# Patient Record
Sex: MALE | Race: WHITE | NOT HISPANIC OR LATINO | URBAN - METROPOLITAN AREA
[De-identification: names, ages, dates, MRNs, and addresses within clinical notes are randomized per-mention and may not be internally consistent; named-entity substitution may affect disease eponyms.]

---

## 2017-01-16 ENCOUNTER — EMERGENCY (EMERGENCY)
Facility: HOSPITAL | Age: 52
LOS: 0 days | Discharge: HOME | End: 2017-01-16
Admitting: EMERGENCY MEDICINE
Payer: COMMERCIAL

## 2017-01-16 DIAGNOSIS — E10.9 TYPE 1 DIABETES MELLITUS WITHOUT COMPLICATIONS: ICD-10-CM

## 2017-01-16 DIAGNOSIS — R10.9 UNSPECIFIED ABDOMINAL PAIN: ICD-10-CM

## 2017-01-16 PROCEDURE — 74177 CT ABD & PELVIS W/CONTRAST: CPT | Mod: 26

## 2017-01-16 PROCEDURE — 71010: CPT | Mod: 26

## 2017-01-16 PROCEDURE — 99285 EMERGENCY DEPT VISIT HI MDM: CPT

## 2017-10-11 ENCOUNTER — INPATIENT (INPATIENT)
Facility: HOSPITAL | Age: 52
LOS: 2 days | Discharge: HOME HEALTH | End: 2017-10-14
Attending: FAMILY MEDICINE | Admitting: FAMILY MEDICINE
Payer: COMMERCIAL

## 2017-10-11 VITALS — HEIGHT: 72 IN | WEIGHT: 225.09 LBS

## 2017-10-11 LAB
ALBUMIN SERPL ELPH-MCNC: 3.8 G/DL — SIGNIFICANT CHANGE UP (ref 3.3–5)
ALP SERPL-CCNC: 61 U/L — SIGNIFICANT CHANGE UP (ref 40–120)
ALT FLD-CCNC: 38 U/L — SIGNIFICANT CHANGE UP (ref 12–78)
ANION GAP SERPL CALC-SCNC: 9 MMOL/L — SIGNIFICANT CHANGE UP (ref 5–17)
APTT BLD: 34.8 SEC — SIGNIFICANT CHANGE UP (ref 27.5–37.4)
AST SERPL-CCNC: 18 U/L — SIGNIFICANT CHANGE UP (ref 15–37)
BASOPHILS # BLD AUTO: 0.1 K/UL — SIGNIFICANT CHANGE UP (ref 0–0.2)
BASOPHILS NFR BLD AUTO: 1.3 % — SIGNIFICANT CHANGE UP (ref 0–2)
BILIRUB SERPL-MCNC: 0.3 MG/DL — SIGNIFICANT CHANGE UP (ref 0.2–1.2)
BUN SERPL-MCNC: 24 MG/DL — HIGH (ref 7–23)
CALCIUM SERPL-MCNC: 8.9 MG/DL — SIGNIFICANT CHANGE UP (ref 8.5–10.1)
CHLORIDE SERPL-SCNC: 103 MMOL/L — SIGNIFICANT CHANGE UP (ref 96–108)
CO2 SERPL-SCNC: 25 MMOL/L — SIGNIFICANT CHANGE UP (ref 22–31)
CREAT SERPL-MCNC: 1.56 MG/DL — HIGH (ref 0.5–1.3)
EOSINOPHIL # BLD AUTO: 0.5 K/UL — SIGNIFICANT CHANGE UP (ref 0–0.5)
EOSINOPHIL NFR BLD AUTO: 5.2 % — SIGNIFICANT CHANGE UP (ref 0–6)
ERYTHROCYTE [SEDIMENTATION RATE] IN BLOOD: 7 MM/HR — SIGNIFICANT CHANGE UP (ref 0–20)
GLUCOSE SERPL-MCNC: 324 MG/DL — HIGH (ref 70–99)
HCT VFR BLD CALC: 38.5 % — LOW (ref 39–50)
HGB BLD-MCNC: 13.8 G/DL — SIGNIFICANT CHANGE UP (ref 13–17)
INR BLD: 0.96 RATIO — SIGNIFICANT CHANGE UP (ref 0.88–1.16)
LYMPHOCYTES # BLD AUTO: 2.6 K/UL — SIGNIFICANT CHANGE UP (ref 1–3.3)
LYMPHOCYTES # BLD AUTO: 30.5 % — SIGNIFICANT CHANGE UP (ref 13–44)
MCHC RBC-ENTMCNC: 30 PG — SIGNIFICANT CHANGE UP (ref 27–34)
MCHC RBC-ENTMCNC: 35.7 GM/DL — SIGNIFICANT CHANGE UP (ref 32–36)
MCV RBC AUTO: 84.1 FL — SIGNIFICANT CHANGE UP (ref 80–100)
MONOCYTES # BLD AUTO: 0.6 K/UL — SIGNIFICANT CHANGE UP (ref 0–0.9)
MONOCYTES NFR BLD AUTO: 6.6 % — SIGNIFICANT CHANGE UP (ref 2–14)
NEUTROPHILS # BLD AUTO: 4.8 K/UL — SIGNIFICANT CHANGE UP (ref 1.8–7.4)
NEUTROPHILS NFR BLD AUTO: 56.2 % — SIGNIFICANT CHANGE UP (ref 43–77)
PLATELET # BLD AUTO: 278 K/UL — SIGNIFICANT CHANGE UP (ref 150–400)
POTASSIUM SERPL-MCNC: 4 MMOL/L — SIGNIFICANT CHANGE UP (ref 3.5–5.3)
POTASSIUM SERPL-SCNC: 4 MMOL/L — SIGNIFICANT CHANGE UP (ref 3.5–5.3)
PROT SERPL-MCNC: 7.1 GM/DL — SIGNIFICANT CHANGE UP (ref 6–8.3)
PROTHROM AB SERPL-ACNC: 10.4 SEC — SIGNIFICANT CHANGE UP (ref 9.8–12.7)
RBC # BLD: 4.58 M/UL — SIGNIFICANT CHANGE UP (ref 4.2–5.8)
RBC # FLD: 12 % — SIGNIFICANT CHANGE UP (ref 10.3–14.5)
SODIUM SERPL-SCNC: 137 MMOL/L — SIGNIFICANT CHANGE UP (ref 135–145)
WBC # BLD: 8.6 K/UL — SIGNIFICANT CHANGE UP (ref 3.8–10.5)
WBC # FLD AUTO: 8.6 K/UL — SIGNIFICANT CHANGE UP (ref 3.8–10.5)

## 2017-10-11 PROCEDURE — 73660 X-RAY EXAM OF TOE(S): CPT | Mod: 26,RT

## 2017-10-11 PROCEDURE — 93010 ELECTROCARDIOGRAM REPORT: CPT

## 2017-10-11 PROCEDURE — 71020: CPT | Mod: 26

## 2017-10-11 PROCEDURE — 99285 EMERGENCY DEPT VISIT HI MDM: CPT

## 2017-10-11 RX ORDER — AZTREONAM 2 G
1000 VIAL (EA) INJECTION ONCE
Qty: 0 | Refills: 0 | Status: COMPLETED | OUTPATIENT
Start: 2017-10-11 | End: 2017-10-11

## 2017-10-11 RX ORDER — AMLODIPINE BESYLATE 2.5 MG/1
10 TABLET ORAL DAILY
Qty: 0 | Refills: 0 | Status: DISCONTINUED | OUTPATIENT
Start: 2017-10-11 | End: 2017-10-14

## 2017-10-11 RX ORDER — AZTREONAM 2 G
VIAL (EA) INJECTION
Qty: 0 | Refills: 0 | Status: DISCONTINUED | OUTPATIENT
Start: 2017-10-11 | End: 2017-10-11

## 2017-10-11 RX ORDER — VANCOMYCIN HCL 1 G
1000 VIAL (EA) INTRAVENOUS ONCE
Qty: 0 | Refills: 0 | Status: COMPLETED | OUTPATIENT
Start: 2017-10-11 | End: 2017-10-11

## 2017-10-11 RX ORDER — METOPROLOL TARTRATE 50 MG
25 TABLET ORAL DAILY
Qty: 0 | Refills: 0 | Status: DISCONTINUED | OUTPATIENT
Start: 2017-10-11 | End: 2017-10-14

## 2017-10-11 RX ADMIN — Medication 50 MILLIGRAM(S): at 21:54

## 2017-10-11 RX ADMIN — Medication 250 MILLIGRAM(S): at 22:35

## 2017-10-11 NOTE — H&P ADULT - PROBLEM SELECTOR PLAN 6
DVT prophylaxis   Hep SQ Systolic murmur   Best heard in left 5th ICS   likely MR   Pt is aware   Denied any CHF hx   No SOb on exertion or chest pain   Outpatient f/u . No recent ECHO

## 2017-10-11 NOTE — ED ADULT TRIAGE NOTE - CHIEF COMPLAINT QUOTE
Right foot 3rd toe infected with oozing and discoloration today. Pt states he has had pain in this toe for 3 weeks and hx of Type II DM just coming off Insulin recently. Possibly has fever and took Khloe Dunlap Cold medicine today "for a bug I might have"

## 2017-10-11 NOTE — ED STATDOCS - ATTENDING CONTRIBUTION TO CARE
Attending Contribution to Care: I, Tomasa Burrell, performed the initial face to face bedside interview with this patient regarding history of present illness, review of symptoms and relevant past medical, social and family history.  I completed an independent physical examination.  I was the initial provider who evaluated this patient and the history, physical, and MDM reflect this intial assessment. I have signed out the follow up of any pending tests after the original (i.e. labs, radiological studies) to the ACP with instructions to review any with instructions to review any concerning findings to me prior to discharge.  I have communicated the patient’s plan of care and disposition with the ACP.

## 2017-10-11 NOTE — ED ADULT NURSE NOTE - OBJECTIVE STATEMENT
complaining of right third toe pain for 3 weeks and states he developed oozing and discoloration today with associated fevers. no other complaints at this time. Hx of diabetes type 2.

## 2017-10-11 NOTE — H&P ADULT - PROBLEM SELECTOR PLAN 2
Type 2 DM   Hold PO meds , was recently switched from lantus to PO meds   Hold metformin , januvia   Hold trulicity (dulaglutide) 0.75 mg weekly   start lantus + ISS  Hyperglycemia could be secondary to infection. Pt reports good copntrol otherwise since his weight loss  Pt is on ACE but held for now   not on a statin , but is on fenofibrate . No prior hx of statin use or side effects Type 2 DM   Hold PO meds , was recently switched from lantus to PO meds   Hold metformin , januvia   Hold trulicity (dulaglutide) 0.75 mg weekly   start lantus 15 units + ISS  Hyperglycemia could be secondary to infection. Pt reports good copntrol otherwise since his weight loss  Pt is on ACE but held for now   not on a statin , but is on fenofibrate . No prior hx of statin use or side effects

## 2017-10-11 NOTE — H&P ADULT - PROBLEM SELECTOR PLAN 1
Admit to Med surg under Dr. Fournier   cont with Vancomycin and Aztreonam to cover for MRSA and Pseudomonas  Allergic to penicillin- gets itching and possible hives no SOB  Cannot r/o Osteomyelitis suggest MRI foot   ID consult   Afebrile   Local wound care and F/u with podiatry Admit to Med surg under Dr. Fournier   cont with Vancomycin and Aztreonam 2 gm Q8H , given gm ion ED , will give another 1 gm stat for now  to cover for MRSA and Pseudomonas  Allergic to penicillin- gets itching and possible hives no SOB  Cannot r/o Osteomyelitis suggest MRI foot   ID consult   Afebrile   Local wound care and F/u with podiatry  Blood cultures sent

## 2017-10-11 NOTE — H&P ADULT - NSHPLABSRESULTS_GEN_ALL_CORE
Right foot xray soft tissue swelling noted , await formal read   CXR : normal                  13.8   8.6   )-----------( 278      ( 11 Oct 2017 21:30 )             38.5     11 Oct 2017 21:30    137    |  103    |  24     ----------------------------<  324    4.0     |  25     |  1.56     Ca    8.9        11 Oct 2017 21:30    TPro  7.1    /  Alb  3.8    /  TBili  0.3    /  DBili  x      /  AST  18     /  ALT  38     /  AlkPhos  61     11 Oct 2017 21:30    PT/INR - ( 11 Oct 2017 21:30 )   PT: 10.4 sec;   INR: 0.96 ratio         PTT - ( 11 Oct 2017 21:30 )  PTT:34.8 sec  CAPILLARY BLOOD GLUCOSE        LIVER FUNCTIONS - ( 11 Oct 2017 21:30 )  Alb: 3.8 g/dL / Pro: 7.1 gm/dL / ALK PHOS: 61 U/L / ALT: 38 U/L / AST: 18 U/L / GGT: x

## 2017-10-11 NOTE — ED STATDOCS - PROGRESS NOTE DETAILS
VIOLETA Toussaint:   Patient has been seen, evaluated and orders have been written by the attending in intake. Patient is stable.  I will follow up the results of orders written and I will continue to evaluate/observe the patient.  Mona Toussaint PA-C Podiatry resident at bedside performing I&D.  Mona Toussaint PA-C

## 2017-10-11 NOTE — H&P ADULT - ASSESSMENT
Pt is a 51 yo M with Pmhx of DM (diagnosed 19 yrs ago, was on insulin for 10 yrs, recently d/c ed , 1 week ago after 60 lbs weight loss ) and HTN who comes with 2 week hx of right 3rd toe discoloration after he changed footware . he noticed his toe is rubbing and noticed some discoloration but did not seek medical help. Following this he bumped his same toe 1 week ago into a trolley wheel and noticed some bruising. He mentioned it was painful but not unbearable and could palpate it . Denied any fever. Today in am ( 10/11) he noticed pus from the underside of the toe and came to the ER where he was found to be mildly elevated creatinine and hyperglycemia. Pt was started on Abx for cellulitis and possible Osteomyelitis. Was seen and eval by Podiatry who manually exuded pus from the wound and recommended IV abx for now.

## 2017-10-11 NOTE — ED ADULT NURSE NOTE - CHIEF COMPLAINT QUOTE
Right foot 3rd toe infected with oozing and discoloration today. Pt states he has had pain in this toe for 3 weeks and hx of Type II DM just coming off Insulin recently. Possibly has fever and took Klhoe Dunlap Cold medicine today "for a bug I might have"

## 2017-10-11 NOTE — H&P ADULT - NSHPPHYSICALEXAM_GEN_ALL_CORE
ICU Vital Signs Last 24 Hrs  T(C): 37.1 (11 Oct 2017 20:53), Max: 37.1 (11 Oct 2017 20:53)  T(F): 98.8 (11 Oct 2017 20:53), Max: 98.8 (11 Oct 2017 20:53)  HR: 88 (11 Oct 2017 20:53) (88 - 88)  BP: 143/85 (11 Oct 2017 20:53) (143/85 - 143/85)  RR: 16 (11 Oct 2017 20:53) (16 - 16)  SpO2: 98% (11 Oct 2017 20:53) (98% - 98%)      PHYSICAL EXAM:    GENERAL: NAD, well-groomed, well-developed  HEAD:  NC/AT  EYES: EOMI, PERRLA, no scleral icterus  HEENT: Moist mucous membranes  NECK: Supple, No JVD  CNS:  Alert & Oriented X3, Motor Strength 5/5 B/L upper and lower extremities; DTRs 2+ intact   LUNG: Clear to auscultation bilaterally; No rales, rhonchi, wheezing, or rubs  HEART: RRR rubs, or gallops  ABDOMEN: +BS, ST/ND/NT  GENITOURINARY- Voiding, Bladder not distended  EXTREMITIES:  2+ Peripheral Pulses, No clubbing, cyanosis, or edema  MUSCULOSKELTAL- Joints normal ROM, no Muscle or joint tenderness ICU Vital Signs Last 24 Hrs  T(C): 37.1 (11 Oct 2017 20:53), Max: 37.1 (11 Oct 2017 20:53)  T(F): 98.8 (11 Oct 2017 20:53), Max: 98.8 (11 Oct 2017 20:53)  HR: 88 (11 Oct 2017 20:53) (88 - 88)  BP: 143/85 (11 Oct 2017 20:53) (143/85 - 143/85)  RR: 16 (11 Oct 2017 20:53) (16 - 16)  SpO2: 98% (11 Oct 2017 20:53) (98% - 98%)      PHYSICAL EXAM:    GENERAL: NAD, well-groomed, well-developed  HEAD:  NC/AT  EYES: EOMI, PERRLA, no scleral icterus  HEENT: Moist mucous membranes. No erythema noted  NECK: Supple, No JVD  CNS:  Alert & Oriented X3, Motor Strength 5/5 B/L upper and lower extremities; DTRs 2+ intact   LUNG: Clear to auscultation bilaterally; No rales, rhonchi, wheezing, or rubs  HEART: RRR rubs, or gallops Systolic murmur + , best heard in left 5th ICS  ABDOMEN: +BS, ST/ND/NT  GENITOURINARY- Voiding, Bladder not distended  EXTREMITIES:  2+ Peripheral Pulses, No clubbing, cyanosis, or edema  Local Exam : right 3rd toe erythema + on dorsal surface , open wound no discharge on pulp of toe . Non tender. No fluctuance noted , Swelling +   callosity noted between 4th and 5th left toe   MUSCULOSKELTAL- Joints normal ROM, no Muscle or joint tenderness

## 2017-10-11 NOTE — H&P ADULT - PROBLEM SELECTOR PLAN 5
prev diagnosed , never on CPAP   lost 60 lbs , no formal sleep studies done but pt reports resolution of symptoms exp before.

## 2017-10-11 NOTE — ED STATDOCS - CARE PLAN
Principal Discharge DX:	Diabetes mellitus  Secondary Diagnosis:	Renal insufficiency  Secondary Diagnosis:	Cellulitis of toe

## 2017-10-11 NOTE — H&P ADULT - PROBLEM SELECTOR PLAN 3
Baseline cr unknown   2016 march Cr was 1.15 , jan 2017 cr was 1.59   - Hold ACE (fosinopril)  f/u BMP in am   encourage PO fluids. Baseline cr unknown   2016 march Cr was 1.15 , jan 2017 cr was 1.59   - Hold ACE (fosinopril)  f/u BMP in am   encourage PO fluids.  cont IV fluids at 100 ml/hr for 10 hrs  urine Na and Urine Cr.

## 2017-10-11 NOTE — ED STATDOCS - OBJECTIVE STATEMENT
51 y/o male with PMHx of T2DM presents to the ED c/o right third toe pain. He states his 3rd toe has become infected with oozing and discoloration today.  Pt states he has had pain in this toe for 3 weeks.  Wife states he has had fevers over the last few days.  Allergic to penicillin.  Dr. uJárez podiatrist.

## 2017-10-11 NOTE — H&P ADULT - NSHPREVIEWOFSYSTEMS_GEN_ALL_CORE
REVIEW OF SYSTEMS:    CONSTITUTIONAL: No weakness, fevers or chills  EYES/ENT: No visual changes;  No vertigo  c/o sore throat   NECK: No pain or stiffness  RESPIRATORY: No cough, wheezing, hemoptysis; No shortness of breath  CARDIOVASCULAR: No chest pain or palpitations  GASTROINTESTINAL: No abdominal or epigastric pain. No nausea, vomiting, or hematemesis; No diarrhea or constipation. No melena or hematochezia.  GENITOURINARY: No dysuria, frequency or hematuria  NEUROLOGICAL: No numbness or weakness  SKIN: No itching, burning  c/o redness on 3rd toe right foot   All other review of systems is negative unless indicated above.

## 2017-10-11 NOTE — CONSULT NOTE ADULT - ATTENDING COMMENTS
I reviewed the patient with the resident and agree with assessment and plan.  Patient to be admitted for IV abx and for hyperglycemia.  Wound care to be performed with saline flush, bactroban and dsd.  Patient will need a debridement of wound which can be done bedside on the floor.

## 2017-10-11 NOTE — H&P ADULT - ATTENDING COMMENTS
53 y/o M PMHx significant as noted above presents to the ED for further evaluation/management of right 3rd toe cellulitis.    1)Right 3rd Toe Cellulitis; r/o Osteomyelitis  ~admit to Medicine  ~f/u PAN C+S  ~cont. IV abx  ~f/u w/ Podiatry in the am  ~f/u w/ ID consultation  ~cont. local wound care    2)DM  ~FS qAC  ~cont. Basal/Bolus insulin regimen    3)VIKKI on CKD  ~cont. trial of IV Fluids  ~f/u urine studies  ~f/u repeat labs in the am    4)HTN  ~cont. as above  ~will hold ACE-I for now; reassess in the am    5)Vte ppx  ~cont. Heparin sq

## 2017-10-12 DIAGNOSIS — E11.9 TYPE 2 DIABETES MELLITUS WITHOUT COMPLICATIONS: ICD-10-CM

## 2017-10-12 DIAGNOSIS — I10 ESSENTIAL (PRIMARY) HYPERTENSION: ICD-10-CM

## 2017-10-12 DIAGNOSIS — Z29.9 ENCOUNTER FOR PROPHYLACTIC MEASURES, UNSPECIFIED: ICD-10-CM

## 2017-10-12 DIAGNOSIS — N28.9 DISORDER OF KIDNEY AND URETER, UNSPECIFIED: ICD-10-CM

## 2017-10-12 DIAGNOSIS — R01.1 CARDIAC MURMUR, UNSPECIFIED: ICD-10-CM

## 2017-10-12 DIAGNOSIS — G47.33 OBSTRUCTIVE SLEEP APNEA (ADULT) (PEDIATRIC): ICD-10-CM

## 2017-10-12 DIAGNOSIS — L03.039 CELLULITIS OF UNSPECIFIED TOE: ICD-10-CM

## 2017-10-12 LAB
ALBUMIN SERPL ELPH-MCNC: 3.6 G/DL — SIGNIFICANT CHANGE UP (ref 3.3–5)
ALP SERPL-CCNC: 43 U/L — SIGNIFICANT CHANGE UP (ref 40–120)
ALT FLD-CCNC: 33 U/L — SIGNIFICANT CHANGE UP (ref 12–78)
ANION GAP SERPL CALC-SCNC: 6 MMOL/L — SIGNIFICANT CHANGE UP (ref 5–17)
AST SERPL-CCNC: 15 U/L — SIGNIFICANT CHANGE UP (ref 15–37)
BASOPHILS # BLD AUTO: 0.1 K/UL — SIGNIFICANT CHANGE UP (ref 0–0.2)
BASOPHILS NFR BLD AUTO: 1.1 % — SIGNIFICANT CHANGE UP (ref 0–2)
BILIRUB SERPL-MCNC: 0.3 MG/DL — SIGNIFICANT CHANGE UP (ref 0.2–1.2)
BUN SERPL-MCNC: 21 MG/DL — SIGNIFICANT CHANGE UP (ref 7–23)
CALCIUM SERPL-MCNC: 8.9 MG/DL — SIGNIFICANT CHANGE UP (ref 8.5–10.1)
CHLORIDE SERPL-SCNC: 105 MMOL/L — SIGNIFICANT CHANGE UP (ref 96–108)
CHOLEST SERPL-MCNC: 162 MG/DL — SIGNIFICANT CHANGE UP (ref 10–199)
CO2 SERPL-SCNC: 30 MMOL/L — SIGNIFICANT CHANGE UP (ref 22–31)
CREAT ?TM UR-MCNC: 35 MG/DL — SIGNIFICANT CHANGE UP
CREAT SERPL-MCNC: 1.34 MG/DL — HIGH (ref 0.5–1.3)
CRP SERPL-MCNC: 0.3 MG/DL — SIGNIFICANT CHANGE UP (ref 0–0.4)
EOSINOPHIL # BLD AUTO: 0.5 K/UL — SIGNIFICANT CHANGE UP (ref 0–0.5)
EOSINOPHIL NFR BLD AUTO: 4.7 % — SIGNIFICANT CHANGE UP (ref 0–6)
GLUCOSE BLDC GLUCOMTR-MCNC: 151 MG/DL — HIGH (ref 70–99)
GLUCOSE BLDC GLUCOMTR-MCNC: 152 MG/DL — HIGH (ref 70–99)
GLUCOSE BLDC GLUCOMTR-MCNC: 180 MG/DL — HIGH (ref 70–99)
GLUCOSE BLDC GLUCOMTR-MCNC: 213 MG/DL — HIGH (ref 70–99)
GLUCOSE SERPL-MCNC: 188 MG/DL — HIGH (ref 70–99)
HBA1C BLD-MCNC: 8.4 % — HIGH (ref 4–5.6)
HCT VFR BLD CALC: 40.8 % — SIGNIFICANT CHANGE UP (ref 39–50)
HDLC SERPL-MCNC: 24 MG/DL — LOW (ref 40–125)
HGB BLD-MCNC: 13.9 G/DL — SIGNIFICANT CHANGE UP (ref 13–17)
INR BLD: 0.98 RATIO — SIGNIFICANT CHANGE UP (ref 0.88–1.16)
LIPID PNL WITH DIRECT LDL SERPL: 74 MG/DL — SIGNIFICANT CHANGE UP
LYMPHOCYTES # BLD AUTO: 1.6 K/UL — SIGNIFICANT CHANGE UP (ref 1–3.3)
LYMPHOCYTES # BLD AUTO: 14.7 % — SIGNIFICANT CHANGE UP (ref 13–44)
MCHC RBC-ENTMCNC: 29.4 PG — SIGNIFICANT CHANGE UP (ref 27–34)
MCHC RBC-ENTMCNC: 34 GM/DL — SIGNIFICANT CHANGE UP (ref 32–36)
MCV RBC AUTO: 86.5 FL — SIGNIFICANT CHANGE UP (ref 80–100)
MONOCYTES # BLD AUTO: 1 K/UL — HIGH (ref 0–0.9)
MONOCYTES NFR BLD AUTO: 8.8 % — SIGNIFICANT CHANGE UP (ref 2–14)
NEUTROPHILS # BLD AUTO: 7.9 K/UL — HIGH (ref 1.8–7.4)
NEUTROPHILS NFR BLD AUTO: 70.7 % — SIGNIFICANT CHANGE UP (ref 43–77)
PLATELET # BLD AUTO: 256 K/UL — SIGNIFICANT CHANGE UP (ref 150–400)
POTASSIUM SERPL-MCNC: 4.8 MMOL/L — SIGNIFICANT CHANGE UP (ref 3.5–5.3)
POTASSIUM SERPL-SCNC: 4.8 MMOL/L — SIGNIFICANT CHANGE UP (ref 3.5–5.3)
PROT SERPL-MCNC: 6.7 GM/DL — SIGNIFICANT CHANGE UP (ref 6–8.3)
PROTHROM AB SERPL-ACNC: 10.6 SEC — SIGNIFICANT CHANGE UP (ref 9.8–12.7)
RBC # BLD: 4.72 M/UL — SIGNIFICANT CHANGE UP (ref 4.2–5.8)
RBC # FLD: 12.4 % — SIGNIFICANT CHANGE UP (ref 10.3–14.5)
SODIUM SERPL-SCNC: 141 MMOL/L — SIGNIFICANT CHANGE UP (ref 135–145)
SODIUM UR-SCNC: 114 MMOL/L — SIGNIFICANT CHANGE UP
TOTAL CHOLESTEROL/HDL RATIO MEASUREMENT: 6.8 RATIO — SIGNIFICANT CHANGE UP (ref 3.4–9.6)
TRIGL SERPL-MCNC: 322 MG/DL — HIGH (ref 10–149)
WBC # BLD: 11.2 K/UL — HIGH (ref 3.8–10.5)
WBC # FLD AUTO: 11.2 K/UL — HIGH (ref 3.8–10.5)

## 2017-10-12 PROCEDURE — 73720 MRI LWR EXTREMITY W/O&W/DYE: CPT | Mod: 26,RT

## 2017-10-12 RX ORDER — CEFEPIME 1 G/1
INJECTION, POWDER, FOR SOLUTION INTRAMUSCULAR; INTRAVENOUS
Qty: 0 | Refills: 0 | Status: DISCONTINUED | OUTPATIENT
Start: 2017-10-12 | End: 2017-10-13

## 2017-10-12 RX ORDER — DEXTROSE 50 % IN WATER 50 %
25 SYRINGE (ML) INTRAVENOUS ONCE
Qty: 0 | Refills: 0 | Status: DISCONTINUED | OUTPATIENT
Start: 2017-10-12 | End: 2017-10-14

## 2017-10-12 RX ORDER — INFLUENZA VIRUS VACCINE 15; 15; 15; 15 UG/.5ML; UG/.5ML; UG/.5ML; UG/.5ML
0.5 SUSPENSION INTRAMUSCULAR ONCE
Qty: 0 | Refills: 0 | Status: COMPLETED | OUTPATIENT
Start: 2017-10-12 | End: 2017-10-12

## 2017-10-12 RX ORDER — CEFEPIME 1 G/1
1000 INJECTION, POWDER, FOR SOLUTION INTRAMUSCULAR; INTRAVENOUS EVERY 12 HOURS
Qty: 0 | Refills: 0 | Status: DISCONTINUED | OUTPATIENT
Start: 2017-10-12 | End: 2017-10-13

## 2017-10-12 RX ORDER — SODIUM CHLORIDE 9 MG/ML
1000 INJECTION, SOLUTION INTRAVENOUS
Qty: 0 | Refills: 0 | Status: DISCONTINUED | OUTPATIENT
Start: 2017-10-12 | End: 2017-10-14

## 2017-10-12 RX ORDER — DEXTROSE 50 % IN WATER 50 %
1 SYRINGE (ML) INTRAVENOUS ONCE
Qty: 0 | Refills: 0 | Status: DISCONTINUED | OUTPATIENT
Start: 2017-10-12 | End: 2017-10-14

## 2017-10-12 RX ORDER — INSULIN LISPRO 100/ML
VIAL (ML) SUBCUTANEOUS
Qty: 0 | Refills: 0 | Status: DISCONTINUED | OUTPATIENT
Start: 2017-10-12 | End: 2017-10-14

## 2017-10-12 RX ORDER — HEPARIN SODIUM 5000 [USP'U]/ML
5000 INJECTION INTRAVENOUS; SUBCUTANEOUS EVERY 8 HOURS
Qty: 0 | Refills: 0 | Status: DISCONTINUED | OUTPATIENT
Start: 2017-10-12 | End: 2017-10-14

## 2017-10-12 RX ORDER — FENOFIBRATE,MICRONIZED 130 MG
145 CAPSULE ORAL DAILY
Qty: 0 | Refills: 0 | Status: DISCONTINUED | OUTPATIENT
Start: 2017-10-12 | End: 2017-10-14

## 2017-10-12 RX ORDER — GLUCAGON INJECTION, SOLUTION 0.5 MG/.1ML
1 INJECTION, SOLUTION SUBCUTANEOUS ONCE
Qty: 0 | Refills: 0 | Status: DISCONTINUED | OUTPATIENT
Start: 2017-10-12 | End: 2017-10-14

## 2017-10-12 RX ORDER — VANCOMYCIN HCL 1 G
750 VIAL (EA) INTRAVENOUS ONCE
Qty: 0 | Refills: 0 | Status: COMPLETED | OUTPATIENT
Start: 2017-10-12 | End: 2017-10-12

## 2017-10-12 RX ORDER — AZTREONAM 2 G
2000 VIAL (EA) INJECTION EVERY 8 HOURS
Qty: 0 | Refills: 0 | Status: DISCONTINUED | OUTPATIENT
Start: 2017-10-12 | End: 2017-10-12

## 2017-10-12 RX ORDER — FENOFIBRATE,MICRONIZED 130 MG
160 CAPSULE ORAL DAILY
Qty: 0 | Refills: 0 | Status: DISCONTINUED | OUTPATIENT
Start: 2017-10-12 | End: 2017-10-12

## 2017-10-12 RX ORDER — DEXTROSE 50 % IN WATER 50 %
12.5 SYRINGE (ML) INTRAVENOUS ONCE
Qty: 0 | Refills: 0 | Status: DISCONTINUED | OUTPATIENT
Start: 2017-10-12 | End: 2017-10-14

## 2017-10-12 RX ORDER — AZTREONAM 2 G
1000 VIAL (EA) INJECTION ONCE
Qty: 0 | Refills: 0 | Status: COMPLETED | OUTPATIENT
Start: 2017-10-12 | End: 2017-10-12

## 2017-10-12 RX ORDER — VANCOMYCIN HCL 1 G
VIAL (EA) INTRAVENOUS
Qty: 0 | Refills: 0 | Status: DISCONTINUED | OUTPATIENT
Start: 2017-10-12 | End: 2017-10-14

## 2017-10-12 RX ORDER — CEFEPIME 1 G/1
1000 INJECTION, POWDER, FOR SOLUTION INTRAMUSCULAR; INTRAVENOUS ONCE
Qty: 0 | Refills: 0 | Status: COMPLETED | OUTPATIENT
Start: 2017-10-12 | End: 2017-10-12

## 2017-10-12 RX ORDER — SODIUM CHLORIDE 9 MG/ML
1000 INJECTION INTRAMUSCULAR; INTRAVENOUS; SUBCUTANEOUS
Qty: 0 | Refills: 0 | Status: DISCONTINUED | OUTPATIENT
Start: 2017-10-12 | End: 2017-10-13

## 2017-10-12 RX ORDER — INSULIN GLARGINE 100 [IU]/ML
15 INJECTION, SOLUTION SUBCUTANEOUS EVERY MORNING
Qty: 0 | Refills: 0 | Status: DISCONTINUED | OUTPATIENT
Start: 2017-10-12 | End: 2017-10-14

## 2017-10-12 RX ORDER — ACETAMINOPHEN 500 MG
650 TABLET ORAL EVERY 6 HOURS
Qty: 0 | Refills: 0 | Status: DISCONTINUED | OUTPATIENT
Start: 2017-10-12 | End: 2017-10-14

## 2017-10-12 RX ORDER — VANCOMYCIN HCL 1 G
750 VIAL (EA) INTRAVENOUS EVERY 12 HOURS
Qty: 0 | Refills: 0 | Status: DISCONTINUED | OUTPATIENT
Start: 2017-10-12 | End: 2017-10-14

## 2017-10-12 RX ADMIN — Medication 2: at 12:02

## 2017-10-12 RX ADMIN — CEFEPIME 100 MILLIGRAM(S): 1 INJECTION, POWDER, FOR SOLUTION INTRAMUSCULAR; INTRAVENOUS at 17:17

## 2017-10-12 RX ADMIN — HEPARIN SODIUM 5000 UNIT(S): 5000 INJECTION INTRAVENOUS; SUBCUTANEOUS at 05:45

## 2017-10-12 RX ADMIN — Medication 1: at 17:14

## 2017-10-12 RX ADMIN — HEPARIN SODIUM 5000 UNIT(S): 5000 INJECTION INTRAVENOUS; SUBCUTANEOUS at 22:08

## 2017-10-12 RX ADMIN — Medication 1: at 08:01

## 2017-10-12 RX ADMIN — Medication 150 MILLIGRAM(S): at 12:02

## 2017-10-12 RX ADMIN — INFLUENZA VIRUS VACCINE 0.5 MILLILITER(S): 15; 15; 15; 15 SUSPENSION INTRAMUSCULAR at 15:08

## 2017-10-12 RX ADMIN — AMLODIPINE BESYLATE 10 MILLIGRAM(S): 2.5 TABLET ORAL at 05:47

## 2017-10-12 RX ADMIN — SODIUM CHLORIDE 100 MILLILITER(S): 9 INJECTION INTRAMUSCULAR; INTRAVENOUS; SUBCUTANEOUS at 11:12

## 2017-10-12 RX ADMIN — Medication 145 MILLIGRAM(S): at 11:10

## 2017-10-12 RX ADMIN — CEFEPIME 100 MILLIGRAM(S): 1 INJECTION, POWDER, FOR SOLUTION INTRAMUSCULAR; INTRAVENOUS at 11:12

## 2017-10-12 RX ADMIN — SODIUM CHLORIDE 100 MILLILITER(S): 9 INJECTION INTRAMUSCULAR; INTRAVENOUS; SUBCUTANEOUS at 02:04

## 2017-10-12 RX ADMIN — HEPARIN SODIUM 5000 UNIT(S): 5000 INJECTION INTRAVENOUS; SUBCUTANEOUS at 15:08

## 2017-10-12 RX ADMIN — Medication 25 MILLIGRAM(S): at 05:47

## 2017-10-12 RX ADMIN — Medication 50 MILLIGRAM(S): at 02:44

## 2017-10-12 RX ADMIN — Medication 150 MILLIGRAM(S): at 18:59

## 2017-10-12 RX ADMIN — INSULIN GLARGINE 15 UNIT(S): 100 INJECTION, SOLUTION SUBCUTANEOUS at 11:10

## 2017-10-12 NOTE — PROGRESS NOTE ADULT - PROBLEM SELECTOR PLAN 6
Systolic murmur   Best heard in left 5th ICS   likely MR   Pt is aware   Denied any CHF hx   No SOb on exertion or chest pain   Outpatient f/u . No recent ECHO

## 2017-10-12 NOTE — CONSULT NOTE ADULT - ASSESSMENT
Pt is a 51 yo M with Pmhx of DM (diagnosed 19 yrs ago, was on insulin for 10 yrs, recently d/c ed , 1 week ago after 60 lbs weight loss) and HTN admitted 10/12 with  2 week hx of right 3rd toe discoloration/swelling pain. Pt reports bumping his toe few weeks ago into a trolley wheel and noticed some bruising after with ulcer which began to worsen over time with worsening pain and difficulty ambulating, day prior to admit, he noticed pus from his toe prompting admission, here afebrile, normal wbc ct, xray R foot normal, was given IV vancomycin/aztreonam d/t PCN allergy of itching.     1. R 3rd toe cellulitis/ulcer/PCN allergy/VIKKI  - PCN allergy of itching no anaphlyaxis   - d/c aztreonam and start IV cefepime [id number]   -monitor for side effects/rash  - continue with vancomycin [id number], renally-dose adjusted, check trough prior to 4th dose for mrsa coverage  - f/u cultures  - podiatry eval  - f/u MRI for further eval  - f/u cbc  - monitor temps  -tolerating abx well so far; no side effects noted  -reason for abx use and side effects reviewed with patient; monitor BMP and vancomycin trough levels  - supportive care

## 2017-10-12 NOTE — ED ADULT NURSE REASSESSMENT NOTE - NS ED NURSE REASSESS COMMENT FT1
Hospitalist Dr. Pereira at bedside.
vanco started per flowsheet. patient has no complaints at this time.
Patient has no complaints at this time. pending hospitalist orders.

## 2017-10-12 NOTE — CONSULT NOTE ADULT - SUBJECTIVE AND OBJECTIVE BOX
HPI:   51 y/o male with PMHx of T2DM is seen and evaluated for right third toe pain. Pt states he bumped into a bed and noticed his right toe bleeding last week. Pt states he put a toe foam to cover it and it has been becoming more swelling and red. Patient noticed pus coming out of the tip of the toe today. Pt reports minimal pain at the 3rd right toe and mild tenderness upon squeezing. Pt also experienced sweating and sore throat today and said people are getting sick at home. Pt states he used to see his podiatrist Dr. Juárez every month but he has not seen him for 6 months. Pt denies fever, chill, nausea, vomiting, SOB, and chest pain.       REVIEW OF SYSTEMS: all other review of systems are negative other than per HPI.     Allergies    penicillin (Hives)    MEDICATIONS  (STANDING):  vancomycin  IVPB 1000 milliGRAM(s) IV Intermittent once    PAST MEDICAL and Past Surgical History: DM type II    SOCIAL HISTORY: , live with his wife.   FAMILY HISTORY: no pertinent family history       VITALS:  Vital Signs Last 24 Hrs  T(C): 37.1 (10-11-17 @ 20:53), Max: 37.1 (10-11-17 @ 20:53)  T(F): 98.8 (10-11-17 @ 20:53), Max: 98.8 (10-11-17 @ 20:53)  HR: 88 (10-11-17 @ 20:53) (88 - 88)  BP: 143/85 (10-11-17 @ 20:53) (143/85 - 143/85)  BP(mean): --  RR: 16 (10-11-17 @ 20:53) (16 - 16)  SpO2: 98% (10-11-17 @ 20:53) (98% - 98%)        PHYSICAL EXAM:  Constitutional: AAOx3, non-focal; NAD, comfortable  Lower extremity physical exam:  Derm:   Right foot: erythema and non pitting edema noted at the 3rd digit. Hyperkeratotic lesion noted at the distal tip of the 3rd digit with a small opening; no malodor, no probe to bone, + drops of purulence upon squeezing, no fluctuance. Hyperkeratotic lesion noted at the medial plantar aspect of the hallux. No interdigital maceration. Nails are cut to hygenic length, non dystrophic x 5.     Left foot: Hyperkeratotic lesion noted at the medial plantar aspect of the hallux. No open lesion, no malodor, no clinical signs of infection. No interdigital maceration. Nails are cut to hygenic length, non dystrophic x 5.     Vasc:  Pedal pulses DP and PT palpable 2/4 B/L. CFT immediate x10. Pedal hair presents B/L.   Neuro: protective sensation ipswich touch test intact 4/4 B/L.  Ortho: Tenderness upon palpation and ROM of the 3rd digit, right foot. No pain upon palpation of the calf muscle B/L. Lower extremity compartments are soft, non tender. Lower extremity strength 5/5 inversion, eversion, dorsiflexion and plantar flexion B/L. Hammer toes 2, 3, 4 B/L.     LABS:                          13.8   8.6   )-----------( 278      ( 11 Oct 2017 21:30 )             38.5       10-11    137  |  103  |  24<H>  ----------------------------<  324<H>  4.0   |  25  |  1.56<H>    Ca    8.9      11 Oct 2017 21:30    TPro  7.1  /  Alb  3.8  /  TBili  0.3  /  DBili  x   /  AST  18  /  ALT  38  /  AlkPhos  61  10-11      PT/INR - ( 11 Oct 2017 21:30 )   PT: 10.4 sec;   INR: 0.96 ratio         PTT - ( 11 Oct 2017 21:30 )  PTT:34.8 sec    X-ray right toes: official report pending. Initial podiatry reading: soft tissue swelling at the 3rd digit. No fracture, no dislocation, no bony lesion, no signs of osteomyelitis, joints are congruent,
Patient is a 52y old  Male who presents with a chief complaint of Toe pain/infection (12 Oct 2017 02:12)      HPI:  Pt is a 51 yo M with Pmhx of DM (diagnosed 19 yrs ago, was on insulin for 10 yrs, recently d/c ed , 1 week ago after 60 lbs weight loss ) and HTN admitted 10/12 with  2 week hx of right 3rd toe discoloration/swelling pain. Pt reports bumping his toe few weeks ago into a trolley wheel and noticed some bruising after with ulcer which began to worsen over time with worsening pain and difficulty ambulating, day prior to admit, he noticed pus from his toe prompting admission, here afebrile, normal wbc ct, xray R foot normal, was given IV vancomycin/aztreonam d/t PCN allergy of itching.       PMH: as above    PSH: as above    Meds: per reconciliation sheet, noted below    MEDICATIONS  (STANDING):  amLODIPine   Tablet 10 milliGRAM(s) Oral daily  cefepime  IVPB      dextrose 5%. 1000 milliLiter(s) (50 mL/Hr) IV Continuous <Continuous>  dextrose 50% Injectable 12.5 Gram(s) IV Push once  dextrose 50% Injectable 25 Gram(s) IV Push once  dextrose 50% Injectable 25 Gram(s) IV Push once  fenofibrate Tablet 145 milliGRAM(s) Oral daily  heparin  Injectable 5000 Unit(s) SubCutaneous every 8 hours  influenza   Vaccine 0.5 milliLiter(s) IntraMuscular once  insulin glargine Injectable (LANTUS) 15 Unit(s) SubCutaneous every morning  insulin lispro (HumaLOG) corrective regimen sliding scale   SubCutaneous three times a day before meals  metoprolol succinate ER 25 milliGRAM(s) Oral daily  sodium chloride 0.9%. 1000 milliLiter(s) (100 mL/Hr) IV Continuous <Continuous>  vancomycin  IVPB          Allergies    penicillin (Hives)    Intolerances        Social: no smoking, no alcohol, no illegal drugs; no recent travel, no exposure to TB    Family history:  Family history of diabetes mellitus (Mother, Sibling)      ROS: the patient denies fever, no chills, no HA, no dizziness, no sore throat, no blurry vision, no CP, no palpitations, no SOB, no cough, no abdominal pain, no diarrhea, no N/V, no dysuria, no leg pain, no claudication,  no rectal pain or bleeding, no night sweats    Vital Signs Last 24 Hrs  T(C): 36.4 (12 Oct 2017 05:40), Max: 37.1 (11 Oct 2017 20:53)  T(F): 97.6 (12 Oct 2017 05:40), Max: 98.8 (11 Oct 2017 20:53)  HR: 61 (12 Oct 2017 05:40) (61 - 88)  BP: 125/78 (12 Oct 2017 05:40) (121/65 - 143/85)  BP(mean): --  RR: 16 (12 Oct 2017 05:40) (16 - 18)  SpO2: 100% (12 Oct 2017 05:40) (98% - 100%)      PE:  Constitutional: NAD  HEENT: NC/AT, EOMI, PERRLA  Neck: supple  Back: no tenderness  Respiratory: clear  Cardiovascular: S1S2 regular, no murmurs  Abdomen: soft, not tender, not distended, positive BS  Genitourinary: deferred  Rectal: deferred  Musculoskeletal: no muscle tenderness, no joint swelling or tenderness  Extremities: R 3rd toe with erythema, edema, small ulcer along distal portion, no PTB, no drainage  Neurological: AxOx3, moving all extremities, no focal deficits  Skin: no rashes    Labs:                        13.9   11.2  )-----------( 256      ( 12 Oct 2017 05:21 )             40.8     10-12    141  |  105  |  21  ----------------------------<  188<H>  4.8   |  30  |  1.34<H>    Ca    8.9      12 Oct 2017 05:21    TPro  6.7  /  Alb  3.6  /  TBili  0.3  /  DBili  x   /  AST  15  /  ALT  33  /  AlkPhos  43  10-12     LIVER FUNCTIONS - ( 12 Oct 2017 05:21 )  Alb: 3.6 g/dL / Pro: 6.7 gm/dL / ALK PHOS: 43 U/L / ALT: 33 U/L / AST: 15 U/L / GGT: x                   Radiology:    Advanced directives addressed: full resuscitation

## 2017-10-12 NOTE — PROGRESS NOTE ADULT - PROBLEM SELECTOR PLAN 2
Type 2 DM   Hold PO meds , was recently switched from lantus to PO meds ?  HB A1c 8.8  On Metformin, januvia and  trulicity (dulaglutide) 0.75 mg weekly   started lantus 15 units + ISS, adjust PRN   Hyperglycemia could be secondary to infection. Pt reports good control otherwise since his weight loss? HBA1c 8.8  Pt is on ACE but held for now   not on a statin , but is on fenofibrate . No prior hx of statin use or side effects

## 2017-10-12 NOTE — PROGRESS NOTE ADULT - ASSESSMENT
Pt is a 53 yo M with Pmhx of DM (diagnosed 19 yrs ago, was on insulin for 10 yrs, recently d/c ed , 1 week ago after 60 lbs weight loss ) and HTN  admitted for R 3rd toe cellulitis, r/o OM

## 2017-10-12 NOTE — PROGRESS NOTE ADULT - ATTENDING COMMENTS
Patient was seen with the resident.  Debridement of the ulceration was performed.  MRI was ordered to R/O OM of the distal phalanx.  Suspicion is low based on clinical evaluation, however still possible.    Local wound care is to be performed with saline flush, bactroban and dsd.

## 2017-10-12 NOTE — PROGRESS NOTE ADULT - SUBJECTIVE AND OBJECTIVE BOX
CC: Toe pain/infection (12 Oct 2017 02:12)    HPI:  Pt is a 53 yo M with Pmhx of DM (diagnosed 19 yrs ago, was on insulin for 10 yrs, recently d/c ed , 1 week ago after 60 lbs weight loss ) and HTN who comes with 2 week hx of right 3rd toe discoloration after he changed footware . he noticed his toe is rubbing and noticed some discoloration but did not seek medical help. Following this he bumped his same toe 1 week ago into a trolley wheel and noticed some bruising. He mentioned it was painful but not unbearable and could palpate it . Denied any fever. Today in am ( 10/11) he noticed pus from the underside of the toe and came to the ER where he was found to be mildly elevated creatinine and hyperglycemia. Pt was started on Abx for cellulitis and possible Osteomyelitis. Was seen and eval by Podiatry who manually exuded pus from the wound and recommended IV abx for now. (11 Oct 2017 23:50)    INTERVAL HPI/ OVERNIGHT EVENTS: Pt was seen and examined, confirms history above, reports no pain, no fevers or chills. Ok oral intake. POC discussed     Vital Signs Last 24 Hrs  T(C): 37.1 (12 Oct 2017 11:42), Max: 37.1 (11 Oct 2017 20:53)  T(F): 98.7 (12 Oct 2017 11:42), Max: 98.8 (11 Oct 2017 20:53)  HR: 77 (12 Oct 2017 11:42) (61 - 88)  BP: 141/79 (12 Oct 2017 11:42) (121/65 - 143/85)  RR: 19 (12 Oct 2017 11:42) (16 - 19)  SpO2: 97% (12 Oct 2017 11:42) (97% - 100%)  I&O's Detail    11 Oct 2017 07:01  -  12 Oct 2017 07:00  --------------------------------------------------------  IN:    IV PiggyBack: 50 mL    sodium chloride 0.9%.: 301 mL  Total IN: 351 mL    OUT:  Total OUT: 0 mL    Total NET: 351 mL      12 Oct 2017 07:01  -  12 Oct 2017 15:44  --------------------------------------------------------  IN:    IV PiggyBack: 200 mL  Total IN: 200 mL    OUT:  Total OUT: 0 mL    Total NET: 200 mL    REVIEW OF SYSTEMS:    CONSTITUTIONAL: No weakness, fevers or chills  EYES/ENT: No visual changes;  No vertigo or throat pain   NECK: No pain or stiffness  RESPIRATORY: No cough, wheezing, hemoptysis; No shortness of breath  CARDIOVASCULAR: No chest pain or palpitations  GASTROINTESTINAL: No abdominal or epigastric pain. No nausea, vomiting, or hematemesis; No diarrhea or constipation. No melena or hematochezia.  GENITOURINARY: No dysuria, frequency or hematuria  NEUROLOGICAL: No numbness or weakness  SKIN: No itching, burning, rashes, or lesions   All other review of systems is negative unless indicated above.      PHYSICAL EXAM:    General: Well developed; well nourished; in no acute distress  Eyes: PERRLA, EOMI; conjunctiva and sclera clear  Head: Normocephalic; atraumatic  ENMT: No nasal discharge; airway clear  Neck: Supple; non tender; no masses  Respiratory: No wheezes, rales or rhonchi  Cardiovascular: Regular rate and rhythm. S1 and S2 Normal; No murmurs  Gastrointestinal: Soft non-tender non-distended; Normal bowel sounds  Genitourinary: No suprapubic tenderness or fullness   Extremities: Normal range of motion, No edema, R 3rd toe erythema and edema, s/p debridement   Vascular: Peripheral pulses palpable 2+ bilaterally  Neurological: Alert and oriented x4, non focal   Skin: Warm and dry. No acute rash  Lymph Nodes: No acute cervical adenopathy  Musculoskeletal: Normal gait, tone, without deformities  Psychiatric: Cooperative and appropriate      LABS:                           13.9   11.2  )-----------( 256      ( 12 Oct 2017 05:21 )             40.8     12 Oct 2017 05:21    141    |  105    |  21     ----------------------------<  188    4.8     |  30     |  1.34     Ca    8.9        12 Oct 2017 05:21    TPro  6.7    /  Alb  3.6    /  TBili  0.3    /  DBili  x      /  AST  15     /  ALT  33     /  AlkPhos  43     12 Oct 2017 05:21  PT/INR - ( 12 Oct 2017 05:21 )   PT: 10.6 sec;   INR: 0.98 ratio    PTT - ( 11 Oct 2017 21:30 )  PTT:34.8 sec      CAPILLARY BLOOD GLUCOSE  180 (12 Oct 2017 07:58)      POCT Blood Glucose.: 213 mg/dL (12 Oct 2017 12:02)  POCT Blood Glucose.: 180 mg/dL (12 Oct 2017 07:48)      LIVER FUNCTIONS - ( 12 Oct 2017 05:21 )  Alb: 3.6 g/dL / Pro: 6.7 gm/dL / ALK PHOS: 43 U/L / ALT: 33 U/L / AST: 15 U/L / GGT: x             Hemoglobin A1C, Whole Blood: 8.4 % (10-12-17 @ 05:21)    MEDICATIONS  (STANDING):  amLODIPine   Tablet 10 milliGRAM(s) Oral daily  cefepime  IVPB      cefepime  IVPB 1000 milliGRAM(s) IV Intermittent every 12 hours  dextrose 5%. 1000 milliLiter(s) (50 mL/Hr) IV Continuous <Continuous>  dextrose 50% Injectable 12.5 Gram(s) IV Push once  dextrose 50% Injectable 25 Gram(s) IV Push once  dextrose 50% Injectable 25 Gram(s) IV Push once  fenofibrate Tablet 145 milliGRAM(s) Oral daily  heparin  Injectable 5000 Unit(s) SubCutaneous every 8 hours  insulin glargine Injectable (LANTUS) 15 Unit(s) SubCutaneous every morning  insulin lispro (HumaLOG) corrective regimen sliding scale   SubCutaneous three times a day before meals  metoprolol succinate ER 25 milliGRAM(s) Oral daily  sodium chloride 0.9%. 1000 milliLiter(s) (100 mL/Hr) IV Continuous <Continuous>  vancomycin  IVPB 750 milliGRAM(s) IV Intermittent every 12 hours  vancomycin  IVPB        MEDICATIONS  (PRN):  acetaminophen   Tablet 650 milliGRAM(s) Oral every 6 hours PRN For Temp greater than 38 C (100.4 F)  dextrose Gel 1 Dose(s) Oral once PRN Blood Glucose LESS THAN 70 milliGRAM(s)/deciliter  glucagon  Injectable 1 milliGRAM(s) IntraMuscular once PRN Glucose LESS THAN 70 milligrams/deciliter      RADIOLOGY & ADDITIONAL TESTS:  < from: Xray Chest 2 Views PA/Lat (10.11.17 @ 21:30) >    EXAM:  TOE(S)-RIGHT FOOT                          EXAM:  CHEST P.A. LATERAL                            PROCEDURE DATE:  10/11/2017          INTERPRETATION:  Clinical information: Toe infection. Admission.    PA and lateral views of the chest  AP, lateral, oblique views of the right third toe.    COMPARISON: Chest x-ray January 16, 2017.    FINDINGS: Chest: The lungs are clear. The heart is unremarkable. There is   no pneumothorax or pleural effusion.    Surgical anchoring screws are seen overlying the right humeral head.    Right third toe: The study is limited due to suboptimal positioning; the   distal phalanx is in plantar flexion. There is however no lytic or   destructive lesion involving the bones. There is no fracture. There is no   soft tissue gas.    IMPRESSION:  Chest: Clear lungs  Right third toe: No evidence of osteomyelitis    < from: Xray Chest 2 Views PA/Lat (10.11.17 @ 21:30) >    EXAM:  TOE(S)-RIGHT FOOT                          EXAM:  CHEST P.A. LATERAL                            PROCEDURE DATE:  10/11/2017          INTERPRETATION:  Clinical information: Toe infection. Admission.    PA and lateral views of the chest  AP, lateral, oblique views of the right third toe.    COMPARISON: Chest x-ray January 16, 2017.    FINDINGS: Chest: The lungs are clear. The heart is unremarkable. There is   no pneumothorax or pleural effusion.    Surgical anchoring screws are seen overlying the right humeral head.    Right third toe: The study is limited due to suboptimal positioning; the   distal phalanx is in plantar flexion. There is however no lytic or   destructive lesion involving the bones. There is no fracture. There is no   soft tissue gas.    IMPRESSION:  Chest: Clear lungs  Right third toe: No evidence of osteomyelitis

## 2017-10-12 NOTE — PROGRESS NOTE ADULT - SUBJECTIVE AND OBJECTIVE BOX
53 y/o male patient with PMHx of DMII seen at bedside for f/u Right foot 3rd toe cellulitis. Pt states he is feeling well; no acute events overnight. Pt denies fever, chills, nausea, vomiting, SOB, chest pain, diarrhea or urinary sx.     Allergies  penicillin (Hives)    MEDICATIONS  (STANDING):  amLODIPine   Tablet 10 milliGRAM(s) Oral daily  cefepime  IVPB      dextrose 5%. 1000 milliLiter(s) (50 mL/Hr) IV Continuous <Continuous>  dextrose 50% Injectable 12.5 Gram(s) IV Push once  dextrose 50% Injectable 25 Gram(s) IV Push once  dextrose 50% Injectable 25 Gram(s) IV Push once  fenofibrate Tablet 145 milliGRAM(s) Oral daily  heparin  Injectable 5000 Unit(s) SubCutaneous every 8 hours  influenza   Vaccine 0.5 milliLiter(s) IntraMuscular once  insulin glargine Injectable (LANTUS) 15 Unit(s) SubCutaneous every morning  insulin lispro (HumaLOG) corrective regimen sliding scale   SubCutaneous three times a day before meals  metoprolol succinate ER 25 milliGRAM(s) Oral daily  sodium chloride 0.9%. 1000 milliLiter(s) (100 mL/Hr) IV Continuous <Continuous>  vancomycin  IVPB        MEDICATIONS  (PRN):  acetaminophen   Tablet 650 milliGRAM(s) Oral every 6 hours PRN For Temp greater than 38 C (100.4 F)  dextrose Gel 1 Dose(s) Oral once PRN Blood Glucose LESS THAN 70 milliGRAM(s)/deciliter  glucagon  Injectable 1 milliGRAM(s) IntraMuscular once PRN Glucose LESS THAN 70 milligrams/deciliter    Vital Signs Last 24 Hrs  T(C): 36.4 (12 Oct 2017 05:40), Max: 37.1 (11 Oct 2017 20:53)  T(F): 97.6 (12 Oct 2017 05:40), Max: 98.8 (11 Oct 2017 20:53)  HR: 61 (12 Oct 2017 05:40) (61 - 88)  BP: 125/78 (12 Oct 2017 05:40) (121/65 - 143/85)  BP(mean): --  RR: 16 (12 Oct 2017 05:40) (16 - 18)  SpO2: 100% (12 Oct 2017 05:40) (98% - 100%)        PHYSICAL EXAM:  Constitutional: AAOx3, non-focal; NAD, comfortable  Lower extremity physical exam:  Derm:   Right foot: + erythema and non pitting edema noted at the 3rd digit extending to the level of the PIPJ. Hyperkeratotic lesion noted at the distal tip of the 3rd digit with a small open lesion noted centrally; no active drainage,  no malodor, no probe to bone, no fluctuance. Hyperkeratotic lesion noted at the medial plantar aspect of the hallux. No interdigital maceration. Nails are cut to hygenic length, non dystrophic x 5.     Left foot: Hyperkeratotic lesion noted at the medial plantar aspect of the hallux. No open lesion, no malodor, no clinical signs of infection. No interdigital maceration. Nails are cut to hygenic length, non dystrophic x 5.     Vasc:  Pedal pulses DP and PT palpable 2/4 B/L. CFT immediate x10. Pedal hair presents B/L.   Neuro: protective sensation measured SWM and light touch test intact B/L.  Ortho: Tenderness upon palpation and ROM of the 3rd digit, right foot. No pain upon palpation of the calf muscle B/L. Lower extremity compartments are soft, non tender. Lower extremity strength 5/5 inversion, eversion, dorsiflexion and plantar flexion B/L. Hammer toes 2, 3, 4 B/L. Hallux limitus b/l.     LABS:    (10-12 @ 05:21)                      13.9  11.2 )-----------( 256                 40.8    Neutrophils = 7.9 (70.7%)  Lymphocytes = 1.6 (14.7%)  Eosinophils = 0.5 (4.7%)  Basophils = 0.1 (1.1%)  Monocytes = 1.0 (8.8%)  Bands = --%    10-12    141  |  105  |  21  ----------------------------<  188<H>  4.8   |  30  |  1.34<H>    Ca    8.9      12 Oct 2017 05:21    TPro  6.7  /  Alb  3.6  /  TBili  0.3  /  DBili  x   /  AST  15  /  ALT  33  /  AlkPhos  43  10-12    ( 12 Oct 2017 05:21 )   PT: 10.6 sec;   INR: 0.98 ratio;    PTT:34.8 sec      ESR: 7    CRP: pending      Blood Cx: pending          Radiology:    EXAM:  TOE(S)-RIGHT FOOT                        PROCEDURE DATE:  10/11/2017        Right third toe: The study is limited due to suboptimal positioning; the   distal phalanx is in plantar flexion. There is however no lytic or   destructive lesion involving the bones. There is no fracture. There is no   soft tissue gas.    IMPRESSION:  Right third toe: No evidence of osteomyelitis

## 2017-10-12 NOTE — PROGRESS NOTE ADULT - PROBLEM SELECTOR PLAN 3
Baseline cr unknown, suspect has CKD   2016 march Cr was 1.15 , jan 2017 cr was 1.59   - Hold ACE (fosinopril)  encourage PO fluids.  On  IV fluids at 100 ml/hr for 10 hrs, will complete   repeat BMP in am

## 2017-10-13 ENCOUNTER — TRANSCRIPTION ENCOUNTER (OUTPATIENT)
Age: 52
End: 2017-10-13

## 2017-10-13 LAB
ANION GAP SERPL CALC-SCNC: 6 MMOL/L — SIGNIFICANT CHANGE UP (ref 5–17)
BUN SERPL-MCNC: 21 MG/DL — SIGNIFICANT CHANGE UP (ref 7–23)
CALCIUM SERPL-MCNC: 8.7 MG/DL — SIGNIFICANT CHANGE UP (ref 8.5–10.1)
CHLORIDE SERPL-SCNC: 102 MMOL/L — SIGNIFICANT CHANGE UP (ref 96–108)
CO2 SERPL-SCNC: 27 MMOL/L — SIGNIFICANT CHANGE UP (ref 22–31)
CREAT SERPL-MCNC: 1.31 MG/DL — HIGH (ref 0.5–1.3)
GLUCOSE BLDC GLUCOMTR-MCNC: 146 MG/DL — HIGH (ref 70–99)
GLUCOSE BLDC GLUCOMTR-MCNC: 178 MG/DL — HIGH (ref 70–99)
GLUCOSE BLDC GLUCOMTR-MCNC: 190 MG/DL — HIGH (ref 70–99)
GLUCOSE BLDC GLUCOMTR-MCNC: 190 MG/DL — HIGH (ref 70–99)
GLUCOSE SERPL-MCNC: 188 MG/DL — HIGH (ref 70–99)
HCT VFR BLD CALC: 40.9 % — SIGNIFICANT CHANGE UP (ref 39–50)
HGB BLD-MCNC: 14.2 G/DL — SIGNIFICANT CHANGE UP (ref 13–17)
MCHC RBC-ENTMCNC: 30.1 PG — SIGNIFICANT CHANGE UP (ref 27–34)
MCHC RBC-ENTMCNC: 34.7 GM/DL — SIGNIFICANT CHANGE UP (ref 32–36)
MCV RBC AUTO: 86.7 FL — SIGNIFICANT CHANGE UP (ref 80–100)
PLATELET # BLD AUTO: 248 K/UL — SIGNIFICANT CHANGE UP (ref 150–400)
POTASSIUM SERPL-MCNC: 4.4 MMOL/L — SIGNIFICANT CHANGE UP (ref 3.5–5.3)
POTASSIUM SERPL-SCNC: 4.4 MMOL/L — SIGNIFICANT CHANGE UP (ref 3.5–5.3)
RBC # BLD: 4.72 M/UL — SIGNIFICANT CHANGE UP (ref 4.2–5.8)
RBC # FLD: 12.5 % — SIGNIFICANT CHANGE UP (ref 10.3–14.5)
SODIUM SERPL-SCNC: 135 MMOL/L — SIGNIFICANT CHANGE UP (ref 135–145)
VANCOMYCIN TROUGH SERPL-MCNC: 10.2 UG/ML — SIGNIFICANT CHANGE UP (ref 10–20)
WBC # BLD: 8.2 K/UL — SIGNIFICANT CHANGE UP (ref 3.8–10.5)
WBC # FLD AUTO: 8.2 K/UL — SIGNIFICANT CHANGE UP (ref 3.8–10.5)

## 2017-10-13 PROCEDURE — 71010: CPT | Mod: 26

## 2017-10-13 RX ORDER — MUPIROCIN 20 MG/G
1 OINTMENT TOPICAL DAILY
Qty: 0 | Refills: 0 | Status: DISCONTINUED | OUTPATIENT
Start: 2017-10-13 | End: 2017-10-14

## 2017-10-13 RX ORDER — CEFEPIME 1 G/1
2000 INJECTION, POWDER, FOR SOLUTION INTRAMUSCULAR; INTRAVENOUS EVERY 24 HOURS
Qty: 0 | Refills: 0 | Status: DISCONTINUED | OUTPATIENT
Start: 2017-10-14 | End: 2017-10-14

## 2017-10-13 RX ORDER — ASPIRIN/CALCIUM CARB/MAGNESIUM 324 MG
81 TABLET ORAL DAILY
Qty: 0 | Refills: 0 | Status: DISCONTINUED | OUTPATIENT
Start: 2017-10-13 | End: 2017-10-14

## 2017-10-13 RX ADMIN — Medication 25 MILLIGRAM(S): at 05:40

## 2017-10-13 RX ADMIN — Medication 145 MILLIGRAM(S): at 11:38

## 2017-10-13 RX ADMIN — SODIUM CHLORIDE 100 MILLILITER(S): 9 INJECTION INTRAMUSCULAR; INTRAVENOUS; SUBCUTANEOUS at 03:57

## 2017-10-13 RX ADMIN — INSULIN GLARGINE 15 UNIT(S): 100 INJECTION, SOLUTION SUBCUTANEOUS at 08:32

## 2017-10-13 RX ADMIN — Medication 1: at 11:38

## 2017-10-13 RX ADMIN — CEFEPIME 100 MILLIGRAM(S): 1 INJECTION, POWDER, FOR SOLUTION INTRAMUSCULAR; INTRAVENOUS at 05:21

## 2017-10-13 RX ADMIN — HEPARIN SODIUM 5000 UNIT(S): 5000 INJECTION INTRAVENOUS; SUBCUTANEOUS at 22:18

## 2017-10-13 RX ADMIN — HEPARIN SODIUM 5000 UNIT(S): 5000 INJECTION INTRAVENOUS; SUBCUTANEOUS at 13:55

## 2017-10-13 RX ADMIN — AMLODIPINE BESYLATE 10 MILLIGRAM(S): 2.5 TABLET ORAL at 05:40

## 2017-10-13 RX ADMIN — HEPARIN SODIUM 5000 UNIT(S): 5000 INJECTION INTRAVENOUS; SUBCUTANEOUS at 05:41

## 2017-10-13 RX ADMIN — Medication 150 MILLIGRAM(S): at 17:36

## 2017-10-13 RX ADMIN — Medication 150 MILLIGRAM(S): at 06:58

## 2017-10-13 RX ADMIN — Medication 81 MILLIGRAM(S): at 11:37

## 2017-10-13 RX ADMIN — Medication 1: at 08:32

## 2017-10-13 NOTE — [facility]I QUERY NOTE - NSCDIOTHERTXTBX_GEN_ALL_CORE_HH
1) MRI of foot reveals: Acute osteomyelitis of the distal phalanx of the third toe with surrounding cellulitis.    Please clarify if you agree or disagree with the clinical diagnosis of Acute osteomyelitis of the third toe ?    2) VIKKI and CKD documented  creat=1.56 > 1.34 > 1.31  gfr= 51 > 61 > 63    Please clarify the stage of the CKD ?

## 2017-10-13 NOTE — DISCHARGE NOTE ADULT - SECONDARY DIAGNOSIS.
Type 2 diabetes mellitus with other circulatory complication, with long-term current use of insulin CKD (chronic kidney disease) stage 2, GFR 60-89 ml/min Murmur, cardiac

## 2017-10-13 NOTE — PROGRESS NOTE ADULT - SUBJECTIVE AND OBJECTIVE BOX
CC: Toe pain/infection (12 Oct 2017 02:12)    HPI:  Pt is a 51 yo M with Pmhx of DM (diagnosed 19 yrs ago, was on insulin for 10 yrs, recently d/c ed , 1 week ago after 60 lbs weight loss ) and HTN who comes with 2 week hx of right 3rd toe discoloration after he changed footware . he noticed his toe is rubbing and noticed some discoloration but did not seek medical help. Following this he bumped his same toe 1 week ago into a trolley wheel and noticed some bruising. He mentioned it was painful but not unbearable and could palpate it . Denied any fever. Today in am ( 10/11) he noticed pus from the underside of the toe and came to the ER where he was found to be mildly elevated creatinine and hyperglycemia. Pt was started on Abx for cellulitis and possible Osteomyelitis. Was seen and eval by Podiatry who manually exuded pus from the wound and recommended IV abx for now. (11 Oct 2017 23:50)    INTERVAL HPI/ OVERNIGHT EVENTS: Pt was seen and examined, confirms history above, reports no pain, no fevers or chills. Ok oral intake. POC discussed     Vital Signs Last 24 Hrs  T(C): 37.1 (12 Oct 2017 11:42), Max: 37.1 (11 Oct 2017 20:53)  T(F): 98.7 (12 Oct 2017 11:42), Max: 98.8 (11 Oct 2017 20:53)  HR: 77 (12 Oct 2017 11:42) (61 - 88)  BP: 141/79 (12 Oct 2017 11:42) (121/65 - 143/85)  RR: 19 (12 Oct 2017 11:42) (16 - 19)  SpO2: 97% (12 Oct 2017 11:42) (97% - 100%)  I&O's Detail    11 Oct 2017 07:01  -  12 Oct 2017 07:00  --------------------------------------------------------  IN:    IV PiggyBack: 50 mL    sodium chloride 0.9%.: 301 mL  Total IN: 351 mL    OUT:  Total OUT: 0 mL    Total NET: 351 mL      12 Oct 2017 07:01  -  12 Oct 2017 15:44  --------------------------------------------------------  IN:    IV PiggyBack: 200 mL  Total IN: 200 mL    OUT:  Total OUT: 0 mL    Total NET: 200 mL    REVIEW OF SYSTEMS:    CONSTITUTIONAL: No weakness, fevers or chills  EYES/ENT: No visual changes;  No vertigo or throat pain   NECK: No pain or stiffness  RESPIRATORY: No cough, wheezing, hemoptysis; No shortness of breath  CARDIOVASCULAR: No chest pain or palpitations  GASTROINTESTINAL: No abdominal or epigastric pain. No nausea, vomiting, or hematemesis; No diarrhea or constipation. No melena or hematochezia.  GENITOURINARY: No dysuria, frequency or hematuria  NEUROLOGICAL: No numbness or weakness  SKIN: No itching, burning, rashes, or lesions   All other review of systems is negative unless indicated above.      PHYSICAL EXAM:    General: Well developed; well nourished; in no acute distress  Eyes: PERRLA, EOMI; conjunctiva and sclera clear  Head: Normocephalic; atraumatic  ENMT: No nasal discharge; airway clear  Neck: Supple; non tender; no masses  Respiratory: No wheezes, rales or rhonchi  Cardiovascular: Regular rate and rhythm. S1 and S2 Normal; No murmurs  Gastrointestinal: Soft non-tender non-distended; Normal bowel sounds  Genitourinary: No suprapubic tenderness or fullness   Extremities: Normal range of motion, No edema, R 3rd toe erythema and edema, s/p debridement   Vascular: Peripheral pulses palpable 2+ bilaterally  Neurological: Alert and oriented x4, non focal   Skin: Warm and dry. No acute rash  Lymph Nodes: No acute cervical adenopathy  Musculoskeletal: Normal gait, tone, without deformities  Psychiatric: Cooperative and appropriate      LABS:                           13.9   11.2  )-----------( 256      ( 12 Oct 2017 05:21 )             40.8     12 Oct 2017 05:21    141    |  105    |  21     ----------------------------<  188    4.8     |  30     |  1.34     Ca    8.9        12 Oct 2017 05:21    TPro  6.7    /  Alb  3.6    /  TBili  0.3    /  DBili  x      /  AST  15     /  ALT  33     /  AlkPhos  43     12 Oct 2017 05:21  PT/INR - ( 12 Oct 2017 05:21 )   PT: 10.6 sec;   INR: 0.98 ratio    PTT - ( 11 Oct 2017 21:30 )  PTT:34.8 sec      CAPILLARY BLOOD GLUCOSE  180 (12 Oct 2017 07:58)      POCT Blood Glucose.: 213 mg/dL (12 Oct 2017 12:02)  POCT Blood Glucose.: 180 mg/dL (12 Oct 2017 07:48)      LIVER FUNCTIONS - ( 12 Oct 2017 05:21 )  Alb: 3.6 g/dL / Pro: 6.7 gm/dL / ALK PHOS: 43 U/L / ALT: 33 U/L / AST: 15 U/L / GGT: x             Hemoglobin A1C, Whole Blood: 8.4 % (10-12-17 @ 05:21)    MEDICATIONS  (STANDING):  amLODIPine   Tablet 10 milliGRAM(s) Oral daily  cefepime  IVPB      cefepime  IVPB 1000 milliGRAM(s) IV Intermittent every 12 hours  dextrose 5%. 1000 milliLiter(s) (50 mL/Hr) IV Continuous <Continuous>  dextrose 50% Injectable 12.5 Gram(s) IV Push once  dextrose 50% Injectable 25 Gram(s) IV Push once  dextrose 50% Injectable 25 Gram(s) IV Push once  fenofibrate Tablet 145 milliGRAM(s) Oral daily  heparin  Injectable 5000 Unit(s) SubCutaneous every 8 hours  insulin glargine Injectable (LANTUS) 15 Unit(s) SubCutaneous every morning  insulin lispro (HumaLOG) corrective regimen sliding scale   SubCutaneous three times a day before meals  metoprolol succinate ER 25 milliGRAM(s) Oral daily  sodium chloride 0.9%. 1000 milliLiter(s) (100 mL/Hr) IV Continuous <Continuous>  vancomycin  IVPB 750 milliGRAM(s) IV Intermittent every 12 hours  vancomycin  IVPB        MEDICATIONS  (PRN):  acetaminophen   Tablet 650 milliGRAM(s) Oral every 6 hours PRN For Temp greater than 38 C (100.4 F)  dextrose Gel 1 Dose(s) Oral once PRN Blood Glucose LESS THAN 70 milliGRAM(s)/deciliter  glucagon  Injectable 1 milliGRAM(s) IntraMuscular once PRN Glucose LESS THAN 70 milligrams/deciliter      RADIOLOGY & ADDITIONAL TESTS:  < from: Xray Chest 2 Views PA/Lat (10.11.17 @ 21:30) >    EXAM:  TOE(S)-RIGHT FOOT                          EXAM:  CHEST P.A. LATERAL                            PROCEDURE DATE:  10/11/2017          INTERPRETATION:  Clinical information: Toe infection. Admission.    PA and lateral views of the chest  AP, lateral, oblique views of the right third toe.    COMPARISON: Chest x-ray January 16, 2017.    FINDINGS: Chest: The lungs are clear. The heart is unremarkable. There is   no pneumothorax or pleural effusion.    Surgical anchoring screws are seen overlying the right humeral head.    Right third toe: The study is limited due to suboptimal positioning; the   distal phalanx is in plantar flexion. There is however no lytic or   destructive lesion involving the bones. There is no fracture. There is no   soft tissue gas.    IMPRESSION:  Chest: Clear lungs  Right third toe: No evidence of osteomyelitis    < from: Xray Chest 2 Views PA/Lat (10.11.17 @ 21:30) >    EXAM:  TOE(S)-RIGHT FOOT                          EXAM:  CHEST P.A. LATERAL                            PROCEDURE DATE:  10/11/2017          INTERPRETATION:  Clinical information: Toe infection. Admission.    PA and lateral views of the chest  AP, lateral, oblique views of the right third toe.    COMPARISON: Chest x-ray January 16, 2017.    FINDINGS: Chest: The lungs are clear. The heart is unremarkable. There is   no pneumothorax or pleural effusion.    Surgical anchoring screws are seen overlying the right humeral head.    Right third toe: The study is limited due to suboptimal positioning; the   distal phalanx is in plantar flexion. There is however no lytic or   destructive lesion involving the bones. There is no fracture. There is no   soft tissue gas.    IMPRESSION:  Chest: Clear lungs  Right third toe: No evidence of osteomyelitis CC: Toe pain/infection (12 Oct 2017 02:12)    HPI:  Pt is a 53 yo M with Pmhx of DM (diagnosed 19 yrs ago, was on insulin for 10 yrs, recently d/c ed , 1 week ago after 60 lbs weight loss ) and HTN who comes with 2 week hx of right 3rd toe discoloration after he changed footware . he noticed his toe is rubbing and noticed some discoloration but did not seek medical help. Following this he bumped his same toe 1 week ago into a trolley wheel and noticed some bruising. He mentioned it was painful but not unbearable and could palpate it . Denied any fever. Today in am ( 10/11) he noticed pus from the underside of the toe and came to the ER where he was found to be mildly elevated creatinine and hyperglycemia. Pt was started on Abx for cellulitis and possible Osteomyelitis. Was seen and eval by Podiatry who manually exuded pus from the wound and recommended IV abx for now. (11 Oct 2017 23:50)    INTERVAL HPI/ OVERNIGHT EVENTS: Pt was seen and examined,  reports no fevers or chills or pain. Results of MRI and POC.     Vital Signs Last 24 Hrs  T(C): 36.4 (13 Oct 2017 11:36), Max: 36.7 (12 Oct 2017 21:29)  T(F): 97.6 (13 Oct 2017 11:36), Max: 98 (12 Oct 2017 21:29)  HR: 72 (13 Oct 2017 11:36) (72 - 80)  BP: 148/82 (13 Oct 2017 11:36) (113/85 - 148/82)  BP(mean): --  RR: 16 (13 Oct 2017 11:36) (16 - 18)  SpO2: 100% (13 Oct 2017 11:36) (99% - 100%)    REVIEW OF SYSTEMS:  All other review of systems is negative unless indicated above.      PHYSICAL EXAM:    General: Well developed; well nourished; in no acute distress  Eyes: PERRLA, EOMI; conjunctiva and sclera clear  Head: Normocephalic; atraumatic  ENMT: No nasal discharge; airway clear  Neck: Supple; non tender; no masses  Respiratory: No wheezes, rales or rhonchi  Cardiovascular: Regular rate and rhythm. S1 and S2 Normal; No murmurs  Gastrointestinal: Soft non-tender non-distended; Normal bowel sounds  Genitourinary: No suprapubic tenderness or fullness   Extremities: Normal range of motion, No edema, R 3rd toe erythema and edema, s/p debridement   Vascular: Peripheral pulses palpable 2+ bilaterally  Neurological: Alert and oriented x4, non focal   Skin: Warm and dry. No acute rash  Lymph Nodes: No acute cervical adenopathy  Musculoskeletal: Normal gait, tone, without deformities  Psychiatric: Cooperative and appropriate      LABS:                           14.2   8.2   )-----------( 248      ( 13 Oct 2017 07:20 )             40.9     10-13    135  |  102  |  21  ----------------------------<  188<H>  4.4   |  27  |  1.31<H>    Ca    8.7      13 Oct 2017 07:20    TPro  6.7  /  Alb  3.6  /  TBili  0.3  /  DBili  x   /  AST  15  /  ALT  33  /  AlkPhos  43  10-12    LIVER FUNCTIONS - ( 12 Oct 2017 05:21 )  Alb: 3.6 g/dL / Pro: 6.7 gm/dL / ALK PHOS: 43 U/L / ALT: 33 U/L / AST: 15 U/L / GGT: x           PT/INR - ( 12 Oct 2017 05:21 )   PT: 10.6 sec;   INR: 0.98 ratio    PTT - ( 11 Oct 2017 21:30 )  PTT:34.8 sec      CAPILLARY BLOOD GLUCOSE      POCT Blood Glucose.: 146 mg/dL (13 Oct 2017 17:04)  POCT Blood Glucose.: 190 mg/dL (13 Oct 2017 11:36)  POCT Blood Glucose.: 178 mg/dL (13 Oct 2017 08:17)  POCT Blood Glucose.: 152 mg/dL (12 Oct 2017 21:12)      POCT Blood Glucose.: 146 mg/dL (13 Oct 2017 17:04)          Hemoglobin A1C, Whole Blood: 8.4 % (10-12-17 @ 05:21)    MEDICATIONS  (STANDING):  amLODIPine   Tablet 10 milliGRAM(s) Oral daily  cefepime  IVPB      cefepime  IVPB 1000 milliGRAM(s) IV Intermittent every 12 hours  dextrose 5%. 1000 milliLiter(s) (50 mL/Hr) IV Continuous <Continuous>  dextrose 50% Injectable 12.5 Gram(s) IV Push once  dextrose 50% Injectable 25 Gram(s) IV Push once  dextrose 50% Injectable 25 Gram(s) IV Push once  fenofibrate Tablet 145 milliGRAM(s) Oral daily  heparin  Injectable 5000 Unit(s) SubCutaneous every 8 hours  insulin glargine Injectable (LANTUS) 15 Unit(s) SubCutaneous every morning  insulin lispro (HumaLOG) corrective regimen sliding scale   SubCutaneous three times a day before meals  metoprolol succinate ER 25 milliGRAM(s) Oral daily  sodium chloride 0.9%. 1000 milliLiter(s) (100 mL/Hr) IV Continuous <Continuous>  vancomycin  IVPB 750 milliGRAM(s) IV Intermittent every 12 hours  vancomycin  IVPB        MEDICATIONS  (PRN):  acetaminophen   Tablet 650 milliGRAM(s) Oral every 6 hours PRN For Temp greater than 38 C (100.4 F)  dextrose Gel 1 Dose(s) Oral once PRN Blood Glucose LESS THAN 70 milliGRAM(s)/deciliter  glucagon  Injectable 1 milliGRAM(s) IntraMuscular once PRN Glucose LESS THAN 70 milligrams/deciliter      RADIOLOGY & ADDITIONAL TESTS:  < from: Xray Chest 2 Views PA/Lat (10.11.17 @ 21:30) >    EXAM:  TOE(S)-RIGHT FOOT                          EXAM:  CHEST P.A. LATERAL                            PROCEDURE DATE:  10/11/2017          INTERPRETATION:  Clinical information: Toe infection. Admission.    PA and lateral views of the chest  AP, lateral, oblique views of the right third toe.    COMPARISON: Chest x-ray January 16, 2017.    FINDINGS: Chest: The lungs are clear. The heart is unremarkable. There is   no pneumothorax or pleural effusion.    Surgical anchoring screws are seen overlying the right humeral head.    Right third toe: The study is limited due to suboptimal positioning; the   distal phalanx is in plantar flexion. There is however no lytic or   destructive lesion involving the bones. There is no fracture. There is no   soft tissue gas.    IMPRESSION:  Chest: Clear lungs  Right third toe: No evidence of osteomyelitis          EXAM:  MRI FOOT WO W C RIGHT      Findings:    Soft tissue ulceration is noted along the dorsal lateral aspect of the   third toe with surrounding soft tissue edema and enhancement consistent   with cellulitis. There is no discrete peripherally enhancing fluid   collection to suggest abscess. There is enhancing osseous edema within   the distal phalanx of the third toe with corresponding vague hypointense   T1 signal consistent with acute osteomyelitis. There is a bipartite   fibular sesamoid with the distal moiety appearing diffusely sclerotic.   This may be related to arthrosis of the sequela of prior osteonecrosis.   Marrow signal within the remainder of the foot is otherwise intact.    Visualized tendinous structures are intact without evidence of   tenosynovitis or tearing. Lisfranc ligament is intact.    There is mild first metatarsal phalangeal joint arthrosis. Hammertoe   deformities of the second through fourth digits. Ill-defined scarring is   noted within the first webspace adjacent to the fibular sesamoid.   Intermetatarsal bursitis is noted within the second, third, and fourth   webspaces. Callus formation is noted along the plantar lateral aspect of   the fifth metatarsal phalangeal joint. Signal arising from muscle which   are is within normal limits.    Impression:    Acute osteomyelitis of the distal phalanx of the third toe with   surrounding cellulitis.

## 2017-10-13 NOTE — PROGRESS NOTE ADULT - ASSESSMENT
Pt is a 51 yo M with Pmhx of DM (diagnosed 19 yrs ago, was on insulin for 10 yrs, recently d/c ed , 1 week ago after 60 lbs weight loss ) and HTN  admitted for R 3rd toe cellulitis, r/o OM

## 2017-10-13 NOTE — DISCHARGE NOTE ADULT - CARE PROVIDER_API CALL
Ron Rogel), Family Medicine  [address] [place] [address]  Phone: [phone]  Fax: [phone]    Abilio Flores), [place] [address]  Phone: [phone]  Fax: [phone]    Marshall Garcia (TARY), Orthopaedics Surgery  [address] [place] [address]  Phone: [phone]  Fax: [phone]

## 2017-10-13 NOTE — PROGRESS NOTE ADULT - PROBLEM SELECTOR PLAN 1
CXR neg for OM  MRI ordered  s/p Vancomycin and Aztreonam 2 gm Q8H, changed to Cefepime IV by ID,  monitor for side effect  Allergic to penicillin- gets itching and possible hives no SOB  F/u Blood cultures   Podiatry eval appreciated, s/p bedside debridement, MRI ordered  D/w Dr Dillon and acute OM of R d 3rd digit distal phalang   XR neg for OM  MRI: + OM   BCX: NGTD   s/p Vancomycin and Aztreonam 2 gm Q8H, changed to Cefepime IV by ID,  monitor for side effect  Allergic to penicillin- gets itching and possible hives no SOB  Podiatry eval appreciated, s/p bedside debridement, MRI ordered  D/w Dr Dillon, Plan for PICC line and 6 weeks of IV ABxs

## 2017-10-13 NOTE — PROGRESS NOTE ADULT - ASSESSMENT
Pt is a 53 yo M with Pmhx of DM (diagnosed 19 yrs ago, was on insulin for 10 yrs, recently d/c ed , 1 week ago after 60 lbs weight loss) and HTN admitted 10/12 with 2 week hx of right 3rd toe discoloration/swelling pain. Pt reports bumping his toe few weeks ago into a trolley wheel and noticed some bruising after with ulcer which began to worsen over time with worsening pain and difficulty ambulating, day prior to admit, he noticed pus from his toe prompting admission, here afebrile, normal wbc ct, xray R foot normal, was given IV vancomycin/aztreonam d/t PCN allergy of itching.     1. R 3rd toe acute om with cellulitis/ulcer/PCN allergy/VIKKI  - improving  - PCN allergy of itching no anaphlyaxis   - tolerating cefepime without side effects/rash  - on IV cefepime [id number], switch to cefepime [id number] #2  - continue with vancomycin [id number], renally-dose adjusted, check trough prior to 4th dose for mrsa coverage #3  - blood cx no growth   - podiatry f/u  - MRI shows acute OM with cellulitis  - plan for PICC line placement and IV vancomycin [id number] + [id number] for 6 weeks until 11/21/17 with weekly cbc, cmp, esr, crp, vancomycin troughs  - f/u cbc  - monitor temps  -tolerating abx well so far; no side effects noted  -reason for abx use and side effects reviewed with patient; monitor BMP and vancomycin trough levels  - supportive care

## 2017-10-13 NOTE — PROGRESS NOTE ADULT - SUBJECTIVE AND OBJECTIVE BOX
Pt is a 51 yo M with Pmhx of DM (diagnosed 19 yrs ago, was on insulin for 10 yrs, recently d/c ed , 1 week ago after 60 lbs weight loss ) and HTN admitted 10/12 with  2 week hx of right 3rd toe discoloration/swelling pain. Pt reports bumping his toe few weeks ago into a trolley wheel and noticed some bruising after with ulcer which began to worsen over time with worsening pain and difficulty ambulating, day prior to admit, he noticed pus from his toe prompting admission, here afebrile, normal wbc ct, xray R foot normal, was given IV vancomycin/aztreonam d/t PCN allergy of itching.     less toe pain  no fevers    MEDICATIONS  (STANDING):  amLODIPine   Tablet 10 milliGRAM(s) Oral daily  aspirin  chewable 81 milliGRAM(s) Oral daily  dextrose 5%. 1000 milliLiter(s) (50 mL/Hr) IV Continuous <Continuous>  dextrose 50% Injectable 12.5 Gram(s) IV Push once  dextrose 50% Injectable 25 Gram(s) IV Push once  dextrose 50% Injectable 25 Gram(s) IV Push once  fenofibrate Tablet 145 milliGRAM(s) Oral daily  heparin  Injectable 5000 Unit(s) SubCutaneous every 8 hours  insulin glargine Injectable (LANTUS) 15 Unit(s) SubCutaneous every morning  insulin lispro (HumaLOG) corrective regimen sliding scale   SubCutaneous three times a day before meals  metoprolol succinate ER 25 milliGRAM(s) Oral daily  vancomycin  IVPB 750 milliGRAM(s) IV Intermittent every 12 hours  vancomycin  IVPB          Vital Signs Last 24 Hrs  T(C): 36.4 (13 Oct 2017 05:41), Max: 37.1 (12 Oct 2017 11:42)  T(F): 97.6 (13 Oct 2017 05:41), Max: 98.7 (12 Oct 2017 11:42)  HR: 80 (13 Oct 2017 05:41) (77 - 80)  BP: 113/85 (13 Oct 2017 05:41) (113/85 - 141/79)  BP(mean): --  RR: 17 (13 Oct 2017 05:41) (17 - 19)  SpO2: 100% (13 Oct 2017 05:41) (97% - 100%)        PE:  Constitutional: NAD  HEENT: NC/AT, EOMI, PERRLA  Neck: supple  Back: no tenderness  Respiratory: clear  Cardiovascular: S1S2 regular, no murmurs  Abdomen: soft, not tender, not distended, positive BS  Genitourinary: deferred  Rectal: deferred  Musculoskeletal: no muscle tenderness, no joint swelling or tenderness  Extremities: R 3rd toe with erythema, edema, small ulcer along distal portion, no PTB, no drainage  Neurological: AxOx3, moving all extremities, no focal deficits  Skin: no rashes    Labs:                                   14.2   8.2   )-----------( 248      ( 13 Oct 2017 07:20 )             40.9     10-13    135  |  102  |  21  ----------------------------<  188<H>  4.4   |  27  |  1.31<H>    Ca    8.7      13 Oct 2017 07:20    TPro  6.7  /  Alb  3.6  /  TBili  0.3  /  DBili  x   /  AST  15  /  ALT  33  /  AlkPhos  43  10-12           Cultures:         Culture - Blood (10.11.17 @ 21:30)    Specimen Source: .Blood None    Culture Results:   No growth to date.            Radiology:   < from: MRI Foot w/wo Cont, Right (10.12.17 @ 14:51) >    EXAM:  MRI FOOT WO W C RIGHT                            PROCEDURE DATE:  10/12/2017          INTERPRETATION:  History: Nonhealing wound at the right third toe for the   past 2-3 weeks.    Multiplanar multisequence MRI of the right foot was performed with and   without intravenous contrast.    10 cc of Gadavist was administered intravenously. 0 cc was discarded.    Correlation is made with prior radiographs from October 11, 2017.    Findings:    Soft tissue ulceration is noted along the dorsal lateral aspect of the   third toe with surrounding soft tissue edema and enhancement consistent   with cellulitis. There is no discrete peripherally enhancing fluid   collection to suggest abscess. There is enhancing osseous edema within   the distal phalanx of the third toe with corresponding vague hypointense   T1 signal consistent with acute osteomyelitis. There is a bipartite   fibular sesamoid with the distal moiety appearing diffusely sclerotic.   This may be related to arthrosis of the sequela of prior osteonecrosis.   Marrow signal within the remainder of the foot is otherwise intact.    Visualized tendinous structures are intact without evidence of   tenosynovitis or tearing. Lisfranc ligament is intact.    There is mild first metatarsal phalangeal joint arthrosis. Hammertoe   deformities of the second through fourth digits. Ill-defined scarring is   noted within the first webspace adjacent to the fibular sesamoid.   Intermetatarsal bursitis is noted within the second, third, and fourth   webspaces. Callus formation is noted along the plantar lateral aspect of   the fifth metatarsal phalangeal joint. Signal arising from muscle which   are is within normal limits.    Impression:    Acute osteomyelitis of the distal phalanx of the third toe with   surrounding cellulitis.        Advanced directives addressed: full resuscitation

## 2017-10-13 NOTE — DISCHARGE NOTE ADULT - PATIENT PORTAL LINK FT
“You can access the [facility] Patient Portal, offered by [facility], by registering with the following website: [email or web address]”

## 2017-10-13 NOTE — PROGRESS NOTE ADULT - ATTENDING COMMENTS
Patient seen and evaluated with resident.  I agree with assessment and plan.  Patient ok for d/c once picc line placed with iv abx as outpatient, and daily wound care with saline lavage, bactroban and dsd.  Patient to follow up Tuesday/Wednsday

## 2017-10-13 NOTE — DISCHARGE NOTE ADULT - HOSPITAL COURSE
Pt is a 53 yo M with Pmhx of DM (diagnosed 19 yrs ago, was on insulin for 10 yrs, recently d/c ed , 1 week ago after 60 lbs weight loss ) and HTN who comes with 2 week hx of right 3rd toe discoloration after he changed footware . he noticed his toe is rubbing and noticed some discoloration but did not seek medical help. Following this he bumped his same toe 1 week ago into a trolley wheel and noticed some bruising. He mentioned it was painful but not unbearable and could palpate it . Denied any fever. Today in am ( 10/11) he noticed pus from the underside of the toe and came to the ER where he was found to be mildly elevated creatinine and hyperglycemia. Pt was started on Abx for cellulitis and possible Osteomyelitis. Was seen and eval by Podiatry who manually exuded pus from the wound and recommended IV abx for now.     PHYSICAL EXAM:  General: Well developed; well nourished; in no acute distress  Eyes: PERRLA, EOMI; conjunctiva and sclera clear  Head: Normocephalic; atraumatic  ENMT: No nasal discharge; airway clear  Neck: Supple; non tender; no masses  Respiratory: No wheezes, rales or rhonchi  Cardiovascular: Regular rate and rhythm. S1 and S2 Normal; No murmurs  Gastrointestinal: Soft non-tender non-distended; Normal bowel sounds  Genitourinary: No suprapubic tenderness or fullness   Extremities: Normal range of motion, No edema, R 3rd toe erythema and edema, s/p debridement   Vascular: Peripheral pulses palpable 2+ bilaterally  Neurological: Alert and oriented x4, non focal   Skin: Warm and dry. No acute rash  Lymph Nodes: No acute cervical adenopathy  Musculoskeletal: Normal gait, tone, without deformities  Psychiatric: Cooperative and appropriate  Problem/Plan - 1:  ·  Problem: Cellulitis of toe.  Plan: and acute OM of R d 3rd digit distal phalang   XR neg for OM  MRI: + OM   BCX: NGTD   s/p Vancomycin and Aztreonam 2 gm Q8H, changed to Cefepime IV by ID,  monitor for side effect  Allergic to penicillin- gets itching and possible hives no SOB  Podiatry eval appreciated, s/p bedside debridement, MRI ordered  D/w Dr Dillon, Plan for PICC line and 6 weeks of IV ABxs. CXR neg for OM  MRI ordered  s/p Vancomycin and Aztreonam 2 gm Q8H, changed to Cefepime IV by ID,  monitor for side effect  Allergic to penicillin- gets itching and possible hives no SOB  F/u Blood cultures   Podiatry remigio appreciated, s/p bedside debridement, MRI ordered  D/w Dr Dillon     Problem/Plan - 2:  ·  Problem: Diabetes mellitus.  Plan: Type 2 DM   Hold PO meds , was recently switched from lantus to PO meds ?  HB A1c 8.4  On Metformin, januvia and  trulicity (dulaglutide) 0.75 mg weekly   started lantus 15 units + ISS, adjust PRN   Hyperglycemia could be secondary to infection. Pt reports good control otherwise since his weight loss? HBA1c 8.8  Problem/Plan - 3:  ·  Problem: Renal insufficiency.  Plan: Baseline cr unknown, suspect has CKD   2016 march Cr was 1.15 , jan 2017 cr was 1.59   - Hold ACE (fosinopril)  encourage PO fluids.  On  IV fluids at 100 ml/hr for 10 hrs, will complete   repeat BMP in am.     Problem/Plan - 4:  ·  Problem: Essential hypertension.  Plan: BP stable   Cont with Amlodipine and BB   hold ACE for VIKKI.     Problem/Plan - 5:  ·  Problem: Obstructive sleep apnea.  Plan: prev diagnosed , never on CPAP   lost 60 lbs , no formal sleep studies done but pt reports resolution of symptoms exp before.     Problem/Plan - 6:  Problem: Murmur, cardiac. Plan: Systolic murmur   Best heard in left 5th ICS   likely MR   Pt is aware   Denied any CHF hx   No SOb on exertion or chest pain   Outpatient f/u . No recent ECHO.    Problem/Plan - 7:  ·  Problem: Prophylactic measure.  Plan: DVT prophylaxis   Hep SQ. Pt is a 51 yo M with Pmhx of DM (diagnosed 19 yrs ago, was on insulin for 10 yrs, recently d/c ed , 1 week ago after 60 lbs weight loss ) and HTN who  ws admitted for R 3rd toe ulcer and draining puss. Pt was also noted to be dehydrated and in ARF. Was started on IV Abxs and Fluids. Was evaluated by Podiatry and ID, MRI was ordered and was + for acute OM. PICC line was placed for 6 weeks of IV ABxs. Meds and outPt f/u discussed with PT. HC  and Home IV abx arranged  by SW and CM.     Vital Signs Last 24 Hrs  T(C): 36.6 (14 Oct 2017 11:15), Max: 36.8 (13 Oct 2017 21:55)  T(F): 97.9 (14 Oct 2017 11:15), Max: 98.3 (13 Oct 2017 21:55)  HR: 79 (14 Oct 2017 11:15) (76 - 79)  BP: 140/78 (14 Oct 2017 11:15) (111/63 - 140/78)  RR: 16 (14 Oct 2017 11:15) (16 - 18)  SpO2: 100% (14 Oct 2017 11:15) (96% - 100%)  PHYSICAL EXAM:  General: Well developed; well nourished; in no acute distress  Eyes: PERRLA, EOMI; conjunctiva and sclera clear  Head: Normocephalic; atraumatic  ENMT: No nasal discharge; airway clear  Neck: Supple; non tender; no masses  Respiratory: No wheezes, rales or rhonchi  Cardiovascular: Regular rate and rhythm. S1 and S2 Normal; +  murmur  Gastrointestinal: Soft non-tender non-distended; Normal bowel sounds  Genitourinary: No suprapubic tenderness or fullness   Extremities: Normal range of motion, No edema, R 3rd toe erythema and edema, s/p debridement   Vascular: Peripheral pulses palpable 2+ bilaterally  Neurological: Alert and oriented x4, non focal   Skin: Warm and dry. No acute rash  Lymph Nodes: No acute cervical adenopathy  Musculoskeletal: Normal gait, tone, without deformities  Psychiatric: Cooperative and appropriate    Problem/Plan - 1:  Cellulitis of toe.  Acute OM of R d 3rd digit distal phalang 2/2 diabetic neuropathy    XR neg for OM  MRI: + OM   BCX: NGTD   S/p  Cefepime IV  and VAnco, changed to Dapto and Cefepime by ID, plan for 6 weeks   Local wound care as per Podiatry   D/w Dr iDllon,   MRI ordered  s/p Vancomycin and Aztreonam 2 gm Q8H, changed to Cefepime IV by ID,  monitor for side effect  Allergic to penicillin- gets itching and possible hives no SOB  F/u Blood cultures   Podiatry eval appreciated, s/p bedside debridement, MRI ordered  D/w Dr Dillon   Problem/Plan - 2:  Diabetes mellitus, Type 2 DM   Resume  home  meds , was recently switched from lantus to PO meds by endo   HB A1c 8.4  F/u with endo for further management of DM     Problem/Plan - 3:  Acute Renal insufficiency, resolved   S/p IVF and ACEI was on holdrestart Fosinopril at lower dose and F/u with PCP for Renal FX check and BP     Problem/Plan - 4:  Essential hypertension.  BP stable   Cont with Amlodipine and BB, restart fosinopril     Problem/Plan - 5:  Obstructive sleep apnea.  , never on CPAP   lost 60 lbs , no formal sleep studies done but pt reports resolution of symptoms exp before.     Problem/Plan - 6:  Murmur, cardiac. Systolic murmur   with PCP for ECHO     Dispo: Stable for d/c homr today.

## 2017-10-13 NOTE — DISCHARGE NOTE ADULT - PLAN OF CARE
prevent worsening continue PICC Line care as per recommendations  continue antibiotic via PICC as prescribed.- weekly CBC, CRP, ESR and CMP while on antibiotics  Cleansed the 3rd right toe with normal saline   Applied bacitracin + DSD + majo, and cobane to Right toe/foot  Weight bearing as tolerater with  surgical shoe  f/u wth Dr Garcia within 2-3 days  Keep dressing clean and dry. Keeping your Diabetes under control is very important in the healing process of your toe  Continue the medication regimen that your were prescribed on discharge  check your Blood sugar aw s per recommendations  Your Hemoglobin a1c is 8.9- the normal should be <7- please let your endocrinologist aware  You were started on ASA 81mg. f/u with your PCP  repeat Renal Function Panel with your PCP on your next appointment resolve continue PICC Line care as per recommendations  continue antibiotic via PICC as prescribed.- weekly CBC, CRP, ESR and CMP while on antibiotics  Cleansed the 3rd right toe with normal saline   Applied bacitracin + DSD + majo, and cobane to Right toe/foot  Weight bearing as tolerater with  surgical shoe  f/u wt Dr Garcia within 2-3 days Tuesday/ Wednesday  Keep dressing clean and dry. Keeping your Diabetes under control is very important in the healing process of your toe  Continue the medication regimen that your were prescribed on discharge  check your Blood sugar aw s per recommendations  Your Hemoglobin a1c is 8.4  F/u with endocrinologist monitor F/u with PCP for possible echo

## 2017-10-13 NOTE — PROGRESS NOTE ADULT - PROBLEM SELECTOR PLAN 2
Type 2 DM   Hold PO meds , was recently switched from lantus to PO meds ?  HB A1c 8.8  On Metformin, januvia and  trulicity (dulaglutide) 0.75 mg weekly   started lantus 15 units + ISS, adjust PRN   Hyperglycemia could be secondary to infection. Pt reports good control otherwise since his weight loss? HBA1c 8.8  Pt is on ACE but held for now   not on a statin , but is on fenofibrate . No prior hx of statin use or side effects Type 2 DM   Hold PO meds , was recently switched from lantus to PO meds ?  HB A1c 8.4  On Metformin, januvia and  trulicity (dulaglutide) 0.75 mg weekly   started lantus 15 units + ISS, adjust PRN   Hyperglycemia could be secondary to infection. Pt reports good control otherwise since his weight loss? HBA1c 8.8  Pt is on ACE but held for now   not on a statin , but is on fenofibrate . No prior hx of statin use or side effects

## 2017-10-13 NOTE — PROGRESS NOTE ADULT - SUBJECTIVE AND OBJECTIVE BOX
51 y/o male patient with PMHx of DMII seen at bedside for f/u Right foot 3rd toe cellulitis/osteomyelitis. Pt is resting comfortably in bed. Pt reports feeling well. Pt denies any pain in the foot b/l. Pt denies fever, chills, nausea, vomiting, SOB, chest pain, diarrhea or urinary sx.     Allergies  penicillin (Hives)    MEDICATIONS  (STANDING):  amLODIPine   Tablet 10 milliGRAM(s) Oral daily  aspirin  chewable 81 milliGRAM(s) Oral daily  dextrose 5%. 1000 milliLiter(s) (50 mL/Hr) IV Continuous <Continuous>  dextrose 50% Injectable 12.5 Gram(s) IV Push once  dextrose 50% Injectable 25 Gram(s) IV Push once  dextrose 50% Injectable 25 Gram(s) IV Push once  fenofibrate Tablet 145 milliGRAM(s) Oral daily  heparin  Injectable 5000 Unit(s) SubCutaneous every 8 hours  insulin glargine Injectable (LANTUS) 15 Unit(s) SubCutaneous every morning  insulin lispro (HumaLOG) corrective regimen sliding scale   SubCutaneous three times a day before meals  metoprolol succinate ER 25 milliGRAM(s) Oral daily  mupirocin 2% Ointment 1 Application(s) Topical daily  vancomycin  IVPB 750 milliGRAM(s) IV Intermittent every 12 hours  vancomycin  IVPB        MEDICATIONS  (PRN):  acetaminophen   Tablet 650 milliGRAM(s) Oral every 6 hours PRN For Temp greater than 38 C (100.4 F)  dextrose Gel 1 Dose(s) Oral once PRN Blood Glucose LESS THAN 70 milliGRAM(s)/deciliter  glucagon  Injectable 1 milliGRAM(s) IntraMuscular once PRN Glucose LESS THAN 70 milligrams/deciliter    Vital Signs Last 24 Hrs  T(C): 36.4 (13 Oct 2017 11:36), Max: 36.7 (12 Oct 2017 21:29)  T(F): 97.6 (13 Oct 2017 11:36), Max: 98 (12 Oct 2017 21:29)  HR: 72 (13 Oct 2017 11:36) (72 - 80)  BP: 148/82 (13 Oct 2017 11:36) (113/85 - 148/82)  BP(mean): --  RR: 16 (13 Oct 2017 11:36) (16 - 18)  SpO2: 100% (13 Oct 2017 11:36) (99% - 100%)      PHYSICAL EXAM:  Constitutional: AAOx3, non-focal; NAD, comfortable  Lower extremity physical exam:  Derm:   Right foot: Dressing dry, clean and intact. + erythema and non pitting edema noted at the 3rd digit extending to the level of the PIPJ, improving. Hyperkeratotic lesion noted at the distal tip of the 3rd digit with a small open lesion noted centrally, improving; no active drainage,  no malodor, no probe to bone, no fluctuance. Hyperkeratotic lesion noted at the medial plantar aspect of the hallux. No interdigital maceration. Nails are cut to hygenic length, non dystrophic x 5.     Left foot: Hyperkeratotic lesion noted at the medial plantar aspect of the hallux. No open lesion, no malodor, no clinical signs of infection. No interdigital maceration. Nails are cut to hygenic length, non dystrophic x 5.     Vasc:  Pedal pulses DP and PT palpable 2/4 B/L. CFT immediate x10. Pedal hair presents B/L.   Neuro: protective sensation measured SWM and light touch test intact B/L.  Ortho: Mild tenderness upon palpation and ROM of the 3rd digit, right foot. No pain upon palpation of the calf muscle B/L. Lower extremity compartments are soft, non tender. Lower extremity strength 5/5 inversion, eversion, dorsiflexion and plantar flexion B/L. Hammer toes 2, 3, 4 B/L. Hallux limitus b/l.       LABS:                         14.2   8.2   )-----------( 248      ( 13 Oct 2017 07:20 )             40.9     10-13    135  |  102  |  21  ----------------------------<  188<H>  4.4   |  27  |  1.31<H>    Ca    8.7      13 Oct 2017 07:20    TPro  6.7  /  Alb  3.6  /  TBili  0.3  /  DBili  x   /  AST  15  /  ALT  33  /  AlkPhos  43  10-12        Radiology:    EXAM:  TOE(S)-RIGHT FOOT                        PROCEDURE DATE:  10/11/2017        Right third toe: The study is limited due to suboptimal positioning; the   distal phalanx is in plantar flexion. There is however no lytic or   destructive lesion involving the bones. There is no fracture. There is no   soft tissue gas.    IMPRESSION:  Right third toe: No evidence of osteomyelitis    < from: MRI Foot w/wo Cont, Right (10.12.17 @ 14:51) >    EXAM:  MRI FOOT WO W C RIGHT                            PROCEDURE DATE:  10/12/2017          INTERPRETATION:  History: Nonhealing wound at the right third toe for the   past 2-3 weeks.    Multiplanar multisequence MRI of the right foot was performed with and   without intravenous contrast.    10 cc of Gadavist was administered intravenously. 0 cc was discarded.    Correlation is made with prior radiographs from October 11, 2017.    Findings:    Soft tissue ulceration is noted along the dorsal lateral aspect of the   third toe with surrounding soft tissue edema and enhancement consistent   with cellulitis. There is no discrete peripherally enhancing fluid   collection to suggest abscess. There is enhancing osseous edema within   the distal phalanx of the third toe with corresponding vague hypointense   T1 signal consistent with acute osteomyelitis. There is a bipartite   fibular sesamoid with the distal moiety appearing diffusely sclerotic.   This may be related to arthrosis of the sequela of prior osteonecrosis.   Marrow signal within the remainder of the foot is otherwise intact.    Visualized tendinous structures are intact without evidence of   tenosynovitis or tearing. Lisfranc ligament is intact.    There is mild first metatarsal phalangeal joint arthrosis. Hammertoe   deformities of the second through fourth digits. Ill-defined scarring is   noted within the first webspace adjacent to the fibular sesamoid.   Intermetatarsal bursitis is noted within the second, third, and fourth   webspaces. Callus formation is noted along the plantar lateral aspect of   the fifth metatarsal phalangeal joint. Signal arising from muscle which   are is within normal limits.    Impression:    Acute osteomyelitis of the distal phalanx of the third toe with   surrounding cellulitis.    KRYS AGARWAL   This document has been electronically signed. Oct 12 2017  5:04PM

## 2017-10-13 NOTE — PROGRESS NOTE ADULT - ASSESSMENT
53 y/o male with PMHx of T2DM is seen and evaluated for:    1. Osteomyelitis of the distal phallanx, 3rd digit; Right foot  2. Partial thickness Ulceration, distal tuft 3rd digit; Right foot	  3. Right foot edema  4. Pain in toe; Right  5. Hyperkeratotic callus b/l Hallux  6. Hammer toes 2, 3, 4 B/L with Hallux limitus B/L  7. DM type 2     Plan:  Patient seen and evaluated with Dr. Garcia  MRI of the right foot reviewed and discussed with patient   No surgical intervention at this time  Recommended PICC line IV antibiotics   Cleansed the 3rd right toe with normal saline   Applied bacitracin + DSD + majo, and cobane to Right toe/foot  WBAT surgical shoe  Instructed the patient to keep the dressing dry, clean and intact   Bactroban ointment ordered and to be applied daily; please discharge the patient with Bactroban    Medical management appreciated  Patient is stable to discharge from podiatry stand point; patient to follow up with Dr. Garcia at the office next week  Podiatry will continue to follow patient while in house    Daily Wound care instruction:  Cleansed wound with normal saline and apply Bactroban to the ulcer at the distal tip of the 3rd toe, right foot. Dress the 3rd digit, right toe with majo, and cobane. 51 y/o male with PMHx of T2DM is seen and evaluated for:    1. Osteomyelitis of the distal phallanx, 3rd digit; Right foot  2. Partial thickness Ulceration, distal tuft 3rd digit; Right foot	  3. Right foot edema  4. Pain in toe; Right  5. Hyperkeratotic callus b/l Hallux  6. Hammer toes 2, 3, 4 B/L with Hallux limitus B/L  7. DM type 2     Plan:  Patient seen and evaluated with Dr. Garcia  MRI of the right foot reviewed and discussed with patient   No surgical intervention at this time  Recommended PICC line IV antibiotics   Cleansed the 3rd right toe with normal saline   Applied bacitracin + DSD + majo, and cobane to Right toe/foot  WBAT surgical shoe  Instructed the patient to keep the dressing dry, clean and intact   Bactroban ointment ordered and to be applied daily; please discharge the patient with Bactroban    Medical management appreciated  Patient is stable to discharge from podiatry stand point; patient to follow up with Dr. Garcia at the office next week      Daily Wound care instruction:  Cleansed wound with normal saline and apply Bactroban to the ulcer at the distal tip of the 3rd toe, right foot. Dress the 3rd digit, right toe with majo, and cobane. 53 y/o male with PMHx of T2DM is seen and evaluated for:    1. Osteomyelitis of the distal phallanx, 3rd digit; Right foot  2. Partial thickness Ulceration, distal tuft 3rd digit; Right foot	  3. Right foot edema  4. Pain in toe; Right  5. Hyperkeratotic callus b/l Hallux  6. Hammer toes 2, 3, 4 B/L with Hallux limitus B/L  7. DM type 2     Plan:  Patient seen and evaluated with Dr. Garcia  MRI of the right foot reviewed and discussed with patient   No surgical intervention at this time.  Patient is aware of the risks and benefits of choosing conservative care with IV abx vs partial digital amputation.  Risks included but are in no way limited to kidney damage, not a full resolution of infection, negative progression of infection and possible need for future amputation if conservative care fails.   Recommended PICC line IV antibiotics as per ID  Cleansed the 3rd right toe with normal saline   Applied bacitracin + DSD + majo, and cobane to Right toe/foot  WBAT surgical shoe  Instructed the patient to keep the dressing dry, clean and intact   Bactroban ointment ordered and to be applied daily; please discharge the patient with Bactroban    Medical management appreciated  Patient is stable to discharge from podiatry stand point once placement of picc line is performed with IV abx as outpatient; patient to follow up with Dr. Garcia at the office next week tuesday/wednsday       Daily Wound care instruction:  Cleansed wound with normal saline and apply Bactroban to the ulcer at the distal tip of the 3rd toe, right foot. Dress the 3rd digit, right toe with majo, and cobane.

## 2017-10-13 NOTE — DISCHARGE NOTE ADULT - NS AS ACTIVITY OBS
Walking-Outdoors allowed/Walking-Indoors allowed/Bathing allowed/Stairs allowed/Driving allowed/Showering allowed Showering allowed/Walking-Indoors allowed/Stairs allowed/Driving allowed/Walking-Outdoors allowed/as tolerated/Bathing allowed

## 2017-10-13 NOTE — DISCHARGE NOTE ADULT - CARE PLAN
Principal Discharge DX:	Other acute osteomyelitis of right foot  Goal:	prevent worsening  Instructions for follow-up, activity and diet:	continue PICC Line care as per recommendations  continue antibiotic via PICC as prescribed.- weekly CBC, CRP, ESR and CMP while on antibiotics  Cleansed the 3rd right toe with normal saline   Applied bacitracin + DSD + majo, and cobane to Right toe/foot  Weight bearing as tolerater with  surgical shoe  f/u wth Dr Garcia within 2-3 days  Keep dressing clean and dry.  Secondary Diagnosis:	Type 2 diabetes mellitus with other circulatory complication, with long-term current use of insulin  Instructions for follow-up, activity and diet:	Keeping your Diabetes under control is very important in the healing process of your toe  Continue the medication regimen that your were prescribed on discharge  check your Blood sugar aw s per recommendations  Your Hemoglobin a1c is 8.9- the normal should be <7- please let your endocrinologist aware  You were started on ASA 81mg.  Secondary Diagnosis:	CKD (chronic kidney disease) stage 2, GFR 60-89 ml/min  Instructions for follow-up, activity and diet:	f/u with your PCP  repeat Renal Function Panel with your PCP on your next appointment Principal Discharge DX:	Other acute osteomyelitis of right foot  Goal:	resolve  Instructions for follow-up, activity and diet:	continue PICC Line care as per recommendations  continue antibiotic via PICC as prescribed.- weekly CBC, CRP, ESR and CMP while on antibiotics  Cleansed the 3rd right toe with normal saline   Applied bacitracin + DSD + majo, and cobane to Right toe/foot  Weight bearing as tolerater with  surgical shoe  f/u wth Dr Garcia within 2-3 days Tuesday/ Wednesday  Keep dressing clean and dry.  Secondary Diagnosis:	Type 2 diabetes mellitus with other circulatory complication, with long-term current use of insulin  Instructions for follow-up, activity and diet:	Keeping your Diabetes under control is very important in the healing process of your toe  Continue the medication regimen that your were prescribed on discharge  check your Blood sugar aw s per recommendations  Your Hemoglobin a1c is 8.4  F/u with endocrinologist  Secondary Diagnosis:	CKD (chronic kidney disease) stage 2, GFR 60-89 ml/min  Instructions for follow-up, activity and diet:	f/u with your PCP  repeat Renal Function Panel with your PCP on your next appointment Principal Discharge DX:	Other acute osteomyelitis of right foot  Goal:	resolve  Instructions for follow-up, activity and diet:	continue PICC Line care as per recommendations  continue antibiotic via PICC as prescribed.- weekly CBC, CRP, ESR and CMP while on antibiotics  Cleansed the 3rd right toe with normal saline   Applied bacitracin + DSD + majo, and cobane to Right toe/foot  Weight bearing as tolerater with  surgical shoe  f/u wth Dr Garcia within 2-3 days Tuesday/ Wednesday  Keep dressing clean and dry.  Secondary Diagnosis:	Type 2 diabetes mellitus with other circulatory complication, with long-term current use of insulin  Instructions for follow-up, activity and diet:	Keeping your Diabetes under control is very important in the healing process of your toe  Continue the medication regimen that your were prescribed on discharge  check your Blood sugar aw s per recommendations  Your Hemoglobin a1c is 8.4  F/u with endocrinologist  Secondary Diagnosis:	CKD (chronic kidney disease) stage 2, GFR 60-89 ml/min  Instructions for follow-up, activity and diet:	f/u with your PCP  repeat Renal Function Panel with your PCP on your next appointment  Secondary Diagnosis:	Murmur, cardiac  Goal:	monitor  Instructions for follow-up, activity and diet:	F/u with PCP for possible echo

## 2017-10-13 NOTE — DISCHARGE NOTE ADULT - MEDICATION SUMMARY - MEDICATIONS TO STOP TAKING
I will STOP taking the medications listed below when I get home from the hospital:    fosinopril 10 mg oral tablet  -- 1 tab(s) by mouth once a day

## 2017-10-13 NOTE — DISCHARGE NOTE ADULT - CARE PROVIDERS DIRECT ADDRESSES
,DirectAddress_Unknown,[email or web address],DirectAddress_Unknown

## 2017-10-13 NOTE — ADVANCED PRACTICE NURSE CONSULT - ASSESSMENT
Rec.d order to place PICC line for long-term IV abx. Reviewed chart, labwork, explained all risks and benefits and obtained consent for PICC placement. Pt. A&Ox3 and verified pt.’s identification, name, , and correct procedure.  Inserted Navilyst PICC 4FS w/PASV technology via ultrasound guidance to ­­­right basillic,  number of insertion attempts: 1, cut to 48 cm. length, brisk blood return, flushes well w/20 ml. NS. Site prepped with CHG, Draped in sterile fashion using strict aseptic technique maintained throughout procedure, performed hand hygiene, all team members maintained full barrier precautions, 1% lidocaine used subdermal, Minimal blood loss. Site covered with sterile dressing and dated. No complications. Endcap placed.  Pt. tolerated well.  All sharps and guidewires accounted for. CXR confirms placement, no pneumothorax.  Lot #   [id number]     ­­­­­­­­_________.

## 2017-10-13 NOTE — DISCHARGE NOTE ADULT - MEDICATION SUMMARY - MEDICATIONS TO TAKE
I will START or STAY ON the medications listed below when I get home from the hospital:    acetaminophen 325 mg oral tablet  -- 2 tab(s) by mouth every 6 hours, As needed, For pain  -- Indication: For Pain    aspirin 81 mg oral tablet, chewable  -- 1 tab(s) by mouth once a day  -- Indication: For PPXs    fosinopril 10 mg oral tablet  -- 0.5 tab(s) by mouth once a day  -- Indication: For HTN    metFORMIN 1000 mg oral tablet, extended release  -- 1 tab(s) by mouth 2 times a day  -- Indication: For DM    Januvia 100 mg oral tablet  -- 1 tab(s) by mouth once a day  -- Indication: For DM    Trulicity Pen  -- Indication: For DM    fenofibrate 160 mg oral tablet  -- 1 tab(s) by mouth once a day  -- Indication: For HLD    Toprol-XL 25 mg oral tablet, extended release  -- 1 tab(s) by mouth once a day  -- Indication: For HTN    amLODIPine 10 mg oral tablet  -- 1 tab(s) by mouth once a day  -- Indication: For HTN    cefepime  -- 2 gram(s) intravenous once a day, last day Nov 21  -- Indication: For OM    mupirocin 2% topical ointment  -- 1 application on skin once a day  -- Indication: For Cellulitis of toe    DAPTOmycin 500 mg intravenous injection  -- 410 milligram(s) intravenous once a day, last day 11/21   -- Indication: For OM

## 2017-10-14 VITALS
OXYGEN SATURATION: 100 % | SYSTOLIC BLOOD PRESSURE: 140 MMHG | DIASTOLIC BLOOD PRESSURE: 78 MMHG | RESPIRATION RATE: 16 BRPM | HEART RATE: 79 BPM | TEMPERATURE: 98 F

## 2017-10-14 LAB
ADD ON TEST-SPECIMEN IN LAB: SIGNIFICANT CHANGE UP
ANION GAP SERPL CALC-SCNC: 6 MMOL/L — SIGNIFICANT CHANGE UP (ref 5–17)
BUN SERPL-MCNC: 20 MG/DL — SIGNIFICANT CHANGE UP (ref 7–23)
CALCIUM SERPL-MCNC: 8.9 MG/DL — SIGNIFICANT CHANGE UP (ref 8.5–10.1)
CHLORIDE SERPL-SCNC: 103 MMOL/L — SIGNIFICANT CHANGE UP (ref 96–108)
CK SERPL-CCNC: 52 U/L — SIGNIFICANT CHANGE UP (ref 26–308)
CO2 SERPL-SCNC: 27 MMOL/L — SIGNIFICANT CHANGE UP (ref 22–31)
CREAT SERPL-MCNC: 1.23 MG/DL — SIGNIFICANT CHANGE UP (ref 0.5–1.3)
GLUCOSE BLDC GLUCOMTR-MCNC: 178 MG/DL — HIGH (ref 70–99)
GLUCOSE BLDC GLUCOMTR-MCNC: 314 MG/DL — HIGH (ref 70–99)
GLUCOSE SERPL-MCNC: 191 MG/DL — HIGH (ref 70–99)
HCT VFR BLD CALC: 40.8 % — SIGNIFICANT CHANGE UP (ref 39–50)
HGB BLD-MCNC: 13.9 G/DL — SIGNIFICANT CHANGE UP (ref 13–17)
MCHC RBC-ENTMCNC: 29.1 PG — SIGNIFICANT CHANGE UP (ref 27–34)
MCHC RBC-ENTMCNC: 34.1 GM/DL — SIGNIFICANT CHANGE UP (ref 32–36)
MCV RBC AUTO: 85.4 FL — SIGNIFICANT CHANGE UP (ref 80–100)
PLATELET # BLD AUTO: 266 K/UL — SIGNIFICANT CHANGE UP (ref 150–400)
POTASSIUM SERPL-MCNC: 4.3 MMOL/L — SIGNIFICANT CHANGE UP (ref 3.5–5.3)
POTASSIUM SERPL-SCNC: 4.3 MMOL/L — SIGNIFICANT CHANGE UP (ref 3.5–5.3)
RBC # BLD: 4.78 M/UL — SIGNIFICANT CHANGE UP (ref 4.2–5.8)
RBC # FLD: 12.1 % — SIGNIFICANT CHANGE UP (ref 10.3–14.5)
SODIUM SERPL-SCNC: 136 MMOL/L — SIGNIFICANT CHANGE UP (ref 135–145)
WBC # BLD: 8 K/UL — SIGNIFICANT CHANGE UP (ref 3.8–10.5)
WBC # FLD AUTO: 8 K/UL — SIGNIFICANT CHANGE UP (ref 3.8–10.5)

## 2017-10-14 RX ORDER — DAPTOMYCIN 500 MG/10ML
INJECTION, POWDER, LYOPHILIZED, FOR SOLUTION INTRAVENOUS
Qty: 0 | Refills: 0 | Status: DISCONTINUED | OUTPATIENT
Start: 2017-10-14 | End: 2017-10-14

## 2017-10-14 RX ORDER — DAPTOMYCIN 500 MG/10ML
410 INJECTION, POWDER, LYOPHILIZED, FOR SOLUTION INTRAVENOUS ONCE
Qty: 0 | Refills: 0 | Status: COMPLETED | OUTPATIENT
Start: 2017-10-14 | End: 2017-10-14

## 2017-10-14 RX ORDER — CEFEPIME 1 G/1
2 INJECTION, POWDER, FOR SOLUTION INTRAMUSCULAR; INTRAVENOUS
Qty: 0 | Refills: 0 | DISCHARGE
Start: 2017-10-14

## 2017-10-14 RX ORDER — ACETAMINOPHEN 500 MG
2 TABLET ORAL
Qty: 0 | Refills: 0 | DISCHARGE
Start: 2017-10-14

## 2017-10-14 RX ORDER — DAPTOMYCIN 500 MG/10ML
410 INJECTION, POWDER, LYOPHILIZED, FOR SOLUTION INTRAVENOUS
Qty: 0 | Refills: 0 | DISCHARGE
Start: 2017-10-14

## 2017-10-14 RX ORDER — FOSINOPRIL SODIUM 10 MG/1
1 TABLET ORAL
Qty: 0 | Refills: 0 | COMMUNITY

## 2017-10-14 RX ORDER — DAPTOMYCIN 500 MG/10ML
410 INJECTION, POWDER, LYOPHILIZED, FOR SOLUTION INTRAVENOUS EVERY 24 HOURS
Qty: 0 | Refills: 0 | Status: DISCONTINUED | OUTPATIENT
Start: 2017-10-15 | End: 2017-10-14

## 2017-10-14 RX ORDER — ASPIRIN/CALCIUM CARB/MAGNESIUM 324 MG
1 TABLET ORAL
Qty: 0 | Refills: 0 | COMMUNITY
Start: 2017-10-14

## 2017-10-14 RX ORDER — MUPIROCIN 20 MG/G
1 OINTMENT TOPICAL
Qty: 1 | Refills: 0
Start: 2017-10-14 | End: 2017-10-29

## 2017-10-14 RX ADMIN — MUPIROCIN 1 APPLICATION(S): 20 OINTMENT TOPICAL at 11:39

## 2017-10-14 RX ADMIN — Medication 4: at 08:44

## 2017-10-14 RX ADMIN — HEPARIN SODIUM 5000 UNIT(S): 5000 INJECTION INTRAVENOUS; SUBCUTANEOUS at 05:17

## 2017-10-14 RX ADMIN — INSULIN GLARGINE 15 UNIT(S): 100 INJECTION, SOLUTION SUBCUTANEOUS at 08:43

## 2017-10-14 RX ADMIN — HEPARIN SODIUM 5000 UNIT(S): 5000 INJECTION INTRAVENOUS; SUBCUTANEOUS at 13:33

## 2017-10-14 RX ADMIN — AMLODIPINE BESYLATE 10 MILLIGRAM(S): 2.5 TABLET ORAL at 05:17

## 2017-10-14 RX ADMIN — CEFEPIME 100 MILLIGRAM(S): 1 INJECTION, POWDER, FOR SOLUTION INTRAMUSCULAR; INTRAVENOUS at 05:17

## 2017-10-14 RX ADMIN — Medication 150 MILLIGRAM(S): at 07:05

## 2017-10-14 RX ADMIN — Medication 81 MILLIGRAM(S): at 11:39

## 2017-10-14 RX ADMIN — Medication 145 MILLIGRAM(S): at 11:39

## 2017-10-14 RX ADMIN — DAPTOMYCIN 116.4 MILLIGRAM(S): 500 INJECTION, POWDER, LYOPHILIZED, FOR SOLUTION INTRAVENOUS at 11:39

## 2017-10-14 RX ADMIN — Medication 25 MILLIGRAM(S): at 05:17

## 2017-10-14 RX ADMIN — Medication 1: at 11:51

## 2017-10-14 NOTE — PROVIDER CONTACT NOTE (OTHER) - ACTION/TREATMENT ORDERED:
Spoke to Dr. WES Mcadams.  Informed him patient was experiencing sob/expiratory wheezes.  Rcvd. orders for stat chest x-ray, to hold fuilds and place pt on bedrest.  Will continue to monitor patient.

## 2017-10-17 LAB
CULTURE RESULTS: SIGNIFICANT CHANGE UP
CULTURE RESULTS: SIGNIFICANT CHANGE UP
SPECIMEN SOURCE: SIGNIFICANT CHANGE UP
SPECIMEN SOURCE: SIGNIFICANT CHANGE UP

## 2017-10-18 DIAGNOSIS — L97.519 NON-PRESSURE CHRONIC ULCER OF OTHER PART OF RIGHT FOOT WITH UNSPECIFIED SEVERITY: ICD-10-CM

## 2017-10-18 DIAGNOSIS — Z88.0 ALLERGY STATUS TO PENICILLIN: ICD-10-CM

## 2017-10-18 DIAGNOSIS — N28.9 DISORDER OF KIDNEY AND URETER, UNSPECIFIED: ICD-10-CM

## 2017-10-18 DIAGNOSIS — R01.1 CARDIAC MURMUR, UNSPECIFIED: ICD-10-CM

## 2017-10-18 DIAGNOSIS — E11.22 TYPE 2 DIABETES MELLITUS WITH DIABETIC CHRONIC KIDNEY DISEASE: ICD-10-CM

## 2017-10-18 DIAGNOSIS — N17.9 ACUTE KIDNEY FAILURE, UNSPECIFIED: ICD-10-CM

## 2017-10-18 DIAGNOSIS — M86.171 OTHER ACUTE OSTEOMYELITIS, RIGHT ANKLE AND FOOT: ICD-10-CM

## 2017-10-18 DIAGNOSIS — E11.40 TYPE 2 DIABETES MELLITUS WITH DIABETIC NEUROPATHY, UNSPECIFIED: ICD-10-CM

## 2017-10-18 DIAGNOSIS — E11.69 TYPE 2 DIABETES MELLITUS WITH OTHER SPECIFIED COMPLICATION: ICD-10-CM

## 2017-10-18 DIAGNOSIS — M79.674 PAIN IN RIGHT TOE(S): ICD-10-CM

## 2017-10-18 DIAGNOSIS — E86.0 DEHYDRATION: ICD-10-CM

## 2017-10-18 DIAGNOSIS — I12.9 HYPERTENSIVE CHRONIC KIDNEY DISEASE WITH STAGE 1 THROUGH STAGE 4 CHRONIC KIDNEY DISEASE, OR UNSPECIFIED CHRONIC KIDNEY DISEASE: ICD-10-CM

## 2017-10-18 DIAGNOSIS — L03.031 CELLULITIS OF RIGHT TOE: ICD-10-CM

## 2017-10-18 DIAGNOSIS — M20.41 OTHER HAMMER TOE(S) (ACQUIRED), RIGHT FOOT: ICD-10-CM

## 2017-10-18 DIAGNOSIS — L84 CORNS AND CALLOSITIES: ICD-10-CM

## 2017-10-18 DIAGNOSIS — M20.42 OTHER HAMMER TOE(S) (ACQUIRED), LEFT FOOT: ICD-10-CM

## 2017-10-18 DIAGNOSIS — N18.2 CHRONIC KIDNEY DISEASE, STAGE 2 (MILD): ICD-10-CM

## 2017-10-18 DIAGNOSIS — G47.33 OBSTRUCTIVE SLEEP APNEA (ADULT) (PEDIATRIC): ICD-10-CM

## 2017-10-18 DIAGNOSIS — E11.65 TYPE 2 DIABETES MELLITUS WITH HYPERGLYCEMIA: ICD-10-CM

## 2018-09-10 ENCOUNTER — INPATIENT (INPATIENT)
Facility: HOSPITAL | Age: 53
LOS: 2 days | Discharge: HOME | End: 2018-09-13
Attending: FAMILY MEDICINE | Admitting: FAMILY MEDICINE
Payer: COMMERCIAL

## 2018-09-10 VITALS — HEIGHT: 71 IN | WEIGHT: 240.08 LBS

## 2018-09-10 DIAGNOSIS — N18.3 CHRONIC KIDNEY DISEASE, STAGE 3 (MODERATE): ICD-10-CM

## 2018-09-10 DIAGNOSIS — Z79.82 LONG TERM (CURRENT) USE OF ASPIRIN: ICD-10-CM

## 2018-09-10 DIAGNOSIS — I12.9 HYPERTENSIVE CHRONIC KIDNEY DISEASE WITH STAGE 1 THROUGH STAGE 4 CHRONIC KIDNEY DISEASE, OR UNSPECIFIED CHRONIC KIDNEY DISEASE: ICD-10-CM

## 2018-09-10 DIAGNOSIS — G47.33 OBSTRUCTIVE SLEEP APNEA (ADULT) (PEDIATRIC): ICD-10-CM

## 2018-09-10 DIAGNOSIS — R07.9 CHEST PAIN, UNSPECIFIED: ICD-10-CM

## 2018-09-10 DIAGNOSIS — I25.10 ATHEROSCLEROTIC HEART DISEASE OF NATIVE CORONARY ARTERY WITHOUT ANGINA PECTORIS: ICD-10-CM

## 2018-09-10 DIAGNOSIS — E11.22 TYPE 2 DIABETES MELLITUS WITH DIABETIC CHRONIC KIDNEY DISEASE: ICD-10-CM

## 2018-09-10 DIAGNOSIS — E78.5 HYPERLIPIDEMIA, UNSPECIFIED: ICD-10-CM

## 2018-09-10 DIAGNOSIS — Z79.84 LONG TERM (CURRENT) USE OF ORAL HYPOGLYCEMIC DRUGS: ICD-10-CM

## 2018-09-10 DIAGNOSIS — E66.9 OBESITY, UNSPECIFIED: ICD-10-CM

## 2018-09-10 LAB
ALBUMIN SERPL ELPH-MCNC: 3.6 G/DL — SIGNIFICANT CHANGE UP (ref 3.3–5)
ALP SERPL-CCNC: 50 U/L — SIGNIFICANT CHANGE UP (ref 40–120)
ALT FLD-CCNC: 39 U/L — SIGNIFICANT CHANGE UP (ref 12–78)
ANION GAP SERPL CALC-SCNC: 11 MMOL/L — SIGNIFICANT CHANGE UP (ref 5–17)
AST SERPL-CCNC: 25 U/L — SIGNIFICANT CHANGE UP (ref 15–37)
BASOPHILS # BLD AUTO: 0.07 K/UL — SIGNIFICANT CHANGE UP (ref 0–0.2)
BASOPHILS NFR BLD AUTO: 0.8 % — SIGNIFICANT CHANGE UP (ref 0–2)
BILIRUB SERPL-MCNC: 0.3 MG/DL — SIGNIFICANT CHANGE UP (ref 0.2–1.2)
BUN SERPL-MCNC: 27 MG/DL — HIGH (ref 7–23)
CALCIUM SERPL-MCNC: 8.8 MG/DL — SIGNIFICANT CHANGE UP (ref 8.5–10.1)
CHLORIDE SERPL-SCNC: 104 MMOL/L — SIGNIFICANT CHANGE UP (ref 96–108)
CO2 SERPL-SCNC: 24 MMOL/L — SIGNIFICANT CHANGE UP (ref 22–31)
CREAT SERPL-MCNC: 1.48 MG/DL — HIGH (ref 0.5–1.3)
EOSINOPHIL # BLD AUTO: 0.38 K/UL — SIGNIFICANT CHANGE UP (ref 0–0.5)
EOSINOPHIL NFR BLD AUTO: 4.5 % — SIGNIFICANT CHANGE UP (ref 0–6)
GLUCOSE SERPL-MCNC: 303 MG/DL — HIGH (ref 70–99)
HCT VFR BLD CALC: 44.9 % — SIGNIFICANT CHANGE UP (ref 39–50)
HGB BLD-MCNC: 15.6 G/DL — SIGNIFICANT CHANGE UP (ref 13–17)
IMM GRANULOCYTES NFR BLD AUTO: 1.3 % — SIGNIFICANT CHANGE UP (ref 0–1.5)
LYMPHOCYTES # BLD AUTO: 2.4 K/UL — SIGNIFICANT CHANGE UP (ref 1–3.3)
LYMPHOCYTES # BLD AUTO: 28.4 % — SIGNIFICANT CHANGE UP (ref 13–44)
MCHC RBC-ENTMCNC: 29.7 PG — SIGNIFICANT CHANGE UP (ref 27–34)
MCHC RBC-ENTMCNC: 34.7 GM/DL — SIGNIFICANT CHANGE UP (ref 32–36)
MCV RBC AUTO: 85.4 FL — SIGNIFICANT CHANGE UP (ref 80–100)
MONOCYTES # BLD AUTO: 0.6 K/UL — SIGNIFICANT CHANGE UP (ref 0–0.9)
MONOCYTES NFR BLD AUTO: 7.1 % — SIGNIFICANT CHANGE UP (ref 2–14)
NEUTROPHILS # BLD AUTO: 4.9 K/UL — SIGNIFICANT CHANGE UP (ref 1.8–7.4)
NEUTROPHILS NFR BLD AUTO: 57.9 % — SIGNIFICANT CHANGE UP (ref 43–77)
NRBC # BLD: 0 /100 WBCS — SIGNIFICANT CHANGE UP (ref 0–0)
PLATELET # BLD AUTO: 254 K/UL — SIGNIFICANT CHANGE UP (ref 150–400)
POTASSIUM SERPL-MCNC: 3.9 MMOL/L — SIGNIFICANT CHANGE UP (ref 3.5–5.3)
POTASSIUM SERPL-SCNC: 3.9 MMOL/L — SIGNIFICANT CHANGE UP (ref 3.5–5.3)
PROT SERPL-MCNC: 7.1 GM/DL — SIGNIFICANT CHANGE UP (ref 6–8.3)
RBC # BLD: 5.26 M/UL — SIGNIFICANT CHANGE UP (ref 4.2–5.8)
RBC # FLD: 12.9 % — SIGNIFICANT CHANGE UP (ref 10.3–14.5)
SODIUM SERPL-SCNC: 139 MMOL/L — SIGNIFICANT CHANGE UP (ref 135–145)
TROPONIN I SERPL-MCNC: <0.015 NG/ML — SIGNIFICANT CHANGE UP (ref 0.01–0.04)
TROPONIN I SERPL-MCNC: <0.015 NG/ML — SIGNIFICANT CHANGE UP (ref 0.01–0.04)
WBC # BLD: 8.46 K/UL — SIGNIFICANT CHANGE UP (ref 3.8–10.5)
WBC # FLD AUTO: 8.46 K/UL — SIGNIFICANT CHANGE UP (ref 3.8–10.5)

## 2018-09-10 PROCEDURE — 99285 EMERGENCY DEPT VISIT HI MDM: CPT

## 2018-09-10 PROCEDURE — 71046 X-RAY EXAM CHEST 2 VIEWS: CPT | Mod: 26

## 2018-09-10 PROCEDURE — 93010 ELECTROCARDIOGRAM REPORT: CPT

## 2018-09-10 RX ORDER — ASPIRIN/CALCIUM CARB/MAGNESIUM 324 MG
162 TABLET ORAL ONCE
Qty: 0 | Refills: 0 | Status: COMPLETED | OUTPATIENT
Start: 2018-09-10 | End: 2018-09-10

## 2018-09-10 RX ORDER — FAMOTIDINE 10 MG/ML
20 INJECTION INTRAVENOUS ONCE
Qty: 0 | Refills: 0 | Status: COMPLETED | OUTPATIENT
Start: 2018-09-10 | End: 2018-09-10

## 2018-09-10 RX ADMIN — Medication 162 MILLIGRAM(S): at 20:27

## 2018-09-10 RX ADMIN — FAMOTIDINE 20 MILLIGRAM(S): 10 INJECTION INTRAVENOUS at 20:27

## 2018-09-10 RX ADMIN — Medication 30 MILLILITER(S): at 20:27

## 2018-09-10 NOTE — ED STATDOCS - ATTENDING CONTRIBUTION TO CARE
I, Marshall Vega MD, personally saw the patient with ACP.  I have personally performed a face to face diagnostic evaluation on this patient.  I have reviewed the ACP note and agree with the history, exam, and plan of care, except as noted.

## 2018-09-10 NOTE — ED STATDOCS - OBJECTIVE STATEMENT
51 y/o male with a PMHx of  HTN, DM presents to the ED c/o chest pain, HA, SOB x a few days. CP and SOB are worsened with movement. Pt notes he has been waking up in the middle of the night with diaphoresis. Denies n/v/d, cough, fever.  Pt notes having similar sx in the past due to anxiety, but does not feel anxious at this time. Pt denies family hx of heart problems. on ASA 81mg daily. Last stress test 3-4 years ago. cardio- Dr. Marie 51 y/o male with a PMHx of  HTN, DM presents to the ED c/o chest pain, HA, SOB x a few days. CP and SOB are worsened with exertion. Pt notes he has been waking up in the middle of the night with diaphoresis. Denies n/v/d, cough, fever, AP.  Pt notes having similar sx in the past due to anxiety, but does not feel anxious at this time. Pt denies family hx of heart problems. on ASA 81mg daily. Last stress test 3-4 years ago. cardio- Dr. Marie

## 2018-09-10 NOTE — ED ADULT NURSE NOTE - OBJECTIVE STATEMENT
presents to the ED with chest pain/SOB. as per pt, he has been having ongoing symptoms for aprox 4 days but increasing in intensity. denies nausea, vomiting, fevers, chills. no simular episodes in the past. does not seen a cardiologist.

## 2018-09-10 NOTE — ED ADULT NURSE NOTE - CAS EDN DISCHARGE INTERVENTIONS
DISCHARGE DIAGNOSIS:  Status post laparoscopic cholecystectomy.

 

SUMMARY OF HOSPITAL COURSE:  A pleasant 48-year-old male who underwent an uneventful

laparoscopic cholecystectomy.

 

On postop day 1, his pain is well controlled.  He had no nausea, vomiting, shortness of

breath, or chest pain.  He is tolerating diet, passing gas.  No evidence of problems.

 

Total bilirubin was also normal.

 

FOLLOWUP:  Follow up with Surgery in 7 to 14 days.

 

ACTIVITY:  No lifting greater than 30 pounds x30 days

 

DISCHARGE MEDICATIONS:  Please see MAR, but include Toradol for pain.  This was discussed in

detail with the patient as far as methadone versus pain management, and the agreement was

methadone as the main treatment and ibuprofen as backup.
Arm band on/IV intact

## 2018-09-10 NOTE — ED STATDOCS - PROGRESS NOTE DETAILS
signed Tiffanie Mares PA-C Pt seen and evaluated initially in intake by Dr. Vega.   Pt c/o left sided chest heaviness/pressure and SOB worsened by exertion, releived by rest x 2-3 days. PMH IDDM, HTN, HLD. Pain was 5/10, now 2/10. Negative stress test 5 years ago Dr Kaye. Pt alert, NAD. Plan labs, cxr, trop, admit for chest pain. Pt agrees with plan of  care. PMD Cindy Gomez.

## 2018-09-10 NOTE — ED STATDOCS - ENMT, MLM
Nasal mucosa clear.  Mouth with normal mucosa  Throat has no vesicles, no oropharyngeal exudates and uvula is midline. airway patent

## 2018-09-10 NOTE — ED STATDOCS - GASTROINTESTINAL, MLM
abdomen soft, non-tender, and non-distended. Bowel sounds present. abdomen soft, non-tender, and non-distended. neg rebound/guarding/boaz's

## 2018-09-10 NOTE — ED ADULT NURSE NOTE - NSIMPLEMENTINTERV_GEN_ALL_ED
Implemented All Universal Safety Interventions:  [place] to call system. Call bell, personal items and telephone within reach. Instruct patient to call for assistance. Room bathroom lighting operational. Non-slip footwear when patient is off stretcher. Physically safe environment: no spills, clutter or unnecessary equipment. Stretcher in lowest position, wheels locked, appropriate side rails in place.

## 2018-09-10 NOTE — ED STATDOCS - NS_ ATTENDINGSCRIBEDETAILS _ED_A_ED_FT
The scribe's documentation has been prepared under my direction and personally reviewed by me in its entirety. I confirm that the note above accurately reflects all work, treatment, procedures, and medical decision-making performed by me.  Marshall Vega MD

## 2018-09-11 PROBLEM — G47.33 OBSTRUCTIVE SLEEP APNEA (ADULT) (PEDIATRIC): Chronic | Status: ACTIVE | Noted: 2017-10-12

## 2018-09-11 PROBLEM — E11.9 TYPE 2 DIABETES MELLITUS WITHOUT COMPLICATIONS: Chronic | Status: ACTIVE | Noted: 2017-10-17

## 2018-09-11 PROBLEM — I10 ESSENTIAL (PRIMARY) HYPERTENSION: Chronic | Status: ACTIVE | Noted: 2017-10-12

## 2018-09-11 LAB
ALBUMIN SERPL ELPH-MCNC: 3.5 G/DL — SIGNIFICANT CHANGE UP (ref 3.3–5)
ALP SERPL-CCNC: 37 U/L — LOW (ref 40–120)
ALT FLD-CCNC: 39 U/L — SIGNIFICANT CHANGE UP (ref 12–78)
ANION GAP SERPL CALC-SCNC: 8 MMOL/L — SIGNIFICANT CHANGE UP (ref 5–17)
AST SERPL-CCNC: 21 U/L — SIGNIFICANT CHANGE UP (ref 15–37)
BASOPHILS # BLD AUTO: 0.08 K/UL — SIGNIFICANT CHANGE UP (ref 0–0.2)
BASOPHILS NFR BLD AUTO: 1.1 % — SIGNIFICANT CHANGE UP (ref 0–2)
BILIRUB SERPL-MCNC: 0.4 MG/DL — SIGNIFICANT CHANGE UP (ref 0.2–1.2)
BUN SERPL-MCNC: 26 MG/DL — HIGH (ref 7–23)
CALCIUM SERPL-MCNC: 8.9 MG/DL — SIGNIFICANT CHANGE UP (ref 8.5–10.1)
CHLORIDE SERPL-SCNC: 107 MMOL/L — SIGNIFICANT CHANGE UP (ref 96–108)
CHOLEST SERPL-MCNC: 152 MG/DL — SIGNIFICANT CHANGE UP (ref 10–199)
CO2 SERPL-SCNC: 26 MMOL/L — SIGNIFICANT CHANGE UP (ref 22–31)
CREAT SERPL-MCNC: 1.37 MG/DL — HIGH (ref 0.5–1.3)
EOSINOPHIL # BLD AUTO: 0.32 K/UL — SIGNIFICANT CHANGE UP (ref 0–0.5)
EOSINOPHIL NFR BLD AUTO: 4.6 % — SIGNIFICANT CHANGE UP (ref 0–6)
GLUCOSE BLDC GLUCOMTR-MCNC: 151 MG/DL — HIGH (ref 70–99)
GLUCOSE BLDC GLUCOMTR-MCNC: 153 MG/DL — HIGH (ref 70–99)
GLUCOSE BLDC GLUCOMTR-MCNC: 161 MG/DL — HIGH (ref 70–99)
GLUCOSE SERPL-MCNC: 166 MG/DL — HIGH (ref 70–99)
HBA1C BLD-MCNC: 8.2 % — HIGH (ref 4–5.6)
HCT VFR BLD CALC: 44.9 % — SIGNIFICANT CHANGE UP (ref 39–50)
HDLC SERPL-MCNC: 22 MG/DL — LOW
HGB BLD-MCNC: 15.4 G/DL — SIGNIFICANT CHANGE UP (ref 13–17)
IMM GRANULOCYTES NFR BLD AUTO: 2.4 % — HIGH (ref 0–1.5)
LIPID PNL WITH DIRECT LDL SERPL: 85 MG/DL — SIGNIFICANT CHANGE UP
LYMPHOCYTES # BLD AUTO: 1.84 K/UL — SIGNIFICANT CHANGE UP (ref 1–3.3)
LYMPHOCYTES # BLD AUTO: 26.2 % — SIGNIFICANT CHANGE UP (ref 13–44)
MAGNESIUM SERPL-MCNC: 2.2 MG/DL — SIGNIFICANT CHANGE UP (ref 1.6–2.6)
MANUAL SMEAR VERIFICATION: SIGNIFICANT CHANGE UP
MCHC RBC-ENTMCNC: 29.4 PG — SIGNIFICANT CHANGE UP (ref 27–34)
MCHC RBC-ENTMCNC: 34.3 GM/DL — SIGNIFICANT CHANGE UP (ref 32–36)
MCV RBC AUTO: 85.7 FL — SIGNIFICANT CHANGE UP (ref 80–100)
MONOCYTES # BLD AUTO: 0.63 K/UL — SIGNIFICANT CHANGE UP (ref 0–0.9)
MONOCYTES NFR BLD AUTO: 9 % — SIGNIFICANT CHANGE UP (ref 2–14)
NEUTROPHILS # BLD AUTO: 3.98 K/UL — SIGNIFICANT CHANGE UP (ref 1.8–7.4)
NEUTROPHILS NFR BLD AUTO: 56.7 % — SIGNIFICANT CHANGE UP (ref 43–77)
NRBC # BLD: 0 /100 WBCS — SIGNIFICANT CHANGE UP (ref 0–0)
PLAT MORPH BLD: NORMAL — SIGNIFICANT CHANGE UP
PLATELET # BLD AUTO: 235 K/UL — SIGNIFICANT CHANGE UP (ref 150–400)
POTASSIUM SERPL-MCNC: 4.1 MMOL/L — SIGNIFICANT CHANGE UP (ref 3.5–5.3)
POTASSIUM SERPL-SCNC: 4.1 MMOL/L — SIGNIFICANT CHANGE UP (ref 3.5–5.3)
PROT SERPL-MCNC: 6.8 GM/DL — SIGNIFICANT CHANGE UP (ref 6–8.3)
RBC # BLD: 5.24 M/UL — SIGNIFICANT CHANGE UP (ref 4.2–5.8)
RBC # FLD: 13 % — SIGNIFICANT CHANGE UP (ref 10.3–14.5)
RBC BLD AUTO: NORMAL — SIGNIFICANT CHANGE UP
SODIUM SERPL-SCNC: 141 MMOL/L — SIGNIFICANT CHANGE UP (ref 135–145)
TOTAL CHOLESTEROL/HDL RATIO MEASUREMENT: 6.9 RATIO — SIGNIFICANT CHANGE UP (ref 3.4–9.6)
TRIGL SERPL-MCNC: 223 MG/DL — HIGH (ref 10–149)
TROPONIN I SERPL-MCNC: <0.015 NG/ML — SIGNIFICANT CHANGE UP (ref 0.01–0.04)
WBC # BLD: 7.02 K/UL — SIGNIFICANT CHANGE UP (ref 3.8–10.5)
WBC # FLD AUTO: 7.02 K/UL — SIGNIFICANT CHANGE UP (ref 3.8–10.5)

## 2018-09-11 PROCEDURE — 93010 ELECTROCARDIOGRAM REPORT: CPT

## 2018-09-11 RX ORDER — ACETAMINOPHEN 500 MG
650 TABLET ORAL EVERY 6 HOURS
Qty: 0 | Refills: 0 | Status: DISCONTINUED | OUTPATIENT
Start: 2018-09-11 | End: 2018-09-13

## 2018-09-11 RX ORDER — LISINOPRIL 2.5 MG/1
10 TABLET ORAL DAILY
Qty: 0 | Refills: 0 | Status: DISCONTINUED | OUTPATIENT
Start: 2018-09-11 | End: 2018-09-13

## 2018-09-11 RX ORDER — INFLUENZA VIRUS VACCINE 15; 15; 15; 15 UG/.5ML; UG/.5ML; UG/.5ML; UG/.5ML
0.5 SUSPENSION INTRAMUSCULAR ONCE
Qty: 0 | Refills: 0 | Status: COMPLETED | OUTPATIENT
Start: 2018-09-11 | End: 2018-09-13

## 2018-09-11 RX ORDER — DULAGLUTIDE 4.5 MG/.5ML
0 INJECTION, SOLUTION SUBCUTANEOUS
Qty: 0 | Refills: 0 | COMMUNITY

## 2018-09-11 RX ORDER — GLUCAGON INJECTION, SOLUTION 0.5 MG/.1ML
1 INJECTION, SOLUTION SUBCUTANEOUS ONCE
Qty: 0 | Refills: 0 | Status: DISCONTINUED | OUTPATIENT
Start: 2018-09-11 | End: 2018-09-13

## 2018-09-11 RX ORDER — METOPROLOL TARTRATE 50 MG
25 TABLET ORAL DAILY
Qty: 0 | Refills: 0 | Status: DISCONTINUED | OUTPATIENT
Start: 2018-09-11 | End: 2018-09-13

## 2018-09-11 RX ORDER — SENNA PLUS 8.6 MG/1
2 TABLET ORAL AT BEDTIME
Qty: 0 | Refills: 0 | Status: DISCONTINUED | OUTPATIENT
Start: 2018-09-11 | End: 2018-09-13

## 2018-09-11 RX ORDER — DEXTROSE 50 % IN WATER 50 %
25 SYRINGE (ML) INTRAVENOUS ONCE
Qty: 0 | Refills: 0 | Status: DISCONTINUED | OUTPATIENT
Start: 2018-09-11 | End: 2018-09-13

## 2018-09-11 RX ORDER — ONDANSETRON 8 MG/1
4 TABLET, FILM COATED ORAL EVERY 6 HOURS
Qty: 0 | Refills: 0 | Status: DISCONTINUED | OUTPATIENT
Start: 2018-09-11 | End: 2018-09-13

## 2018-09-11 RX ORDER — INSULIN GLARGINE 100 [IU]/ML
25 INJECTION, SOLUTION SUBCUTANEOUS
Qty: 0 | Refills: 0 | COMMUNITY

## 2018-09-11 RX ORDER — DOCUSATE SODIUM 100 MG
100 CAPSULE ORAL THREE TIMES A DAY
Qty: 0 | Refills: 0 | Status: DISCONTINUED | OUTPATIENT
Start: 2018-09-11 | End: 2018-09-13

## 2018-09-11 RX ORDER — INSULIN LISPRO 100/ML
VIAL (ML) SUBCUTANEOUS EVERY 6 HOURS
Qty: 0 | Refills: 0 | Status: DISCONTINUED | OUTPATIENT
Start: 2018-09-11 | End: 2018-09-13

## 2018-09-11 RX ORDER — DEXTROSE 50 % IN WATER 50 %
12.5 SYRINGE (ML) INTRAVENOUS ONCE
Qty: 0 | Refills: 0 | Status: DISCONTINUED | OUTPATIENT
Start: 2018-09-11 | End: 2018-09-13

## 2018-09-11 RX ORDER — SODIUM CHLORIDE 9 MG/ML
1000 INJECTION, SOLUTION INTRAVENOUS
Qty: 0 | Refills: 0 | Status: DISCONTINUED | OUTPATIENT
Start: 2018-09-11 | End: 2018-09-13

## 2018-09-11 RX ORDER — ASPIRIN/CALCIUM CARB/MAGNESIUM 324 MG
81 TABLET ORAL DAILY
Qty: 0 | Refills: 0 | Status: DISCONTINUED | OUTPATIENT
Start: 2018-09-11 | End: 2018-09-13

## 2018-09-11 RX ORDER — ATORVASTATIN CALCIUM 80 MG/1
20 TABLET, FILM COATED ORAL AT BEDTIME
Qty: 0 | Refills: 0 | Status: DISCONTINUED | OUTPATIENT
Start: 2018-09-11 | End: 2018-09-13

## 2018-09-11 RX ORDER — DEXTROSE 50 % IN WATER 50 %
15 SYRINGE (ML) INTRAVENOUS ONCE
Qty: 0 | Refills: 0 | Status: DISCONTINUED | OUTPATIENT
Start: 2018-09-11 | End: 2018-09-13

## 2018-09-11 RX ADMIN — LISINOPRIL 10 MILLIGRAM(S): 2.5 TABLET ORAL at 06:15

## 2018-09-11 RX ADMIN — Medication 25 MILLIGRAM(S): at 06:15

## 2018-09-11 RX ADMIN — Medication 1: at 11:01

## 2018-09-11 RX ADMIN — Medication 1: at 17:04

## 2018-09-11 RX ADMIN — ATORVASTATIN CALCIUM 20 MILLIGRAM(S): 80 TABLET, FILM COATED ORAL at 20:56

## 2018-09-11 RX ADMIN — Medication 81 MILLIGRAM(S): at 11:01

## 2018-09-11 NOTE — H&P ADULT - NSHPOUTPATIENTPROVIDERS_GEN_ALL_CORE
PCP; Dr. Rogel  Cardiology; Dr. Kaye PCP; Dr. Rogel  Cardiology; Dr. Kaye  Endocrinology; Dr. López PCP; Dr. Ron Rogel  Cardiology; Dr. Kaye  Endocrinology; Dr. López

## 2018-09-11 NOTE — ED ADULT NURSE REASSESSMENT NOTE - NS ED NURSE REASSESS COMMENT FT1
Assumed care of patient from COREY Brooke. Patient transferred to [facility] to decrease stimuli and promote sleep. Safety and comfort maintained. Will continue to monitor.

## 2018-09-11 NOTE — H&P ADULT - NSHPPHYSICALEXAM_GEN_ALL_CORE
Vital Signs Last 24 Hrs  T(C): 36.4 (11 Sep 2018 03:15), Max: 37 (10 Sep 2018 20:31)  T(F): 97.5 (11 Sep 2018 03:15), Max: 98.6 (10 Sep 2018 20:31)  HR: 80 (11 Sep 2018 03:15) (73 - 84)  BP: 134/93 (11 Sep 2018 03:15) (130/86 - 143/82)  RR: 15 (11 Sep 2018 03:15) (15 - 16)  SpO2: 97% (11 Sep 2018 03:15) (97% - 98%)

## 2018-09-11 NOTE — H&P ADULT - ASSESSMENT
53 y/o M PMHx significant for hypertension, diabetes mellitus type 2, RENETTA who presents to the ED for further evaluation of c/o recurrent exertional chest pain/pressure (5/10) associated w/ shortness of breath. Of note the patient has been waking up at night w/ diaphoresis over the last 2-3 days. The patient's last known stress test was 3-4 years ago (was reportedly negative at that time). 51 y/o M PMHx significant for hypertension, diabetes mellitus type 2, RENETTA who presents to the ED for further evaluation of c/o recurrent exertional chest pain/pressure (5/10) associated w/ shortness of breath. Of note the patient has been waking up at night w/ diaphoresis over the last 2-3 days. The patient's last known stress test was 3-4 years ago (was reportedly negative at that time).     1)Chest pain r/o ACS  ~admit to Telemetry  ~serial Jaskaran/EKGs  ~cont. ASA 81mg po daily  ~cont. Metoprolol 25mg po q12h  ~NPO   ~f/u w/ Cardiology in the am    2)Hypertension  ~cont. Fosinopril 10mg po daily    3)Diabetes Mellitus Type 2  ~FS qAC  ~cont. Basal/Bolus insulin regimen    4)Vte ppx  IMPROVE Vte Risk Score is 0  ~encourage early ambulation

## 2018-09-11 NOTE — H&P ADULT - HISTORY OF PRESENT ILLNESS
51 y/o M PMHx significant for hypertension, diabetes mellitus type 2, RENETTA who presents to the ED for further evaluation of c/o recurrent exertional chest pain/pressure (5/10) associated w/ shortness of breath. Of note the patient has been waking up at night w/ diaphoresis over the last 2-3 days. The patient's last known stress test was 3-4 years ago (was reportedly negative at that time).

## 2018-09-11 NOTE — CONSULT NOTE ADULT - ASSESSMENT
A/P: 53 y/o M PMHx significant for hypertension, diabetes mellitus type 2, RENETTA who presents to the ED for further evaluation of c/o recurrent exertional chest pain/pressure (5/10) associated w/ shortness of breath.     1. CP- exertional symptoms of CP, SOB. Pt also with multiple risk factors including obesity, DM2, HTN.  Will have LHC today to assess coronary anatomy. Cont asa, bbl. Will add lipitor 20mg today.     2. HTN- Cont bbl, acei. BP is elevated. Can change Bbl to BID.    3. Dyslipidemia- check lipid panel. Goal LDL should be < 70. Start lipitor today.   Check Hgb a1C.    4. DVT proph, keep NPO for now.

## 2018-09-12 LAB
ANION GAP SERPL CALC-SCNC: 7 MMOL/L — SIGNIFICANT CHANGE UP (ref 5–17)
BASOPHILS # BLD AUTO: 0.09 K/UL — SIGNIFICANT CHANGE UP (ref 0–0.2)
BASOPHILS NFR BLD AUTO: 1.1 % — SIGNIFICANT CHANGE UP (ref 0–2)
BLD GP AB SCN SERPL QL: SIGNIFICANT CHANGE UP
BUN SERPL-MCNC: 26 MG/DL — HIGH (ref 7–23)
CALCIUM SERPL-MCNC: 9.1 MG/DL — SIGNIFICANT CHANGE UP (ref 8.5–10.1)
CHLORIDE SERPL-SCNC: 106 MMOL/L — SIGNIFICANT CHANGE UP (ref 96–108)
CO2 SERPL-SCNC: 27 MMOL/L — SIGNIFICANT CHANGE UP (ref 22–31)
CREAT SERPL-MCNC: 1.32 MG/DL — HIGH (ref 0.5–1.3)
EOSINOPHIL # BLD AUTO: 0.35 K/UL — SIGNIFICANT CHANGE UP (ref 0–0.5)
EOSINOPHIL NFR BLD AUTO: 4.3 % — SIGNIFICANT CHANGE UP (ref 0–6)
GLUCOSE BLDC GLUCOMTR-MCNC: 205 MG/DL — HIGH (ref 70–99)
GLUCOSE BLDC GLUCOMTR-MCNC: 207 MG/DL — HIGH (ref 70–99)
GLUCOSE BLDC GLUCOMTR-MCNC: 293 MG/DL — HIGH (ref 70–99)
GLUCOSE SERPL-MCNC: 182 MG/DL — HIGH (ref 70–99)
HCT VFR BLD CALC: 47.2 % — SIGNIFICANT CHANGE UP (ref 39–50)
HGB BLD-MCNC: 16 G/DL — SIGNIFICANT CHANGE UP (ref 13–17)
IMM GRANULOCYTES NFR BLD AUTO: 1.3 % — SIGNIFICANT CHANGE UP (ref 0–1.5)
LYMPHOCYTES # BLD AUTO: 1.98 K/UL — SIGNIFICANT CHANGE UP (ref 1–3.3)
LYMPHOCYTES # BLD AUTO: 24.2 % — SIGNIFICANT CHANGE UP (ref 13–44)
MCHC RBC-ENTMCNC: 29.1 PG — SIGNIFICANT CHANGE UP (ref 27–34)
MCHC RBC-ENTMCNC: 33.9 GM/DL — SIGNIFICANT CHANGE UP (ref 32–36)
MCV RBC AUTO: 85.8 FL — SIGNIFICANT CHANGE UP (ref 80–100)
MONOCYTES # BLD AUTO: 0.73 K/UL — SIGNIFICANT CHANGE UP (ref 0–0.9)
MONOCYTES NFR BLD AUTO: 8.9 % — SIGNIFICANT CHANGE UP (ref 2–14)
NEUTROPHILS # BLD AUTO: 4.93 K/UL — SIGNIFICANT CHANGE UP (ref 1.8–7.4)
NEUTROPHILS NFR BLD AUTO: 60.2 % — SIGNIFICANT CHANGE UP (ref 43–77)
NRBC # BLD: 0 /100 WBCS — SIGNIFICANT CHANGE UP (ref 0–0)
PLATELET # BLD AUTO: 234 K/UL — SIGNIFICANT CHANGE UP (ref 150–400)
POTASSIUM SERPL-MCNC: 4.3 MMOL/L — SIGNIFICANT CHANGE UP (ref 3.5–5.3)
POTASSIUM SERPL-SCNC: 4.3 MMOL/L — SIGNIFICANT CHANGE UP (ref 3.5–5.3)
RBC # BLD: 5.5 M/UL — SIGNIFICANT CHANGE UP (ref 4.2–5.8)
RBC # FLD: 13.1 % — SIGNIFICANT CHANGE UP (ref 10.3–14.5)
SODIUM SERPL-SCNC: 140 MMOL/L — SIGNIFICANT CHANGE UP (ref 135–145)
TYPE + AB SCN PNL BLD: SIGNIFICANT CHANGE UP
WBC # BLD: 8.19 K/UL — SIGNIFICANT CHANGE UP (ref 3.8–10.5)
WBC # FLD AUTO: 8.19 K/UL — SIGNIFICANT CHANGE UP (ref 3.8–10.5)

## 2018-09-12 PROCEDURE — 93010 ELECTROCARDIOGRAM REPORT: CPT

## 2018-09-12 RX ORDER — INSULIN GLARGINE 100 [IU]/ML
8 INJECTION, SOLUTION SUBCUTANEOUS AT BEDTIME
Qty: 0 | Refills: 0 | Status: DISCONTINUED | OUTPATIENT
Start: 2018-09-12 | End: 2018-09-13

## 2018-09-12 RX ORDER — CLOPIDOGREL BISULFATE 75 MG/1
75 TABLET, FILM COATED ORAL DAILY
Qty: 0 | Refills: 0 | Status: DISCONTINUED | OUTPATIENT
Start: 2018-09-12 | End: 2018-09-13

## 2018-09-12 RX ORDER — SODIUM CHLORIDE 9 MG/ML
1000 INJECTION INTRAMUSCULAR; INTRAVENOUS; SUBCUTANEOUS
Qty: 0 | Refills: 0 | Status: DISCONTINUED | OUTPATIENT
Start: 2018-09-12 | End: 2018-09-13

## 2018-09-12 RX ADMIN — Medication 3: at 18:29

## 2018-09-12 RX ADMIN — ATORVASTATIN CALCIUM 20 MILLIGRAM(S): 80 TABLET, FILM COATED ORAL at 21:18

## 2018-09-12 RX ADMIN — INSULIN GLARGINE 8 UNIT(S): 100 INJECTION, SOLUTION SUBCUTANEOUS at 21:19

## 2018-09-12 RX ADMIN — Medication 25 MILLIGRAM(S): at 05:18

## 2018-09-12 RX ADMIN — SODIUM CHLORIDE 100 MILLILITER(S): 9 INJECTION INTRAMUSCULAR; INTRAVENOUS; SUBCUTANEOUS at 17:58

## 2018-09-12 RX ADMIN — LISINOPRIL 10 MILLIGRAM(S): 2.5 TABLET ORAL at 05:18

## 2018-09-12 RX ADMIN — Medication 2: at 21:19

## 2018-09-12 NOTE — PACU DISCHARGE NOTE - COMMENTS
Report given to Eliane, receiving RN on CICU.  Pt. transferred to inpt. room, 346-1, in CICU, on cardiac monitor via stretcher accompanied by RN's.  COREY Del [place], present at bedside upon transfer.

## 2018-09-12 NOTE — PROGRESS NOTE ADULT - ASSESSMENT
· Assessment		  51 y/o M PMHx significant for hypertension, diabetes mellitus type 2, RENETTA who presents to the ED for further evaluation of c/o recurrent exertional chest pain/pressure (5/10) associated w/ shortness of breath. Of note the patient has been waking up at night w/ diaphoresis over the last 2-3 days. The patient's last known stress test was 3-4 years ago (was reportedly negative at that time).     1) recurrent Chest pain secondary to CAD /s/p [facility] 9/12 s/p PCI and BEATRICE to LAD  continue tele Telemetry    ~cont. ASA 81mg po daily  added plavix 75mg  ~cont. Metoprolol 25mg po q12h  continue ACEI  statin  ~NPO   ~f/u w/ Cardiology in the am    # VIKKI likely prerenal azotemia  continue IVF for 10 hrs         #Hypertension  ~cont. ACEI and BB    #Diabetes Mellitus Type 2  ~FS qAC  Basal/Bolus insulin regimen    4)Vte ppx  IMPROVE Vte Risk Score is 0  ~encourage early ambulation · Assessment		  51 y/o M PMHx significant for hypertension, diabetes mellitus type 2, RENETTA who presents to the ED for further evaluation of c/o recurrent exertional chest pain/pressure (5/10) associated w/ shortness of breath. Of note the patient has been waking up at night w/ diaphoresis over the last 2-3 days. The patient's last known stress test was 3-4 years ago (was reportedly negative at that time).     1) recurrent Chest pain secondary to CAD /s/p [facility] 9/12 s/p PCI and BEATRICE to LAD  continue tele Telemetry    ~cont. ASA 81mg po daily  added plavix 75mg  ~cont. Metoprolol 25mg po q12h  continue ACEI  statin  ~NPO   ~f/u w/ Cardiology in the am    # VIKKI likely prerenal azotemia on CKD stage 3  continue IVF for 10 hrs         #Hypertension  ~cont. ACEI and BB    #Diabetes Mellitus Type 2  ~FS qAC  Basal/Bolus insulin regimen    4)Vte ppx  IMPROVE Vte Risk Score is 0  ~encourage early ambulation

## 2018-09-12 NOTE — PROGRESS NOTE ADULT - ASSESSMENT
A/P: 53 y/o M PMHx significant for hypertension, diabetes mellitus type 2, RENETTA who presents to the ED for further evaluation of c/o recurrent exertional chest pain/pressure (5/10) associated w/ shortness of breath.     1. CP- exertional symptoms of CP, SOB. Pt also with multiple risk factors including obesity, DM2, HTN.  Will have LHC today to assess coronary anatomy. Cont asa, bbl. Will continue lipitor 20mg today.     2. HTN- Cont bbl, acei. BP is better controlled today. Cont to follow.    3. Dyslipidemia- check lipid panel. Goal LDL should be < 70. Start lipitor today.   Check Hgb a1C.    4. DVT proph.

## 2018-09-12 NOTE — PROGRESS NOTE ADULT - SUBJECTIVE AND OBJECTIVE BOX
Reason for Admission: Chest Pain	  History of Present Illness: 	  53 y/o M PMHx significant for hypertension, diabetes mellitus type 2, RENETTA who presents to the ED for further evaluation of c/o recurrent exertional chest pain/pressure (5/10) associated w/ shortness of breath. Of note the patient has been waking up at night w/ diaphoresis over the last 2-3 days. The patient's last known stress test was 3-4 years ago (was reportedly negative at that time).     9/12- s/p LHC ,seen in PACU, awake and no complaints feels well      PHYSICAL EXAM:    Daily Height in cm: 180.34 (12 Sep 2018 08:13)    Daily     ICU Vital Signs Last 24 Hrs  T(C): 36.1 (12 Sep 2018 08:17), Max: 37.2 (11 Sep 2018 17:31)  T(F): 96.9 (12 Sep 2018 08:17), Max: 98.9 (11 Sep 2018 17:31)  HR: 85 (12 Sep 2018 14:40) (69 - 85)  BP: 133/79 (12 Sep 2018 14:40) (128/82 - 146/91)  BP(mean): --  ABP: --  ABP(mean): --  RR: 16 (12 Sep 2018 14:40) (16 - 18)  SpO2: 98% (12 Sep 2018 14:40) (97% - 100%)      Constitutional: NAD  HEENT: Atraumatic, RENITA, Normal, No congestion  Respiratory: Breath Sounds normal, no rhonchi/wheeze  Cardiovascular: N S1S2;   Gastrointestinal: Abdomen soft, non tender, Bowel Ssounds present  Extremities: No edema,  Neurological: AAO x 3, no gross focal motor deficits  s   Musculoskeletal: non tender  Breasts: Deferred  Genitourinary: deferred  Rectal: Deferred                          16.0   8.19  )-----------( 234      ( 12 Sep 2018 06:15 )             47.2       CBC Full  -  ( 12 Sep 2018 06:15 )  WBC Count : 8.19 K/uL  Hemoglobin : 16.0 g/dL  Hematocrit : 47.2 %  Platelet Count - Automated : 234 K/uL  Mean Cell Volume : 85.8 fl  Mean Cell Hemoglobin : 29.1 pg  Mean Cell Hemoglobin Concentration : 33.9 gm/dL  Auto Neutrophil # : 4.93 K/uL  Auto Lymphocyte # : 1.98 K/uL  Auto Monocyte # : 0.73 K/uL  Auto Eosinophil # : 0.35 K/uL  Auto Basophil # : 0.09 K/uL  Auto Neutrophil % : 60.2 %  Auto Lymphocyte % : 24.2 %  Auto Monocyte % : 8.9 %  Auto Eosinophil % : 4.3 %  Auto Basophil % : 1.1 %      09-12    140  |  106  |  26<H>  ----------------------------<  182<H>  4.3   |  27  |  1.32<H>    Ca    9.1      12 Sep 2018 06:15  Mg     2.2     09-11    TPro  6.8  /  Alb  3.5  /  TBili  0.4  /  DBili  x   /  AST  21  /  ALT  39  /  AlkPhos  37<L>  09-11      LIVER FUNCTIONS - ( 11 Sep 2018 06:24 )  Alb: 3.5 g/dL / Pro: 6.8 gm/dL / ALK PHOS: 37 U/L / ALT: 39 U/L / AST: 21 U/L / GGT: x                 CARDIAC MARKERS ( 11 Sep 2018 06:24 )  <0.015 ng/mL / x     / x     / x     / x      CARDIAC MARKERS ( 10 Sep 2018 23:25 )  <0.015 ng/mL / x     / x     / x     / x      CARDIAC MARKERS ( 10 Sep 2018 20:18 )  <0.015 ng/mL / x     / x     / x     / x                    MEDICATIONS  (STANDING):  aspirin  chewable 81 milliGRAM(s) Oral daily  atorvastatin 20 milliGRAM(s) Oral at bedtime  clopidogrel Tablet 75 milliGRAM(s) Oral daily  dextrose 5%. 1000 milliLiter(s) (50 mL/Hr) IV Continuous <Continuous>  dextrose 50% Injectable 12.5 Gram(s) IV Push once  dextrose 50% Injectable 25 Gram(s) IV Push once  dextrose 50% Injectable 25 Gram(s) IV Push once  influenza   Vaccine 0.5 milliLiter(s) IntraMuscular once  insulin glargine Injectable (LANTUS) 8 Unit(s) SubCutaneous at bedtime  insulin lispro (HumaLOG) corrective regimen sliding scale   SubCutaneous every 6 hours  lisinopril 10 milliGRAM(s) Oral daily  metoprolol succinate ER 25 milliGRAM(s) Oral daily  sodium chloride 0.9%. 1000 milliLiter(s) (100 mL/Hr) IV Continuous <Continuous>

## 2018-09-12 NOTE — PROGRESS NOTE ADULT - SUBJECTIVE AND OBJECTIVE BOX
Cardiology Progress Note    HPI: 53 y/o M PMHx significant for hypertension, diabetes mellitus type 2, RENETTA who presents to the ED for further evaluation of c/o recurrent exertional chest pain/pressure (5/10) associated w/ shortness of breath. Of note the patient has been waking up at night w/ diaphoresis over the last 2-3 days. The patient's last known stress test was 3-4 years ago (was reportedly negative at that time). (11 Sep 2018 04:04)    9/11- Pt has exertional CP, pressure with activity. No pain radiation or diaphoresis. No h/o CAD/MI.  SR on tele.     9/12- Awaiting [facility] today. No CP last pm. SR on tele. No events last pm.    PAST MEDICAL & SURGICAL HISTORY:  Diabetes mellitus  Obstructive sleep apnea  Essential hypertension  No significant past surgical history    Allergies  penicillin (Hives)    SOCIAL HISTORY: Denies tobacco, etoh abuse or illicit drug use    FAMILY HISTORY: Family history of diabetes mellitus (Mother, Sibling)    MEDICATIONS  (STANDING):  aspirin  chewable 81 milliGRAM(s) Oral daily  dextrose 5%. 1000 milliLiter(s) (50 mL/Hr) IV Continuous <Continuous>  dextrose 50% Injectable 12.5 Gram(s) IV Push once  dextrose 50% Injectable 25 Gram(s) IV Push once  dextrose 50% Injectable 25 Gram(s) IV Push once  influenza   Vaccine 0.5 milliLiter(s) IntraMuscular once  insulin lispro (HumaLOG) corrective regimen sliding scale   SubCutaneous every 6 hours  lisinopril 10 milliGRAM(s) Oral daily  metoprolol succinate ER 25 milliGRAM(s) Oral daily    MEDICATIONS  (PRN):  acetaminophen   Tablet .. 650 milliGRAM(s) Oral every 6 hours PRN Temp greater or equal to 38C (100.4F), Mild Pain (1 - 3)  dextrose 40% Gel 15 Gram(s) Oral once PRN Blood Glucose LESS THAN 70 milliGRAM(s)/deciliter  docusate sodium 100 milliGRAM(s) Oral three times a day PRN Constipation  glucagon  Injectable 1 milliGRAM(s) IntraMuscular once PRN Glucose LESS THAN 70 milligrams/deciliter  ondansetron Injectable 4 milliGRAM(s) IV Push every 6 hours PRN Nausea  senna 2 Tablet(s) Oral at bedtime PRN Constipation    Vital Signs Last 24 Hrs  T(C): 36.9 (11 Sep 2018 06:21), Max: 37 (10 Sep 2018 20:31)  T(F): 98.4 (11 Sep 2018 06:21), Max: 98.6 (10 Sep 2018 20:31)  HR: 80 (11 Sep 2018 06:21) (73 - 84)  BP: 155/92 (11 Sep 2018 06:21) (130/86 - 155/92)  BP(mean): --  RR: 18 (11 Sep 2018 06:21) (15 - 18)  SpO2: 97% (11 Sep 2018 06:21) (97% - 99%)    REVIEW OF SYSTEMS:    CONSTITUTIONAL:  As per HPI.  HEENT:  Eyes:  No diplopia or blurred vision. ENT:  No earache, sore throat or runny nose.  CARDIOVASCULAR: +CP, chest pressure.  RESPIRATORY:  No cough, shortness of breath, PND or orthopnea.  GASTROINTESTINAL:  No nausea, vomiting or diarrhea.  GENITOURINARY:  No dysuria, frequency or urgency.  MUSCULOSKELETAL:  As per HPI.  NEUROLOGIC:  No paresthesias, fasciculations, seizures or weakness.    PHYSICAL EXAMINATION:    GENERAL APPEARANCE:  Pt. is not currently dyspneic, in no distress. Pt. is alert, oriented, and pleasant.  HEENT:  Pupils are normal and react normally. No icterus. Mucous membranes well colored.  NECK:  Supple. No lymphadenopathy. Jugular venous pressure not elevated. Carotids equal.   HEART:   The cardiac impulse has a normal quality. There are no murmurs, rubs or gallops noted  CHEST:  Chest is clear to auscultation. Normal respiratory effort.  ABDOMEN:  Soft and nontender.   EXTREMITIES:  There is no edema.     I&O's Summary      LABS:                        15.4   7.02  )-----------( 235      ( 11 Sep 2018 06:24 )             44.9     09-11    141  |  107  |  26<H>  ----------------------------<  166<H>  4.1   |  26  |  1.37<H>    Ca    8.9      11 Sep 2018 06:24  Mg     2.2     09-11    TPro  6.8  /  Alb  3.5  /  TBili  0.4  /  DBili  x   /  AST  21  /  ALT  39  /  AlkPhos  37<L>  09-11    LIVER FUNCTIONS - ( 11 Sep 2018 06:24 )  Alb: 3.5 g/dL / Pro: 6.8 gm/dL / ALK PHOS: 37 U/L / ALT: 39 U/L / AST: 21 U/L / GGT: x           CARDIAC MARKERS ( 11 Sep 2018 06:24 )  <0.015 ng/mL / x     / x     / x     / x      CARDIAC MARKERS ( 10 Sep 2018 23:25 )  <0.015 ng/mL / x     / x     / x     / x      CARDIAC MARKERS ( 10 Sep 2018 20:18 )  <0.015 ng/mL / x     / x     / x     / x        EKG: SR @ 73. IVCD. TWI in lead III. No acute ST changes.     TELEMETRY: SR    CARDIAC TESTS: pending    RADIOLOGY & ADDITIONAL STUDIES: < from: Xray Chest 1 View AP/PA. (10.13.17 @ 15:59) >  Impression: No active pulmonarydisease.RIGHT PICC catheter tip is seen   within the atriocaval junction.    ASSESSMENT & PLAN:

## 2018-09-12 NOTE — PROGRESS NOTE ADULT - SUBJECTIVE AND OBJECTIVE BOX
Cardiology NP     Patient is a 53y old  Male who presents with a chief complaint of Chest Pain (12 Sep 2018 08:27)      HPI:  53 y/o M PMHx significant for hypertension, diabetes mellitus type 2, RENETTA who presents to the ED for further evaluation of c/o recurrent exertional chest pain/pressure (5/10) associated w/ shortness of breath. Of note the patient has been waking up at night w/ diaphoresis over the last 2-3 days.       PAST MEDICAL & SURGICAL HISTORY:  Diabetes mellitus  Obstructive sleep apnea  Essential hypertension  No significant past surgical history      MEDICATIONS  (STANDING):  aspirin  chewable 81 milliGRAM(s) Oral daily  atorvastatin 20 milliGRAM(s) Oral at bedtime  clopidogrel Tablet 75 milliGRAM(s) Oral daily  dextrose 5%. 1000 milliLiter(s) (50 mL/Hr) IV Continuous <Continuous>  dextrose 50% Injectable 12.5 Gram(s) IV Push once  dextrose 50% Injectable 25 Gram(s) IV Push once  dextrose 50% Injectable 25 Gram(s) IV Push once  influenza   Vaccine 0.5 milliLiter(s) IntraMuscular once  insulin lispro (HumaLOG) corrective regimen sliding scale   SubCutaneous every 6 hours  lisinopril 10 milliGRAM(s) Oral daily  metoprolol succinate ER 25 milliGRAM(s) Oral daily  sodium chloride 0.9%. 1000 milliLiter(s) (100 mL/Hr) IV Continuous <Continuous>    MEDICATIONS  (PRN):  acetaminophen   Tablet .. 650 milliGRAM(s) Oral every 6 hours PRN Temp greater or equal to 38C (100.4F), Mild Pain (1 - 3)  dextrose 40% Gel 15 Gram(s) Oral once PRN Blood Glucose LESS THAN 70 milliGRAM(s)/deciliter  docusate sodium 100 milliGRAM(s) Oral three times a day PRN Constipation  glucagon  Injectable 1 milliGRAM(s) IntraMuscular once PRN Glucose LESS THAN 70 milligrams/deciliter  ondansetron Injectable 4 milliGRAM(s) IV Push every 6 hours PRN Nausea  senna 2 Tablet(s) Oral at bedtime PRN Constipation      Allergies    penicillin (Hives)    Intolerances        REVIEW OF SYSTEMS: As mentioned in HPI all others Negative     Vital Signs Last 24 Hrs  T(C): 36.1 (12 Sep 2018 08:17), Max: 37.2 (11 Sep 2018 17:31)  T(F): 96.9 (12 Sep 2018 08:17), Max: 98.9 (11 Sep 2018 17:31)  HR: 71 (12 Sep 2018 08:17) (69 - 83)  BP: 144/78 (12 Sep 2018 08:17) (129/83 - 144/78)  BP(mean): --  RR: 16 (12 Sep 2018 08:17) (16 - 18)  SpO2: 99% (12 Sep 2018 08:17) (98% - 99%)    PHYSICAL EXAM:  NERVOUS SYSTEM:  Alert & Oriented X3  CHEST/LUNG: Clear to auscultation bilaterally  HEART: Regular rate and rhythm  ABDOMEN: Soft, Nontender, Bowel sounds present  EXTREMITIES:  + Peripheral Pulses, No edema  SKIN: right femoral cath site with Perclose device ,site without bleeding or hematoma    LABS:                        16.0   8.19  )-----------( 234      ( 12 Sep 2018 06:15 )             47.2     09-12    140  |  106  |  26<H>  ----------------------------<  182<H>  4.3   |  27  |  1.32<H>    Ca    9.1      12 Sep 2018 06:15  Mg     2.2     09-11    TPro  6.8  /  Alb  3.5  /  TBili  0.4  /  DBili  x   /  AST  21  /  ALT  39  /  AlkPhos  37<L>  09-11        CAPILLARY BLOOD GLUCOSE      POCT Blood Glucose.: 205 mg/dL (12 Sep 2018 05:20)  POCT Blood Glucose.: 153 mg/dL (11 Sep 2018 16:54)  POCT Blood Glucose.: 151 mg/dL (11 Sep 2018 10:57)

## 2018-09-12 NOTE — PROGRESS NOTE ADULT - ASSESSMENT
HPI:    This is ia 53 y/o M PM Hx of hypertension, diabetes mellitus type 2, RENETTA who presents to the ED for further evaluation of c/o recurrent exertional chest pain/pressure (5/10) associated w/ shortness of breath. Of note the patient has been waking up at night w/ diaphoresis over the last 2-3 days.     Patient now s/p angiogram with PCI and BEATRICE to the LAD    - monitor in CICU  - IV hydration  - AM labs and EKG  - procedure, outcome and f/u care reviewed with patient  - continue ASA  - start Plavix 75mg daily  - continue statin/ b' blocker / ACE  - continue hospitalist service  - f/u with MD in 4-7 days of discharge

## 2018-09-13 ENCOUNTER — TRANSCRIPTION ENCOUNTER (OUTPATIENT)
Age: 53
End: 2018-09-13

## 2018-09-13 VITALS — SYSTOLIC BLOOD PRESSURE: 132 MMHG | HEART RATE: 83 BPM | DIASTOLIC BLOOD PRESSURE: 78 MMHG

## 2018-09-13 LAB
ANION GAP SERPL CALC-SCNC: 7 MMOL/L — SIGNIFICANT CHANGE UP (ref 5–17)
BASOPHILS # BLD AUTO: 0.08 K/UL — SIGNIFICANT CHANGE UP (ref 0–0.2)
BASOPHILS NFR BLD AUTO: 0.8 % — SIGNIFICANT CHANGE UP (ref 0–2)
BUN SERPL-MCNC: 31 MG/DL — HIGH (ref 7–23)
CALCIUM SERPL-MCNC: 9.2 MG/DL — SIGNIFICANT CHANGE UP (ref 8.5–10.1)
CHLORIDE SERPL-SCNC: 107 MMOL/L — SIGNIFICANT CHANGE UP (ref 96–108)
CO2 SERPL-SCNC: 27 MMOL/L — SIGNIFICANT CHANGE UP (ref 22–31)
CREAT SERPL-MCNC: 1.38 MG/DL — HIGH (ref 0.5–1.3)
EOSINOPHIL # BLD AUTO: 0.42 K/UL — SIGNIFICANT CHANGE UP (ref 0–0.5)
EOSINOPHIL NFR BLD AUTO: 4.2 % — SIGNIFICANT CHANGE UP (ref 0–6)
GLUCOSE BLDC GLUCOMTR-MCNC: 189 MG/DL — HIGH (ref 70–99)
GLUCOSE BLDC GLUCOMTR-MCNC: 251 MG/DL — HIGH (ref 70–99)
GLUCOSE SERPL-MCNC: 206 MG/DL — HIGH (ref 70–99)
HCT VFR BLD CALC: 47.6 % — SIGNIFICANT CHANGE UP (ref 39–50)
HGB BLD-MCNC: 16 G/DL — SIGNIFICANT CHANGE UP (ref 13–17)
IMM GRANULOCYTES NFR BLD AUTO: 1.7 % — HIGH (ref 0–1.5)
LYMPHOCYTES # BLD AUTO: 2.17 K/UL — SIGNIFICANT CHANGE UP (ref 1–3.3)
LYMPHOCYTES # BLD AUTO: 21.6 % — SIGNIFICANT CHANGE UP (ref 13–44)
MCHC RBC-ENTMCNC: 28.8 PG — SIGNIFICANT CHANGE UP (ref 27–34)
MCHC RBC-ENTMCNC: 33.6 GM/DL — SIGNIFICANT CHANGE UP (ref 32–36)
MCV RBC AUTO: 85.6 FL — SIGNIFICANT CHANGE UP (ref 80–100)
MONOCYTES # BLD AUTO: 0.84 K/UL — SIGNIFICANT CHANGE UP (ref 0–0.9)
MONOCYTES NFR BLD AUTO: 8.3 % — SIGNIFICANT CHANGE UP (ref 2–14)
NEUTROPHILS # BLD AUTO: 6.38 K/UL — SIGNIFICANT CHANGE UP (ref 1.8–7.4)
NEUTROPHILS NFR BLD AUTO: 63.4 % — SIGNIFICANT CHANGE UP (ref 43–77)
NRBC # BLD: 0 /100 WBCS — SIGNIFICANT CHANGE UP (ref 0–0)
PLATELET # BLD AUTO: 254 K/UL — SIGNIFICANT CHANGE UP (ref 150–400)
POTASSIUM SERPL-MCNC: 4.3 MMOL/L — SIGNIFICANT CHANGE UP (ref 3.5–5.3)
POTASSIUM SERPL-SCNC: 4.3 MMOL/L — SIGNIFICANT CHANGE UP (ref 3.5–5.3)
RBC # BLD: 5.56 M/UL — SIGNIFICANT CHANGE UP (ref 4.2–5.8)
RBC # FLD: 12.9 % — SIGNIFICANT CHANGE UP (ref 10.3–14.5)
SODIUM SERPL-SCNC: 141 MMOL/L — SIGNIFICANT CHANGE UP (ref 135–145)
WBC # BLD: 10.06 K/UL — SIGNIFICANT CHANGE UP (ref 3.8–10.5)
WBC # FLD AUTO: 10.06 K/UL — SIGNIFICANT CHANGE UP (ref 3.8–10.5)

## 2018-09-13 PROCEDURE — 93010 ELECTROCARDIOGRAM REPORT: CPT | Mod: 76

## 2018-09-13 RX ORDER — ASPIRIN/CALCIUM CARB/MAGNESIUM 324 MG
1 TABLET ORAL
Qty: 30 | Refills: 0
Start: 2018-09-13 | End: 2018-10-12

## 2018-09-13 RX ORDER — ATORVASTATIN CALCIUM 80 MG/1
1 TABLET, FILM COATED ORAL
Qty: 30 | Refills: 0
Start: 2018-09-13 | End: 2018-10-12

## 2018-09-13 RX ORDER — CLOPIDOGREL BISULFATE 75 MG/1
1 TABLET, FILM COATED ORAL
Qty: 30 | Refills: 0
Start: 2018-09-13 | End: 2018-10-12

## 2018-09-13 RX ADMIN — Medication 81 MILLIGRAM(S): at 08:08

## 2018-09-13 RX ADMIN — LISINOPRIL 10 MILLIGRAM(S): 2.5 TABLET ORAL at 06:18

## 2018-09-13 RX ADMIN — Medication 1: at 08:07

## 2018-09-13 RX ADMIN — Medication 3: at 12:04

## 2018-09-13 RX ADMIN — CLOPIDOGREL BISULFATE 75 MILLIGRAM(S): 75 TABLET, FILM COATED ORAL at 08:08

## 2018-09-13 RX ADMIN — INFLUENZA VIRUS VACCINE 0.5 MILLILITER(S): 15; 15; 15; 15 SUSPENSION INTRAMUSCULAR at 12:04

## 2018-09-13 RX ADMIN — Medication 25 MILLIGRAM(S): at 06:18

## 2018-09-13 NOTE — DISCHARGE NOTE ADULT - PLAN OF CARE
recommend compliance with meds as per reconcilliation, need to check BMP in 1 week of discharge to make sure renal function stable , follow up with dr galdamez  within 1 week or as scheduled see discharge summary

## 2018-09-13 NOTE — DISCHARGE NOTE ADULT - CARE PLAN
Principal Discharge DX:	Chest pain, unspecified type  Goal:	recommend compliance with meds as per reconcilliation, need to check BMP in 1 week of discharge to make sure renal function stable , follow up with dr galdamez  within 1 week or as scheduled  Assessment and plan of treatment:	see discharge summary

## 2018-09-13 NOTE — DISCHARGE NOTE ADULT - CARE PROVIDER_API CALL
Scotty Kaye (MD), Cardiovascular Disease; Interventional Cardiology  [address] [place] [address]  Phone: [phone]  Fax: [phone]    pcp,   primary care provider  Phone: (   )    -  Fax: (   )    -

## 2018-09-13 NOTE — PROGRESS NOTE ADULT - ASSESSMENT
53 y/o M PMHx significant for hypertension, diabetes mellitus type 2, RENETTA who presents to the ED for further evaluation of c/o recurrent exertional chest pain/pressure (5/10) associated w/ shortness of breath. Of note the patient has been waking up at night w/ diaphoresis over the last 2-3 days. The patient's last known stress test was 3-4 years ago (was reportedly negative at that time). (11 Sep 2018 04:04)    -Encourage PO fluids  -ASA 81mg  -Plavix 75mg  -Lisinopril 10mg  -Toprol XL 25mg  -Lipitor 20mg  -Plan of care D/W pt. and MD  -Discussed therapeutic lifestyle changes to reduce risk factors such as following a cardiac diet, weight loss, maintaining a healthy weight, exercise, smoking cessation, medication compliance, and regular follow-up  with MD to know our numbers (BP, cholesterol, weight, and glucose  -If pt. remains stable overnight possible D/C in AM  - Follow-up AM labs/EKG/site check  -Follow-up with attending 53 y/o M PMHx significant for hypertension, diabetes mellitus type 2, RENETTA who presents to the ED for further evaluation of c/o recurrent exertional chest pain/pressure (5/10) associated w/ shortness of breath. Of note the patient has been waking up at night w/ diaphoresis over the last 2-3 days. The patient's last known stress test was 3-4 years ago (was reportedly negative at that time). (11 Sep 2018 04:04)    -Encourage PO fluids  -ASA 81mg  -Plavix 75mg  -Lisinopril 10mg  -Toprol XL 25mg  -Lipitor 20mg  -Plan of care D/W pt. and MD  -Discussed therapeutic lifestyle changes to reduce risk factors such as following a cardiac diet, weight loss, maintaining a healthy weight, exercise, smoking cessation, medication compliance, and regular follow-up  with MD to know our numbers (BP, cholesterol, weight, and glucose  -Pt. to be discharged home today   - Follow-up AM labs/EKG/site check  -Follow-up with attending 51 y/o M PMHx significant for hypertension, diabetes mellitus type 2, RENETTA who presents to the ED for further evaluation of c/o recurrent exertional chest pain/pressure (5/10) associated w/ shortness of breath. Of note the patient has been waking up at night w/ diaphoresis over the last 2-3 days. The patient's last known stress test was 3-4 years ago (was reportedly negative at that time). (11 Sep 2018 04:04)    -Encourage PO fluids  -ASA 81mg  -Plavix 75mg  -[id number]  -Toprol XL 25mg  -Lipitor 20mg  -Plan of care D/W pt. and MD  -Discussed therapeutic lifestyle changes to reduce risk factors such as following a cardiac diet, weight loss, maintaining a healthy weight, exercise, smoking cessation, medication compliance, and regular follow-up  with MD to know our numbers (BP, cholesterol, weight, and glucose  -Pt. to be discharged home today   -Post cath discharge instructions reviewed with pt., pt. verbalizes and understands instructions   -Follow-up with attending

## 2018-09-13 NOTE — DISCHARGE NOTE ADULT - MEDICATION SUMMARY - MEDICATIONS TO TAKE
I will START or STAY ON the medications listed below when I get home from the hospital:    aspirin 81 mg oral tablet, chewable  -- 1 tab(s) by mouth once a day  -- Indication: For Cad    fosinopril 10 mg oral tablet  -- 0.5 tab(s) by mouth once a day  -- Indication: For .    Jardiance 25 mg oral tablet  -- 1 tab(s) by mouth once a day (in the morning)  -- Indication: For .    Trulicity Pen 1.5 mg/0.5 mL subcutaneous solution  -- 0.5 milliliter(s) subcutaneous once a week  -- Indication: For .    nateglinide 120 mg oral tablet  -- 1 tab(s) by mouth 3 times a day (before meals)  -- Indication: For .    metFORMIN 500 mg oral tablet, extended release  -- 4 tab(s) by mouth once a day  -- Indication: For .    Toujeo SoloStar 300 units/mL subcutaneous solution  -- 45 unit(s) subcutaneous once a day (at bedtime)  -- Indication: For .    atorvastatin 20 mg oral tablet  -- 1 tab(s) by mouth once a day (at bedtime)  -- Indication: For Cad/hld    fenofibrate 160 mg oral tablet  -- 1 tab(s) by mouth once a day  -- Indication: For hld    clopidogrel 75 mg oral tablet  -- 1 tab(s) by mouth once a day  -- Indication: For Cad stent    Toprol-XL 25 mg oral tablet, extended release  -- 1 tab(s) by mouth once a day  -- Indication: For .    Vitamin D2 50,000 intl units (1.25 mg) oral capsule  -- 1 cap(s) by mouth once a week  -- Indication: For .

## 2018-09-13 NOTE — DISCHARGE NOTE ADULT - HOSPITAL COURSE
· Subjective and Objective: 	  Reason for Admission: Chest Pain	  History of Present Illness: 	  51 y/o M PMHx significant for hypertension, diabetes mellitus type 2, RENETTA who presents to the ED for further evaluation of c/o recurrent exertional chest pain/pressure (5/10) associated w/ shortness of breath. Of note the patient has been waking up at night w/ diaphoresis over the last 2-3 days. The patient's last known stress test was 3-4 years ago (was reportedly negative at that time).     9/12- s/p LHC ,seen in PACU, awake and no complaints feels well  9/13- no chest pain ,no SOB ,is ambulating, feels well  Constitutional: NAD  HEENT: Atraumatic, RENITA, Normal, No congestion  Respiratory: Breath Sounds normal, no rhonchi/wheeze  Cardiovascular: N S1S2;   Gastrointestinal: Abdomen soft, non tender, Bowel Ssounds present  Extremities: No edema,  Neurological: AAO x 3, no gross focal motor deficits  s   Musculoskeletal: non tender  Breasts: Deferred  Genitourinary: deferred  Rectal: Deferred  · Assessment		  51 y/o M PMHx significant for hypertension, diabetes mellitus type 2, RENETTA who presents to the ED for further evaluation of c/o recurrent exertional chest pain/pressure (5/10) associated w/ shortness of breath. Of note the patient has been waking up at night w/ diaphoresis over the last 2-3 days. The patient's last known stress test was 3-4 years ago (was reportedly negative at that time).     1) recurrent Chest pain secondary to CAD /s/p LHC 9/12 s/p PCI and BEATRICE to LAD  s/p PCI stent on 9/13/15  patient stable to be discharged home  cleared by cardio for discharge  ~cont. ASA 81mg po daily  added plavix 75mg  added lipitor  ~cont. Metoprolol 25mg po q12h  continue ACEI   f/u dr galdamez in 1 week or as scheduled    # VIKKI likely prerenal azotemia improved  with IVF to baseline Cr of around 1.3 which was also in oct 2017 on CKD stage 3  f/u BMP in 1 week  since patient also on ACEI    #Hypertension hx  ~cont. ACEI and BB    #Diabetes Mellitus Type 2  ~FS qAC  Basal/Bolus insulin regimen      discussed with patient ,wife and RN team and cardiology  discharge time 50mins

## 2018-09-13 NOTE — DISCHARGE NOTE ADULT - PROVIDER TOKENS
TOKEN:'[id number]',FREE:[LAST:[pcp],PHONE:[(   )    -],FAX:[(   )    -],ADDRESS:[primary care provider]]

## 2018-09-13 NOTE — PROGRESS NOTE ADULT - SUBJECTIVE AND OBJECTIVE BOX
NP Progress Note         Follow Up: S/P [facility]     HPI:  51 y/o M PMHx significant for hypertension, diabetes mellitus type 2, RENETTA who presents to the ED for further evaluation of c/o recurrent exertional chest pain/pressure (5/10) associated w/ shortness of breath. Of note the patient has been waking up at night w/ diaphoresis over the last 2-3 days. The patient's last known stress test was 3-4 years ago (was reportedly negative at that time). (11 Sep 2018 04:04)        Subjective/Observations: Pt. seen and examined and evaluated. Pt. resting comfortably in bed in NAD, with no respiratory distress, no chest pain, dyspnea, palpitations, PND, or orthopnea.      REVIEW OF SYSTEMS: All other review of systems is negative unless indicated above    PAST MEDICAL & SURGICAL HISTORY:  Diabetes mellitus  Obstructive sleep apnea  Essential hypertension  No significant past surgical history      MEDICATIONS  (STANDING):  aspirin  chewable 81 milliGRAM(s) Oral daily  atorvastatin 20 milliGRAM(s) Oral at bedtime  clopidogrel Tablet 75 milliGRAM(s) Oral daily  dextrose 5%. 1000 milliLiter(s) (50 mL/Hr) IV Continuous <Continuous>  dextrose 50% Injectable 12.5 Gram(s) IV Push once  dextrose 50% Injectable 25 Gram(s) IV Push once  dextrose 50% Injectable 25 Gram(s) IV Push once  influenza   Vaccine 0.5 milliLiter(s) IntraMuscular once  insulin glargine Injectable (LANTUS) 8 Unit(s) SubCutaneous at bedtime  insulin lispro (HumaLOG) corrective regimen sliding scale   SubCutaneous every 6 hours  lisinopril 10 milliGRAM(s) Oral daily  metoprolol succinate ER 25 milliGRAM(s) Oral daily  sodium chloride 0.9%. 1000 milliLiter(s) (100 mL/Hr) IV Continuous <Continuous>    MEDICATIONS  (PRN):  acetaminophen   Tablet .. 650 milliGRAM(s) Oral every 6 hours PRN Temp greater or equal to 38C (100.4F), Mild Pain (1 - 3)  dextrose 40% Gel 15 Gram(s) Oral once PRN Blood Glucose LESS THAN 70 milliGRAM(s)/deciliter  docusate sodium 100 milliGRAM(s) Oral three times a day PRN Constipation  glucagon  Injectable 1 milliGRAM(s) IntraMuscular once PRN Glucose LESS THAN 70 milligrams/deciliter  ondansetron Injectable 4 milliGRAM(s) IV Push every 6 hours PRN Nausea  senna 2 Tablet(s) Oral at bedtime PRN Constipation      Allergies: penicillin (Hives)      Vital Signs Last 24 Hrs  T(C): 36.1 (13 Sep 2018 06:10), Max: 37 (12 Sep 2018 23:48)  T(F): 97 (13 Sep 2018 06:10), Max: 98.6 (12 Sep 2018 23:48)  HR: 84 (13 Sep 2018 06:10) (71 - 92)  BP: 142/82 (13 Sep 2018 06:10) (117/77 - 146/91)  BP(mean): 93 (13 Sep 2018 04:00) (86 - 95)  RR: 16 (12 Sep 2018 14:40) (16 - 16)  SpO2: 97% (13 Sep 2018 06:10) (95% - 100%)    I&O's Summary    12 Sep 2018 07:01  -  13 Sep 2018 07:00  --------------------------------------------------------  IN: 1260 mL / OUT: 1000 mL / NET: 260 mL      Weight (kg): 108.9 (09-12 @ 08:13)        LABS: All Labs Reviewed:                        16.0   10.06 )-----------( 254      ( 13 Sep 2018 04:21 )             47.6                    13 Sep 2018 04:21    141    |  107    |  31     ----------------------------<  206    4.3     |  27     |  1.38     Ca    9.2        13 Sep 2018 04:21      Echo:    Stress Testing:     Cath:    EKG:    Interpretation of Telemetry:      Physical Exam:  Appearance: [ ] Normal  [ ] abnormal [ ] NAD   Eyes: [ ] PERRL [ ] EOMI  HEENT: [ ] Normal [ ] Abnormal oral mucosa [ ]NC/AT  Cardiovascular: [ ] S1 [ ] S2 [ ] RRR [ ] m/r/g [ ]edema [ ] JVP  Procedural Access Site: [ ]  hematoma [ ] tender to palpation [ ] 2+ pulse [ ] bruit [ ] Ecchymosis  Respiratory: [ ] Clear to auscultation bilaterally  Gastrointestinal: [ ] Soft [ ] tenderness[ ] distension [ ] BS  Musculoskeletal: [ ] clubbing [ ] joint deformity   Neurologic: [ ] Non-focal  Lymphatic: [ ] lymphadenopathy  Psychiatry: [ ] AAOx3  [ ] confused [ ] disoriented [ ] Mood & affect appropriate  Skin: [ ]  rashes [ ] ecchymoses [ ] cyanosis NP Progress Note   Follow Up: S/P [facility]     HPI:  53 y/o M PMHx significant for hypertension, diabetes mellitus type 2, RENETTA who presents to the ED for further evaluation of c/o recurrent exertional chest pain/pressure (5/10) associated w/ shortness of breath. Of note the patient has been waking up at night w/ diaphoresis over the last 2-3 days. The patient's last known stress test was 3-4 years ago (was reportedly negative at that time). (11 Sep 2018 04:04)        Subjective/Observations: Pt. seen and examined and evaluated. Pt. resting comfortably in bed in NAD, with no respiratory distress, no chest pain, dyspnea, palpitations, PND, or orthopnea.      REVIEW OF SYSTEMS: All other review of systems is negative unless indicated above    PAST MEDICAL & SURGICAL HISTORY:  Diabetes mellitus  Obstructive sleep apnea  Essential hypertension  No significant past surgical history      MEDICATIONS  (STANDING):  aspirin  chewable 81 milliGRAM(s) Oral daily  atorvastatin 20 milliGRAM(s) Oral at bedtime  clopidogrel Tablet 75 milliGRAM(s) Oral daily  dextrose 5%. 1000 milliLiter(s) (50 mL/Hr) IV Continuous <Continuous>  dextrose 50% Injectable 12.5 Gram(s) IV Push once  dextrose 50% Injectable 25 Gram(s) IV Push once  dextrose 50% Injectable 25 Gram(s) IV Push once  influenza   Vaccine 0.5 milliLiter(s) IntraMuscular once  insulin glargine Injectable (LANTUS) 8 Unit(s) SubCutaneous at bedtime  insulin lispro (HumaLOG) corrective regimen sliding scale   SubCutaneous every 6 hours  lisinopril 10 milliGRAM(s) Oral daily  metoprolol succinate ER 25 milliGRAM(s) Oral daily  sodium chloride 0.9%. 1000 milliLiter(s) (100 mL/Hr) IV Continuous <Continuous>    MEDICATIONS  (PRN):  acetaminophen   Tablet .. 650 milliGRAM(s) Oral every 6 hours PRN Temp greater or equal to 38C (100.4F), Mild Pain (1 - 3)  dextrose 40% Gel 15 Gram(s) Oral once PRN Blood Glucose LESS THAN 70 milliGRAM(s)/deciliter  docusate sodium 100 milliGRAM(s) Oral three times a day PRN Constipation  glucagon  Injectable 1 milliGRAM(s) IntraMuscular once PRN Glucose LESS THAN 70 milligrams/deciliter  ondansetron Injectable 4 milliGRAM(s) IV Push every 6 hours PRN Nausea  senna 2 Tablet(s) Oral at bedtime PRN Constipation      Allergies: penicillin (Hives)      Vital Signs Last 24 Hrs  T(C): 36.1 (13 Sep 2018 06:10), Max: 37 (12 Sep 2018 23:48)  T(F): 97 (13 Sep 2018 06:10), Max: 98.6 (12 Sep 2018 23:48)  HR: 84 (13 Sep 2018 06:10) (71 - 92)  BP: 142/82 (13 Sep 2018 06:10) (117/77 - 146/91)  BP(mean): 93 (13 Sep 2018 04:00) (86 - 95)  RR: 16 (12 Sep 2018 14:40) (16 - 16)  SpO2: 97% (13 Sep 2018 06:10) (95% - 100%)    I&O's Summary    12 Sep 2018 07:01  -  13 Sep 2018 07:00  --------------------------------------------------------  IN: 1260 mL / OUT: 1000 mL / NET: 260 mL      Weight (kg): 108.9 (09-12 @ 08:13)        LABS: All Labs Reviewed:                        16.0   10.06 )-----------( 254      ( 13 Sep 2018 04:21 )             47.6                    13 Sep 2018 04:21    141    |  107    |  31     ----------------------------<  206    4.3     |  27     |  1.38     Ca    9.2        13 Sep 2018 04:21      Echo:    Stress Testing:     Cath: S/P LHC S/P BEATRICE to LAD     EKG: NSR @ 75BPM no acute changes noted    Interpretation of Telemetry: Overnight on telemetry NS @ 73-85 BPM      Physical Exam:  Appearance: [ ] Normal  [ ] abnormal [X ] NAD   Eyes: [ ] PERRL [ ] EOMI  HEENT: [ ] Normal [ ] Abnormal oral mucosa [ ]NC/AT  Cardiovascular: [ ] S1 [ ] S2 [ ] RRR [ ] m/r/g [ ]edema [ ] JVP  Procedural Access Site: [ ]  hematoma [ ] tender to palpation [ ] 2+ pulse [ ] bruit [ ] Ecchymosis  Respiratory: [ ] Clear to auscultation bilaterally  Gastrointestinal: [ ] Soft [ ] tenderness[ ] distension [ ] BS  Musculoskeletal: [ ] clubbing [ ] joint deformity   Neurologic: [ ] Non-focal  Lymphatic: [ ] lymphadenopathy  Psychiatry: [ ] AAOx3  [ ] confused [ ] disoriented [ ] Mood & affect appropriate  Skin: [ ]  rashes [ ] ecchymoses [ ] cyanosis

## 2018-09-13 NOTE — DISCHARGE NOTE ADULT - PATIENT PORTAL LINK FT
You can access the [facility] Patient Portal, offered by [facility], by registering with the following website: [email or web address]

## 2018-09-14 ENCOUNTER — INPATIENT (INPATIENT)
Facility: HOSPITAL | Age: 53
LOS: 0 days | Discharge: HOME | End: 2018-09-15
Attending: FAMILY MEDICINE | Admitting: FAMILY MEDICINE
Payer: COMMERCIAL

## 2018-09-14 VITALS
DIASTOLIC BLOOD PRESSURE: 94 MMHG | RESPIRATION RATE: 17 BRPM | SYSTOLIC BLOOD PRESSURE: 136 MMHG | TEMPERATURE: 99 F | HEART RATE: 93 BPM | OXYGEN SATURATION: 96 %

## 2018-09-14 DIAGNOSIS — Z95.5 PRESENCE OF CORONARY ANGIOPLASTY IMPLANT AND GRAFT: Chronic | ICD-10-CM

## 2018-09-14 LAB
ALBUMIN SERPL ELPH-MCNC: 3.8 G/DL — SIGNIFICANT CHANGE UP (ref 3.3–5)
ALP SERPL-CCNC: 48 U/L — SIGNIFICANT CHANGE UP (ref 40–120)
ALT FLD-CCNC: 48 U/L — SIGNIFICANT CHANGE UP (ref 12–78)
ANION GAP SERPL CALC-SCNC: 10 MMOL/L — SIGNIFICANT CHANGE UP (ref 5–17)
AST SERPL-CCNC: 33 U/L — SIGNIFICANT CHANGE UP (ref 15–37)
BASOPHILS # BLD AUTO: 0.08 K/UL — SIGNIFICANT CHANGE UP (ref 0–0.2)
BASOPHILS NFR BLD AUTO: 0.8 % — SIGNIFICANT CHANGE UP (ref 0–2)
BILIRUB SERPL-MCNC: 0.5 MG/DL — SIGNIFICANT CHANGE UP (ref 0.2–1.2)
BUN SERPL-MCNC: 34 MG/DL — HIGH (ref 7–23)
CALCIUM SERPL-MCNC: 9.2 MG/DL — SIGNIFICANT CHANGE UP (ref 8.5–10.1)
CHLORIDE SERPL-SCNC: 103 MMOL/L — SIGNIFICANT CHANGE UP (ref 96–108)
CO2 SERPL-SCNC: 24 MMOL/L — SIGNIFICANT CHANGE UP (ref 22–31)
CREAT SERPL-MCNC: 1.79 MG/DL — HIGH (ref 0.5–1.3)
EOSINOPHIL # BLD AUTO: 0.37 K/UL — SIGNIFICANT CHANGE UP (ref 0–0.5)
EOSINOPHIL NFR BLD AUTO: 3.7 % — SIGNIFICANT CHANGE UP (ref 0–6)
GLUCOSE SERPL-MCNC: 381 MG/DL — HIGH (ref 70–99)
HCT VFR BLD CALC: 48.2 % — SIGNIFICANT CHANGE UP (ref 39–50)
HGB BLD-MCNC: 16.7 G/DL — SIGNIFICANT CHANGE UP (ref 13–17)
IMM GRANULOCYTES NFR BLD AUTO: 1.4 % — SIGNIFICANT CHANGE UP (ref 0–1.5)
INR BLD: 0.96 RATIO — SIGNIFICANT CHANGE UP (ref 0.88–1.16)
LYMPHOCYTES # BLD AUTO: 2.34 K/UL — SIGNIFICANT CHANGE UP (ref 1–3.3)
LYMPHOCYTES # BLD AUTO: 23.2 % — SIGNIFICANT CHANGE UP (ref 13–44)
MAGNESIUM SERPL-MCNC: 1.7 MG/DL — SIGNIFICANT CHANGE UP (ref 1.6–2.6)
MCHC RBC-ENTMCNC: 29.4 PG — SIGNIFICANT CHANGE UP (ref 27–34)
MCHC RBC-ENTMCNC: 34.6 GM/DL — SIGNIFICANT CHANGE UP (ref 32–36)
MCV RBC AUTO: 84.9 FL — SIGNIFICANT CHANGE UP (ref 80–100)
MONOCYTES # BLD AUTO: 0.77 K/UL — SIGNIFICANT CHANGE UP (ref 0–0.9)
MONOCYTES NFR BLD AUTO: 7.6 % — SIGNIFICANT CHANGE UP (ref 2–14)
NEUTROPHILS # BLD AUTO: 6.38 K/UL — SIGNIFICANT CHANGE UP (ref 1.8–7.4)
NEUTROPHILS NFR BLD AUTO: 63.3 % — SIGNIFICANT CHANGE UP (ref 43–77)
NRBC # BLD: 0 /100 WBCS — SIGNIFICANT CHANGE UP (ref 0–0)
PLATELET # BLD AUTO: 265 K/UL — SIGNIFICANT CHANGE UP (ref 150–400)
POTASSIUM SERPL-MCNC: 4.5 MMOL/L — SIGNIFICANT CHANGE UP (ref 3.5–5.3)
POTASSIUM SERPL-SCNC: 4.5 MMOL/L — SIGNIFICANT CHANGE UP (ref 3.5–5.3)
PROT SERPL-MCNC: 7.6 GM/DL — SIGNIFICANT CHANGE UP (ref 6–8.3)
PROTHROM AB SERPL-ACNC: 10.4 SEC — SIGNIFICANT CHANGE UP (ref 9.8–12.7)
RBC # BLD: 5.68 M/UL — SIGNIFICANT CHANGE UP (ref 4.2–5.8)
RBC # FLD: 12.7 % — SIGNIFICANT CHANGE UP (ref 10.3–14.5)
SODIUM SERPL-SCNC: 137 MMOL/L — SIGNIFICANT CHANGE UP (ref 135–145)
TROPONIN I SERPL-MCNC: <0.015 NG/ML — SIGNIFICANT CHANGE UP (ref 0.01–0.04)
WBC # BLD: 10.08 K/UL — SIGNIFICANT CHANGE UP (ref 3.8–10.5)
WBC # FLD AUTO: 10.08 K/UL — SIGNIFICANT CHANGE UP (ref 3.8–10.5)

## 2018-09-14 PROCEDURE — 99285 EMERGENCY DEPT VISIT HI MDM: CPT

## 2018-09-14 PROCEDURE — 71045 X-RAY EXAM CHEST 1 VIEW: CPT | Mod: 26

## 2018-09-14 PROCEDURE — 93010 ELECTROCARDIOGRAM REPORT: CPT

## 2018-09-14 RX ORDER — ASPIRIN/CALCIUM CARB/MAGNESIUM 324 MG
325 TABLET ORAL ONCE
Qty: 0 | Refills: 0 | Status: COMPLETED | OUTPATIENT
Start: 2018-09-14 | End: 2018-09-14

## 2018-09-14 RX ORDER — SODIUM CHLORIDE 9 MG/ML
1000 INJECTION INTRAMUSCULAR; INTRAVENOUS; SUBCUTANEOUS ONCE
Qty: 0 | Refills: 0 | Status: COMPLETED | OUTPATIENT
Start: 2018-09-14 | End: 2018-09-14

## 2018-09-14 RX ORDER — FENOFIBRATE,MICRONIZED 130 MG
1 CAPSULE ORAL
Qty: 0 | Refills: 0 | COMMUNITY

## 2018-09-14 RX ORDER — METFORMIN HYDROCHLORIDE 850 MG/1
4 TABLET ORAL
Qty: 0 | Refills: 0 | COMMUNITY

## 2018-09-14 RX ORDER — FOSINOPRIL SODIUM 10 MG/1
0.5 TABLET ORAL
Qty: 0 | Refills: 0 | COMMUNITY

## 2018-09-14 RX ADMIN — SODIUM CHLORIDE 1000 MILLILITER(S): 9 INJECTION INTRAMUSCULAR; INTRAVENOUS; SUBCUTANEOUS at 22:27

## 2018-09-14 RX ADMIN — Medication 325 MILLIGRAM(S): at 20:35

## 2018-09-14 NOTE — ED PROVIDER NOTE - PROGRESS NOTE DETAILS
Lori BARNES for ED attending, Dr. Oli Bauer: Spoke with cardiologist Dr. Brewer who is covering for Dr. Kaye. Recommends overnight observation with trending cardiac enzymes, and they will see pt in the ED tomorrow. pt currently asymptomatic. pt endorsed to Dr. Guadalupe who accepts. Oli Bauer M.D., Attending Physician

## 2018-09-14 NOTE — ED PROVIDER NOTE - PMH
CAD (coronary artery disease)    Diabetes mellitus    Essential hypertension    HLD (hyperlipidemia)    Obstructive sleep apnea

## 2018-09-14 NOTE — ED PROVIDER NOTE - NS_ ATTENDINGSCRIBEDETAILS _ED_A_ED_FT
I, Oli Bauer MD,  performed the initial face to face bedside interview with this patient regarding history of present illness, review of symptoms and relevant past medical, social and family history.  I completed an independent physical examination.  I was the initial provider who evaluated this patient.  The history, relevant review of systems, past medical and surgical history, medical decision making, and physical examination was documented by the scribe in my presence and I attest to the accuracy of the documentation.

## 2018-09-14 NOTE — ED ADULT NURSE NOTE - NSIMPLEMENTINTERV_GEN_ALL_ED
Implemented All Universal Safety Interventions:  [place] to call system. Call bell, personal items and telephone within reach. Instruct patient to call for assistance. Room bathroom lighting operational. Non-slip footwear when patient is off stretcher. Physically safe environment: no spills, clutter or unnecessary equipment. Stretcher in lowest position, wheels locked, appropriate side rails in place.

## 2018-09-14 NOTE — ED ADULT TRIAGE NOTE - CHIEF COMPLAINT QUOTE
pt presents to Ed with complaints of chest pain starting at approximately 1400 this afternoon. pt states he was D/C from  s/p 1 stent place by MD Marie on 9/12. pt no developed chest pain and returned to ED for eval. pt states he took one 81 mg ASA and one 75 mg Plavix this am per home regimine this am. EKG to be performed at triage

## 2018-09-14 NOTE — ED PROVIDER NOTE - MEDICAL DECISION MAKING DETAILS
52 y/o M with a PMHx of HTN, HLD, DM, CAD s/p stent on Wednesday by Dr. Winn presents to the ED with chest discomfort, substernal, non-radiating, feels like pressure and associated with HA and clammy feeling. Symptoms are now resolving but pt wanted to be evaluated given recent stent placement. Exam for pt is nonfocal, EKG is unchanged. Will obtain labs, touch base with pt's cardiologist, and reassess.

## 2018-09-14 NOTE — ED PROVIDER NOTE - PHYSICAL EXAMINATION
Constitutional: mild distress AAOx3  Eyes: PERRLA EOMI  Head: Normocephalic atraumatic  Mouth: MMM  Cardiac: regular rate   Resp: Lungs CTAB  GI: Abd s/nt/nd  Neuro: CN2-12 intact  Skin: No rashes Constitutional: mild distress AAOx3  Eyes: PERRLA EOMI  Head: Normocephalic atraumatic  Mouth: MMM  Cardiac: regular rate normal peripheral pulses  Resp: Lungs CTAB  GI: Abd s/nt/nd  Neuro: CN2-12 intact  Skin: No rashes

## 2018-09-14 NOTE — ED STATDOCS - PROGRESS NOTE DETAILS
Lori CD for ED attending, Doctor Jomar. 52 y/o male with a PMHx of DM, HTN, CAD s/p stent this week on ASA 81mg and Plavix presents to the ED c/o CP today. +HA. Pt was admitted to [facility] from 9/10/18-9/13/18 for CP and had left heart catheterization performed. Pt notes this CP is similar to previous CP. Pt took Plavix and ASA today. Cardio- Dr. Marie. PMD- Dr. Rogel Will send pt to main ED for further evaluation.

## 2018-09-14 NOTE — ED PROVIDER NOTE - OBJECTIVE STATEMENT
54 y/o male with a PMHx of CAD s/p stent (placed 9/12/18 by Dr. Kaye) DM, HTN, HLD, and obstructive sleep apnea presents to the ED c/o CP. Pt states he had an episode of CP on 9/10/18 and went to ED at that time. Pt was admitted to ED and had cardiac stent placed on 9/12/18 by Dr. Kaye; d/c from ED yesterday with resolution of symptoms. Today, pt states he was walking when he began to feel new onset CP. Chest pain located substernally and described as being a pressure, non-radiating. Pt also reports HA, clammy skin, and pallor at onset of symptoms. Symptoms are resolving, but pt came to ED for evaluation due to recent stent. Denies pedal edema. No other acute complaints at time of eval. Pt is a non-smoker, denies ETOH. Allergy to penicillin.

## 2018-09-14 NOTE — ED PROVIDER NOTE - NS ED ROS FT
Constitutional: No fever or chills  Eyes: No visual changes  HEENT: No throat pain  CV: +Chest pain  Resp: No SOB no cough  GI: No abd pain, nausea or vomiting  : No dysuria  MSK: No musculoskeletal pain  Skin: +Clammy skin, +pallor  Neuro: +HA

## 2018-09-15 ENCOUNTER — TRANSCRIPTION ENCOUNTER (OUTPATIENT)
Age: 53
End: 2018-09-15

## 2018-09-15 VITALS
HEART RATE: 76 BPM | RESPIRATION RATE: 18 BRPM | OXYGEN SATURATION: 96 % | DIASTOLIC BLOOD PRESSURE: 68 MMHG | SYSTOLIC BLOOD PRESSURE: 110 MMHG | TEMPERATURE: 98 F

## 2018-09-15 LAB
ANION GAP SERPL CALC-SCNC: 6 MMOL/L — SIGNIFICANT CHANGE UP (ref 5–17)
BASOPHILS # BLD AUTO: 0.07 K/UL — SIGNIFICANT CHANGE UP (ref 0–0.2)
BASOPHILS NFR BLD AUTO: 0.8 % — SIGNIFICANT CHANGE UP (ref 0–2)
BUN SERPL-MCNC: 34 MG/DL — HIGH (ref 7–23)
CALCIUM SERPL-MCNC: 8.9 MG/DL — SIGNIFICANT CHANGE UP (ref 8.5–10.1)
CHLORIDE SERPL-SCNC: 107 MMOL/L — SIGNIFICANT CHANGE UP (ref 96–108)
CO2 SERPL-SCNC: 27 MMOL/L — SIGNIFICANT CHANGE UP (ref 22–31)
CREAT SERPL-MCNC: 1.64 MG/DL — HIGH (ref 0.5–1.3)
EOSINOPHIL # BLD AUTO: 0.4 K/UL — SIGNIFICANT CHANGE UP (ref 0–0.5)
EOSINOPHIL NFR BLD AUTO: 4.9 % — SIGNIFICANT CHANGE UP (ref 0–6)
GLUCOSE BLDC GLUCOMTR-MCNC: 203 MG/DL — HIGH (ref 70–99)
GLUCOSE BLDC GLUCOMTR-MCNC: 217 MG/DL — HIGH (ref 70–99)
GLUCOSE SERPL-MCNC: 197 MG/DL — HIGH (ref 70–99)
HCT VFR BLD CALC: 45.1 % — SIGNIFICANT CHANGE UP (ref 39–50)
HGB BLD-MCNC: 15.2 G/DL — SIGNIFICANT CHANGE UP (ref 13–17)
IMM GRANULOCYTES NFR BLD AUTO: 2.2 % — HIGH (ref 0–1.5)
LYMPHOCYTES # BLD AUTO: 2.29 K/UL — SIGNIFICANT CHANGE UP (ref 1–3.3)
LYMPHOCYTES # BLD AUTO: 27.8 % — SIGNIFICANT CHANGE UP (ref 13–44)
MANUAL SMEAR VERIFICATION: SIGNIFICANT CHANGE UP
MCHC RBC-ENTMCNC: 28.9 PG — SIGNIFICANT CHANGE UP (ref 27–34)
MCHC RBC-ENTMCNC: 33.7 GM/DL — SIGNIFICANT CHANGE UP (ref 32–36)
MCV RBC AUTO: 85.7 FL — SIGNIFICANT CHANGE UP (ref 80–100)
MONOCYTES # BLD AUTO: 0.7 K/UL — SIGNIFICANT CHANGE UP (ref 0–0.9)
MONOCYTES NFR BLD AUTO: 8.5 % — SIGNIFICANT CHANGE UP (ref 2–14)
NEUTROPHILS # BLD AUTO: 4.6 K/UL — SIGNIFICANT CHANGE UP (ref 1.8–7.4)
NEUTROPHILS NFR BLD AUTO: 55.8 % — SIGNIFICANT CHANGE UP (ref 43–77)
NRBC # BLD: 0 /100 WBCS — SIGNIFICANT CHANGE UP (ref 0–0)
PLAT MORPH BLD: NORMAL — SIGNIFICANT CHANGE UP
PLATELET # BLD AUTO: 241 K/UL — SIGNIFICANT CHANGE UP (ref 150–400)
POTASSIUM SERPL-MCNC: 4.4 MMOL/L — SIGNIFICANT CHANGE UP (ref 3.5–5.3)
POTASSIUM SERPL-SCNC: 4.4 MMOL/L — SIGNIFICANT CHANGE UP (ref 3.5–5.3)
RBC # BLD: 5.26 M/UL — SIGNIFICANT CHANGE UP (ref 4.2–5.8)
RBC # FLD: 12.8 % — SIGNIFICANT CHANGE UP (ref 10.3–14.5)
RBC BLD AUTO: SIGNIFICANT CHANGE UP
SODIUM SERPL-SCNC: 140 MMOL/L — SIGNIFICANT CHANGE UP (ref 135–145)
TROPONIN I SERPL-MCNC: <0.015 NG/ML — SIGNIFICANT CHANGE UP (ref 0.01–0.04)
TROPONIN I SERPL-MCNC: <0.015 NG/ML — SIGNIFICANT CHANGE UP (ref 0.01–0.04)
WBC # BLD: 8.24 K/UL — SIGNIFICANT CHANGE UP (ref 3.8–10.5)
WBC # FLD AUTO: 8.24 K/UL — SIGNIFICANT CHANGE UP (ref 3.8–10.5)

## 2018-09-15 PROCEDURE — 93010 ELECTROCARDIOGRAM REPORT: CPT

## 2018-09-15 RX ORDER — GLUCAGON INJECTION, SOLUTION 0.5 MG/.1ML
1 INJECTION, SOLUTION SUBCUTANEOUS ONCE
Qty: 0 | Refills: 0 | Status: DISCONTINUED | OUTPATIENT
Start: 2018-09-15 | End: 2018-09-15

## 2018-09-15 RX ORDER — INSULIN LISPRO 100/ML
VIAL (ML) SUBCUTANEOUS
Qty: 0 | Refills: 0 | Status: DISCONTINUED | OUTPATIENT
Start: 2018-09-15 | End: 2018-09-15

## 2018-09-15 RX ORDER — CLOPIDOGREL BISULFATE 75 MG/1
75 TABLET, FILM COATED ORAL DAILY
Qty: 0 | Refills: 0 | Status: DISCONTINUED | OUTPATIENT
Start: 2018-09-15 | End: 2018-09-15

## 2018-09-15 RX ORDER — DEXTROSE 50 % IN WATER 50 %
12.5 SYRINGE (ML) INTRAVENOUS ONCE
Qty: 0 | Refills: 0 | Status: DISCONTINUED | OUTPATIENT
Start: 2018-09-15 | End: 2018-09-15

## 2018-09-15 RX ORDER — ATORVASTATIN CALCIUM 80 MG/1
20 TABLET, FILM COATED ORAL AT BEDTIME
Qty: 0 | Refills: 0 | Status: DISCONTINUED | OUTPATIENT
Start: 2018-09-15 | End: 2018-09-15

## 2018-09-15 RX ORDER — FENOFIBRATE,MICRONIZED 130 MG
160 CAPSULE ORAL DAILY
Qty: 0 | Refills: 0 | Status: DISCONTINUED | OUTPATIENT
Start: 2018-09-15 | End: 2018-09-15

## 2018-09-15 RX ORDER — METOPROLOL TARTRATE 50 MG
1 TABLET ORAL
Qty: 0 | Refills: 0 | COMMUNITY

## 2018-09-15 RX ORDER — METOPROLOL TARTRATE 50 MG
1 TABLET ORAL
Qty: 15 | Refills: 0
Start: 2018-09-15 | End: 2018-09-29

## 2018-09-15 RX ORDER — METOPROLOL TARTRATE 50 MG
0.5 TABLET ORAL
Qty: 0 | Refills: 0 | DISCHARGE
Start: 2018-09-15 | End: 2018-09-29

## 2018-09-15 RX ORDER — ACETAMINOPHEN 500 MG
650 TABLET ORAL EVERY 6 HOURS
Qty: 0 | Refills: 0 | Status: DISCONTINUED | OUTPATIENT
Start: 2018-09-15 | End: 2018-09-15

## 2018-09-15 RX ORDER — LISINOPRIL 2.5 MG/1
10 TABLET ORAL DAILY
Qty: 0 | Refills: 0 | Status: DISCONTINUED | OUTPATIENT
Start: 2018-09-15 | End: 2018-09-15

## 2018-09-15 RX ORDER — FENOFIBRATE,MICRONIZED 130 MG
145 CAPSULE ORAL DAILY
Qty: 0 | Refills: 0 | Status: DISCONTINUED | OUTPATIENT
Start: 2018-09-15 | End: 2018-09-15

## 2018-09-15 RX ORDER — INSULIN GLARGINE 100 [IU]/ML
40 INJECTION, SOLUTION SUBCUTANEOUS AT BEDTIME
Qty: 0 | Refills: 0 | Status: DISCONTINUED | OUTPATIENT
Start: 2018-09-15 | End: 2018-09-15

## 2018-09-15 RX ORDER — SODIUM CHLORIDE 9 MG/ML
1000 INJECTION, SOLUTION INTRAVENOUS
Qty: 0 | Refills: 0 | Status: DISCONTINUED | OUTPATIENT
Start: 2018-09-15 | End: 2018-09-15

## 2018-09-15 RX ORDER — DEXTROSE 50 % IN WATER 50 %
25 SYRINGE (ML) INTRAVENOUS ONCE
Qty: 0 | Refills: 0 | Status: DISCONTINUED | OUTPATIENT
Start: 2018-09-15 | End: 2018-09-15

## 2018-09-15 RX ORDER — HEPARIN SODIUM 5000 [USP'U]/ML
5000 INJECTION INTRAVENOUS; SUBCUTANEOUS EVERY 8 HOURS
Qty: 0 | Refills: 0 | Status: DISCONTINUED | OUTPATIENT
Start: 2018-09-15 | End: 2018-09-15

## 2018-09-15 RX ORDER — METOPROLOL TARTRATE 50 MG
25 TABLET ORAL DAILY
Qty: 0 | Refills: 0 | Status: DISCONTINUED | OUTPATIENT
Start: 2018-09-15 | End: 2018-09-15

## 2018-09-15 RX ORDER — METOPROLOL TARTRATE 50 MG
50 TABLET ORAL DAILY
Qty: 0 | Refills: 0 | Status: DISCONTINUED | OUTPATIENT
Start: 2018-09-15 | End: 2018-09-15

## 2018-09-15 RX ORDER — METOPROLOL TARTRATE 50 MG
1 TABLET ORAL
Qty: 0 | Refills: 0 | DISCHARGE
Start: 2018-09-15 | End: 2018-09-29

## 2018-09-15 RX ORDER — DEXTROSE 50 % IN WATER 50 %
15 SYRINGE (ML) INTRAVENOUS ONCE
Qty: 0 | Refills: 0 | Status: DISCONTINUED | OUTPATIENT
Start: 2018-09-15 | End: 2018-09-15

## 2018-09-15 RX ORDER — ASPIRIN/CALCIUM CARB/MAGNESIUM 324 MG
81 TABLET ORAL DAILY
Qty: 0 | Refills: 0 | Status: DISCONTINUED | OUTPATIENT
Start: 2018-09-15 | End: 2018-09-15

## 2018-09-15 RX ADMIN — Medication 81 MILLIGRAM(S): at 12:53

## 2018-09-15 RX ADMIN — Medication 2: at 08:34

## 2018-09-15 RX ADMIN — CLOPIDOGREL BISULFATE 75 MILLIGRAM(S): 75 TABLET, FILM COATED ORAL at 12:53

## 2018-09-15 RX ADMIN — HEPARIN SODIUM 5000 UNIT(S): 5000 INJECTION INTRAVENOUS; SUBCUTANEOUS at 06:14

## 2018-09-15 RX ADMIN — HEPARIN SODIUM 5000 UNIT(S): 5000 INJECTION INTRAVENOUS; SUBCUTANEOUS at 12:57

## 2018-09-15 RX ADMIN — Medication 145 MILLIGRAM(S): at 13:03

## 2018-09-15 RX ADMIN — LISINOPRIL 10 MILLIGRAM(S): 2.5 TABLET ORAL at 06:14

## 2018-09-15 RX ADMIN — Medication 2: at 12:53

## 2018-09-15 RX ADMIN — Medication 25 MILLIGRAM(S): at 06:14

## 2018-09-15 NOTE — DISCHARGE NOTE ADULT - CONDITIONS AT DISCHARGE
Patient stable for discharge. VSS. Patient denies any chest pain. Patient safety and comfort measures maintained.

## 2018-09-15 NOTE — CONSULT NOTE ADULT - ASSESSMENT
Chest pain - atypical .  Pt denies any SOB he had prior to PCI.   No recurrence with ambulation last night.  EKG no new ischemic changes.   Advised pt to ambulate to today to asses symptoms.  Increased toprol to 50mg daily.  COntinue rest of current meds including dual antiplatelet therapy.    CAd s/p PCI- meds as stated above.    HTN- continue current meds.  BP well controlled.    Hyperlipidemia- continue statin.    Other medical issues- Management per primary team.   Thank you for allowing me to participate in the care of this patient. Please feel free to contact me with any questions.

## 2018-09-15 NOTE — H&P ADULT - HISTORY OF PRESENT ILLNESS
54 y/o male with a PMHx of CAD s/p stent (placed 9/12/18 by Dr. Kaye) DM, HTN, HLD, and obstructive sleep apnea presents to the ED c/o CP. Pt states he had an episode of CP on 9/10/18 and went to ED at that time. Pt was admitted to ED and had cardiac stent placed on 9/12/18 by Dr. Kaye; d/c from ED yesterday with resolution of symptoms. Today, pt states he was walking when he began to feel new onset CP. Chest pain located substernally and described as being a pressure, non-radiating. Pt also reports HA, clammy skin, and pallor at onset of symptoms. Symptoms are resolving, but pt came to ED for evaluation due to recent stent. Denies pedal edema. No other acute complaints at time of eval. Pt is a non-smoker, denies ETOH.

## 2018-09-15 NOTE — DISCHARGE NOTE ADULT - CARE PLAN
Principal Discharge DX:	CAD (coronary artery disease)  Goal:	Maintain Cardiac health  Assessment and plan of treatment:	- Continue Aspirin, Clopidogrel, Atorvastatin and Fosinopril as prescribed  - Dose increased for Metoprolol  - Follow up with Cardiologist Dr. Kaye in outpatient appointment on Monday 9/17/18.  Secondary Diagnosis:	History of coronary artery stent placement  Goal:	Maintain hygiene of Coronary Stent Placement  Assessment and plan of treatment:	-Cont. Aspirin/Clopidogrel Daily  -Follow up with Dr. Kaye Outpatient  Secondary Diagnosis:	Essential hypertension  Goal:	Maintain Normotensive Blood pressure levels  Assessment and plan of treatment:	Continue Fosinopril as prescribed, and observe new increase in dose of Metoprolol  Secondary Diagnosis:	Diabetes mellitus  Goal:	Control blood glucose  Assessment and plan of treatment:	Hemoglobin A1C, Whole Blood: 8.2  -Continue Oral hypoglycemics as prescribed  - Maintain a low carb/low fat diet  Secondary Diagnosis:	HLD (hyperlipidemia)  Goal:	Lower Cholesterol levels  Assessment and plan of treatment:	continue Fenofibrate and Atorvastatin as prescribed, maintain a low carb/low fat diet.

## 2018-09-15 NOTE — DISCHARGE NOTE ADULT - SECONDARY DIAGNOSIS.
History of coronary artery stent placement Essential hypertension Diabetes mellitus HLD (hyperlipidemia)

## 2018-09-15 NOTE — H&P ADULT - NSHPPHYSICALEXAM_GEN_ALL_CORE
Vital Signs Last 24 Hrs  T(C): 37.4 (14 Sep 2018 19:46), Max: 37.4 (14 Sep 2018 19:46)  T(F): 99.4 (14 Sep 2018 19:46), Max: 99.4 (14 Sep 2018 19:46)  HR: 75 (15 Sep 2018 02:24) (75 - 93)  BP: 129/70 (15 Sep 2018 02:24) (120/75 - 136/94)  RR: 17 (15 Sep 2018 02:24) (17 - 18)  SpO2: 100% (15 Sep 2018 02:24) (96% - 100%)

## 2018-09-15 NOTE — DISCHARGE NOTE ADULT - PLAN OF CARE
Maintain Cardiac health - Continue Aspirin, Clopidogrel, Atorvastatin and Fosinopril as prescribed  - Dose increased for Metoprolol  - Follow up with Cardiologist Dr. Kaye in outpatient appointment on Monday 9/17/18. Maintain hygiene of Coronary Stent Placement -Cont. Aspirin/Clopidogrel Daily  -Follow up with Dr. Kaye Outpatient Maintain Normotensive Blood pressure levels Continue Fosinopril as prescribed, and observe new increase in dose of Metoprolol Control blood glucose Hemoglobin A1C, Whole Blood: 8.2  -Continue Oral hypoglycemics as prescribed  - Maintain a low carb/low fat diet Lower Cholesterol levels continue Fenofibrate and Atorvastatin as prescribed, maintain a low carb/low fat diet.

## 2018-09-15 NOTE — CONSULT NOTE ADULT - SUBJECTIVE AND OBJECTIVE BOX
Patient is a 53y old  Male who presents with a chief complaint of complain of chest pain.     HPI:  52 y/o male with a PMHx of CAD s/p stent (placed 9/12/18 by Dr. Kaye) DM, HTN, HLD, and obstructive sleep apnea presents to the ED c/o CP. Pt states he had an episode of CP on 9/10/18 and went to ED at that time. Pt was admitted to ED and had cardiac stent placed on 9/12/18 by Dr. Kaye; d/c from ED yesterday with resolution of symptoms. On day of admission , pt states he was walking when he began to feel new onset CP. Chest pain located substernally and described as being a pressure, non-radiating. Pt also reports HA, clammy skin, and pallor at onset of symptoms. Symptoms are resolving, but pt came to ED for evaluation due to recent stent. Denies pedal edema. No other acute complaints at time of eval. Pt is a non-smoker, denies ETOH.    Currently chest pain free.       PAST MEDICAL & SURGICAL HISTORY:  CAD (coronary artery disease)  HLD (hyperlipidemia)  Diabetes mellitus  Obstructive sleep apnea  Essential hypertension  History of coronary artery stent placement: Placed 9/12/18 by Dr. Kaye      MEDICATIONS  (STANDING):  aspirin  chewable 81 milliGRAM(s) Oral daily  atorvastatin 20 milliGRAM(s) Oral at bedtime  clopidogrel Tablet 75 milliGRAM(s) Oral daily  dextrose 5%. 1000 milliLiter(s) (50 mL/Hr) IV Continuous <Continuous>  dextrose 50% Injectable 12.5 Gram(s) IV Push once  dextrose 50% Injectable 25 Gram(s) IV Push once  dextrose 50% Injectable 25 Gram(s) IV Push once  fenofibrate Tablet 145 milliGRAM(s) Oral daily  heparin  Injectable 5000 Unit(s) SubCutaneous every 8 hours  insulin glargine Injectable (LANTUS) 40 Unit(s) SubCutaneous at bedtime  insulin lispro (HumaLOG) corrective regimen sliding scale   SubCutaneous three times a day before meals  lisinopril 10 milliGRAM(s) Oral daily  metoprolol succinate ER 50 milliGRAM(s) Oral daily    MEDICATIONS  (PRN):  acetaminophen   Tablet .. 650 milliGRAM(s) Oral every 6 hours PRN Temp greater or equal to 38C (100.4F), Mild Pain (1 - 3)  dextrose 40% Gel 15 Gram(s) Oral once PRN Blood Glucose LESS THAN 70 milliGRAM(s)/deciliter  glucagon  Injectable 1 milliGRAM(s) IntraMuscular once PRN Glucose LESS THAN 70 milligrams/deciliter      FAMILY HISTORY:  Family history of diabetes mellitus (Mother, Sibling)      SOCIAL HISTORY:  no smoking in recent past     REVIEW OF SYSTEMS:  CONSTITUTIONAL:    No fatigue, malaise, lethargy.  No fever or chills.  HEENT:  Eyes:  No visual changes.     ENT:  No epistaxis.  No sinus pain.    RESPIRATORY:  No cough.  No wheeze.  No hemoptysis.  No shortness of breath.  CARDIOVASCULAR:  No chest pains.  No palpitations. No shortness of breath, No orthopnea or PND.  GASTROINTESTINAL:  No abdominal pain.  No nausea or vomiting.    GENITOURINARY:    No hematuria.    MUSCULOSKELETAL:  No musculoskeletal pain.  No joint swelling.  No arthritis.  NEUROLOGICAL:  No tingling or numbness or weakness.  PSYCHIATRIC:  No confusion  SKIN:  No rashes.    ENDOCRINE:  No unexplained weight loss.  No polydipsia.   HEMATOLOGIC:  No anemia.  No prolonged or excessive bleeding.   ALLERGIC AND IMMUNOLOGIC:  No pruritus.          Vital Signs Last 24 Hrs  T(C): 36.4 (15 Sep 2018 05:09), Max: 37.4 (14 Sep 2018 19:46)  T(F): 97.6 (15 Sep 2018 05:09), Max: 99.4 (14 Sep 2018 19:46)  HR: 71 (15 Sep 2018 05:09) (71 - 93)  BP: 128/83 (15 Sep 2018 05:09) (120/75 - 136/94)  BP(mean): --  RR: 18 (15 Sep 2018 05:09) (17 - 18)  SpO2: 97% (15 Sep 2018 05:09) (96% - 100%)    PHYSICAL EXAM-    Constitutional: The patient appears to be normal, well developed, well nourished and alert and oriented to time, place and person. The patient does not appear acutely ill.     Head: Head is normocephalic and atraumatic.      Neck: No jugular venous distention. No audible carotid bruits. There are strong carotid pulses bilaterally. No JVD.     Cardiovascular: Regular rate and rhythm without S3, S4. No murmurs or rubs are appreciated.      Respiratory: Breathsounds are normal. No rales. No wheezing.    Abdomen: Soft, nontender, nondistended with positive bowel sounds.      Extremity: No tenderness. No  pitting edema     Neurologic: The patient is alert and oriented.      Skin: No rash, no obvious lesions noted.      Psychiatric: The patient appears to be emotionally stable.      INTERPRETATION OF TELEMETRY: SR 60-70/min    ECG: Sinus rythm , T wave inversion in III, poor R wave progression.   I&O's Detail      LABS:                        15.2   8.24  )-----------( 241      ( 15 Sep 2018 06:25 )             45.1     09-15    140  |  107  |  34<H>  ----------------------------<  197<H>  4.4   |  27  |  1.64<H>    Ca    8.9      15 Sep 2018 06:25  Mg     1.7     09-14    TPro  7.6  /  Alb  3.8  /  TBili  0.5  /  DBili  x   /  AST  33  /  ALT  48  /  AlkPhos  48  09-14    CARDIAC MARKERS ( 15 Sep 2018 06:25 )  <0.015 ng/mL / x     / x     / x     / x      CARDIAC MARKERS ( 14 Sep 2018 23:24 )  <0.015 ng/mL / x     / x     / x     / x      CARDIAC MARKERS ( 14 Sep 2018 20:19 )  <0.015 ng/mL / x     / x     / x     / x          PT/INR - ( 14 Sep 2018 20:19 )   PT: 10.4 sec;   INR: 0.96 ratio             I&O's Summary    BNP  RADIOLOGY & ADDITIONAL STUDIES:  < from: Xray Chest 1 View AP/PA. (09.14.18 @ 20:31) >    EXAM:  XR CHEST AP OR PA 1V                            PROCEDURE DATE:  09/14/2018          INTERPRETATION:  History: Chest pain.    AP view of the chest was obtained.    Comparison is made to September 10, 2018.     Findings:     The lungs are clear. The heart size is normal. [place] sutures are noted   within the right greater tuberosity consistent with prior rotator cuff   repair.    Impression: Clear lungs, grossly unchanged.                 KRYS AGARWAL   This document has been electronically signed. Sep 15 2018  8:47AM    < end of copied text >

## 2018-09-15 NOTE — DISCHARGE NOTE ADULT - CARE PROVIDER_API CALL
Scotty Kaye), Cardiovascular Disease; Interventional Cardiology  [address] [place] [address]  Phone: [phone]  Fax: [phone]    Ron Rogel), Family Medicine  [address] [place] [address]  Phone: [phone]  Fax: [phone]

## 2018-09-15 NOTE — DISCHARGE NOTE ADULT - PATIENT PORTAL LINK FT
You can access the [facility] Patient Portal, offered by [facility], by registering with the following website: [email or web address]

## 2018-09-15 NOTE — H&P ADULT - NEUROLOGICAL DETAILS
responds to verbal commands/deep reflexes intact/cranial nerves intact/normal strength/sensation intact/responds to pain/alert and oriented x 3

## 2018-09-15 NOTE — H&P ADULT - ASSESSMENT
52 yo male presented with chest pain.    A/P:    1.  Chest pain  h/o Stent placement on 9/12/18  -follow in telemetry  -follow troponin  -follow cardiology consult  -on aspirin, statin, plavix     2.  HTN  -follow BP and adjust meds as needed    3.  DM  -on lantus  -on ISS  -follow Dxt    4.  CKD stage 3  -worsening creatinine  -got IVF in ED  -will follow Cr    5.  Heparin for DVT ppx

## 2018-09-15 NOTE — DISCHARGE NOTE ADULT - MEDICATION SUMMARY - MEDICATIONS TO CHANGE
I will SWITCH the dose or number of times a day I take the medications listed below when I get home from the hospital:    Toprol-XL 25 mg oral tablet, extended release  -- 1 tab(s) by mouth once a day

## 2018-09-15 NOTE — DISCHARGE NOTE ADULT - HOSPITAL COURSE
53y M PMHx of CAD s/p Stent Placement with Dr. Kaye on 9/12/18, presented to [facility] ED with CP on 9/14/18. Patient admitted for rule out ACS, followed by primary hospitalist team and cardiologist consult. Patient troponins were negative x3, EKG shown to have no changes from previous. Cardiologist consult recommended increasing Metoprolol 25mg to 50mg daily to help decrease blood pressure, that may contribute to chest pain as well as headaches the patient has been experiencing concurrently. Patient noticed on 9/15/18 to be asymptomatic, and ambulating well without chest pain or shortness of breath, and was medically cleared by primary team and cardiology consult to go home. Patient advised to ambulate to see his level of tolerance, and advised that although he may experience some pains that are normal, if he experiences extreme chest tightness with diaphoresis and shortness of breath to return to the emergency room.    Vital Signs Last 24 Hrs  T(C): 37.1 (15 Sep 2018 10:33), Max: 37.4 (14 Sep 2018 19:46)  T(F): 98.7 (15 Sep 2018 10:33), Max: 99.4 (14 Sep 2018 19:46)  HR: 85 (15 Sep 2018 10:33) (71 - 93)  BP: 125/67 (15 Sep 2018 10:33) (120/75 - 136/94)  BP(mean): --  RR: 18 (15 Sep 2018 10:33) (17 - 18)  SpO2: 96% (15 Sep 2018 10:33) (96% - 100%)    PHYSICAL EXAM 9/15/18    Constitutional: NAD, awake and alert, well-developed  HEENT: PERR, EOMI, Normal Hearing, MMM  Neck: Soft and supple, No LAD, No JVD  Respiratory: Breath sounds are clear bilaterally, No wheezing, rales or rhonchi  Cardiovascular: S1 and S2, regular rate and rhythm, no Murmurs, gallops or rubs  Gastrointestinal: Bowel Sounds present, soft, nontender, nondistended, no guarding, no rebound  Extremities: No peripheral edema  Vascular: 2+ peripheral pulses  Neurological: A/O x 3, no focal deficits  Musculoskeletal: 5/5 strength b/l upper and lower extremities  Skin: No rashes  Ambulation: Ambulates independently without gait issues, respiratory distress, or complaints of chest pain    Lipid Profile in AM (09.11.18 @ 06:24)    Total Cholesterol/HDL Ratio Measurement: 6.9 RATIO    Cholesterol, Serum: 152 mg/dL    Triglycerides, Serum: 223 mg/dL    HDL Cholesterol, Serum: 22: HDL Levels     Direct LDL: 85: LDL Cholesterol

## 2018-09-23 DIAGNOSIS — E11.9 TYPE 2 DIABETES MELLITUS WITHOUT COMPLICATIONS: ICD-10-CM

## 2018-09-23 DIAGNOSIS — R07.89 OTHER CHEST PAIN: ICD-10-CM

## 2018-09-23 DIAGNOSIS — E78.5 HYPERLIPIDEMIA, UNSPECIFIED: ICD-10-CM

## 2018-09-23 DIAGNOSIS — Z95.5 PRESENCE OF CORONARY ANGIOPLASTY IMPLANT AND GRAFT: ICD-10-CM

## 2018-09-23 DIAGNOSIS — Z88.0 ALLERGY STATUS TO PENICILLIN: ICD-10-CM

## 2018-09-23 DIAGNOSIS — I25.10 ATHEROSCLEROTIC HEART DISEASE OF NATIVE CORONARY ARTERY WITHOUT ANGINA PECTORIS: ICD-10-CM

## 2018-09-23 DIAGNOSIS — I25.119 ATHEROSCLEROTIC HEART DISEASE OF NATIVE CORONARY ARTERY WITH UNSPECIFIED ANGINA PECTORIS: ICD-10-CM

## 2018-09-23 DIAGNOSIS — G47.33 OBSTRUCTIVE SLEEP APNEA (ADULT) (PEDIATRIC): ICD-10-CM

## 2018-09-23 DIAGNOSIS — Z79.82 LONG TERM (CURRENT) USE OF ASPIRIN: ICD-10-CM

## 2018-09-23 DIAGNOSIS — I10 ESSENTIAL (PRIMARY) HYPERTENSION: ICD-10-CM

## 2018-09-23 DIAGNOSIS — Z79.84 LONG TERM (CURRENT) USE OF ORAL HYPOGLYCEMIC DRUGS: ICD-10-CM

## 2019-02-01 NOTE — ED ADULT NURSE NOTE - NS ED NURSE RECORD ANOTHER HT AND WT
Yes

## 2019-03-05 NOTE — ED ADULT NURSE NOTE - CAS EDN DISCHARGE ASSESSMENT
Addended by: ALESSANDRO SHIELDS on: 3/4/2019 07:29 PM     Modules accepted: Orders     Alert and oriented to person, place and time

## 2019-12-02 ENCOUNTER — INPATIENT (INPATIENT)
Facility: HOSPITAL | Age: 54
LOS: 0 days | Discharge: HOME | DRG: 440 | End: 2019-12-03
Attending: HOSPITALIST | Admitting: HOSPITALIST
Payer: COMMERCIAL

## 2019-12-02 VITALS
HEIGHT: 72 IN | HEART RATE: 86 BPM | TEMPERATURE: 98 F | WEIGHT: 235.01 LBS | RESPIRATION RATE: 18 BRPM | DIASTOLIC BLOOD PRESSURE: 97 MMHG | OXYGEN SATURATION: 100 % | SYSTOLIC BLOOD PRESSURE: 170 MMHG

## 2019-12-02 DIAGNOSIS — Z95.5 PRESENCE OF CORONARY ANGIOPLASTY IMPLANT AND GRAFT: Chronic | ICD-10-CM

## 2019-12-02 DIAGNOSIS — K85.90 ACUTE PANCREATITIS WITHOUT NECROSIS OR INFECTION, UNSPECIFIED: ICD-10-CM

## 2019-12-02 PROBLEM — E78.5 HYPERLIPIDEMIA, UNSPECIFIED: Chronic | Status: ACTIVE | Noted: 2018-09-14

## 2019-12-02 PROBLEM — I25.10 ATHEROSCLEROTIC HEART DISEASE OF NATIVE CORONARY ARTERY WITHOUT ANGINA PECTORIS: Chronic | Status: ACTIVE | Noted: 2018-09-14

## 2019-12-02 LAB
ALBUMIN SERPL ELPH-MCNC: 3.7 G/DL — SIGNIFICANT CHANGE UP (ref 3.3–5)
ALP SERPL-CCNC: 58 U/L — SIGNIFICANT CHANGE UP (ref 40–120)
ALT FLD-CCNC: 45 U/L — SIGNIFICANT CHANGE UP (ref 12–78)
ANION GAP SERPL CALC-SCNC: 8 MMOL/L — SIGNIFICANT CHANGE UP (ref 5–17)
APTT BLD: 34.1 SEC — SIGNIFICANT CHANGE UP (ref 27.5–36.3)
AST SERPL-CCNC: 28 U/L — SIGNIFICANT CHANGE UP (ref 15–37)
BILIRUB SERPL-MCNC: 0.6 MG/DL — SIGNIFICANT CHANGE UP (ref 0.2–1.2)
BUN SERPL-MCNC: 31 MG/DL — HIGH (ref 7–23)
CALCIUM SERPL-MCNC: 8.8 MG/DL — SIGNIFICANT CHANGE UP (ref 8.5–10.1)
CHLORIDE SERPL-SCNC: 108 MMOL/L — SIGNIFICANT CHANGE UP (ref 96–108)
CK SERPL-CCNC: 149 U/L — SIGNIFICANT CHANGE UP (ref 26–308)
CO2 SERPL-SCNC: 24 MMOL/L — SIGNIFICANT CHANGE UP (ref 22–31)
CREAT SERPL-MCNC: 1.56 MG/DL — HIGH (ref 0.5–1.3)
D DIMER BLD IA.RAPID-MCNC: 166 NG/ML DDU — SIGNIFICANT CHANGE UP
GLUCOSE SERPL-MCNC: 208 MG/DL — HIGH (ref 70–99)
HCT VFR BLD CALC: 48.4 % — SIGNIFICANT CHANGE UP (ref 39–50)
HGB BLD-MCNC: 16.5 G/DL — SIGNIFICANT CHANGE UP (ref 13–17)
INR BLD: 0.93 RATIO — SIGNIFICANT CHANGE UP (ref 0.88–1.16)
LIDOCAIN IGE QN: 1773 U/L — HIGH (ref 73–393)
MAGNESIUM SERPL-MCNC: 1.8 MG/DL — SIGNIFICANT CHANGE UP (ref 1.6–2.6)
MCHC RBC-ENTMCNC: 29.2 PG — SIGNIFICANT CHANGE UP (ref 27–34)
MCHC RBC-ENTMCNC: 34.1 GM/DL — SIGNIFICANT CHANGE UP (ref 32–36)
MCV RBC AUTO: 85.5 FL — SIGNIFICANT CHANGE UP (ref 80–100)
PLATELET # BLD AUTO: 276 K/UL — SIGNIFICANT CHANGE UP (ref 150–400)
POTASSIUM SERPL-MCNC: 4.4 MMOL/L — SIGNIFICANT CHANGE UP (ref 3.5–5.3)
POTASSIUM SERPL-SCNC: 4.4 MMOL/L — SIGNIFICANT CHANGE UP (ref 3.5–5.3)
PROT SERPL-MCNC: 7.2 GM/DL — SIGNIFICANT CHANGE UP (ref 6–8.3)
PROTHROM AB SERPL-ACNC: 10.3 SEC — SIGNIFICANT CHANGE UP (ref 10–12.9)
RBC # BLD: 5.66 M/UL — SIGNIFICANT CHANGE UP (ref 4.2–5.8)
RBC # FLD: 13.2 % — SIGNIFICANT CHANGE UP (ref 10.3–14.5)
SODIUM SERPL-SCNC: 140 MMOL/L — SIGNIFICANT CHANGE UP (ref 135–145)
TROPONIN I SERPL-MCNC: <0.015 NG/ML — SIGNIFICANT CHANGE UP (ref 0.01–0.04)
WBC # BLD: 7.98 K/UL — SIGNIFICANT CHANGE UP (ref 3.8–10.5)
WBC # FLD AUTO: 7.98 K/UL — SIGNIFICANT CHANGE UP (ref 3.8–10.5)

## 2019-12-02 PROCEDURE — G0008: CPT

## 2019-12-02 PROCEDURE — 90686 IIV4 VACC NO PRSV 0.5 ML IM: CPT

## 2019-12-02 PROCEDURE — C9113: CPT

## 2019-12-02 PROCEDURE — 36415 COLL VENOUS BLD VENIPUNCTURE: CPT

## 2019-12-02 PROCEDURE — 80061 LIPID PANEL: CPT

## 2019-12-02 PROCEDURE — 93010 ELECTROCARDIOGRAM REPORT: CPT

## 2019-12-02 PROCEDURE — 85027 COMPLETE CBC AUTOMATED: CPT

## 2019-12-02 PROCEDURE — 83690 ASSAY OF LIPASE: CPT

## 2019-12-02 PROCEDURE — 71045 X-RAY EXAM CHEST 1 VIEW: CPT | Mod: 26

## 2019-12-02 PROCEDURE — 74176 CT ABD & PELVIS W/O CONTRAST: CPT

## 2019-12-02 PROCEDURE — 83036 HEMOGLOBIN GLYCOSYLATED A1C: CPT

## 2019-12-02 PROCEDURE — 82962 GLUCOSE BLOOD TEST: CPT

## 2019-12-02 PROCEDURE — 74176 CT ABD & PELVIS W/O CONTRAST: CPT | Mod: 26

## 2019-12-02 PROCEDURE — 80048 BASIC METABOLIC PNL TOTAL CA: CPT

## 2019-12-02 PROCEDURE — 84484 ASSAY OF TROPONIN QUANT: CPT

## 2019-12-02 RX ORDER — DEXTROSE 50 % IN WATER 50 %
12.5 SYRINGE (ML) INTRAVENOUS ONCE
Refills: 0 | Status: DISCONTINUED | OUTPATIENT
Start: 2019-12-02 | End: 2019-12-03

## 2019-12-02 RX ORDER — MORPHINE SULFATE 50 MG/1
2 CAPSULE, EXTENDED RELEASE ORAL EVERY 4 HOURS
Refills: 0 | Status: DISCONTINUED | OUTPATIENT
Start: 2019-12-02 | End: 2019-12-03

## 2019-12-02 RX ORDER — SODIUM CHLORIDE 9 MG/ML
1000 INJECTION INTRAMUSCULAR; INTRAVENOUS; SUBCUTANEOUS ONCE
Refills: 0 | Status: COMPLETED | OUTPATIENT
Start: 2019-12-02 | End: 2019-12-02

## 2019-12-02 RX ORDER — INSULIN LISPRO 100/ML
VIAL (ML) SUBCUTANEOUS AT BEDTIME
Refills: 0 | Status: DISCONTINUED | OUTPATIENT
Start: 2019-12-02 | End: 2019-12-03

## 2019-12-02 RX ORDER — GLUCAGON INJECTION, SOLUTION 0.5 MG/.1ML
1 INJECTION, SOLUTION SUBCUTANEOUS ONCE
Refills: 0 | Status: DISCONTINUED | OUTPATIENT
Start: 2019-12-02 | End: 2019-12-03

## 2019-12-02 RX ORDER — INSULIN GLARGINE 100 [IU]/ML
45 INJECTION, SOLUTION SUBCUTANEOUS
Qty: 0 | Refills: 0 | DISCHARGE

## 2019-12-02 RX ORDER — ASPIRIN/CALCIUM CARB/MAGNESIUM 324 MG
243 TABLET ORAL ONCE
Refills: 0 | Status: COMPLETED | OUTPATIENT
Start: 2019-12-02 | End: 2019-12-02

## 2019-12-02 RX ORDER — CLOPIDOGREL BISULFATE 75 MG/1
75 TABLET, FILM COATED ORAL DAILY
Refills: 0 | Status: DISCONTINUED | OUTPATIENT
Start: 2019-12-02 | End: 2019-12-03

## 2019-12-02 RX ORDER — PANTOPRAZOLE SODIUM 20 MG/1
40 TABLET, DELAYED RELEASE ORAL DAILY
Refills: 0 | Status: DISCONTINUED | OUTPATIENT
Start: 2019-12-02 | End: 2019-12-03

## 2019-12-02 RX ORDER — INSULIN LISPRO 100/ML
VIAL (ML) SUBCUTANEOUS
Refills: 0 | Status: DISCONTINUED | OUTPATIENT
Start: 2019-12-02 | End: 2019-12-03

## 2019-12-02 RX ORDER — ONDANSETRON 8 MG/1
4 TABLET, FILM COATED ORAL EVERY 6 HOURS
Refills: 0 | Status: DISCONTINUED | OUTPATIENT
Start: 2019-12-02 | End: 2019-12-03

## 2019-12-02 RX ORDER — INSULIN GLARGINE 100 [IU]/ML
70 INJECTION, SOLUTION SUBCUTANEOUS AT BEDTIME
Refills: 0 | Status: DISCONTINUED | OUTPATIENT
Start: 2019-12-02 | End: 2019-12-03

## 2019-12-02 RX ORDER — ERGOCALCIFEROL 1.25 MG/1
1 CAPSULE ORAL
Qty: 0 | Refills: 0 | DISCHARGE

## 2019-12-02 RX ORDER — ASPIRIN/CALCIUM CARB/MAGNESIUM 324 MG
81 TABLET ORAL DAILY
Refills: 0 | Status: DISCONTINUED | OUTPATIENT
Start: 2019-12-02 | End: 2019-12-03

## 2019-12-02 RX ORDER — NITROGLYCERIN 6.5 MG
1 CAPSULE, EXTENDED RELEASE ORAL ONCE
Refills: 0 | Status: COMPLETED | OUTPATIENT
Start: 2019-12-02 | End: 2019-12-02

## 2019-12-02 RX ORDER — DEXTROSE 50 % IN WATER 50 %
25 SYRINGE (ML) INTRAVENOUS ONCE
Refills: 0 | Status: DISCONTINUED | OUTPATIENT
Start: 2019-12-02 | End: 2019-12-03

## 2019-12-02 RX ORDER — METOPROLOL TARTRATE 50 MG
50 TABLET ORAL DAILY
Refills: 0 | Status: DISCONTINUED | OUTPATIENT
Start: 2019-12-02 | End: 2019-12-03

## 2019-12-02 RX ORDER — DEXTROSE 50 % IN WATER 50 %
15 SYRINGE (ML) INTRAVENOUS ONCE
Refills: 0 | Status: DISCONTINUED | OUTPATIENT
Start: 2019-12-02 | End: 2019-12-03

## 2019-12-02 RX ORDER — LISINOPRIL 2.5 MG/1
10 TABLET ORAL DAILY
Refills: 0 | Status: DISCONTINUED | OUTPATIENT
Start: 2019-12-02 | End: 2019-12-03

## 2019-12-02 RX ORDER — NATEGLINIDE 60 MG/1
1 TABLET, COATED ORAL
Qty: 0 | Refills: 0 | DISCHARGE

## 2019-12-02 RX ORDER — SODIUM CHLORIDE 9 MG/ML
1000 INJECTION, SOLUTION INTRAVENOUS
Refills: 0 | Status: DISCONTINUED | OUTPATIENT
Start: 2019-12-02 | End: 2019-12-03

## 2019-12-02 RX ORDER — ACETAMINOPHEN 500 MG
650 TABLET ORAL EVERY 4 HOURS
Refills: 0 | Status: DISCONTINUED | OUTPATIENT
Start: 2019-12-02 | End: 2019-12-03

## 2019-12-02 RX ORDER — FENOFIBRATE,MICRONIZED 130 MG
145 CAPSULE ORAL AT BEDTIME
Refills: 0 | Status: DISCONTINUED | OUTPATIENT
Start: 2019-12-02 | End: 2019-12-03

## 2019-12-02 RX ORDER — INFLUENZA VIRUS VACCINE 15; 15; 15; 15 UG/.5ML; UG/.5ML; UG/.5ML; UG/.5ML
0.5 SUSPENSION INTRAMUSCULAR ONCE
Refills: 0 | Status: COMPLETED | OUTPATIENT
Start: 2019-12-02 | End: 2019-12-03

## 2019-12-02 RX ADMIN — Medication 81 MILLIGRAM(S): at 11:29

## 2019-12-02 RX ADMIN — Medication 145 MILLIGRAM(S): at 21:44

## 2019-12-02 RX ADMIN — Medication 1 INCH(S): at 05:52

## 2019-12-02 RX ADMIN — SODIUM CHLORIDE 1000 MILLILITER(S): 9 INJECTION INTRAMUSCULAR; INTRAVENOUS; SUBCUTANEOUS at 06:41

## 2019-12-02 RX ADMIN — Medication 650 MILLIGRAM(S): at 20:03

## 2019-12-02 RX ADMIN — SODIUM CHLORIDE 125 MILLILITER(S): 9 INJECTION, SOLUTION INTRAVENOUS at 09:43

## 2019-12-02 RX ADMIN — PANTOPRAZOLE SODIUM 40 MILLIGRAM(S): 20 TABLET, DELAYED RELEASE ORAL at 11:29

## 2019-12-02 RX ADMIN — Medication 2: at 12:39

## 2019-12-02 RX ADMIN — CLOPIDOGREL BISULFATE 75 MILLIGRAM(S): 75 TABLET, FILM COATED ORAL at 11:28

## 2019-12-02 RX ADMIN — Medication 650 MILLIGRAM(S): at 19:29

## 2019-12-02 RX ADMIN — Medication 50 MILLIGRAM(S): at 11:28

## 2019-12-02 RX ADMIN — LISINOPRIL 10 MILLIGRAM(S): 2.5 TABLET ORAL at 11:27

## 2019-12-02 RX ADMIN — Medication 243 MILLIGRAM(S): at 05:52

## 2019-12-02 NOTE — ED ADULT NURSE NOTE - NSIMPLEMENTINTERV_GEN_ALL_ED
Implemented All Universal Safety Interventions:  [place] to call system. Call bell, personal items and telephone within reach. Instruct patient to call for assistance. Room bathroom lighting operational. Non-slip footwear when patient is off stretcher. Physically safe environment: no spills, clutter or unnecessary equipment. Stretcher in lowest position, wheels locked, appropriate side rails in place.

## 2019-12-02 NOTE — ED PROVIDER NOTE - CLINICAL SUMMARY MEDICAL DECISION MAKING FREE TEXT BOX
Mr. Miranda is a 78 yo man with history of Anemia, HTN, PUD, chronic diarrhea, and recent hospitalization at [facility] from 3/3/19-3/21/19 brought in by family for generalized weakness and inability to walk admitted for severe malnutrition, mild hypernatremia and severe deconditioning. Exam relatively benign aside for significant LE edema and weakness of legs. Anemia at baseline. Lower chest pain; hx of cardiac stent.  Labs, urine and CXR planned.  Multiple risk factors for chest pain so will be admitted to tele.

## 2019-12-02 NOTE — CONSULT NOTE ADULT - ASSESSMENT
Epigastric pain  R/O for AMI with troponins  Pain more epigastric  Diagnosed with pancreatitis  Stable from cardiac point of view  Will follow PRN

## 2019-12-02 NOTE — H&P ADULT - NSHPPHYSICALEXAM_GEN_ALL_CORE
Vital Signs Last 24 Hrs  T(C): 36.5 (02 Dec 2019 08:40), Max: 37.2 (02 Dec 2019 06:56)  T(F): 97.7 (02 Dec 2019 08:40), Max: 98.9 (02 Dec 2019 06:56)  HR: 75 (02 Dec 2019 08:40) (75 - 86)  BP: 172/87 (02 Dec 2019 08:40) (144/98 - 172/87)  BP(mean): --  RR: 16 (02 Dec 2019 08:40) (13 - 18)  SpO2: 98% (02 Dec 2019 08:40) (96% - 100%)

## 2019-12-02 NOTE — ED ADULT NURSE REASSESSMENT NOTE - NS ED NURSE REASSESS COMMENT FT1
Pt received from previous RN.  Pt aaox4  No c/o pain or discomfort, in no apparent distress.  Plan of care, transition from ED to admitted status discussed with pt and wife at bedside.  All questions answered to pt satisfaction.   Call bell given to patient. breakfast ordered for patient; sent to inpatient bed.  No additional needs at this time, awaiting transport to .

## 2019-12-02 NOTE — ED PROVIDER NOTE - CARDIAC, MLM
Normal rate, regular rhythm.  Heart sounds S1, S2.  No murmurs, rubs or gallops.  No reproducible chest wall tenderness.

## 2019-12-02 NOTE — H&P ADULT - ASSESSMENT
#Acute pancreatitis  Admit  Clears  IVF  Pain meds prn  Lipid panel in am  GI eval DR Cooper  CT abd/pelvis  IV PPI  Trend lipase    #Chest pain likely atypical  #CAD s/p stent LAD in 09/2018  Telemetry  Cardio eval DR Kaye  Cont Aspirin, Plavix  Hold statin for pancreatitis- resume when better  Trend troponins    #CKD stage 2-3  Stable creatinine    #Diabetes  Hold Metformin  Lantus+ ISS    #HTN/hyperlipidemia  Stable    #Dispo- inpatient admit. D/w pt and wife at bedside

## 2019-12-02 NOTE — ED ADULT NURSE NOTE - OBJECTIVE STATEMENT
Patient comes in with chest pressure/pain since last night prior to going to sleep. Patient has hx of stents with similar symptoms prior to stent placement. Patient denies SOB, fevers, dizziness. Patient takes Plavix, and 81mg aspirin daily.

## 2019-12-02 NOTE — ED PROVIDER NOTE - OBJECTIVE STATEMENT
Pt. is a 55 yo M with a hx of type 2 DM, HTN, hyperlipidemia, CAD with 1 cardiac stent in Sept 2018 BIB wife for chest pain.  Pt. states for the past 24 hours he has had a constant achy feeling about 4/10 in lower sternal area.  Pain is not associated with movement, but patient states he was working on a car 2 days ago and doing some heavy lifting without any known injury.  Patient states he had a stent placed by Dr. Kaye last year and had a similar sensation.  Wife states he has been stressed with his job as a .  Denies fever, cough, shortness of breath or pain with exertion.  No smoking, drinking or drug use.

## 2019-12-02 NOTE — H&P ADULT - NSHPLABSRESULTS_GEN_ALL_CORE
16.5   7.98  )-----------( 276      ( 02 Dec 2019 05:12 )             48.4     02 Dec 2019 05:12    140    |  108    |  31     ----------------------------<  208    4.4     |  24     |  1.56     Ca    8.8        02 Dec 2019 05:12  Mg     1.8       02 Dec 2019 05:12    TPro  7.2    /  Alb  3.7    /  TBili  0.6    /  DBili  x      /  AST  28     /  ALT  45     /  AlkPhos  58     02 Dec 2019 05:12    LIVER FUNCTIONS - ( 02 Dec 2019 05:12 )  Alb: 3.7 g/dL / Pro: 7.2 gm/dL / ALK PHOS: 58 U/L / ALT: 45 U/L / AST: 28 U/L / GGT: x           PT/INR - ( 02 Dec 2019 05:12 )   PT: 10.3 sec;   INR: 0.93 ratio       PTT - ( 02 Dec 2019 05:12 )  PTT:34.1 sec    CARDIAC MARKERS ( 02 Dec 2019 05:12 )  <0.015 ng/mL / x     / 149 U/L / x     / x

## 2019-12-02 NOTE — CONSULT NOTE ADULT - SUBJECTIVE AND OBJECTIVE BOX
Patient is a 54y old  Male who presents with a chief complaint of chest pain/epigastric pain (02 Dec 2019 08:31)      HPI:  55yo/M with PMH CAD s/p BEATRICE LAD in 09/2018, HTN, hyperlipidemia, diabetes, RENETTA presented for evaluation of chest pain/epigastric pain for the last 2 days. Patient states he started feeling sick after Thanksgiving, pain localized in the lower substernal area/epigastric area, radiating to the back, no nausea/vomiting, no diaphoresis, no fever, no diarrhea. Pain is constant.\  R/O in the hospital  Elevated amylase/lipase diagnosed with pancreatitis, denies drinking had a gallbladder removal years prior    PAST MEDICAL & SURGICAL HISTORY:  CAD (coronary artery disease)  HLD (hyperlipidemia)  Diabetes mellitus  Obstructive sleep apnea  Essential hypertension  History of coronary artery stent placement: Placed 9/12/18 by Dr. Kaye      HPI:                PREVIOUS DIAGNOSTIC TESTING:      ECHO  FINDINGS:    STRESS  FINDINGS:    CATHETERIZATION  FINDINGS:    MEDICATIONS  (STANDING):  aspirin enteric coated 81 milliGRAM(s) Oral daily  clopidogrel Tablet 75 milliGRAM(s) Oral daily  dextrose 5%. 1000 milliLiter(s) (50 mL/Hr) IV Continuous <Continuous>  dextrose 50% Injectable 12.5 Gram(s) IV Push once  dextrose 50% Injectable 25 Gram(s) IV Push once  dextrose 50% Injectable 25 Gram(s) IV Push once  fenofibrate Tablet 145 milliGRAM(s) Oral at bedtime  influenza   Vaccine 0.5 milliLiter(s) IntraMuscular once  insulin glargine Injectable (LANTUS) 70 Unit(s) SubCutaneous at bedtime  insulin lispro (HumaLOG) corrective regimen sliding scale   SubCutaneous three times a day before meals  insulin lispro (HumaLOG) corrective regimen sliding scale   SubCutaneous at bedtime  lactated ringers. 1000 milliLiter(s) (125 mL/Hr) IV Continuous <Continuous>  lisinopril 10 milliGRAM(s) Oral daily  metoprolol succinate ER 50 milliGRAM(s) Oral daily  pantoprazole  Injectable 40 milliGRAM(s) IV Push daily    MEDICATIONS  (PRN):  acetaminophen   Tablet .. 650 milliGRAM(s) Oral every 4 hours PRN Mild Pain (1 - 3)  aluminum hydroxide/magnesium hydroxide/simethicone Suspension 30 milliLiter(s) Oral every 4 hours PRN Dyspepsia  dextrose 40% Gel 15 Gram(s) Oral once PRN Blood Glucose LESS THAN 70 milliGRAM(s)/deciliter  glucagon  Injectable 1 milliGRAM(s) IntraMuscular once PRN Glucose LESS THAN 70 milligrams/deciliter  morphine  - Injectable 2 milliGRAM(s) IV Push every 4 hours PRN Severe Pain (7 - 10)  ondansetron Injectable 4 milliGRAM(s) IV Push every 6 hours PRN Nausea and/or Vomiting      FAMILY HISTORY:  Family history of diabetes mellitus (Sibling)      SOCIAL HISTORY:    CIGARETTES:    ALCOHOL:            Vital Signs Last 24 Hrs  T(C): 36.3 (02 Dec 2019 11:22), Max: 37.2 (02 Dec 2019 06:56)  T(F): 97.3 (02 Dec 2019 11:22), Max: 98.9 (02 Dec 2019 06:56)  HR: 78 (02 Dec 2019 11:22) (75 - 86)  BP: 158/89 (02 Dec 2019 11:22) (144/98 - 172/87)  BP(mean): --  RR: 18 (02 Dec 2019 11:22) (13 - 18)  SpO2: 99% (02 Dec 2019 11:22) (96% - 100%)    PHYSICAL EXAM-    Constitutional: The patient appears to be normal, well developed, well nourished and alert and oriented to time, place and person. The patient does not appear acutely ill. The patient is alert.     Head: Head is normocephalic and atraumatic.      Neck: The patient's neck is supple without enlargement, has no palpable thyromegaly nor thyroid nodules and has no jugular venous distention. No audible carotid bruits. There are strong carotid pulses bilaterally. No JVD.     Cardiovascular: Regular rate and rhythm without S3, S4. No murmurs or rubs are appreciated.      Respiratory: The patient has no rales and no rhonchi. The patient has no wheezes.     Abdomen: Soft, nontender, nondistended with positive bowel sounds.      Extremity: No tenderness. There is no pitting edema, skin discoloration, clubbing and cyanosis.           INTERPRETATION OF TELEMETRY:    ECG:    I&O's Detail      LABS:                        16.5   7.98  )-----------( 276      ( 02 Dec 2019 05:12 )             48.4     12-02    140  |  108  |  31<H>  ----------------------------<  208<H>  4.4   |  24  |  1.56<H>    Ca    8.8      02 Dec 2019 05:12  Mg     1.8     12-02    TPro  7.2  /  Alb  3.7  /  TBili  0.6  /  DBili  x   /  AST  28  /  ALT  45  /  AlkPhos  58  12-02    CARDIAC MARKERS ( 02 Dec 2019 11:05 )  <0.015 ng/mL / x     / x     / x     / x      CARDIAC MARKERS ( 02 Dec 2019 08:19 )  <0.015 ng/mL / x     / x     / x     / x      CARDIAC MARKERS ( 02 Dec 2019 05:12 )  <0.015 ng/mL / x     / 149 U/L / x     / x          PT/INR - ( 02 Dec 2019 05:12 )   PT: 10.3 sec;   INR: 0.93 ratio         PTT - ( 02 Dec 2019 05:12 )  PTT:34.1 sec    I&O's Summary    BNP  RADIOLOGY & ADDITIONAL STUDIES:

## 2019-12-02 NOTE — PROGRESS NOTE ADULT - SUBJECTIVE AND OBJECTIVE BOX
GI consult    HPI:  55yo/M with PMH CAD s/p BEATRICE LAD in 09/2018, HTN, hyperlipidemia, diabetes, RENETTA presented for evaluation of chest pain/epigastric pain for the last 2 days. Patient states he started feeling sick after Thanksgiving, pain localized in the lower substernal area/epigastric area, radiating to the back, no nausea/vomiting, no diaphoresis, no fever, no diarrhea. Pain is constant but resolved by time of my evaluation this afternoon. Found to have elevated lipase but CT negative for pancreatitis. No past episodes of pancreatitis. No EtOH use. No recent new meds. Had cholecystectomy 4 y ago. +BM today. Tolerating clears.   Also c/o chronic urgent postprandial BM and RLQ pain for years. Possibly worse with dairy.      PAST MEDICAL & SURGICAL HISTORY:  CAD (coronary artery disease)  HLD (hyperlipidemia)  Diabetes mellitus  Obstructive sleep apnea  Essential hypertension  Diverticulitis  History of coronary artery stent placement: Placed 9/12/18 by Dr. Kaye  Cholecystectomy       Home Medications:  B Complex 50 oral tablet, extended release: 1 tab(s) orally once a day (02 Dec 2019 08:51)  CoQ10 300 mg oral capsule: 1 cap(s) orally once a day (02 Dec 2019 08:51)  fenofibrate 160 mg oral tablet: 1 tab(s) orally once a day (at bedtime) (02 Dec 2019 08:51)  fosinopril 10 mg oral tablet: 1 tab(s) orally once a day (02 Dec 2019 08:51)  Jardiance 25 mg oral tablet: 1 tab(s) orally once a day (in the morning) (02 Dec 2019 08:51)  metFORMIN 500 mg oral tablet, extended release: 2 tab(s) orally 2 times a day (02 Dec 2019 08:51)  Probiotic Formula oral capsule: 1 cap(s) orally once a day (02 Dec 2019 08:51)  [place]jeo SoloStar 300 units/mL subcutaneous solution: 70 unit(s) subcutaneous once a day (at bedtime) (02 Dec 2019 08:51)  Trulicity Pen 1.5 mg/0.5 mL subcutaneous solution: 0.5 milliliter(s) subcutaneous once a week  ***Mondays**** (02 Dec 2019 08:51)      MEDICATIONS  (STANDING):  aspirin enteric coated 81 milliGRAM(s) Oral daily  clopidogrel Tablet 75 milliGRAM(s) Oral daily  dextrose 5%. 1000 milliLiter(s) (50 mL/Hr) IV Continuous <Continuous>  dextrose 50% Injectable 12.5 Gram(s) IV Push once  dextrose 50% Injectable 25 Gram(s) IV Push once  dextrose 50% Injectable 25 Gram(s) IV Push once  fenofibrate Tablet 145 milliGRAM(s) Oral at bedtime  influenza   Vaccine 0.5 milliLiter(s) IntraMuscular once  insulin glargine Injectable (LANTUS) 70 Unit(s) SubCutaneous at bedtime  insulin lispro (HumaLOG) corrective regimen sliding scale   SubCutaneous three times a day before meals  insulin lispro (HumaLOG) corrective regimen sliding scale   SubCutaneous at bedtime  lactated ringers. 1000 milliLiter(s) (125 mL/Hr) IV Continuous <Continuous>  lisinopril 10 milliGRAM(s) Oral daily  metoprolol succinate ER 50 milliGRAM(s) Oral daily  pantoprazole  Injectable 40 milliGRAM(s) IV Push daily    MEDICATIONS  (PRN):  acetaminophen   Tablet .. 650 milliGRAM(s) Oral every 4 hours PRN Mild Pain (1 - 3)  aluminum hydroxide/magnesium hydroxide/simethicone Suspension 30 milliLiter(s) Oral every 4 hours PRN Dyspepsia  dextrose 40% Gel 15 Gram(s) Oral once PRN Blood Glucose LESS THAN 70 milliGRAM(s)/deciliter  glucagon  Injectable 1 milliGRAM(s) IntraMuscular once PRN Glucose LESS THAN 70 milligrams/deciliter  morphine  - Injectable 2 milliGRAM(s) IV Push every 4 hours PRN Severe Pain (7 - 10)  ondansetron Injectable 4 milliGRAM(s) IV Push every 6 hours PRN Nausea and/or Vomiting      Allergies    penicillin (Hives)    Intolerances        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[employer]  no tob, EtOH, drugs    FAMILY HISTORY:  Family history of diabetes mellitus (Sibling)      ROS  As above  Otherwise unremarkable, all systems reviewed    PE:  Vital Signs Last 24 Hrs  T(C): 36.6 (02 Dec 2019 17:07), Max: 37.2 (02 Dec 2019 06:56)  T(F): 97.9 (02 Dec 2019 17:07), Max: 98.9 (02 Dec 2019 06:56)  HR: 77 (02 Dec 2019 17:07) (75 - 86)  BP: 120/70 (02 Dec 2019 17:07) (120/70 - 172/87)  BP(mean): --  RR: 17 (02 Dec 2019 17:07) (13 - 18)  SpO2: 98% (02 Dec 2019 17:07) (96% - 100%)    Constitutional: NAD, well-developed, A+Ox3, obese  Anicteric   Respiratory: CTABL, breathing comfortably  Cardiovascular: S1 and S2, RRR  Gastrointestinal: +BS, soft, non tender, non distended, no mass  Extremities: warm, well perfused, no edema  Psychiatric: Normal mood, normal affect  Neuro: moves all extremities, grossly intact  Skin: No rashes or lesions    LABS:                        16.5   7.98  )-----------( 276      ( 02 Dec 2019 05:12 )             48.4     12-02    140  |  108  |  31<H>  ----------------------------<  208<H>  4.4   |  24  |  1.56<H>    Ca    8.8      02 Dec 2019 05:12  Mg     1.8     12-02    TPro  7.2  /  Alb  3.7  /  TBili  0.6  /  DBili  x   /  AST  28  /  ALT  45  /  AlkPhos  58  12-02    PT/INR - ( 02 Dec 2019 05:12 )   PT: 10.3 sec;   INR: 0.93 ratio      lipase 1773     PTT - ( 02 Dec 2019 05:12 )  PTT:34.1 sec  LIVER FUNCTIONS - ( 02 Dec 2019 05:12 )  Alb: 3.7 g/dL / Pro: 7.2 gm/dL / ALK PHOS: 58 U/L / ALT: 45 U/L / AST: 28 U/L / GGT: x             RADIOLOGY & ADDITIONAL STUDIES:  CT images reviewed in PACS  no pancreatic inflammation  +fatty liver

## 2019-12-02 NOTE — ED PROVIDER NOTE - CARE PLAN
Principal Discharge DX:	Acute pancreatitis, unspecified complication status, unspecified pancreatitis type  Secondary Diagnosis:	Atypical chest pain

## 2019-12-02 NOTE — H&P ADULT - NSICDXPASTMEDICALHX_GEN_ALL_CORE_FT
PAST MEDICAL HISTORY:  CAD (coronary artery disease)     Diabetes mellitus     Essential hypertension     HLD (hyperlipidemia)     Obstructive sleep apnea

## 2019-12-02 NOTE — H&P ADULT - HISTORY OF PRESENT ILLNESS
53yo/M with PMH CAD s/p BEATRICE LAD in 09/2018, HTN, hyperlipidemia, diabetes, RENETTA presented for evaluation of chest pain/epigastric pain for the last 2 days. Patient states he started feeling sick after Thanksgiving, pain localized in the lower substernal area/epigastric area, radiating to the back, no nausea/vomiting, no diaphoresis, no fever, no diarrhea. Pain is constant

## 2019-12-03 ENCOUNTER — TRANSCRIPTION ENCOUNTER (OUTPATIENT)
Age: 54
End: 2019-12-03

## 2019-12-03 VITALS
OXYGEN SATURATION: 100 % | RESPIRATION RATE: 18 BRPM | TEMPERATURE: 98 F | HEART RATE: 78 BPM | SYSTOLIC BLOOD PRESSURE: 137 MMHG | DIASTOLIC BLOOD PRESSURE: 88 MMHG

## 2019-12-03 LAB
ANION GAP SERPL CALC-SCNC: 9 MMOL/L — SIGNIFICANT CHANGE UP (ref 5–17)
BUN SERPL-MCNC: 15 MG/DL — SIGNIFICANT CHANGE UP (ref 7–23)
CALCIUM SERPL-MCNC: 8.8 MG/DL — SIGNIFICANT CHANGE UP (ref 8.5–10.1)
CHLORIDE SERPL-SCNC: 108 MMOL/L — SIGNIFICANT CHANGE UP (ref 96–108)
CHOLEST SERPL-MCNC: 173 MG/DL — SIGNIFICANT CHANGE UP (ref 10–199)
CO2 SERPL-SCNC: 23 MMOL/L — SIGNIFICANT CHANGE UP (ref 22–31)
CREAT SERPL-MCNC: 1.31 MG/DL — HIGH (ref 0.5–1.3)
GLUCOSE SERPL-MCNC: 234 MG/DL — HIGH (ref 70–99)
HBA1C BLD-MCNC: 7.9 % — HIGH (ref 4–5.6)
HCT VFR BLD CALC: 46.6 % — SIGNIFICANT CHANGE UP (ref 39–50)
HDLC SERPL-MCNC: 23 MG/DL — LOW
HGB BLD-MCNC: 15.4 G/DL — SIGNIFICANT CHANGE UP (ref 13–17)
LIDOCAIN IGE QN: 277 U/L — SIGNIFICANT CHANGE UP (ref 73–393)
LIPID PNL WITH DIRECT LDL SERPL: 86 MG/DL — SIGNIFICANT CHANGE UP
MCHC RBC-ENTMCNC: 28.5 PG — SIGNIFICANT CHANGE UP (ref 27–34)
MCHC RBC-ENTMCNC: 33 GM/DL — SIGNIFICANT CHANGE UP (ref 32–36)
MCV RBC AUTO: 86.1 FL — SIGNIFICANT CHANGE UP (ref 80–100)
PLATELET # BLD AUTO: 238 K/UL — SIGNIFICANT CHANGE UP (ref 150–400)
POTASSIUM SERPL-MCNC: 3.8 MMOL/L — SIGNIFICANT CHANGE UP (ref 3.5–5.3)
POTASSIUM SERPL-SCNC: 3.8 MMOL/L — SIGNIFICANT CHANGE UP (ref 3.5–5.3)
RBC # BLD: 5.41 M/UL — SIGNIFICANT CHANGE UP (ref 4.2–5.8)
RBC # FLD: 13.5 % — SIGNIFICANT CHANGE UP (ref 10.3–14.5)
SODIUM SERPL-SCNC: 140 MMOL/L — SIGNIFICANT CHANGE UP (ref 135–145)
TOTAL CHOLESTEROL/HDL RATIO MEASUREMENT: 7.5 RATIO — SIGNIFICANT CHANGE UP (ref 3.4–9.6)
TRIGL SERPL-MCNC: 319 MG/DL — HIGH (ref 10–149)
WBC # BLD: 6.23 K/UL — SIGNIFICANT CHANGE UP (ref 3.8–10.5)
WBC # FLD AUTO: 6.23 K/UL — SIGNIFICANT CHANGE UP (ref 3.8–10.5)

## 2019-12-03 RX ORDER — ATORVASTATIN CALCIUM 80 MG/1
2 TABLET, FILM COATED ORAL
Qty: 60 | Refills: 0
Start: 2019-12-03 | End: 2020-01-01

## 2019-12-03 RX ORDER — DULAGLUTIDE 4.5 MG/.5ML
0.5 INJECTION, SOLUTION SUBCUTANEOUS
Qty: 0 | Refills: 0 | DISCHARGE

## 2019-12-03 RX ADMIN — CLOPIDOGREL BISULFATE 75 MILLIGRAM(S): 75 TABLET, FILM COATED ORAL at 12:32

## 2019-12-03 RX ADMIN — PANTOPRAZOLE SODIUM 40 MILLIGRAM(S): 20 TABLET, DELAYED RELEASE ORAL at 12:33

## 2019-12-03 RX ADMIN — LISINOPRIL 10 MILLIGRAM(S): 2.5 TABLET ORAL at 05:56

## 2019-12-03 RX ADMIN — Medication 81 MILLIGRAM(S): at 12:32

## 2019-12-03 RX ADMIN — Medication 50 MILLIGRAM(S): at 05:56

## 2019-12-03 RX ADMIN — INFLUENZA VIRUS VACCINE 0.5 MILLILITER(S): 15; 15; 15; 15 SUSPENSION INTRAMUSCULAR at 13:24

## 2019-12-03 NOTE — PHARMACOTHERAPY INTERVENTION NOTE - COMMENTS
Spoke to patient regarding diabetes medications and link of trulicity to pancreatitis; patient to follow up w/ endocrinologist had an appointment today will reschedule for this week

## 2019-12-03 NOTE — DISCHARGE NOTE PROVIDER - NSDCMRMEDTOKEN_GEN_ALL_CORE_FT
aspirin 81 mg oral tablet, chewable: 1 tab(s) orally once a day  atorvastatin 20 mg oral tablet: 2 tab(s) orally once a day (at bedtime)   B Complex 50 oral tablet, extended release: 1 tab(s) orally once a day  clopidogrel 75 mg oral tablet: 1 tab(s) orally once a day  CoQ10 300 mg oral capsule: 1 cap(s) orally once a day  fenofibrate 160 mg oral tablet: 1 tab(s) orally once a day (at bedtime)  fosinopril 10 mg oral tablet: 1 tab(s) orally once a day  Jardiance 25 mg oral tablet: 1 tab(s) orally once a day (in the morning)  metFORMIN 500 mg oral tablet, extended release: 2 tab(s) orally 2 times a day  metoprolol succinate 50 mg oral tablet, extended release: 1 tab(s) orally once a day  Probiotic Formula oral capsule: 1 cap(s) orally once a day  [place] SoloStar 300 units/mL subcutaneous solution: 70 unit(s) subcutaneous once a day (at bedtime)  Trulicity Pen 1.5 mg/0.5 mL subcutaneous solution: 0.5 milliliter(s) subcutaneous once a week  ***Mondays**** aspirin 81 mg oral tablet, chewable: 1 tab(s) orally once a day  atorvastatin 20 mg oral tablet: 2 tab(s) orally once a day (at bedtime)   B Complex 50 oral tablet, extended release: 1 tab(s) orally once a day  clopidogrel 75 mg oral tablet: 1 tab(s) orally once a day  CoQ10 300 mg oral capsule: 1 cap(s) orally once a day  fenofibrate 160 mg oral tablet: 1 tab(s) orally once a day (at bedtime)  fosinopril 10 mg oral tablet: 1 tab(s) orally once a day  Jardiance 25 mg oral tablet: 1 tab(s) orally once a day (in the morning)  metFORMIN 500 mg oral tablet, extended release: 2 tab(s) orally 2 times a day  metoprolol succinate 50 mg oral tablet, extended release: 1 tab(s) orally once a day  Probiotic Formula oral capsule: 1 cap(s) orally once a day  Toreema SoloStar 300 units/mL subcutaneous solution: 70 unit(s) subcutaneous once a day (at bedtime)

## 2019-12-03 NOTE — PROGRESS NOTE ADULT - SUBJECTIVE AND OBJECTIVE BOX
GI    HPI:  feeling well  no abd pain  no n/v  madhav clears and diet advanced for lunch-not yet tried low fat diet  no BM    ROS  As above  Otherwise unremarkable, all systems reviewed    PE:  Vital Signs Last 24 Hrs  T(C): 36.7 (03 Dec 2019 10:50), Max: 36.7 (03 Dec 2019 10:50)  T(F): 98 (03 Dec 2019 10:50), Max: 98 (03 Dec 2019 10:50)  HR: 78 (03 Dec 2019 10:50) (73 - 78)  BP: 137/88 (03 Dec 2019 10:50) (120/70 - 141/82)  BP(mean): --  RR: 18 (03 Dec 2019 10:50) (17 - 18)  SpO2: 100% (03 Dec 2019 10:50) (98% - 100%)    Constitutional: NAD, well-developed, A+Ox3, obese  Anicteric   Respiratory: CTABL, breathing comfortably  Cardiovascular: S1 and S2, RRR  Gastrointestinal: +BS, soft, non tender, non distended, no mass  Extremities: warm, well perfused, no edema  Psychiatric: Normal mood, normal affect  Neuro: moves all extremities, grossly intact  Skin: No rashes or lesions    LABS:                        lipase wnl  trig pending                          15.4   6.23  )-----------( 238      ( 03 Dec 2019 08:34 )             46.6

## 2019-12-03 NOTE — DISCHARGE NOTE NURSING/CASE MANAGEMENT/SOCIAL WORK - NSDCVIVACCINE_GEN_ALL_CORE_FT
Influenza , 2017/10/12 15:08 , Kim Samano (RN)  Influenza , 2018/9/13 12:04 , Eliane Otero (RN)  Influenza , 2019/12/3 13:24 , Benita Agosto (RN)

## 2019-12-03 NOTE — DISCHARGE NOTE PROVIDER - NSDCCPCAREPLAN_GEN_ALL_CORE_FT
PRINCIPAL DISCHARGE DIAGNOSIS  Diagnosis: Acute pancreatitis, unspecified complication status, unspecified pancreatitis type  Assessment and Plan of Treatment: resolved.    Low fat diet is important.  Strict diabetic diet.   - Your triglycerides are elevated 319 and your A1c is 7.9.  - Take lipitor 40mg daily (2 of your 20mg pills).    - Follow up with GI, Dr. Cooper.  - Follow up with your primary doc.  Review meds, repeat labs, cmp, in 1 week.      SECONDARY DISCHARGE DIAGNOSES  Diagnosis: Atypical chest pain  Assessment and Plan of Treatment: resolved  - follow up with your Cardiologist. PRINCIPAL DISCHARGE DIAGNOSIS  Diagnosis: Acute pancreatitis, unspecified complication status, unspecified pancreatitis type  Assessment and Plan of Treatment: resolved.    Low fat diet is important.  Strict diabetic diet.   - Stop trulicity.  Follow up with Endocrinology this week.   - Your triglycerides are elevated 319 and your A1c is 7.9.  - Take lipitor 40mg daily (2 of your 20mg pills).    - Follow up with GI, Dr. Cooper.  - Follow up with your primary doc.  Review meds, repeat labs, cmp, in 1 week.      SECONDARY DISCHARGE DIAGNOSES  Diagnosis: Atypical chest pain  Assessment and Plan of Treatment: resolved  - follow up with your Cardiologist.

## 2019-12-03 NOTE — PROGRESS NOTE ADULT - SUBJECTIVE AND OBJECTIVE BOX
Patient is a 54y old  Male who presents with a chief complaint of chest pain/epigastric pain (02 Dec 2019 17:09)    12/3- no further epigastric pain     MEDICATIONS  (STANDING):  aspirin enteric coated 81 milliGRAM(s) Oral daily  clopidogrel Tablet 75 milliGRAM(s) Oral daily  dextrose 5%. 1000 milliLiter(s) (50 mL/Hr) IV Continuous <Continuous>  dextrose 50% Injectable 12.5 Gram(s) IV Push once  dextrose 50% Injectable 25 Gram(s) IV Push once  dextrose 50% Injectable 25 Gram(s) IV Push once  fenofibrate Tablet 145 milliGRAM(s) Oral at bedtime  influenza   Vaccine 0.5 milliLiter(s) IntraMuscular once  insulin glargine Injectable (LANTUS) 70 Unit(s) SubCutaneous at bedtime  insulin lispro (HumaLOG) corrective regimen sliding scale   SubCutaneous three times a day before meals  insulin lispro (HumaLOG) corrective regimen sliding scale   SubCutaneous at bedtime  lactated ringers. 1000 milliLiter(s) (125 mL/Hr) IV Continuous <Continuous>  lisinopril 10 milliGRAM(s) Oral daily  metoprolol succinate ER 50 milliGRAM(s) Oral daily  pantoprazole  Injectable 40 milliGRAM(s) IV Push daily    MEDICATIONS  (PRN):  acetaminophen   Tablet .. 650 milliGRAM(s) Oral every 4 hours PRN Mild Pain (1 - 3)  aluminum hydroxide/magnesium hydroxide/simethicone Suspension 30 milliLiter(s) Oral every 4 hours PRN Dyspepsia  dextrose 40% Gel 15 Gram(s) Oral once PRN Blood Glucose LESS THAN 70 milliGRAM(s)/deciliter  glucagon  Injectable 1 milliGRAM(s) IntraMuscular once PRN Glucose LESS THAN 70 milligrams/deciliter  morphine  - Injectable 2 milliGRAM(s) IV Push every 4 hours PRN Severe Pain (7 - 10)  ondansetron Injectable 4 milliGRAM(s) IV Push every 6 hours PRN Nausea and/or Vomiting            Vital Signs Last 24 Hrs  T(C): 36.4 (03 Dec 2019 05:06), Max: 36.6 (02 Dec 2019 17:07)  T(F): 97.5 (03 Dec 2019 05:06), Max: 97.9 (02 Dec 2019 17:07)  HR: 73 (03 Dec 2019 05:06) (73 - 78)  BP: 141/82 (03 Dec 2019 05:06) (120/70 - 172/87)  BP(mean): --  RR: 18 (03 Dec 2019 05:06) (16 - 18)  SpO2: 98% (03 Dec 2019 05:06) (98% - 99%)            INTERPRETATION OF TELEMETRY:    ECG:        LABS:                        16.5   7.98  )-----------( 276      ( 02 Dec 2019 05:12 )             48.4     12-02    140  |  108  |  31<H>  ----------------------------<  208<H>  4.4   |  24  |  1.56<H>    Ca    8.8      02 Dec 2019 05:12  Mg     1.8     12-02    TPro  7.2  /  Alb  3.7  /  TBili  0.6  /  DBili  x   /  AST  28  /  ALT  45  /  AlkPhos  58  12-02    CARDIAC MARKERS ( 02 Dec 2019 11:05 )  <0.015 ng/mL / x     / x     / x     / x      CARDIAC MARKERS ( 02 Dec 2019 08:19 )  <0.015 ng/mL / x     / x     / x     / x      CARDIAC MARKERS ( 02 Dec 2019 05:12 )  <0.015 ng/mL / x     / 149 U/L / x     / x          PT/INR - ( 02 Dec 2019 05:12 )   PT: 10.3 sec;   INR: 0.93 ratio         PTT - ( 02 Dec 2019 05:12 )  PTT:34.1 sec    I&O's Summary    02 Dec 2019 07:01  -  03 Dec 2019 07:00  --------------------------------------------------------  IN: 1500 mL / OUT: 0 mL / NET: 1500 mL      BNP  RADIOLOGY & ADDITIONAL STUDIES:

## 2019-12-03 NOTE — DISCHARGE NOTE PROVIDER - CARE PROVIDER_API CALL
Scotty Kaye)  Cardiovascular Disease; Interventional Cardiology  [address] [place] [address]  Phone: [phone]  Fax: [phone]  Follow Up Time:     Ron Rogel)  Family Medicine  [address] [place] [address]  Phone: [phone]  Fax: [phone]  Follow Up Time:

## 2019-12-03 NOTE — PROGRESS NOTE ADULT - ASSESSMENT
54M with mild acute pancreatitis (based on elevated lipase and pain consistent with pancreatitis).  Consider medication induced (diabetic meds) pancreatitis vs other causes (viral, idiopathic, hypertriglyceridemic, malignancy).    Recommend:  -advance to low fat diet  -check lipase in AM  -check triglycerides in AM  -consider d/c home if madhav low fat diet  -should follow up as outpatient to discuss IBS symptoms  -d/w pt in detail  -will follow
54M with mild acute pancreatitis (based on elevated lipase and pain consistent with pancreatitis).  Consider medication induced (diabetic meds) pancreatitis vs other causes (viral, idiopathic, hypertriglyceridemic, malignancy).    Recommend:  -low fat diet  -f/u results of triglycerides   -consider d/c home today if madhav low fat diet  -should follow up as outpatient to discuss IBS symptoms  -d/w pt in detail  -no GI objection to d/c if madhav diet today
Epigastric pain  R/O for AMI with troponins  Pain more epigastric  Diagnosed with pancreatitis  Stable from cardiac point of view  would check EKG today   seems stable from a cardiac standpoint . would DC tele , call us back for further questions

## 2019-12-03 NOTE — DISCHARGE NOTE PROVIDER - HOSPITAL COURSE
cc: cp, epigastric pain    hpi: 54y male w/ pmh CAD s/p BAETRICE LAD in 09/2018, HTN, hyperlipidemia, diabetes, RENETTA presented for evaluation of chest pain/epigastric pain for the last 2 days.  Symptoms resolved today.  No abd pain this morning, no n/v.  Had episode of loose stools x 1.   Tolerating diet.   No further chest pain.  Stable for d/c and follow up outpt.     ros- as per hpi, other 10 point ros neg    Vital Signs Last 24 Hrs    T(C): 36.7 (03 Dec 2019 10:50), Max: 36.7 (03 Dec 2019 10:50)     T(F): 98 (03 Dec 2019 10:50), Max: 98 (03 Dec 2019 10:50)    HR: 78 (03 Dec 2019 10:50) (73 - 78)    BP: 137/88 (03 Dec 2019 10:50) (120/70 - 141/82)    BP(mean): --    RR: 18 (03 Dec 2019 10:50) (17 - 18)    SpO2: 100% (03 Dec 2019 10:50) (98% - 100%)        PHYSICAL EXAM:    General: NAD, comfortable    Neuro: AAOx3, no focal deficits    HEENT: NCAT    Neck: Soft and supple    Respiratory: CTA b/l, no w/r/r    Cardiovascular: S1 and S2, RRR    Gastrointestinal: +BS, soft, NTND    Extremities: No edema                LABS: All Labs Reviewed:                            15.4     6.23  )-----------( 238      ( 03 Dec 2019 08:34 )               46.6         12-03        140  |  108  |  15    ----------------------------<  234<H>    3.8   |  23  |  1.31<H>        Ca    8.8      03 Dec 2019 08:34    Mg     1.8     12-02        TPro  7.2  /  Alb  3.7  /  TBili  0.6  /  DBili  x   /  AST  28  /  ALT  45  /  AlkPhos  58  12-02        PT/INR - ( 02 Dec 2019 05:12 )   PT: 10.3 sec;   INR: 0.93 ratio               PTT - ( 02 Dec 2019 05:12 )  PTT:34.1 sec    CARDIAC MARKERS ( 02 Dec 2019 11:05 )    <0.015 ng/mL / x     / x     / x     / x        CARDIAC MARKERS ( 02 Dec 2019 08:19 )    <0.015 ng/mL / x     / x     / x     / x        CARDIAC MARKERS ( 02 Dec 2019 05:12 )    <0.015 ng/mL / x     / 149 U/L / x     / x            Assessment and Plan:     1. acute pancreatitis    resolved.  ivf.  tolerating diet.  stable for d/c .  Will follow up w/ GI in office    2. chest pain resolved. atypical.  Cardio f/u appreciated- f/u in office. Resume home meds

## 2019-12-03 NOTE — DISCHARGE NOTE NURSING/CASE MANAGEMENT/SOCIAL WORK - PATIENT PORTAL LINK FT
You can access the FollowMyHealth Patient Portal offered by [facility] by registering at the following website: [email or web address]. By joining [employer] [facility]’s FollowMyHealth portal, you will also be able to view your health information using other applications (apps) compatible with our system.

## 2019-12-05 DIAGNOSIS — Z88.0 ALLERGY STATUS TO PENICILLIN: ICD-10-CM

## 2019-12-05 DIAGNOSIS — E11.22 TYPE 2 DIABETES MELLITUS WITH DIABETIC CHRONIC KIDNEY DISEASE: ICD-10-CM

## 2019-12-05 DIAGNOSIS — I12.9 HYPERTENSIVE CHRONIC KIDNEY DISEASE WITH STAGE 1 THROUGH STAGE 4 CHRONIC KIDNEY DISEASE, OR UNSPECIFIED CHRONIC KIDNEY DISEASE: ICD-10-CM

## 2019-12-05 DIAGNOSIS — N18.3 CHRONIC KIDNEY DISEASE, STAGE 3 (MODERATE): ICD-10-CM

## 2019-12-05 DIAGNOSIS — G47.33 OBSTRUCTIVE SLEEP APNEA (ADULT) (PEDIATRIC): ICD-10-CM

## 2019-12-05 DIAGNOSIS — R07.89 OTHER CHEST PAIN: ICD-10-CM

## 2019-12-05 DIAGNOSIS — E78.5 HYPERLIPIDEMIA, UNSPECIFIED: ICD-10-CM

## 2019-12-05 DIAGNOSIS — K85.30 DRUG INDUCED ACUTE PANCREATITIS WITHOUT NECROSIS OR INFECTION: ICD-10-CM

## 2019-12-05 DIAGNOSIS — Z79.82 LONG TERM (CURRENT) USE OF ASPIRIN: ICD-10-CM

## 2019-12-05 DIAGNOSIS — I25.10 ATHEROSCLEROTIC HEART DISEASE OF NATIVE CORONARY ARTERY WITHOUT ANGINA PECTORIS: ICD-10-CM

## 2019-12-05 DIAGNOSIS — T38.3X5A ADVERSE EFFECT OF INSULIN AND ORAL HYPOGLYCEMIC [ANTIDIABETIC] DRUGS, INITIAL ENCOUNTER: ICD-10-CM

## 2019-12-05 DIAGNOSIS — Z79.4 LONG TERM (CURRENT) USE OF INSULIN: ICD-10-CM

## 2019-12-05 DIAGNOSIS — Z95.5 PRESENCE OF CORONARY ANGIOPLASTY IMPLANT AND GRAFT: ICD-10-CM

## 2020-02-13 ENCOUNTER — INPATIENT (INPATIENT)
Facility: HOSPITAL | Age: 55
LOS: 1 days | Discharge: HOME | DRG: 379 | End: 2020-02-15
Attending: HOSPITALIST | Admitting: HOSPITALIST
Payer: COMMERCIAL

## 2020-02-13 VITALS — WEIGHT: 238.1 LBS | HEIGHT: 72 IN

## 2020-02-13 DIAGNOSIS — Z90.49 ACQUIRED ABSENCE OF OTHER SPECIFIED PARTS OF DIGESTIVE TRACT: Chronic | ICD-10-CM

## 2020-02-13 DIAGNOSIS — K92.2 GASTROINTESTINAL HEMORRHAGE, UNSPECIFIED: ICD-10-CM

## 2020-02-13 DIAGNOSIS — Z95.5 PRESENCE OF CORONARY ANGIOPLASTY IMPLANT AND GRAFT: Chronic | ICD-10-CM

## 2020-02-13 LAB
ALBUMIN SERPL ELPH-MCNC: 4 G/DL — SIGNIFICANT CHANGE UP (ref 3.3–5)
ALP SERPL-CCNC: 41 U/L — SIGNIFICANT CHANGE UP (ref 40–120)
ALT FLD-CCNC: 42 U/L — SIGNIFICANT CHANGE UP (ref 12–78)
ANION GAP SERPL CALC-SCNC: 5 MMOL/L — SIGNIFICANT CHANGE UP (ref 5–17)
APPEARANCE UR: CLEAR — SIGNIFICANT CHANGE UP
APTT BLD: 35.9 SEC — SIGNIFICANT CHANGE UP (ref 27.5–36.3)
AST SERPL-CCNC: 28 U/L — SIGNIFICANT CHANGE UP (ref 15–37)
BASOPHILS # BLD AUTO: 0 K/UL — SIGNIFICANT CHANGE UP (ref 0–0.2)
BASOPHILS NFR BLD AUTO: 0 % — SIGNIFICANT CHANGE UP (ref 0–2)
BILIRUB SERPL-MCNC: 0.6 MG/DL — SIGNIFICANT CHANGE UP (ref 0.2–1.2)
BILIRUB UR-MCNC: NEGATIVE — SIGNIFICANT CHANGE UP
BUN SERPL-MCNC: 30 MG/DL — HIGH (ref 7–23)
CALCIUM SERPL-MCNC: 9.8 MG/DL — SIGNIFICANT CHANGE UP (ref 8.5–10.1)
CHLORIDE SERPL-SCNC: 102 MMOL/L — SIGNIFICANT CHANGE UP (ref 96–108)
CO2 SERPL-SCNC: 27 MMOL/L — SIGNIFICANT CHANGE UP (ref 22–31)
COLOR SPEC: YELLOW — SIGNIFICANT CHANGE UP
CREAT SERPL-MCNC: 1.54 MG/DL — HIGH (ref 0.5–1.3)
DIFF PNL FLD: NEGATIVE — SIGNIFICANT CHANGE UP
EOSINOPHIL # BLD AUTO: 0.35 K/UL — SIGNIFICANT CHANGE UP (ref 0–0.5)
EOSINOPHIL NFR BLD AUTO: 4 % — SIGNIFICANT CHANGE UP (ref 0–6)
GLUCOSE SERPL-MCNC: 192 MG/DL — HIGH (ref 70–99)
GLUCOSE UR QL: 1000 MG/DL
HCT VFR BLD CALC: 44 % — SIGNIFICANT CHANGE UP (ref 39–50)
HGB BLD-MCNC: 14.7 G/DL — SIGNIFICANT CHANGE UP (ref 13–17)
INR BLD: 1.03 RATIO — SIGNIFICANT CHANGE UP (ref 0.88–1.16)
KETONES UR-MCNC: NEGATIVE — SIGNIFICANT CHANGE UP
LEUKOCYTE ESTERASE UR-ACNC: NEGATIVE — SIGNIFICANT CHANGE UP
LIDOCAIN IGE QN: 957 U/L — HIGH (ref 73–393)
LYMPHOCYTES # BLD AUTO: 2.11 K/UL — SIGNIFICANT CHANGE UP (ref 1–3.3)
LYMPHOCYTES # BLD AUTO: 24 % — SIGNIFICANT CHANGE UP (ref 13–44)
MCHC RBC-ENTMCNC: 28.8 PG — SIGNIFICANT CHANGE UP (ref 27–34)
MCHC RBC-ENTMCNC: 33.4 GM/DL — SIGNIFICANT CHANGE UP (ref 32–36)
MCV RBC AUTO: 86.3 FL — SIGNIFICANT CHANGE UP (ref 80–100)
MONOCYTES # BLD AUTO: 0.7 K/UL — SIGNIFICANT CHANGE UP (ref 0–0.9)
MONOCYTES NFR BLD AUTO: 8 % — SIGNIFICANT CHANGE UP (ref 2–14)
NEUTROPHILS # BLD AUTO: 5.28 K/UL — SIGNIFICANT CHANGE UP (ref 1.8–7.4)
NEUTROPHILS NFR BLD AUTO: 58 % — SIGNIFICANT CHANGE UP (ref 43–77)
NITRITE UR-MCNC: NEGATIVE — SIGNIFICANT CHANGE UP
NRBC # BLD: SIGNIFICANT CHANGE UP /100 WBCS (ref 0–0)
PH UR: 5 — SIGNIFICANT CHANGE UP (ref 5–8)
PLATELET # BLD AUTO: 305 K/UL — SIGNIFICANT CHANGE UP (ref 150–400)
POTASSIUM SERPL-MCNC: 4.8 MMOL/L — SIGNIFICANT CHANGE UP (ref 3.5–5.3)
POTASSIUM SERPL-SCNC: 4.8 MMOL/L — SIGNIFICANT CHANGE UP (ref 3.5–5.3)
PROT SERPL-MCNC: 7.6 GM/DL — SIGNIFICANT CHANGE UP (ref 6–8.3)
PROT UR-MCNC: 100 MG/DL
PROTHROM AB SERPL-ACNC: 11.5 SEC — SIGNIFICANT CHANGE UP (ref 10–12.9)
RBC # BLD: 5.1 M/UL — SIGNIFICANT CHANGE UP (ref 4.2–5.8)
RBC # FLD: 13.4 % — SIGNIFICANT CHANGE UP (ref 10.3–14.5)
SODIUM SERPL-SCNC: 134 MMOL/L — LOW (ref 135–145)
SP GR SPEC: 1.01 — SIGNIFICANT CHANGE UP (ref 1.01–1.02)
UROBILINOGEN FLD QL: NEGATIVE MG/DL — SIGNIFICANT CHANGE UP
WBC # BLD: 8.8 K/UL — SIGNIFICANT CHANGE UP (ref 3.8–10.5)
WBC # FLD AUTO: 8.8 K/UL — SIGNIFICANT CHANGE UP (ref 3.8–10.5)

## 2020-02-13 PROCEDURE — 80048 BASIC METABOLIC PNL TOTAL CA: CPT

## 2020-02-13 PROCEDURE — 82962 GLUCOSE BLOOD TEST: CPT

## 2020-02-13 PROCEDURE — C9113: CPT

## 2020-02-13 PROCEDURE — 99223 1ST HOSP IP/OBS HIGH 75: CPT

## 2020-02-13 PROCEDURE — 85027 COMPLETE CBC AUTOMATED: CPT

## 2020-02-13 PROCEDURE — 83036 HEMOGLOBIN GLYCOSYLATED A1C: CPT

## 2020-02-13 PROCEDURE — 36415 COLL VENOUS BLD VENIPUNCTURE: CPT

## 2020-02-13 PROCEDURE — 85025 COMPLETE CBC W/AUTO DIFF WBC: CPT

## 2020-02-13 PROCEDURE — 83690 ASSAY OF LIPASE: CPT

## 2020-02-13 PROCEDURE — 74177 CT ABD & PELVIS W/CONTRAST: CPT | Mod: 26

## 2020-02-13 RX ORDER — PANTOPRAZOLE SODIUM 20 MG/1
40 TABLET, DELAYED RELEASE ORAL
Refills: 0 | Status: DISCONTINUED | OUTPATIENT
Start: 2020-02-13 | End: 2020-02-14

## 2020-02-13 RX ORDER — ONDANSETRON 8 MG/1
4 TABLET, FILM COATED ORAL EVERY 6 HOURS
Refills: 0 | Status: DISCONTINUED | OUTPATIENT
Start: 2020-02-13 | End: 2020-02-15

## 2020-02-13 RX ORDER — DEXTROSE 50 % IN WATER 50 %
15 SYRINGE (ML) INTRAVENOUS ONCE
Refills: 0 | Status: DISCONTINUED | OUTPATIENT
Start: 2020-02-13 | End: 2020-02-15

## 2020-02-13 RX ORDER — DEXTROSE 50 % IN WATER 50 %
12.5 SYRINGE (ML) INTRAVENOUS ONCE
Refills: 0 | Status: DISCONTINUED | OUTPATIENT
Start: 2020-02-13 | End: 2020-02-15

## 2020-02-13 RX ORDER — CLOPIDOGREL BISULFATE 75 MG/1
75 TABLET, FILM COATED ORAL DAILY
Refills: 0 | Status: DISCONTINUED | OUTPATIENT
Start: 2020-02-14 | End: 2020-02-15

## 2020-02-13 RX ORDER — LISINOPRIL 2.5 MG/1
20 TABLET ORAL DAILY
Refills: 0 | Status: DISCONTINUED | OUTPATIENT
Start: 2020-02-13 | End: 2020-02-15

## 2020-02-13 RX ORDER — INSULIN GLARGINE 100 [IU]/ML
75 INJECTION, SOLUTION SUBCUTANEOUS AT BEDTIME
Refills: 0 | Status: DISCONTINUED | OUTPATIENT
Start: 2020-02-13 | End: 2020-02-15

## 2020-02-13 RX ORDER — ASPIRIN/CALCIUM CARB/MAGNESIUM 324 MG
81 TABLET ORAL DAILY
Refills: 0 | Status: DISCONTINUED | OUTPATIENT
Start: 2020-02-13 | End: 2020-02-15

## 2020-02-13 RX ORDER — PANTOPRAZOLE SODIUM 20 MG/1
40 TABLET, DELAYED RELEASE ORAL ONCE
Refills: 0 | Status: COMPLETED | OUTPATIENT
Start: 2020-02-13 | End: 2020-02-13

## 2020-02-13 RX ORDER — SODIUM CHLORIDE 9 MG/ML
1000 INJECTION, SOLUTION INTRAVENOUS
Refills: 0 | Status: DISCONTINUED | OUTPATIENT
Start: 2020-02-13 | End: 2020-02-15

## 2020-02-13 RX ORDER — L.ACIDOPH/B.ANIMALIS/B.LONGUM 15B CELL
1 CAPSULE ORAL
Qty: 0 | Refills: 0 | DISCHARGE

## 2020-02-13 RX ORDER — SITAGLIPTIN 50 MG/1
1 TABLET, FILM COATED ORAL
Qty: 0 | Refills: 0 | DISCHARGE

## 2020-02-13 RX ORDER — GLUCAGON INJECTION, SOLUTION 0.5 MG/.1ML
1 INJECTION, SOLUTION SUBCUTANEOUS ONCE
Refills: 0 | Status: DISCONTINUED | OUTPATIENT
Start: 2020-02-13 | End: 2020-02-15

## 2020-02-13 RX ORDER — FENOFIBRATE,MICRONIZED 130 MG
145 CAPSULE ORAL AT BEDTIME
Refills: 0 | Status: DISCONTINUED | OUTPATIENT
Start: 2020-02-13 | End: 2020-02-15

## 2020-02-13 RX ORDER — FOSINOPRIL SODIUM 10 MG/1
1 TABLET ORAL
Qty: 0 | Refills: 0 | DISCHARGE

## 2020-02-13 RX ORDER — INSULIN GLARGINE 100 [IU]/ML
70 INJECTION, SOLUTION SUBCUTANEOUS
Qty: 0 | Refills: 0 | DISCHARGE

## 2020-02-13 RX ORDER — SODIUM CHLORIDE 9 MG/ML
1000 INJECTION INTRAMUSCULAR; INTRAVENOUS; SUBCUTANEOUS
Refills: 0 | Status: DISCONTINUED | OUTPATIENT
Start: 2020-02-13 | End: 2020-02-14

## 2020-02-13 RX ORDER — SODIUM CHLORIDE 9 MG/ML
1000 INJECTION INTRAMUSCULAR; INTRAVENOUS; SUBCUTANEOUS ONCE
Refills: 0 | Status: COMPLETED | OUTPATIENT
Start: 2020-02-13 | End: 2020-02-13

## 2020-02-13 RX ORDER — METFORMIN HYDROCHLORIDE 850 MG/1
1 TABLET ORAL
Qty: 0 | Refills: 0 | DISCHARGE

## 2020-02-13 RX ORDER — UBIDECARENONE 100 MG
1 CAPSULE ORAL
Qty: 0 | Refills: 0 | DISCHARGE

## 2020-02-13 RX ORDER — METOPROLOL TARTRATE 50 MG
50 TABLET ORAL DAILY
Refills: 0 | Status: DISCONTINUED | OUTPATIENT
Start: 2020-02-13 | End: 2020-02-15

## 2020-02-13 RX ORDER — FLUTICASONE PROPIONATE 50 MCG
1 SPRAY, SUSPENSION NASAL
Qty: 0 | Refills: 0 | DISCHARGE

## 2020-02-13 RX ORDER — INSULIN LISPRO 100/ML
VIAL (ML) SUBCUTANEOUS
Refills: 0 | Status: DISCONTINUED | OUTPATIENT
Start: 2020-02-13 | End: 2020-02-15

## 2020-02-13 RX ADMIN — Medication 145 MILLIGRAM(S): at 21:47

## 2020-02-13 RX ADMIN — PANTOPRAZOLE SODIUM 40 MILLIGRAM(S): 20 TABLET, DELAYED RELEASE ORAL at 13:17

## 2020-02-13 RX ADMIN — SODIUM CHLORIDE 125 MILLILITER(S): 9 INJECTION INTRAMUSCULAR; INTRAVENOUS; SUBCUTANEOUS at 17:06

## 2020-02-13 RX ADMIN — SODIUM CHLORIDE 1000 MILLILITER(S): 9 INJECTION INTRAMUSCULAR; INTRAVENOUS; SUBCUTANEOUS at 11:15

## 2020-02-13 RX ADMIN — SODIUM CHLORIDE 125 MILLILITER(S): 9 INJECTION INTRAMUSCULAR; INTRAVENOUS; SUBCUTANEOUS at 21:47

## 2020-02-13 RX ADMIN — PANTOPRAZOLE SODIUM 40 MILLIGRAM(S): 20 TABLET, DELAYED RELEASE ORAL at 21:47

## 2020-02-13 RX ADMIN — SODIUM CHLORIDE 1000 MILLILITER(S): 9 INJECTION INTRAMUSCULAR; INTRAVENOUS; SUBCUTANEOUS at 12:17

## 2020-02-13 NOTE — PROVIDER CONTACT NOTE (OTHER) - SITUATION
Tele hospital  to add patient to list for  visit.
Ines TATE spoke with Carmela to advise MD of admission.
Tele MD office spoke with Adriana to request routine consult.

## 2020-02-13 NOTE — PROGRESS NOTE ADULT - ASSESSMENT
54yr/old male r/o appendicitis, w/ non specific abdominal pain  - not acute appendicitis, no clinical symptoms indicate, pain most likely from UGIB/ elevated lipase from pancreatitis  - Ns 0.9% at 125cc per hr  -clear liquid diet  -protonix  -GI evaluation for possible future endoscopy  -pain management  -am labs  D/w Dr rey

## 2020-02-13 NOTE — H&P ADULT - HISTORY OF PRESENT ILLNESS
History of Present Illness:   53 yo WM with PMH CAD s/p BEATRICE LAD in 09/2018, HTN, hyperlipidemia, diabetes, RENETTA, Pancreatitis in Dec 2019 presented for evaluation of RLQ pain for the past few weeks, epigastric pain and melena for the past week. Patient has had decreased oral intake for the past few days due to pain. Pain has increased in intensity over the past 24 hrs and today he went to his PMD who sent him to the ED for evaluation.        PAST MEDICAL HISTORY:  CAD (coronary artery disease)   Diabetes mellitus   Essential hypertension   HLD (hyperlipidemia)   Obstructive sleep apnea.     PAST SURGICAL HISTORY:  History of coronary artery stent placement Placed 9/12/18 by Dr. Kaye.     FAMILY HISTORY:  mother had DM  daughter had perforated appy  father had PVD and Ckd    Social History:  no smoking, no Etoh            REVIEW OF SYSTEMS:    CONSTITUTIONAL: No weakness, No fevers or chills  ENT: No ear ache, No sorethroat  NECK: No pain, No stiffness  RESPIRATORY: No cough, No wheezing, No hemoptysis; No dyspnea  CARDIOVASCULAR: No chest pain, No palpitations  GASTROINTESTINAL: No abd pain, No nausea, No vomiting, No hematemesis, No diarrhea or constipation. No melena, No hematochezia.  GENITOURINARY: No dysuria, No  hematuria  NEUROLOGICAL: No diplopia, No paresthesia, No motor dysfunction  MUSCULOSKELETAL: No arthralgia, No myalgia  SKIN: No rashes, or lesions   PSYCH: no anxiety, no suicidal ideation    All other review of systems is negative unless indicated above    Vital Signs Last 24 Hrs  T(C): 36.4 (13 Feb 2020 15:22), Max: 36.6 (13 Feb 2020 10:33)  T(F): 97.5 (13 Feb 2020 15:22), Max: 97.9 (13 Feb 2020 10:33)  HR: 75 (13 Feb 2020 15:22) (75 - 75)  BP: 137/86 (13 Feb 2020 15:22) (137/86 - 158/82)  BP(mean): --  RR: 18 (13 Feb 2020 15:22) (18 - 18)  SpO2: 100% (13 Feb 2020 15:22) (100% - 100%)    PHYSICAL EXAM:    GENERAL: NAD, Well nourished  HEENT:  NC/AT, EOMI, PERRLA, No scleral icterus, Moist mucous membranes  NECK: Supple, No JVD  CNS:  Alert & Oriented X3, Motor Strength 5/5 B/L upper and lower extremities; DTRs 2+ intact   LUNG: Normal Breath sounds, Clear to auscultation bilaterally, No rales, No rhonchi, No wheezing  HEART: RRR; No murmurs, No rubs  ABDOMEN: +BS, ST/ND/NT  GENITOURINARY: Voiding, Bladder not distended  EXTREMITIES:  2+ Peripheral Pulses, No clubbing, No cyanosis, No tibial edema  MUSCULOSKELTAL: Joints normal ROM, No TTP, No effusion  VAGINAL: deferred  SKIN: no rashes  RECTAL: deferred, not indicated  BREAST: deferred                          14.7   8.80  )-----------( 305      ( 13 Feb 2020 11:11 )             44.0     02-13    134<L>  |  102  |  30<H>  ----------------------------<  192<H>  4.8   |  27  |  1.54<H>    Ca    9.8      13 Feb 2020 11:11    TPro  7.6  /  Alb  4.0  /  TBili  0.6  /  DBili  x   /  AST  28  /  ALT  42  /  AlkPhos  41  02-13    Vancomycin levels:   Cultures:     MEDICATIONS  (STANDING):  aspirin  chewable 81 milliGRAM(s) Oral daily  dextrose 5%. 1000 milliLiter(s) (50 mL/Hr) IV Continuous <Continuous>  dextrose 50% Injectable 12.5 Gram(s) IV Push once  fenofibrate Tablet 145 milliGRAM(s) Oral at bedtime  insulin glargine Injectable (LANTUS) 75 Unit(s) SubCutaneous at bedtime  insulin lispro (HumaLOG) corrective regimen sliding scale   SubCutaneous three times a day before meals  lisinopril 20 milliGRAM(s) Oral daily  metoprolol succinate ER 50 milliGRAM(s) Oral daily  pantoprazole  Injectable 40 milliGRAM(s) IV Push two times a day  sodium chloride 0.9%. 1000 milliLiter(s) (125 mL/Hr) IV Continuous <Continuous>    MEDICATIONS  (PRN):  aluminum hydroxide/magnesium hydroxide/simethicone Suspension 30 milliLiter(s) Oral every 4 hours PRN Dyspepsia  dextrose 40% Gel 15 Gram(s) Oral once PRN Blood Glucose LESS THAN 70 milliGRAM(s)/deciliter  glucagon  Injectable 1 milliGRAM(s) IntraMuscular once PRN Glucose LESS THAN 70 milligrams/deciliter  ondansetron Injectable 4 milliGRAM(s) IV Push every 6 hours PRN Nausea      all labs reviewed  all imaging reviewed    a/p:    1. Melena: r/o UGIB  Clear liquid diet  Protonix IV q12hrs  GI evaluation    2. Pancreatitis:  Clears  IV fluids: NS at 125cc/hr    3. RLQ pain:   CT Abd: negative  Surgical evaluation....? Appendicitis     4. DM type II:  c/w Insulin Lantus 75u qHS  Novolog SS    5. Dehydration/Prerenal azotemia:  IV fluds  check Cr in Am

## 2020-02-13 NOTE — PROGRESS NOTE ADULT - SUBJECTIVE AND OBJECTIVE BOX
54 yr/male  with PMH CAD s/p BEATRICE LAD in 09/2018, HTN, hyperlipidemia, diabetes, RENETTA, Pancreatitis in Dec 2019 presented for evaluation of non specific abdominal pain,  stomach bloat distension, pain localizing to right side of abdomen/epigastric area, difficulty with bowel movements, noticed black tarry stools in past one week, tolerating reg diet, urinating without difficulty,Vital Signs Last 24 Hrs complains of subjective night sweats and chills, last colonoscopy over one yr ago  T(C): 36.3 (13 Feb 2020 16:21), Max: 36.6 (13 Feb 2020 10:33)  T(F): 97.3 (13 Feb 2020 16:21), Max: 97.9 (13 Feb 2020 10:33)  HR: 89 (13 Feb 2020 16:21) (75 - 89)  BP: 138/68 (13 Feb 2020 16:21) (137/86 - 158/82)  BP(mean): --  RR: 16 (13 Feb 2020 16:21) (16 - 18)  SpO2: 97% (13 Feb 2020 16:21) (97% - 100%)     abdomen: minimal reproducible pain right lower quadrant/right inguinal area, - scrotal pain, -cvat, + bs  + pain at epigastric region- mcburneys sign                          14.7   8.80  )-----------( 305      ( 13 Feb 2020 11:11 )             44.0     PT/INR - ( 13 Feb 2020 11:11 )   PT: 11.5 sec;   INR: 1.03 ratio         PTT - ( 13 Feb 2020 11:11 )  PTT:35.9 sec  02-13    134<L>  |  102  |  30<H>  ----------------------------<  192<H>  4.8   |  27  |  1.54<H>    Ca    9.8      13 Feb 2020 11:11    TPro  7.6  /  Alb  4.0  /  TBili  0.6  /  DBili  x   /  AST  28  /  ALT  42  /  AlkPhos  41  02-13    Type and Screen Antibody Screen: NEG (02-13 @ 11:11)    abd ct : no acute intra abdominal pathology  -neg for appendicitis

## 2020-02-13 NOTE — ED STATDOCS - PROGRESS NOTE DETAILS
Lori BLANCO for ED attending, Dr. Jack: Pt aware to not take Metformin for the next 48 hours. 54 yr. old male PMH: CAD S/P Stent presents to ED with RLQ pain radiating to back. No N/V/D. No fever or chills. Sent to ED from Dr. MARY Rogel office for further evaluation. Seen and examined by attending in intake. Plan: IV, labs, CT abd./pelvis and Stool Quiac. Will F/U with resyults and re evaluate. MTangredi NP Stool Guiac positive Lot #[id number] Exp. 8/31/20. Liu ALLEN Stool Guaiac positive Lot #[id number] Exp. 8/31/20. Liu ALLEN 54 yr. old male PMH: CAD S/P Stent presents to ED with RLQ pain radiating to back. No N/V/D. No fever or chills. Sent to ED from Dr. MARY Rogel office for further evaluation. Currently on Plavix and ASA.  Seen and examined by attending in intake. Plan: IV, labs, CT abd./pelvis and Stool Quiac. Will F/U with resyults and re evaluate. MTangredi NP

## 2020-02-13 NOTE — ED STATDOCS - ATTENDING CONTRIBUTION TO CARE
I, Yaneth Jack DO,  performed the initial face to face bedside interview with this patient regarding history of present illness, review of symptoms and relevant past medical, social and family history.  I completed an independent physical examination.  I was the initial provider who evaluated this patient. I have signed out the follow up of any pending tests (i.e. labs, radiological studies) to the ACP.  I have communicated the patient’s plan of care and disposition with the ACP.  The history, relevant review of systems, past medical and surgical history, medical decision making, and physical examination was documented by the scribe in my presence and I attest to the accuracy of the documentation.

## 2020-02-13 NOTE — PATIENT PROFILE ADULT - FLU SEASON?
Patient attended phase 2 Cardiac Rehabilitation today session 32. Session report will be scanned in by medical records under the media tab after discharge.   Yes...

## 2020-02-13 NOTE — ED STATDOCS - PMH
CAD (coronary artery disease)    Diabetes mellitus    Essential hypertension    HLD (hyperlipidemia)    Obstructive sleep apnea    Pancreatitis

## 2020-02-13 NOTE — ED ADULT NURSE NOTE - CAS EDN DISCHARGE INTERVENTIONS
Prescription approved per [facility] [place] Refill Protocol.  Clarisa Farrell RN  
allopurinol (ZYLOPRIM) 100 MG tablet     Sig: Take 2 tablets (200 mg) by mouth 2 times daily        Last Written Prescription Date: 6/13/17  Last Fill Quantity: 120, # refills: 3  Last Office Visit with [facility] [place] P or [facility] prescribing provider:  7/28/2017          Uric Acid   Date Value Ref Range Status   07/28/2017 3.0 (L) 3.5 - 7.2 mg/dL Final   ]  Creatinine   Date Value Ref Range Status   06/13/2017 1.03 0.66 - 1.25 mg/dL Final   ]  Lab Results   Component Value Date    WBC 5.5 05/07/2017     Lab Results   Component Value Date    RBC 4.63 05/07/2017     Lab Results   Component Value Date    HGB 15.1 05/07/2017     Lab Results   Component Value Date    HCT 44.0 05/07/2017     No components found for: MCT  Lab Results   Component Value Date    MCV 95 05/07/2017     Lab Results   Component Value Date    MCH 32.6 05/07/2017     Lab Results   Component Value Date    MCHC 34.3 05/07/2017     Lab Results   Component Value Date    RDW 13.0 05/07/2017     Lab Results   Component Value Date     05/07/2017     Lab Results   Component Value Date    AST 25 06/13/2017     Lab Results   Component Value Date    ALT 37 06/13/2017     OLYA Wilkinson  September 22, 2017  2:58 PM    
IV intact

## 2020-02-14 LAB
ANION GAP SERPL CALC-SCNC: 5 MMOL/L — SIGNIFICANT CHANGE UP (ref 5–17)
BUN SERPL-MCNC: 20 MG/DL — SIGNIFICANT CHANGE UP (ref 7–23)
CALCIUM SERPL-MCNC: 8.8 MG/DL — SIGNIFICANT CHANGE UP (ref 8.5–10.1)
CHLORIDE SERPL-SCNC: 108 MMOL/L — SIGNIFICANT CHANGE UP (ref 96–108)
CO2 SERPL-SCNC: 25 MMOL/L — SIGNIFICANT CHANGE UP (ref 22–31)
CREAT SERPL-MCNC: 1.35 MG/DL — HIGH (ref 0.5–1.3)
CULTURE RESULTS: SIGNIFICANT CHANGE UP
GLUCOSE SERPL-MCNC: 130 MG/DL — HIGH (ref 70–99)
HBA1C BLD-MCNC: 9.5 % — HIGH (ref 4–5.6)
HCT VFR BLD CALC: 40.2 % — SIGNIFICANT CHANGE UP (ref 39–50)
HGB BLD-MCNC: 13.3 G/DL — SIGNIFICANT CHANGE UP (ref 13–17)
LIDOCAIN IGE QN: 359 U/L — SIGNIFICANT CHANGE UP (ref 73–393)
MCHC RBC-ENTMCNC: 29 PG — SIGNIFICANT CHANGE UP (ref 27–34)
MCHC RBC-ENTMCNC: 33.1 GM/DL — SIGNIFICANT CHANGE UP (ref 32–36)
MCV RBC AUTO: 87.6 FL — SIGNIFICANT CHANGE UP (ref 80–100)
PLATELET # BLD AUTO: 293 K/UL — SIGNIFICANT CHANGE UP (ref 150–400)
POTASSIUM SERPL-MCNC: 4.4 MMOL/L — SIGNIFICANT CHANGE UP (ref 3.5–5.3)
POTASSIUM SERPL-SCNC: 4.4 MMOL/L — SIGNIFICANT CHANGE UP (ref 3.5–5.3)
RBC # BLD: 4.59 M/UL — SIGNIFICANT CHANGE UP (ref 4.2–5.8)
RBC # FLD: 13.7 % — SIGNIFICANT CHANGE UP (ref 10.3–14.5)
SODIUM SERPL-SCNC: 138 MMOL/L — SIGNIFICANT CHANGE UP (ref 135–145)
SPECIMEN SOURCE: SIGNIFICANT CHANGE UP
WBC # BLD: 8.11 K/UL — SIGNIFICANT CHANGE UP (ref 3.8–10.5)
WBC # FLD AUTO: 8.11 K/UL — SIGNIFICANT CHANGE UP (ref 3.8–10.5)

## 2020-02-14 PROCEDURE — 99233 SBSQ HOSP IP/OBS HIGH 50: CPT

## 2020-02-14 RX ORDER — ACETAMINOPHEN 500 MG
650 TABLET ORAL EVERY 6 HOURS
Refills: 0 | Status: DISCONTINUED | OUTPATIENT
Start: 2020-02-14 | End: 2020-02-15

## 2020-02-14 RX ORDER — MORPHINE SULFATE 50 MG/1
2 CAPSULE, EXTENDED RELEASE ORAL EVERY 8 HOURS
Refills: 0 | Status: DISCONTINUED | OUTPATIENT
Start: 2020-02-14 | End: 2020-02-15

## 2020-02-14 RX ADMIN — PANTOPRAZOLE SODIUM 40 MILLIGRAM(S): 20 TABLET, DELAYED RELEASE ORAL at 05:28

## 2020-02-14 RX ADMIN — Medication 2: at 17:11

## 2020-02-14 RX ADMIN — CLOPIDOGREL BISULFATE 75 MILLIGRAM(S): 75 TABLET, FILM COATED ORAL at 17:12

## 2020-02-14 RX ADMIN — SODIUM CHLORIDE 125 MILLILITER(S): 9 INJECTION INTRAMUSCULAR; INTRAVENOUS; SUBCUTANEOUS at 17:12

## 2020-02-14 RX ADMIN — Medication 81 MILLIGRAM(S): at 17:12

## 2020-02-14 RX ADMIN — INSULIN GLARGINE 75 UNIT(S): 100 INJECTION, SOLUTION SUBCUTANEOUS at 21:23

## 2020-02-14 RX ADMIN — LISINOPRIL 20 MILLIGRAM(S): 2.5 TABLET ORAL at 05:27

## 2020-02-14 RX ADMIN — SODIUM CHLORIDE 125 MILLILITER(S): 9 INJECTION INTRAMUSCULAR; INTRAVENOUS; SUBCUTANEOUS at 05:27

## 2020-02-14 RX ADMIN — Medication 50 MILLIGRAM(S): at 05:27

## 2020-02-14 RX ADMIN — Medication 145 MILLIGRAM(S): at 21:23

## 2020-02-14 NOTE — CONSULT NOTE ADULT - ASSESSMENT
54M with abdominal pain in RLQ but also epigastrium, dark stools, mild decreased hgb and elevated lipase. Etiology unclear. Had pancreatitis in Dec 2019 also. Normal LFTs. Rule out duodenal ulcer, IBD, enteritis, EtOH pancreatitis.    Rec:  -EGD to be done today  -monitor labs  -consider colonoscopy to evaluate right colon if no findings on EGD  -cont PPI for now  -the risks, benefits, and alternatives of upper endoscopy were discussed with the patient. We specifically discussed the risk of bleeding, infection, perforation, sore throat. All questions were answered and the patient/proxy agree to proceed with the procedure.  -d/w pt, will follow

## 2020-02-14 NOTE — PROGRESS NOTE ADULT - SUBJECTIVE AND OBJECTIVE BOX
GI    EGD negative except hiatal hernia  recommend colonoscopy next  resume regular diet for now  will follow over weekend

## 2020-02-14 NOTE — CONSULT NOTE ADULT - SUBJECTIVE AND OBJECTIVE BOX
53 yo WM with PMH CAD s/p BEATRICE LAD in 09/2018, HTN, hyperlipidemia, diabetes, RENETTA, Pancreatitis in Dec 2019 presented for evaluation of RLQ pain for the past few weeks, epigastric pain and melena for the past week. Patient has had decreased oral intake for the past few days due to pain. Pain has increased in intensity over the past 24 hrs and today he went to his PMD who sent him to the ED for evaluation. Pain is mainly in RLQ. CT a/p negative. Lipase elevated. Pt continues to have RLQ abdominal pain despite negative CT scan.    PAST MEDICAL & SURGICAL HISTORY:  Pancreatitis  CAD (coronary artery disease)  HLD (hyperlipidemia)  Diabetes mellitus  Obstructive sleep apnea  Essential hypertension  S/P cholecystectomy  History of coronary artery stent placement: Placed 9/12/18 by Dr. Kaye    [place] Medications:  B Complex 50 oral tablet, extended release: 1 tab(s) orally once a day (13 Feb 2020 18:42)  Co Q-10 100 mg oral capsule: 2 cap(s) orally once a day (13 Feb 2020 18:42)  fenofibrate 160 mg oral tablet: 1 tab(s) orally once a day (at bedtime) (13 Feb 2020 18:42)  fosinopril 20 mg oral tablet: 1 tab(s) orally once a day (13 Feb 2020 18:42)  HumaLOG KwikPen 100 units/mL injectable solution: Inject subcutaneously 3 times daily per sliding scale. (13 Feb 2020 18:42)  Jardiance 25 mg oral tablet: 1 tab(s) orally once a day (in the morning) (13 Feb 2020 18:42)  metFORMIN 500 mg oral tablet, extended release: 2 tab(s) orally 2 times a day (13 Feb 2020 18:42)  Starlix 120 mg oral tablet: 1 tab(s) orally once a day (13 Feb 2020 18:42)  Toujeo SoloStar 300 units/mL subcutaneous solution: 75 unit(s) subcutaneous once a day (at bedtime) (13 Feb 2020 18:42)  Vitamin D3 50,000 intl units (1250 mcg) oral capsule: 1 cap(s) orally once a week on Mon (13 Feb       Allergies    penicillin (Hives)  SOCIAL HISTORY: no EtOH or tob  PE  afeb, VSS    53 yo male WDWN in NAD  skin- warm,dry  HEENT- pupils equal, sclerae anicteric  Neck- trache midline, no JVD  Lungs- clear  Cor- RRR  Abd- + BS soft, tender RLQ and suprapubic, local guarding and rebound  Ext- no edema    < from: CT Abdomen and Pelvis w/ IV Cont (02.13.20 @ 11:57) >  LOWER CHEST: There are no pleural or pericardial effusions. The heart size is normal. There is aortic valvular and coronary artery calcification. The imaged lung bases    LIVER: Within normal limits.  BILE DUCTS: Normal caliber.  GALLBLADDER: Status post cholecystectomy.  SPLEEN: Within normal limits.  PANCREAS: Within normal limits.  ADRENALS: Within normal limits.  KIDNEYS/URETERS: No renal stones or hydronephrosis. Symmetric enhancement. Lobulated contour.    BLADDER: Within normal limits.  REPRODUCTIVE ORGANS: Prostate within normal limits.    BOWEL: No bowel obstruction. Appendix is within normal limits. There is scattered colonic diverticulosis without diverticulitis.  PERITONEUM: No ascites.  VESSELS: The aorta and IVC are normal in caliber and patent.  RETROPERITONEUM/LYMPH NODES: No lymphadenopathy.    ABDOMINAL WALL: Within normal limits.  BONES: Multilevel degenerative change of the thoracic spine with osteophytes, degenerative disc disease and facet joint arthropathy.    IMPRESSION:     No acute CT findings to suggest a cause for patient's symptoms. Incidental findings are as above.    < end of copied text >

## 2020-02-14 NOTE — PROGRESS NOTE ADULT - SUBJECTIVE AND OBJECTIVE BOX
CC: Melena, abd pain  History of Present Illness: 	  History of Present Illness:   53 yo WM with PMH CAD s/p BEATRICE LAD in 2018, HTN, hyperlipidemia, diabetes, RENETTA, Pancreatitis in Dec 2019 presented for evaluation of RLQ pain for the past few weeks, epigastric pain and melena for the past week. Patient has had decreased oral intake for the past few days due to pain. Pain has increased in intensity over the past 24 hrs and today he went to his PMD who sent him to the ED for evaluation.    : Pt with RLQ pain, admits to black stool.  No fever, chills, n, v, urinary complaints.    REVIEW OF SYSTEMS: All other review of systems is negative unless indicated above.    Vital Signs Last 24 Hrs  T(C): 36.7 (2020 11:31), Max: 36.7 (2020 11:31)  T(F): 98.1 (2020 11:31), Max: 98.1 (2020 11:31)  HR: 77 (2020 11:31) (73 - 89)  BP: 117/73 (2020 11:31) (117/73 - 138/68)  BP(mean): --  RR: 16 (2020 11:31) (16 - 18)  SpO2: 99% (2020 11:31) (97% - 100%)    PHYSICAL EXAM:    Constitutional: NAD, awake and alert, well-developed  HEENT: PERR, EOMI, Normal Hearing, MMM  Neck: Soft and supple  Respiratory: Breath sounds are clear bilaterally, No wheezing, rales or rhonchi  Cardiovascular: S1 and S2, regular rate and rhythm, no Murmurs, gallops or rubs  Gastrointestinal: Bowel Sounds present, soft, RLQ pain to deep palpation  Extremities: No peripheral edema  Neurological: A/O x 3, no focal deficits in my limited exam  Skin: No rashes    MEDICATIONS  (STANDING):  aspirin  chewable 81 milliGRAM(s) Oral daily  clopidogrel Tablet 75 milliGRAM(s) Oral daily  dextrose 5%. 1000 milliLiter(s) (50 mL/Hr) IV Continuous <Continuous>  dextrose 50% Injectable 12.5 Gram(s) IV Push once  fenofibrate Tablet 145 milliGRAM(s) Oral at bedtime  insulin glargine Injectable (LANTUS) 75 Unit(s) SubCutaneous at bedtime  insulin lispro (HumaLOG) corrective regimen sliding scale   SubCutaneous three times a day before meals  lisinopril 20 milliGRAM(s) Oral daily  metoprolol succinate ER 50 milliGRAM(s) Oral daily  sodium chloride 0.9%. 1000 milliLiter(s) (125 mL/Hr) IV Continuous <Continuous>    MEDICATIONS  (PRN):  aluminum hydroxide/magnesium hydroxide/simethicone Suspension 30 milliLiter(s) Oral every 4 hours PRN Dyspepsia  dextrose 40% Gel 15 Gram(s) Oral once PRN Blood Glucose LESS THAN 70 milliGRAM(s)/deciliter  glucagon  Injectable 1 milliGRAM(s) IntraMuscular once PRN Glucose LESS THAN 70 milligrams/deciliter  ondansetron Injectable 4 milliGRAM(s) IV Push every 6 hours PRN Nausea                                13.3   8.11  )-----------( 293      ( 2020 08:15 )             40.2     02-14    138  |  108  |  20  ----------------------------<  130<H>  4.4   |  25  |  1.35<H>    Ca    8.8      2020 08:15    TPro  7.6  /  Alb  4.0  /  TBili  0.6  /  DBili  x   /  AST  28  /  ALT  42  /  AlkPhos  41  02-13    CAPILLARY BLOOD GLUCOSE      POCT Blood Glucose.: 100 mg/dL (2020 12:28)  POCT Blood Glucose.: 126 mg/dL (2020 06:51)  POCT Blood Glucose.: 125 mg/dL (2020 21:35)  POCT Blood Glucose.: 83 mg/dL (2020 17:04)    LIVER FUNCTIONS - ( 2020 11:11 )  Alb: 4.0 g/dL / Pro: 7.6 gm/dL / ALK PHOS: 41 U/L / ALT: 42 U/L / AST: 28 U/L / GGT: x           PT/INR - ( 2020 11:11 )   PT: 11.5 sec;   INR: 1.03 ratio         PTT - ( 2020 11:11 )  PTT:35.9 sec  Urinalysis Basic - ( 2020 11:11 )    Color: Yellow / Appearance: Clear / S.015 / pH: x  Gluc: x / Ketone: Negative  / Bili: Negative / Urobili: Negative mg/dL   Blood: x / Protein: 100 mg/dL / Nitrite: Negative   Leuk Esterase: Negative / RBC: Negative /HPF / WBC Negative   Sq Epi: x / Non Sq Epi: Negative / Bacteria: Negative      a/p: 54 year old man with abd pain and dark stools.    RLL abd pain, questionable melena / pancreatitis:  -EGD --> hiatal hernia  -stop PPI  -resume diet  -GI eval for colonoscopy  -IVFs  -trend lipase  -surgical eval appreciated, no appendicitis    DM type II: Uncontrolled  -A1c 9.5  -c/w Insulin Lantus 75u qHS  -Novolog SS    Dehydration/Prerenal azotemia: RESOLVED

## 2020-02-14 NOTE — CONSULT NOTE ADULT - SUBJECTIVE AND OBJECTIVE BOX
GI consult    HPI:  History of Present Illness:   53 yo WM with PMH CAD s/p BEATRICE LAD in 09/2018, HTN, hyperlipidemia, diabetes, RENETTA, Pancreatitis in Dec 2019 presented for evaluation of RLQ pain for the past few weeks, epigastric pain and melena for the past week. Patient has had decreased oral intake for the past few days due to pain. Pain has increased in intensity over the past 24 hrs and today he went to his PMD who sent him to the ED for evaluation. Pain is mainly in RLQ. CT a/p negative. Lipase elevated. Pt known to me from recent admission.    PAST MEDICAL & SURGICAL HISTORY:  Pancreatitis  CAD (coronary artery disease)  HLD (hyperlipidemia)  Diabetes mellitus  Obstructive sleep apnea  Essential hypertension  S/P cholecystectomy  History of coronary artery stent placement: Placed 9/12/18 by Dr. Kaye      [place] Medications:  B Complex 50 oral tablet, extended release: 1 tab(s) orally once a day (13 Feb 2020 18:42)  Co Q-10 100 mg oral capsule: 2 cap(s) orally once a day (13 Feb 2020 18:42)  fenofibrate 160 mg oral tablet: 1 tab(s) orally once a day (at bedtime) (13 Feb 2020 18:42)  fosinopril 20 mg oral tablet: 1 tab(s) orally once a day (13 Feb 2020 18:42)  HumaLOG KwikPen 100 units/mL injectable solution: Inject subcutaneously 3 times daily per sliding scale. (13 Feb 2020 18:42)  Jardiance 25 mg oral tablet: 1 tab(s) orally once a day (in the morning) (13 Feb 2020 18:42)  metFORMIN 500 mg oral tablet, extended release: 2 tab(s) orally 2 times a day (13 Feb 2020 18:42)  Starlix 120 mg oral tablet: 1 tab(s) orally once a day (13 Feb 2020 18:42)  Toujeo SoloStar 300 units/mL subcutaneous solution: 75 unit(s) subcutaneous once a day (at bedtime) (13 Feb 2020 18:42)  Vitamin D3 50,000 intl units (1250 mcg) oral capsule: 1 cap(s) orally once a week on Mon (13 Feb 2020 18:42)      MEDICATIONS  (STANDING):  aspirin  chewable 81 milliGRAM(s) Oral daily  clopidogrel Tablet 75 milliGRAM(s) Oral daily  dextrose 5%. 1000 milliLiter(s) (50 mL/Hr) IV Continuous <Continuous>  dextrose 50% Injectable 12.5 Gram(s) IV Push once  fenofibrate Tablet 145 milliGRAM(s) Oral at bedtime  insulin glargine Injectable (LANTUS) 75 Unit(s) SubCutaneous at bedtime  insulin lispro (HumaLOG) corrective regimen sliding scale   SubCutaneous three times a day before meals  lisinopril 20 milliGRAM(s) Oral daily  metoprolol succinate ER 50 milliGRAM(s) Oral daily  pantoprazole  Injectable 40 milliGRAM(s) IV Push two times a day  sodium chloride 0.9%. 1000 milliLiter(s) (125 mL/Hr) IV Continuous <Continuous>    MEDICATIONS  (PRN):  aluminum hydroxide/magnesium hydroxide/simethicone Suspension 30 milliLiter(s) Oral every 4 hours PRN Dyspepsia  dextrose 40% Gel 15 Gram(s) Oral once PRN Blood Glucose LESS THAN 70 milliGRAM(s)/deciliter  glucagon  Injectable 1 milliGRAM(s) IntraMuscular once PRN Glucose LESS THAN 70 milligrams/deciliter  ondansetron Injectable 4 milliGRAM(s) IV Push every 6 hours PRN Nausea      Allergies    penicillin (Hives)    Intolerances        SOCIAL HISTORY: no EtOH or tob    FAMILY HISTORY:  mother had DM  daughter had perforated appy  father had PVD and Ckd      ROS  As above  Otherwise unremarkable, all systems reviewed    PE:  Vital Signs Last 24 Hrs  T(C): 36.7 (14 Feb 2020 11:31), Max: 36.7 (14 Feb 2020 11:31)  T(F): 98.1 (14 Feb 2020 11:31), Max: 98.1 (14 Feb 2020 11:31)  HR: 77 (14 Feb 2020 11:31) (73 - 89)  BP: 117/73 (14 Feb 2020 11:31) (117/73 - 138/68)  BP(mean): --  RR: 16 (14 Feb 2020 11:31) (16 - 18)  SpO2: 99% (14 Feb 2020 11:31) (97% - 100%)    Constitutional: NAD, well-developed, A+Ox3  Anicteric   Respiratory: CTABL, breathing comfortably  Cardiovascular: S1 and S2, RRR  Gastrointestinal: +BS, soft, +mild epigastric and moderate RLQ tenderness, non distended, no mass  Extremities: warm, well perfused, no edema  Psychiatric: Normal mood, normal affect  Neuro: moves all extremities, grossly intact  Skin: No rashes or lesions    LABS:                        13.3   8.11  )-----------( 293      ( 14 Feb 2020 08:15 )             40.2     02-14    138  |  108  |  20  ----------------------------<  130<H>  4.4   |  25  |  1.35<H>    Ca    8.8      14 Feb 2020 08:15    TPro  7.6  /  Alb  4.0  /  TBili  0.6  /  DBili  x   /  AST  28  /  ALT  42  /  AlkPhos  41  02-13    PT/INR - ( 13 Feb 2020 11:11 )   PT: 11.5 sec;   INR: 1.03 ratio         PTT - ( 13 Feb 2020 11:11 )  PTT:35.9 sec  LIVER FUNCTIONS - ( 13 Feb 2020 11:11 )  Alb: 4.0 g/dL / Pro: 7.6 gm/dL / ALK PHOS: 41 U/L / ALT: 42 U/L / AST: 28 U/L / GGT: x           admit lipase 900, normal today    RADIOLOGY & ADDITIONAL STUDIES:    CT a/p negative except diverticulosis. Images reviewed in PACS

## 2020-02-14 NOTE — CONSULT NOTE ADULT - ASSESSMENT
RLQ abdominal pain, melena, normal CT and EGD. Pain pretty well localized to RLQ. Appendix normal on CT. Consider focal diverticulitis, colitis. GI input appreciated. Will follow with repeat abdominal exams and labs. Follow up imaging studies if no improvement.

## 2020-02-15 ENCOUNTER — TRANSCRIPTION ENCOUNTER (OUTPATIENT)
Age: 55
End: 2020-02-15

## 2020-02-15 VITALS
OXYGEN SATURATION: 100 % | HEART RATE: 71 BPM | DIASTOLIC BLOOD PRESSURE: 71 MMHG | TEMPERATURE: 98 F | RESPIRATION RATE: 16 BRPM | SYSTOLIC BLOOD PRESSURE: 113 MMHG

## 2020-02-15 LAB
ANION GAP SERPL CALC-SCNC: 4 MMOL/L — LOW (ref 5–17)
BASOPHILS # BLD AUTO: 0.06 K/UL — SIGNIFICANT CHANGE UP (ref 0–0.2)
BASOPHILS NFR BLD AUTO: 1 % — SIGNIFICANT CHANGE UP (ref 0–2)
BUN SERPL-MCNC: 22 MG/DL — SIGNIFICANT CHANGE UP (ref 7–23)
CALCIUM SERPL-MCNC: 9 MG/DL — SIGNIFICANT CHANGE UP (ref 8.5–10.1)
CHLORIDE SERPL-SCNC: 108 MMOL/L — SIGNIFICANT CHANGE UP (ref 96–108)
CO2 SERPL-SCNC: 25 MMOL/L — SIGNIFICANT CHANGE UP (ref 22–31)
CREAT SERPL-MCNC: 1.65 MG/DL — HIGH (ref 0.5–1.3)
EOSINOPHIL # BLD AUTO: 0.17 K/UL — SIGNIFICANT CHANGE UP (ref 0–0.5)
EOSINOPHIL NFR BLD AUTO: 2.7 % — SIGNIFICANT CHANGE UP (ref 0–6)
GLUCOSE SERPL-MCNC: 217 MG/DL — HIGH (ref 70–99)
HCT VFR BLD CALC: 37.8 % — LOW (ref 39–50)
HGB BLD-MCNC: 12.7 G/DL — LOW (ref 13–17)
IMM GRANULOCYTES NFR BLD AUTO: 1.4 % — SIGNIFICANT CHANGE UP (ref 0–1.5)
LIDOCAIN IGE QN: 1183 U/L — HIGH (ref 73–393)
LYMPHOCYTES # BLD AUTO: 1.51 K/UL — SIGNIFICANT CHANGE UP (ref 1–3.3)
LYMPHOCYTES # BLD AUTO: 24 % — SIGNIFICANT CHANGE UP (ref 13–44)
MCHC RBC-ENTMCNC: 29.6 PG — SIGNIFICANT CHANGE UP (ref 27–34)
MCHC RBC-ENTMCNC: 33.6 GM/DL — SIGNIFICANT CHANGE UP (ref 32–36)
MCV RBC AUTO: 88.1 FL — SIGNIFICANT CHANGE UP (ref 80–100)
MONOCYTES # BLD AUTO: 0.53 K/UL — SIGNIFICANT CHANGE UP (ref 0–0.9)
MONOCYTES NFR BLD AUTO: 8.4 % — SIGNIFICANT CHANGE UP (ref 2–14)
NEUTROPHILS # BLD AUTO: 3.94 K/UL — SIGNIFICANT CHANGE UP (ref 1.8–7.4)
NEUTROPHILS NFR BLD AUTO: 62.5 % — SIGNIFICANT CHANGE UP (ref 43–77)
OB PNL STL: NEGATIVE — SIGNIFICANT CHANGE UP
PLATELET # BLD AUTO: 246 K/UL — SIGNIFICANT CHANGE UP (ref 150–400)
POTASSIUM SERPL-MCNC: 4.7 MMOL/L — SIGNIFICANT CHANGE UP (ref 3.5–5.3)
POTASSIUM SERPL-SCNC: 4.7 MMOL/L — SIGNIFICANT CHANGE UP (ref 3.5–5.3)
RBC # BLD: 4.29 M/UL — SIGNIFICANT CHANGE UP (ref 4.2–5.8)
RBC # FLD: 13.9 % — SIGNIFICANT CHANGE UP (ref 10.3–14.5)
SODIUM SERPL-SCNC: 137 MMOL/L — SIGNIFICANT CHANGE UP (ref 135–145)
WBC # BLD: 6.3 K/UL — SIGNIFICANT CHANGE UP (ref 3.8–10.5)
WBC # FLD AUTO: 6.3 K/UL — SIGNIFICANT CHANGE UP (ref 3.8–10.5)

## 2020-02-15 PROCEDURE — 99239 HOSP IP/OBS DSCHRG MGMT >30: CPT

## 2020-02-15 RX ORDER — METFORMIN HYDROCHLORIDE 850 MG/1
2 TABLET ORAL
Qty: 0 | Refills: 0 | DISCHARGE

## 2020-02-15 RX ORDER — ACETAMINOPHEN 500 MG
2 TABLET ORAL
Qty: 0 | Refills: 0 | DISCHARGE
Start: 2020-02-15

## 2020-02-15 RX ORDER — KETOROLAC TROMETHAMINE 30 MG/ML
15 SYRINGE (ML) INJECTION ONCE
Refills: 0 | Status: DISCONTINUED | OUTPATIENT
Start: 2020-02-15 | End: 2020-02-15

## 2020-02-15 RX ADMIN — Medication 50 MILLIGRAM(S): at 05:14

## 2020-02-15 RX ADMIN — Medication 15 MILLIGRAM(S): at 01:15

## 2020-02-15 RX ADMIN — Medication 81 MILLIGRAM(S): at 11:58

## 2020-02-15 RX ADMIN — Medication 2: at 11:59

## 2020-02-15 RX ADMIN — Medication 15 MILLIGRAM(S): at 02:04

## 2020-02-15 RX ADMIN — CLOPIDOGREL BISULFATE 75 MILLIGRAM(S): 75 TABLET, FILM COATED ORAL at 11:58

## 2020-02-15 RX ADMIN — LISINOPRIL 20 MILLIGRAM(S): 2.5 TABLET ORAL at 05:14

## 2020-02-15 RX ADMIN — Medication 2: at 08:20

## 2020-02-15 NOTE — DISCHARGE NOTE PROVIDER - NSDCCPCAREPLAN_GEN_ALL_CORE_FT
PRINCIPAL DISCHARGE DIAGNOSIS  Diagnosis: Abdominal pain  Assessment and Plan of Treatment: Endoscopy shows hiatal hernia but no acute findings.  Your stool was negative for occult blood.  Follow up with GI Dr. Lange, his office will call you Tuesday to arrange for colonoscopy.  Follow up with your surgeon after colonoscopy Dr Sarah.      SECONDARY DISCHARGE DIAGNOSES  Diagnosis: Chronic kidney disease (CKD)  Assessment and Plan of Treatment: You may have some mild chronic kidney disease due to diabetes.  Please make sure you follow your kidney function closely with your pcp.    Diagnosis: DM2 (diabetes mellitus, type 2)  Assessment and Plan of Treatment: Uncontrolled, A1c 9.5  Would hold metformin for now due to urine creatinine cut off.  Would stop until re-evaluated by your PCP and or endocrinologist.

## 2020-02-15 NOTE — PROGRESS NOTE ADULT - SUBJECTIVE AND OBJECTIVE BOX
Patient is a 54y old  Male who presents with a chief complaint of abd pain (14 Feb 2020 14:47)      Subective:  Feels good, but still has the RLQ pain. Stool is lightening up.    PAST MEDICAL & SURGICAL HISTORY:  Pancreatitis  CAD (coronary artery disease)  HLD (hyperlipidemia)  Diabetes mellitus  Obstructive sleep apnea  Essential hypertension  S/P cholecystectomy  History of coronary artery stent placement: Placed 9/12/18 by Dr. Kaye      MEDICATIONS  (STANDING):  aspirin  chewable 81 milliGRAM(s) Oral daily  clopidogrel Tablet 75 milliGRAM(s) Oral daily  dextrose 5%. 1000 milliLiter(s) (50 mL/Hr) IV Continuous <Continuous>  dextrose 50% Injectable 12.5 Gram(s) IV Push once  fenofibrate Tablet 145 milliGRAM(s) Oral at bedtime  insulin glargine Injectable (LANTUS) 75 Unit(s) SubCutaneous at bedtime  insulin lispro (HumaLOG) corrective regimen sliding scale   SubCutaneous three times a day before meals  lisinopril 20 milliGRAM(s) Oral daily  metoprolol succinate ER 50 milliGRAM(s) Oral daily    MEDICATIONS  (PRN):  acetaminophen   Tablet .. 650 milliGRAM(s) Oral every 6 hours PRN Temp greater or equal to 38.5C (101.3F), Mild Pain (1 - 3), Moderate Pain (4 - 6)  aluminum hydroxide/magnesium hydroxide/simethicone Suspension 30 milliLiter(s) Oral every 4 hours PRN Dyspepsia  dextrose 40% Gel 15 Gram(s) Oral once PRN Blood Glucose LESS THAN 70 milliGRAM(s)/deciliter  glucagon  Injectable 1 milliGRAM(s) IntraMuscular once PRN Glucose LESS THAN 70 milligrams/deciliter  morphine  - Injectable 2 milliGRAM(s) IV Push every 8 hours PRN Severe Pain (7 - 10)  ondansetron Injectable 4 milliGRAM(s) IV Push every 6 hours PRN Nausea      REVIEW OF SYSTEMS:    RESPIRATORY: No shortness of breath  CARDIOVASCULAR: No chest pain  All other review of systems is negative unless indicated above.    Vital Signs Last 24 Hrs  T(C): 36.2 (15 Feb 2020 04:57), Max: 36.7 (14 Feb 2020 11:31)  T(F): 97.2 (15 Feb 2020 04:57), Max: 98.1 (14 Feb 2020 11:31)  HR: 71 (15 Feb 2020 04:57) (71 - 81)  BP: 120/62 (15 Feb 2020 04:57) (114/68 - 120/62)  BP(mean): --  RR: 18 (15 Feb 2020 04:57) (16 - 18)  SpO2: 99% (15 Feb 2020 04:57) (99% - 100%)    PHYSICAL EXAM:    Constitutional: NAD, well-developed  Respiratory: CTAB  Cardiovascular: S1 and S2, RRR  Gastrointestinal: BS+, soft, mild RLQ (inguinal) tenderness  Extremities: No peripheral edema  Psychiatric: Normal mood, normal affect    LABS:                        13.3   8.11  )-----------( 293      ( 14 Feb 2020 08:15 )             40.2     02-14    138  |  108  |  20  ----------------------------<  130<H>  4.4   |  25  |  1.35<H>    Ca    8.8      14 Feb 2020 08:15    TPro  7.6  /  Alb  4.0  /  TBili  0.6  /  DBili  x   /  AST  28  /  ALT  42  /  AlkPhos  41  02-13    PT/INR - ( 13 Feb 2020 11:11 )   PT: 11.5 sec;   INR: 1.03 ratio         PTT - ( 13 Feb 2020 11:11 )  PTT:35.9 sec  LIVER FUNCTIONS - ( 13 Feb 2020 11:11 )  Alb: 4.0 g/dL / Pro: 7.6 gm/dL / ALK PHOS: 41 U/L / ALT: 42 U/L / AST: 28 U/L / GGT: x             RADIOLOGY & ADDITIONAL STUDIES:

## 2020-02-15 NOTE — DISCHARGE NOTE PROVIDER - CARE PROVIDER_API CALL
Lefty Cooper)  Gastroenterology; Internal Medicine  [address], [address]  [place] [address]  Phone: [phone]  Fax: [phone]  Follow Up Time: 1-3 days    Earle Sarah)  Surgery  [address] [place] [address]  Phone: [phone]  Fax: [phone]  Follow Up Time: 1 week    follow up pcp 1 week for monitoring of diabetes, kidney function, meds.,   Phone: (   )    -  Fax: (   )    -  Follow Up Time:

## 2020-02-15 NOTE — DISCHARGE NOTE PROVIDER - NSDCMRMEDTOKEN_GEN_ALL_CORE_FT
acetaminophen 325 mg oral tablet: 2 tab(s) orally every 6 hours, As needed, Temp greater or equal to 38.5C (101.3F), Mild Pain (1 - 3), Moderate Pain (4 - 6)  aspirin 81 mg oral tablet, chewable: 1 tab(s) orally once a day  B Complex 50 oral tablet, extended release: 1 tab(s) orally once a day  clopidogrel 75 mg oral tablet: 1 tab(s) orally once a day  Co Q-10 100 mg oral capsule: 2 cap(s) orally once a day  fenofibrate 160 mg oral tablet: 1 tab(s) orally once a day (at bedtime)  fosinopril 20 mg oral tablet: 1 tab(s) orally once a day  HumaLOG KwikPen 100 units/mL injectable solution: Inject subcutaneously 3 times daily per sliding scale.  Jardiance 25 mg oral tablet: 1 tab(s) orally once a day (in the morning)  metoprolol succinate 50 mg oral tablet, extended release: 1 tab(s) orally once a day  Starlix 120 mg oral tablet: 1 tab(s) orally once a day  Toujeo SoloStar 300 units/mL subcutaneous solution: 75 unit(s) subcutaneous once a day (at bedtime)  Vitamin D3 50,000 intl units (1250 mcg) oral capsule: 1 cap(s) orally once a week on Mon

## 2020-02-15 NOTE — DISCHARGE NOTE PROVIDER - HOSPITAL COURSE
CC: Melena, abd pain    History of Present Illness:     History of Present Illness:     53 yo WM with PMH CAD s/p BEATRICE LAD in 09/2018, HTN, hyperlipidemia, diabetes, RENETTA, Pancreatitis in Dec 2019 presented for evaluation of RLQ pain for the past few weeks, epigastric pain and melena for the past week. Patient has had decreased oral intake for the past few days due to pain. Pain has increased in intensity over the past 24 hrs and today he went to his PMD who sent him to the ED for evaluation.        2/14: Pt with RLQ pain, admits to black stool.  No fever, chills, n, v, urinary complaints.    2/15: RLQ pain, dull, worse with certain positioning.  No fever, chills, n, v.  EGD with no acute pathology, hiatal hernia.   Pt cleared by surgery but will need follow up as outpatient.        REVIEW OF SYSTEMS: All other review of systems is negative unless indicated above.        Vital Signs Last 24 Hrs    T(C): 36.7 (15 Feb 2020 11:52), Max: 36.7 (14 Feb 2020 20:45)    T(F): 98.1 (15 Feb 2020 11:52), Max: 98.1 (15 Feb 2020 11:52)    HR: 71 (15 Feb 2020 11:52) (71 - 81)    BP: 113/71 (15 Feb 2020 11:52) (113/71 - 120/62)    BP(mean): --    RR: 16 (15 Feb 2020 11:52) (16 - 18)    SpO2: 100% (15 Feb 2020 11:52) (99% - 100%)        PHYSICAL EXAM:        Constitutional: NAD, awake and alert, well-developed    HEENT: PERR, EOMI, Normal Hearing, MMM    Neck: Soft and supple    Respiratory: Breath sounds are clear bilaterally, No wheezing, rales or rhonchi    Cardiovascular: S1 and S2, regular rate and rhythm, no Murmurs, gallops or rubs    Gastrointestinal: Bowel Sounds present, soft, RLQ pain to deep palpation    Extremities: No peripheral edema    Neurological: A/O x 3, no focal deficits in my limited exam        med/labs: Reviewed and interpreted         a/p: 54 year old man with abd pain and dark stools.        RLL abd pain, questionable melena / pancreatitis:    -EGD --> hiatal hernia    -stop PPI    -resume diet    -GI eval for colonoscopy as outpatient    -IVFs    -stool guaiac negative    -trend lipase fluctuates, no classic pancreatitis symptoms or findings on CT.    -surgical eval appreciated, no appendicitis.  Follow up outpatient Dr. Sarah.         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

## 2020-02-15 NOTE — PROGRESS NOTE ADULT - ASSESSMENT
Imp:  RLQ pain and melena, but evaluation so far neg  Also, tenderness seems more inguinal, perhaps muscular    Rec:  OK by for d/c or if team and patient prefer, can stay for colon tuesday

## 2020-02-15 NOTE — DISCHARGE NOTE PROVIDER - PROVIDER TOKENS
PROVIDER:[TOKEN:[[id number]],FOLLOWUP:[1-3 days]],PROVIDER:[TOKEN:[[id number]],FOLLOWUP:[1 week]],FREE:[LAST:[follow up pcp 1 week for monitoring of diabetes, kidney function, meds.],PHONE:[(   )    -],FAX:[(   )    -]]

## 2020-02-15 NOTE — DISCHARGE NOTE NURSING/CASE MANAGEMENT/SOCIAL WORK - PATIENT PORTAL LINK FT
You can access the FollowMyHealth Patient Portal offered by [facility] by registering at the following website: [email or web address]. By joining [email or web address]’s FollowMyHealth portal, you will also be able to view your health information using other applications (apps) compatible with our system.

## 2020-02-20 DIAGNOSIS — Z95.5 PRESENCE OF CORONARY ANGIOPLASTY IMPLANT AND GRAFT: ICD-10-CM

## 2020-02-20 DIAGNOSIS — Z79.02 LONG TERM (CURRENT) USE OF ANTITHROMBOTICS/ANTIPLATELETS: ICD-10-CM

## 2020-02-20 DIAGNOSIS — Z79.84 LONG TERM (CURRENT) USE OF ORAL HYPOGLYCEMIC DRUGS: ICD-10-CM

## 2020-02-20 DIAGNOSIS — K92.1 MELENA: ICD-10-CM

## 2020-02-20 DIAGNOSIS — N18.3 CHRONIC KIDNEY DISEASE, STAGE 3 (MODERATE): ICD-10-CM

## 2020-02-20 DIAGNOSIS — I12.9 HYPERTENSIVE CHRONIC KIDNEY DISEASE WITH STAGE 1 THROUGH STAGE 4 CHRONIC KIDNEY DISEASE, OR UNSPECIFIED CHRONIC KIDNEY DISEASE: ICD-10-CM

## 2020-02-20 DIAGNOSIS — E78.5 HYPERLIPIDEMIA, UNSPECIFIED: ICD-10-CM

## 2020-02-20 DIAGNOSIS — Z88.0 ALLERGY STATUS TO PENICILLIN: ICD-10-CM

## 2020-02-20 DIAGNOSIS — I25.10 ATHEROSCLEROTIC HEART DISEASE OF NATIVE CORONARY ARTERY WITHOUT ANGINA PECTORIS: ICD-10-CM

## 2020-02-20 DIAGNOSIS — K44.9 DIAPHRAGMATIC HERNIA WITHOUT OBSTRUCTION OR GANGRENE: ICD-10-CM

## 2020-02-20 DIAGNOSIS — R74.8 ABNORMAL LEVELS OF OTHER SERUM ENZYMES: ICD-10-CM

## 2020-02-20 DIAGNOSIS — E86.0 DEHYDRATION: ICD-10-CM

## 2020-02-20 DIAGNOSIS — G47.33 OBSTRUCTIVE SLEEP APNEA (ADULT) (PEDIATRIC): ICD-10-CM

## 2020-02-20 DIAGNOSIS — E11.22 TYPE 2 DIABETES MELLITUS WITH DIABETIC CHRONIC KIDNEY DISEASE: ICD-10-CM

## 2020-02-24 ENCOUNTER — NON-APPOINTMENT (OUTPATIENT)
Age: 55
End: 2020-02-24

## 2020-02-24 NOTE — DISCHARGE NOTE ADULT - BECAUSE OF A PHYSICAL, MENTAL OR EMOTIONAL CONDITION, DO YOU HAVE DIFFICULTY DOING  ERRANDS ALONE LIKE VISITING A DOCTOR'S OFFICE OR SHOPPING (15 YEARS AND OLDER)
[address] [facility] time____0630________        Surgery time___0730_________    Take the following medications with a sip of water:    Do not eat or drink anything after 12:00 midnight prior to your surgery. This includes water chewing gum, mints and ice chips. You may brush your teeth and gargle the morning of your surgery, but do not swallow the water         You may be asked to stop blood thinners such as Coumadin, Plavix, Fragmin, Lovenox, etc., or any anti-inflammatories such as:  Aspirin, Ibuprofen, Advil, Naproxen prior to your surgery. We also ask that you stop any OTC medications such as fish oil, vitamin E, glucosamine, garlic, Multivitamins, COQ 10, etc.    We ask that you do not smoke 24 hours prior to surgery  We ask that you do not  drink any alcoholic beverages 24 hours prior to surgery     You must make arrangements for a responsible adult to take you home after your surgery. For your safety you will not be allowed to leave alone or drive yourself home. Your surgery will be cancelled if you do not have a ride home. Also for your safety, it is strongly suggested that someone stay with you the first 24 hours after your surgery. A parent or legal guardian must accompany a child scheduled for surgery and plan to stay at the hospital until the child is discharged. Please do not bring other children with you. For your comfort, please wear simple loose fitting clothing to the hospital.  Please do not bring valuables. Do not wear any make-up or nail polish on your fingers or toes      For your safety, please do not wear any jewelry or body piercing's on the day of surgery. All jewelry must be removed. If you have dentures, they will be removed before going to operating room. For your convenience, we will provide you with a container. If you wear contact lenses or glasses, they will be removed, please bring a case for them.      If No

## 2020-09-16 ENCOUNTER — INPATIENT (INPATIENT)
Facility: HOSPITAL | Age: 55
LOS: 2 days | Discharge: HOME | DRG: 247 | End: 2020-09-19
Attending: STUDENT IN AN ORGANIZED HEALTH CARE EDUCATION/TRAINING PROGRAM | Admitting: INTERNAL MEDICINE
Payer: COMMERCIAL

## 2020-09-16 VITALS
OXYGEN SATURATION: 98 % | RESPIRATION RATE: 17 BRPM | DIASTOLIC BLOOD PRESSURE: 69 MMHG | TEMPERATURE: 98 F | HEART RATE: 72 BPM | SYSTOLIC BLOOD PRESSURE: 142 MMHG

## 2020-09-16 DIAGNOSIS — I24.9 ACUTE ISCHEMIC HEART DISEASE, UNSPECIFIED: ICD-10-CM

## 2020-09-16 DIAGNOSIS — Z90.49 ACQUIRED ABSENCE OF OTHER SPECIFIED PARTS OF DIGESTIVE TRACT: Chronic | ICD-10-CM

## 2020-09-16 DIAGNOSIS — Z95.5 PRESENCE OF CORONARY ANGIOPLASTY IMPLANT AND GRAFT: Chronic | ICD-10-CM

## 2020-09-16 PROBLEM — K85.90 ACUTE PANCREATITIS WITHOUT NECROSIS OR INFECTION, UNSPECIFIED: Chronic | Status: ACTIVE | Noted: 2020-02-13

## 2020-09-16 LAB
ADD ON TEST-SPECIMEN IN LAB: SIGNIFICANT CHANGE UP
ALBUMIN SERPL ELPH-MCNC: 3.5 G/DL — SIGNIFICANT CHANGE UP (ref 3.3–5)
ALP SERPL-CCNC: 59 U/L — SIGNIFICANT CHANGE UP (ref 40–120)
ALT FLD-CCNC: 52 U/L — SIGNIFICANT CHANGE UP (ref 12–78)
ANION GAP SERPL CALC-SCNC: 7 MMOL/L — SIGNIFICANT CHANGE UP (ref 5–17)
AST SERPL-CCNC: 33 U/L — SIGNIFICANT CHANGE UP (ref 15–37)
BASOPHILS # BLD AUTO: 0.08 K/UL — SIGNIFICANT CHANGE UP (ref 0–0.2)
BASOPHILS NFR BLD AUTO: 0.9 % — SIGNIFICANT CHANGE UP (ref 0–2)
BILIRUB SERPL-MCNC: 0.4 MG/DL — SIGNIFICANT CHANGE UP (ref 0.2–1.2)
BUN SERPL-MCNC: 31 MG/DL — HIGH (ref 7–23)
CALCIUM SERPL-MCNC: 8.4 MG/DL — LOW (ref 8.5–10.1)
CHLORIDE SERPL-SCNC: 107 MMOL/L — SIGNIFICANT CHANGE UP (ref 96–108)
CO2 SERPL-SCNC: 23 MMOL/L — SIGNIFICANT CHANGE UP (ref 22–31)
CREAT SERPL-MCNC: 1.58 MG/DL — HIGH (ref 0.5–1.3)
EOSINOPHIL # BLD AUTO: 0.13 K/UL — SIGNIFICANT CHANGE UP (ref 0–0.5)
EOSINOPHIL NFR BLD AUTO: 1.5 % — SIGNIFICANT CHANGE UP (ref 0–6)
GLUCOSE SERPL-MCNC: 191 MG/DL — HIGH (ref 70–99)
HCT VFR BLD CALC: 46.9 % — SIGNIFICANT CHANGE UP (ref 39–50)
HGB BLD-MCNC: 15.3 G/DL — SIGNIFICANT CHANGE UP (ref 13–17)
IMM GRANULOCYTES NFR BLD AUTO: 0.9 % — SIGNIFICANT CHANGE UP (ref 0–1.5)
LYMPHOCYTES # BLD AUTO: 1.75 K/UL — SIGNIFICANT CHANGE UP (ref 1–3.3)
LYMPHOCYTES # BLD AUTO: 19.6 % — SIGNIFICANT CHANGE UP (ref 13–44)
MAGNESIUM SERPL-MCNC: 2.2 MG/DL — SIGNIFICANT CHANGE UP (ref 1.6–2.6)
MCHC RBC-ENTMCNC: 27.1 PG — SIGNIFICANT CHANGE UP (ref 27–34)
MCHC RBC-ENTMCNC: 32.6 GM/DL — SIGNIFICANT CHANGE UP (ref 32–36)
MCV RBC AUTO: 83 FL — SIGNIFICANT CHANGE UP (ref 80–100)
MONOCYTES # BLD AUTO: 0.68 K/UL — SIGNIFICANT CHANGE UP (ref 0–0.9)
MONOCYTES NFR BLD AUTO: 7.6 % — SIGNIFICANT CHANGE UP (ref 2–14)
NEUTROPHILS # BLD AUTO: 6.21 K/UL — SIGNIFICANT CHANGE UP (ref 1.8–7.4)
NEUTROPHILS NFR BLD AUTO: 69.5 % — SIGNIFICANT CHANGE UP (ref 43–77)
NT-PROBNP SERPL-SCNC: 20 PG/ML — SIGNIFICANT CHANGE UP (ref 0–125)
PLATELET # BLD AUTO: 252 K/UL — SIGNIFICANT CHANGE UP (ref 150–400)
POTASSIUM SERPL-MCNC: 4.1 MMOL/L — SIGNIFICANT CHANGE UP (ref 3.5–5.3)
POTASSIUM SERPL-SCNC: 4.1 MMOL/L — SIGNIFICANT CHANGE UP (ref 3.5–5.3)
PROT SERPL-MCNC: 7.2 GM/DL — SIGNIFICANT CHANGE UP (ref 6–8.3)
RBC # BLD: 5.65 M/UL — SIGNIFICANT CHANGE UP (ref 4.2–5.8)
RBC # FLD: 14.7 % — HIGH (ref 10.3–14.5)
SARS-COV-2 RNA SPEC QL NAA+PROBE: SIGNIFICANT CHANGE UP
SODIUM SERPL-SCNC: 137 MMOL/L — SIGNIFICANT CHANGE UP (ref 135–145)
TROPONIN I SERPL-MCNC: 0.02 NG/ML — SIGNIFICANT CHANGE UP (ref 0.01–0.04)
TROPONIN I SERPL-MCNC: <0.015 NG/ML — SIGNIFICANT CHANGE UP (ref 0.01–0.04)
TROPONIN I SERPL-MCNC: <0.015 NG/ML — SIGNIFICANT CHANGE UP (ref 0.01–0.04)
WBC # BLD: 8.93 K/UL — SIGNIFICANT CHANGE UP (ref 3.8–10.5)
WBC # FLD AUTO: 8.93 K/UL — SIGNIFICANT CHANGE UP (ref 3.8–10.5)

## 2020-09-16 PROCEDURE — 99223 1ST HOSP IP/OBS HIGH 75: CPT

## 2020-09-16 PROCEDURE — C1894: CPT

## 2020-09-16 PROCEDURE — G0269: CPT

## 2020-09-16 PROCEDURE — 93005 ELECTROCARDIOGRAM TRACING: CPT

## 2020-09-16 PROCEDURE — 85025 COMPLETE CBC W/AUTO DIFF WBC: CPT

## 2020-09-16 PROCEDURE — 93306 TTE W/DOPPLER COMPLETE: CPT

## 2020-09-16 PROCEDURE — C1725: CPT

## 2020-09-16 PROCEDURE — 82962 GLUCOSE BLOOD TEST: CPT

## 2020-09-16 PROCEDURE — 84484 ASSAY OF TROPONIN QUANT: CPT

## 2020-09-16 PROCEDURE — 93454 CORONARY ARTERY ANGIO S&I: CPT | Mod: 59

## 2020-09-16 PROCEDURE — 71045 X-RAY EXAM CHEST 1 VIEW: CPT | Mod: 26

## 2020-09-16 PROCEDURE — 86850 RBC ANTIBODY SCREEN: CPT

## 2020-09-16 PROCEDURE — 86901 BLOOD TYPING SEROLOGIC RH(D): CPT

## 2020-09-16 PROCEDURE — 80061 LIPID PANEL: CPT

## 2020-09-16 PROCEDURE — C1769: CPT

## 2020-09-16 PROCEDURE — 80048 BASIC METABOLIC PNL TOTAL CA: CPT

## 2020-09-16 PROCEDURE — 36415 COLL VENOUS BLD VENIPUNCTURE: CPT

## 2020-09-16 PROCEDURE — C1874: CPT

## 2020-09-16 PROCEDURE — 93010 ELECTROCARDIOGRAM REPORT: CPT

## 2020-09-16 PROCEDURE — C9600: CPT | Mod: LC

## 2020-09-16 PROCEDURE — 86900 BLOOD TYPING SEROLOGIC ABO: CPT

## 2020-09-16 PROCEDURE — 86769 SARS-COV-2 COVID-19 ANTIBODY: CPT

## 2020-09-16 PROCEDURE — C1760: CPT

## 2020-09-16 PROCEDURE — 83036 HEMOGLOBIN GLYCOSYLATED A1C: CPT

## 2020-09-16 PROCEDURE — C1887: CPT

## 2020-09-16 RX ORDER — INSULIN LISPRO 100/ML
VIAL (ML) SUBCUTANEOUS
Refills: 0 | Status: DISCONTINUED | OUTPATIENT
Start: 2020-09-16 | End: 2020-09-19

## 2020-09-16 RX ORDER — INSULIN GLARGINE 100 [IU]/ML
50 INJECTION, SOLUTION SUBCUTANEOUS AT BEDTIME
Refills: 0 | Status: DISCONTINUED | OUTPATIENT
Start: 2020-09-16 | End: 2020-09-19

## 2020-09-16 RX ORDER — UBIDECARENONE 100 MG
2 CAPSULE ORAL
Qty: 0 | Refills: 0 | DISCHARGE

## 2020-09-16 RX ORDER — ATORVASTATIN CALCIUM 80 MG/1
40 TABLET, FILM COATED ORAL AT BEDTIME
Refills: 0 | Status: DISCONTINUED | OUTPATIENT
Start: 2020-09-16 | End: 2020-09-19

## 2020-09-16 RX ORDER — NATEGLINIDE 60 MG/1
1 TABLET, COATED ORAL
Qty: 0 | Refills: 0 | DISCHARGE

## 2020-09-16 RX ORDER — ACETAMINOPHEN 500 MG
650 TABLET ORAL EVERY 4 HOURS
Refills: 0 | Status: DISCONTINUED | OUTPATIENT
Start: 2020-09-16 | End: 2020-09-19

## 2020-09-16 RX ORDER — ENOXAPARIN SODIUM 100 MG/ML
40 INJECTION SUBCUTANEOUS DAILY
Refills: 0 | Status: DISCONTINUED | OUTPATIENT
Start: 2020-09-16 | End: 2020-09-17

## 2020-09-16 RX ORDER — DEXTROSE 50 % IN WATER 50 %
25 SYRINGE (ML) INTRAVENOUS ONCE
Refills: 0 | Status: DISCONTINUED | OUTPATIENT
Start: 2020-09-16 | End: 2020-09-19

## 2020-09-16 RX ORDER — INFLUENZA VIRUS VACCINE 15; 15; 15; 15 UG/.5ML; UG/.5ML; UG/.5ML; UG/.5ML
0.5 SUSPENSION INTRAMUSCULAR ONCE
Refills: 0 | Status: DISCONTINUED | OUTPATIENT
Start: 2020-09-16 | End: 2020-09-19

## 2020-09-16 RX ORDER — INSULIN LISPRO 100/ML
10 VIAL (ML) SUBCUTANEOUS
Refills: 0 | Status: DISCONTINUED | OUTPATIENT
Start: 2020-09-16 | End: 2020-09-19

## 2020-09-16 RX ORDER — DEXTROSE 50 % IN WATER 50 %
15 SYRINGE (ML) INTRAVENOUS ONCE
Refills: 0 | Status: DISCONTINUED | OUTPATIENT
Start: 2020-09-16 | End: 2020-09-19

## 2020-09-16 RX ORDER — ASPIRIN/CALCIUM CARB/MAGNESIUM 324 MG
325 TABLET ORAL ONCE
Refills: 0 | Status: COMPLETED | OUTPATIENT
Start: 2020-09-16 | End: 2020-09-16

## 2020-09-16 RX ORDER — ONDANSETRON 8 MG/1
4 TABLET, FILM COATED ORAL EVERY 6 HOURS
Refills: 0 | Status: DISCONTINUED | OUTPATIENT
Start: 2020-09-16 | End: 2020-09-19

## 2020-09-16 RX ORDER — SODIUM CHLORIDE 9 MG/ML
1000 INJECTION, SOLUTION INTRAVENOUS
Refills: 0 | Status: DISCONTINUED | OUTPATIENT
Start: 2020-09-16 | End: 2020-09-19

## 2020-09-16 RX ORDER — INSULIN LISPRO 100/ML
VIAL (ML) SUBCUTANEOUS AT BEDTIME
Refills: 0 | Status: DISCONTINUED | OUTPATIENT
Start: 2020-09-16 | End: 2020-09-19

## 2020-09-16 RX ORDER — GLUCAGON INJECTION, SOLUTION 0.5 MG/.1ML
1 INJECTION, SOLUTION SUBCUTANEOUS ONCE
Refills: 0 | Status: DISCONTINUED | OUTPATIENT
Start: 2020-09-16 | End: 2020-09-19

## 2020-09-16 RX ORDER — METOPROLOL TARTRATE 50 MG
50 TABLET ORAL DAILY
Refills: 0 | Status: DISCONTINUED | OUTPATIENT
Start: 2020-09-16 | End: 2020-09-19

## 2020-09-16 RX ORDER — NITROGLYCERIN 6.5 MG
0.4 CAPSULE, EXTENDED RELEASE ORAL
Refills: 0 | Status: DISCONTINUED | OUTPATIENT
Start: 2020-09-16 | End: 2020-09-19

## 2020-09-16 RX ORDER — INSULIN GLARGINE 100 [IU]/ML
50 INJECTION, SOLUTION SUBCUTANEOUS EVERY MORNING
Refills: 0 | Status: DISCONTINUED | OUTPATIENT
Start: 2020-09-17 | End: 2020-09-19

## 2020-09-16 RX ORDER — LISINOPRIL 2.5 MG/1
20 TABLET ORAL DAILY
Refills: 0 | Status: DISCONTINUED | OUTPATIENT
Start: 2020-09-16 | End: 2020-09-19

## 2020-09-16 RX ORDER — ASPIRIN/CALCIUM CARB/MAGNESIUM 324 MG
81 TABLET ORAL DAILY
Refills: 0 | Status: DISCONTINUED | OUTPATIENT
Start: 2020-09-16 | End: 2020-09-19

## 2020-09-16 RX ORDER — FENOFIBRATE,MICRONIZED 130 MG
145 CAPSULE ORAL DAILY
Refills: 0 | Status: DISCONTINUED | OUTPATIENT
Start: 2020-09-16 | End: 2020-09-19

## 2020-09-16 RX ORDER — CLOPIDOGREL BISULFATE 75 MG/1
75 TABLET, FILM COATED ORAL DAILY
Refills: 0 | Status: DISCONTINUED | OUTPATIENT
Start: 2020-09-16 | End: 2020-09-19

## 2020-09-16 RX ORDER — INSULIN GLARGINE 100 [IU]/ML
75 INJECTION, SOLUTION SUBCUTANEOUS
Qty: 0 | Refills: 0 | DISCHARGE

## 2020-09-16 RX ORDER — DEXTROSE 50 % IN WATER 50 %
12.5 SYRINGE (ML) INTRAVENOUS ONCE
Refills: 0 | Status: DISCONTINUED | OUTPATIENT
Start: 2020-09-16 | End: 2020-09-19

## 2020-09-16 RX ADMIN — ATORVASTATIN CALCIUM 40 MILLIGRAM(S): 80 TABLET, FILM COATED ORAL at 21:05

## 2020-09-16 RX ADMIN — INSULIN GLARGINE 50 UNIT(S): 100 INJECTION, SOLUTION SUBCUTANEOUS at 21:23

## 2020-09-16 RX ADMIN — Medication 325 MILLIGRAM(S): at 13:02

## 2020-09-16 RX ADMIN — Medication 50 MILLIGRAM(S): at 21:05

## 2020-09-16 RX ADMIN — Medication 10 UNIT(S): at 18:07

## 2020-09-16 RX ADMIN — Medication 2: at 18:07

## 2020-09-16 RX ADMIN — ENOXAPARIN SODIUM 40 MILLIGRAM(S): 100 INJECTION SUBCUTANEOUS at 18:06

## 2020-09-16 NOTE — ED ADULT TRIAGE NOTE - CHIEF COMPLAINT QUOTE
pt c/o chest tightness and pressure for 4 days with increased desire to take deep breaths when exerting self. pt denies SOb dizizness and weakness. pt has hx of cardiac stent placemtn

## 2020-09-16 NOTE — ED STATDOCS - OBJECTIVE STATEMENT
56 y/o M with PMHx of CAD s/p stent placement, HTN, HLD, DM, RENETTA, pancreatitis, and s/p cholecystectomy presents to the ED c/o worsening +chest pain x4 days radiating up to neck. Aggravated with exertion. Notes associated +CANNON as well. No fever. Non-smoker. No ETOH use. Allergic to penicillins. Cardiologist: Dr. Kaye. PCP: Dr. Ron Rogel.

## 2020-09-16 NOTE — ED STATDOCS - PROGRESS NOTE DETAILS
Pt. is a 55 year old male Hx DM, cardiac stent, HTN, HLD presenting with CP x 4 days associated with intermittent SOB.  Pt. states he feels tightness in chest radiating to throat.  Denies N/V/D.  No calf pain or recent long car / plane rides.  Pt. with heard score 5 to be admitted for acute ACS.  Mona Toussaint PA-C Left message x 2 for admission.  Mona Toussaint PA-C Admitted to medicine.  Paged Dr. Marie.  Mona Toussaint PA-C Dr. Marie aware of admission.  Mona Toussaint PA-C

## 2020-09-16 NOTE — H&P ADULT - HISTORY OF PRESENT ILLNESS
56 y/o male with PMHx of HTN, HLD, RENETTA, IDDM, Obesity, pancreatitis secondary to Trulicity, and CAD s/p PCI to proximal LAD 2018 who presented to  with CC of CP.  Patient notes CP started 2-3 days ago.  Patient is a .  He notes for the last few days, he has noticed increased substernal CP with associated SOB and flushing with exertion.  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  He has had similar sx in the past when he needed PCI in 2018 and when he had pancreatitis in the past as well.  No nausea/ vomiting/ abd pain.  No palpitations.  Even during my interview, patient c/o slight SOB and flushing after 5-10 min conversation.  He states CP/ SOB will get better after rest.  In ER, trop and EKG negative.  Admitted for unstable angina/ r/o ACS.        PAST MEDICAL & SURGICAL HISTORY:  Pancreatitis  CAD (coronary artery disease)  HLD (hyperlipidemia)  Diabetes mellitus  Obstructive sleep apnea  Essential hypertension  S/P cholecystectomy  History of coronary artery stent placement  Placed 18 by Dr. Kaye      FAMILY HISTORY:  Family history of diabetes mellitus (Sibling)  Brother just  of COVID    Social History:    No tob/ etoh/ drug use.      Allergies:  penicillin (Hives)

## 2020-09-16 NOTE — H&P ADULT - NSICDXPASTSURGICALHX_GEN_ALL_CORE_FT
PAST SURGICAL HISTORY:  History of coronary artery stent placement Placed 9/12/18 by Dr. Kaye    S/P cholecystectomy

## 2020-09-16 NOTE — ED STATDOCS - NS_ ATTENDINGSCRIBEDETAILS _ED_A_ED_FT
I, Bear Rader MD,  performed the initial face to face bedside interview with this patient regarding history of present illness, review of symptoms and relevant past medical, social and family history.  I completed an independent physical examination.    The history, relevant review of systems, past medical and surgical history, medical decision making, and physical examination was documented by the scribe in my presence and I attest to the accuracy of the documentation.

## 2020-09-16 NOTE — H&P ADULT - NSHPLABSRESULTS_GEN_ALL_CORE
15.3   8.93  )-----------( 252      ( 16 Sep 2020 11:32 )             46.9     09-16    137  |  107  |  31<H>  ----------------------------<  191<H>  4.1   |  23  |  1.58<H>    Ca    8.4<L>      16 Sep 2020 11:32  Mg     2.2     09-16    TPro  7.2  /  Alb  3.5  /  TBili  0.4  /  DBili  x   /  AST  33  /  ALT  52  /  AlkPhos  59  09-16    CAPILLARY BLOOD GLUCOSE      POCT Blood Glucose.: 187 mg/dL (16 Sep 2020 17:45)

## 2020-09-16 NOTE — ED ADULT NURSE NOTE - NSIMPLEMENTINTERV_GEN_ALL_ED
Implemented All Universal Safety Interventions:  [place] to call system. Call bell, personal items and telephone within reach. Instruct patient to call for assistance. Room bathroom lighting operational. Non-slip footwear when patient is off stretcher. Physically safe environment: no spills, clutter or unnecessary equipment. Stretcher in lowest position, wheels locked, appropriate side rails in place.

## 2020-09-16 NOTE — H&P ADULT - NSHPREVIEWOFSYSTEMS_GEN_ALL_CORE
ROS:  General:  No fevers, chills, or unexplained weight loss  Skin: No rash or bothersome skin lesions  Musculoskeletal: No arthalgias, myalgias or joint swelling  Eyes: No visual changes or eye pain  Ears: No hearing loss , otorrhea or ear pain  Nose, Mouth, Throat: No nasal congestion, rhinorrhea, oral lesions, postnasal drip or sore throat  Cardio: CP as above  Respiratory: CANNON   GI: No diarrhea, constipation, blood in stools, abdominal pain, vomiting or heartburn  : No urinary frequency, hematuria, incontinence, or dysuria  Neurologic: No headaches, parasthesias, confusion, dysarthria or gait instability  Psychiatric:  No anxiety or depression  Lymphatic:  No easy bruising, easy bleeding or swollen glands  Allergic: No itching, sneezing , watery eyes, clear rhinorrhea or recurrent infections

## 2020-09-16 NOTE — ED ADULT NURSE NOTE - OBJECTIVE STATEMENT
Pt states chest pain for 3 days on and off. Pt states he felt the pain after he was walking and helping closing a door. THe pain is mid sternal with radiating up chest yulisa into bilateral neck. Pt states when the pain comes it makes him feel like he needs to take a deep breath. Pt states when he takes deep breaths or drinks water it feels better, the pain is worse with strenuous activity. Pt denies n/v/d/f/c. PT states when the pain comes on he feels the need to cough.

## 2020-09-16 NOTE — H&P ADULT - ASSESSMENT
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      #Unstable angina-- r/o ACS:    Admit to tele.    Follow trop x3.    Check ECHO.    Cardio eval-- Polena.  ? Cath in am.  NPO after midnight.    Pt with hx of PCI 9/2018 to LAD.    Cont asa/ plavix/ ACE/ BB.    Start statin.    Nitro and repeat EKG PRN CP.      #IDDM:    Patient takes 140 units qhs long acting insulin at home.    Cont lantus-- 50 BID.    Pt was having hypoglycemia at home prior to admission.    10 units TID with meals short acting.    Sliding scale.    Diabetic diet.    Check A1C.      #CAD/ PCI:    Cont home meds.    Cont asa/ plavix/ ACE/ BB.      #HTN:    Cont home meds.      #HLD:    Cont fenofibrate.  Unclear why patient not on statin.    Check lipids and start lipitor.    #RENETTA      #Hx of Pancreatitis:    Medication related in the past.  (Trulicity).      #DVT Proph:  Lovenox.

## 2020-09-16 NOTE — H&P ADULT - NSHPPHYSICALEXAM_GEN_ALL_CORE
PEx  T(C): 36.7 (09-16-20 @ 16:19), Max: 36.8 (09-16-20 @ 11:06)  HR: 73 (09-16-20 @ 16:19) (69 - 73)  BP: 129/78 (09-16-20 @ 16:19) (123/76 - 142/69)  RR: 16 (09-16-20 @ 16:19) (16 - 17)  SpO2: 100% (09-16-20 @ 16:19) (98% - 100%)  Wt(kg): --  General:     Well appearing, well nourished in no distress, no identifying marks , scars, or tattoos.  Skin: no rash or prominent lesions  Head: normocephalic, atraumatic     Sinuses: non-tender  Nose: no external lesions, mucosa non-inflamed, septum and turbinates normal  Throat: no erythema, exudates or lesions.  Neck: Supple without lymphadenopathy. Thyroid no thyromegaly, no palpable thyroid nodules, no palpable nodules or masses, carotid arteries no bruits.   Breasts: No palpable masses or lesions.  Heart: RRR, no murmur or gallop.  Normal S1, S2.  No S3, S4.   Lungs: CTA bilaterally, no wheezes, rhonchi, rales.  Breathing unlabored.   Chest wall: Normal insp   Abdomen:  Soft, NT/ND, normal bowel sounds, no HSM, no masses.  No peritoneal signs.   Back: spine normal without deformity or tenderness.  Normal ROM   : Exam normal.  no inguinal hernias.  Extremities: No deformities, clubbing, cyanosis, or edema.  Musculoskeletal: Normal gait and station. No decreased range of motion, instability, atrophy or abnormal strength or tone in the head, neck, spine, ribs, pelvis or extremities.   Neurologic: CN 2-12 normal. Sensation to pain, touch and proprioception normal. DTRs normal in upper and lower extremities. No pathologic reflexes.  Motor normal.  Psychiatric: Oriented X3, intact recent and remote memory, judgement and insight, normal mood and affect.

## 2020-09-16 NOTE — H&P ADULT - NSICDXFAMILYHX_GEN_ALL_CORE_FT
FAMILY HISTORY:  Sibling  Still living? Unknown  Family history of diabetes mellitus, Age at diagnosis: Age Unknown

## 2020-09-16 NOTE — ED ADULT NURSE NOTE - NSFALLRSKASSESSDT_ED_ALL_ED
Bedside and Verbal shift change report given to TORRIE Weir(oncoming nurse) by Comfort Santoyo (offgoing nurse). Report included the following information SBAR and Kardex. 16-Sep-2020 11:27

## 2020-09-16 NOTE — H&P ADULT - NSICDXPASTMEDICALHX_GEN_ALL_CORE_FT
PAST MEDICAL HISTORY:  CAD (coronary artery disease)     Diabetes mellitus     Essential hypertension     HLD (hyperlipidemia)     Obstructive sleep apnea     Pancreatitis

## 2020-09-16 NOTE — ED ADULT NURSE NOTE - CHIEF COMPLAINT QUOTE
pt c/o chest tightness and pressure for 4 days with increased desire to take deep breaths when exerting self. pt denies SOb dizizness and weakness. pt has hx of cardiac stent placemt

## 2020-09-16 NOTE — ED ADULT NURSE NOTE - DOES PATIENT HAVE ADVANCE DIRECTIVE
Patient accompanied by:mother  DIET:      bottle [place] Soothe mother mixes 4 tsp cereal in bottle occasionally      Frequency: 2-4 ounces ,Q2H    APPETITE: none  SLEEPING     length 2 hours per noc  STOOLS: normal  CONCERNS:   Pharmacy verified  School excusen/a  Medications reviewed and updated.  Denies known Latex allergy or symptoms of Latex sensitivity.  Health Maintenance Due   Topic Date Due   • IPV Vaccine (2 of 4 - All-IPV series) 2018   • Pneumococcal Vaccine (2 of 4 - Standard Series) 2018   • HIB Vaccine (2 of 4 - Standard series) 2018   • Rotavirus Vaccine (2 of 3 - 3-dose series) 2018   • DTaP/Tdap/Td Vaccine (2 - DTaP) 2018       Patient is due for the topics as listed above and wishes to proceed with them.                        No

## 2020-09-17 PROCEDURE — 93306 TTE W/DOPPLER COMPLETE: CPT | Mod: 26

## 2020-09-17 PROCEDURE — 99233 SBSQ HOSP IP/OBS HIGH 50: CPT

## 2020-09-17 RX ADMIN — Medication 50 MILLIGRAM(S): at 22:14

## 2020-09-17 RX ADMIN — Medication 4: at 17:40

## 2020-09-17 RX ADMIN — ENOXAPARIN SODIUM 40 MILLIGRAM(S): 100 INJECTION SUBCUTANEOUS at 11:36

## 2020-09-17 RX ADMIN — CLOPIDOGREL BISULFATE 75 MILLIGRAM(S): 75 TABLET, FILM COATED ORAL at 11:36

## 2020-09-17 RX ADMIN — Medication 81 MILLIGRAM(S): at 11:36

## 2020-09-17 RX ADMIN — ATORVASTATIN CALCIUM 40 MILLIGRAM(S): 80 TABLET, FILM COATED ORAL at 22:14

## 2020-09-17 RX ADMIN — LISINOPRIL 20 MILLIGRAM(S): 2.5 TABLET ORAL at 11:36

## 2020-09-17 RX ADMIN — Medication 10 UNIT(S): at 17:40

## 2020-09-17 RX ADMIN — Medication 145 MILLIGRAM(S): at 11:36

## 2020-09-18 PROCEDURE — 93010 ELECTROCARDIOGRAM REPORT: CPT

## 2020-09-18 PROCEDURE — 99233 SBSQ HOSP IP/OBS HIGH 50: CPT

## 2020-09-18 RX ORDER — SODIUM CHLORIDE 9 MG/ML
1000 INJECTION INTRAMUSCULAR; INTRAVENOUS; SUBCUTANEOUS
Refills: 0 | Status: DISCONTINUED | OUTPATIENT
Start: 2020-09-18 | End: 2020-09-19

## 2020-09-18 RX ADMIN — CLOPIDOGREL BISULFATE 75 MILLIGRAM(S): 75 TABLET, FILM COATED ORAL at 08:12

## 2020-09-18 RX ADMIN — SODIUM CHLORIDE 100 MILLILITER(S): 9 INJECTION INTRAMUSCULAR; INTRAVENOUS; SUBCUTANEOUS at 10:48

## 2020-09-18 RX ADMIN — Medication 50 MILLIGRAM(S): at 22:50

## 2020-09-18 RX ADMIN — LISINOPRIL 20 MILLIGRAM(S): 2.5 TABLET ORAL at 08:12

## 2020-09-18 RX ADMIN — ATORVASTATIN CALCIUM 40 MILLIGRAM(S): 80 TABLET, FILM COATED ORAL at 22:50

## 2020-09-18 RX ADMIN — Medication 145 MILLIGRAM(S): at 08:13

## 2020-09-18 RX ADMIN — Medication 2: at 16:52

## 2020-09-18 RX ADMIN — Medication 81 MILLIGRAM(S): at 08:11

## 2020-09-18 RX ADMIN — Medication 10 UNIT(S): at 16:52

## 2020-09-18 NOTE — PACU DISCHARGE NOTE - COMMENTS
REport given to AMANDA Cooper RN in CCU.  Pt. transferred to inpt. room in CCU on cardiac monitor via stretcher accompanied by RN and transport tech.

## 2020-09-18 NOTE — CHART NOTE - NSCHARTNOTEFT_GEN_A_CORE
Patient is a 55y old  Male who presents with a chief complaint of chest pain s/p LHC BEATRICE to LCx    Patient status post cath via RFA. Site clean, dry, intact. Pulses present, capillary refill appropriate.  no signs of hematoma present, surrounding area soft. VSS no c/o pain. Patient resting comfortably in bed

## 2020-09-19 ENCOUNTER — TRANSCRIPTION ENCOUNTER (OUTPATIENT)
Age: 55
End: 2020-09-19

## 2020-09-19 VITALS — HEART RATE: 81 BPM | DIASTOLIC BLOOD PRESSURE: 72 MMHG | SYSTOLIC BLOOD PRESSURE: 122 MMHG | RESPIRATION RATE: 25 BRPM

## 2020-09-19 LAB
ANION GAP SERPL CALC-SCNC: 2 MMOL/L — LOW (ref 5–17)
BASOPHILS # BLD AUTO: 0.09 K/UL — SIGNIFICANT CHANGE UP (ref 0–0.2)
BASOPHILS NFR BLD AUTO: 1.1 % — SIGNIFICANT CHANGE UP (ref 0–2)
BUN SERPL-MCNC: 25 MG/DL — HIGH (ref 7–23)
CALCIUM SERPL-MCNC: 8.8 MG/DL — SIGNIFICANT CHANGE UP (ref 8.5–10.1)
CHLORIDE SERPL-SCNC: 112 MMOL/L — HIGH (ref 96–108)
CO2 SERPL-SCNC: 25 MMOL/L — SIGNIFICANT CHANGE UP (ref 22–31)
CREAT SERPL-MCNC: 1.31 MG/DL — HIGH (ref 0.5–1.3)
EOSINOPHIL # BLD AUTO: 0.27 K/UL — SIGNIFICANT CHANGE UP (ref 0–0.5)
EOSINOPHIL NFR BLD AUTO: 3.2 % — SIGNIFICANT CHANGE UP (ref 0–6)
GLUCOSE SERPL-MCNC: 142 MG/DL — HIGH (ref 70–99)
HCT VFR BLD CALC: 49.3 % — SIGNIFICANT CHANGE UP (ref 39–50)
HGB BLD-MCNC: 15.9 G/DL — SIGNIFICANT CHANGE UP (ref 13–17)
IMM GRANULOCYTES NFR BLD AUTO: 1.2 % — SIGNIFICANT CHANGE UP (ref 0–1.5)
LYMPHOCYTES # BLD AUTO: 1.97 K/UL — SIGNIFICANT CHANGE UP (ref 1–3.3)
LYMPHOCYTES # BLD AUTO: 23.4 % — SIGNIFICANT CHANGE UP (ref 13–44)
MCHC RBC-ENTMCNC: 27.6 PG — SIGNIFICANT CHANGE UP (ref 27–34)
MCHC RBC-ENTMCNC: 32.3 GM/DL — SIGNIFICANT CHANGE UP (ref 32–36)
MCV RBC AUTO: 85.6 FL — SIGNIFICANT CHANGE UP (ref 80–100)
MONOCYTES # BLD AUTO: 0.78 K/UL — SIGNIFICANT CHANGE UP (ref 0–0.9)
MONOCYTES NFR BLD AUTO: 9.3 % — SIGNIFICANT CHANGE UP (ref 2–14)
NEUTROPHILS # BLD AUTO: 5.22 K/UL — SIGNIFICANT CHANGE UP (ref 1.8–7.4)
NEUTROPHILS NFR BLD AUTO: 61.8 % — SIGNIFICANT CHANGE UP (ref 43–77)
PLATELET # BLD AUTO: 236 K/UL — SIGNIFICANT CHANGE UP (ref 150–400)
POTASSIUM SERPL-MCNC: 5.1 MMOL/L — SIGNIFICANT CHANGE UP (ref 3.5–5.3)
POTASSIUM SERPL-SCNC: 5.1 MMOL/L — SIGNIFICANT CHANGE UP (ref 3.5–5.3)
RBC # BLD: 5.76 M/UL — SIGNIFICANT CHANGE UP (ref 4.2–5.8)
RBC # FLD: 14.6 % — HIGH (ref 10.3–14.5)
SODIUM SERPL-SCNC: 139 MMOL/L — SIGNIFICANT CHANGE UP (ref 135–145)
WBC # BLD: 8.43 K/UL — SIGNIFICANT CHANGE UP (ref 3.8–10.5)
WBC # FLD AUTO: 8.43 K/UL — SIGNIFICANT CHANGE UP (ref 3.8–10.5)

## 2020-09-19 PROCEDURE — 99239 HOSP IP/OBS DSCHRG MGMT >30: CPT

## 2020-09-19 PROCEDURE — 93010 ELECTROCARDIOGRAM REPORT: CPT

## 2020-09-19 RX ORDER — CHOLECALCIFEROL (VITAMIN D3) 125 MCG
1 CAPSULE ORAL
Qty: 0 | Refills: 0 | DISCHARGE

## 2020-09-19 RX ORDER — FOSINOPRIL SODIUM 10 MG/1
1 TABLET ORAL
Qty: 0 | Refills: 0 | DISCHARGE

## 2020-09-19 RX ORDER — FENOFIBRATE,MICRONIZED 130 MG
1 CAPSULE ORAL
Qty: 0 | Refills: 0 | DISCHARGE

## 2020-09-19 RX ORDER — LISINOPRIL 2.5 MG/1
1 TABLET ORAL
Qty: 30 | Refills: 0
Start: 2020-09-19 | End: 2020-10-18

## 2020-09-19 RX ORDER — ASPIRIN/CALCIUM CARB/MAGNESIUM 324 MG
1 TABLET ORAL
Qty: 30 | Refills: 0
Start: 2020-09-19 | End: 2020-10-18

## 2020-09-19 RX ORDER — ATORVASTATIN CALCIUM 80 MG/1
1 TABLET, FILM COATED ORAL
Qty: 30 | Refills: 0
Start: 2020-09-19

## 2020-09-19 RX ORDER — LISINOPRIL 2.5 MG/1
1 TABLET ORAL
Qty: 0 | Refills: 0 | DISCHARGE
Start: 2020-09-19

## 2020-09-19 RX ORDER — CLOPIDOGREL BISULFATE 75 MG/1
1 TABLET, FILM COATED ORAL
Qty: 30 | Refills: 0
Start: 2020-09-19

## 2020-09-19 RX ADMIN — Medication 10 UNIT(S): at 08:10

## 2020-09-19 RX ADMIN — INSULIN GLARGINE 50 UNIT(S): 100 INJECTION, SOLUTION SUBCUTANEOUS at 08:10

## 2020-09-19 RX ADMIN — Medication 2: at 08:11

## 2020-09-19 RX ADMIN — Medication 145 MILLIGRAM(S): at 09:02

## 2020-09-19 RX ADMIN — Medication 81 MILLIGRAM(S): at 09:02

## 2020-09-19 RX ADMIN — CLOPIDOGREL BISULFATE 75 MILLIGRAM(S): 75 TABLET, FILM COATED ORAL at 09:02

## 2020-09-19 RX ADMIN — LISINOPRIL 20 MILLIGRAM(S): 2.5 TABLET ORAL at 09:02

## 2020-09-19 NOTE — PROGRESS NOTE ADULT - SUBJECTIVE AND OBJECTIVE BOX
HPI:  54 y/o male with PMHx of HTN, HLD, RENETTA, IDDM, Obesity, pancreatitis secondary to Trulicity, and CAD s/p PCI to proximal LAD 9/2018 who presented to  with c/o chest pain, increased substernal CP with associated SOB and flushing with exertion. In ER, trop and EKG negative, admitted for unstable angina/ r/o ACS. Pt brought yo cath lab for ischemic evaluation.     ASA class: II  Cr: 1.35  GFR: 60  Bleeding risk: 1.3%    Pt found to have LCx and LAD diease. Now, pt is s/p PCI with BEATRICE x1 to LCx.       ROS: denies chest pain/ pressure, SOB or palpitation     Vital Signs;  T(C): 36.7 (09-18-20 @ 09:14), Max: 36.9 (09-17-20 @ 21:30)  HR: 65 (09-18-20 @ 09:14) (65 - 80)  BP: 134/93 (09-18-20 @ 09:14) (116/72 - 134/93)  RR: 16 (09-18-20 @ 09:14) (16 - 18)  SpO2: 100% (09-18-20 @ 09:14) (70% - 100%)    Physical Exam:   General: awake, no acute distress   HEENT: NCAT, neck supple   CV: RRR, normal S1S2, no murmur/ rub   Pulmonary: clear, no wheezing or rales   GI: +BS, soft, non-tender, non-distended   : voiding freely   Extremities: no edema, + pedal pulses   Skin: no rashes or lesion. Rt. femoral access site (s/p Mynx): no hematoma or bleeding     Labs:  LABS: All Labs Reviewed:                        16.3   7.44  )-----------( 249      ( 17 Sep 2020 07:59 )             49.9     09-17    141  |  111<H>  |  28<H>  ----------------------------<  79  4.5   |  26  |  1.35<H>    Ca    9.0      17 Sep 2020 07:59  Mg     2.2     09-16    TPro  7.2  /  Alb  3.5  /  TBili  0.4  /  DBili  x   /  AST  33  /  ALT  52  /  AlkPhos  59  09-16  CARDIAC MARKERS ( 16 Sep 2020 19:38 )  0.020 ng/mL / x     / x     / x     / x      CARDIAC MARKERS ( 16 Sep 2020 14:15 )  <0.015 ng/mL / x     / x     / x     / x      CARDIAC MARKERS ( 16 Sep 2020 11:32 )  <0.015 ng/mL / x     / x     / x     / x        Medications:  acetaminophen   Tablet .. 650 milliGRAM(s) Oral every 4 hours PRN  aluminum hydroxide/magnesium hydroxide/simethicone Suspension 30 milliLiter(s) Oral every 4 hours PRN  aspirin  chewable 81 milliGRAM(s) Oral daily  atorvastatin 40 milliGRAM(s) Oral at bedtime  clopidogrel Tablet 75 milliGRAM(s) Oral daily  dextrose 40% Gel 15 Gram(s) Oral once PRN  dextrose 5%. 1000 milliLiter(s) IV Continuous <Continuous>  dextrose 50% Injectable 12.5 Gram(s) IV Push once  dextrose 50% Injectable 25 Gram(s) IV Push once  dextrose 50% Injectable 25 Gram(s) IV Push once  fenofibrate Tablet 145 milliGRAM(s) Oral daily  glucagon  Injectable 1 milliGRAM(s) IntraMuscular once PRN  influenza   Vaccine 0.5 milliLiter(s) IntraMuscular once  insulin glargine Injectable (LANTUS) 50 Unit(s) SubCutaneous every morning  insulin glargine Injectable (LANTUS) 50 Unit(s) SubCutaneous at bedtime  insulin lispro (HumaLOG) corrective regimen sliding scale   SubCutaneous three times a day before meals  insulin lispro (HumaLOG) corrective regimen sliding scale   SubCutaneous at bedtime  insulin lispro Injectable (HumaLOG) 10 Unit(s) SubCutaneous three times a day before meals  lisinopril 20 milliGRAM(s) Oral daily  metoprolol succinate ER 50 milliGRAM(s) Oral daily  nitroglycerin     SubLingual 0.4 milliGRAM(s) SubLingual every 5 minutes PRN  ondansetron Injectable 4 milliGRAM(s) IV Push every 6 hours PRN  sodium chloride 0.9%. 1000 milliLiter(s) IV Continuous <Continuous>    # s/p BEATRICE x1 to LCx  - CICU monitor   - IV hydration  - Labs and EKG in am   - continue ASA and Plavix   - continue ACEI/ BB  - continue statin  - continue DM management   - CAD risk factor modifications, including exercise, diet, weight control etc.   - Plan for staged PCI of LAD in few weeks   - post procedure, outcome and follow up care reviewed with patient  - possible discharge home in am if medically stable  - follow up with Dr. Kaye in 1 week.     Discussed with Dr. Kaye, Dr. Saldivar, Pt and cath RN.
Chief Complaint: chest pain     Subjective and objective   54 y/o male with PMHx of HTN, HLD, RENETTA, IDDM, Obesity, pancreatitis secondary to Trulicity, and CAD s/p PCI to proximal LAD 9/2018 who presented to  with CC of CP.  Patient notes CP started 2-3 days ago.  Patient is a .  He notes for the last few days, he has noticed increased substernal CP with associated SOB and flushing with exertion.  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  He has had similar sx in the past when he needed PCI in 2018 and when he had pancreatitis in the past as well.  No nausea/ vomiting/ abd pain.  No palpitations.  Even during my interview, patient c/o slight SOB and flushing after 5-10 min conversation.  He states CP/ SOB will get better after rest.  In ER, trop and EKG negative.  Admitted for unstable angina/ r/o ACS.     9/18 - s/p BEATRICE to LCx. right groin C/D/I    REVIEW OF SYSTEMS:  All other review of systems is negative unless indicated above    Vital Signs Last 24 Hrs  T(C): 36.7 (18 Sep 2020 09:14), Max: 36.9 (17 Sep 2020 21:30)  T(F): 98.1 (18 Sep 2020 09:14), Max: 98.4 (17 Sep 2020 21:30)  HR: 79 (18 Sep 2020 14:00) (65 - 80)  BP: 157/76 (18 Sep 2020 14:00) (106/80 - 157/76)  BP(mean): 97 (18 Sep 2020 14:00) (84 - 97)  RR: 16 (18 Sep 2020 14:00) (16 - 18)  SpO2: 97% (18 Sep 2020 14:00) (70% - 100%)    CAPILLARY BLOOD GLUCOSE      POCT Blood Glucose.: 127 mg/dL (18 Sep 2020 05:30)  POCT Blood Glucose.: 124 mg/dL (17 Sep 2020 22:13)  POCT Blood Glucose.: 208 mg/dL (17 Sep 2020 17:33)      PHYSICAL EXAM:    Constitutional: NAD, awake and alert, well-developed  HEENT: PERR, EOMI, Normal Hearing, MMM  Neck: Soft and supple, No LAD, No JVD  Respiratory: Breath sounds are clear bilaterally, No wheezing, rales or rhonchi  Cardiovascular: S1 and S2, regular rate and rhythm, no Murmurs, gallops or rubs  Gastrointestinal: Bowel Sounds present, soft, nontender, nondistended, no guarding, no rebound  Extremities: No peripheral edema  Vascular: 2+ peripheral pulses  Neurological: A/O x 3, no focal deficits  Musculoskeletal: 5/5 strength b/l upper and lower extremities  Skin: No rashes    Medications:  MEDICATIONS  (STANDING):  aspirin  chewable 81 milliGRAM(s) Oral daily  atorvastatin 40 milliGRAM(s) Oral at bedtime  clopidogrel Tablet 75 milliGRAM(s) Oral daily  dextrose 5%. 1000 milliLiter(s) (50 mL/Hr) IV Continuous <Continuous>  dextrose 50% Injectable 12.5 Gram(s) IV Push once  dextrose 50% Injectable 25 Gram(s) IV Push once  dextrose 50% Injectable 25 Gram(s) IV Push once  fenofibrate Tablet 145 milliGRAM(s) Oral daily  influenza   Vaccine 0.5 milliLiter(s) IntraMuscular once  insulin glargine Injectable (LANTUS) 50 Unit(s) SubCutaneous every morning  insulin glargine Injectable (LANTUS) 50 Unit(s) SubCutaneous at bedtime  insulin lispro (HumaLOG) corrective regimen sliding scale   SubCutaneous three times a day before meals  insulin lispro (HumaLOG) corrective regimen sliding scale   SubCutaneous at bedtime  insulin lispro Injectable (HumaLOG) 10 Unit(s) SubCutaneous three times a day before meals  lisinopril 20 milliGRAM(s) Oral daily  metoprolol succinate ER 50 milliGRAM(s) Oral daily  sodium chloride 0.9%. 1000 milliLiter(s) (100 mL/Hr) IV Continuous <Continuous>      Labs: All Labs Reviewed:                        16.3   7.44  )-----------( 249      ( 17 Sep 2020 07:59 )             49.9     09-17    141  |  111<H>  |  28<H>  ----------------------------<  79  4.5   |  26  |  1.35<H>    Ca    9.0      17 Sep 2020 07:59    CARDIAC MARKERS ( 16 Sep 2020 19:38 )  0.020 ng/mL / x     / x     / x     / x        RADIOLOGY/EKG: all tests were reviewed     EXAM:  XR CHEST PORTABLE IMMED 1V                            PROCEDURE DATE:  09/16/2020        INTERPRETATION:  AP chest on September 16, 2020 at 11:33 AM. Patient has chest pain for 2 to 3 days.    Heart appears normal for projection.    The lung fields and pleural surfaces are unremarkable.    Orthopedic staples over the right shoulder again noted.    Chest is similar to December 2, 2019.    IMPRESSION: No acute finding.      [facility] 9/18/20   Diagnostic Conclusions   Two vessel disease as described   LCx being the culprit     Interventional Conclusions     Successful Coronary Intervention BEATRICE of proximal LCx.     Recommendations     Aggressive medical management of coronary artery disease and its   underlying risk factors.   Dual antiplatelet therapy for at least one year   Staged PCI of LAD in 2-3 weeks      DVT PPX: on hold for cath lab in AM    Advance Directive: full code     Disposition: observe in CCU overnight   
Chief Complaint: chest pain     Subjective and objective   54 y/o male with PMHx of HTN, HLD, RENETTA, IDDM, Obesity, pancreatitis secondary to Trulicity, and CAD s/p PCI to proximal LAD 9/2018 who presented to  with CC of CP.  Patient notes CP started 2-3 days ago.  Patient is a .  He notes for the last few days, he has noticed increased substernal CP with associated SOB and flushing with exertion.  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  He has had similar sx in the past when he needed PCI in 2018 and when he had pancreatitis in the past as well.  No nausea/ vomiting/ abd pain.  No palpitations.  Even during my interview, patient c/o slight SOB and flushing after 5-10 min conversation.  He states CP/ SOB will get better after rest.  In ER, trop and EKG negative.  Admitted for unstable angina/ r/o ACS.     REVIEW OF SYSTEMS:  All other review of systems is negative unless indicated above    Vital Signs Last 24 Hrs  T(C): 36.8 (17 Sep 2020 08:56), Max: 36.9 (16 Sep 2020 20:28)  T(F): 98.2 (17 Sep 2020 08:56), Max: 98.4 (16 Sep 2020 20:28)  HR: 67 (17 Sep 2020 08:56) (67 - 73)  BP: 143/73 (17 Sep 2020 08:56) (115/64 - 143/73)  BP(mean): 91 (16 Sep 2020 15:10) (91 - 91)  RR: 18 (17 Sep 2020 08:56) (16 - 18)  SpO2: 97% (17 Sep 2020 08:56) (96% - 100%)    I&O's Summary      CAPILLARY BLOOD GLUCOSE      POCT Blood Glucose.: 141 mg/dL (17 Sep 2020 11:41)  POCT Blood Glucose.: 79 mg/dL (17 Sep 2020 05:16)  POCT Blood Glucose.: 174 mg/dL (16 Sep 2020 20:59)  POCT Blood Glucose.: 187 mg/dL (16 Sep 2020 17:45)      PHYSICAL EXAM:    Constitutional: NAD, awake and alert, well-developed  HEENT: PERR, EOMI, Normal Hearing, MMM  Neck: Soft and supple, No LAD, No JVD  Respiratory: Breath sounds are clear bilaterally, No wheezing, rales or rhonchi  Cardiovascular: S1 and S2, regular rate and rhythm, no Murmurs, gallops or rubs  Gastrointestinal: Bowel Sounds present, soft, nontender, nondistended, no guarding, no rebound  Extremities: No peripheral edema  Vascular: 2+ peripheral pulses  Neurological: A/O x 3, no focal deficits  Musculoskeletal: 5/5 strength b/l upper and lower extremities  Skin: No rashes    Medications:  MEDICATIONS  (STANDING):  aspirin  chewable 81 milliGRAM(s) Oral daily  atorvastatin 40 milliGRAM(s) Oral at bedtime  clopidogrel Tablet 75 milliGRAM(s) Oral daily  dextrose 5%. 1000 milliLiter(s) (50 mL/Hr) IV Continuous <Continuous>  dextrose 50% Injectable 12.5 Gram(s) IV Push once  dextrose 50% Injectable 25 Gram(s) IV Push once  dextrose 50% Injectable 25 Gram(s) IV Push once  fenofibrate Tablet 145 milliGRAM(s) Oral daily  influenza   Vaccine 0.5 milliLiter(s) IntraMuscular once  insulin glargine Injectable (LANTUS) 50 Unit(s) SubCutaneous every morning  insulin glargine Injectable (LANTUS) 50 Unit(s) SubCutaneous at bedtime  insulin lispro (HumaLOG) corrective regimen sliding scale   SubCutaneous three times a day before meals  insulin lispro (HumaLOG) corrective regimen sliding scale   SubCutaneous at bedtime  insulin lispro Injectable (HumaLOG) 10 Unit(s) SubCutaneous three times a day before meals  lisinopril 20 milliGRAM(s) Oral daily  metoprolol succinate ER 50 milliGRAM(s) Oral daily      Labs: All Labs Reviewed:                        16.3   7.44  )-----------( 249      ( 17 Sep 2020 07:59 )             49.9     09-17    141  |  111<H>  |  28<H>  ----------------------------<  79  4.5   |  26  |  1.35<H>    Ca    9.0      17 Sep 2020 07:59  Mg     2.2     09-16    TPro  7.2  /  Alb  3.5  /  TBili  0.4  /  DBili  x   /  AST  33  /  ALT  52  /  AlkPhos  59  09-16          CARDIAC MARKERS ( 16 Sep 2020 19:38 )  0.020 ng/mL / x     / x     / x     / x      CARDIAC MARKERS ( 16 Sep 2020 14:15 )  <0.015 ng/mL / x     / x     / x     / x      CARDIAC MARKERS ( 16 Sep 2020 11:32 )  <0.015 ng/mL / x     / x     / x     / x          RADIOLOGY/EKG:    EXAM:  XR CHEST PORTABLE IMMED 1V                            PROCEDURE DATE:  09/16/2020          INTERPRETATION:  AP chest on September 16, 2020 at 11:33 AM. Patient has chest pain for 2 to 3 days.    Heart appears normal for projection.    The lung fields and pleural surfaces are unremarkable.    Orthopedic staples over the right shoulder again noted.    Chest is similar to December 2, 2019.    IMPRESSION: No acute finding.      DVT PPX: on hold for cath lab in AM    Advance Directive: full code     Disposition: npo p midnight for LHC in AM   
Patient is a 55y old  Male who presents with a chief complaint of chest pain.       HPI:  54 y/o male with PMHx of HTN, HLD, RENETTA, IDDM, Obesity, pancreatitis secondary to Trulicity, and CAD s/p PCI to proximal LAD 2018 who presented to  with CC of CP.  Patient notes CP started 2-3 days ago.  Patient is a .  He notes for the last few days, he has noticed increased substernal CP with associated SOB and flushing with exertion.  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  He has had similar sx in the past when he needed PCI in 2018 and when he had pancreatitis in the past as well.  No nausea/ vomiting/ abd pain.  No palpitations.  Even during my interview, patient c/o slight SOB and flushing after 5-10 min conversation.  He states CP/ SOB will get better after rest.  In ER, trop and EKG negative.  Admitted for unstable angina/ r/o ACS.      Pt denies any CP or SOB now.      PAST MEDICAL & SURGICAL HISTORY:  Pancreatitis  CAD (coronary artery disease)  HLD (hyperlipidemia)  Diabetes mellitus  Obstructive sleep apnea  Essential hypertension  S/P cholecystectomy  History of coronary artery stent placement  Placed 18 by Dr. Kaye      FAMILY HISTORY:  Family history of diabetes mellitus (Sibling)  Brother just  of COVID    Social History:    No tob/ etoh/ drug use.      Allergies:  penicillin (Hives)   (16 Sep 2020 18:51)      PAST MEDICAL & SURGICAL HISTORY:  Pancreatitis    CAD (coronary artery disease)    HLD (hyperlipidemia)    Diabetes mellitus    Obstructive sleep apnea    Essential hypertension    S/P cholecystectomy    History of coronary artery stent placement  Placed 18 by Dr. Kaye        MEDICATIONS  (STANDING):  aspirin  chewable 81 milliGRAM(s) Oral daily  atorvastatin 40 milliGRAM(s) Oral at bedtime  clopidogrel Tablet 75 milliGRAM(s) Oral daily  dextrose 5%. 1000 milliLiter(s) (50 mL/Hr) IV Continuous <Continuous>  dextrose 50% Injectable 12.5 Gram(s) IV Push once  dextrose 50% Injectable 25 Gram(s) IV Push once  dextrose 50% Injectable 25 Gram(s) IV Push once  enoxaparin Injectable 40 milliGRAM(s) SubCutaneous daily  fenofibrate Tablet 145 milliGRAM(s) Oral daily  influenza   Vaccine 0.5 milliLiter(s) IntraMuscular once  insulin glargine Injectable (LANTUS) 50 Unit(s) SubCutaneous every morning  insulin glargine Injectable (LANTUS) 50 Unit(s) SubCutaneous at bedtime  insulin lispro (HumaLOG) corrective regimen sliding scale   SubCutaneous three times a day before meals  insulin lispro (HumaLOG) corrective regimen sliding scale   SubCutaneous at bedtime  insulin lispro Injectable (HumaLOG) 10 Unit(s) SubCutaneous three times a day before meals  lisinopril 20 milliGRAM(s) Oral daily  metoprolol succinate ER 50 milliGRAM(s) Oral daily    MEDICATIONS  (PRN):  acetaminophen   Tablet .. 650 milliGRAM(s) Oral every 4 hours PRN Mild Pain (1 - 3)  aluminum hydroxide/magnesium hydroxide/simethicone Suspension 30 milliLiter(s) Oral every 4 hours PRN Dyspepsia  dextrose 40% Gel 15 Gram(s) Oral once PRN Blood Glucose LESS THAN 70 milliGRAM(s)/deciliter  glucagon  Injectable 1 milliGRAM(s) IntraMuscular once PRN Glucose LESS THAN 70 milligrams/deciliter  nitroglycerin     SubLingual 0.4 milliGRAM(s) SubLingual every 5 minutes PRN Chest Pain  ondansetron Injectable 4 milliGRAM(s) IV Push every 6 hours PRN Nausea      FAMILY HISTORY:  Family history of diabetes mellitus (Sibling)        SOCIAL HISTORY: non smoker, no alcohol use     REVIEW OF SYSTEMS:  CONSTITUTIONAL:    No fatigue, malaise, lethargy.  No fever or chills.  HEENT:  Eyes:  No visual changes.     ENT:  No epistaxis.  No sinus pain.    RESPIRATORY:  No cough.  No wheeze.  No hemoptysis.  No shortness of breath.  CARDIOVASCULAR:  c/o chest pains.  No palpitations. No shortness of breath, No orthopnea or PND.  GASTROINTESTINAL:  No abdominal pain.  No nausea or vomiting.    GENITOURINARY:    No hematuria.    MUSCULOSKELETAL:  No musculoskeletal pain.  No joint swelling.  No arthritis.  NEUROLOGICAL:  No tingling or numbness or weakness.  PSYCHIATRIC:  No confusion  SKIN:  No rashes.    ENDOCRINE:  No unexplained weight loss.  No polydipsia.   HEMATOLOGIC:  No anemia.  No prolonged or excessive bleeding.   ALLERGIC AND IMMUNOLOGIC:  No pruritus.          Vital Signs Last 24 Hrs  T(C): 36.8 (17 Sep 2020 08:56), Max: 36.9 (16 Sep 2020 20:28)  T(F): 98.2 (17 Sep 2020 08:56), Max: 98.4 (16 Sep 2020 20:28)  HR: 67 (17 Sep 2020 08:56) (67 - 73)  BP: 143/73 (17 Sep 2020 08:56) (115/64 - 143/73)  BP(mean): 91 (16 Sep 2020 15:10) (88 - 91)  RR: 18 (17 Sep 2020 08:56) (16 - 18)  SpO2: 97% (17 Sep 2020 08:56) (96% - 100%)    PHYSICAL EXAM-    Constitutional: The patient appears to be normal, well developed, well nourished and alert and oriented to time, place and person. The patient does not appear acutely ill.     Head: Head is normocephalic and atraumatic.      Neck: No jugular venous distention. No audible carotid bruits. There are strong carotid pulses bilaterally. No JVD.     Cardiovascular: Regular rate and rhythm without S3, S4. No murmurs or rubs are appreciated.      Respiratory: Breath sounds are normal. No rales. No wheezing.    Abdomen: Soft, nontender, nondistended with positive bowel sounds.      Extremity: No tenderness. No  pitting edema     Neurologic: The patient is alert and oriented.      Skin: No rash, no obvious lesions noted.      Psychiatric: The patient appears to be emotionally stable.      INTERPRETATION OF TELEMETRY: SR     ECG: Sinus rythm , T wave inversion in III, aVF.     I&O's Detail      LABS:                        16.3   7.44  )-----------( 249      ( 17 Sep 2020 07:59 )             49.9     09-17    141  |  111<H>  |  28<H>  ----------------------------<  79  4.5   |  26  |  1.35<H>    Ca    9.0      17 Sep 2020 07:59  Mg     2.2     09-16    TPro  7.2  /  Alb  3.5  /  TBili  0.4  /  DBili  x   /  AST  33  /  ALT  52  /  AlkPhos  59  09-16    CARDIAC MARKERS ( 16 Sep 2020 19:38 )  0.020 ng/mL / x     / x     / x     / x      CARDIAC MARKERS ( 16 Sep 2020 14:15 )  <0.015 ng/mL / x     / x     / x     / x      CARDIAC MARKERS ( 16 Sep 2020 11:32 )  <0.015 ng/mL / x     / x     / x     / x              I&O's Summary    BNPSerum Pro-Brain Natriuretic Peptide: 20 pg/mL ( @ 11:32)    RADIOLOGY & ADDITIONAL STUDIES:  st< from: Xray Chest 1 View-PORTABLE IMMEDIATE (20 @ 11:36) >    EXAM:  XR CHEST PORTABLE IMMED 1V                            PROCEDURE DATE:  2020          INTERPRETATION:  AP chest on 2020 at 11:33 AM. Patient has chest pain for 2 to 3 days.    Heart appears normal for projection.    The lung fields and pleural surfaces are unremarkable.    Orthopedic staples over the right shoulder again noted.    Chest is similar to 2019.    IMPRESSION: No acute finding.            GERTRUDE SMITH M.D., ATTENDING RADIOLOGIST  This document hasbeen electronically signed. Sep 16 2020 12:01PM    < end of copied text >  
Patient is a 55y old  Male who presents with a chief complaint of chest pain.       HPI:  56 y/o male with PMHx of HTN, HLD, RENETTA, IDDM, Obesity, pancreatitis secondary to Trulicity, and CAD s/p PCI to proximal LAD 2018 who presented to  with CC of CP.  Patient notes CP started 2-3 days ago.  Patient is a .  He notes for the last few days, he has noticed increased substernal CP with associated SOB and flushing with exertion.  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  He has had similar sx in the past when he needed PCI in 2018 and when he had pancreatitis in the past as well.  No nausea/ vomiting/ abd pain.  No palpitations.  Even during my interview, patient c/o slight SOB and flushing after 5-10 min conversation.  He states CP/ SOB will get better after rest.  In ER, trop and EKG negative.  Admitted for unstable angina/ r/o ACS.      Pt denies any CP or SOB now at rest.       He had chest pain with ambulation yesterday.      S/P LHC revealing two vessel disease with 99% stenosis in LCx and ISR in LAD, S/P PVCI of LCx, doing well feeling better    PAST MEDICAL & SURGICAL HISTORY:  Pancreatitis  CAD (coronary artery disease)  HLD (hyperlipidemia)  Diabetes mellitus  Obstructive sleep apnea  Essential hypertension  S/P cholecystectomy  History of coronary artery stent placement  Placed 18 by Dr. Kaye      FAMILY HISTORY:  Family history of diabetes mellitus (Sibling)  Brother just  of COVID    Social History:    No tob/ etoh/ drug use.      Allergies:  penicillin (Hives)   (16 Sep 2020 18:51)      PAST MEDICAL & SURGICAL HISTORY:  Pancreatitis    CAD (coronary artery disease)    HLD (hyperlipidemia)    Diabetes mellitus    Obstructive sleep apnea    Essential hypertension    S/P cholecystectomy    History of coronary artery stent placement  Placed 18 by Dr. Kaye        MEDICATIONS  (STANDING):  aspirin  chewable 81 milliGRAM(s) Oral daily  atorvastatin 40 milliGRAM(s) Oral at bedtime  clopidogrel Tablet 75 milliGRAM(s) Oral daily  dextrose 5%. 1000 milliLiter(s) (50 mL/Hr) IV Continuous <Continuous>  dextrose 50% Injectable 12.5 Gram(s) IV Push once  dextrose 50% Injectable 25 Gram(s) IV Push once  dextrose 50% Injectable 25 Gram(s) IV Push once  enoxaparin Injectable 40 milliGRAM(s) SubCutaneous daily  fenofibrate Tablet 145 milliGRAM(s) Oral daily  influenza   Vaccine 0.5 milliLiter(s) IntraMuscular once  insulin glargine Injectable (LANTUS) 50 Unit(s) SubCutaneous every morning  insulin glargine Injectable (LANTUS) 50 Unit(s) SubCutaneous at bedtime  insulin lispro (HumaLOG) corrective regimen sliding scale   SubCutaneous three times a day before meals  insulin lispro (HumaLOG) corrective regimen sliding scale   SubCutaneous at bedtime  insulin lispro Injectable (HumaLOG) 10 Unit(s) SubCutaneous three times a day before meals  lisinopril 20 milliGRAM(s) Oral daily  metoprolol succinate ER 50 milliGRAM(s) Oral daily    MEDICATIONS  (PRN):  acetaminophen   Tablet .. 650 milliGRAM(s) Oral every 4 hours PRN Mild Pain (1 - 3)  aluminum hydroxide/magnesium hydroxide/simethicone Suspension 30 milliLiter(s) Oral every 4 hours PRN Dyspepsia  dextrose 40% Gel 15 Gram(s) Oral once PRN Blood Glucose LESS THAN 70 milliGRAM(s)/deciliter  glucagon  Injectable 1 milliGRAM(s) IntraMuscular once PRN Glucose LESS THAN 70 milligrams/deciliter  nitroglycerin     SubLingual 0.4 milliGRAM(s) SubLingual every 5 minutes PRN Chest Pain  ondansetron Injectable 4 milliGRAM(s) IV Push every 6 hours PRN Nausea      FAMILY HISTORY:  Family history of diabetes mellitus (Sibling)        SOCIAL HISTORY: non smoker, no alcohol use     REVIEW OF SYSTEMS:  CONSTITUTIONAL:    No fatigue, malaise, lethargy.  No fever or chills.  RESPIRATORY:  No cough.  No wheeze.  No hemoptysis.  No shortness of breath.  CARDIOVASCULAR:  c/o chest pains.  No palpitations. No shortness of breath, No orthopnea or PND.  GASTROINTESTINAL:  No abdominal pain.  No nausea or vomiting.    GENITOURINARY:    No hematuria.    MUSCULOSKELETAL:  No musculoskeletal pain.  No joint swelling.  No arthritis.  NEUROLOGICAL:  No tingling or numbness or weakness.  PSYCHIATRIC:  No confusion  SKIN:  No rashes.            ICU Vital Signs Last 24 Hrs  T(C): 36.4 (18 Sep 2020 08:08), Max: 36.9 (17 Sep 2020 21:30)  T(F): 97.6 (18 Sep 2020 08:08), Max: 98.4 (17 Sep 2020 21:30)  HR: 66 (18 Sep 2020 08:08) (66 - 80)  BP: 126/71 (18 Sep 2020 08:08) (116/72 - 126/71)  BP(mean): --  ABP: --  ABP(mean): --  RR: 16 (18 Sep 2020 08:08) (16 - 18)  SpO2: 98% (18 Sep 2020 08:08) (70% - 99%)      PHYSICAL EXAM-    Constitutional: The patient appears to be normal, well developed, well nourished and alert and oriented to time, place and person. The patient does not appear acutely ill.     Head: Head is normocephalic and atraumatic.      Neck: No jugular venous distention. No audible carotid bruits. There are strong carotid pulses bilaterally. No JVD.     Cardiovascular: Regular rate and rhythm without S3, S4. No murmurs or rubs are appreciated.      Respiratory: Breath sounds are normal. No rales. No wheezing.    Abdomen: Soft, nontender, nondistended with positive bowel sounds.      Extremity: No tenderness. No  pitting edema     Neurologic: The patient is alert and oriented.      Skin: No rash, no obvious lesions noted.      Psychiatric: The patient appears to be emotionally stable.      INTERPRETATION OF TELEMETRY: SR     ECG: Sinus rythm , T wave inversion in III, aVF.     I&O's Detail      LABS:                        16.3   7.44  )-----------( 249      ( 17 Sep 2020 07:59 )             49.9     09-    141  |  111<H>  |  28<H>  ----------------------------<  79  4.5   |  26  |  1.35<H>    Ca    9.0      17 Sep 2020 07:59  Mg     2.2     09-16    TPro  7.2  /  Alb  3.5  /  TBili  0.4  /  DBili  x   /  AST  33  /  ALT  52  /  AlkPhos  59  09-16    CARDIAC MARKERS ( 16 Sep 2020 19:38 )  0.020 ng/mL / x     / x     / x     / x      CARDIAC MARKERS ( 16 Sep 2020 14:15 )  <0.015 ng/mL / x     / x     / x     / x      CARDIAC MARKERS ( 16 Sep 2020 11:32 )  <0.015 ng/mL / x     / x     / x     / x              I&O's Summary    BNPSerum Pro-Brain Natriuretic Peptide: 20 pg/mL ( @ 11:32)    RADIOLOGY & ADDITIONAL STUDIES:  st< from: Xray Chest 1 View-PORTABLE IMMEDIATE (20 @ 11:36) >    EXAM:  XR CHEST PORTABLE IMMED 1V                            PROCEDURE DATE:  2020          INTERPRETATION:  AP chest on 2020 at 11:33 AM. Patient has chest pain for 2 to 3 days.    Heart appears normal for projection.    The lung fields and pleural surfaces are unremarkable.    Orthopedic staples over the right shoulder again noted.    Chest is similar to 2019.    IMPRESSION: No acute finding.            GERTRUDE SMITH M.D., ATTENDING RADIOLOGIST  This document hasbeen electronically signed. Sep 16 2020 12:01PM    < end of copied text >  
Patient is a 55y old  Male who presents with a chief complaint of chest pain.       HPI:  56 y/o male with PMHx of HTN, HLD, RENETTA, IDDM, Obesity, pancreatitis secondary to Trulicity, and CAD s/p PCI to proximal LAD 2018 who presented to  with CC of CP.  Patient notes CP started 2-3 days ago.  Patient is a .  He notes for the last few days, he has noticed increased substernal CP with associated SOB and flushing with exertion.  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  He has had similar sx in the past when he needed PCI in 2018 and when he had pancreatitis in the past as well.  No nausea/ vomiting/ abd pain.  No palpitations.  Even during my interview, patient c/o slight SOB and flushing after 5-10 min conversation.  He states CP/ SOB will get better after rest.  In ER, trop and EKG negative.  Admitted for unstable angina/ r/o ACS.      Pt denies any CP or SOB now at rest.       He had chest pain with ambulation yesterday.     PAST MEDICAL & SURGICAL HISTORY:  Pancreatitis  CAD (coronary artery disease)  HLD (hyperlipidemia)  Diabetes mellitus  Obstructive sleep apnea  Essential hypertension  S/P cholecystectomy  History of coronary artery stent placement  Placed 18 by Dr. Kaye      FAMILY HISTORY:  Family history of diabetes mellitus (Sibling)  Brother just  of COVID    Social History:    No tob/ etoh/ drug use.      Allergies:  penicillin (Hives)   (16 Sep 2020 18:51)      PAST MEDICAL & SURGICAL HISTORY:  Pancreatitis    CAD (coronary artery disease)    HLD (hyperlipidemia)    Diabetes mellitus    Obstructive sleep apnea    Essential hypertension    S/P cholecystectomy    History of coronary artery stent placement  Placed 18 by Dr. Kaye        MEDICATIONS  (STANDING):  aspirin  chewable 81 milliGRAM(s) Oral daily  atorvastatin 40 milliGRAM(s) Oral at bedtime  clopidogrel Tablet 75 milliGRAM(s) Oral daily  dextrose 5%. 1000 milliLiter(s) (50 mL/Hr) IV Continuous <Continuous>  dextrose 50% Injectable 12.5 Gram(s) IV Push once  dextrose 50% Injectable 25 Gram(s) IV Push once  dextrose 50% Injectable 25 Gram(s) IV Push once  enoxaparin Injectable 40 milliGRAM(s) SubCutaneous daily  fenofibrate Tablet 145 milliGRAM(s) Oral daily  influenza   Vaccine 0.5 milliLiter(s) IntraMuscular once  insulin glargine Injectable (LANTUS) 50 Unit(s) SubCutaneous every morning  insulin glargine Injectable (LANTUS) 50 Unit(s) SubCutaneous at bedtime  insulin lispro (HumaLOG) corrective regimen sliding scale   SubCutaneous three times a day before meals  insulin lispro (HumaLOG) corrective regimen sliding scale   SubCutaneous at bedtime  insulin lispro Injectable (HumaLOG) 10 Unit(s) SubCutaneous three times a day before meals  lisinopril 20 milliGRAM(s) Oral daily  metoprolol succinate ER 50 milliGRAM(s) Oral daily    MEDICATIONS  (PRN):  acetaminophen   Tablet .. 650 milliGRAM(s) Oral every 4 hours PRN Mild Pain (1 - 3)  aluminum hydroxide/magnesium hydroxide/simethicone Suspension 30 milliLiter(s) Oral every 4 hours PRN Dyspepsia  dextrose 40% Gel 15 Gram(s) Oral once PRN Blood Glucose LESS THAN 70 milliGRAM(s)/deciliter  glucagon  Injectable 1 milliGRAM(s) IntraMuscular once PRN Glucose LESS THAN 70 milligrams/deciliter  nitroglycerin     SubLingual 0.4 milliGRAM(s) SubLingual every 5 minutes PRN Chest Pain  ondansetron Injectable 4 milliGRAM(s) IV Push every 6 hours PRN Nausea      FAMILY HISTORY:  Family history of diabetes mellitus (Sibling)        SOCIAL HISTORY: non smoker, no alcohol use     REVIEW OF SYSTEMS:  CONSTITUTIONAL:    No fatigue, malaise, lethargy.  No fever or chills.  RESPIRATORY:  No cough.  No wheeze.  No hemoptysis.  No shortness of breath.  CARDIOVASCULAR:  c/o chest pains.  No palpitations. No shortness of breath, No orthopnea or PND.  GASTROINTESTINAL:  No abdominal pain.  No nausea or vomiting.    GENITOURINARY:    No hematuria.    MUSCULOSKELETAL:  No musculoskeletal pain.  No joint swelling.  No arthritis.  NEUROLOGICAL:  No tingling or numbness or weakness.  PSYCHIATRIC:  No confusion  SKIN:  No rashes.            ICU Vital Signs Last 24 Hrs  T(C): 36.4 (18 Sep 2020 08:08), Max: 36.9 (17 Sep 2020 21:30)  T(F): 97.6 (18 Sep 2020 08:08), Max: 98.4 (17 Sep 2020 21:30)  HR: 66 (18 Sep 2020 08:08) (66 - 80)  BP: 126/71 (18 Sep 2020 08:08) (116/72 - 126/71)  BP(mean): --  ABP: --  ABP(mean): --  RR: 16 (18 Sep 2020 08:08) (16 - 18)  SpO2: 98% (18 Sep 2020 08:08) (70% - 99%)      PHYSICAL EXAM-    Constitutional: The patient appears to be normal, well developed, well nourished and alert and oriented to time, place and person. The patient does not appear acutely ill.     Head: Head is normocephalic and atraumatic.      Neck: No jugular venous distention. No audible carotid bruits. There are strong carotid pulses bilaterally. No JVD.     Cardiovascular: Regular rate and rhythm without S3, S4. No murmurs or rubs are appreciated.      Respiratory: Breath sounds are normal. No rales. No wheezing.    Abdomen: Soft, nontender, nondistended with positive bowel sounds.      Extremity: No tenderness. No  pitting edema     Neurologic: The patient is alert and oriented.      Skin: No rash, no obvious lesions noted.      Psychiatric: The patient appears to be emotionally stable.      INTERPRETATION OF TELEMETRY: SR     ECG: Sinus rythm , T wave inversion in III, aVF.     I&O's Detail      LABS:                        16.3   7.44  )-----------( 249      ( 17 Sep 2020 07:59 )             49.9     09-17    141  |  111<H>  |  28<H>  ----------------------------<  79  4.5   |  26  |  1.35<H>    Ca    9.0      17 Sep 2020 07:59  Mg     2.2     09-16    TPro  7.2  /  Alb  3.5  /  TBili  0.4  /  DBili  x   /  AST  33  /  ALT  52  /  AlkPhos  59  09-16    CARDIAC MARKERS ( 16 Sep 2020 19:38 )  0.020 ng/mL / x     / x     / x     / x      CARDIAC MARKERS ( 16 Sep 2020 14:15 )  <0.015 ng/mL / x     / x     / x     / x      CARDIAC MARKERS ( 16 Sep 2020 11:32 )  <0.015 ng/mL / x     / x     / x     / x              I&O's Summary    BNPSerum Pro-Brain Natriuretic Peptide: 20 pg/mL ( @ 11:32)    RADIOLOGY & ADDITIONAL STUDIES:  st< from: Xray Chest 1 View-PORTABLE IMMEDIATE (20 @ 11:36) >    EXAM:  XR CHEST PORTABLE IMMED 1V                            PROCEDURE DATE:  2020          INTERPRETATION:  AP chest on 2020 at 11:33 AM. Patient has chest pain for 2 to 3 days.    Heart appears normal for projection.    The lung fields and pleural surfaces are unremarkable.    Orthopedic staples over the right shoulder again noted.    Chest is similar to 2019.    IMPRESSION: No acute finding.            GERTRUDE SMITH M.D., ATTENDING RADIOLOGIST  This document hasbeen electronically signed. Sep 16 2020 12:01PM    < end of copied text >

## 2020-09-19 NOTE — DISCHARGE NOTE PROVIDER - CARE PROVIDER_API CALL
Scotty Kaye (MD)  Cardiovascular Disease; Interventional Cardiology  [address] [place] [address]  Phone: [phone]  Fax: [phone]  Follow Up Time:

## 2020-09-19 NOTE — PROGRESS NOTE ADULT - ASSESSMENT
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      #Unstable angina-- r/o ACS   pt. with chest pain and tightness on exertion   Trops negative x 3   NP spoke with Dr. Kaye - intial plan was to dc pt. and have stress test outpatient,  however pt. with chest pain on mabulation  - npo p midnight for LHC in AM     #IDDM:    Patient takes 140 units qhs long acting insulin at home.    Cont lantus-- 50 BID.    Pt was having hypoglycemia at home prior to admission.    10 units TID with meals short acting.    Sliding scale.    Diabetic diet.    F/U  A1C.      #CAD/ PCI:    Cont home meds.    Cont asa/ plavix/ ACE/ BB.      #HTN:    Cont home meds.      #HLD:    Cont fenofibrate.  Unclear why patient not on statin.    Check lipids and start lipitor.    #RENETTA      #Hx of Pancreatitis:    Medication related in the past.  (Trulicity).
56 y/o male with PMHx of HTN, HLD, RENETTA, IDDM, Obesity, pancreatitis secondary to Trulicity, and CAD s/p PCI to proximal LAD 9/2018 who presented to  with CC of CP admitted for unstable angina/ r/o ACS.      #Unstable angina s/p PCI - BEATRICE of proximal LCx  [facility] 9/18 shows 2 V CAD with successful PCI - BEATRICE of proximal LCx  Plan for staged PCI of LAD in 2-3 weeks    - CICU monitor   - IV hydration  - Labs and EKG in am   - continue ASA and Plavix   - continue ACEI/ BB  - continue statin  - continue DM management   - CAD risk factor modifications, including exercise, diet, weight control etc.   - Plan for staged PCI of LAD in few weeks   - post procedure, outcome and follow up care reviewed with patient  - possible discharge home in am if medically stable  - follow up with Dr. Kaye in 1 week    #IDDM:    Patient takes 140 units qhs long acting insulin at home.    Cont lantus-- 50 BID.    Pt was having hypoglycemia at home prior to admission.    10 units TID with meals short acting.    Sliding scale.    Diabetic diet.    F/U  A1C.      #CAD/ PCI:    Cont home meds.    Cont asa/ plavix/ ACE/ BB.      #HTN:    Cont home meds.      #HLD:    Cont fenofibrate.  Unclear why patient not on statin.    Check lipids and start lipitor.    #Hx of Pancreatitis:    Medication related in the past.  (Trulicity).   
Chest pain- Chest pain is atypical in nature. So far cardiac enzymes did not reveal any ischemia.   His EKG changes are similar to his old EKG.  I spoke with Dr Kaye his outpt interventional cardiologist and he reviewed all the testing results and symptoms and stated that he would like to see him in the office tomorrow for outpt stress test and pt to be discharged today.  Informed primary team about the details and he is to be ambulated prior to discharge to asses symptoms.  Advised promptly to return if the symptoms change or worsen and he expressed full understanding.     CAD s/p PCI- continue current meds including dual antiplatelet therapy.    Other medical issues- Management per primary team.   Thank you for allowing me to participate in the care of this patient. Please feel free to contact me with any questions.   
Unstable angina with 2 vessel CAD with LCX being the culprit  S/P PCI with BEATRICE  Residual disease in LAD  Will stage PCI in 2-3 weeks  Dual antiplatelet therapy  D/C planning today
Chest pain- Chest pain is atypical in nature. So far cardiac enzymes did not reveal any ischemia.   His EKG changes are similar to his old EKG.  For [facility] cath today.  NPO now.     CAD s/p PCI- continue current meds including dual antiplatelet therapy.    Other medical issues- Management per primary team.   Thank you for allowing me to participate in the care of this patient. Please feel free to contact me with any questions.

## 2020-09-19 NOTE — DISCHARGE NOTE NURSING/CASE MANAGEMENT/SOCIAL WORK - PATIENT PORTAL LINK FT
You can access the FollowMyHealth Patient Portal offered by [facility] by registering at the following website: [email or web address]. By joining [employer]’s FollowMyHealth portal, you will also be able to view your health information using other applications (apps) compatible with our system.

## 2020-09-19 NOTE — DISCHARGE NOTE PROVIDER - NSDCMRMEDTOKEN_GEN_ALL_CORE_FT
aspirin 81 mg oral tablet, chewable: 1 tab(s) orally once a day  atorvastatin 40 mg oral tablet: 1 tab(s) orally once a day (at bedtime)  clopidogrel 75 mg oral tablet: 1 tab(s) orally once a day  HumaLOG KwikPen 100 units/mL injectable solution: Inject subcutaneously twice daily   ***pt checks in morning and uses if necessary and uses 8-10 units in the evening***  Jardiance 25 mg oral tablet: 1 tab(s) orally once a day (in the morning)  lisinopril 20 mg oral tablet: 1 tab(s) orally once a day  lisinopril 20 mg oral tablet: 1 tab(s) orally once a day  metoprolol succinate 50 mg oral tablet, extended release: 1 tab(s) orally once a day  Toujeo SoloStar 300 units/mL subcutaneous solution: 140 unit(s) subcutaneous once a day (at bedtime)  ***pt recently increased dose - his BS was 75 this am***

## 2020-09-19 NOTE — DISCHARGE NOTE PROVIDER - HOSPITAL COURSE
56 y/o male with PMHx of HTN, HLD, RENETTA, IDDM, Obesity, pancreatitis secondary to Trulicity, and CAD s/p PCI to proximal LAD 9/2018 who presented to  with CC of CP.  Patient notes CP started 2-3 days ago.  Patient is a .  He notes for the last few days, he has noticed increased substernal CP with associated SOB and flushing with exertion.  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  He has had similar sx in the past when he needed PCI in 2018 and when he had pancreatitis in the past as well.  No nausea/ vomiting/ abd pain.  No palpitations.  Even during my interview, patient c/o slight SOB and flushing after 5-10 min conversation.  He states CP/ SOB will get better after rest.  In ER, trop and EKG negative.  Admitted for unstable angina/ r/o ACS.     Chest pain free. No overnight events. D/C planning.     Physical Exam:   GENERAL APPEARANCE:  NAD, hemodynamically stable, obese  T(C): 36.3 (09-19-20 @ 06:42), Max: 36.8 (09-18-20 @ 09:07)  HR: 69 (09-19-20 @ 07:00) (60 - 79)  BP: 116/68 (09-19-20 @ 06:00) (96/67 - 157/76)  RR: 13 (09-19-20 @ 07:00) (13 - 20)  SpO2: 98% (09-18-20 @ 16:00) (97% - 100%)  Wt(kg): --  HEENT:  Head is normocephalic    Skin:  Warm and dry without any rash   NECK:  Supple without lymphadenopathy.   HEART:  Regular rate and rhythm. normal S1 and S2, No M/R/G  LUNGS:  Good ins/exp effort, no W/R/R/C  ABDOMEN:  Soft, nontender, nondistended with good bowel sounds heard  EXTREMITIES:  Without cyanosis, clubbing or edema.   NEUROLOGICAL:  Gross nonfocal

## 2020-09-19 NOTE — DISCHARGE NOTE PROVIDER - NSDCCPCAREPLAN_GEN_ALL_CORE_FT
PRINCIPAL DISCHARGE DIAGNOSIS  Diagnosis: ACS (acute coronary syndrome)  Assessment and Plan of Treatment: # s/p BEATRICE x1 to LCx  - continue ASA and Plavix - it is very important that you take both antiplatlet medications and you do not stop it  - continue ACEI/ BB  - continue statin  - continue DM management - strongly recommended to follow up with endocrine for tight blood sugar control  - CAD risk factor modifications, including exercise, diet, weight control etc.   - Plan for staged PCI of LAD in few weeks   - post procedure, outcome and follow up care reviewed with patient  - possible discharge home in am if medically stable  - follow up with Dr. Kaye in 1 week.

## 2020-09-23 NOTE — ED ADULT NURSE NOTE - HOW OFTEN DO YOU HAVE A DRINK CONTAINING ALCOHOL?
Followed by Dr. Matias, oncology  She is to start therapy as an outpatient  
Followed by Dr. Matias, oncology, and Dr. Robert, surgery  Status post HCC ablation by Dr. COPPOLA on 8/10 and again 9/22 by Dr. Mendez  
Hold outpatient norvasc as BP is on the low side post-procedure.   
Post-procedural pain status post HCC ablation  She is requiring IV dilaudid for pain  She will be placed under observation status over night and monitored  She is at risk of bleeding and will have serial exams  Oral oxycodone and IV morphine ordered for severe pain.  
Never

## 2020-09-25 DIAGNOSIS — E78.5 HYPERLIPIDEMIA, UNSPECIFIED: ICD-10-CM
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
## 2020-09-25 DIAGNOSIS — E66.9 OBESITY, UNSPECIFIED: ICD-10-CM

## 2020-09-25 DIAGNOSIS — T82.855A STENOSIS OF CORONARY ARTERY STENT, INITIAL ENCOUNTER: ICD-10-CM
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
## 2020-09-25 DIAGNOSIS — Z79.02 LONG TERM (CURRENT) USE OF ANTITHROMBOTICS/ANTIPLATELETS: ICD-10-CM

## 2020-09-25 DIAGNOSIS — R07.89 OTHER CHEST PAIN: ICD-10-CM

## 2020-09-25 DIAGNOSIS — I10 ESSENTIAL (PRIMARY) HYPERTENSION: ICD-10-CM

## 2020-09-25 DIAGNOSIS — E11.9 TYPE 2 DIABETES MELLITUS WITHOUT COMPLICATIONS: ICD-10-CM

## 2020-09-25 DIAGNOSIS — I25.110 ATHEROSCLEROTIC HEART DISEASE OF NATIVE CORONARY ARTERY WITH UNSTABLE ANGINA PECTORIS: ICD-10-CM

## 2020-09-25 DIAGNOSIS — G47.33 OBSTRUCTIVE SLEEP APNEA (ADULT) (PEDIATRIC): ICD-10-CM

## 2020-09-25 DIAGNOSIS — Z88.0 ALLERGY STATUS TO PENICILLIN: ICD-10-CM

## 2020-09-25 DIAGNOSIS — Z79.4 LONG TERM (CURRENT) USE OF INSULIN: ICD-10-CM

## 2020-09-25 DIAGNOSIS — Z79.82 LONG TERM (CURRENT) USE OF ASPIRIN: ICD-10-CM

## 2020-09-28 DIAGNOSIS — Z01.818 ENCOUNTER FOR OTHER PREPROCEDURAL EXAMINATION: ICD-10-CM

## 2020-09-29 ENCOUNTER — APPOINTMENT (OUTPATIENT)
Dept: DISASTER EMERGENCY | Facility: CLINIC | Age: 55
End: 2020-09-29

## 2020-09-30 LAB — SARS-COV-2 N GENE NPH QL NAA+PROBE: NOT DETECTED

## 2020-10-01 RX ORDER — INSULIN GLARGINE 100 [IU]/ML
140 INJECTION, SOLUTION SUBCUTANEOUS
Qty: 0 | Refills: 0 | DISCHARGE

## 2020-10-01 RX ORDER — INSULIN LISPRO 100/ML
0 VIAL (ML) SUBCUTANEOUS
Qty: 0 | Refills: 0 | DISCHARGE

## 2020-10-01 NOTE — H&P ADULT - NSHPLABSRESULTS_GEN_ALL_CORE
< from: Cardiac Cath Lab - Adult (09.18.20 @ 09:53) >     Impression     Diagnostic Conclusions   Two vessel disease as decribed   LCx being the culprit     Interventional Conclusions     Successful Coronary Intervention BEATRICE of proximal LCx.     Recommendations     Aggressive medical management of coronary artery disease and its   underlying risk factors.   Dual antiplatelet therapy for at least one year               Staged PCI of LAD in 2-3 weeks      < end of copied text >

## 2020-10-01 NOTE — H&P ADULT - HISTORY OF PRESENT ILLNESS
55yr old male PMH HTN, HLD, T2DM, CAD, stents 54 y/o male with PMHx of HTN, HLD, RENETTA, IDDM, Obesity, pancreatitis secondary to Trulicity, and CAD s/p PCI to proximal LAD 9/2018 who presented to   on 9/16/2020 with c/o chest pain, increased substernal CP with associated SOB and flushing with exertion. In ER, trop and EKG negative, admitted for unstable angina/ r/o ACS. Pt was brought to cath lab for ischemic evaluation; S/P PCI of LCx on 9/18 now presents for staged PCI of LAD. Denies CP, SOB, palpitation since last admission

## 2020-10-01 NOTE — H&P ADULT - NSHPREVIEWOFSYSTEMS_GEN_ALL_CORE
CONSTITUTIONAL: No fever, weight loss, or fatigue  EYES: No eye pain, visual disturbances, or discharge  ENMT:  No difficulty hearing, tinnitus, vertigo; No sinus or throat pain  NECK: No pain or stiffness  BREASTS: No pain, masses, or nipple discharge  RESPIRATORY: No cough, wheezing, chills or hemoptysis; No shortness of breath  CARDIOVASCULAR: No chest pain, palpitations, dizziness, or leg swelling  GASTROINTESTINAL: No abdominal or epigastric pain. No nausea, vomiting, or hematemesis; No diarrhea or constipation. No melena or hematochezia.  GENITOURINARY: No dysuria, frequency, hematuria, or incontinence  NEUROLOGICAL: No headaches, memory loss, loss of strength, numbness, or tremors  SKIN: No itching, burning, rashes, or lesions   ENDOCRINE: No heat or cold intolerance; No hair loss  MUSCULOSKELETAL: No joint pain or swelling; No muscle, back, or extremity pain  PSYCHIATRIC: No depression, anxiety, mood swings, or difficulty sleeping REVIEW OF SYSTEMS:    CONSTITUTIONAL: No weakness, fevers or chills  EYES/ENT: No visual changes;  No vertigo or throat pain   NECK: No pain or stiffness  RESPIRATORY: No cough, wheezing, hemoptysis; No shortness of breath  CARDIOVASCULAR: No chest pain or palpitations  GASTROINTESTINAL: No abdominal or epigastric pain. No nausea, vomiting, or hematemesis; No diarrhea or constipation. No melena or hematochezia.  GENITOURINARY: No dysuria, frequency or hematuria  NEUROLOGICAL: No numbness or weakness  SKIN: No itching, burning, rashes, or lesions   All other review of systems is negative unless indicated above.

## 2020-10-01 NOTE — H&P ADULT - ASSESSMENT
54 y/o male with PMHx of HTN, HLD, RENETTA, IDDM, Obesity, pancreatitis secondary to Trulicity, and CAD s/p PCI to proximal LAD 9/2018 who presented to   on 9/16/2020 with c/o chest pain, increased substernal CP with associated SOB and flushing with exertion. In ER, trop and EKG negative, admitted for unstable angina/ r/o ACS. Pt was brought to cath lab for ischemic evaluation; S/P PCI of LCx on 9/18 now presents for staged PCI of LAD. Denies CP, SOB, palpitation since last admission    ASA: II  Bleeding  Risk score:0.8%  Creatinine:1.31  GFR:62

## 2020-10-01 NOTE — H&P ADULT - NSHPPHYSICALEXAM_GEN_ALL_CORE
GENERAL: NAD, well-groomed, well-developed  HEAD:  Atraumatic, Normocephalic  EYES: EOMI, PERRLA, conjunctiva and sclera clear  ENMT: No tonsillar erythema, exudates, or enlargement; Moist mucous membranes, Good dentition, No lesions  NECK: Supple, No JVD, Normal thyroid  NERVOUS SYSTEM:  Alert & Oriented X3, Good concentration; Motor Strength 5/5 B/L upper and lower extremities; DTRs 2+ intact and symmetric  CHEST/LUNG: Clear to auscultation bilaterally; No rales, rhonchi, wheezing, or rubs  HEART: Regular rate and rhythm; No murmurs, rubs, or gallops  ABDOMEN: Soft, Nontender, Nondistended; Bowel sounds present  EXTREMITIES:  2+ Peripheral Pulses, No clubbing, cyanosis, or edema  LYMPH: No lymphadenopathy noted  SKIN: No rashes or lesions Vital Signs Last 24 Hrs  HR: 76 (02 Oct 2020 09:27) (76 - 76)  BP: 137/72 (02 Oct 2020 09:27) (137/72 - 137/72)  RR: 16 (02 Oct 2020 09:27) (16 - 16)  SpO2: 99% (02 Oct 2020 09:27) (99% - 99%)    GENERAL: NAD, well-groomed, well-developed  HEAD:  Atraumatic, Normocephalic  EYES: EOMI, PERRLA, conjunctiva and sclera clear  ENMT: No tonsillar erythema, exudates, or enlargement; Moist mucous membranes, Good dentition, No lesions  NECK: Supple, No JVD, Normal thyroid  NERVOUS SYSTEM:  Alert & Oriented X3, Good concentration; Motor Strength 5/5 B/L upper and lower extremities; DTRs 2+ intact and symmetric  CHEST/LUNG: Clear to auscultation bilaterally; No rales, rhonchi, wheezing, or rubs  HEART: Regular rate and rhythm; No murmurs, rubs, or gallops  ABDOMEN: Soft, Nontender, Nondistended; Bowel sounds present  EXTREMITIES:  2+ Peripheral Pulses, No clubbing, cyanosis, or edema  LYMPH: No lymphadenopathy noted  SKIN: No rashes or lesions

## 2020-10-02 ENCOUNTER — OUTPATIENT (OUTPATIENT)
Dept: OUTPATIENT SERVICES | Facility: HOSPITAL | Age: 55
LOS: 1 days | Discharge: HOME | End: 2020-10-02
Payer: COMMERCIAL

## 2020-10-02 VITALS
SYSTOLIC BLOOD PRESSURE: 137 MMHG | WEIGHT: 240.08 LBS | DIASTOLIC BLOOD PRESSURE: 72 MMHG | OXYGEN SATURATION: 99 % | HEART RATE: 76 BPM | RESPIRATION RATE: 16 BRPM

## 2020-10-02 DIAGNOSIS — I20.0 UNSTABLE ANGINA: ICD-10-CM

## 2020-10-02 DIAGNOSIS — Z95.5 PRESENCE OF CORONARY ANGIOPLASTY IMPLANT AND GRAFT: Chronic | ICD-10-CM

## 2020-10-02 DIAGNOSIS — Z90.49 ACQUIRED ABSENCE OF OTHER SPECIFIED PARTS OF DIGESTIVE TRACT: Chronic | ICD-10-CM

## 2020-10-02 PROCEDURE — C1760: CPT

## 2020-10-02 PROCEDURE — C9600: CPT | Mod: LD

## 2020-10-02 PROCEDURE — C1769: CPT

## 2020-10-02 PROCEDURE — 86850 RBC ANTIBODY SCREEN: CPT

## 2020-10-02 PROCEDURE — 86900 BLOOD TYPING SEROLOGIC ABO: CPT

## 2020-10-02 PROCEDURE — 85025 COMPLETE CBC W/AUTO DIFF WBC: CPT

## 2020-10-02 PROCEDURE — 36415 COLL VENOUS BLD VENIPUNCTURE: CPT

## 2020-10-02 PROCEDURE — C1887: CPT

## 2020-10-02 PROCEDURE — 93010 ELECTROCARDIOGRAM REPORT: CPT

## 2020-10-02 PROCEDURE — C1894: CPT

## 2020-10-02 PROCEDURE — C1725: CPT

## 2020-10-02 PROCEDURE — 80048 BASIC METABOLIC PNL TOTAL CA: CPT

## 2020-10-02 PROCEDURE — 86901 BLOOD TYPING SEROLOGIC RH(D): CPT

## 2020-10-02 PROCEDURE — 93005 ELECTROCARDIOGRAM TRACING: CPT

## 2020-10-02 RX ORDER — ASPIRIN/CALCIUM CARB/MAGNESIUM 324 MG
81 TABLET ORAL DAILY
Refills: 0 | Status: DISCONTINUED | OUTPATIENT
Start: 2020-10-02 | End: 2020-10-03

## 2020-10-02 RX ORDER — FENOFIBRATE,MICRONIZED 130 MG
145 CAPSULE ORAL DAILY
Refills: 0 | Status: DISCONTINUED | OUTPATIENT
Start: 2020-10-02 | End: 2020-10-03

## 2020-10-02 RX ORDER — SODIUM CHLORIDE 9 MG/ML
1000 INJECTION INTRAMUSCULAR; INTRAVENOUS; SUBCUTANEOUS
Refills: 0 | Status: DISCONTINUED | OUTPATIENT
Start: 2020-10-02 | End: 2020-10-03

## 2020-10-02 RX ORDER — INSULIN LISPRO 100/ML
VIAL (ML) SUBCUTANEOUS ONCE
Refills: 0 | Status: COMPLETED | OUTPATIENT
Start: 2020-10-02 | End: 2020-10-02

## 2020-10-02 RX ORDER — SODIUM CHLORIDE 9 MG/ML
1000 INJECTION, SOLUTION INTRAVENOUS
Refills: 0 | Status: DISCONTINUED | OUTPATIENT
Start: 2020-10-02 | End: 2020-10-03

## 2020-10-02 RX ORDER — DEXTROSE 50 % IN WATER 50 %
25 SYRINGE (ML) INTRAVENOUS ONCE
Refills: 0 | Status: DISCONTINUED | OUTPATIENT
Start: 2020-10-02 | End: 2020-10-03

## 2020-10-02 RX ORDER — ASPIRIN/CALCIUM CARB/MAGNESIUM 324 MG
81 TABLET ORAL DAILY
Refills: 0 | Status: DISCONTINUED | OUTPATIENT
Start: 2020-10-02 | End: 2020-10-02

## 2020-10-02 RX ORDER — LISINOPRIL 2.5 MG/1
20 TABLET ORAL DAILY
Refills: 0 | Status: DISCONTINUED | OUTPATIENT
Start: 2020-10-02 | End: 2020-10-03

## 2020-10-02 RX ORDER — CLOPIDOGREL BISULFATE 75 MG/1
75 TABLET, FILM COATED ORAL DAILY
Refills: 0 | Status: DISCONTINUED | OUTPATIENT
Start: 2020-10-02 | End: 2020-10-03

## 2020-10-02 RX ORDER — ACETAMINOPHEN 500 MG
650 TABLET ORAL EVERY 6 HOURS
Refills: 0 | Status: DISCONTINUED | OUTPATIENT
Start: 2020-10-02 | End: 2020-10-03

## 2020-10-02 RX ORDER — DEXTROSE 50 % IN WATER 50 %
12.5 SYRINGE (ML) INTRAVENOUS ONCE
Refills: 0 | Status: DISCONTINUED | OUTPATIENT
Start: 2020-10-02 | End: 2020-10-03

## 2020-10-02 RX ORDER — ATORVASTATIN CALCIUM 80 MG/1
40 TABLET, FILM COATED ORAL AT BEDTIME
Refills: 0 | Status: DISCONTINUED | OUTPATIENT
Start: 2020-10-02 | End: 2020-10-03

## 2020-10-02 RX ORDER — AMLODIPINE BESYLATE 2.5 MG/1
1 TABLET ORAL
Qty: 0 | Refills: 0 | DISCHARGE

## 2020-10-02 RX ORDER — METOPROLOL TARTRATE 50 MG
50 TABLET ORAL DAILY
Refills: 0 | Status: DISCONTINUED | OUTPATIENT
Start: 2020-10-02 | End: 2020-10-03

## 2020-10-02 RX ORDER — DEXTROSE 50 % IN WATER 50 %
15 SYRINGE (ML) INTRAVENOUS ONCE
Refills: 0 | Status: DISCONTINUED | OUTPATIENT
Start: 2020-10-02 | End: 2020-10-03

## 2020-10-02 RX ORDER — GLUCAGON INJECTION, SOLUTION 0.5 MG/.1ML
1 INJECTION, SOLUTION SUBCUTANEOUS ONCE
Refills: 0 | Status: DISCONTINUED | OUTPATIENT
Start: 2020-10-02 | End: 2020-10-03

## 2020-10-02 RX ADMIN — ATORVASTATIN CALCIUM 40 MILLIGRAM(S): 80 TABLET, FILM COATED ORAL at 21:19

## 2020-10-02 RX ADMIN — SODIUM CHLORIDE 75 MILLILITER(S): 9 INJECTION INTRAMUSCULAR; INTRAVENOUS; SUBCUTANEOUS at 13:33

## 2020-10-02 NOTE — CHART NOTE - NSCHARTNOTEFT_GEN_A_CORE
seen and examined at the bedside.   Denies chest pain, sob or palpitation.   Rt groin access site intact; no hematoma or bleeding.   Cardiology team will follow in am

## 2020-10-02 NOTE — PROGRESS NOTE ADULT - SUBJECTIVE AND OBJECTIVE BOX
Nurse Practitioner Progress note:   s/p [facility] PCI.  Denies chest pain, shortness of breath, dizziness or palpitations at this time    A+Ox3  CV: S1S2 reg  Respiratory: CTAB  Right groin procedure site hematoma after Mynx was compressed for 40 minutes. Hemostasis achieved; no further  bleeding,  hematoma, noted; site soft, non tender, positive pedal pulses bilaterally    HR: 69 (10-02-20 @ 14:05) (68 - 77)  BP: 142/67 (10-02-20 @ 14:05) (119/72 - 142/67)  RR: 17 (10-02-20 @ 14:05) (16 - 18)  SpO2: 99% (10-02-20 @ 14:05) (97% - 99%)      MEDICATIONS  (STANDING):  aspirin  chewable 81 milliGRAM(s) Oral daily  atorvastatin 40 milliGRAM(s) Oral at bedtime  clopidogrel Tablet 75 milliGRAM(s) Oral daily  dextrose 5%. 1000 milliLiter(s) (50 mL/Hr) IV Continuous <Continuous>  dextrose 50% Injectable 12.5 Gram(s) IV Push Once  dextrose 50% Injectable 25 Gram(s) IV Push Once  dextrose 50% Injectable 25 Gram(s) IV Push Once  fenofibrate Tablet 145 milliGRAM(s) Oral daily  insulin lispro (HumaLOG) corrective regimen sliding scale   SubCutaneous Once  insulin lispro (HumaLOG) corrective regimen sliding scale   SubCutaneous Once  lisinopril 20 milliGRAM(s) Oral daily  metoprolol succinate ER 50 milliGRAM(s) Oral daily  sodium chloride 0.9%. 1000 milliLiter(s) (75 mL/Hr) IV Continuous <Continuous>    MEDICATIONS  (PRN):  acetaminophen   Tablet .. 650 milliGRAM(s) Oral every 6 hours PRN Mild Pain (1 - 3)  dextrose 40% Gel 15 Gram(s) Oral Once PRN Blood Glucose LESS THAN 70 milliGRAM(s)/deciliter  glucagon  Injectable 1 milliGRAM(s) IntraMuscular Once PRN Glucose LESS THAN 70 milligrams/deciliter        HPI:  56 y/o male with PMHx of HTN, HLD, RENETTA, IDDM, Obesity, pancreatitis secondary to Trulicity, and CAD s/p PCI to proximal LAD 9/2018 who presented to   on 9/16/2020 with c/o chest pain, increased substernal CP with associated SOB and flushing with exertion. In ER, trop and EKG negative, admitted for unstable angina/ r/o ACS. Pt was brought to cath lab for ischemic evaluation; S/P PCI of LCx on 9/18 now presents for staged PCI of LAD. Denies CP, SOB, palpitation since last admission (01 Oct 2020 16:15)    now s/p PCI of lAD  ASSESSMENT/PLAN:    Coronary artery disease  -Admit to CICU  -VS, labs, diet, activity as per PCI orders  -IV hydration  -Encourage PO fluids  -Aspirin 81mg PO daily  -Plavix 75mg PO daily  -Continue ACEI, BB  -atorvastatin 40mg PO QHS   -Plan of care discussed with patient and MD  -likely d/c in AM if patient remains stable overnight  -Follow-up with attending MD  within 1 week of discharge  -Discussed therapeutic lifestyle changes to reduce risk factors such as following a cardiac diet, weight loss, maintaining a healthy weight, exercise, smoking cessation, medication compliance, and regular follow-up  with MD

## 2020-10-02 NOTE — PACU DISCHARGE NOTE - COMMENTS
patient discharged to CICU. right groin site benign, soft nontender, no signs or symptoms of bleeding. dressing clean/dry/intact. IVL site benign. Patient without any complaints. Vital signs stable. Post procedure instructions given and understood by patient via teach back. Will continue to monitor patient status. Report given to Elsie NICOLE.

## 2020-10-03 ENCOUNTER — TRANSCRIPTION ENCOUNTER (OUTPATIENT)
Age: 55
End: 2020-10-03

## 2020-10-03 VITALS — DIASTOLIC BLOOD PRESSURE: 73 MMHG | HEART RATE: 76 BPM | SYSTOLIC BLOOD PRESSURE: 137 MMHG | OXYGEN SATURATION: 97 %

## 2020-10-03 LAB
ANION GAP SERPL CALC-SCNC: 3 MMOL/L — LOW (ref 5–17)
BASOPHILS # BLD AUTO: 0.11 K/UL — SIGNIFICANT CHANGE UP (ref 0–0.2)
BASOPHILS NFR BLD AUTO: 1.2 % — SIGNIFICANT CHANGE UP (ref 0–2)
BUN SERPL-MCNC: 26 MG/DL — HIGH (ref 7–23)
CALCIUM SERPL-MCNC: 8.5 MG/DL — SIGNIFICANT CHANGE UP (ref 8.5–10.1)
CHLORIDE SERPL-SCNC: 111 MMOL/L — HIGH (ref 96–108)
CO2 SERPL-SCNC: 28 MMOL/L — SIGNIFICANT CHANGE UP (ref 22–31)
CREAT SERPL-MCNC: 1.52 MG/DL — HIGH (ref 0.5–1.3)
EOSINOPHIL # BLD AUTO: 0.28 K/UL — SIGNIFICANT CHANGE UP (ref 0–0.5)
EOSINOPHIL NFR BLD AUTO: 3 % — SIGNIFICANT CHANGE UP (ref 0–6)
GLUCOSE SERPL-MCNC: 104 MG/DL — HIGH (ref 70–99)
HCT VFR BLD CALC: 45.3 % — SIGNIFICANT CHANGE UP (ref 39–50)
HGB BLD-MCNC: 14.3 G/DL — SIGNIFICANT CHANGE UP (ref 13–17)
IMM GRANULOCYTES NFR BLD AUTO: 1.1 % — SIGNIFICANT CHANGE UP (ref 0–1.5)
LYMPHOCYTES # BLD AUTO: 2.31 K/UL — SIGNIFICANT CHANGE UP (ref 1–3.3)
LYMPHOCYTES # BLD AUTO: 24.7 % — SIGNIFICANT CHANGE UP (ref 13–44)
MCHC RBC-ENTMCNC: 27.4 PG — SIGNIFICANT CHANGE UP (ref 27–34)
MCHC RBC-ENTMCNC: 31.6 GM/DL — LOW (ref 32–36)
MCV RBC AUTO: 86.9 FL — SIGNIFICANT CHANGE UP (ref 80–100)
MONOCYTES # BLD AUTO: 0.86 K/UL — SIGNIFICANT CHANGE UP (ref 0–0.9)
MONOCYTES NFR BLD AUTO: 9.2 % — SIGNIFICANT CHANGE UP (ref 2–14)
NEUTROPHILS # BLD AUTO: 5.69 K/UL — SIGNIFICANT CHANGE UP (ref 1.8–7.4)
NEUTROPHILS NFR BLD AUTO: 60.8 % — SIGNIFICANT CHANGE UP (ref 43–77)
PLATELET # BLD AUTO: 221 K/UL — SIGNIFICANT CHANGE UP (ref 150–400)
POTASSIUM SERPL-MCNC: 4.1 MMOL/L — SIGNIFICANT CHANGE UP (ref 3.5–5.3)
POTASSIUM SERPL-SCNC: 4.1 MMOL/L — SIGNIFICANT CHANGE UP (ref 3.5–5.3)
RBC # BLD: 5.21 M/UL — SIGNIFICANT CHANGE UP (ref 4.2–5.8)
RBC # FLD: 14.5 % — SIGNIFICANT CHANGE UP (ref 10.3–14.5)
SODIUM SERPL-SCNC: 142 MMOL/L — SIGNIFICANT CHANGE UP (ref 135–145)
WBC # BLD: 9.35 K/UL — SIGNIFICANT CHANGE UP (ref 3.8–10.5)
WBC # FLD AUTO: 9.35 K/UL — SIGNIFICANT CHANGE UP (ref 3.8–10.5)

## 2020-10-03 PROCEDURE — 93010 ELECTROCARDIOGRAM REPORT: CPT

## 2020-10-03 RX ORDER — LISINOPRIL 2.5 MG/1
1 TABLET ORAL
Qty: 0 | Refills: 0 | DISCHARGE
Start: 2020-10-03

## 2020-10-03 NOTE — DISCHARGE NOTE PROVIDER - HOSPITAL COURSE
Patient admitted post successful  staged PCI with BEATRICE to LAD via RFA (Mynx closure)  tolerated procedure well  no events overnight  patient ambulating well  ekg, tele and labs reviewed, VSS  patient seen and assessed and is cleared for discharge home  f/u with Dr Kaye in 1-2 weeks  e-scripts sent to pharmacy  post procedure instructions and importance of DAPT reinforced

## 2020-10-03 NOTE — DISCHARGE NOTE PROVIDER - NSDCMRMEDTOKEN_GEN_ALL_CORE_FT
aspirin 81 mg oral tablet, chewable: 1 tab(s) orally once a day  clopidogrel 75 mg oral tablet: 1 tab(s) orally once a day  ergocalciferol 50,000 intl units (1.25 mg) oral capsule: 1 cap(s) orally once a week  fenofibrate 160 mg oral tablet: 1 tab(s) orally once a day  fosinopril 20 mg oral tablet: 1 tab(s) orally once a day  Jardiance 25 mg oral tablet: 1 tab(s) orally once a day (in the morning)  Lipitor 40 mg oral tablet: 1 tab(s) orally once a day (at bedtime)  lisinopril 20 mg oral tablet: 1 tab(s) orally once a day  metoprolol succinate 50 mg oral tablet, extended release: 1 tab(s) orally once a day  Toujeo SoloStar 300 units/mL subcutaneous solution: 25 unit(s) subcutaneous once a day (at bedtime) - his BS was 75 this am***

## 2020-10-03 NOTE — DISCHARGE NOTE NURSING/CASE MANAGEMENT/SOCIAL WORK - PATIENT PORTAL LINK FT
You can access the FollowMyHealth Patient Portal offered by [facility] by registering at the following website: [email or web address]. By joining [employer]’s FollowMyHealth portal, you will also be able to view your health information using other applications (apps) compatible with our system.

## 2020-10-03 NOTE — PROGRESS NOTE ADULT - SUBJECTIVE AND OBJECTIVE BOX
Cardiology Nurse Practitioner Progress note:   s/p [facility] with successful staged  PCI and BEATRICE to LAD via RFA  (Mynx closure device)  observed overnight on tele-no overnight events    Patient feels well.  Denies chest pain, shortness of breath, dizziness or palpitations at this time.        EKG:NSR@74  TELE:no ectopy, NSR  GENERAL: appears well, OOB to chair/ BR  CV: RRR, S1 S2, no murmur, rub, clicks or gallop noted  RESP: LCTA bilaterally, no wheeze, no rhonchi or crackles noted  GI/: soft, NT/ND  NEURO: AOx4, no focal deficits  EXTREMITIES: no edema, clubbing or cyanosis noted  PROCEDURE SITE:  Right groin dressing removed, site CDI with no bleeding, no hematoma, area soft, non tender, positive pedal pulses bilaterally        T(C): 36.2 (10-03-20 @ 08:38), Max: 36.6 (10-02-20 @ 14:20)  HR: 76 (10-03-20 @ 09:00) (66 - 86)  BP: 137/73 (10-03-20 @ 09:00) (105/63 - 151/76)  RR: 10 (10-03-20 @ 08:00) (7 - 22)  SpO2: 97% (10-03-20 @ 09:00) (95% - 99%)  Wt(kg): --  CBC Full  -  ( 03 Oct 2020 06:27 )  WBC Count : 9.35 K/uL  RBC Count : 5.21 M/uL  Hemoglobin : 14.3 g/dL  Hematocrit : 45.3 %  Platelet Count - Automated : 221 K/uL  Mean Cell Volume : 86.9 fl  Mean Cell Hemoglobin : 27.4 pg  Mean Cell Hemoglobin Concentration : 31.6 gm/dL  Auto Neutrophil # : 5.69 K/uL  Auto Lymphocyte # : 2.31 K/uL  Auto Monocyte # : 0.86 K/uL  Auto Eosinophil # : 0.28 K/uL  Auto Basophil # : 0.11 K/uL  Auto Neutrophil % : 60.8 %  Auto Lymphocyte % : 24.7 %  Auto Monocyte % : 9.2 %  Auto Eosinophil % : 3.0 %  Auto Basophil % : 1.2 %    10-03    142  |  111<H>  |  26<H>  ----------------------------<  104<H>  4.1   |  28  |  1.52<H>    Ca    8.5      03 Oct 2020 06:27              MEDICATIONS  (STANDING):  aspirin  chewable 81 milliGRAM(s) Oral daily  atorvastatin 40 milliGRAM(s) Oral at bedtime  clopidogrel Tablet 75 milliGRAM(s) Oral daily  dextrose 5%. 1000 milliLiter(s) (50 mL/Hr) IV Continuous <Continuous>  dextrose 50% Injectable 12.5 Gram(s) IV Push Once  dextrose 50% Injectable 25 Gram(s) IV Push Once  dextrose 50% Injectable 25 Gram(s) IV Push Once  fenofibrate Tablet 145 milliGRAM(s) Oral daily  lisinopril 20 milliGRAM(s) Oral daily  metoprolol succinate ER 50 milliGRAM(s) Oral daily  sodium chloride 0.9%. 1000 milliLiter(s) (75 mL/Hr) IV Continuous <Continuous>    MEDICATIONS  (PRN):  acetaminophen   Tablet .. 650 milliGRAM(s) Oral every 6 hours PRN Mild Pain (1 - 3)  dextrose 40% Gel 15 Gram(s) Oral Once PRN Blood Glucose LESS THAN 70 milliGRAM(s)/deciliter  glucagon  Injectable 1 milliGRAM(s) IntraMuscular Once PRN Glucose LESS THAN 70 milligrams/deciliter        HPI:  54 y/o male with PMHx of HTN, HLD, RENETTA, IDDM, Obesity, pancreatitis secondary to Trulicity, and CAD s/p PCI to proximal LAD 9/2018 who presented to   on 9/16/2020 with c/o chest pain, increased substernal CP with associated SOB and flushing with exertion. In ER, trop and EKG negative, admitted for unstable angina/ r/o ACS. Pt was brought to cath lab for ischemic evaluation; S/P PCI of LCx on 9/18 now presents for staged PCI of LAD. Denies CP, SOB, palpitation since last admission (01 Oct 2020 16:15)    now s/p [facility] with successful  staged PCI and BEATRICE to LAD via RFA (Mynx closure device)    ASSESSMENT/PLAN:    Coronary artery disease  -Discharge homer this am  -OOB to chair, then ambulate ad lolly on tele  -continue aspirin, plavix, BB, ACE, statin   -Encourage PO fluids  -Plan of care discussed with patient,   -labs, EKG and procedure site assessed  -Follow-up with Dr Kaye  within 1 week  -Discussed therapeutic lifestyle changes to reduce risk factors such as following a cardiac diet, weight loss, maintaining a healthy weight, exercise, smoking cessation, medication compliance, and regular follow-up  with MD to know your numbers (BP, cholesterol, weight, and glucose)  cardiac rehab info provided/referral and communication to cardiac rehab completed

## 2020-10-03 NOTE — DISCHARGE NOTE PROVIDER - NSDCCPTREATMENT_GEN_ALL_CORE_FT
PRINCIPAL PROCEDURE  Procedure: Left heart catheterization  Findings and Treatment: Staged PCi to LAD

## 2020-10-03 NOTE — DISCHARGE NOTE PROVIDER - NSDCCPCAREPLAN_GEN_ALL_CORE_FT
PRINCIPAL DISCHARGE DIAGNOSIS  Diagnosis: Coronary artery disease  Assessment and Plan of Treatment:

## 2020-10-03 NOTE — DISCHARGE NOTE PROVIDER - CARE PROVIDER_API CALL
Scotty Kaye (MD)  Cardiovascular Disease; Interventional Cardiology  [address] [place] [address]  Phone: [phone]  Fax: [phone]  Follow Up Time:

## 2020-10-03 NOTE — DISCHARGE NOTE PROVIDER - NSDCACTIVITY_GEN_ALL_CORE
Walking - Outdoors allowed/Do not drive or operate machinery/Stairs allowed/Walking - Indoors allowed/Showering allowed/No heavy lifting/straining

## 2020-10-06 DIAGNOSIS — E66.9 OBESITY, UNSPECIFIED: ICD-10-CM

## 2020-10-06 DIAGNOSIS — I10 ESSENTIAL (PRIMARY) HYPERTENSION: ICD-10-CM

## 2020-10-06 DIAGNOSIS — E10.9 TYPE 1 DIABETES MELLITUS WITHOUT COMPLICATIONS: ICD-10-CM

## 2020-10-06 DIAGNOSIS — R07.89 OTHER CHEST PAIN: ICD-10-CM

## 2020-10-06 DIAGNOSIS — Z88.0 ALLERGY STATUS TO PENICILLIN: ICD-10-CM

## 2020-10-06 DIAGNOSIS — G47.33 OBSTRUCTIVE SLEEP APNEA (ADULT) (PEDIATRIC): ICD-10-CM

## 2020-10-06 DIAGNOSIS — E78.00 PURE HYPERCHOLESTEROLEMIA, UNSPECIFIED: ICD-10-CM

## 2020-10-06 DIAGNOSIS — R06.02 SHORTNESS OF BREATH: ICD-10-CM

## 2020-10-06 DIAGNOSIS — Z79.84 LONG TERM (CURRENT) USE OF ORAL HYPOGLYCEMIC DRUGS: ICD-10-CM

## 2020-10-06 DIAGNOSIS — I25.110 ATHEROSCLEROTIC HEART DISEASE OF NATIVE CORONARY ARTERY WITH UNSTABLE ANGINA PECTORIS: ICD-10-CM

## 2020-10-06 DIAGNOSIS — I20.0 UNSTABLE ANGINA: ICD-10-CM

## 2020-10-06 DIAGNOSIS — Z79.02 LONG TERM (CURRENT) USE OF ANTITHROMBOTICS/ANTIPLATELETS: ICD-10-CM

## 2020-10-06 DIAGNOSIS — Z79.82 LONG TERM (CURRENT) USE OF ASPIRIN: ICD-10-CM

## 2021-06-03 ENCOUNTER — APPOINTMENT (OUTPATIENT)
Dept: FAMILY MEDICINE | Facility: CLINIC | Age: 56
End: 2021-06-03
Payer: COMMERCIAL

## 2021-06-03 VITALS
TEMPERATURE: 95.3 F | OXYGEN SATURATION: 98 % | DIASTOLIC BLOOD PRESSURE: 80 MMHG | HEIGHT: 72 IN | HEART RATE: 75 BPM | BODY MASS INDEX: 35.49 KG/M2 | SYSTOLIC BLOOD PRESSURE: 140 MMHG | WEIGHT: 262 LBS

## 2021-06-03 DIAGNOSIS — M10.9 GOUT, UNSPECIFIED: ICD-10-CM

## 2021-06-03 PROCEDURE — 99072 ADDL SUPL MATRL&STAF TM PHE: CPT

## 2021-06-03 PROCEDURE — 99213 OFFICE O/P EST LOW 20 MIN: CPT | Mod: 25

## 2021-06-03 PROCEDURE — 36415 COLL VENOUS BLD VENIPUNCTURE: CPT

## 2021-06-03 NOTE — PLAN
[FreeTextEntry1] : Patient is advised to take the Colchicine as directed, and to use the Indocin until his pain becomes tolerable.  At that time, he is advised to switch to Advil, which he will use sparingly due to his cardiac history.  He is also advised to hydrate well, and to use tart cherry juice in conjunction with the above.  He is reminded to limit the excess purines in his diet as well, which should help long term.  If there is no improvement, he is then advised to follow up with the office.   He will also have his uric acid drawn today, as this has not been done in the past.

## 2021-06-03 NOTE — PHYSICAL EXAM
[Normal] : affect was normal and insight and judgment were intact [de-identified] : mild tenderness and erythma noted to the right third toe.  no evidence of cellulitis

## 2021-06-03 NOTE — HISTORY OF PRESENT ILLNESS
[FreeTextEntry1] : Gout [de-identified] : Patient complains of a 1 week history of pain to the middle toe on the right foot.  He states that this is now affecting him on the left foot as well.  The pain was more intense last weekend, but it has improved.  Initially he had pain with mild touch/manipulation of the toe, but this has lessened.

## 2021-06-04 LAB — URATE SERPL-MCNC: 5.9 MG/DL

## 2021-07-07 ENCOUNTER — RX RENEWAL (OUTPATIENT)
Age: 56
End: 2021-07-07

## 2021-07-07 DIAGNOSIS — Z87.39 PERSONAL HISTORY OF OTHER DISEASES OF THE MUSCULOSKELETAL SYSTEM AND CONNECTIVE TISSUE: ICD-10-CM

## 2021-07-07 DIAGNOSIS — G89.29 OTHER CHRONIC PAIN: ICD-10-CM

## 2021-07-07 DIAGNOSIS — Z82.49 FAMILY HISTORY OF ISCHEMIC HEART DISEASE AND OTHER DISEASES OF THE CIRCULATORY SYSTEM: ICD-10-CM

## 2021-07-07 DIAGNOSIS — Z82.3 FAMILY HISTORY OF STROKE: ICD-10-CM

## 2021-07-07 DIAGNOSIS — Z12.11 ENCOUNTER FOR SCREENING FOR MALIGNANT NEOPLASM OF COLON: ICD-10-CM

## 2021-07-07 DIAGNOSIS — J30.2 OTHER SEASONAL ALLERGIC RHINITIS: ICD-10-CM

## 2021-07-07 DIAGNOSIS — Z86.39 PERSONAL HISTORY OF OTHER ENDOCRINE, NUTRITIONAL AND METABOLIC DISEASE: ICD-10-CM

## 2021-07-07 DIAGNOSIS — Z83.3 FAMILY HISTORY OF DIABETES MELLITUS: ICD-10-CM

## 2021-07-07 DIAGNOSIS — Z78.9 OTHER SPECIFIED HEALTH STATUS: ICD-10-CM

## 2021-07-19 ENCOUNTER — APPOINTMENT (OUTPATIENT)
Dept: FAMILY MEDICINE | Facility: CLINIC | Age: 56
End: 2021-07-19
Payer: COMMERCIAL

## 2021-07-19 VITALS
TEMPERATURE: 98 F | HEIGHT: 73 IN | WEIGHT: 255 LBS | HEART RATE: 82 BPM | OXYGEN SATURATION: 98 % | BODY MASS INDEX: 33.8 KG/M2 | SYSTOLIC BLOOD PRESSURE: 140 MMHG | DIASTOLIC BLOOD PRESSURE: 80 MMHG

## 2021-07-19 DIAGNOSIS — Z87.438 PERSONAL HISTORY OF OTHER DISEASES OF MALE GENITAL ORGANS: ICD-10-CM

## 2021-07-19 DIAGNOSIS — K82.4 CHOLESTEROLOSIS OF GALLBLADDER: ICD-10-CM

## 2021-07-19 DIAGNOSIS — Z87.19 PERSONAL HISTORY OF OTHER DISEASES OF THE DIGESTIVE SYSTEM: ICD-10-CM

## 2021-07-19 PROCEDURE — 99072 ADDL SUPL MATRL&STAF TM PHE: CPT

## 2021-07-19 PROCEDURE — 99213 OFFICE O/P EST LOW 20 MIN: CPT

## 2021-07-19 NOTE — PLAN
[FreeTextEntry1] : Supp care measures for allergies/post nasal drip revd and pt ed HO on URI/allergies given and revd. We revd ok to use Alkaseltzer or oral decongestant for a few days if needed for sxs but then d/c and do not use longer. Mainly use Fluticasone nasal spray and envtal control measures and Neti pot/nasal saline. Ok to use oral antihistamine if tolerated/as needed. \par \par  today but he is not feeling well and did not take all BP meds yet today. Check BP at home and we will recheck at f/u visit. He states he is UTD on card f/u and testing and Bps have been good there. \par \par RTO/call if incr/new sxs or if sxs not starting to improve in next several days\par \par Last PE 10/2020 and I recommended he sched f/u +/- PE in near future

## 2021-07-19 NOTE — REVIEW OF SYSTEMS
[Cough] : cough [Fever] : no fever [Chills] : no chills [Fatigue] : no fatigue [Discharge] : no discharge [Redness] : no redness [Chest Pain] : no chest pain [Palpitations] : no palpitations [Shortness Of Breath] : no shortness of breath [Wheezing] : no wheezing [Abdominal Pain] : no abdominal pain [Diarrhea] : no diarrhea [Vomiting] : no vomiting [Muscle Pain] : no muscle pain [Headache] : no headache [FreeTextEntry4] : see HPI

## 2021-07-19 NOTE — PHYSICAL EXAM
[No Acute Distress] : no acute distress [Well Developed] : well developed [Well-Appearing] : well-appearing [Normal Sclera/Conjunctiva] : normal sclera/conjunctiva [EOMI] : extraocular movements intact [Normal Oropharynx] : the oropharynx was normal [No JVD] : no jugular venous distention [No Lymphadenopathy] : no lymphadenopathy [Supple] : supple [Thyroid Normal, No Nodules] : the thyroid was normal and there were no nodules present [No Respiratory Distress] : no respiratory distress  [No Accessory Muscle Use] : no accessory muscle use [Clear to Auscultation] : lungs were clear to auscultation bilaterally [Normal Rate] : normal rate  [Regular Rhythm] : with a regular rhythm [Normal S1, S2] : normal S1 and S2 [Pedal Pulses Present] : the pedal pulses are present [No Edema] : there was no peripheral edema [No Extremity Clubbing/Cyanosis] : no extremity clubbing/cyanosis [Soft] : abdomen soft [Non Tender] : non-tender [Non-distended] : non-distended [Normal Bowel Sounds] : normal bowel sounds [Normal Posterior Cervical Nodes] : no posterior cervical lymphadenopathy [Normal Anterior Cervical Nodes] : no anterior cervical lymphadenopathy [No Joint Swelling] : no joint swelling [Grossly Normal Strength/Tone] : grossly normal strength/tone [No Rash] : no rash [Coordination Grossly Intact] : coordination grossly intact [No Focal Deficits] : no focal deficits [Normal Gait] : normal gait [Normal Affect] : the affect was normal [Normal Insight/Judgement] : insight and judgment were intact [Normal Outer Ear/Nose] : the outer ears and nose were normal in appearance [de-identified] : nontoxic appearance, overweight, rare cough noted during visit, no resp distress [de-identified] : nasal mucosa is edematous and purplish bluish, post OP wnl without erythema or edema but there is visibl post nasal drip in post OP, L ear with small amt fluid seen but tm is clear and not bulging, R TM wnl.  [de-identified] : 2/6 soft systolic murmur heard at LUSB

## 2021-07-19 NOTE — HISTORY OF PRESENT ILLNESS
[FreeTextEntry8] : Here for eval of sore throat since this AM. Also L ear feels clogged and hears crackling in that ear. Saw post nasal drip in post OP this AM when awoke and looked in his throat. Was working out in yard yesterday. Has perennial allergies for which he uses Fluticasone nasal spray sometimes but has not been using recently. \par \par Often finds Khloe Ney to be the most effective med for these type of sxs but he limits/avoids this generally due to he has HTN and knows should not really use this. \par \par Took Benadryl this AM x 2 pills. No effect noted yet. \par \par No fevers, no N/V/abd pain. No chest congestion. Some cough (dry cough). Nasal d/c is always clear. No SOB. No CP. Feels ok otherwise. Not sure if sxs are due to allergies or infection so is here for eval. \par \par He has had COVID vaccine series ([employer]).

## 2021-08-22 NOTE — PROGRESS NOTE ADULT - PROBLEM SELECTOR PLAN 1
CXR neg for OM  MRI ordered  s/p Vancomycin and Aztreonam 2 gm Q8H, changed to Cefepime IV by ID,  monitor for side effect  Allergic to penicillin- gets itching and possible hives no SOB  F/u Blood cultures   Podiatry eval appreciated, s/p bedside debridement, MRI ordered  D/w Dr Dillon 7

## 2021-09-13 ENCOUNTER — RX RENEWAL (OUTPATIENT)
Age: 56
End: 2021-09-13

## 2021-09-13 RX ORDER — FENOFIBRATE 160 MG/1
160 TABLET ORAL DAILY
Qty: 30 | Refills: 2 | Status: ACTIVE | COMMUNITY
Start: 2021-06-03 | End: 1900-01-01

## 2021-10-06 ENCOUNTER — RX RENEWAL (OUTPATIENT)
Age: 56
End: 2021-10-06

## 2022-01-01 ENCOUNTER — INPATIENT (INPATIENT)
Facility: HOSPITAL | Age: 57
LOS: 1 days | Discharge: HOME | DRG: 377 | End: 2022-01-03
Attending: INTERNAL MEDICINE | Admitting: INTERNAL MEDICINE
Payer: COMMERCIAL

## 2022-01-01 VITALS — WEIGHT: 257.94 LBS | HEIGHT: 72 IN

## 2022-01-01 DIAGNOSIS — Z95.5 PRESENCE OF CORONARY ANGIOPLASTY IMPLANT AND GRAFT: Chronic | ICD-10-CM

## 2022-01-01 DIAGNOSIS — Z90.49 ACQUIRED ABSENCE OF OTHER SPECIFIED PARTS OF DIGESTIVE TRACT: Chronic | ICD-10-CM

## 2022-01-01 DIAGNOSIS — K92.2 GASTROINTESTINAL HEMORRHAGE, UNSPECIFIED: ICD-10-CM

## 2022-01-01 LAB
ALBUMIN SERPL ELPH-MCNC: 2.9 G/DL — LOW (ref 3.3–5)
ALP SERPL-CCNC: 74 U/L — SIGNIFICANT CHANGE UP (ref 40–120)
ALT FLD-CCNC: 48 U/L — SIGNIFICANT CHANGE UP (ref 12–78)
ANION GAP SERPL CALC-SCNC: 6 MMOL/L — SIGNIFICANT CHANGE UP (ref 5–17)
APTT BLD: 32.7 SEC — SIGNIFICANT CHANGE UP (ref 27.5–35.5)
AST SERPL-CCNC: 35 U/L — SIGNIFICANT CHANGE UP (ref 15–37)
BASOPHILS # BLD AUTO: 0.13 K/UL — SIGNIFICANT CHANGE UP (ref 0–0.2)
BASOPHILS NFR BLD AUTO: 1.6 % — SIGNIFICANT CHANGE UP (ref 0–2)
BILIRUB SERPL-MCNC: 0.5 MG/DL — SIGNIFICANT CHANGE UP (ref 0.2–1.2)
BUN SERPL-MCNC: 36 MG/DL — HIGH (ref 7–23)
CALCIUM SERPL-MCNC: 8.4 MG/DL — LOW (ref 8.5–10.1)
CHLORIDE SERPL-SCNC: 95 MMOL/L — LOW (ref 96–108)
CO2 SERPL-SCNC: 30 MMOL/L — SIGNIFICANT CHANGE UP (ref 22–31)
CREAT SERPL-MCNC: 2.17 MG/DL — HIGH (ref 0.5–1.3)
EOSINOPHIL # BLD AUTO: 0.2 K/UL — SIGNIFICANT CHANGE UP (ref 0–0.5)
EOSINOPHIL NFR BLD AUTO: 2.5 % — SIGNIFICANT CHANGE UP (ref 0–6)
GLUCOSE SERPL-MCNC: 473 MG/DL — CRITICAL HIGH (ref 70–99)
HCT VFR BLD CALC: 40 % — SIGNIFICANT CHANGE UP (ref 39–50)
HCT VFR BLD CALC: 41.4 % — SIGNIFICANT CHANGE UP (ref 39–50)
HGB BLD-MCNC: 13.7 G/DL — SIGNIFICANT CHANGE UP (ref 13–17)
HGB BLD-MCNC: 14.7 G/DL — SIGNIFICANT CHANGE UP (ref 13–17)
IMM GRANULOCYTES NFR BLD AUTO: 3.9 % — HIGH (ref 0–1.5)
INR BLD: 0.99 RATIO — SIGNIFICANT CHANGE UP (ref 0.88–1.16)
LACTATE SERPL-SCNC: 0.8 MMOL/L — SIGNIFICANT CHANGE UP (ref 0.7–2)
LIDOCAIN IGE QN: 294 U/L — SIGNIFICANT CHANGE UP (ref 73–393)
LYMPHOCYTES # BLD AUTO: 1.86 K/UL — SIGNIFICANT CHANGE UP (ref 1–3.3)
LYMPHOCYTES # BLD AUTO: 23.2 % — SIGNIFICANT CHANGE UP (ref 13–44)
MCHC RBC-ENTMCNC: 29.1 PG — SIGNIFICANT CHANGE UP (ref 27–34)
MCHC RBC-ENTMCNC: 35.5 GM/DL — SIGNIFICANT CHANGE UP (ref 32–36)
MCV RBC AUTO: 81.8 FL — SIGNIFICANT CHANGE UP (ref 80–100)
MONOCYTES # BLD AUTO: 0.72 K/UL — SIGNIFICANT CHANGE UP (ref 0–0.9)
MONOCYTES NFR BLD AUTO: 9 % — SIGNIFICANT CHANGE UP (ref 2–14)
NEUTROPHILS # BLD AUTO: 4.81 K/UL — SIGNIFICANT CHANGE UP (ref 1.8–7.4)
NEUTROPHILS NFR BLD AUTO: 59.8 % — SIGNIFICANT CHANGE UP (ref 43–77)
PLATELET # BLD AUTO: 271 K/UL — SIGNIFICANT CHANGE UP (ref 150–400)
POTASSIUM SERPL-MCNC: 4.1 MMOL/L — SIGNIFICANT CHANGE UP (ref 3.5–5.3)
POTASSIUM SERPL-SCNC: 4.1 MMOL/L — SIGNIFICANT CHANGE UP (ref 3.5–5.3)
PROT SERPL-MCNC: 6.4 GM/DL — SIGNIFICANT CHANGE UP (ref 6–8.3)
PROTHROM AB SERPL-ACNC: 11.5 SEC — SIGNIFICANT CHANGE UP (ref 10.6–13.6)
RBC # BLD: 5.06 M/UL — SIGNIFICANT CHANGE UP (ref 4.2–5.8)
RBC # FLD: 13.6 % — SIGNIFICANT CHANGE UP (ref 10.3–14.5)
SODIUM SERPL-SCNC: 131 MMOL/L — LOW (ref 135–145)
WBC # BLD: 8.03 K/UL — SIGNIFICANT CHANGE UP (ref 3.8–10.5)
WBC # FLD AUTO: 8.03 K/UL — SIGNIFICANT CHANGE UP (ref 3.8–10.5)

## 2022-01-01 PROCEDURE — 99291 CRITICAL CARE FIRST HOUR: CPT

## 2022-01-01 PROCEDURE — 85014 HEMATOCRIT: CPT

## 2022-01-01 PROCEDURE — 71045 X-RAY EXAM CHEST 1 VIEW: CPT | Mod: 26

## 2022-01-01 PROCEDURE — 85027 COMPLETE CBC AUTOMATED: CPT

## 2022-01-01 PROCEDURE — 80061 LIPID PANEL: CPT

## 2022-01-01 PROCEDURE — 36415 COLL VENOUS BLD VENIPUNCTURE: CPT

## 2022-01-01 PROCEDURE — 83735 ASSAY OF MAGNESIUM: CPT

## 2022-01-01 PROCEDURE — 84443 ASSAY THYROID STIM HORMONE: CPT

## 2022-01-01 PROCEDURE — 83605 ASSAY OF LACTIC ACID: CPT

## 2022-01-01 PROCEDURE — 82962 GLUCOSE BLOOD TEST: CPT

## 2022-01-01 PROCEDURE — C9113: CPT

## 2022-01-01 PROCEDURE — 93010 ELECTROCARDIOGRAM REPORT: CPT

## 2022-01-01 PROCEDURE — 80053 COMPREHEN METABOLIC PANEL: CPT

## 2022-01-01 PROCEDURE — 84100 ASSAY OF PHOSPHORUS: CPT

## 2022-01-01 PROCEDURE — 82150 ASSAY OF AMYLASE: CPT

## 2022-01-01 PROCEDURE — 83880 ASSAY OF NATRIURETIC PEPTIDE: CPT

## 2022-01-01 PROCEDURE — 85018 HEMOGLOBIN: CPT

## 2022-01-01 PROCEDURE — 85025 COMPLETE CBC W/AUTO DIFF WBC: CPT

## 2022-01-01 PROCEDURE — 83690 ASSAY OF LIPASE: CPT

## 2022-01-01 PROCEDURE — 99223 1ST HOSP IP/OBS HIGH 75: CPT

## 2022-01-01 PROCEDURE — 83036 HEMOGLOBIN GLYCOSYLATED A1C: CPT

## 2022-01-01 RX ORDER — PANTOPRAZOLE SODIUM 20 MG/1
8 TABLET, DELAYED RELEASE ORAL
Qty: 80 | Refills: 0 | Status: DISCONTINUED | OUTPATIENT
Start: 2022-01-01 | End: 2022-01-02

## 2022-01-01 RX ORDER — PANTOPRAZOLE SODIUM 20 MG/1
80 TABLET, DELAYED RELEASE ORAL ONCE
Refills: 0 | Status: COMPLETED | OUTPATIENT
Start: 2022-01-01 | End: 2022-01-01

## 2022-01-01 RX ORDER — INSULIN HUMAN 100 [IU]/ML
8 INJECTION, SOLUTION SUBCUTANEOUS ONCE
Refills: 0 | Status: COMPLETED | OUTPATIENT
Start: 2022-01-01 | End: 2022-01-01

## 2022-01-01 RX ORDER — SODIUM CHLORIDE 9 MG/ML
1000 INJECTION INTRAMUSCULAR; INTRAVENOUS; SUBCUTANEOUS ONCE
Refills: 0 | Status: COMPLETED | OUTPATIENT
Start: 2022-01-01 | End: 2022-01-01

## 2022-01-01 RX ORDER — METOPROLOL TARTRATE 50 MG
5 TABLET ORAL ONCE
Refills: 0 | Status: COMPLETED | OUTPATIENT
Start: 2022-01-01 | End: 2022-01-01

## 2022-01-01 RX ADMIN — SODIUM CHLORIDE 1000 MILLILITER(S): 9 INJECTION INTRAMUSCULAR; INTRAVENOUS; SUBCUTANEOUS at 21:12

## 2022-01-01 RX ADMIN — PANTOPRAZOLE SODIUM 80 MILLIGRAM(S): 20 TABLET, DELAYED RELEASE ORAL at 20:11

## 2022-01-01 RX ADMIN — Medication 5 MILLIGRAM(S): at 21:42

## 2022-01-01 RX ADMIN — INSULIN HUMAN 8 UNIT(S): 100 INJECTION, SOLUTION SUBCUTANEOUS at 21:42

## 2022-01-01 RX ADMIN — PANTOPRAZOLE SODIUM 10 MG/HR: 20 TABLET, DELAYED RELEASE ORAL at 21:42

## 2022-01-01 RX ADMIN — SODIUM CHLORIDE 1000 MILLILITER(S): 9 INJECTION INTRAMUSCULAR; INTRAVENOUS; SUBCUTANEOUS at 20:11

## 2022-01-01 NOTE — ED ADULT NURSE NOTE - OBJECTIVE STATEMENT
Pt presents to the ED c/o epigastric pain and black tarry stool. Pt tested positive for covid 8 days ago. Denies cP/SOB. Denies dizziness. EKG performed, IV placed. Pt left with call bell in reach.

## 2022-01-01 NOTE — ED PROVIDER NOTE - CRTICAL CARE TIME SPENT (MIN)
Health Maintenance Due   Topic Date Due   • DTaP/Tdap/Td Vaccine (6 - Tdap) 02/18/1996   • Influenza Vaccine (1) 09/01/2020       Patient is due for topics as listed above but is not proceeding with Immunization(s) Dtap/Tdap/Td and Influenza at this time. Education provided for Immunization(s) Dtap/Tdap/Td and Influenza.    Recent PHQ 2/9 Score    PHQ 2:  Date Adult PHQ 2 Score Adult PHQ 2 Interpretation   1/25/2021 0 No further screening needed       PHQ 9:  Date Adult PHQ 9 Score   1/25/2021 0        35

## 2022-01-01 NOTE — ED PROVIDER NOTE - NS_ATTENDINGSCRIBE_ED_ALL_ED
venodyne boots I personally performed the service described in the documentation recorded by the scribe in my presence, and it accurately and completely records my words and actions.

## 2022-01-01 NOTE — ED PROVIDER NOTE - CARE PLAN
1 Principal Discharge DX:	GI bleed  Secondary Diagnosis:	Hyperglycemia  Secondary Diagnosis:	Acute on chronic renal failure

## 2022-01-01 NOTE — ED ADULT TRIAGE NOTE - CHIEF COMPLAINT QUOTE
Pt reports that he gets sharp epigastric pain when he eats and has been having dark tarry stool Pt reports that he gets sharp epigastric pain when he eats and has been having dark tarry stool. Covid + x 8days

## 2022-01-01 NOTE — ED PROVIDER NOTE - PROGRESS NOTE DETAILS
Stool guaiac performed by Dr. Alfredo Santacruz  ). Lot #: [id number], exp. 08/31/2022); Result: (positive); QC- reactive.

## 2022-01-01 NOTE — ED ADULT NURSE NOTE - CHIEF COMPLAINT QUOTE
Pt reports that he gets sharp epigastric pain when he eats and has been having dark tarry stool. Covid + x 8days

## 2022-01-01 NOTE — ED PROVIDER NOTE - OBJECTIVE STATEMENT
57 y/o M with a hx of DM, HTN, cardiac stents, pancreatitis with GI bleed who had URI sx 8 days ago and tested positive for COVID. pt is on ASA and plavix. 3 days ago, pt noticed some black stools and epigastric pain on and off. Pt had been taking Motrin while he was sick with covid.  No N/V.

## 2022-01-01 NOTE — ED PROVIDER NOTE - CLINICAL SUMMARY MEDICAL DECISION MAKING FREE TEXT BOX
pt with hx of diabetes, HTN, stents on plavix and ASA, recently dx having COVID 8 days ago. has been using ibuprofen for fever. here with black stools and intermittent epigastric pain x 3 days.

## 2022-01-01 NOTE — ED PROVIDER NOTE - CRITICAL CARE ATTENDING CONTRIBUTION TO CARE
Critical care time spent evaluating and reevaluating patient, ordering and interpreting diagnostics, ordering therapaeutics, speaking to pt and admitting MD, and documenting. Yoly TATE

## 2022-01-02 LAB
A1C WITH ESTIMATED AVERAGE GLUCOSE RESULT: 13.7 % — HIGH (ref 4–5.6)
ALBUMIN SERPL ELPH-MCNC: 3 G/DL — LOW (ref 3.3–5)
ALP SERPL-CCNC: 53 U/L — SIGNIFICANT CHANGE UP (ref 40–120)
ALT FLD-CCNC: 38 U/L — SIGNIFICANT CHANGE UP (ref 12–78)
AMYLASE P1 CFR SERPL: 56 U/L — SIGNIFICANT CHANGE UP (ref 25–115)
ANION GAP SERPL CALC-SCNC: 6 MMOL/L — SIGNIFICANT CHANGE UP (ref 5–17)
AST SERPL-CCNC: 24 U/L — SIGNIFICANT CHANGE UP (ref 15–37)
BASOPHILS # BLD AUTO: 0.11 K/UL — SIGNIFICANT CHANGE UP (ref 0–0.2)
BASOPHILS NFR BLD AUTO: 1.3 % — SIGNIFICANT CHANGE UP (ref 0–2)
BILIRUB SERPL-MCNC: 0.4 MG/DL — SIGNIFICANT CHANGE UP (ref 0.2–1.2)
BUN SERPL-MCNC: 26 MG/DL — HIGH (ref 7–23)
CALCIUM SERPL-MCNC: 8.3 MG/DL — LOW (ref 8.5–10.1)
CHLORIDE SERPL-SCNC: 103 MMOL/L — SIGNIFICANT CHANGE UP (ref 96–108)
CHOLEST SERPL-MCNC: 157 MG/DL — SIGNIFICANT CHANGE UP
CO2 SERPL-SCNC: 27 MMOL/L — SIGNIFICANT CHANGE UP (ref 22–31)
CREAT SERPL-MCNC: 1.4 MG/DL — HIGH (ref 0.5–1.3)
EOSINOPHIL # BLD AUTO: 0.33 K/UL — SIGNIFICANT CHANGE UP (ref 0–0.5)
EOSINOPHIL NFR BLD AUTO: 3.8 % — SIGNIFICANT CHANGE UP (ref 0–6)
ESTIMATED AVERAGE GLUCOSE: 346 MG/DL — HIGH (ref 68–114)
GLUCOSE SERPL-MCNC: 317 MG/DL — HIGH (ref 70–99)
HCT VFR BLD CALC: 42.4 % — SIGNIFICANT CHANGE UP (ref 39–50)
HDLC SERPL-MCNC: 21 MG/DL — LOW
HGB BLD-MCNC: 14.2 G/DL — SIGNIFICANT CHANGE UP (ref 13–17)
IMM GRANULOCYTES NFR BLD AUTO: 4 % — HIGH (ref 0–1.5)
LACTATE SERPL-SCNC: 0.9 MMOL/L — SIGNIFICANT CHANGE UP (ref 0.7–2)
LIDOCAIN IGE QN: 326 U/L — SIGNIFICANT CHANGE UP (ref 73–393)
LIPID PNL WITH DIRECT LDL SERPL: SIGNIFICANT CHANGE UP MG/DL
LYMPHOCYTES # BLD AUTO: 2.17 K/UL — SIGNIFICANT CHANGE UP (ref 1–3.3)
LYMPHOCYTES # BLD AUTO: 25.1 % — SIGNIFICANT CHANGE UP (ref 13–44)
MAGNESIUM SERPL-MCNC: 1.8 MG/DL — SIGNIFICANT CHANGE UP (ref 1.6–2.6)
MCHC RBC-ENTMCNC: 27.5 PG — SIGNIFICANT CHANGE UP (ref 27–34)
MCHC RBC-ENTMCNC: 33.5 GM/DL — SIGNIFICANT CHANGE UP (ref 32–36)
MCV RBC AUTO: 82.2 FL — SIGNIFICANT CHANGE UP (ref 80–100)
MONOCYTES # BLD AUTO: 0.69 K/UL — SIGNIFICANT CHANGE UP (ref 0–0.9)
MONOCYTES NFR BLD AUTO: 8 % — SIGNIFICANT CHANGE UP (ref 2–14)
NEUTROPHILS # BLD AUTO: 5 K/UL — SIGNIFICANT CHANGE UP (ref 1.8–7.4)
NEUTROPHILS NFR BLD AUTO: 57.8 % — SIGNIFICANT CHANGE UP (ref 43–77)
NON HDL CHOLESTEROL: 136 MG/DL — HIGH
NT-PROBNP SERPL-SCNC: 67 PG/ML — SIGNIFICANT CHANGE UP (ref 0–125)
PHOSPHATE SERPL-MCNC: 2.7 MG/DL — SIGNIFICANT CHANGE UP (ref 2.5–4.5)
PLATELET # BLD AUTO: 266 K/UL — SIGNIFICANT CHANGE UP (ref 150–400)
POTASSIUM SERPL-MCNC: 3.8 MMOL/L — SIGNIFICANT CHANGE UP (ref 3.5–5.3)
POTASSIUM SERPL-SCNC: 3.8 MMOL/L — SIGNIFICANT CHANGE UP (ref 3.5–5.3)
PROT SERPL-MCNC: 6.5 GM/DL — SIGNIFICANT CHANGE UP (ref 6–8.3)
RBC # BLD: 5.16 M/UL — SIGNIFICANT CHANGE UP (ref 4.2–5.8)
RBC # FLD: 13.6 % — SIGNIFICANT CHANGE UP (ref 10.3–14.5)
SODIUM SERPL-SCNC: 136 MMOL/L — SIGNIFICANT CHANGE UP (ref 135–145)
TRIGL SERPL-MCNC: 546 MG/DL — HIGH
TSH SERPL-MCNC: 2.12 UU/ML — SIGNIFICANT CHANGE UP (ref 0.34–4.82)
WBC # BLD: 8.65 K/UL — SIGNIFICANT CHANGE UP (ref 3.8–10.5)
WBC # FLD AUTO: 8.65 K/UL — SIGNIFICANT CHANGE UP (ref 3.8–10.5)

## 2022-01-02 PROCEDURE — 99233 SBSQ HOSP IP/OBS HIGH 50: CPT

## 2022-01-02 RX ORDER — ASPIRIN/CALCIUM CARB/MAGNESIUM 324 MG
81 TABLET ORAL DAILY
Refills: 0 | Status: DISCONTINUED | OUTPATIENT
Start: 2022-01-02 | End: 2022-01-03

## 2022-01-02 RX ORDER — DEXTROSE 50 % IN WATER 50 %
12.5 SYRINGE (ML) INTRAVENOUS ONCE
Refills: 0 | Status: DISCONTINUED | OUTPATIENT
Start: 2022-01-02 | End: 2022-01-03

## 2022-01-02 RX ORDER — FENOFIBRATE,MICRONIZED 130 MG
145 CAPSULE ORAL DAILY
Refills: 0 | Status: DISCONTINUED | OUTPATIENT
Start: 2022-01-02 | End: 2022-01-03

## 2022-01-02 RX ORDER — INSULIN GLARGINE 100 [IU]/ML
30 INJECTION, SOLUTION SUBCUTANEOUS AT BEDTIME
Refills: 0 | Status: DISCONTINUED | OUTPATIENT
Start: 2022-01-02 | End: 2022-01-03

## 2022-01-02 RX ORDER — DEXTROSE 50 % IN WATER 50 %
25 SYRINGE (ML) INTRAVENOUS ONCE
Refills: 0 | Status: DISCONTINUED | OUTPATIENT
Start: 2022-01-02 | End: 2022-01-03

## 2022-01-02 RX ORDER — METOPROLOL TARTRATE 50 MG
25 TABLET ORAL DAILY
Refills: 0 | Status: DISCONTINUED | OUTPATIENT
Start: 2022-01-02 | End: 2022-01-03

## 2022-01-02 RX ORDER — DEXTROSE 50 % IN WATER 50 %
15 SYRINGE (ML) INTRAVENOUS ONCE
Refills: 0 | Status: DISCONTINUED | OUTPATIENT
Start: 2022-01-02 | End: 2022-01-03

## 2022-01-02 RX ORDER — SODIUM CHLORIDE 9 MG/ML
1000 INJECTION, SOLUTION INTRAVENOUS
Refills: 0 | Status: DISCONTINUED | OUTPATIENT
Start: 2022-01-02 | End: 2022-01-03

## 2022-01-02 RX ORDER — SODIUM CHLORIDE 9 MG/ML
1000 INJECTION INTRAMUSCULAR; INTRAVENOUS; SUBCUTANEOUS
Refills: 0 | Status: DISCONTINUED | OUTPATIENT
Start: 2022-01-02 | End: 2022-01-02

## 2022-01-02 RX ORDER — SODIUM CHLORIDE 9 MG/ML
1000 INJECTION INTRAMUSCULAR; INTRAVENOUS; SUBCUTANEOUS
Refills: 0 | Status: DISCONTINUED | OUTPATIENT
Start: 2022-01-02 | End: 2022-01-03

## 2022-01-02 RX ORDER — ATORVASTATIN CALCIUM 80 MG/1
40 TABLET, FILM COATED ORAL AT BEDTIME
Refills: 0 | Status: DISCONTINUED | OUTPATIENT
Start: 2022-01-02 | End: 2022-01-03

## 2022-01-02 RX ORDER — LISINOPRIL 2.5 MG/1
10 TABLET ORAL DAILY
Refills: 0 | Status: DISCONTINUED | OUTPATIENT
Start: 2022-01-02 | End: 2022-01-03

## 2022-01-02 RX ORDER — INSULIN GLARGINE 100 [IU]/ML
30 INJECTION, SOLUTION SUBCUTANEOUS EVERY MORNING
Refills: 0 | Status: DISCONTINUED | OUTPATIENT
Start: 2022-01-02 | End: 2022-01-03

## 2022-01-02 RX ORDER — INSULIN GLARGINE 100 [IU]/ML
25 INJECTION, SOLUTION SUBCUTANEOUS
Qty: 0 | Refills: 0 | DISCHARGE

## 2022-01-02 RX ORDER — INSULIN LISPRO 100/ML
VIAL (ML) SUBCUTANEOUS AT BEDTIME
Refills: 0 | Status: DISCONTINUED | OUTPATIENT
Start: 2022-01-02 | End: 2022-01-03

## 2022-01-02 RX ORDER — FOSINOPRIL SODIUM 10 MG/1
1 TABLET ORAL
Qty: 0 | Refills: 0 | DISCHARGE

## 2022-01-02 RX ORDER — GLUCAGON INJECTION, SOLUTION 0.5 MG/.1ML
1 INJECTION, SOLUTION SUBCUTANEOUS ONCE
Refills: 0 | Status: DISCONTINUED | OUTPATIENT
Start: 2022-01-02 | End: 2022-01-03

## 2022-01-02 RX ORDER — PANTOPRAZOLE SODIUM 20 MG/1
40 TABLET, DELAYED RELEASE ORAL
Refills: 0 | Status: DISCONTINUED | OUTPATIENT
Start: 2022-01-02 | End: 2022-01-03

## 2022-01-02 RX ORDER — INSULIN LISPRO 100/ML
VIAL (ML) SUBCUTANEOUS
Refills: 0 | Status: DISCONTINUED | OUTPATIENT
Start: 2022-01-02 | End: 2022-01-03

## 2022-01-02 RX ADMIN — SODIUM CHLORIDE 100 MILLILITER(S): 9 INJECTION INTRAMUSCULAR; INTRAVENOUS; SUBCUTANEOUS at 06:56

## 2022-01-02 RX ADMIN — Medication 25 MILLIGRAM(S): at 09:27

## 2022-01-02 RX ADMIN — INSULIN GLARGINE 30 UNIT(S): 100 INJECTION, SOLUTION SUBCUTANEOUS at 09:26

## 2022-01-02 RX ADMIN — Medication 6: at 06:55

## 2022-01-02 RX ADMIN — SODIUM CHLORIDE 100 MILLILITER(S): 9 INJECTION INTRAMUSCULAR; INTRAVENOUS; SUBCUTANEOUS at 16:54

## 2022-01-02 RX ADMIN — PANTOPRAZOLE SODIUM 10 MG/HR: 20 TABLET, DELAYED RELEASE ORAL at 07:06

## 2022-01-02 RX ADMIN — PANTOPRAZOLE SODIUM 40 MILLIGRAM(S): 20 TABLET, DELAYED RELEASE ORAL at 21:41

## 2022-01-02 RX ADMIN — Medication 10: at 16:53

## 2022-01-02 RX ADMIN — Medication 2: at 21:58

## 2022-01-02 RX ADMIN — Medication 81 MILLIGRAM(S): at 09:28

## 2022-01-02 RX ADMIN — Medication 145 MILLIGRAM(S): at 13:18

## 2022-01-02 RX ADMIN — ATORVASTATIN CALCIUM 40 MILLIGRAM(S): 80 TABLET, FILM COATED ORAL at 21:41

## 2022-01-02 RX ADMIN — INSULIN GLARGINE 30 UNIT(S): 100 INJECTION, SOLUTION SUBCUTANEOUS at 21:58

## 2022-01-02 RX ADMIN — LISINOPRIL 10 MILLIGRAM(S): 2.5 TABLET ORAL at 09:26

## 2022-01-02 RX ADMIN — Medication 8: at 13:23

## 2022-01-02 NOTE — H&P ADULT - HISTORY OF PRESENT ILLNESS
55 yo M PMH HTN HLD IDDM2, CAD s/p BEATRICE x3 (mLAD, dLAD, and mLCx, most recent 2018) on DAPT, recent covid URTI diagnosed 8 days ago, now asymptomatic but was taking motrin persistently, presents with melena for past day. Pt reports melena is not painful but noticed his stool is dark. Stool is normal consistency otherwise. Denies cp, abd pain, sob, and has fair control of his DM at home per pt on fairly high dose insulin. Denies dizziness, syncope or near syncope. Reports compliance with other medications.  Essential hypertension    Obstructive sleep apnea    Diabetes mellitus    HLD (hyperlipidemia)    CAD (coronary artery disease)    Pancreatitis    History of coronary artery stent placement    S/P cholecystectomy      	  Vitals:  T(C): 37.1 (01-01-22 @ 19:11), Max: 37.1 (01-01-22 @ 19:11)  HR: 85 (01-01-22 @ 21:37) (82 - 85)  BP: 158/90 (01-01-22 @ 22:51) (158/90 - 186/101)  RR: 19 (01-01-22 @ 21:04) (19 - 20)  SpO2: 100% (01-01-22 @ 21:04) (97% - 100%)  Wt(kg): --  I&O's Summary    Height (cm): 182.9 (01-01 @ 18:30)  Weight (kg): 117 (01-01 @ 18:30)  BMI (kg/m2): 35 (01-01 @ 18:30)    PHYSICAL EXAM:  Appearance: Comfortable. No acute distress  HEENT:  Head and neck: Atraumatic. Normocephalic.  Normal oral mucosa, PERRL, Neck is supple. No JVD, No carotid bruit.   Neurologic: A & O x 3, no focal deficits. EOMI , Cranial nerves are intact.  Lymphatic: No cervical lymphadenopathy  Cardiovascular: Normal S1 S2, No murmur, rubs/gallops. No JVD, No edema  Respiratory: Lungs clear to auscultation  Gastrointestinal:  Soft, Non-tender, + BS  Lower Extremities: No edema  Psychiatry: Patient is calm. No agitation. Mood & affect appropriate  Skin: No rashes/ ecchymoses/cyanosis/ulcers visualized on the face, hands or feet.    CURRENT MEDICATIONS:  lisinopril 10 milliGRAM(s) Oral daily  metoprolol succinate ER 25 milliGRAM(s) Oral daily    dextrose 5%. 1000 (50 mL/Hr) IV Continuous  dextrose 5%. 1000 (100 mL/Hr) IV Continuous  pantoprazole Infusion  atorvastatin  dextrose 40% Gel  dextrose 50% Injectable  dextrose 50% Injectable  dextrose 50% Injectable  fenofibrate Tablet  glucagon  Injectable  insulin glargine Injectable (LANTUS)  insulin glargine Injectable (LANTUS)  insulin lispro (ADMELOG) corrective regimen sliding scale  insulin lispro (ADMELOG) corrective regimen sliding scale  aspirin  chewable  dextrose 5%.  dextrose 5%.      LABS:	 	                              13.7   x     )-----------( x        ( 01 Jan 2022 22:47 )             40.0     01-01    131<L>  |  95<L>  |  36<H>  ----------------------------<  473<HH>  4.1   |  30  |  2.17<H>    Ca    8.4<L>      01 Jan 2022 20:09    TPro  6.4  /  Alb  2.9<L>  /  TBili  0.5  /  DBili  x   /  AST  35  /  ALT  48  /  AlkPhos  74  01-01< from: TTE Echo Complete w/o Contrast w/ Doppler (09.17.20 @ 10:22) >  Summary     The left ventricle is normal in size, wall thickness, wall motion and   contractility. Estimated left ventricular ejection fraction is 60%.   Mild diastolic dysfunction (stage I).   Trace aortic regurgitation.   Mild aortic stenosis.    < end of copied text >      Cath 2018:   LMCA: Normal.    LAD: Diffuse irregularity.There is a 75% ISR in mLAD and 80% de-homer in dLAD    LCx: Diffuse irregularity.Severe stenosis in pLCx     Mid CX: Proximal subsection.99% stenosis reduced to 1%  (follow up cath staged PCI to LAD Oct 2020) 57 yo M PMH HTN HLD IDDM2, CKD?3 CAD s/p BEATRICE x3 (mLAD, dLAD, and mLCx, most recent 2018) on DAPT, recent covid URTI diagnosed 8 days ago, now asymptomatic but was taking motrin persistently, presents with melena for past day. Pt reports melena is not painful but noticed his stool is dark. Stool is normal consistency otherwise. Denies cp, abd pain, sob, and has fair control of his DM at home per pt on fairly high dose insulin. Denies dizziness, syncope or near syncope. Reports compliance with other medications.  Essential hypertension    Obstructive sleep apnea    Diabetes mellitus    HLD (hyperlipidemia)    CAD (coronary artery disease)    Pancreatitis    History of coronary artery stent placement    S/P cholecystectomy      	  Vitals:  T(C): 37.1 (01-01-22 @ 19:11), Max: 37.1 (01-01-22 @ 19:11)  HR: 85 (01-01-22 @ 21:37) (82 - 85)  BP: 158/90 (01-01-22 @ 22:51) (158/90 - 186/101)  RR: 19 (01-01-22 @ 21:04) (19 - 20)  SpO2: 100% (01-01-22 @ 21:04) (97% - 100%)  Wt(kg): --  I&O's Summary    Height (cm): 182.9 (01-01 @ 18:30)  Weight (kg): 117 (01-01 @ 18:30)  BMI (kg/m2): 35 (01-01 @ 18:30)    PHYSICAL EXAM:  Appearance: Comfortable. No acute distress  HEENT:  Head and neck: Atraumatic. Normocephalic.  Normal oral mucosa, PERRL, Neck is supple. No JVD, No carotid bruit.   Neurologic: A & O x 3, no focal deficits. EOMI , Cranial nerves are intact.  Lymphatic: No cervical lymphadenopathy  Cardiovascular: Normal S1 S2, No murmur, rubs/gallops. No JVD, No edema  Respiratory: Lungs clear to auscultation  Gastrointestinal:  Soft, Non-tender, + BS  Lower Extremities: No edema  Psychiatry: Patient is calm. No agitation. Mood & affect appropriate  Skin: No rashes/ ecchymoses/cyanosis/ulcers visualized on the face, hands or feet.    CURRENT MEDICATIONS:  lisinopril 10 milliGRAM(s) Oral daily  metoprolol succinate ER 25 milliGRAM(s) Oral daily    dextrose 5%. 1000 (50 mL/Hr) IV Continuous  dextrose 5%. 1000 (100 mL/Hr) IV Continuous  pantoprazole Infusion  atorvastatin  dextrose 40% Gel  dextrose 50% Injectable  dextrose 50% Injectable  dextrose 50% Injectable  fenofibrate Tablet  glucagon  Injectable  insulin glargine Injectable (LANTUS)  insulin glargine Injectable (LANTUS)  insulin lispro (ADMELOG) corrective regimen sliding scale  insulin lispro (ADMELOG) corrective regimen sliding scale  aspirin  chewable  dextrose 5%.  dextrose 5%.      LABS:	 	                              13.7   x     )-----------( x        ( 01 Jan 2022 22:47 )             40.0     01-01    131<L>  |  95<L>  |  36<H>  ----------------------------<  473<HH>  4.1   |  30  |  2.17<H>    Ca    8.4<L>      01 Jan 2022 20:09    TPro  6.4  /  Alb  2.9<L>  /  TBili  0.5  /  DBili  x   /  AST  35  /  ALT  48  /  AlkPhos  74  01-01< from: TTE Echo Complete w/o Contrast w/ Doppler (09.17.20 @ 10:22) >  Summary     The left ventricle is normal in size, wall thickness, wall motion and   contractility. Estimated left ventricular ejection fraction is 60%.   Mild diastolic dysfunction (stage I).   Trace aortic regurgitation.   Mild aortic stenosis.    < end of copied text >      Cath 2018:   LMCA: Normal.    LAD: Diffuse irregularity.There is a 75% ISR in mLAD and 80% de-homer in dLAD    LCx: Diffuse irregularity.Severe stenosis in pLCx     Mid CX: Proximal subsection.99% stenosis reduced to 1%  (follow up cath staged PCI to LAD Oct 2020) 57 yo M PMH HTN HLD IDDM2, CKD?3 CAD s/p BEATRICE x3 (mLAD, dLAD, and mLCx, most recent 2018) on DAPT, recent covid URTI diagnosed 8 days ago, now asymptomatic but was taking motrin persistently, presents with melena for past day. Pt reports melena is not painful but noticed his stool is dark. Stool is normal consistency otherwise. Denies cp, abd pain, sob, and has fair control of his DM at home per pt on fairly high dose insulin. Denies dizziness, syncope or near syncope. Reports compliance with other medications.  Essential hypertension    Obstructive sleep apnea    Diabetes mellitus    HLD (hyperlipidemia)    CAD (coronary artery disease)    Pancreatitis    History of coronary artery stent placement    S/P cholecystectomy, tonsillectomy, b/l rotator cuff repair  SH denies x3  FH cad htn dm      	  Vitals:  T(C): 37.1 (01-01-22 @ 19:11), Max: 37.1 (01-01-22 @ 19:11)  HR: 85 (01-01-22 @ 21:37) (82 - 85)  BP: 158/90 (01-01-22 @ 22:51) (158/90 - 186/101)  RR: 19 (01-01-22 @ 21:04) (19 - 20)  SpO2: 100% (01-01-22 @ 21:04) (97% - 100%)  Wt(kg): --  I&O's Summary    Height (cm): 182.9 (01-01 @ 18:30)  Weight (kg): 117 (01-01 @ 18:30)  BMI (kg/m2): 35 (01-01 @ 18:30)    PHYSICAL EXAM:  Appearance: Comfortable. No acute distress  HEENT:  Head and neck: Atraumatic. Normocephalic.  Normal oral mucosa, PERRL, Neck is supple. No JVD, No carotid bruit.   Neurologic: A & O x 3, no focal deficits. EOMI , Cranial nerves are intact.  Lymphatic: No cervical lymphadenopathy  Cardiovascular: Normal S1 S2, No murmur, rubs/gallops. No JVD, No edema  Respiratory: Lungs clear to auscultation  Gastrointestinal:  Soft, Non-tender, + BS  Lower Extremities: No edema  Psychiatry: Patient is calm. No agitation. Mood & affect appropriate  Skin: No rashes/ ecchymoses/cyanosis/ulcers visualized on the face, hands or feet.    CURRENT MEDICATIONS:  lisinopril 10 milliGRAM(s) Oral daily  metoprolol succinate ER 25 milliGRAM(s) Oral daily    dextrose 5%. 1000 (50 mL/Hr) IV Continuous  dextrose 5%. 1000 (100 mL/Hr) IV Continuous  pantoprazole Infusion  atorvastatin  dextrose 40% Gel  dextrose 50% Injectable  dextrose 50% Injectable  dextrose 50% Injectable  fenofibrate Tablet  glucagon  Injectable  insulin glargine Injectable (LANTUS)  insulin glargine Injectable (LANTUS)  insulin lispro (ADMELOG) corrective regimen sliding scale  insulin lispro (ADMELOG) corrective regimen sliding scale  aspirin  chewable  dextrose 5%.  dextrose 5%.      LABS:	 	                              13.7   x     )-----------( x        ( 01 Jan 2022 22:47 )             40.0     01-01    131<L>  |  95<L>  |  36<H>  ----------------------------<  473<HH>  4.1   |  30  |  2.17<H>    Ca    8.4<L>      01 Jan 2022 20:09    TPro  6.4  /  Alb  2.9<L>  /  TBili  0.5  /  DBili  x   /  AST  35  /  ALT  48  /  AlkPhos  74  01-01< from: TTE Echo Complete w/o Contrast w/ Doppler (09.17.20 @ 10:22) >  Summary     The left ventricle is normal in size, wall thickness, wall motion and   contractility. Estimated left ventricular ejection fraction is 60%.   Mild diastolic dysfunction (stage I).   Trace aortic regurgitation.   Mild aortic stenosis.    < end of copied text >      Cath 2018:   LMCA: Normal.    LAD: Diffuse irregularity.There is a 75% ISR in mLAD and 80% de-homer in dLAD    LCx: Diffuse irregularity.Severe stenosis in pLCx     Mid CX: Proximal subsection.99% stenosis reduced to 1%  (follow up cath staged PCI to LAD Oct 2020)

## 2022-01-02 NOTE — CONSULT NOTE ADULT - SUBJECTIVE AND OBJECTIVE BOX
GI consult    HPI:  55 yo M PMH HTN HLD IDDM2, CKD?3 CAD s/p BEATRICE x3 (mLAD, dLAD, and mLCx, most recent 2018) on DAPT, recent covid URTI diagnosed 8 days ago, now asymptomatic but was taking motrin persistently, presents with melena for past day. Pt reports melena is not painful but noticed his stool is dark. Stool is normal consistency otherwise. Denies cp, abd pain, sob, and has fair control of his DM at home per pt on fairly high dose insulin. Denies dizziness, syncope or near syncope. Reports compliance with other medications.    Pt known to me. Had EGD and colonoscopy 2/2020 with findings of hiatal hernia and diverticulosis. No bleeding focus. Now with black stool in setting of ASA/plavix/motrin but hgb stable. No n/v, heartburn, dysphagia, WL, abd pain or hematochezia.       PAST MEDICAL & SURGICAL HISTORY:  Essential hypertension    Obstructive sleep apnea    Diabetes mellitus    HLD (hyperlipidemia)    CAD (coronary artery disease)    Pancreatitis    History of coronary artery stent placement  Placed 9/12/18 by Dr. Kaye    S/P cholecystectomy        Home Medications:  ergocalciferol 50,000 intl units (1.25 mg) oral capsule: 1 cap(s) orally once a week (02 Oct 2020 14:13)  fenofibrate 160 mg oral tablet: 1 tab(s) orally once a day (02 Oct 2020 14:13)  fosinopril 20 mg oral tablet: 0.5 tab(s) orally once a day (02 Jan 2022 00:16)  hydroCHLOROthiazide 25 mg oral tablet: 1 tab(s) orally once a day (02 Jan 2022 00:20)  insulin regular (concentrated) 500 units/mL human recombinant subcutaneous solution: 40 unit(s) subcutaneous 2 times a day (02 Jan 2022 00:18)  Jardiance 25 mg oral tablet: 1 tab(s) orally once a day (in the morning) (02 Oct 2020 14:13)  Lipitor 40 mg oral tablet: 1 tab(s) orally once a day (at bedtime) (02 Oct 2020 14:13)  metoprolol succinate 50 mg oral tablet, extended release: 0.5 tab(s) orally once a day (02 Jan 2022 00:19)      MEDICATIONS  (STANDING):  aspirin  chewable 81 milliGRAM(s) Oral daily  atorvastatin 40 milliGRAM(s) Oral at bedtime  dextrose 40% Gel 15 Gram(s) Oral once  dextrose 5%. 1000 milliLiter(s) (50 mL/Hr) IV Continuous <Continuous>  dextrose 5%. 1000 milliLiter(s) (100 mL/Hr) IV Continuous <Continuous>  dextrose 50% Injectable 25 Gram(s) IV Push once  dextrose 50% Injectable 12.5 Gram(s) IV Push once  dextrose 50% Injectable 25 Gram(s) IV Push once  fenofibrate Tablet 145 milliGRAM(s) Oral daily  glucagon  Injectable 1 milliGRAM(s) IntraMuscular once  insulin glargine Injectable (LANTUS) 30 Unit(s) SubCutaneous every morning  insulin glargine Injectable (LANTUS) 30 Unit(s) SubCutaneous at bedtime  insulin lispro (ADMELOG) corrective regimen sliding scale   SubCutaneous three times a day before meals  insulin lispro (ADMELOG) corrective regimen sliding scale   SubCutaneous at bedtime  lisinopril 10 milliGRAM(s) Oral daily  metoprolol succinate ER 25 milliGRAM(s) Oral daily  pantoprazole    Tablet 40 milliGRAM(s) Oral two times a day  sodium chloride 0.9%. 1000 milliLiter(s) (100 mL/Hr) IV Continuous <Continuous>    MEDICATIONS  (PRN):      Allergies    penicillin (Hives)    Intolerances    SOCIAL HISTORY: no toxic habits    FAMILY HISTORY:  Family history of diabetes mellitus (Sibling)    ROS  As above  Otherwise unremarkable, all systems reviewed    PE:  Vital Signs Last 24 Hrs  T(C): 36.7 (02 Jan 2022 15:18), Max: 37.1 (01 Jan 2022 19:11)  T(F): 98 (02 Jan 2022 15:18), Max: 98.8 (01 Jan 2022 19:11)  HR: 84 (02 Jan 2022 15:18) (76 - 86)  BP: 162/88 (02 Jan 2022 15:18) (146/75 - 186/101)  BP(mean): 118 (02 Jan 2022 11:17) (111 - 126)  RR: 18 (02 Jan 2022 15:18) (16 - 20)  SpO2: 97% (02 Jan 2022 15:18) (95% - 100%)    Constitutional: NAD, well-developed, A+Ox3  Anicteric   Respiratory: CTABL, breathing comfortably  Cardiovascular: S1 and S2, RRR  Gastrointestinal: +BS, soft, non tender, non distended, no mass  Extremities: warm, well perfused, no edema  Psychiatric: Normal mood, normal affect  Neuro: moves all extremities, grossly intact  Skin: No rashes or lesions    LABS:                        14.2   8.65  )-----------( 266      ( 02 Jan 2022 07:21 )             42.4     01-02    136  |  103  |  26<H>  ----------------------------<  317<H>  3.8   |  27  |  1.40<H>    Ca    8.3<L>      02 Jan 2022 07:21  Phos  2.7     01-02  Mg     1.8     01-02    TPro  6.5  /  Alb  3.0<L>  /  TBili  0.4  /  DBili  x   /  AST  24  /  ALT  38  /  AlkPhos  53  01-02    PT/INR - ( 01 Jan 2022 20:09 )   PT: 11.5 sec;   INR: 0.99 ratio         PTT - ( 01 Jan 2022 20:09 )  PTT:32.7 sec  LIVER FUNCTIONS - ( 02 Jan 2022 07:21 )  Alb: 3.0 g/dL / Pro: 6.5 gm/dL / ALK PHOS: 53 U/L / ALT: 38 U/L / AST: 24 U/L / GGT: x             RADIOLOGY & ADDITIONAL STUDIES:

## 2022-01-02 NOTE — ED ADULT NURSE REASSESSMENT NOTE - NS ED NURSE REASSESS COMMENT FT1
Pt  and /88, HR 85. Pt ok to receive IV lopressor and insulin orders per Yoly TATE. Pt with complaints at this time. No needs stated. Left with call bell in reach.
assumed care from COREY Pandya, pt p/w c/o melena, denies dizziness, chest pain, sob, fever, cough.  pt educated on POC.

## 2022-01-02 NOTE — PATIENT PROFILE ADULT - FALL HARM RISK - UNIVERSAL INTERVENTIONS
Bed in lowest position, wheels locked, appropriate side rails in place/Call bell, personal items and telephone in reach/Instruct patient to call for assistance before getting out of bed or chair/Non-slip footwear when patient is out of bed/[place] to call system/Physically safe environment - no spills, clutter or unnecessary equipment/Purposeful Proactive Rounding/Room/bathroom lighting operational, light cord in reach

## 2022-01-02 NOTE — H&P ADULT - ASSESSMENT
55 yo M PMH HTN HLD IDDM2, CAD s/p BEATRICE x3 (mLAD, dLAD, and mLCx, most recent 2018) on DAPT, recent covid URTI diagnosed 8 days ago, now asymptomatic but was taking motrin persistently, presents with melena for past day. Pt reports melena is not painful but noticed his stool is dark. Stool is normal consistency otherwise. Denies cp, abd pain, sob, and has fair control of his DM at home per pt on fairly high dose insulin. Denies dizziness, syncope or near syncope. Reports compliance with other medications.    GI bleed - melena, suspect gastric ulcer  Continue ASA despite bleed given hx of CAD and multiple stents with minimal blood loss  CBC q12  PPI q12 if CBC stable  GI consult  2 large bore peripheral IV    CAD/HTN/HLD  most recent stent 2018, LCx, LAD  Continue ASA 81mg to avoid stent thrombosis  Will hold plavix at this time  Continue metoprolol and fosinopril (lisinopril 10mg while inpt)  Statin    COVID infection  Minimal symptoms, hold motrin   IVF as needed  Supportive care    IDDM2  On Regular insulin outpt 40 units SC q12  Start lantus 30 units nightly and in AM 20 Units with hold parameters for FS <200    VTE ppx  SCD 57 yo M PMH HTN HLD IDDM2, CKD ?3, CAD s/p BEATRICE x3 (mLAD, dLAD, and mLCx, most recent 2018) on DAPT, recent covid URTI diagnosed 8 days ago, now asymptomatic but was taking motrin persistently, presents with melena for past day. Pt reports melena is not painful but noticed his stool is dark. Stool is normal consistency otherwise. Denies cp, abd pain, sob, and has fair control of his DM at home per pt on fairly high dose insulin. Denies dizziness, syncope or near syncope. Reports compliance with other medications.    GI bleed - melena, suspect gastric ulcer  Continue ASA despite bleed given hx of CAD and multiple stents with minimal blood loss  CBC q12  PPI q12 if CBC stable  GI consult  2 large bore peripheral IV    CAD/HTN/HLD  most recent stent 2018, LCx, LAD  Continue ASA 81mg to avoid stent thrombosis  Will hold plavix at this time  Continue metoprolol and fosinopril (lisinopril 10mg while inpt)  Statin    COVID infection  Minimal symptoms, hold motrin   IVF as needed  Supportive care    IDDM2  On Regular insulin outpt 40 units SC q12  Start lantus 30 units nightly and in AM 20 Units with hold parameters for FS <200    VIKKI on CKD secondary to covid and motrin use  Cr 2.1 baseline 1.3-1.5  IVF  UA and urine studies if no improvement with IVF    VTE ppx  SCD 57 yo M PMH HTN HLD IDDM2, CKD ?3, CAD s/p BEATRICE x3 (mLAD, dLAD, and mLCx, most recent 2018) on DAPT, recent covid URTI diagnosed 8 days ago, now asymptomatic but was taking motrin persistently, presents with melena for past day. Pt reports melena is not painful but noticed his stool is dark. Stool is normal consistency otherwise. Denies cp, abd pain, sob, and has fair control of his DM at home per pt on concentrated high dose insulin (U500 humilin 30units BID). Denies dizziness, syncope or near syncope. Reports compliance with other medications.    GI bleed - melena, suspect gastric ulcer, however pt reports when he had pancreatitis in past (reportedly was put on possibly creon in past and reports his black stools were a result of pancreatitis)  Continued mild RLQ pain with deep palpation  Continue ASA despite bleed given hx of CAD and multiple stents with minimal blood loss  CBC q12  PPI q12 if CBC stable  GI consult  2 large bore peripheral IV    ?Chronic pancreatitis not on medications  S/P cholecystectomy    CAD/HTN/HLD  most recent stent 2018, LCx, LAD  Continue ASA 81mg to avoid stent thrombosis  Will hold plavix at this time  Continue metoprolol and fosinopril (lisinopril 10mg while inpt)  Statin  Weight loss and lifestyle changes advised    COVID infection  Minimal symptoms, hold motrin   IVF as needed  Supportive care    IDDM2  On Regular insulin outpt 40 units SC q12  Start lantus 30 units nightly and in AM 20 Units with hold parameters for FS <200    VIKKI on CKD secondary to covid and motrin use  Cr 2.1 baseline 1.3-1.5  IVF  UA and urine studies if no improvement with IVF    VTE ppx  SCD

## 2022-01-02 NOTE — PROGRESS NOTE ADULT - ASSESSMENT
57 yo M PMH HTN HLD IDDM2, CKD ?3, CAD s/p BEATRICE x3 (mLAD, dLAD, and mLCx, most recent 2018) on DAPT, recent covid URTI diagnosed 8 days ago, now asymptomatic but was taking motrin persistently, presents with melena for past day.     # GI bleed - melena  - Continue ASA despite bleed given hx of CAD and multiple stents with minimal blood loss  - CBC q12  - PPI  - GI consult  - History of Melena in the past, sp colonoscopy x 2 many years ago, polyps +  - T&S, transfuse if necessary    # CAD  -most recent stent 2018, LCx, LAD  -Continue ASA 81mg to avoid stent thrombosis  -Will hold plavix at this time    # HTN  - Continue metoprolol and fosinopril (lisinopril 10mg while inpt)    # HLD  - Statin  - Weight loss and lifestyle changes advised    # COVID infection  - Minimal symptoms  - Not requiring 02  -Supportive care    # IDDM2  - On Regular insulin outpt 40 units SC q12  - Start lantus 30 units nightly and in AM 20 Units with hold parameters for FS <200    # VIKKI on CKD secondary to covid and motrin use  -Cr 2.1 on admission, down to 1.4  -IVF    # VTE ppx  -SCD

## 2022-01-02 NOTE — CONSULT NOTE ADULT - ASSESSMENT
56M on ASA/plavix admitted with suspected GIB in setting of motrin use. Hgb remains normal after 3 serial measurements. EGD/colonoscopy within the year unrevealing except diverticulosis.     Bleeding is likely not significant given above. Would not re-evaluate with endoscopy at this time.  Changed PPI to BID.  Advanced diet.  Can d/c home when otherwise medically cleared.   Please call with GI questions.

## 2022-01-02 NOTE — PROGRESS NOTE ADULT - SUBJECTIVE AND OBJECTIVE BOX
Patient seen and examined:  No acute events overnight.  Still having Melena. No sob or  chest pain.     ROS: Negative except for above    All other systems reviewed and found to be negative with the exception of what has been described above.    MEDICATIONS  (STANDING):  aspirin  chewable 81 milliGRAM(s) Oral daily  atorvastatin 40 milliGRAM(s) Oral at bedtime  dextrose 40% Gel 15 Gram(s) Oral once  dextrose 5%. 1000 milliLiter(s) (50 mL/Hr) IV Continuous <Continuous>  dextrose 5%. 1000 milliLiter(s) (100 mL/Hr) IV Continuous <Continuous>  dextrose 50% Injectable 25 Gram(s) IV Push once  dextrose 50% Injectable 12.5 Gram(s) IV Push once  dextrose 50% Injectable 25 Gram(s) IV Push once  fenofibrate Tablet 145 milliGRAM(s) Oral daily  glucagon  Injectable 1 milliGRAM(s) IntraMuscular once  insulin glargine Injectable (LANTUS) 30 Unit(s) SubCutaneous every morning  insulin glargine Injectable (LANTUS) 30 Unit(s) SubCutaneous at bedtime  insulin lispro (ADMELOG) corrective regimen sliding scale   SubCutaneous three times a day before meals  insulin lispro (ADMELOG) corrective regimen sliding scale   SubCutaneous at bedtime  lisinopril 10 milliGRAM(s) Oral daily  metoprolol succinate ER 25 milliGRAM(s) Oral daily  pantoprazole Infusion 8 mG/Hr (10 mL/Hr) IV Continuous <Continuous>  sodium chloride 0.9%. 1000 milliLiter(s) (100 mL/Hr) IV Continuous <Continuous>    MEDICATIONS  (PRN):      VITALS:  T(F): 97.2 (01-02-22 @ 11:17), Max: 98.8 (01-01-22 @ 19:11)  HR: 76 (01-02-22 @ 11:17) (76 - 85)  BP: 157/100 (01-02-22 @ 11:17) (154/91 - 186/101)  RR: 18 (01-02-22 @ 11:17) (16 - 20)  SpO2: 100% (01-02-22 @ 11:17) (95% - 100%)  Wt(kg): --          POCT Blood Glucose.: 312 mg/dL (02 Jan 2022 09:24)  POCT Blood Glucose.: 287 mg/dL (02 Jan 2022 06:41)  POCT Blood Glucose.: 246 mg/dL (02 Jan 2022 00:00)  POCT Blood Glucose.: 310 mg/dL (01 Jan 2022 21:39)      PHYSICAL EXAM:  GEN: NAD, Obese  HEENT:  pupils equal and reactive, EOMI, no oropharyngeal lesions, erythema, exudates, oral thrush  NECK:   supple, no carotid bruits, no palpable lymph nodes, no thyromegaly  CV:  +S1, +S2, regular, no murmurs or rubs  RESP:   lungs clear to auscultation bilaterally, no wheezing, rales, rhonchi, good air entry bilaterally  BREAST:  not examined  GI:  abdomen soft, non-tender, non-distended, normal BS, no bruits, no abdominal masses, no palpable masses  RECTAL:  not examined  :  not examined  MSK:   normal muscle tone, no atrophy, no rigidity, no contractions  EXT:  no clubbing, no cyanosis, no edema, no calf pain, swelling or erythema  VASCULAR:  pulses equal and symmetric in the upper and lower extremities  NEURO:  AAOX3, no focal neurological deficits, follows all commands, able to move extremities spontaneously  SKIN:  no ulcers, lesions or rashes    LABS:                            14.2   8.65  )-----------( 266      ( 02 Jan 2022 07:21 )             42.4     01-02    136  |  103  |  26<H>  ----------------------------<  317<H>  3.8   |  27  |  1.40<H>    Ca    8.3<L>      02 Jan 2022 07:21  Phos  2.7     01-02  Mg     1.8     01-02    TPro  6.5  /  Alb  3.0<L>  /  TBili  0.4  /  DBili  x   /  AST  24  /  ALT  38  /  AlkPhos  53  01-02        LIVER FUNCTIONS - ( 02 Jan 2022 07:21 )  Alb: 3.0 g/dL / Pro: 6.5 gm/dL / ALK PHOS: 53 U/L / ALT: 38 U/L / AST: 24 U/L / GGT: x           PT/INR - ( 01 Jan 2022 20:09 )   PT: 11.5 sec;   INR: 0.99 ratio         PTT - ( 01 Jan 2022 20:09 )  PTT:32.7 sec      Lactate, Blood: 0.9 mmol/L (01-02 @ 07:21)  Lactate, Blood: 0.8 mmol/L (01-01 @ 20:09)

## 2022-01-03 ENCOUNTER — TRANSCRIPTION ENCOUNTER (OUTPATIENT)
Age: 57
End: 2022-01-03

## 2022-01-03 VITALS
OXYGEN SATURATION: 99 % | HEART RATE: 79 BPM | RESPIRATION RATE: 18 BRPM | TEMPERATURE: 98 F | SYSTOLIC BLOOD PRESSURE: 154 MMHG | DIASTOLIC BLOOD PRESSURE: 83 MMHG

## 2022-01-03 LAB
ALBUMIN SERPL ELPH-MCNC: 2.9 G/DL — LOW (ref 3.3–5)
ALP SERPL-CCNC: 47 U/L — SIGNIFICANT CHANGE UP (ref 40–120)
ALT FLD-CCNC: 45 U/L — SIGNIFICANT CHANGE UP (ref 12–78)
ANION GAP SERPL CALC-SCNC: 7 MMOL/L — SIGNIFICANT CHANGE UP (ref 5–17)
AST SERPL-CCNC: 34 U/L — SIGNIFICANT CHANGE UP (ref 15–37)
BILIRUB SERPL-MCNC: 0.5 MG/DL — SIGNIFICANT CHANGE UP (ref 0.2–1.2)
BUN SERPL-MCNC: 13 MG/DL — SIGNIFICANT CHANGE UP (ref 7–23)
CALCIUM SERPL-MCNC: 8.7 MG/DL — SIGNIFICANT CHANGE UP (ref 8.5–10.1)
CHLORIDE SERPL-SCNC: 108 MMOL/L — SIGNIFICANT CHANGE UP (ref 96–108)
CO2 SERPL-SCNC: 22 MMOL/L — SIGNIFICANT CHANGE UP (ref 22–31)
CREAT SERPL-MCNC: 1.17 MG/DL — SIGNIFICANT CHANGE UP (ref 0.5–1.3)
GLUCOSE SERPL-MCNC: 236 MG/DL — HIGH (ref 70–99)
HCT VFR BLD CALC: 42.8 % — SIGNIFICANT CHANGE UP (ref 39–50)
HGB BLD-MCNC: 14.4 G/DL — SIGNIFICANT CHANGE UP (ref 13–17)
MCHC RBC-ENTMCNC: 28.2 PG — SIGNIFICANT CHANGE UP (ref 27–34)
MCHC RBC-ENTMCNC: 33.6 GM/DL — SIGNIFICANT CHANGE UP (ref 32–36)
MCV RBC AUTO: 83.9 FL — SIGNIFICANT CHANGE UP (ref 80–100)
PLATELET # BLD AUTO: 250 K/UL — SIGNIFICANT CHANGE UP (ref 150–400)
POTASSIUM SERPL-MCNC: 3.7 MMOL/L — SIGNIFICANT CHANGE UP (ref 3.5–5.3)
POTASSIUM SERPL-SCNC: 3.7 MMOL/L — SIGNIFICANT CHANGE UP (ref 3.5–5.3)
PROT SERPL-MCNC: 6.5 GM/DL — SIGNIFICANT CHANGE UP (ref 6–8.3)
RBC # BLD: 5.1 M/UL — SIGNIFICANT CHANGE UP (ref 4.2–5.8)
RBC # FLD: 13.9 % — SIGNIFICANT CHANGE UP (ref 10.3–14.5)
SODIUM SERPL-SCNC: 137 MMOL/L — SIGNIFICANT CHANGE UP (ref 135–145)
WBC # BLD: 7.4 K/UL — SIGNIFICANT CHANGE UP (ref 3.8–10.5)
WBC # FLD AUTO: 7.4 K/UL — SIGNIFICANT CHANGE UP (ref 3.8–10.5)

## 2022-01-03 PROCEDURE — 99239 HOSP IP/OBS DSCHRG MGMT >30: CPT

## 2022-01-03 RX ORDER — PANTOPRAZOLE SODIUM 20 MG/1
1 TABLET, DELAYED RELEASE ORAL
Qty: 60 | Refills: 0
Start: 2022-01-03

## 2022-01-03 RX ADMIN — PANTOPRAZOLE SODIUM 40 MILLIGRAM(S): 20 TABLET, DELAYED RELEASE ORAL at 09:43

## 2022-01-03 RX ADMIN — Medication 81 MILLIGRAM(S): at 09:44

## 2022-01-03 RX ADMIN — INSULIN GLARGINE 30 UNIT(S): 100 INJECTION, SOLUTION SUBCUTANEOUS at 08:06

## 2022-01-03 RX ADMIN — LISINOPRIL 10 MILLIGRAM(S): 2.5 TABLET ORAL at 09:44

## 2022-01-03 RX ADMIN — Medication 8: at 11:25

## 2022-01-03 RX ADMIN — Medication 2: at 08:06

## 2022-01-03 RX ADMIN — Medication 25 MILLIGRAM(S): at 09:43

## 2022-01-03 RX ADMIN — Medication 145 MILLIGRAM(S): at 09:43

## 2022-01-03 NOTE — DISCHARGE NOTE PROVIDER - CARE PROVIDER_API CALL
Ron Rogel)  Family Medicine  [address], [address]  [place] [address]  Phone: [phone]  Fax: [phone]  Follow Up Time:     Lefty Cooper)  Gastroenterology; Internal Medicine  [address] [place] [address]  Phone: [phone]  Fax: [phone]  Follow Up Time:

## 2022-01-03 NOTE — DISCHARGE NOTE NURSING/CASE MANAGEMENT/SOCIAL WORK - NSDCVIVACCINE_GEN_ALL_CORE_FT
influenza, injectable, quadrivalent, preservative free; 12-Oct-2017 15:08; Kim Samano (RN); [employer]; [id number]; IntraMuscular; Deltoid Left.; 0.5 milliLiter(s); VIS (VIS Published: 07-Aug-2015, VIS Presented: 12-Oct-2017);   influenza, injectable, quadrivalent, preservative free; 13-Sep-2018 12:04; Eliane Otero (RN); [employer]; [id number] (Exp. Date: 30-Jun-2019); IntraMuscular; Deltoid Left.; 0.5 milliLiter(s); VIS (VIS Published: 07-Aug-2015, VIS Presented: 13-Sep-2018);   influenza, injectable, quadrivalent, preservative free; 03-Dec-2019 13:24; Benita Agosto (RN); [employer]; [id number] (Exp. Date: 30-Jun-2020); IntraMuscular; Deltoid Left.; 0.5 milliLiter(s); VIS (VIS Published: 15-Aug-2019, VIS Presented: 03-Dec-2019);

## 2022-01-03 NOTE — DISCHARGE NOTE NURSING/CASE MANAGEMENT/SOCIAL WORK - PATIENT PORTAL LINK FT
You can access the FollowMyHealth Patient Portal offered by [facility] by registering at the following website: [email or web address]. By joining [employer]’s FollowMyHealth portal, you will also be able to view your health information using other applications (apps) compatible with our system.

## 2022-01-03 NOTE — DISCHARGE NOTE PROVIDER - NSDCCPCAREPLAN_GEN_ALL_CORE_FT
PRINCIPAL DISCHARGE DIAGNOSIS  Diagnosis: GI bleed  Assessment and Plan of Treatment:   Gastrointestinal (GI) bleeding is bleeding somewhere along the path that food travels through the body (digestive tract). This path is anywhere between the mouth and the opening of the butt (anus). You may have blood in your poop (stool) or have black poop. If you throw up (vomit), there may be blood in it.  This condition can be mild, serious, or even life-threatening. If you have a lot of bleeding, you may need to stay in the hospital.  What are the causes?  This condition may be caused by:  •Irritation and swelling of the esophagus (esophagitis). The esophagus is part of the body that moves food from your mouth to your stomach.  •Swollen veins in the butt (hemorrhoids)  •Areas of painful tearing in the opening of the butt (anal fissures). These are often caused by passing hard poop.  •Pouches that form on the colon over time (diverticulosis).  •Irritation and swelling (diverticulitis) in areas where pouches have formed on the colon.  •Growths (polyps) or cancer. Colon cancer often starts out as growths that are not cancer.  •Irritation of the stomach lining (gastritis).  •Sores (ulcers) in the stomach  What increases the risk?  You are more likely to develop this condition if you:  •Have a certain type of infection in your stomach (Helicobacter pylori infection).  •Take certain medicines.  •Smoke.   •Drink alcohol.  **Monitor for the following signs/symptoms and contact your primary care provider if they occur or go to the Emergency Room if they are severe***  •Throwing up (vomiting) material that has bright red blood in it. It may look like coffee grounds.  •Changes in your poop. The poop may:  •Have red blood in it.   •Be black, look like tar, and smell stronger than normal.  •Be red.  •Pain or cramping in the belly (abdomen).      SECONDARY DISCHARGE DIAGNOSES  Diagnosis: Hyperglycemia  Assessment and Plan of Treatment: You have a history of diabetes type 2. Continue to maintain a low carbohydrate diet at home. Monitor blood glucose levels throughout the day before meals and at bedtime.  Record blood glucose readings and bring to your primary care providers office in order for it to be reviewed by your doctor for management modifications.  Follow up with your primary care provider for further management - Call to make an appointment. Monitor for the following symptoms: excessive thirst or urination, unexplained weight loss, vision changes, lightheadedness or dizziness. If you experice any of these symptoms please alert your primary care provider or return to the ED if your symptoms are severe.    Diagnosis: Acute on chronic renal failure  Assessment and Plan of Treatment: You were found to have elevations in labs that monitor your kidney function (creatinine) which indicates a decline in your kidney function which was due to bleeding from your gastrointestinal tract. Follow up with your primary care provider to have your kidney function rechecked. Please keep well hydrated during this time.

## 2022-01-03 NOTE — DISCHARGE NOTE PROVIDER - NSDCMRMEDTOKEN_GEN_ALL_CORE_FT
aspirin 81 mg oral tablet, chewable: 1 tab(s) orally once a day  clopidogrel 75 mg oral tablet: 1 tab(s) orally once a day  ergocalciferol 50,000 intl units (1.25 mg) oral capsule: 1 cap(s) orally once a week  fenofibrate 160 mg oral tablet: 1 tab(s) orally once a day  fosinopril 20 mg oral tablet: 0.5 tab(s) orally once a day  hydroCHLOROthiazide 25 mg oral tablet: 1 tab(s) orally once a day  insulin regular (concentrated) 500 units/mL human recombinant subcutaneous solution: 40 unit(s) subcutaneous 2 times a day  Jardiance 25 mg oral tablet: 1 tab(s) orally once a day (in the morning)  Lipitor 40 mg oral tablet: 1 tab(s) orally once a day (at bedtime)  metoprolol succinate 50 mg oral tablet, extended release: 0.5 tab(s) orally once a day  pantoprazole 40 mg oral delayed release tablet: 1 tab(s) orally 2 times a day

## 2022-01-03 NOTE — DISCHARGE NOTE PROVIDER - NSDCFUADDINST_GEN_ALL_CORE_FT
Continue to take Aspirin and Plavix to prevent your stents from clotting up.     *******DO NOT TAKE ADVIL/MOTRIN/ALLEVE/ASPIRIN 325 OR NAPROXEN.******

## 2022-01-03 NOTE — DISCHARGE NOTE PROVIDER - NSDCCAREPROVSEEN_GEN_ALL_CORE_FT
Camila Kelly - hospitalist medicine   Lefty Cooper - gastroenterologist   Bhavna Lemus - nurse practitioner

## 2022-01-03 NOTE — DISCHARGE NOTE PROVIDER - HOSPITAL COURSE
HPI:  57 yo M PMH HTN HLD IDDM2, CKD?3 CAD s/p BEATRICE x3 (mLAD, dLAD, and mLCx, most recent 2018) on DAPT, recent covid URTI diagnosed 8 days ago, now asymptomatic but was taking motrin persistently, presents with melena for past day. Pt reports melena is not painful but noticed his stool is dark. Stool is normal consistency otherwise. Denies cp, abd pain, sob, and has fair control of his DM at home per pt on fairly high dose insulin. Denies dizziness, syncope or near syncope. Reports compliance with other medications.    hospital course: pt admitted for melena with subsequent VIKKI due to GIB. , reported use of Motrin over past 3 months secondary to left shoulder ligament tear and covid infection without PPI. pt H&H stable - no drops. now also w/ formed stools non melanotic. Evaled by GI Dr. Cooper - start BID PPI cont upon DC Bleeding is likely not significant given above. Would not re-evaluate with endoscopy at this time. can cont ASA and Plavix at this time. Pt also w/ hyperglycemia - being managed by out pt provider, has been elevated during admission but at home is <170 (via Freestyle Zaheer). pt w/ follow up appt with cardiologist, PCP and GI upon DC. pt w/ cardio appointment this month. BUN/ Creatinine improved    All 10 systems reviewed and found to be negative with the exception of what has been described above.    Vital Signs Last 24 Hrs  T(C): 36.3 (03 Jan 2022 08:30), Max: 36.7 (02 Jan 2022 15:18)  T(F): 97.4 (03 Jan 2022 08:30), Max: 98 (02 Jan 2022 15:18)  HR: 86 (03 Jan 2022 08:30) (75 - 86)  BP: 158/94 (03 Jan 2022 08:30) (158/94 - 164/90)  BP(mean): --  RR: 18 (03 Jan 2022 08:30) (16 - 18)  SpO2: 98% (03 Jan 2022 08:30) (97% - 99%)  	    PHYSICAL EXAM:  Appearance: Comfortable. No acute distress  HEENT:  Head and neck: Atraumatic. Normocephalic.  Normal oral mucosa, PERRL, Neck is supple. No JVD, No carotid bruit.   Neurologic: A & O x 3, no focal deficits. EOMI , Cranial nerves are intact.  Lymphatic: No cervical lymphadenopathy  Cardiovascular: Normal S1 S2, No murmur, rubs/gallops. No JVD, No edema  Respiratory: Lungs clear to auscultation  Gastrointestinal:  Soft, Non-tender, + BS  Lower Extremities: No edema  Psychiatry: Patient is calm. No agitation. Mood & affect appropriate  Skin: No rashes/ ecchymoses/cyanosis/ulcers visualized on the face, hands or feet.    LABS:	 	                        14.4   7.40  )-----------( 250      ( 03 Jan 2022 08:25 )             42.8     01-03    137  |  108  |  13  ----------------------------<  236<H>  3.7   |  22  |  1.17    Ca    8.7      03 Jan 2022 08:25  Phos  2.7     01-02  Mg     1.8     01-02    TPro  6.5  /  Alb  2.9<L>  /  TBili  0.5  /  DBili  x   /  AST  34  /  ALT  45  /  AlkPhos  47  01-03    LIVER FUNCTIONS - ( 03 Jan 2022 08:25 )  Alb: 2.9 g/dL / Pro: 6.5 gm/dL / ALK PHOS: 47 U/L / ALT: 45 U/L / AST: 34 U/L / GGT: x           PT/INR - ( 01 Jan 2022 20:09 )   PT: 11.5 sec;   INR: 0.99 ratio   PTT - ( 01 Jan 2022 20:09 )  PTT:32.7 sec    A&P   # GI bleed - melena  - Continue ASA and restart plavix upon DC despite bleed given hx of CAD and multiple stents with minimal blood loss  - CBC q12 stable   - PPI BID upon DC   - GI consult - Dr Cooper   - History of Melena in the past, sp colonoscopy x 2 many years ago, polyps +. no need for endoscopy at this time, stable H&H   - T&S, transfusion not needed at present     # CAD  -most recent stent 2018, LCx, LAD  -Continue ASA 81mg to avoid stent thrombosis  -restart plavix today     # HTN  - Continue metoprolol and fosinopril upon DC     # HLD  - resume Statin upon DC   - Weight loss and lifestyle changes advised    # COVID infection  - Minimal symptoms  - Not requiring 02  -Supportive care    # IDDM2  - On Regular insulin outpt 40 units SC q12 resume out pt iain per routine   - s/p lantus 30 units nightly and in AM 20 Units with hold parameters for FS <200    # VIKKI on CKD secondary to covid and motrin use  -Cr 2.1 on admission, down to 1.4  - s/p IVF    dc home with follow up with PMD and cardio   above plan discussed with pt, bedside RN, and MD Kelly  time spent preparing dc 42 mins HPI:  57 yo M PMH HTN HLD IDDM2, CKD?3 CAD s/p BEATRICE x3 (mLAD, dLAD, and mLCx, most recent 2018) on DAPT, recent covid URTI diagnosed 8 days ago, now asymptomatic but was taking motrin persistently, presents with melena for past day. Pt reports melena is not painful but noticed his stool is dark. Stool is normal consistency otherwise. Denies cp, abd pain, sob, and has fair control of his DM at home per pt on fairly high dose insulin. Denies dizziness, syncope or near syncope. Reports compliance with other medications.    hospital course: pt admitted for melena with subsequent VIKKI due to GIB. , reported use of Motrin over past 3 months secondary to left shoulder ligament tear and covid infection without PPI. pt H&H stable - no drops. now also w/ formed stools non melanotic. Evaled by GI Dr. Cooper - start BID PPI cont upon DC Bleeding is likely not significant given above. Would not re-evaluate with endoscopy at this time. can cont ASA and Plavix at this time. Pt also w/ hyperglycemia - being managed by out pt provider, has been elevated during admission but at home is <170 (via Freestyle Zaheer). pt w/ follow up appt with cardiologist, PCP and GI upon DC. pt w/ cardio appointment this month. BUN/ Creatinine improved    All 10 systems reviewed and found to be negative with the exception of what has been described above.    Vital Signs Last 24 Hrs  T(C): 36.3 (03 Jan 2022 08:30), Max: 36.7 (02 Jan 2022 15:18)  T(F): 97.4 (03 Jan 2022 08:30), Max: 98 (02 Jan 2022 15:18)  HR: 86 (03 Jan 2022 08:30) (75 - 86)  BP: 158/94 (03 Jan 2022 08:30) (158/94 - 164/90)  BP(mean): --  RR: 18 (03 Jan 2022 08:30) (16 - 18)  SpO2: 98% (03 Jan 2022 08:30) (97% - 99%)  	    PHYSICAL EXAM:  Appearance: Comfortable. No acute distress  HEENT:  Head and neck: Atraumatic. Normocephalic.  Normal oral mucosa, PERRL, Neck is supple. No JVD, No carotid bruit.   Neurologic: A & O x 3, no focal deficits. EOMI , Cranial nerves are intact.  Lymphatic: No cervical lymphadenopathy  Cardiovascular: Normal S1 S2, No murmur, rubs/gallops. No JVD, No edema  Respiratory: Lungs clear to auscultation  Gastrointestinal:  Soft, Non-tender, + BS  Lower Extremities: No edema  Psychiatry: Patient is calm. No agitation. Mood & affect appropriate  Skin: No rashes/ ecchymoses/cyanosis/ulcers visualized on the face, hands or feet.    LABS:	 	                        14.4   7.40  )-----------( 250      ( 03 Jan 2022 08:25 )             42.8     01-03    137  |  108  |  13  ----------------------------<  236<H>  3.7   |  22  |  1.17    Ca    8.7      03 Jan 2022 08:25  Phos  2.7     01-02  Mg     1.8     01-02    TPro  6.5  /  Alb  2.9<L>  /  TBili  0.5  /  DBili  x   /  AST  34  /  ALT  45  /  AlkPhos  47  01-03    LIVER FUNCTIONS - ( 03 Jan 2022 08:25 )  Alb: 2.9 g/dL / Pro: 6.5 gm/dL / ALK PHOS: 47 U/L / ALT: 45 U/L / AST: 34 U/L / GGT: x           PT/INR - ( 01 Jan 2022 20:09 )   PT: 11.5 sec;   INR: 0.99 ratio   PTT - ( 01 Jan 2022 20:09 )  PTT:32.7 sec    A&P   # GI bleed - melena  - Continue ASA and restart plavix upon DC despite bleed given hx of CAD and multiple stents with minimal blood loss  - CBC q12 stable   - PPI BID upon DC   - GI consult - Dr Cooper   - History of Melena in the past, sp colonoscopy x 2 many years ago, polyps +. no need for endoscopy at this time, stable H&H   - T&S, transfusion not needed at present     # CAD  -most recent stent 2018, LCx, LAD  -Continue ASA 81mg to avoid stent thrombosis  -restart plavix today     # HTN  - Continue metoprolol and fosinopril upon DC     # HLD  - resume Statin upon DC   - Weight loss and lifestyle changes advised    # COVID infection  - Minimal symptoms  - Not requiring 02  -Supportive care    # IDDM2  - On Regular insulin outpt 40 units SC q12 resume out pt iain per routine   - s/p lantus 30 units nightly and in AM 20 Units with hold parameters for FS <200    # VIKKI on CKD secondary to covid and motrin use  -Cr 2.1 on admission, down to 1.4  - s/p IVF    dc home with follow up with PMD and cardio   above plan discussed with pt, bedside RN, and MD Kelly  time spent preparing dc 42 mins   Attending note: Patient seen and examined with ALEJANDRO Lemus  Case reviewed and discussed with her and necessary changes were made  Agree with her assessment and d/c plan.

## 2022-01-03 NOTE — DISCHARGE NOTE NURSING/CASE MANAGEMENT/SOCIAL WORK - NSDCPEFALRISK_GEN_ALL_CORE
For information on Fall & Injury Prevention, visit: [email or web address] OR  [email or web address] OR  https://www.cdc.gov/steadi/patient.html

## 2022-01-03 NOTE — PROVIDER CONTACT NOTE (OTHER) - SITUATION
notified Dr Rogel's office of admission
melena after motrin, +dAPT    left message with ans service for dr to  pt in the morning

## 2022-01-05 ENCOUNTER — NON-APPOINTMENT (OUTPATIENT)
Age: 57
End: 2022-01-05

## 2022-01-07 ENCOUNTER — APPOINTMENT (OUTPATIENT)
Dept: FAMILY MEDICINE | Facility: CLINIC | Age: 57
End: 2022-01-07
Payer: COMMERCIAL

## 2022-01-07 VITALS
BODY MASS INDEX: 34.06 KG/M2 | DIASTOLIC BLOOD PRESSURE: 88 MMHG | WEIGHT: 257 LBS | SYSTOLIC BLOOD PRESSURE: 136 MMHG | HEIGHT: 73 IN | OXYGEN SATURATION: 97 % | TEMPERATURE: 97.7 F | HEART RATE: 102 BPM

## 2022-01-07 DIAGNOSIS — Z84.1 FAMILY HISTORY OF DISORDERS OF KIDNEY AND URETER: ICD-10-CM

## 2022-01-07 DIAGNOSIS — N17.9 ACUTE KIDNEY FAILURE, UNSPECIFIED: ICD-10-CM

## 2022-01-07 DIAGNOSIS — M19.90 UNSPECIFIED OSTEOARTHRITIS, UNSPECIFIED SITE: ICD-10-CM

## 2022-01-07 PROCEDURE — 99214 OFFICE O/P EST MOD 30 MIN: CPT

## 2022-01-07 RX ORDER — INDOMETHACIN 50 MG/1
50 CAPSULE ORAL 3 TIMES DAILY
Qty: 20 | Refills: 0 | Status: DISCONTINUED | COMMUNITY
Start: 2021-06-03 | End: 2022-01-07

## 2022-01-07 RX ORDER — METOPROLOL SUCCINATE 25 MG/1
25 TABLET, EXTENDED RELEASE ORAL
Qty: 90 | Refills: 0 | Status: DISCONTINUED | COMMUNITY
Start: 2021-07-07 | End: 2022-01-07

## 2022-01-07 NOTE — ASSESSMENT
[FreeTextEntry1] : JAMES GONZALEZ is a 56 year old male here for a follow up visit s/p recent hospitalization for GI bleed due to daily NSAID (ibuprofen) use. Also f/u for multiple chronic medical issues as noted above.

## 2022-01-07 NOTE — HISTORY OF PRESENT ILLNESS
[FreeTextEntry1] : JAMES GONZALEZ is a 56 year old male here for a follow up visit.\par  [de-identified] : Baudilio is here today for hospital follow up ( 1/1/22- 1/3/22) for melena/GI bleed and acute kidney injury  due to daily NSAID use for past several months (for shoulder injury-- torn rotator cuff muscle x 3 and biceps tendonitis) and other joint pain as well as to treat symptoms of recent COVID infection). \par \par Dr. Cooper consulted in hospital and started pt on PPI med BID. Initial Hgb was wnl and did not drop (Hgb on 1/3 date of d/c was 14.4) and melena resolved upon hosp admission and pt was having formed stools. Thus endoscopy was not needed as inpt and GI advised cont PPI med and outpt f/u. H/H were stable in hospital (14.4/42.8 on 1/3/22) and kidney function improved from initial 2.1 to 1.17 with stopping NSAIDs and hydration. \par \par Given that his GI bleeding was minimal and resolved with PPI med BID he was given the ok to restart Plavix therapy (in addition to ASA Rx which was continued in hosp) given his known CAD w/ h/o stent placement (most recent stent placed in 2018).  \par \par He has h/o Type 2 DM (requiring insulin), HTN, high cholesterol, CAD, gout, OA/chronic joint pain, thyroid nodule. \par \par He sees Dr. López (Endo) and Dr. Kaye (card) and is scheduled for f/u visit in near future. He has labs checked Qfew months with Dr. López. Last visit (non-acute care) to our office was 10/2020) but he has been consistent with seeing his specialists. \par \par He states he is taking his medications consistently. He saw Dr. Kaye just prior to hosp stay and BP was elevated and HCTZ was added to his regimen. Bps are better on home checks since then. \par \par His glucoses were in 400-500 range prior to hospitalization/when had GI bleed and now are down to 150 fasting this AM. He was having SE from Humulin R U- 500 as was causing rash and SOB and palps every time he took a dose. So he stopped that and went back to Tuojeo 80 U BID and Lispro SSI as needed. Lantus in the hospital was controlling glucoses better and in the past when used this as an outpt it worked well and was well tolerated but had to be stopped due to formulary issues. He plans f/u in near future with Dr. López to discuss treatment options in terms of insulin. \par \par No melena since hosp d/c, hematochezia, abd pain, N/V/D, fevers. \par \par Ortho is Dr. Lance ([facility]).  His L shoulder pain is improved at this point after PT. He alsohad a visit with Dr. Gagandeep Lr in past 4 months and had labs and no evidence for rheumatologic or autoimmune condition. Did months of PT. \par \stone Has had COVID Pfizer vacc series and booster dose and seasonal flu vaccine

## 2022-01-07 NOTE — PLAN
[FreeTextEntry1] : Recommended f/u with Dr. Cooper, Dr. Kaye and Dr. López. He will also f/u with Dr. Gagandeep Lr for rheum eval and pain mgmt. He has a call out to Dr. López's office to sched in near future. Has appt with Dr. Cooper next week and is UTD with Dr Kaye and will see him in a few months. \par \par Reviewed that he can not use any OTC or Rx NSAID other than his ASA Rx for CV risk reduction and he states he understands this. Should likely remain on PPI med long term (but I defer to GI re that decision) since he requires ASA/Plavix Rx although perhaps he can reduce dose at some point to daily instead of BID (and ?perhaps someday can wean to H2 blocker med). \par \par Cont meds/doses as he currently is. Meds reconciled (though of note hosp med list states that Fosinopril and Metoprolol doses are 1/2 pill daily but I confirmed with pt today and he takes full pill of each). \par \par He notes that he is concerned about renal function given that his dad had kidney problems as he aged and given that he has DM etc. He will avoid NSAIDs, hydrate well, and work to keep BPs and BS's well controlled. He is considering seeing nephrologist and we disc that that is reasonable for baseline eval and if he would like to do this he can schedule nonurgently in next several months. \par \par Consider trial of CBD oil for pain mgmt and consider Tylenol arthritis 1-2 tabs Q8hr for round the clock better pain mgmt, Heat/ice prn. Stretching prn. F/u with ortho and rheum as needed/recommended by them. \par \par Reviewed clean eating (eg Mediterranean style eating plan) and regular exercise/staying as physically active as possible.\par \par I reminded pt he is due for CPE visit and asked him to schedule for next few months.

## 2022-01-07 NOTE — PHYSICAL EXAM
[No Acute Distress] : no acute distress [Well Nourished] : well nourished [Well Developed] : well developed [Well-Appearing] : well-appearing [Normal Sclera/Conjunctiva] : normal sclera/conjunctiva [EOMI] : extraocular movements intact [Normal Outer Ear/Nose] : the outer ears and nose were normal in appearance [No JVD] : no jugular venous distention [No Lymphadenopathy] : no lymphadenopathy [Supple] : supple [Thyroid Normal, No Nodules] : the thyroid was normal and there were no nodules present [No Respiratory Distress] : no respiratory distress  [No Accessory Muscle Use] : no accessory muscle use [Clear to Auscultation] : lungs were clear to auscultation bilaterally [Normal Rate] : normal rate  [Regular Rhythm] : with a regular rhythm [Normal S1, S2] : normal S1 and S2 [No Carotid Bruits] : no carotid bruits [No Varicosities] : no varicosities [Pedal Pulses Present] : the pedal pulses are present [No Edema] : there was no peripheral edema [No Extremity Clubbing/Cyanosis] : no extremity clubbing/cyanosis [Soft] : abdomen soft [Non Tender] : non-tender [Non-distended] : non-distended [No Masses] : no abdominal mass palpated [No HSM] : no HSM [Normal Bowel Sounds] : normal bowel sounds [Normal Posterior Cervical Nodes] : no posterior cervical lymphadenopathy [Normal Anterior Cervical Nodes] : no anterior cervical lymphadenopathy [No Joint Swelling] : no joint swelling [Grossly Normal Strength/Tone] : grossly normal strength/tone [No Rash] : no rash [Coordination Grossly Intact] : coordination grossly intact [No Focal Deficits] : no focal deficits [Normal Gait] : normal gait [Normal Affect] : the affect was normal [Normal Insight/Judgement] : insight and judgment were intact [de-identified] : 2/6 soft systolic murmur heard along LSB, no r/g

## 2022-01-12 DIAGNOSIS — U07.1 COVID-19: ICD-10-CM

## 2022-01-12 DIAGNOSIS — T39.315A ADVERSE EFFECT OF PROPIONIC ACID DERIVATIVES, INITIAL ENCOUNTER: ICD-10-CM

## 2022-01-12 DIAGNOSIS — N17.9 ACUTE KIDNEY FAILURE, UNSPECIFIED: ICD-10-CM

## 2022-01-12 DIAGNOSIS — E11.22 TYPE 2 DIABETES MELLITUS WITH DIABETIC CHRONIC KIDNEY DISEASE: ICD-10-CM

## 2022-01-12 DIAGNOSIS — T39.015A ADVERSE EFFECT OF ASPIRIN, INITIAL ENCOUNTER: ICD-10-CM

## 2022-01-12 DIAGNOSIS — K92.1 MELENA: ICD-10-CM

## 2022-01-12 DIAGNOSIS — N18.30 CHRONIC KIDNEY DISEASE, STAGE 3 UNSPECIFIED: ICD-10-CM

## 2022-01-12 DIAGNOSIS — E11.65 TYPE 2 DIABETES MELLITUS WITH HYPERGLYCEMIA: ICD-10-CM

## 2022-01-12 DIAGNOSIS — Z95.5 PRESENCE OF CORONARY ANGIOPLASTY IMPLANT AND GRAFT: ICD-10-CM

## 2022-01-12 DIAGNOSIS — Z88.0 ALLERGY STATUS TO PENICILLIN: ICD-10-CM

## 2022-01-12 DIAGNOSIS — I13.10 HYPERTENSIVE HEART AND CHRONIC KIDNEY DISEASE WITHOUT HEART FAILURE, WITH STAGE 1 THROUGH STAGE 4 CHRONIC KIDNEY DISEASE, OR UNSPECIFIED CHRONIC KIDNEY DISEASE: ICD-10-CM

## 2022-01-12 DIAGNOSIS — T45.525A ADVERSE EFFECT OF ANTITHROMBOTIC DRUGS, INITIAL ENCOUNTER: ICD-10-CM

## 2022-01-12 DIAGNOSIS — E78.5 HYPERLIPIDEMIA, UNSPECIFIED: ICD-10-CM

## 2022-01-12 DIAGNOSIS — I25.10 ATHEROSCLEROTIC HEART DISEASE OF NATIVE CORONARY ARTERY WITHOUT ANGINA PECTORIS: ICD-10-CM

## 2022-01-12 DIAGNOSIS — G47.33 OBSTRUCTIVE SLEEP APNEA (ADULT) (PEDIATRIC): ICD-10-CM

## 2022-01-12 DIAGNOSIS — Z79.4 LONG TERM (CURRENT) USE OF INSULIN: ICD-10-CM

## 2022-01-12 DIAGNOSIS — T39.395A ADVERSE EFFECT OF OTHER NONSTEROIDAL ANTI-INFLAMMATORY DRUGS [NSAID], INITIAL ENCOUNTER: ICD-10-CM

## 2022-01-13 ENCOUNTER — FORM ENCOUNTER (OUTPATIENT)
Age: 57
End: 2022-01-13

## 2022-01-15 RX ORDER — METOPROLOL SUCCINATE 50 MG/1
50 TABLET, EXTENDED RELEASE ORAL DAILY
Refills: 0 | Status: DISCONTINUED | COMMUNITY
Start: 2021-06-03 | End: 2022-01-15

## 2022-01-18 ENCOUNTER — NON-APPOINTMENT (OUTPATIENT)
Age: 57
End: 2022-01-18

## 2022-01-20 RX ORDER — CIPROFLOXACIN LACTATE 400MG/40ML
1 VIAL (ML) INTRAVENOUS
Qty: 0 | Refills: 0 | DISCHARGE
Start: 2022-01-20 | End: 2022-01-24

## 2022-01-20 RX ORDER — METRONIDAZOLE 500 MG
1 TABLET ORAL
Qty: 0 | Refills: 0 | DISCHARGE
Start: 2022-01-20 | End: 2022-01-24

## 2022-01-23 ENCOUNTER — INPATIENT (INPATIENT)
Facility: HOSPITAL | Age: 57
LOS: 4 days | Discharge: HOME | DRG: 246 | End: 2022-01-28
Attending: STUDENT IN AN ORGANIZED HEALTH CARE EDUCATION/TRAINING PROGRAM | Admitting: INTERNAL MEDICINE
Payer: COMMERCIAL

## 2022-01-23 VITALS
TEMPERATURE: 98 F | OXYGEN SATURATION: 100 % | RESPIRATION RATE: 18 BRPM | DIASTOLIC BLOOD PRESSURE: 79 MMHG | HEART RATE: 91 BPM | SYSTOLIC BLOOD PRESSURE: 166 MMHG

## 2022-01-23 DIAGNOSIS — Z95.5 PRESENCE OF CORONARY ANGIOPLASTY IMPLANT AND GRAFT: Chronic | ICD-10-CM

## 2022-01-23 DIAGNOSIS — Z90.49 ACQUIRED ABSENCE OF OTHER SPECIFIED PARTS OF DIGESTIVE TRACT: Chronic | ICD-10-CM

## 2022-01-23 DIAGNOSIS — R07.9 CHEST PAIN, UNSPECIFIED: ICD-10-CM

## 2022-01-23 LAB
ALBUMIN SERPL ELPH-MCNC: 3.4 G/DL — SIGNIFICANT CHANGE UP (ref 3.3–5)
ALP SERPL-CCNC: 54 U/L — SIGNIFICANT CHANGE UP (ref 40–120)
ALT FLD-CCNC: 37 U/L — SIGNIFICANT CHANGE UP (ref 12–78)
ANION GAP SERPL CALC-SCNC: 5 MMOL/L — SIGNIFICANT CHANGE UP (ref 5–17)
AST SERPL-CCNC: 25 U/L — SIGNIFICANT CHANGE UP (ref 15–37)
BASOPHILS # BLD AUTO: 0 K/UL — SIGNIFICANT CHANGE UP (ref 0–0.2)
BASOPHILS NFR BLD AUTO: 0 % — SIGNIFICANT CHANGE UP (ref 0–2)
BILIRUB SERPL-MCNC: 0.4 MG/DL — SIGNIFICANT CHANGE UP (ref 0.2–1.2)
BUN SERPL-MCNC: 28 MG/DL — HIGH (ref 7–23)
CALCIUM SERPL-MCNC: 8.4 MG/DL — LOW (ref 8.5–10.1)
CHLORIDE SERPL-SCNC: 108 MMOL/L — SIGNIFICANT CHANGE UP (ref 96–108)
CO2 SERPL-SCNC: 26 MMOL/L — SIGNIFICANT CHANGE UP (ref 22–31)
CREAT SERPL-MCNC: 1.67 MG/DL — HIGH (ref 0.5–1.3)
EOSINOPHIL # BLD AUTO: 0.23 K/UL — SIGNIFICANT CHANGE UP (ref 0–0.5)
EOSINOPHIL NFR BLD AUTO: 3 % — SIGNIFICANT CHANGE UP (ref 0–6)
GLUCOSE SERPL-MCNC: 315 MG/DL — HIGH (ref 70–99)
HCT VFR BLD CALC: 32.1 % — LOW (ref 39–50)
HGB BLD-MCNC: 10.5 G/DL — LOW (ref 13–17)
LYMPHOCYTES # BLD AUTO: 1.2 K/UL — SIGNIFICANT CHANGE UP (ref 1–3.3)
LYMPHOCYTES # BLD AUTO: 16 % — SIGNIFICANT CHANGE UP (ref 13–44)
MAGNESIUM SERPL-MCNC: 2.3 MG/DL — SIGNIFICANT CHANGE UP (ref 1.6–2.6)
MCHC RBC-ENTMCNC: 28.4 PG — SIGNIFICANT CHANGE UP (ref 27–34)
MCHC RBC-ENTMCNC: 32.7 GM/DL — SIGNIFICANT CHANGE UP (ref 32–36)
MCV RBC AUTO: 86.8 FL — SIGNIFICANT CHANGE UP (ref 80–100)
MONOCYTES # BLD AUTO: 0.6 K/UL — SIGNIFICANT CHANGE UP (ref 0–0.9)
MONOCYTES NFR BLD AUTO: 8 % — SIGNIFICANT CHANGE UP (ref 2–14)
NEUTROPHILS # BLD AUTO: 5.26 K/UL — SIGNIFICANT CHANGE UP (ref 1.8–7.4)
NEUTROPHILS NFR BLD AUTO: 66 % — SIGNIFICANT CHANGE UP (ref 43–77)
NRBC # BLD: SIGNIFICANT CHANGE UP /100 WBCS (ref 0–0)
NT-PROBNP SERPL-SCNC: 87 PG/ML — SIGNIFICANT CHANGE UP (ref 0–125)
PLATELET # BLD AUTO: 308 K/UL — SIGNIFICANT CHANGE UP (ref 150–400)
POTASSIUM SERPL-MCNC: 3.9 MMOL/L — SIGNIFICANT CHANGE UP (ref 3.5–5.3)
POTASSIUM SERPL-SCNC: 3.9 MMOL/L — SIGNIFICANT CHANGE UP (ref 3.5–5.3)
PROT SERPL-MCNC: 7 GM/DL — SIGNIFICANT CHANGE UP (ref 6–8.3)
RAPID RVP RESULT: SIGNIFICANT CHANGE UP
RBC # BLD: 3.7 M/UL — LOW (ref 4.2–5.8)
RBC # FLD: 15.6 % — HIGH (ref 10.3–14.5)
SARS-COV-2 RNA SPEC QL NAA+PROBE: SIGNIFICANT CHANGE UP
SODIUM SERPL-SCNC: 139 MMOL/L — SIGNIFICANT CHANGE UP (ref 135–145)
TROPONIN I, HIGH SENSITIVITY RESULT: 21.41 NG/L — SIGNIFICANT CHANGE UP
TROPONIN I, HIGH SENSITIVITY RESULT: 26.08 NG/L — SIGNIFICANT CHANGE UP
TROPONIN I, HIGH SENSITIVITY RESULT: 31.07 NG/L — SIGNIFICANT CHANGE UP
WBC # BLD: 7.51 K/UL — SIGNIFICANT CHANGE UP (ref 3.8–10.5)
WBC # FLD AUTO: 7.51 K/UL — SIGNIFICANT CHANGE UP (ref 3.8–10.5)

## 2022-01-23 PROCEDURE — 85027 COMPLETE CBC AUTOMATED: CPT

## 2022-01-23 PROCEDURE — 86850 RBC ANTIBODY SCREEN: CPT

## 2022-01-23 PROCEDURE — C1769: CPT

## 2022-01-23 PROCEDURE — C1760: CPT

## 2022-01-23 PROCEDURE — U0005: CPT

## 2022-01-23 PROCEDURE — C1725: CPT

## 2022-01-23 PROCEDURE — 36415 COLL VENOUS BLD VENIPUNCTURE: CPT

## 2022-01-23 PROCEDURE — C1894: CPT

## 2022-01-23 PROCEDURE — A9500: CPT

## 2022-01-23 PROCEDURE — 80053 COMPREHEN METABOLIC PANEL: CPT

## 2022-01-23 PROCEDURE — 99285 EMERGENCY DEPT VISIT HI MDM: CPT

## 2022-01-23 PROCEDURE — 93010 ELECTROCARDIOGRAM REPORT: CPT

## 2022-01-23 PROCEDURE — 86900 BLOOD TYPING SEROLOGIC ABO: CPT

## 2022-01-23 PROCEDURE — 93017 CV STRESS TEST TRACING ONLY: CPT

## 2022-01-23 PROCEDURE — 71045 X-RAY EXAM CHEST 1 VIEW: CPT | Mod: 26

## 2022-01-23 PROCEDURE — C1887: CPT

## 2022-01-23 PROCEDURE — 78452 HT MUSCLE IMAGE SPECT MULT: CPT

## 2022-01-23 PROCEDURE — 84484 ASSAY OF TROPONIN QUANT: CPT

## 2022-01-23 PROCEDURE — 99223 1ST HOSP IP/OBS HIGH 75: CPT

## 2022-01-23 PROCEDURE — 80048 BASIC METABOLIC PNL TOTAL CA: CPT

## 2022-01-23 PROCEDURE — 93005 ELECTROCARDIOGRAM TRACING: CPT

## 2022-01-23 PROCEDURE — C1874: CPT

## 2022-01-23 PROCEDURE — 85025 COMPLETE CBC W/AUTO DIFF WBC: CPT

## 2022-01-23 PROCEDURE — 80061 LIPID PANEL: CPT

## 2022-01-23 PROCEDURE — U0003: CPT

## 2022-01-23 PROCEDURE — 82962 GLUCOSE BLOOD TEST: CPT

## 2022-01-23 PROCEDURE — C9113: CPT

## 2022-01-23 PROCEDURE — 86901 BLOOD TYPING SEROLOGIC RH(D): CPT

## 2022-01-23 RX ORDER — PANTOPRAZOLE SODIUM 20 MG/1
8 TABLET, DELAYED RELEASE ORAL
Qty: 80 | Refills: 0 | Status: DISCONTINUED | OUTPATIENT
Start: 2022-01-23 | End: 2022-01-24

## 2022-01-23 RX ORDER — SODIUM CHLORIDE 9 MG/ML
1000 INJECTION, SOLUTION INTRAVENOUS
Refills: 0 | Status: DISCONTINUED | OUTPATIENT
Start: 2022-01-23 | End: 2022-01-28

## 2022-01-23 RX ORDER — ASPIRIN/CALCIUM CARB/MAGNESIUM 324 MG
81 TABLET ORAL DAILY
Refills: 0 | Status: DISCONTINUED | OUTPATIENT
Start: 2022-01-23 | End: 2022-01-23

## 2022-01-23 RX ORDER — FOSINOPRIL SODIUM 10 MG/1
0.5 TABLET ORAL
Qty: 0 | Refills: 0 | DISCHARGE

## 2022-01-23 RX ORDER — DEXTROSE 50 % IN WATER 50 %
25 SYRINGE (ML) INTRAVENOUS ONCE
Refills: 0 | Status: DISCONTINUED | OUTPATIENT
Start: 2022-01-23 | End: 2022-01-28

## 2022-01-23 RX ORDER — INSULIN GLARGINE 100 [IU]/ML
20 INJECTION, SOLUTION SUBCUTANEOUS EVERY MORNING
Refills: 0 | Status: DISCONTINUED | OUTPATIENT
Start: 2022-01-24 | End: 2022-01-28

## 2022-01-23 RX ORDER — INSULIN LISPRO 100/ML
6 VIAL (ML) SUBCUTANEOUS
Refills: 0 | Status: DISCONTINUED | OUTPATIENT
Start: 2022-01-23 | End: 2022-01-28

## 2022-01-23 RX ORDER — INSULIN GLARGINE 100 [IU]/ML
30 INJECTION, SOLUTION SUBCUTANEOUS AT BEDTIME
Refills: 0 | Status: DISCONTINUED | OUTPATIENT
Start: 2022-01-23 | End: 2022-01-28

## 2022-01-23 RX ORDER — INSULIN LISPRO 100/ML
VIAL (ML) SUBCUTANEOUS
Refills: 0 | Status: DISCONTINUED | OUTPATIENT
Start: 2022-01-23 | End: 2022-01-28

## 2022-01-23 RX ORDER — CLOPIDOGREL BISULFATE 75 MG/1
75 TABLET, FILM COATED ORAL DAILY
Refills: 0 | Status: DISCONTINUED | OUTPATIENT
Start: 2022-01-23 | End: 2022-01-23

## 2022-01-23 RX ORDER — ACETAMINOPHEN 500 MG
650 TABLET ORAL EVERY 6 HOURS
Refills: 0 | Status: DISCONTINUED | OUTPATIENT
Start: 2022-01-23 | End: 2022-01-28

## 2022-01-23 RX ORDER — NITROGLYCERIN 6.5 MG
0.4 CAPSULE, EXTENDED RELEASE ORAL
Refills: 0 | Status: COMPLETED | OUTPATIENT
Start: 2022-01-23 | End: 2022-01-24

## 2022-01-23 RX ORDER — METOPROLOL TARTRATE 50 MG
25 TABLET ORAL DAILY
Refills: 0 | Status: DISCONTINUED | OUTPATIENT
Start: 2022-01-23 | End: 2022-01-25

## 2022-01-23 RX ORDER — INSULIN GLARGINE 100 [IU]/ML
140 INJECTION, SOLUTION SUBCUTANEOUS
Qty: 0 | Refills: 0 | DISCHARGE

## 2022-01-23 RX ORDER — INSULIN HUMAN 100 [IU]/ML
40 INJECTION, SOLUTION SUBCUTANEOUS
Qty: 0 | Refills: 0 | DISCHARGE

## 2022-01-23 RX ORDER — INSULIN GLARGINE 100 [IU]/ML
15 INJECTION, SOLUTION SUBCUTANEOUS ONCE
Refills: 0 | Status: COMPLETED | OUTPATIENT
Start: 2022-01-23 | End: 2022-01-23

## 2022-01-23 RX ORDER — FOSINOPRIL SODIUM 10 MG/1
1 TABLET ORAL
Qty: 0 | Refills: 0 | DISCHARGE

## 2022-01-23 RX ORDER — ONDANSETRON 8 MG/1
4 TABLET, FILM COATED ORAL EVERY 8 HOURS
Refills: 0 | Status: DISCONTINUED | OUTPATIENT
Start: 2022-01-23 | End: 2022-01-28

## 2022-01-23 RX ORDER — SODIUM CHLORIDE 9 MG/ML
1000 INJECTION INTRAMUSCULAR; INTRAVENOUS; SUBCUTANEOUS
Refills: 0 | Status: DISCONTINUED | OUTPATIENT
Start: 2022-01-23 | End: 2022-01-28

## 2022-01-23 RX ORDER — LANOLIN ALCOHOL/MO/W.PET/CERES
3 CREAM (GRAM) TOPICAL AT BEDTIME
Refills: 0 | Status: DISCONTINUED | OUTPATIENT
Start: 2022-01-23 | End: 2022-01-28

## 2022-01-23 RX ORDER — INSULIN LISPRO 100/ML
VIAL (ML) SUBCUTANEOUS AT BEDTIME
Refills: 0 | Status: DISCONTINUED | OUTPATIENT
Start: 2022-01-23 | End: 2022-01-28

## 2022-01-23 RX ORDER — ERGOCALCIFEROL 1.25 MG/1
1 CAPSULE ORAL
Qty: 0 | Refills: 0 | DISCHARGE

## 2022-01-23 RX ORDER — ATORVASTATIN CALCIUM 80 MG/1
40 TABLET, FILM COATED ORAL AT BEDTIME
Refills: 0 | Status: DISCONTINUED | OUTPATIENT
Start: 2022-01-23 | End: 2022-01-28

## 2022-01-23 RX ORDER — DEXTROSE 50 % IN WATER 50 %
15 SYRINGE (ML) INTRAVENOUS ONCE
Refills: 0 | Status: DISCONTINUED | OUTPATIENT
Start: 2022-01-23 | End: 2022-01-28

## 2022-01-23 RX ORDER — DEXTROSE 50 % IN WATER 50 %
12.5 SYRINGE (ML) INTRAVENOUS ONCE
Refills: 0 | Status: DISCONTINUED | OUTPATIENT
Start: 2022-01-23 | End: 2022-01-28

## 2022-01-23 RX ORDER — GLUCAGON INJECTION, SOLUTION 0.5 MG/.1ML
1 INJECTION, SOLUTION SUBCUTANEOUS ONCE
Refills: 0 | Status: DISCONTINUED | OUTPATIENT
Start: 2022-01-23 | End: 2022-01-28

## 2022-01-23 RX ADMIN — INSULIN GLARGINE 15 UNIT(S): 100 INJECTION, SOLUTION SUBCUTANEOUS at 21:15

## 2022-01-23 RX ADMIN — PANTOPRAZOLE SODIUM 10 MG/HR: 20 TABLET, DELAYED RELEASE ORAL at 14:28

## 2022-01-23 RX ADMIN — Medication 3 MILLIGRAM(S): at 21:13

## 2022-01-23 RX ADMIN — Medication 0.4 MILLIGRAM(S): at 12:40

## 2022-01-23 RX ADMIN — ATORVASTATIN CALCIUM 40 MILLIGRAM(S): 80 TABLET, FILM COATED ORAL at 21:12

## 2022-01-23 RX ADMIN — Medication 6 UNIT(S): at 17:29

## 2022-01-23 RX ADMIN — SODIUM CHLORIDE 75 MILLILITER(S): 9 INJECTION INTRAMUSCULAR; INTRAVENOUS; SUBCUTANEOUS at 21:11

## 2022-01-23 NOTE — H&P ADULT - ASSESSMENT
#Chest pain  #CAD s/p stents x3 (mLAD, dLAD, mLCx)  Admit to telemetry  Trend troponin  Cont Aspirin, Plavix, statin  Cardio eval DR Kaye  NPO p MN for possible angiogram    #GI bleed/ Anemia acute blood  GI eval DR Starkfo  Cont Protonix drip  NPO p MN for possible EGD  Monitor HH    #Diabetes  ON high dose of insulins at home  Will place on Lantus 20Units+30 Units as will be NPO for tomorrow  Adjust further based on BGM's    #Hyperlipidemia- cont statin    #COVID pending. Vaccinated x3 Pfizer    #DVT proph- ambulate    #Dispo- inpatient admit.  #Chest pain  #CAD s/p stents x3 (mLAD, dLAD, mLCx)  Admit to telemetry  Trend troponin  Already taken Aspirin and Plavix today. Will hold it further, to be reevaluated tomorrow to decide if can resume  Cardio eval DR Kaye  NPO p MN for possible angiogram    #GI bleed/ Anemia acute blood  GI eval DR Starkfo  Cont Protonix drip  NPO p MN for possible EGD  Monitor HH    #Diabetes  ON high dose of insulins at home  Will place on Lantus 20Units+30 Units as will be NPO for tomorrow  Adjust further based on BGM's    #Hyperlipidemia- cont statin    #COVID pending. Vaccinated x3 Pfizer    #DVT proph- ambulate    #Dispo- inpatient admit. NPO p MN for EGD/ angio. To reevaluate for Aspirin/Plavix in AM

## 2022-01-23 NOTE — ED STATDOCS - CARE PLAN
1 Principal Discharge DX:	Chest pain   Principal Discharge DX:	Chest pain  Secondary Diagnosis:	GI bleed

## 2022-01-23 NOTE — H&P ADULT - HISTORY OF PRESENT ILLNESS
55yo/M with PMN CAD s/p BEATRICE x3 (mLAD, dLAD, mLCx), on dual APT, HTN, hyperlipidemia, diabetes presented with epigastric and chest discomfort. Patient states he was not feeling well since he was discharged from , vague abd discomfort. Seen as outpatient by DR Cooper and started on Cipro/flagyl. The past few days has been having epigastric/ retrosternal pain, intermittent, radiating to the jaw, no N/V/diaphoresis. In ED guaiac positive, brown stool.  down to 10. Started on Protonix drip

## 2022-01-23 NOTE — ED ADULT TRIAGE NOTE - CHIEF COMPLAINT QUOTE
Patient presents in ED with chest pain for 3 days became worse last night. Patient has a history of stents and hypertension.

## 2022-01-23 NOTE — ED STATDOCS - OBJECTIVE STATEMENT
see progress note 56 M PMH HTN HLD IDDM2, CKD?3 CAD s/p BEATRICE x3 (mLAD, dLAD, and mLCx, most recent 2018) on DAPT here c/o  intermittent CP radiating to jaw x 1 week with symptoms worsening yesterday. pt notes that he had associated SOB yesterday also. on Plavix and ASA. Reports the episode of CP usually lasts a minute or two.  states that drinking water improves the pain. COVID vaccinated x 3. had COIVD recently 1 month ago. Cardiologist: Dr. Kaye

## 2022-01-23 NOTE — ED STATDOCS - PROGRESS NOTE DETAILS
Lori Medina: 56 M PMH HTN HLD IDDM2, CKD?3 CAD s/p BEATRICE x3 (mLAD, dLAD, and mLCx, most recent 2018) on DAPT here c/o  intermittent CP radiating to jaw x 1 week with symptoms worsening yesterday. on Plavix and ASA. Reports the episode of CP usually lasts a minute or two.  COVID vaccinated x 3. had COIVD recently 1 month ago. Cardiologist: Dr. Kaye Will send pt to main ED for further evaluation. signed Tiffanie Mares PA-C Pt seen and evaluated initially in intake by Dr. Medina.   56M c/o intermittent chest pain x 1 week, last episode this morning, describes it as tightness radiatint to neck, associated with SOB. Worsened by exertion or talking, releived by rest. pt with CAD and stents x4, on ASA and plavix. card Polena. Pt alert, NAD, pain 5/10 right now. EKG with RBBB, similar to prior. Pt took his asa and plavix today. Plan labs, admit. Pt agrees with plan of  care. PMD Dr Rogel, peter Kaye. signed Tiffanie Mares PA-C Pt seen and evaluated initially in intake by Dr. Medina.   Pt with Hgb 10, decreased from 14 on [place] 3 when pt was admitted to  for GI bleed. signed Tiffanie Mares PA-C   I spoke to card Dr Zhang who agrees with admission, recommends hold anticoagulation pending GI consult and recommendations. I spoke to GI Dr Colon who recommends pt be NPO, protonix gtt. Pt to see Ganlfo tomorrow for consult unless there is a significant clinical change today. signed Tiffanie Mares PA-C Pt seen and evaluated initially in intake by Dr. Medina.   Pt with Hgb 10, decreased from 14 on Kody 3 when pt was admitted to  for GI bleed. pt notes he had some very loose dark stool, seen by Allison in the office on 1/20, was started on cipro/flagyl and stool is improving, becoming firmer and less frequent. Denies fevers or AP. abdomen soft, non-tender. Rectal exam with soft brown stool, no gross blood, guaic positive, [address], exp 8/31/22, exam chaperoned by COREY Demarco. pts chest pain imrpved to 1/10 after nitro. Case discussed with and admission accepted by hospitalist Dr. Villegas. Pt agrees with admission and plan of care.

## 2022-01-23 NOTE — PATIENT PROFILE ADULT - NSPROPTRIGHTREPPHONE_GEN_A_NUR
Spoke to patient and let him know his scripts are ready to be picked up at the .    Keturah ADAMSON    07/11/18 11:34 AM         [phone]

## 2022-01-23 NOTE — ED STATDOCS - ATTENDING CONTRIBUTION TO CARE
I, Vicky Medina MD, personally saw the patient with ACP.  I have personally performed a face to face diagnostic evaluation on this patient.  I have reviewed the ACP note and agree with the history, exam, and plan of care, except as noted.  I personally saw the patient and performed a substantive portion of the visit including all aspects of the medical decision making.

## 2022-01-23 NOTE — ED STATDOCS - NS ED ROS FT
Constitutional: No fever or chills  Eyes: No visual changes  HEENT: No throat pain  CV: (+)  chest pain  Resp: No SOB no cough  GI: No abd pain, nausea or vomiting  : No dysuria  MSK: No musculoskeletal pain  Skin: No rash  Neuro: No headache

## 2022-01-23 NOTE — PATIENT PROFILE ADULT - FALL HARM RISK - UNIVERSAL INTERVENTIONS
Bed in lowest position, wheels locked, appropriate side rails in place/Call bell, personal items and telephone in reach/Instruct patient to call for assistance before getting out of bed or chair/Non-slip footwear when patient is out of bed/[place] to call system/Physically safe environment - no spills, clutter or unnecessary equipment/Purposeful Proactive Rounding/Room/bathroom lighting operational, light cord in reach

## 2022-01-23 NOTE — H&P ADULT - NSHPPHYSICALEXAM_GEN_ALL_CORE
Vital Signs Last 24 Hrs  T(C): 36.6 (23 Jan 2022 11:45), Max: 36.6 (23 Jan 2022 11:45)  T(F): 97.8 (23 Jan 2022 11:45), Max: 97.8 (23 Jan 2022 11:45)  HR: 83 (23 Jan 2022 13:29) (83 - 91)  BP: 155/83 (23 Jan 2022 13:29) (155/83 - 166/79)  BP(mean): 105 (23 Jan 2022 13:29) (105 - 106)  RR: 16 (23 Jan 2022 13:29) (16 - 18)  SpO2: 99% (23 Jan 2022 13:29) (99% - 100%)

## 2022-01-23 NOTE — H&P ADULT - NSHPLABSRESULTS_GEN_ALL_CORE
10.5   7.51  )-----------( 308      ( 23 Jan 2022 12:23 )             32.1     23 Jan 2022 12:23    139    |  108    |  28     ----------------------------<  315    3.9     |  26     |  1.67     Ca    8.4        23 Jan 2022 12:23  Mg     2.3       23 Jan 2022 12:23    TPro  7.0    /  Alb  3.4    /  TBili  0.4    /  DBili  x      /  AST  25     /  ALT  37     /  AlkPhos  54     23 Jan 2022 12:23    LIVER FUNCTIONS - ( 23 Jan 2022 12:23 )  Alb: 3.4 g/dL / Pro: 7.0 gm/dL / ALK PHOS: 54 U/L / ALT: 37 U/L / AST: 25 U/L / GGT: x

## 2022-01-23 NOTE — ED STATDOCS - CLINICAL SUMMARY MEDICAL DECISION MAKING FREE TEXT BOX
Will send pt to main ED for further evaluation. EKG obtained, no acute ST segment changes, it was unchanged compared to prior, labs and imaging obtained, pt had a drop in hb and was recently admitted for a GI bleed, guaic was positive here as well, he was given protonix. D/w medicine and he will be admitted for further evaluation and care.

## 2022-01-24 LAB
ANION GAP SERPL CALC-SCNC: 3 MMOL/L — LOW (ref 5–17)
BUN SERPL-MCNC: 20 MG/DL — SIGNIFICANT CHANGE UP (ref 7–23)
CALCIUM SERPL-MCNC: 8.7 MG/DL — SIGNIFICANT CHANGE UP (ref 8.5–10.1)
CHLORIDE SERPL-SCNC: 114 MMOL/L — HIGH (ref 96–108)
CHOLEST SERPL-MCNC: 121 MG/DL — SIGNIFICANT CHANGE UP
CO2 SERPL-SCNC: 25 MMOL/L — SIGNIFICANT CHANGE UP (ref 22–31)
CREAT SERPL-MCNC: 1.3 MG/DL — SIGNIFICANT CHANGE UP (ref 0.5–1.3)
GLUCOSE SERPL-MCNC: 113 MG/DL — HIGH (ref 70–99)
HCT VFR BLD CALC: 31.9 % — LOW (ref 39–50)
HDLC SERPL-MCNC: 25 MG/DL — LOW
HGB BLD-MCNC: 10 G/DL — LOW (ref 13–17)
LIPID PNL WITH DIRECT LDL SERPL: 45 MG/DL — SIGNIFICANT CHANGE UP
MCHC RBC-ENTMCNC: 28.2 PG — SIGNIFICANT CHANGE UP (ref 27–34)
MCHC RBC-ENTMCNC: 31.3 GM/DL — LOW (ref 32–36)
MCV RBC AUTO: 89.9 FL — SIGNIFICANT CHANGE UP (ref 80–100)
NON HDL CHOLESTEROL: 96 MG/DL — SIGNIFICANT CHANGE UP
PLATELET # BLD AUTO: 284 K/UL — SIGNIFICANT CHANGE UP (ref 150–400)
POTASSIUM SERPL-MCNC: 4.3 MMOL/L — SIGNIFICANT CHANGE UP (ref 3.5–5.3)
POTASSIUM SERPL-SCNC: 4.3 MMOL/L — SIGNIFICANT CHANGE UP (ref 3.5–5.3)
RBC # BLD: 3.55 M/UL — LOW (ref 4.2–5.8)
RBC # FLD: 15.7 % — HIGH (ref 10.3–14.5)
SODIUM SERPL-SCNC: 142 MMOL/L — SIGNIFICANT CHANGE UP (ref 135–145)
TRIGL SERPL-MCNC: 256 MG/DL — HIGH
TROPONIN I, HIGH SENSITIVITY RESULT: 18.84 NG/L — SIGNIFICANT CHANGE UP
WBC # BLD: 6.18 K/UL — SIGNIFICANT CHANGE UP (ref 3.8–10.5)
WBC # FLD AUTO: 6.18 K/UL — SIGNIFICANT CHANGE UP (ref 3.8–10.5)

## 2022-01-24 PROCEDURE — 93010 ELECTROCARDIOGRAM REPORT: CPT

## 2022-01-24 PROCEDURE — 99233 SBSQ HOSP IP/OBS HIGH 50: CPT

## 2022-01-24 RX ORDER — NITROGLYCERIN 6.5 MG
0.4 CAPSULE, EXTENDED RELEASE ORAL
Refills: 0 | Status: DISCONTINUED | OUTPATIENT
Start: 2022-01-24 | End: 2022-01-28

## 2022-01-24 RX ORDER — CLOPIDOGREL BISULFATE 75 MG/1
75 TABLET, FILM COATED ORAL DAILY
Refills: 0 | Status: DISCONTINUED | OUTPATIENT
Start: 2022-01-24 | End: 2022-01-28

## 2022-01-24 RX ORDER — ISOSORBIDE MONONITRATE 60 MG/1
30 TABLET, EXTENDED RELEASE ORAL DAILY
Refills: 0 | Status: DISCONTINUED | OUTPATIENT
Start: 2022-01-24 | End: 2022-01-28

## 2022-01-24 RX ORDER — ASPIRIN/CALCIUM CARB/MAGNESIUM 324 MG
81 TABLET ORAL DAILY
Refills: 0 | Status: DISCONTINUED | OUTPATIENT
Start: 2022-01-24 | End: 2022-01-28

## 2022-01-24 RX ADMIN — Medication 6 UNIT(S): at 17:38

## 2022-01-24 RX ADMIN — Medication 0.4 MILLIGRAM(S): at 15:25

## 2022-01-24 RX ADMIN — Medication 25 MILLIGRAM(S): at 11:31

## 2022-01-24 RX ADMIN — ATORVASTATIN CALCIUM 40 MILLIGRAM(S): 80 TABLET, FILM COATED ORAL at 22:42

## 2022-01-24 RX ADMIN — Medication 0.4 MILLIGRAM(S): at 15:32

## 2022-01-24 RX ADMIN — INSULIN GLARGINE 30 UNIT(S): 100 INJECTION, SOLUTION SUBCUTANEOUS at 22:41

## 2022-01-24 RX ADMIN — Medication 81 MILLIGRAM(S): at 15:05

## 2022-01-24 RX ADMIN — Medication 0.4 MILLIGRAM(S): at 15:00

## 2022-01-24 RX ADMIN — Medication 10: at 17:41

## 2022-01-24 RX ADMIN — CLOPIDOGREL BISULFATE 75 MILLIGRAM(S): 75 TABLET, FILM COATED ORAL at 15:06

## 2022-01-24 NOTE — CONSULT NOTE ADULT - ASSESSMENT
Acute anemia, FOBT+ stool in setting of ASA/plavix/NSAIDs and likely UGIB/ulcer.    Rec:  -EGD  -PPI  -hold NSAIDs  -cardiac eval pending   -npo until after EGD  -d/w pt  -d/w Dr. Villegas
A/P:  57yo/M with PMN CAD s/p BEATRICE x3 (mLAD, dLAD, mLCx), on dual APT, HTN, hyperlipidemia, diabetes presented with epigastric and chest discomfort.     1. CP. Typical symptoms. Trops negative x 1 and EKG does not show dynamic changes.  ?Angina vs ACS vs GERD/esophageal spasm/noncardiac.  Pt does have multiple risk factors including CAD s/p multiple PCI.   Cont asa/plavix/statin.   Start Imdur 30mg daily.  Will have NST tomorrow AM to assess for ischemia. If +, will proceed to [facility].  2Decho pending.     2. CAD s/p PCI. As above. Cont medical mgmt.    3. HTN. Labile. 117-189 systolic. Will adjust as needed if remains elevated.    4. Dyslipidemia. Cont statin for a goal LDL of < 70.     5. DVT proph. Replete lytes as needed.

## 2022-01-24 NOTE — PROGRESS NOTE ADULT - SUBJECTIVE AND OBJECTIVE BOX
Reason for Admission: Chest pain, epigastric pain  History of Present Illness:   57yo/M with PMN CAD s/p BEATRICE x3 (mLAD, dLAD, mLCx), on dual APT, HTN, hyperlipidemia, diabetes presented with epigastric and chest discomfort. Patient states he was not feeling well since he was discharged from , vague abd discomfort. Seen as outpatient by DR Cooper and started on Cipro/flagyl. The past few days has been having epigastric/ retrosternal pain, intermittent, radiating to the jaw, no N/V/diaphoresis. In ED guaiac positive, brown stool. HH down to 10. Started on Protonix drip    REVIEW OF SYSTEMS:  General: NAD, hemodynamically stable, (-)  fever, (-) chills, (-) weakness  HEENT:  Eyes:  No visual loss, blurred vision, double vision or yellow sclerae. Ears, Nose, Throat:  No hearing loss, sneezing, congestion, runny nose or sore throat.  SKIN:  No rash or itching.  CARDIOVASCULAR:  No chest pain, chest pressure or chest discomfort. No palpitations or edema.  RESPIRATORY:  No shortness of breath, cough or sputum.  GASTROINTESTINAL:  No anorexia, nausea, vomiting or diarrhea. No abdominal pain or blood.  NEUROLOGICAL:  No headache, dizziness, syncope, paralysis, ataxia, numbness or tingling in the extremities. No change in bowel or bladder control.  MUSCULOSKELETAL:  No muscle, back pain, joint pain or stiffness.  HEMATOLOGIC:  No anemia, bleeding or bruising.  LYMPHATICS:  No enlarged nodes. No history of splenectomy.  ENDOCRINOLOGIC:  No reports of sweating, cold or heat intolerance. No polyuria or polydipsia.  ALLERGIES:  No history of asthma, hives, eczema or rhinitis.    Physical Exam:   GENERAL APPEARANCE:  NAD, hemodynamically stable  T(C): 36.5 (01-24-22 @ 10:44), Max: 37 (01-23-22 @ 15:43)  HR: 96 (01-24-22 @ 14:50) (74 - 96)  BP: 187/96 (01-24-22 @ 14:50) (117/63 - 187/96)  RR: 20 (01-24-22 @ 14:50) (16 - 20)  SpO2: 97% (01-23-22 @ 23:23) (97% - 100%)  Wt(kg): --  HEENT:  Head is normocephalic    Skin:  Warm and dry without any rash   NECK:  Supple without lymphadenopathy.   HEART:  Regular rate and rhythm. normal S1 and S2, No M/R/G  LUNGS:  Good ins/exp effort, no W/R/R/C  ABDOMEN:  Soft, nontender, nondistended with good bowel sounds heard  EXTREMITIES:  Without cyanosis, clubbing or edema.   NEUROLOGICAL:  Gross nonfocal       CBC Full  -  ( 24 Jan 2022 09:41 )  WBC Count : 6.18 K/uL  RBC Count : 3.55 M/uL  Hemoglobin : 10.0 g/dL  Hematocrit : 31.9 %  Platelet Count - Automated : 284 K/uL  Mean Cell Volume : 89.9 fl  Mean Cell Hemoglobin : 28.2 pg  Mean Cell Hemoglobin Concentration : 31.3 gm/dL  Auto Neutrophil # : x  Auto Lymphocyte # : x  Auto Monocyte # : x  Auto Eosinophil # : x  Auto Basophil # : x  Auto Neutrophil % : x  Auto Lymphocyte % : x  Auto Monocyte % : x  Auto Eosinophil % : x  Auto Basophil % : x        01-24    142  |  114<H>  |  20  ----------------------------<  113<H>  4.3   |  25  |  1.30    Ca    8.7      24 Jan 2022 09:41  Mg     2.3     01-23    TPro  7.0  /  Alb  3.4  /  TBili  0.4  /  DBili  x   /  AST  25  /  ALT  37  /  AlkPhos  54  01-23      # Active Chest pain  # CAD s/p stents x3 (mLAD, dLAD, mLCx)  - As patient notes of radiating chest pain towards the neck with numbness of (L) upper extrmeity and sweating, care has been escalated to cardiology Dr. Kaye  - tele monitoring  - STAT EKG  - DAPT  - Nitro given -  chest pain better  - Care discussed with Dr. Kaye  - patient had an endoscopy today      #Diabetes  - ON high dose of insulins at home  - Will place on Lantus 20Units+30 Units as will be NPO for tomorrow  - Adjust further based on BGM's    #Hyperlipidemia  - cont statin    #COVID pending. Vaccinated x3 Pfizer             Reason for Admission: Chest pain, epigastric pain  History of Present Illness:   57yo/M with PMN CAD s/p BEATRICE x3 (mLAD, dLAD, mLCx), on dual APT, HTN, hyperlipidemia, diabetes presented with epigastric and chest discomfort. Patient states he was not feeling well since he was discharged from , vague abd discomfort. Seen as outpatient by DR Cooper and started on Cipro/flagyl. The past few days has been having epigastric/ retrosternal pain, intermittent, radiating to the jaw, no N/V/diaphoresis. In ED guaiac positive, brown stool. HH down to 10. Started on Protonix drip    REVIEW OF SYSTEMS:  General: NAD, hemodynamically stable, (-)  fever, (-) chills, (-) weakness  HEENT:  Eyes:  No visual loss, blurred vision, double vision or yellow sclerae. Ears, Nose, Throat:  No hearing loss, sneezing, congestion, runny nose or sore throat.  SKIN:  No rash or itching.  CARDIOVASCULAR:  No chest pain, chest pressure or chest discomfort. No palpitations or edema.  RESPIRATORY:  No shortness of breath, cough or sputum.  GASTROINTESTINAL:  No anorexia, nausea, vomiting or diarrhea. No abdominal pain or blood.  NEUROLOGICAL:  No headache, dizziness, syncope, paralysis, ataxia, numbness or tingling in the extremities. No change in bowel or bladder control.  MUSCULOSKELETAL:  No muscle, back pain, joint pain or stiffness.  HEMATOLOGIC:  No anemia, bleeding or bruising.  LYMPHATICS:  No enlarged nodes. No history of splenectomy.  ENDOCRINOLOGIC:  No reports of sweating, cold or heat intolerance. No polyuria or polydipsia.  ALLERGIES:  No history of asthma, hives, eczema or rhinitis.    Physical Exam:   GENERAL APPEARANCE:  NAD, hemodynamically stable  T(C): 36.5 (01-24-22 @ 10:44), Max: 37 (01-23-22 @ 15:43)  HR: 96 (01-24-22 @ 14:50) (74 - 96)  BP: 187/96 (01-24-22 @ 14:50) (117/63 - 187/96)  RR: 20 (01-24-22 @ 14:50) (16 - 20)  SpO2: 97% (01-23-22 @ 23:23) (97% - 100%)  Wt(kg): --  HEENT:  Head is normocephalic    Skin:  Warm and dry without any rash   NECK:  Supple without lymphadenopathy.   HEART:  Regular rate and rhythm. normal S1 and S2, No M/R/G  LUNGS:  Good ins/exp effort, no W/R/R/C  ABDOMEN:  Soft, nontender, nondistended with good bowel sounds heard  EXTREMITIES:  Without cyanosis, clubbing or edema.   NEUROLOGICAL:  Gross nonfocal       CBC Full  -  ( 24 Jan 2022 09:41 )  WBC Count : 6.18 K/uL  RBC Count : 3.55 M/uL  Hemoglobin : 10.0 g/dL  Hematocrit : 31.9 %  Platelet Count - Automated : 284 K/uL  Mean Cell Volume : 89.9 fl  Mean Cell Hemoglobin : 28.2 pg  Mean Cell Hemoglobin Concentration : 31.3 gm/dL  Auto Neutrophil # : x  Auto Lymphocyte # : x  Auto Monocyte # : x  Auto Eosinophil # : x  Auto Basophil # : x  Auto Neutrophil % : x  Auto Lymphocyte % : x  Auto Monocyte % : x  Auto Eosinophil % : x  Auto Basophil % : x        01-24    142  |  114<H>  |  20  ----------------------------<  113<H>  4.3   |  25  |  1.30    Ca    8.7      24 Jan 2022 09:41  Mg     2.3     01-23    TPro  7.0  /  Alb  3.4  /  TBili  0.4  /  DBili  x   /  AST  25  /  ALT  37  /  AlkPhos  54  01-23      # Active Chest pain  # CAD s/p stents x3 (mLAD, dLAD, mLCx)  - As patient notes of radiating chest pain towards the neck with numbness of (L) upper extrmeity and sweating, care has been escalated to cardiology Dr. Kaye  - tele monitoring  - STAT EKG  - DAPT  - Nitro given -  chest pain better  - Care discussed with Dr. Kaye  - patient had an endoscopy today     # Anemia   - patient with significant HH drop since 1/3-> 1/23  - no signs of active bleeding     #Diabetes  - ON high dose of insulins at home  - Will place on Lantus 20Units+30 Units as will be NPO for tomorrow  - Adjust further based on BGM's    #Hyperlipidemia  - cont statin    #COVID pending. Vaccinated x3 Pfizer             Reason for Admission: Chest pain, epigastric pain  History of Present Illness:   55yo/M with PMN CAD s/p BEATRICE x3 (mLAD, dLAD, mLCx), on dual APT, HTN, hyperlipidemia, diabetes presented with epigastric and chest discomfort. Patient states he was not feeling well since he was discharged from , vague abd discomfort. Seen as outpatient by DR Cooper and started on Cipro/flagyl. The past few days has been having epigastric/ retrosternal pain, intermittent, radiating to the jaw, no N/V/diaphoresis. In ED guaiac positive, brown stool. HH down to 10. Started on Protonix drip    REVIEW OF SYSTEMS:  General: NAD, hemodynamically stable, (-)  fever, (-) chills, (-) weakness  HEENT:  Eyes:  No visual loss, blurred vision, double vision or yellow sclerae. Ears, Nose, Throat:  No hearing loss, sneezing, congestion, runny nose or sore throat.  SKIN:  No rash or itching.  CARDIOVASCULAR:  No chest pain, chest pressure or chest discomfort. No palpitations or edema.  RESPIRATORY:  No shortness of breath, cough or sputum.  GASTROINTESTINAL:  No anorexia, nausea, vomiting or diarrhea. No abdominal pain or blood.  NEUROLOGICAL:  No headache, dizziness, syncope, paralysis, ataxia, numbness or tingling in the extremities. No change in bowel or bladder control.  MUSCULOSKELETAL:  No muscle, back pain, joint pain or stiffness.  HEMATOLOGIC:  No anemia, bleeding or bruising.  LYMPHATICS:  No enlarged nodes. No history of splenectomy.  ENDOCRINOLOGIC:  No reports of sweating, cold or heat intolerance. No polyuria or polydipsia.  ALLERGIES:  No history of asthma, hives, eczema or rhinitis.    Physical Exam:   GENERAL APPEARANCE:  NAD, hemodynamically stable  T(C): 36.5 (01-24-22 @ 10:44), Max: 37 (01-23-22 @ 15:43)  HR: 96 (01-24-22 @ 14:50) (74 - 96)  BP: 187/96 (01-24-22 @ 14:50) (117/63 - 187/96)  RR: 20 (01-24-22 @ 14:50) (16 - 20)  SpO2: 97% (01-23-22 @ 23:23) (97% - 100%)  Wt(kg): --  HEENT:  Head is normocephalic    Skin:  Warm and dry without any rash   NECK:  Supple without lymphadenopathy.   HEART:  Regular rate and rhythm. normal S1 and S2, No M/R/G  LUNGS:  Good ins/exp effort, no W/R/R/C  ABDOMEN:  Soft, nontender, nondistended with good bowel sounds heard  EXTREMITIES:  Without cyanosis, clubbing or edema.   NEUROLOGICAL:  Gross nonfocal       CBC Full  -  ( 24 Jan 2022 09:41 )  WBC Count : 6.18 K/uL  RBC Count : 3.55 M/uL  Hemoglobin : 10.0 g/dL  Hematocrit : 31.9 %  Platelet Count - Automated : 284 K/uL  Mean Cell Volume : 89.9 fl  Mean Cell Hemoglobin : 28.2 pg  Mean Cell Hemoglobin Concentration : 31.3 gm/dL  Auto Neutrophil # : x  Auto Lymphocyte # : x  Auto Monocyte # : x  Auto Eosinophil # : x  Auto Basophil # : x  Auto Neutrophil % : x  Auto Lymphocyte % : x  Auto Monocyte % : x  Auto Eosinophil % : x  Auto Basophil % : x        01-24    142  |  114<H>  |  20  ----------------------------<  113<H>  4.3   |  25  |  1.30    Ca    8.7      24 Jan 2022 09:41  Mg     2.3     01-23    TPro  7.0  /  Alb  3.4  /  TBili  0.4  /  DBili  x   /  AST  25  /  ALT  37  /  AlkPhos  54  01-23      # Active Chest pain  # CAD s/p stents x3 (mLAD, dLAD, mLCx)  - As patient notes of radiating chest pain towards the neck with numbness of (L) upper extrmeity and sweating, care has been escalated to cardiology Dr. Kaye  - tele monitoring  - STAT EKG  - DAPT  - Nitro given -  chest pain better  - Care discussed with Dr. Kaye  - patient had an endoscopy today     # Anemia   - patient with significant HH drop since 1/3-> 1/23  - no signs of active bleeding  - EGD performed-negative  - outpt colonoscopy 2/2020 was negative except diverticular disease and fair prep in right colon.  - will need repeat colonoscopy and capsule endoscopy if negative.      #Diabetes  - ON high dose of insulins at home  - Will place on Lantus 20Units+30 Units as will be NPO for tomorrow  - Adjust further based on BGM's    #Hyperlipidemia  - cont statin    #COVID pending. Vaccinated x3 Pfizer             Reason for Admission: Chest pain, epigastric pain  History of Present Illness:   55yo/M with PMN CAD s/p BEATRICE x3 (mLAD, dLAD, mLCx), on dual APT, HTN, hyperlipidemia, diabetes presented with epigastric and chest discomfort. Patient states he was not feeling well since he was discharged from , vague abd discomfort. Seen as outpatient by DR Cooper and started on Cipro/flagyl. The past few days has been having epigastric/ retrosternal pain, intermittent, radiating to the jaw, no N/V/diaphoresis. In ED guaiac positive, brown stool. HH down to 10. Started on Protonix drip    Patient was exposed to COVID infected patient -  now PUI  CHEST pain post EGD  EKG -  no acute ischemic changes  pending troponins    REVIEW OF SYSTEMS:  General: NAD, hemodynamically stable, (-)  fever, (-) chills, (-) weakness  HEENT:  Eyes:  No visual loss, blurred vision, double vision or yellow sclerae. Ears, Nose, Throat:  No hearing loss, sneezing, congestion, runny nose or sore throat.  SKIN:  No rash or itching.  CARDIOVASCULAR:  No chest pain, chest pressure or chest discomfort. No palpitations or edema.  RESPIRATORY:  No shortness of breath, cough or sputum.  GASTROINTESTINAL:  No anorexia, nausea, vomiting or diarrhea. No abdominal pain or blood.  NEUROLOGICAL:  No headache, dizziness, syncope, paralysis, ataxia, numbness or tingling in the extremities. No change in bowel or bladder control.  MUSCULOSKELETAL:  No muscle, back pain, joint pain or stiffness.  HEMATOLOGIC:  No anemia, bleeding or bruising.  LYMPHATICS:  No enlarged nodes. No history of splenectomy.  ENDOCRINOLOGIC:  No reports of sweating, cold or heat intolerance. No polyuria or polydipsia.  ALLERGIES:  No history of asthma, hives, eczema or rhinitis.    Physical Exam:   GENERAL APPEARANCE:  NAD, hemodynamically stable  T(C): 36.5 (01-24-22 @ 10:44), Max: 37 (01-23-22 @ 15:43)  HR: 96 (01-24-22 @ 14:50) (74 - 96)  BP: 187/96 (01-24-22 @ 14:50) (117/63 - 187/96)  RR: 20 (01-24-22 @ 14:50) (16 - 20)  SpO2: 97% (01-23-22 @ 23:23) (97% - 100%)  Wt(kg): --  HEENT:  Head is normocephalic    Skin:  Warm and dry without any rash   NECK:  Supple without lymphadenopathy.   HEART:  Regular rate and rhythm. normal S1 and S2, No M/R/G  LUNGS:  Good ins/exp effort, no W/R/R/C  ABDOMEN:  Soft, nontender, nondistended with good bowel sounds heard  EXTREMITIES:  Without cyanosis, clubbing or edema.   NEUROLOGICAL:  Gross nonfocal       CBC Full  -  ( 24 Jan 2022 09:41 )  WBC Count : 6.18 K/uL  RBC Count : 3.55 M/uL  Hemoglobin : 10.0 g/dL  Hematocrit : 31.9 %  Platelet Count - Automated : 284 K/uL  Mean Cell Volume : 89.9 fl  Mean Cell Hemoglobin : 28.2 pg  Mean Cell Hemoglobin Concentration : 31.3 gm/dL  Auto Neutrophil # : x  Auto Lymphocyte # : x  Auto Monocyte # : x  Auto Eosinophil # : x  Auto Basophil # : x  Auto Neutrophil % : x  Auto Lymphocyte % : x  Auto Monocyte % : x  Auto Eosinophil % : x  Auto Basophil % : x        01-24    142  |  114<H>  |  20  ----------------------------<  113<H>  4.3   |  25  |  1.30    Ca    8.7      24 Jan 2022 09:41  Mg     2.3     01-23    TPro  7.0  /  Alb  3.4  /  TBili  0.4  /  DBili  x   /  AST  25  /  ALT  37  /  AlkPhos  54  01-23      # Active Chest pain  # CAD s/p stents x3 (mLAD, dLAD, mLCx)  - As patient notes of radiating chest pain towards the neck with numbness of (L) upper extrmeity and sweating, care has been escalated to cardiology Dr. Kaye  - tele monitoring  - STAT EKG  - DAPT  - Nitro given -  chest pain better  - Care discussed with Dr. Kaye  - patient had an endoscopy today     # Anemia   - patient with significant HH drop since 1/3-> 1/23  - no signs of active bleeding  - EGD performed-negative  - outpt colonoscopy 2/2020 was negative except diverticular disease and fair prep in right colon.  - will need repeat colonoscopy and capsule endoscopy if negative.      #Diabetes  - ON high dose of insulins at home  - Will place on Lantus 20Units+30 Units as will be NPO for tomorrow  - Adjust further based on BGM's    #Hyperlipidemia  - cont statin    #COVID pending. Vaccinated x3 Pfizer

## 2022-01-24 NOTE — CONSULT NOTE ADULT - SUBJECTIVE AND OBJECTIVE BOX
Cardiology Consultation    HPI: 57yo/M with PMN CAD s/p BEATRICE x3 (mLAD, dLAD, mLCx), on dual APT, HTN, hyperlipidemia, diabetes presented with epigastric and chest discomfort. Patient states he was not feeling well since he was discharged from , vague abd discomfort. Seen as outpatient by DR Cooper and started on Cipro/flagyl. The past few days has been having epigastric/ retrosternal pain, intermittent, radiating to the jaw, no N/V/diaphoresis. In ED guaiac positive, brown stool. HH down to 10. Started on Protonix drip (23 Jan 2022 14:48)    1/24. Chart reviewed. D/w hospitalist.  CP/heaviness/pressure.   EGD today that was negative.   Now feeling better. SR on tele.      PAST MEDICAL & SURGICAL HISTORY:  Essential hypertension  Obstructive sleep apnea  Diabetes mellitus  HLD (hyperlipidemia)  CAD (coronary artery disease)  Pancreatitis  History of coronary artery stent placement  Placed 9/12/18 by Dr. Kaye  S/P cholecystectomy    Allergies  penicillin (Hives)  Intolerances    SOCIAL HISTORY: Denies tobacco, etoh abuse or illicit drug use    FAMILY HISTORY:  Family history of diabetes mellitus (Sibling)    MEDICATIONS  (STANDING):  aspirin  chewable 81 milliGRAM(s) Oral daily  atorvastatin 40 milliGRAM(s) Oral at bedtime  clopidogrel Tablet 75 milliGRAM(s) Oral daily  dextrose 40% Gel 15 Gram(s) Oral once  dextrose 5%. 1000 milliLiter(s) (50 mL/Hr) IV Continuous <Continuous>  dextrose 5%. 1000 milliLiter(s) (100 mL/Hr) IV Continuous <Continuous>  dextrose 50% Injectable 25 Gram(s) IV Push once  dextrose 50% Injectable 12.5 Gram(s) IV Push once  dextrose 50% Injectable 25 Gram(s) IV Push once  glucagon  Injectable 1 milliGRAM(s) IntraMuscular once  insulin glargine Injectable (LANTUS) 20 Unit(s) SubCutaneous every morning  insulin glargine Injectable (LANTUS) 30 Unit(s) SubCutaneous at bedtime  insulin lispro (ADMELOG) corrective regimen sliding scale   SubCutaneous three times a day before meals  insulin lispro (ADMELOG) corrective regimen sliding scale   SubCutaneous at bedtime  insulin lispro Injectable (ADMELOG) 6 Unit(s) SubCutaneous three times a day before meals  metoprolol succinate ER 25 milliGRAM(s) Oral daily  sodium chloride 0.9%. 1000 milliLiter(s) (75 mL/Hr) IV Continuous <Continuous>    MEDICATIONS  (PRN):  acetaminophen     Tablet .. 650 milliGRAM(s) Oral every 6 hours PRN Temp greater or equal to 38C (100.4F), Mild Pain (1 - 3)  aluminum hydroxide/magnesium hydroxide/simethicone Suspension 30 milliLiter(s) Oral every 4 hours PRN Dyspepsia  melatonin 3 milliGRAM(s) Oral at bedtime PRN Insomnia  nitroglycerin     SubLingual 0.4 milliGRAM(s) SubLingual every 5 minutes PRN Chest Pain  ondansetron Injectable 4 milliGRAM(s) IV Push every 8 hours PRN Nausea and/or Vomiting    Vital Signs Last 24 Hrs  T(C): 36.5 (24 Jan 2022 10:44), Max: 36.8 (23 Jan 2022 16:25)  T(F): 97.7 (24 Jan 2022 10:44), Max: 98.2 (23 Jan 2022 16:25)  HR: 97 (24 Jan 2022 15:28) (74 - 97)  BP: 189/92 (24 Jan 2022 15:28) (117/63 - 189/92)  BP(mean): --  RR: 20 (24 Jan 2022 15:28) (16 - 20)  SpO2: 97% (23 Jan 2022 23:23) (97% - 100%)    REVIEW OF SYSTEMS:    CONSTITUTIONAL:  As per HPI.  HEENT:  Eyes:  No diplopia or blurred vision. ENT:  No earache, sore throat or runny nose.  CARDIOVASCULAR:  +CP/pressure.  RESPIRATORY:  No cough, shortness of breath, PND or orthopnea.  GASTROINTESTINAL:  No nausea, vomiting or diarrhea.  GENITOURINARY:  No dysuria, frequency or urgency.  MUSCULOSKELETAL:  As per HPI.  NEUROLOGIC:  No paresthesias, fasciculations, seizures or weakness.    PHYSICAL EXAMINATION:    GENERAL APPEARANCE:  Pt. is not currently dyspneic, in no distress. Pt. is alert, oriented, and pleasant.  HEENT:  Pupils are normal and react normally. No icterus. Mucous membranes well colored.  NECK:  Supple. No lymphadenopathy. Jugular venous pressure not elevated. Carotids equal.   HEART:   The cardiac impulse has a normal quality. There are no murmurs, rubs or gallops noted  CHEST:  Chest is clear to auscultation. Normal respiratory effort.  ABDOMEN:  Soft and nontender.   EXTREMITIES:  There is no edema.     LABS:                        10.0   6.18  )-----------( 284      ( 24 Jan 2022 09:41 )             31.9     01-24    142  |  114<H>  |  20  ----------------------------<  113<H>  4.3   |  25  |  1.30    Ca    8.7      24 Jan 2022 09:41  Mg     2.3     01-23    TPro  7.0  /  Alb  3.4  /  TBili  0.4  /  DBili  x   /  AST  25  /  ALT  37  /  AlkPhos  54  01-23    LIVER FUNCTIONS - ( 23 Jan 2022 12:23 )  Alb: 3.4 g/dL / Pro: 7.0 gm/dL / ALK PHOS: 54 U/L / ALT: 37 U/L / AST: 25 U/L / GGT: x           EKG: SR @ 91. 1st degree AV block. RBBB. No dynamic ST changes.     TELEMETRY: SR    CARDIAC TESTS: < from: Cardiac Cath Lab - Adult (10.02.20 @ 11:39) >   Impression     Interventional Conclusions     Successful Coronary Intervention BEATRICE of mid , and distal LAD.     Recommendations     Aggressive medical management of coronary artery disease and its   underlying risk factors.   Continue current medications.    < end of copied text >  < from: Cardiac Cath Lab - Adult (09.18.20 @ 09:53) >   Impression     Diagnostic Conclusions   Two vessel disease as decribed   LCx being the culprit     Interventional Conclusions     Successful Coronary Intervention BEATRICE of proximal LCx.     Recommendations     Aggressive medical management of coronary artery disease and its   underlying risk factors.   Dual antiplatelet therapy for at least one year   Staged PCI of LAD in 2-3 weeks    < end of copied text >  < from: TTE Echo Complete w/o Contrast w/ Doppler (09.17.20 @ 10:22) >   The left ventricle is normal in size, wall thickness, wall motion and   contractility. Estimated left ventricular ejection fraction is 60%.   Mild diastolic dysfunction (stage I).   Trace aortic regurgitation.   Mild aortic stenosis.    ASSESSMENT & PLAN:
GI consult    HPI:  55yo/M with PMN CAD s/p BEATRICE x3 (mLAD, dLAD, mLCx), on dual APT, HTN, hyperlipidemia, diabetes presented with epigastric and chest discomfort. Patient states he was not feeling well since he was discharged from , vague abd discomfort. Seen as outpatient by DR Cooper and started on Cipro/flagyl. The past few days has been having epigastric/ retrosternal pain, intermittent, radiating to the jaw, no N/V/diaphoresis. In ED guaiac positive, brown stool. HH down to 10. Started on Protonix drip (23 Jan 2022 14:48)    Pt recently in office with hgb 15 last week, notes dark stool but not black and no hematochezia. No current CP, no n/v. Was taking NSAIDS/ASA/plavix. Prior abdominal pain resolved. No diarrhea.       PAST MEDICAL & SURGICAL HISTORY:  Essential hypertension    Obstructive sleep apnea    Diabetes mellitus    HLD (hyperlipidemia)    CAD (coronary artery disease)    Pancreatitis    History of coronary artery stent placement  Placed 9/12/18 by Dr. Kaye    S/P cholecystectomy        Home Medications:  aspirin 81 mg oral delayed release tablet: 1 tab(s) orally once a day (23 Jan 2022 15:07)  B Complex 50 oral tablet, extended release: 1 tab(s) orally once a day (23 Jan 2022 15:07)  ciprofloxacin 250 mg oral tablet: 1 tab(s) orally every 12 hours  *** coure not complete*** (23 Jan 2022 15:07)  CoQ10 100 mg oral capsule: 1 cap(s) orally once a day (23 Jan 2022 15:07)  dicyclomine 20 mg oral tablet: 1 tab(s) orally 3 times a day, As Needed (23 Jan 2022 15:07)  fenofibrate 160 mg oral tablet: 1 tab(s) orally once a day (23 Jan 2022 15:07)  Fluad Quadrivalent PF [id number] intramuscular suspension: 0.5 milliliter(s) intramuscular once (23 Jan 2022 15:07)  fosinopril 10 mg oral tablet: 2 tab(s) orally once a day (23 Jan 2022 15:07)  hydroCHLOROthiazide 25 mg oral tablet: 1 tab(s) orally once a day (23 Jan 2022 15:07)  insulin lispro 100 units/mL subcutaneous solution: 25 unit(s) subcutaneous 3 times a day (with meals) (23 Jan 2022 15:07)  Jardiance 25 mg oral tablet: 1 tab(s) orally once a day (in the morning) (23 Jan 2022 15:07)  Lipitor 40 mg oral tablet: 1 tab(s) orally once a day (at bedtime) (23 Jan 2022 15:07)  metoprolol succinate 25 mg oral tablet, extended release: 1 tab(s) orally once a day (23 Jan 2022 15:07)  metroNIDAZOLE 500 mg oral tablet: 1 tab(s) orally 3 times a day  *** course not complete*** (23 Jan 2022 15:07)  Pfizer-BioNTech COVID-19 Vaccine PF 30 mcg/0.3 mL intramuscular suspension: 0.3 milliliter(s) intramuscular once  ***booster in october*** (23 Jan 2022 15:07)  Touzafaro SoloStar 300 units/mL subcutaneous solution: 85 unit(s) subcutaneous 2 times a day (23 Jan 2022 15:07)  Vitamin D3 25 mcg (1000 intl units) oral tablet: 1 tab(s) orally once a day (23 Jan 2022 15:07)      MEDICATIONS  (STANDING):  atorvastatin 40 milliGRAM(s) Oral at bedtime  dextrose 40% Gel 15 Gram(s) Oral once  dextrose 5%. 1000 milliLiter(s) (50 mL/Hr) IV Continuous <Continuous>  dextrose 5%. 1000 milliLiter(s) (100 mL/Hr) IV Continuous <Continuous>  dextrose 50% Injectable 25 Gram(s) IV Push once  dextrose 50% Injectable 12.5 Gram(s) IV Push once  dextrose 50% Injectable 25 Gram(s) IV Push once  glucagon  Injectable 1 milliGRAM(s) IntraMuscular once  insulin glargine Injectable (LANTUS) 20 Unit(s) SubCutaneous every morning  insulin glargine Injectable (LANTUS) 30 Unit(s) SubCutaneous at bedtime  insulin lispro (ADMELOG) corrective regimen sliding scale   SubCutaneous three times a day before meals  insulin lispro (ADMELOG) corrective regimen sliding scale   SubCutaneous at bedtime  insulin lispro Injectable (ADMELOG) 6 Unit(s) SubCutaneous three times a day before meals  metoprolol succinate ER 25 milliGRAM(s) Oral daily  pantoprazole Infusion 8 mG/Hr (10 mL/Hr) IV Continuous <Continuous>  sodium chloride 0.9%. 1000 milliLiter(s) (75 mL/Hr) IV Continuous <Continuous>    MEDICATIONS  (PRN):  acetaminophen     Tablet .. 650 milliGRAM(s) Oral every 6 hours PRN Temp greater or equal to 38C (100.4F), Mild Pain (1 - 3)  aluminum hydroxide/magnesium hydroxide/simethicone Suspension 30 milliLiter(s) Oral every 4 hours PRN Dyspepsia  melatonin 3 milliGRAM(s) Oral at bedtime PRN Insomnia  nitroglycerin     SubLingual 0.4 milliGRAM(s) SubLingual every 5 minutes PRN Chest Pain  ondansetron Injectable 4 milliGRAM(s) IV Push every 8 hours PRN Nausea and/or Vomiting      Allergies    penicillin (Hives)    Intolerances        SOCIAL HISTORY: NC    FAMILY HISTORY:  Family history of diabetes mellitus (Sibling)        ROS  As above  Otherwise unremarkable, all systems reviewed    PE:  Vital Signs Last 24 Hrs  T(C): 36.3 (23 Jan 2022 23:23), Max: 37 (23 Jan 2022 15:43)  T(F): 97.4 (23 Jan 2022 23:23), Max: 98.6 (23 Jan 2022 15:43)  HR: 74 (23 Jan 2022 23:23) (74 - 91)  BP: 117/63 (23 Jan 2022 23:23) (117/63 - 166/79)  BP(mean): 103 (23 Jan 2022 15:43) (103 - 106)  RR: 18 (23 Jan 2022 18:22) (16 - 18)  SpO2: 97% (23 Jan 2022 23:23) (97% - 100%)    Constitutional: NAD, well-developed, A+Ox3  Anicteric   Respiratory: CTABL, breathing comfortably  Cardiovascular: S1 and S2, RRR  Gastrointestinal: +BS, soft, non tender, non distended, no mass  Extremities: warm, well perfused, no edema  Psychiatric: Normal mood, normal affect  Neuro: moves all extremities, grossly intact  Skin: No rashes or lesions    LABS:                        10.5   7.51  )-----------( 308      ( 23 Jan 2022 12:23 )             32.1     01-23    139  |  108  |  28<H>  ----------------------------<  315<H>  3.9   |  26  |  1.67<H>    Ca    8.4<L>      23 Jan 2022 12:23  Mg     2.3     01-23    TPro  7.0  /  Alb  3.4  /  TBili  0.4  /  DBili  x   /  AST  25  /  ALT  37  /  AlkPhos  54  01-23      LIVER FUNCTIONS - ( 23 Jan 2022 12:23 )  Alb: 3.4 g/dL / Pro: 7.0 gm/dL / ALK PHOS: 54 U/L / ALT: 37 U/L / AST: 25 U/L / GGT: x           Trop negative x 3    RADIOLOGY & ADDITIONAL STUDIES:

## 2022-01-24 NOTE — PROGRESS NOTE ADULT - SUBJECTIVE AND OBJECTIVE BOX
EGD performed-negative    of note outpt colonoscopy 2/2020 was negative except diverticular disease and fair prep in right colon.    will need repeat colonoscopy and capsule endoscopy if negative. Timing to be determined.

## 2022-01-25 PROCEDURE — 99233 SBSQ HOSP IP/OBS HIGH 50: CPT

## 2022-01-25 PROCEDURE — 78452 HT MUSCLE IMAGE SPECT MULT: CPT | Mod: 26

## 2022-01-25 PROCEDURE — 93016 CV STRESS TEST SUPVJ ONLY: CPT

## 2022-01-25 PROCEDURE — 93018 CV STRESS TEST I&R ONLY: CPT

## 2022-01-25 RX ORDER — REGADENOSON 0.08 MG/ML
0.4 INJECTION, SOLUTION INTRAVENOUS ONCE
Refills: 0 | Status: COMPLETED | OUTPATIENT
Start: 2022-01-25 | End: 2022-01-25

## 2022-01-25 RX ORDER — METOPROLOL TARTRATE 50 MG
25 TABLET ORAL
Refills: 0 | Status: DISCONTINUED | OUTPATIENT
Start: 2022-01-25 | End: 2022-01-28

## 2022-01-25 RX ADMIN — Medication 25 MILLIGRAM(S): at 12:14

## 2022-01-25 RX ADMIN — Medication 325 MILLIGRAM(S): at 15:58

## 2022-01-25 RX ADMIN — REGADENOSON 0.4 MILLIGRAM(S): 0.08 INJECTION, SOLUTION INTRAVENOUS at 10:04

## 2022-01-25 RX ADMIN — ISOSORBIDE MONONITRATE 30 MILLIGRAM(S): 60 TABLET, EXTENDED RELEASE ORAL at 12:11

## 2022-01-25 RX ADMIN — Medication 6: at 18:18

## 2022-01-25 RX ADMIN — Medication 81 MILLIGRAM(S): at 12:11

## 2022-01-25 RX ADMIN — CLOPIDOGREL BISULFATE 75 MILLIGRAM(S): 75 TABLET, FILM COATED ORAL at 12:12

## 2022-01-25 RX ADMIN — Medication 3 MILLIGRAM(S): at 21:32

## 2022-01-25 RX ADMIN — ATORVASTATIN CALCIUM 40 MILLIGRAM(S): 80 TABLET, FILM COATED ORAL at 21:33

## 2022-01-25 RX ADMIN — Medication 25 MILLIGRAM(S): at 21:32

## 2022-01-25 RX ADMIN — SODIUM CHLORIDE 75 MILLILITER(S): 9 INJECTION INTRAMUSCULAR; INTRAVENOUS; SUBCUTANEOUS at 23:45

## 2022-01-25 RX ADMIN — Medication 6 UNIT(S): at 18:12

## 2022-01-25 NOTE — PROGRESS NOTE ADULT - SUBJECTIVE AND OBJECTIVE BOX
Reason for Admission: Chest pain, epigastric pain  History of Present Illness:   57yo/M with PMN CAD s/p BEATRICE x3 (mLAD, dLAD, mLCx), on dual APT, HTN, hyperlipidemia, diabetes presented with epigastric and chest discomfort. Patient states he was not feeling well since he was discharged from , vague abd discomfort. Seen as outpatient by DR Cooper and started on Cipro/flagyl. The past few days has been having epigastric/ retrosternal pain, intermittent, radiating to the jaw, no N/V/diaphoresis. In ED guaiac positive, brown stool. HH down to 10. Started on Protonix drip    Patient was exposed to COVID infected patient -  now PUI    REVIEW OF SYSTEMS:  General: NAD, hemodynamically stable, (-)  fever, (-) chills, (-) weakness  HEENT:  Eyes:  No visual loss, blurred vision, double vision or yellow sclerae. Ears, Nose, Throat:  No hearing loss, sneezing, congestion, runny nose or sore throat.  SKIN:  No rash or itching.  CARDIOVASCULAR:  No chest pain, chest pressure or chest discomfort. No palpitations or edema.  RESPIRATORY:  No shortness of breath, cough or sputum.  GASTROINTESTINAL:  No anorexia, nausea, vomiting or diarrhea. No abdominal pain or blood.  NEUROLOGICAL:  No headache, dizziness, syncope, paralysis, ataxia, numbness or tingling in the extremities. No change in bowel or bladder control.  MUSCULOSKELETAL:  No muscle, back pain, joint pain or stiffness.  HEMATOLOGIC:  No anemia, bleeding or bruising.  LYMPHATICS:  No enlarged nodes. No history of splenectomy.  ENDOCRINOLOGIC:  No reports of sweating, cold or heat intolerance. No polyuria or polydipsia.  ALLERGIES:  No history of asthma, hives, eczema or rhinitis.    Physical Exam:   GENERAL APPEARANCE:  NAD, hemodynamically stable  T(C): 36.5 (01-24-22 @ 10:44), Max: 37 (01-23-22 @ 15:43)  HR: 96 (01-24-22 @ 14:50) (74 - 96)  BP: 187/96 (01-24-22 @ 14:50) (117/63 - 187/96)  RR: 20 (01-24-22 @ 14:50) (16 - 20)  SpO2: 97% (01-23-22 @ 23:23) (97% - 100%)  Wt(kg): --  HEENT:  Head is normocephalic    Skin:  Warm and dry without any rash   NECK:  Supple without lymphadenopathy.   HEART:  Regular rate and rhythm. normal S1 and S2, No M/R/G  LUNGS:  Good ins/exp effort, no W/R/R/C  ABDOMEN:  Soft, nontender, nondistended with good bowel sounds heard  EXTREMITIES:  Without cyanosis, clubbing or edema.   NEUROLOGICAL:  Gross nonfocal       CBC Full  -  ( 24 Jan 2022 09:41 )  WBC Count : 6.18 K/uL  RBC Count : 3.55 M/uL  Hemoglobin : 10.0 g/dL  Hematocrit : 31.9 %  Platelet Count - Automated : 284 K/uL  Mean Cell Volume : 89.9 fl  Mean Cell Hemoglobin : 28.2 pg  Mean Cell Hemoglobin Concentration : 31.3 gm/dL  Auto Neutrophil # : x  Auto Lymphocyte # : x  Auto Monocyte # : x  Auto Eosinophil # : x  Auto Basophil # : x  Auto Neutrophil % : x  Auto Lymphocyte % : x  Auto Monocyte % : x  Auto Eosinophil % : x  Auto Basophil % : x        01-24    142  |  114<H>  |  20  ----------------------------<  113<H>  4.3   |  25  |  1.30    Ca    8.7      24 Jan 2022 09:41  Mg     2.3     01-23    TPro  7.0  /  Alb  3.4  /  TBili  0.4  /  DBili  x   /  AST  25  /  ALT  37  /  AlkPhos  54  01-23

## 2022-01-25 NOTE — PROGRESS NOTE ADULT - SUBJECTIVE AND OBJECTIVE BOX
Cardiology Progress Note    HPI: 57yo/M with PMN CAD s/p BEATRICE x3 (mLAD, dLAD, mLCx), on dual APT, HTN, hyperlipidemia, diabetes presented with epigastric and chest discomfort. Patient states he was not feeling well since he was discharged from , vague abd discomfort. Seen as outpatient by DR Cooper and started on Cipro/flagyl. The past few days has been having epigastric/ retrosternal pain, intermittent, radiating to the jaw, no N/V/diaphoresis. In ED guaiac positive, brown stool. HH down to 10. Started on Protonix drip (23 Jan 2022 14:48)    1/24. Chart reviewed. D/w hospitalist.  CP/heaviness/pressure.   EGD today that was negative.   Now feeling better. SR on tele.      1/25. Pt remains anemia- Hgb 15 to 10.  No CP/SOB last pm.   Will have NST today followed by colonoscopy likely tomorrow.   D/w GI. SR on tele.      PAST MEDICAL & SURGICAL HISTORY:  Essential hypertension  Obstructive sleep apnea  Diabetes mellitus  HLD (hyperlipidemia)  CAD (coronary artery disease)  Pancreatitis  History of coronary artery stent placement  Placed 9/12/18 by Dr. Kaye  S/P cholecystectomy    Allergies  penicillin (Hives)  Intolerances    SOCIAL HISTORY: Denies tobacco, etoh abuse or illicit drug use    FAMILY HISTORY:  Family history of diabetes mellitus (Sibling)    MEDICATIONS  (STANDING):  aspirin  chewable 81 milliGRAM(s) Oral daily  atorvastatin 40 milliGRAM(s) Oral at bedtime  clopidogrel Tablet 75 milliGRAM(s) Oral daily  dextrose 40% Gel 15 Gram(s) Oral once  dextrose 5%. 1000 milliLiter(s) (50 mL/Hr) IV Continuous <Continuous>  dextrose 5%. 1000 milliLiter(s) (100 mL/Hr) IV Continuous <Continuous>  dextrose 50% Injectable 25 Gram(s) IV Push once  dextrose 50% Injectable 12.5 Gram(s) IV Push once  dextrose 50% Injectable 25 Gram(s) IV Push once  glucagon  Injectable 1 milliGRAM(s) IntraMuscular once  insulin glargine Injectable (LANTUS) 20 Unit(s) SubCutaneous every morning  insulin glargine Injectable (LANTUS) 30 Unit(s) SubCutaneous at bedtime  insulin lispro (ADMELOG) corrective regimen sliding scale   SubCutaneous three times a day before meals  insulin lispro (ADMELOG) corrective regimen sliding scale   SubCutaneous at bedtime  insulin lispro Injectable (ADMELOG) 6 Unit(s) SubCutaneous three times a day before meals  metoprolol succinate ER 25 milliGRAM(s) Oral daily  sodium chloride 0.9%. 1000 milliLiter(s) (75 mL/Hr) IV Continuous <Continuous>    MEDICATIONS  (PRN):  acetaminophen     Tablet .. 650 milliGRAM(s) Oral every 6 hours PRN Temp greater or equal to 38C (100.4F), Mild Pain (1 - 3)  aluminum hydroxide/magnesium hydroxide/simethicone Suspension 30 milliLiter(s) Oral every 4 hours PRN Dyspepsia  melatonin 3 milliGRAM(s) Oral at bedtime PRN Insomnia  nitroglycerin     SubLingual 0.4 milliGRAM(s) SubLingual every 5 minutes PRN Chest Pain  ondansetron Injectable 4 milliGRAM(s) IV Push every 8 hours PRN Nausea and/or Vomiting    Vital Signs Last 24 Hrs  T(C): 36.5 (24 Jan 2022 10:44), Max: 36.8 (23 Jan 2022 16:25)  T(F): 97.7 (24 Jan 2022 10:44), Max: 98.2 (23 Jan 2022 16:25)  HR: 97 (24 Jan 2022 15:28) (74 - 97)  BP: 189/92 (24 Jan 2022 15:28) (117/63 - 189/92)  BP(mean): --  RR: 20 (24 Jan 2022 15:28) (16 - 20)  SpO2: 97% (23 Jan 2022 23:23) (97% - 100%)    REVIEW OF SYSTEMS:    CONSTITUTIONAL:  As per HPI.  HEENT:  Eyes:  No diplopia or blurred vision. ENT:  No earache, sore throat or runny nose.  CARDIOVASCULAR:  +CP/pressure.  RESPIRATORY:  No cough, shortness of breath, PND or orthopnea.  GASTROINTESTINAL:  No nausea, vomiting or diarrhea.  GENITOURINARY:  No dysuria, frequency or urgency.  MUSCULOSKELETAL:  As per HPI.  NEUROLOGIC:  No paresthesias, fasciculations, seizures or weakness.    PHYSICAL EXAMINATION:    GENERAL APPEARANCE:  Pt. is not currently dyspneic, in no distress. Pt. is alert, oriented, and pleasant.  HEENT:  Pupils are normal and react normally. No icterus. Mucous membranes well colored.  NECK:  Supple. No lymphadenopathy. Jugular venous pressure not elevated. Carotids equal.   HEART:   The cardiac impulse has a normal quality. There are no murmurs, rubs or gallops noted  CHEST:  Chest is clear to auscultation. Normal respiratory effort.  ABDOMEN:  Soft and nontender.   EXTREMITIES:  There is no edema.     LABS:                        10.0   6.18  )-----------( 284      ( 24 Jan 2022 09:41 )             31.9     01-24    142  |  114<H>  |  20  ----------------------------<  113<H>  4.3   |  25  |  1.30    Ca    8.7      24 Jan 2022 09:41  Mg     2.3     01-23    TPro  7.0  /  Alb  3.4  /  TBili  0.4  /  DBili  x   /  AST  25  /  ALT  37  /  AlkPhos  54  01-23    LIVER FUNCTIONS - ( 23 Jan 2022 12:23 )  Alb: 3.4 g/dL / Pro: 7.0 gm/dL / ALK PHOS: 54 U/L / ALT: 37 U/L / AST: 25 U/L / GGT: x           EKG: SR @ 91. 1st degree AV block. RBBB. No dynamic ST changes.     TELEMETRY: SR    CARDIAC TESTS: < from: Cardiac Cath Lab - Adult (10.02.20 @ 11:39) >   Impression     Interventional Conclusions     Successful Coronary Intervention BEATRICE of mid , and distal LAD.     Recommendations     Aggressive medical management of coronary artery disease and its   underlying risk factors.   Continue current medications.    < end of copied text >  < from: Cardiac Cath Lab - Adult (09.18.20 @ 09:53) >   Impression     Diagnostic Conclusions   Two vessel disease as decribed   LCx being the culprit     Interventional Conclusions     Successful Coronary Intervention BEATRICE of proximal LCx.     Recommendations     Aggressive medical management of coronary artery disease and its   underlying risk factors.   Dual antiplatelet therapy for at least one year   Staged PCI of LAD in 2-3 weeks    < end of copied text >  < from: TTE Echo Complete w/o Contrast w/ Doppler (09.17.20 @ 10:22) >   The left ventricle is normal in size, wall thickness, wall motion and   contractility. Estimated left ventricular ejection fraction is 60%.   Mild diastolic dysfunction (stage I).   Trace aortic regurgitation.   Mild aortic stenosis.    ASSESSMENT & PLAN:

## 2022-01-25 NOTE — PROGRESS NOTE ADULT - SUBJECTIVE AND OBJECTIVE BOX
GI  pt seen 8am    HPI:  had CP yesterday and colonoscopy will be postponed  pending stress test today  dark green stool, no overt bleeding  no abd pain      ROS  As above  Otherwise unremarkable, all systems reviewed    PE:  Vital Signs Last 24 Hrs  T(C): 36.9 (25 Jan 2022 12:05), Max: 36.9 (25 Jan 2022 12:05)  T(F): 98.5 (25 Jan 2022 12:05), Max: 98.5 (25 Jan 2022 12:05)  HR: 86 (25 Jan 2022 12:05) (82 - 97)  BP: 159/91 (25 Jan 2022 12:05) (148/96 - 189/92)  BP(mean): --  RR: 19 (25 Jan 2022 12:05) (19 - 20)  SpO2: 100% (25 Jan 2022 12:05) (98% - 100%)    Constitutional: NAD, well-developed, A+Ox3  Anicteric   Respiratory: CTABL, breathing comfortably  Cardiovascular: S1 and S2, RRR  Gastrointestinal: +BS, soft, non tender, non distended, no mass  Extremities: warm, well perfused, no edema  Psychiatric: Normal mood, normal affect  Neuro: moves all extremities, grossly intact  Skin: No rashes or lesions    LABS:                                 10.0   6.18  )-----------( 284      ( 24 Jan 2022 09:41 )             31.9

## 2022-01-25 NOTE — CHART NOTE - NSCHARTNOTEFT_GEN_A_CORE
Regadenoson Stress Test Report    tristan Thorpe   : 1965    Indication : chest pain , CAD       Baseline EKG sinus rbbb     Regadenoson dose is 5 cc which was given rapidly over 10 seconds  into a peripheral IV.  Immediately after Regadenoson injection, flush w/ 5 cc saline given.  Radiopharmaceutical was injected 10-20 sec after the saline flush.  Pt was monitored for 6 minutes.  A 12 lead ECG was recorded every minute & BP was recorded throughout the test.    Peak infusion ECG: No additional changes noted.  patient did have chest discomfort similar to his presenting symptoms to hospital , but milder   normal BP response , no significant EKG changes compared to baseline     Conclusion:  Normal response to IV lexiscan.  patient developed chest pain similar to his presenting symptoms   See myocardial perfusion scan report. Regadenoson Stress Test Report    tristan Thorpe   : 1965    Indication : chest pain , CAD       Baseline EKG sinus rbbb     Regadenoson dose is 5 cc which was given rapidly over 10 seconds  into a peripheral IV.  Immediately after Regadenoson injection, flush w/ 5 cc saline given.  Radiopharmaceutical was injected 10-20 sec after the saline flush.  Pt was monitored for 6 minutes.  A 12 lead ECG was recorded every minute & BP was recorded throughout the test.    Peak infusion ECG: No additional changes noted.  patient did have chest discomfort similar to his presenting symptoms to hospital ,  hypertensive response with pain , no significant EKG changes compared to baseline  patient received 100 mg aminophylline which helped his symptoms     Conclusion:  Normal response to IV lexiscan.  patient developed chest pain similar to his presenting symptoms   See myocardial perfusion scan report.

## 2022-01-26 LAB
ALBUMIN SERPL ELPH-MCNC: 3.3 G/DL — SIGNIFICANT CHANGE UP (ref 3.3–5)
ALP SERPL-CCNC: 44 U/L — SIGNIFICANT CHANGE UP (ref 40–120)
ALT FLD-CCNC: 49 U/L — SIGNIFICANT CHANGE UP (ref 12–78)
ANION GAP SERPL CALC-SCNC: 3 MMOL/L — LOW (ref 5–17)
AST SERPL-CCNC: 36 U/L — SIGNIFICANT CHANGE UP (ref 15–37)
BILIRUB SERPL-MCNC: 0.6 MG/DL — SIGNIFICANT CHANGE UP (ref 0.2–1.2)
BUN SERPL-MCNC: 9 MG/DL — SIGNIFICANT CHANGE UP (ref 7–23)
CALCIUM SERPL-MCNC: 9 MG/DL — SIGNIFICANT CHANGE UP (ref 8.5–10.1)
CHLORIDE SERPL-SCNC: 111 MMOL/L — HIGH (ref 96–108)
CO2 SERPL-SCNC: 25 MMOL/L — SIGNIFICANT CHANGE UP (ref 22–31)
CREAT SERPL-MCNC: 1.08 MG/DL — SIGNIFICANT CHANGE UP (ref 0.5–1.3)
GLUCOSE SERPL-MCNC: 127 MG/DL — HIGH (ref 70–99)
HCT VFR BLD CALC: 33.8 % — LOW (ref 39–50)
HGB BLD-MCNC: 10.9 G/DL — LOW (ref 13–17)
MCHC RBC-ENTMCNC: 28.5 PG — SIGNIFICANT CHANGE UP (ref 27–34)
MCHC RBC-ENTMCNC: 32.2 GM/DL — SIGNIFICANT CHANGE UP (ref 32–36)
MCV RBC AUTO: 88.3 FL — SIGNIFICANT CHANGE UP (ref 80–100)
PLATELET # BLD AUTO: 331 K/UL — SIGNIFICANT CHANGE UP (ref 150–400)
POTASSIUM SERPL-MCNC: 4.2 MMOL/L — SIGNIFICANT CHANGE UP (ref 3.5–5.3)
POTASSIUM SERPL-SCNC: 4.2 MMOL/L — SIGNIFICANT CHANGE UP (ref 3.5–5.3)
PROT SERPL-MCNC: 6.8 GM/DL — SIGNIFICANT CHANGE UP (ref 6–8.3)
RBC # BLD: 3.83 M/UL — LOW (ref 4.2–5.8)
RBC # FLD: 15 % — HIGH (ref 10.3–14.5)
SARS-COV-2 RNA SPEC QL NAA+PROBE: SIGNIFICANT CHANGE UP
SODIUM SERPL-SCNC: 139 MMOL/L — SIGNIFICANT CHANGE UP (ref 135–145)
WBC # BLD: 6.5 K/UL — SIGNIFICANT CHANGE UP (ref 3.8–10.5)
WBC # FLD AUTO: 6.5 K/UL — SIGNIFICANT CHANGE UP (ref 3.8–10.5)

## 2022-01-26 PROCEDURE — 99233 SBSQ HOSP IP/OBS HIGH 50: CPT

## 2022-01-26 PROCEDURE — 93010 ELECTROCARDIOGRAM REPORT: CPT | Mod: 76

## 2022-01-26 RX ORDER — SODIUM CHLORIDE 9 MG/ML
1000 INJECTION INTRAMUSCULAR; INTRAVENOUS; SUBCUTANEOUS
Refills: 0 | Status: DISCONTINUED | OUTPATIENT
Start: 2022-01-26 | End: 2022-01-28

## 2022-01-26 RX ADMIN — Medication 25 MILLIGRAM(S): at 22:07

## 2022-01-26 RX ADMIN — ATORVASTATIN CALCIUM 40 MILLIGRAM(S): 80 TABLET, FILM COATED ORAL at 22:07

## 2022-01-26 RX ADMIN — Medication 81 MILLIGRAM(S): at 14:49

## 2022-01-26 RX ADMIN — ISOSORBIDE MONONITRATE 30 MILLIGRAM(S): 60 TABLET, EXTENDED RELEASE ORAL at 15:06

## 2022-01-26 RX ADMIN — Medication 6 UNIT(S): at 17:41

## 2022-01-26 RX ADMIN — SODIUM CHLORIDE 75 MILLILITER(S): 9 INJECTION INTRAMUSCULAR; INTRAVENOUS; SUBCUTANEOUS at 22:08

## 2022-01-26 RX ADMIN — Medication 25 MILLIGRAM(S): at 14:49

## 2022-01-26 RX ADMIN — Medication 4: at 17:40

## 2022-01-26 RX ADMIN — Medication 3 MILLIGRAM(S): at 22:07

## 2022-01-26 RX ADMIN — CLOPIDOGREL BISULFATE 75 MILLIGRAM(S): 75 TABLET, FILM COATED ORAL at 14:49

## 2022-01-26 RX ADMIN — INSULIN GLARGINE 30 UNIT(S): 100 INJECTION, SOLUTION SUBCUTANEOUS at 22:06

## 2022-01-26 NOTE — PROGRESS NOTE ADULT - SUBJECTIVE AND OBJECTIVE BOX
Cardiology Progress Note    HPI: 57yo/M with PMN CAD s/p BEATRICE x3 (mLAD, dLAD, mLCx), on dual APT, HTN, hyperlipidemia, diabetes presented with epigastric and chest discomfort. Patient states he was not feeling well since he was discharged from , vague abd discomfort. Seen as outpatient by DR Cooper and started on Cipro/flagyl. The past few days has been having epigastric/ retrosternal pain, intermittent, radiating to the jaw, no N/V/diaphoresis. In ED guaiac positive, brown stool. HH down to 10. Started on Protonix drip (23 Jan 2022 14:48)    1/24. Chart reviewed. D/w hospitalist.  CP/heaviness/pressure.   EGD today that was negative.   Now feeling better. SR on tele.      1/25. Pt remains anemia- Hgb 15 to 10.  No CP/SOB last pm.   Will have NST today followed by colonoscopy likely tomorrow.   D/w GI. SR on tele.      1/26. NST negative for ischemia.   Some CP with movement. No SOB.   SBP stable at present.    PAST MEDICAL & SURGICAL HISTORY:  Essential hypertension  Obstructive sleep apnea  Diabetes mellitus  HLD (hyperlipidemia)  CAD (coronary artery disease)  Pancreatitis  History of coronary artery stent placement  Placed 9/12/18 by Dr. Kaye  S/P cholecystectomy    Allergies  penicillin (Hives)  Intolerances    SOCIAL HISTORY: Denies tobacco, etoh abuse or illicit drug use    FAMILY HISTORY:  Family history of diabetes mellitus (Sibling)    MEDICATIONS  (STANDING):  aspirin  chewable 81 milliGRAM(s) Oral daily  atorvastatin 40 milliGRAM(s) Oral at bedtime  clopidogrel Tablet 75 milliGRAM(s) Oral daily  dextrose 40% Gel 15 Gram(s) Oral once  dextrose 5%. 1000 milliLiter(s) (50 mL/Hr) IV Continuous <Continuous>  dextrose 5%. 1000 milliLiter(s) (100 mL/Hr) IV Continuous <Continuous>  dextrose 50% Injectable 25 Gram(s) IV Push once  dextrose 50% Injectable 12.5 Gram(s) IV Push once  dextrose 50% Injectable 25 Gram(s) IV Push once  glucagon  Injectable 1 milliGRAM(s) IntraMuscular once  insulin glargine Injectable (LANTUS) 20 Unit(s) SubCutaneous every morning  insulin glargine Injectable (LANTUS) 30 Unit(s) SubCutaneous at bedtime  insulin lispro (ADMELOG) corrective regimen sliding scale   SubCutaneous three times a day before meals  insulin lispro (ADMELOG) corrective regimen sliding scale   SubCutaneous at bedtime  insulin lispro Injectable (ADMELOG) 6 Unit(s) SubCutaneous three times a day before meals  metoprolol succinate ER 25 milliGRAM(s) Oral daily  sodium chloride 0.9%. 1000 milliLiter(s) (75 mL/Hr) IV Continuous <Continuous>    MEDICATIONS  (PRN):  acetaminophen     Tablet .. 650 milliGRAM(s) Oral every 6 hours PRN Temp greater or equal to 38C (100.4F), Mild Pain (1 - 3)  aluminum hydroxide/magnesium hydroxide/simethicone Suspension 30 milliLiter(s) Oral every 4 hours PRN Dyspepsia  melatonin 3 milliGRAM(s) Oral at bedtime PRN Insomnia  nitroglycerin     SubLingual 0.4 milliGRAM(s) SubLingual every 5 minutes PRN Chest Pain  ondansetron Injectable 4 milliGRAM(s) IV Push every 8 hours PRN Nausea and/or Vomiting    Vital Signs Last 24 Hrs  T(C): 36.5 (24 Jan 2022 10:44), Max: 36.8 (23 Jan 2022 16:25)  T(F): 97.7 (24 Jan 2022 10:44), Max: 98.2 (23 Jan 2022 16:25)  HR: 97 (24 Jan 2022 15:28) (74 - 97)  BP: 189/92 (24 Jan 2022 15:28) (117/63 - 189/92)  BP(mean): --  RR: 20 (24 Jan 2022 15:28) (16 - 20)  SpO2: 97% (23 Jan 2022 23:23) (97% - 100%)    REVIEW OF SYSTEMS:    CONSTITUTIONAL:  As per HPI.  HEENT:  Eyes:  No diplopia or blurred vision. ENT:  No earache, sore throat or runny nose.  CARDIOVASCULAR:  +CP/pressure.  RESPIRATORY:  No cough, shortness of breath, PND or orthopnea.  GASTROINTESTINAL:  No nausea, vomiting or diarrhea.  GENITOURINARY:  No dysuria, frequency or urgency.  MUSCULOSKELETAL:  As per HPI.  NEUROLOGIC:  No paresthesias, fasciculations, seizures or weakness.    PHYSICAL EXAMINATION:    GENERAL APPEARANCE:  Pt. is not currently dyspneic, in no distress. Pt. is alert, oriented, and pleasant.  HEENT:  Pupils are normal and react normally. No icterus. Mucous membranes well colored.  NECK:  Supple. No lymphadenopathy. Jugular venous pressure not elevated. Carotids equal.   HEART:   The cardiac impulse has a normal quality. There are no murmurs, rubs or gallops noted  CHEST:  Chest is clear to auscultation. Normal respiratory effort.  ABDOMEN:  Soft and nontender.   EXTREMITIES:  There is no edema.     LABS:                        10.0   6.18  )-----------( 284      ( 24 Jan 2022 09:41 )             31.9     01-24    142  |  114<H>  |  20  ----------------------------<  113<H>  4.3   |  25  |  1.30    Ca    8.7      24 Jan 2022 09:41  Mg     2.3     01-23    TPro  7.0  /  Alb  3.4  /  TBili  0.4  /  DBili  x   /  AST  25  /  ALT  37  /  AlkPhos  54  01-23    LIVER FUNCTIONS - ( 23 Jan 2022 12:23 )  Alb: 3.4 g/dL / Pro: 7.0 gm/dL / ALK PHOS: 54 U/L / ALT: 37 U/L / AST: 25 U/L / GGT: x           EKG: SR @ 91. 1st degree AV block. RBBB. No dynamic ST changes.     TELEMETRY: SR    CARDIAC TESTS: < from: Cardiac Cath Lab - Adult (10.02.20 @ 11:39) >   Impression     Interventional Conclusions     Successful Coronary Intervention BEATRICE of mid , and distal LAD.     Recommendations     Aggressive medical management of coronary artery disease and its   underlying risk factors.   Continue current medications.    < end of copied text >  < from: Cardiac Cath Lab - Adult (09.18.20 @ 09:53) >   Impression     Diagnostic Conclusions   Two vessel disease as decribed   LCx being the culprit     Interventional Conclusions     Successful Coronary Intervention BEATRICE of proximal LCx.     Recommendations     Aggressive medical management of coronary artery disease and its   underlying risk factors.   Dual antiplatelet therapy for at least one year   Staged PCI of LAD in 2-3 weeks    < end of copied text >  < from: TTE Echo Complete w/o Contrast w/ Doppler (09.17.20 @ 10:22) >   The left ventricle is normal in size, wall thickness, wall motion and   contractility. Estimated left ventricular ejection fraction is 60%.   Mild diastolic dysfunction (stage I).   Trace aortic regurgitation.   Mild aortic stenosis.        ASSESSMENT & PLAN:

## 2022-01-26 NOTE — CHART NOTE - NSCHARTNOTEFT_GEN_A_CORE
Patient is a 56y old  Male who presents with a chief complaint of Chest pain, epigastric pain s/p LHC with BEATRICE to mLAD and POBA to D1  status post cath viaRFA Site clean, dry, intact. Pulses present, capillary refill appropriate.  no signs of hematoma present, surrounding area soft. VSS no c/o pain.  Post cath instructions and medications reviewed, patient verbalizes and understands instructions. Patient resting comfortably in bed.   Cath team to follow up in AM

## 2022-01-26 NOTE — CHART NOTE - NSCHARTNOTEFT_GEN_A_CORE
56 y.o male with PMHx of CAD, s/p BEATRICE x3 (mild and distal LAD, mid LCx in 2018, 2020) on DAPT therapy presented with c/o chest pain, underwent stress test which revealed apical, mid inferior and septal defect. Pt brought to cath lab for further ischemic evaluation.   Seen and examined Pt in holding.   VSS, A+O x4, denies chest pain, sob or palpitation at this time.   - Procedure risks, alternatives, benefits and potential complications were discussed in detail. Risks include but not limited to bleeding, infection, allergy, renal failure requiring dialysis, stroke, vascular injury, pericardial tamponade, arrhythmias, MI and even death.   - Informed consent obtained and Pt verbalizes and understands preprocedure instructions.      ASA class: III   Cr: 1.08  GFR: 77  Bleeding risk: 1%

## 2022-01-26 NOTE — PACU DISCHARGE NOTE - COMMENTS
Report given to 3N RN. Patient A&Ox4, VSS. Right groin Mynx dressing clean, dry intact, no s/s of hematoma or bleeding noted. Positive bilateral pedal pulses present. Patient placed on [employer] cardiac monitor, awaiting transport.  Patient safety maintained.

## 2022-01-26 NOTE — PROGRESS NOTE ADULT - SUBJECTIVE AND OBJECTIVE BOX
Cath Lab Nurse Practitioner Note  HPI:  57yo/M with PMN CAD s/p BEATRICE x3 (mLAD, dLAD, mLCx), on dual APT, HTN, hyperlipidemia, diabetes presented with epigastric and chest discomfort. Patient states he was not feeling well since he was discharged from , vague abd discomfort. Seen as outpatient by DR Cooper and started on Cipro/flagyl. The past few days has been having epigastric/ retrosternal pain, intermittent, radiating to the jaw, no N/V/diaphoresis. In ED guaiac positive, brown stool.  down to 10. Started on Protonix drip (23 Jan 2022 14:48)    s/p LHC : with BEATRICE to mLAD and  POBA to D1  Pt denies chest pain/SOB/palpitations post cath.      Vital Signs Last 24 Hrs  T(C): 36.6 (26 Jan 2022 09:59), Max: 36.6 (26 Jan 2022 09:57)  T(F): 97.9 (26 Jan 2022 09:59), Max: 97.9 (26 Jan 2022 09:57)  HR: 80 (26 Jan 2022 09:59) (73 - 82)  BP: 173/89 (26 Jan 2022 09:59) (122/83 - 173/89)  BP(mean): 110 (26 Jan 2022 09:57) (110 - 110)  RR: 18 (26 Jan 2022 09:59) (17 - 19)  SpO2: 100% (26 Jan 2022 09:59) (100% - 100%)      Labs:                        10.9   6.50  )-----------( 331      ( 26 Jan 2022 08:35 )             33.8     01-26    139  |  111<H>  |  9   ----------------------------<  127<H>  4.2   |  25  |  1.08    Ca    9.0      26 Jan 2022 08:35    TPro  6.8  /  Alb  3.3  /  TBili  0.6  /  DBili  x   /  AST  36  /  ALT  49  /  AlkPhos  44  01-26        MEDICATIONS  (STANDING):  aspirin  chewable 81 milliGRAM(s) Oral daily  atorvastatin 40 milliGRAM(s) Oral at bedtime  clopidogrel Tablet 75 milliGRAM(s) Oral daily  dextrose 40% Gel 15 Gram(s) Oral once  dextrose 5%. 1000 milliLiter(s) (50 mL/Hr) IV Continuous <Continuous>  dextrose 5%. 1000 milliLiter(s) (100 mL/Hr) IV Continuous <Continuous>  dextrose 50% Injectable 25 Gram(s) IV Push once  dextrose 50% Injectable 12.5 Gram(s) IV Push once  dextrose 50% Injectable 25 Gram(s) IV Push once  glucagon  Injectable 1 milliGRAM(s) IntraMuscular once  insulin glargine Injectable (LANTUS) 20 Unit(s) SubCutaneous every morning  insulin glargine Injectable (LANTUS) 30 Unit(s) SubCutaneous at bedtime  insulin lispro (ADMELOG) corrective regimen sliding scale   SubCutaneous three times a day before meals  insulin lispro (ADMELOG) corrective regimen sliding scale   SubCutaneous at bedtime  insulin lispro Injectable (ADMELOG) 6 Unit(s) SubCutaneous three times a day before meals  isosorbide   mononitrate ER Tablet (IMDUR) 30 milliGRAM(s) Oral daily  metoprolol tartrate 25 milliGRAM(s) Oral two times a day  sodium chloride 0.9%. 1000 milliLiter(s) (75 mL/Hr) IV Continuous <Continuous>  sodium chloride 0.9%. 1000 milliLiter(s) (75 mL/Hr) IV Continuous <Continuous>      PHYSICAL EXAM  GENERAL: NAD, AAOx3  CHEST/LUNG: Clear to auscultation bilaterally; No wheeze  HEART: s1 s2 Regular rate and rhythm; No murmurs, rubs, or gallops  ABDOMEN: Soft, Nontender, Nondistended; Bowel sounds present X 4 quadrants   EXTREMITIES:  2+ Peripheral Pulses, No clubbing, cyanosis, or edema  Psych: normal affect and behavior, calm and cooperative   PROCEDURE SITE:  RFA accessed, site closed via Mynx. Site is without hematoma or bleeding. Sensation and ZENON intact. Distal pulses palpable 2+, capillary refill < 2 seconds. Patient denies pain, numbness, tingling, CP or SOB. Clean dry dressing applied     EKG post PCI  NSR rate 79bpm     PROCEDURE RESULTS full report to follow     ASSESSMENT : 57yo/M with PMN CAD s/p BEATRICE x3 (mLAD, dLAD, mLCx), on dual APT, HTN, hyperlipidemia, diabetes presented with epigastric and chest discomfort.  s/p LHC  with BEATRICE to mLAD and POBA to  D1    PLAN:  #CAD  - s/p BEATRICE to mLAD and  POBA to D1  -resume TELE   -IV hydration NS @ 75mL/hr x 10 hours  -VS, lab, diet and activity per PCI post orders  -continue ASA/plavix/b-blocker/statin  -f/u EKG in AM, AM Labs  -Discussed therapeutic lifestyle changes to reduce risk factors such as following a cardiac diet, weight loss, maintaining a healthy weight, exercise, smoking cessation, medication compliance, and regular follow-up  with PCP/Cardioloigst  --Post cath instructions reviewed, patient verbalizes and understands instructions  -plan discussed with patient, RN and Dr Franklin  - rest of care per primary team

## 2022-01-26 NOTE — PROGRESS NOTE ADULT - SUBJECTIVE AND OBJECTIVE BOX
Reason for Admission: Chest pain, epigastric pain  History of Present Illness:   55yo/M with PMN CAD s/p BEATRICE x3 (mLAD, dLAD, mLCx), on dual APT, HTN, hyperlipidemia, diabetes presented with epigastric and chest discomfort. Patient states he was not feeling well since he was discharged from , vague abd discomfort. Seen as outpatient by DR Cooper and started on Cipro/flagyl. The past few days has been having epigastric/ retrosternal pain, intermittent, radiating to the jaw, no N/V/diaphoresis. In ED guaiac positive, brown stool. HH down to 10. Started on Protonix drip    Patient was exposed to COVID infected patient -  now PUI    REVIEW OF SYSTEMS:  General: NAD, hemodynamically stable, (-)  fever, (-) chills, (-) weakness  HEENT:  Eyes:  No visual loss, blurred vision, double vision or yellow sclerae. Ears, Nose, Throat:  No hearing loss, sneezing, congestion, runny nose or sore throat.  SKIN:  No rash or itching.  CARDIOVASCULAR:  No chest pain, chest pressure or chest discomfort. No palpitations or edema.  RESPIRATORY:  No shortness of breath, cough or sputum.  GASTROINTESTINAL:  No anorexia, nausea, vomiting or diarrhea. No abdominal pain or blood.  NEUROLOGICAL:  No headache, dizziness, syncope, paralysis, ataxia, numbness or tingling in the extremities. No change in bowel or bladder control.  MUSCULOSKELETAL:  No muscle, back pain, joint pain or stiffness.  HEMATOLOGIC:  No anemia, bleeding or bruising.  LYMPHATICS:  No enlarged nodes. No history of splenectomy.  ENDOCRINOLOGIC:  No reports of sweating, cold or heat intolerance. No polyuria or polydipsia.  ALLERGIES:  No history of asthma, hives, eczema or rhinitis.    Physical Exam:   GENERAL APPEARANCE:  NAD, hemodynamically stable  Vital Signs Last 24 Hrs  T(C): 35.6 (26 Jan 2022 14:18), Max: 36.6 (26 Jan 2022 09:57)  T(F): 96.1 (26 Jan 2022 14:18), Max: 97.9 (26 Jan 2022 09:57)  HR: 77 (26 Jan 2022 14:18) (70 - 85)  BP: 165/79 (26 Jan 2022 14:18) (123/78 - 197/94)  BP(mean): 103 (26 Jan 2022 14:18) (103 - 110)  RR: 18 (26 Jan 2022 14:18) (17 - 18)  SpO2: 99% (26 Jan 2022 14:18) (95% - 100%)  HEENT:  Head is normocephalic    Skin:  Warm and dry without any rash   NECK:  Supple without lymphadenopathy.   HEART:  Regular rate and rhythm. normal S1 and S2, No M/R/G  LUNGS:  Good ins/exp effort, no W/R/R/C  ABDOMEN:  Soft, nontender, nondistended with good bowel sounds heard  EXTREMITIES:  Without cyanosis, clubbing or edema.   NEUROLOGICAL:  Gross nonfocal       CBC Full  -  ( 24 Jan 2022 09:41 )  WBC Count : 6.18 K/uL  RBC Count : 3.55 M/uL  Hemoglobin : 10.0 g/dL  Hematocrit : 31.9 %  Platelet Count - Automated : 284 K/uL  Mean Cell Volume : 89.9 fl  Mean Cell Hemoglobin : 28.2 pg  Mean Cell Hemoglobin Concentration : 31.3 gm/dL  Auto Neutrophil # : x  Auto Lymphocyte # : x  Auto Monocyte # : x  Auto Eosinophil # : x  Auto Basophil # : x  Auto Neutrophil % : x  Auto Lymphocyte % : x  Auto Monocyte % : x  Auto Eosinophil % : x  Auto Basophil % : x        01-24    142  |  114<H>  |  20  ----------------------------<  113<H>  4.3   |  25  |  1.30    Ca    8.7      24 Jan 2022 09:41  Mg     2.3     01-23    TPro  7.0  /  Alb  3.4  /  TBili  0.4  /  DBili  x   /  AST  25  /  ALT  37  /  AlkPhos  54  01-23

## 2022-01-27 LAB
ANION GAP SERPL CALC-SCNC: 5 MMOL/L — SIGNIFICANT CHANGE UP (ref 5–17)
BASOPHILS # BLD AUTO: 0.07 K/UL — SIGNIFICANT CHANGE UP (ref 0–0.2)
BASOPHILS NFR BLD AUTO: 1.1 % — SIGNIFICANT CHANGE UP (ref 0–2)
BUN SERPL-MCNC: 9 MG/DL — SIGNIFICANT CHANGE UP (ref 7–23)
CALCIUM SERPL-MCNC: 9 MG/DL — SIGNIFICANT CHANGE UP (ref 8.5–10.1)
CHLORIDE SERPL-SCNC: 112 MMOL/L — HIGH (ref 96–108)
CO2 SERPL-SCNC: 26 MMOL/L — SIGNIFICANT CHANGE UP (ref 22–31)
CREAT SERPL-MCNC: 1.14 MG/DL — SIGNIFICANT CHANGE UP (ref 0.5–1.3)
EOSINOPHIL # BLD AUTO: 0.18 K/UL — SIGNIFICANT CHANGE UP (ref 0–0.5)
EOSINOPHIL NFR BLD AUTO: 2.9 % — SIGNIFICANT CHANGE UP (ref 0–6)
GLUCOSE SERPL-MCNC: 155 MG/DL — HIGH (ref 70–99)
HCT VFR BLD CALC: 31.7 % — LOW (ref 39–50)
HGB BLD-MCNC: 10.2 G/DL — LOW (ref 13–17)
IMM GRANULOCYTES NFR BLD AUTO: 1 % — SIGNIFICANT CHANGE UP (ref 0–1.5)
LYMPHOCYTES # BLD AUTO: 1.2 K/UL — SIGNIFICANT CHANGE UP (ref 1–3.3)
LYMPHOCYTES # BLD AUTO: 19.3 % — SIGNIFICANT CHANGE UP (ref 13–44)
MCHC RBC-ENTMCNC: 28.3 PG — SIGNIFICANT CHANGE UP (ref 27–34)
MCHC RBC-ENTMCNC: 32.2 GM/DL — SIGNIFICANT CHANGE UP (ref 32–36)
MCV RBC AUTO: 88.1 FL — SIGNIFICANT CHANGE UP (ref 80–100)
MONOCYTES # BLD AUTO: 0.54 K/UL — SIGNIFICANT CHANGE UP (ref 0–0.9)
MONOCYTES NFR BLD AUTO: 8.7 % — SIGNIFICANT CHANGE UP (ref 2–14)
NEUTROPHILS # BLD AUTO: 4.16 K/UL — SIGNIFICANT CHANGE UP (ref 1.8–7.4)
NEUTROPHILS NFR BLD AUTO: 67 % — SIGNIFICANT CHANGE UP (ref 43–77)
PLATELET # BLD AUTO: 311 K/UL — SIGNIFICANT CHANGE UP (ref 150–400)
POTASSIUM SERPL-MCNC: 4.2 MMOL/L — SIGNIFICANT CHANGE UP (ref 3.5–5.3)
POTASSIUM SERPL-SCNC: 4.2 MMOL/L — SIGNIFICANT CHANGE UP (ref 3.5–5.3)
RBC # BLD: 3.6 M/UL — LOW (ref 4.2–5.8)
RBC # FLD: 14.6 % — HIGH (ref 10.3–14.5)
SARS-COV-2 RNA SPEC QL NAA+PROBE: DETECTED
SODIUM SERPL-SCNC: 143 MMOL/L — SIGNIFICANT CHANGE UP (ref 135–145)
WBC # BLD: 6.21 K/UL — SIGNIFICANT CHANGE UP (ref 3.8–10.5)
WBC # FLD AUTO: 6.21 K/UL — SIGNIFICANT CHANGE UP (ref 3.8–10.5)

## 2022-01-27 PROCEDURE — 93010 ELECTROCARDIOGRAM REPORT: CPT

## 2022-01-27 PROCEDURE — 99233 SBSQ HOSP IP/OBS HIGH 50: CPT

## 2022-01-27 RX ORDER — SOD SULF/SODIUM/NAHCO3/KCL/PEG
2000 SOLUTION, RECONSTITUTED, ORAL ORAL ONCE
Refills: 0 | Status: COMPLETED | OUTPATIENT
Start: 2022-01-27 | End: 2022-01-27

## 2022-01-27 RX ADMIN — Medication 6 UNIT(S): at 08:56

## 2022-01-27 RX ADMIN — Medication 2000 MILLILITER(S): at 15:18

## 2022-01-27 RX ADMIN — Medication 6 UNIT(S): at 16:55

## 2022-01-27 RX ADMIN — CLOPIDOGREL BISULFATE 75 MILLIGRAM(S): 75 TABLET, FILM COATED ORAL at 08:58

## 2022-01-27 RX ADMIN — Medication 3 MILLIGRAM(S): at 22:48

## 2022-01-27 RX ADMIN — Medication 6 UNIT(S): at 12:45

## 2022-01-27 RX ADMIN — INSULIN GLARGINE 20 UNIT(S): 100 INJECTION, SOLUTION SUBCUTANEOUS at 08:44

## 2022-01-27 RX ADMIN — ISOSORBIDE MONONITRATE 30 MILLIGRAM(S): 60 TABLET, EXTENDED RELEASE ORAL at 08:58

## 2022-01-27 RX ADMIN — Medication 81 MILLIGRAM(S): at 08:58

## 2022-01-27 RX ADMIN — Medication 2: at 08:55

## 2022-01-27 RX ADMIN — Medication 4: at 12:44

## 2022-01-27 RX ADMIN — Medication 25 MILLIGRAM(S): at 08:58

## 2022-01-27 RX ADMIN — ATORVASTATIN CALCIUM 40 MILLIGRAM(S): 80 TABLET, FILM COATED ORAL at 22:48

## 2022-01-27 RX ADMIN — Medication 2: at 16:54

## 2022-01-27 RX ADMIN — INSULIN GLARGINE 30 UNIT(S): 100 INJECTION, SOLUTION SUBCUTANEOUS at 22:47

## 2022-01-27 RX ADMIN — Medication 25 MILLIGRAM(S): at 22:48

## 2022-01-27 NOTE — PROGRESS NOTE ADULT - SUBJECTIVE AND OBJECTIVE BOX
Cardiology Progress Note    HPI: 55yo/M with PMN CAD s/p BEATRICE x3 (mLAD, dLAD, mLCx), on dual APT, HTN, hyperlipidemia, diabetes presented with epigastric and chest discomfort. Patient states he was not feeling well since he was discharged from , vague abd discomfort. Seen as outpatient by DR Cooper and started on Cipro/flagyl. The past few days has been having epigastric/ retrosternal pain, intermittent, radiating to the jaw, no N/V/diaphoresis. In ED guaiac positive, brown stool. HH down to 10. Started on Protonix drip (23 Jan 2022 14:48)    1/24. Chart reviewed. D/w hospitalist.  CP/heaviness/pressure.   EGD today that was negative.   Now feeling better. SR on tele.      1/25. Pt remains anemia- Hgb 15 to 10.  No CP/SOB last pm.   Will have NST today followed by colonoscopy likely tomorrow.   D/w GI. SR on tele.      1/26. NST negative for ischemia.   Some CP with movement. No SOB.   SBP stable at present.    1/27 More pain yesterday taken to cath lab, severe pLAD  S/P BEATRICE   Doing well this morning, no angina    PAST MEDICAL & SURGICAL HISTORY:  Essential hypertension  Obstructive sleep apnea  Diabetes mellitus  HLD (hyperlipidemia)  CAD (coronary artery disease)  Pancreatitis  History of coronary artery stent placement  Placed 9/12/18 by Dr. Kaye  S/P cholecystectomy    Allergies  penicillin (Hives)  Intolerances    SOCIAL HISTORY: Denies tobacco, etoh abuse or illicit drug use    FAMILY HISTORY:  Family history of diabetes mellitus (Sibling)    MEDICATIONS  (STANDING):  aspirin  chewable 81 milliGRAM(s) Oral daily  atorvastatin 40 milliGRAM(s) Oral at bedtime  clopidogrel Tablet 75 milliGRAM(s) Oral daily  dextrose 40% Gel 15 Gram(s) Oral once  dextrose 5%. 1000 milliLiter(s) (50 mL/Hr) IV Continuous <Continuous>  dextrose 5%. 1000 milliLiter(s) (100 mL/Hr) IV Continuous <Continuous>  dextrose 50% Injectable 25 Gram(s) IV Push once  dextrose 50% Injectable 12.5 Gram(s) IV Push once  dextrose 50% Injectable 25 Gram(s) IV Push once  glucagon  Injectable 1 milliGRAM(s) IntraMuscular once  insulin glargine Injectable (LANTUS) 20 Unit(s) SubCutaneous every morning  insulin glargine Injectable (LANTUS) 30 Unit(s) SubCutaneous at bedtime  insulin lispro (ADMELOG) corrective regimen sliding scale   SubCutaneous three times a day before meals  insulin lispro (ADMELOG) corrective regimen sliding scale   SubCutaneous at bedtime  insulin lispro Injectable (ADMELOG) 6 Unit(s) SubCutaneous three times a day before meals  metoprolol succinate ER 25 milliGRAM(s) Oral daily  sodium chloride 0.9%. 1000 milliLiter(s) (75 mL/Hr) IV Continuous <Continuous>    MEDICATIONS  (PRN):  acetaminophen     Tablet .. 650 milliGRAM(s) Oral every 6 hours PRN Temp greater or equal to 38C (100.4F), Mild Pain (1 - 3)  aluminum hydroxide/magnesium hydroxide/simethicone Suspension 30 milliLiter(s) Oral every 4 hours PRN Dyspepsia  melatonin 3 milliGRAM(s) Oral at bedtime PRN Insomnia  nitroglycerin     SubLingual 0.4 milliGRAM(s) SubLingual every 5 minutes PRN Chest Pain  ondansetron Injectable 4 milliGRAM(s) IV Push every 8 hours PRN Nausea and/or Vomiting    Vital Signs Last 24 Hrs  T(C): 36.5 (24 Jan 2022 10:44), Max: 36.8 (23 Jan 2022 16:25)  T(F): 97.7 (24 Jan 2022 10:44), Max: 98.2 (23 Jan 2022 16:25)  HR: 97 (24 Jan 2022 15:28) (74 - 97)  BP: 189/92 (24 Jan 2022 15:28) (117/63 - 189/92)  BP(mean): --  RR: 20 (24 Jan 2022 15:28) (16 - 20)  SpO2: 97% (23 Jan 2022 23:23) (97% - 100%)    REVIEW OF SYSTEMS:    CONSTITUTIONAL:  As per HPI.  HEENT:  Eyes:  No diplopia or blurred vision. ENT:  No earache, sore throat or runny nose.  CARDIOVASCULAR:  +CP/pressure.  RESPIRATORY:  No cough, shortness of breath, PND or orthopnea.  GASTROINTESTINAL:  No nausea, vomiting or diarrhea.  GENITOURINARY:  No dysuria, frequency or urgency.  MUSCULOSKELETAL:  As per HPI.  NEUROLOGIC:  No paresthesias, fasciculations, seizures or weakness.    PHYSICAL EXAMINATION:    GENERAL APPEARANCE:  Pt. is not currently dyspneic, in no distress. Pt. is alert, oriented, and pleasant.  HEENT:  Pupils are normal and react normally. No icterus. Mucous membranes well colored.  NECK:  Supple. No lymphadenopathy. Jugular venous pressure not elevated. Carotids equal.   HEART:   The cardiac impulse has a normal quality. There are no murmurs, rubs or gallops noted  CHEST:  Chest is clear to auscultation. Normal respiratory effort.  ABDOMEN:  Soft and nontender.   EXTREMITIES:  There is no edema.     LABS:                        10.0   6.18  )-----------( 284      ( 24 Jan 2022 09:41 )             31.9     01-24    142  |  114<H>  |  20  ----------------------------<  113<H>  4.3   |  25  |  1.30    Ca    8.7      24 Jan 2022 09:41  Mg     2.3     01-23    TPro  7.0  /  Alb  3.4  /  TBili  0.4  /  DBili  x   /  AST  25  /  ALT  37  /  AlkPhos  54  01-23    LIVER FUNCTIONS - ( 23 Jan 2022 12:23 )  Alb: 3.4 g/dL / Pro: 7.0 gm/dL / ALK PHOS: 54 U/L / ALT: 37 U/L / AST: 25 U/L / GGT: x           EKG: SR @ 91. 1st degree AV block. RBBB. No dynamic ST changes.     TELEMETRY: SR    CARDIAC TESTS: < from: Cardiac Cath Lab - Adult (10.02.20 @ 11:39) >   Impression     Interventional Conclusions     Successful Coronary Intervention BEATRICE of mid , and distal LAD.     Recommendations     Aggressive medical management of coronary artery disease and its   underlying risk factors.   Continue current medications.    < end of copied text >  < from: Cardiac Cath Lab - Adult (09.18.20 @ 09:53) >   Impression     Diagnostic Conclusions   Two vessel disease as decribed   LCx being the culprit     Interventional Conclusions     Successful Coronary Intervention BEATRICE of proximal LCx.     Recommendations     Aggressive medical management of coronary artery disease and its   underlying risk factors.   Dual antiplatelet therapy for at least one year   Staged PCI of LAD in 2-3 weeks    < end of copied text >  < from: TTE Echo Complete w/o Contrast w/ Doppler (09.17.20 @ 10:22) >   The left ventricle is normal in size, wall thickness, wall motion and   contractility. Estimated left ventricular ejection fraction is 60%.   Mild diastolic dysfunction (stage I).   Trace aortic regurgitation.   Mild aortic stenosis.        ASSESSMENT & PLAN:

## 2022-01-27 NOTE — PROVIDER CONTACT NOTE (OTHER) - ACTION/TREATMENT ORDERED:
as per MD give lantus tonight and hold bowel prep     provider spoke to patient about result and plan

## 2022-01-27 NOTE — PROVIDER CONTACT NOTE (OTHER) - REASON
PCP
pt resulted covid positive, needs to notified about result and plan going forward for colonoscopy

## 2022-01-27 NOTE — PROVIDER CONTACT NOTE (OTHER) - BACKGROUND
admitted with chest pain, received 1 stent. also complained of melena and had positive guaic.    started bowel prep at 3pm today

## 2022-01-27 NOTE — PROGRESS NOTE ADULT - SUBJECTIVE AND OBJECTIVE BOX
NP PROGRESS NOTE:  Pt was seen and examined at the bedside, No overnight events, denies chest pain, sob or palpitation.     ROS: All other review of systems negative, except as noted in HPI  Vital Signs Last 24 Hrs  T(C): 36.7 (27 Jan 2022 08:26), Max: 36.7 (27 Jan 2022 08:26)  T(F): 98.1 (27 Jan 2022 08:26), Max: 98.1 (27 Jan 2022 08:26)  HR: 76 (27 Jan 2022 08:26) (70 - 85)  BP: 160/88 (27 Jan 2022 08:26) (129/79 - 197/94)  BP(mean): 95 (26 Jan 2022 21:26) (95 - 110)  RR: 18 (27 Jan 2022 08:26) (17 - 18)  SpO2: 96% (27 Jan 2022 08:26) (95% - 100%)    PHYSICAL EXAM:  GENERAL: NAD, well-groomed, well-developed  HEENT - NC/AT, pupils equal and reactive to light, Moist mucous membranes, Good dentition, No lesions  NECK: Supple, No JVD  CHEST/LUNG: Clear to auscultation bilaterally; No rales, rhonchi, wheezing  HEART: Regular rate and rhythm; No murmurs, rubs, or gallops  ABDOMEN: Soft, Nontender, Nondistended; Bowel sounds present  EXTREMITIES:  2+ Peripheral Pulses, No clubbing, cyanosis, or edema  NEURO:  No Focal deficits, sensory and motor intact  SKIN: No rashes or lesions, Rt radial access sit intact without hematoma or bleeding. Rt groin access site (s/p Mynx): no hematoma or bleeding     LABS: All Labs Reviewed:                        10.2   6.21  )-----------( 311      ( 27 Jan 2022 07:19 )             31.7     01-27    143  |  112<H>  |  9   ----------------------------<  155<H>  4.2   |  26  |  1.14  Ca    9.0      27 Jan 2022 07:19  TPro  6.8  /  Alb  3.3  /  TBili  0.6  /  DBili  x   /  AST  36  /  ALT  49  /  AlkPhos  44  01-26    EKG (1/27/22): SR at 78 bpm, RBBB, T wave inversion in anterior wall resolved   Tele: SR, no ectopies     Echo   < from: TTE Echo Complete w/o Contrast w/ Doppler (09.17.20 @ 10:22) >   Summary    The left ventricle is normal in size, wall thickness, wall motion and   contractility. Estimated left ventricular ejection fraction is 60%.   Mild diastolic dysfunction (stage I).   Trace aortic regurgitation.   Mild aortic stenosis.  < end of copied text >    Stress test:  < from: NM Nuclear Stress Pharmacologic Multiple (01.25.22 @ 11:29) >  FINDINGS: The technical quality of the resting and post-stress perfusion   images was satisfactory.  Review of resting and post-stress myocardial   scintigrams revealed prominent left ventricle with fixed perfusion   defects in the apical and mid segments of the inferior and septal wall.   There was no scan evidence of reversible perfusion defects.    Gated wall motion study revealed reduced contractility and wall   thickening in the inferoseptal wall of the left ventricle. .    Calculated left ventricular ejection fractionpost-stress was 40%, based   on a left ventricular end diastolic volume of 189 mL and an end systolic   volume of 99 mL. Stroke volume was 90 mL.    IMPRESSION:  SPECT Myocardial Perfusion Imaging demonstrates:    Prominent left ventricle with fixed perfusion defects in the apical and   mid segments of the inferior and septal wall.    No scan evidence of reversible perfusion defects.    Reduced contractility and wall thickening in the inferoseptal wall of the   left ventricle with reduced ejection fraction of 48% (Normal: 50% or   greater).    Please refer to cardiac stress test report for dosage of Regadenoson   administered, EKG findings and symptoms during the procedure.    < end of copied text >      Cath Report: official report pending     MedsMEDICATIONS  (STANDING):  aspirin  chewable 81 milliGRAM(s) Oral daily  atorvastatin 40 milliGRAM(s) Oral at bedtime  clopidogrel Tablet 75 milliGRAM(s) Oral daily  dextrose 40% Gel 15 Gram(s) Oral once  dextrose 5%. 1000 milliLiter(s) (50 mL/Hr) IV Continuous <Continuous>  dextrose 5%. 1000 milliLiter(s) (100 mL/Hr) IV Continuous <Continuous>  dextrose 50% Injectable 25 Gram(s) IV Push once  dextrose 50% Injectable 12.5 Gram(s) IV Push once  dextrose 50% Injectable 25 Gram(s) IV Push once  glucagon  Injectable 1 milliGRAM(s) IntraMuscular once  insulin glargine Injectable (LANTUS) 20 Unit(s) SubCutaneous every morning  insulin glargine Injectable (LANTUS) 30 Unit(s) SubCutaneous at bedtime  insulin lispro (ADMELOG) corrective regimen sliding scale   SubCutaneous three times a day before meals  insulin lispro (ADMELOG) corrective regimen sliding scale   SubCutaneous at bedtime  insulin lispro Injectable (ADMELOG) 6 Unit(s) SubCutaneous three times a day before meals  isosorbide   mononitrate ER Tablet (IMDUR) 30 milliGRAM(s) Oral daily  metoprolol tartrate 25 milliGRAM(s) Oral two times a day  sodium chloride 0.9%. 1000 milliLiter(s) (75 mL/Hr) IV Continuous <Continuous>  sodium chloride 0.9%. 1000 milliLiter(s) (75 mL/Hr) IV Continuous <Continuous>    MEDICATIONS  (PRN):  acetaminophen     Tablet .. 650 milliGRAM(s) Oral every 6 hours PRN Temp greater or equal to 38C (100.4F), Mild Pain (1 - 3)  aluminum hydroxide/magnesium hydroxide/simethicone Suspension 30 milliLiter(s) Oral every 4 hours PRN Dyspepsia  melatonin 3 milliGRAM(s) Oral at bedtime PRN Insomnia  nitroglycerin     SubLingual 0.4 milliGRAM(s) SubLingual every 5 minutes PRN Chest Pain  ondansetron Injectable 4 milliGRAM(s) IV Push every 8 hours PRN Nausea and/or Vomiting      HPI:  57yo/M with PMN CAD s/p BEATRICE x3 (mLAD, dLAD, mLCx), on dual APT, HTN, hyperlipidemia, diabetes presented with epigastric and chest discomfort. Patient states he was not feeling well, seen as outpatient by DR Cooper and started on Cipro/flagyl. The past few days has been having epigastric/ retrosternal pain, intermittent, radiating to the jaw, no N/V/diaphoresis. During the hospital course, Pt developed chest pain, stress test with fixed perfusion defect in apical, mid inferior and septal wall. Pt underwent to cath, revealed severe proximal LAD. Now Pt is s/p BEATRICE to LAD and POBA to D1 on 1/26/22.   Plan:   .   - continue tele monitor   - reviewed Labs and EKG in am   - continue ASA 81 mg and Plavix 75 mg daily   - continue BB, nitrate   - continue statin  - continue other medical management by primary team   - Discussed therapeutic lifestyle modifications to reduce CAD risk factors including cardiac diet, weight control, exercise, smoking cessation, medication compliance and regular outpt follow-up.   - discharge home when medically stable   - follow up with Dr. Kaye in 1-2 weeks as an outpt     Discussed the plan with     , Pt and Cath RN.

## 2022-01-27 NOTE — PROGRESS NOTE ADULT - CONVERSATION DETAILS
D/W pt - remains full code and wants to benefit of all treatment options, including invasive procedures if necesssary

## 2022-01-27 NOTE — PROVIDER CONTACT NOTE (OTHER) - SITUATION
Spoke with Yamileth
pt resulted covid positive, was exposed to a covid patient this admission.. pt is scheduled for colonoscopy tomorrow

## 2022-01-28 ENCOUNTER — TRANSCRIPTION ENCOUNTER (OUTPATIENT)
Age: 57
End: 2022-01-28

## 2022-01-28 VITALS — HEART RATE: 94 BPM | DIASTOLIC BLOOD PRESSURE: 95 MMHG | SYSTOLIC BLOOD PRESSURE: 153 MMHG

## 2022-01-28 LAB
ANION GAP SERPL CALC-SCNC: 6 MMOL/L — SIGNIFICANT CHANGE UP (ref 5–17)
BUN SERPL-MCNC: 8 MG/DL — SIGNIFICANT CHANGE UP (ref 7–23)
CALCIUM SERPL-MCNC: 9.2 MG/DL — SIGNIFICANT CHANGE UP (ref 8.5–10.1)
CHLORIDE SERPL-SCNC: 113 MMOL/L — HIGH (ref 96–108)
CO2 SERPL-SCNC: 24 MMOL/L — SIGNIFICANT CHANGE UP (ref 22–31)
CREAT SERPL-MCNC: 1.14 MG/DL — SIGNIFICANT CHANGE UP (ref 0.5–1.3)
GLUCOSE SERPL-MCNC: 122 MG/DL — HIGH (ref 70–99)
HCT VFR BLD CALC: 34.7 % — LOW (ref 39–50)
HGB BLD-MCNC: 11.1 G/DL — LOW (ref 13–17)
MCHC RBC-ENTMCNC: 28 PG — SIGNIFICANT CHANGE UP (ref 27–34)
MCHC RBC-ENTMCNC: 32 GM/DL — SIGNIFICANT CHANGE UP (ref 32–36)
MCV RBC AUTO: 87.6 FL — SIGNIFICANT CHANGE UP (ref 80–100)
PLATELET # BLD AUTO: 351 K/UL — SIGNIFICANT CHANGE UP (ref 150–400)
POTASSIUM SERPL-MCNC: 4 MMOL/L — SIGNIFICANT CHANGE UP (ref 3.5–5.3)
POTASSIUM SERPL-SCNC: 4 MMOL/L — SIGNIFICANT CHANGE UP (ref 3.5–5.3)
RBC # BLD: 3.96 M/UL — LOW (ref 4.2–5.8)
RBC # FLD: 14.7 % — HIGH (ref 10.3–14.5)
SODIUM SERPL-SCNC: 143 MMOL/L — SIGNIFICANT CHANGE UP (ref 135–145)
WBC # BLD: 6.43 K/UL — SIGNIFICANT CHANGE UP (ref 3.8–10.5)
WBC # FLD AUTO: 6.43 K/UL — SIGNIFICANT CHANGE UP (ref 3.8–10.5)

## 2022-01-28 RX ORDER — INFLUENZA VIRUS VACCINE 15; 15; 15; 15 UG/.5ML; UG/.5ML; UG/.5ML; UG/.5ML
0.5 SUSPENSION INTRAMUSCULAR
Qty: 0 | Refills: 0 | DISCHARGE

## 2022-01-28 RX ORDER — FOSINOPRIL SODIUM 10 MG/1
2 TABLET ORAL
Qty: 0 | Refills: 0 | DISCHARGE

## 2022-01-28 RX ORDER — METOPROLOL TARTRATE 50 MG
1 TABLET ORAL
Qty: 60 | Refills: 0
Start: 2022-01-28 | End: 2022-02-26

## 2022-01-28 RX ORDER — UBIDECARENONE 100 MG
1 CAPSULE ORAL
Qty: 0 | Refills: 0 | DISCHARGE

## 2022-01-28 RX ORDER — ACETAMINOPHEN 500 MG
2 TABLET ORAL
Qty: 0 | Refills: 0 | DISCHARGE
Start: 2022-01-28

## 2022-01-28 RX ORDER — RNA INGREDIENT BNT-162B2 0.23 G/1.8ML
0.3 INJECTION, SUSPENSION INTRAMUSCULAR
Qty: 0 | Refills: 0 | DISCHARGE

## 2022-01-28 RX ORDER — ISOSORBIDE MONONITRATE 60 MG/1
1 TABLET, EXTENDED RELEASE ORAL
Qty: 30 | Refills: 0
Start: 2022-01-28 | End: 2022-02-26

## 2022-01-28 RX ADMIN — Medication 6 UNIT(S): at 17:54

## 2022-01-28 RX ADMIN — CLOPIDOGREL BISULFATE 75 MILLIGRAM(S): 75 TABLET, FILM COATED ORAL at 10:38

## 2022-01-28 RX ADMIN — Medication 81 MILLIGRAM(S): at 10:38

## 2022-01-28 RX ADMIN — ISOSORBIDE MONONITRATE 30 MILLIGRAM(S): 60 TABLET, EXTENDED RELEASE ORAL at 09:49

## 2022-01-28 RX ADMIN — SODIUM CHLORIDE 75 MILLILITER(S): 9 INJECTION INTRAMUSCULAR; INTRAVENOUS; SUBCUTANEOUS at 09:56

## 2022-01-28 RX ADMIN — Medication 25 MILLIGRAM(S): at 09:50

## 2022-01-28 NOTE — DISCHARGE NOTE NURSING/CASE MANAGEMENT/SOCIAL WORK - NSDCVIVACCINE_GEN_ALL_CORE_FT
What Type Of Note Output Would You Prefer (Optional)?: Standard Output How Severe Is Your Skin Lesion?: mild Has Your Skin Lesion Been Treated?: not been treated Is This A New Presentation, Or A Follow-Up?: Skin Lesion influenza, injectable, quadrivalent, preservative free; 12-Oct-2017 15:08; Kim Samano (RN); [employer]; [id number]; IntraMuscular; Deltoid Left.; 0.5 milliLiter(s); VIS (VIS Published: 07-Aug-2015, VIS Presented: 12-Oct-2017);   influenza, injectable, quadrivalent, preservative free; 13-Sep-2018 12:04; Eliane Otero (RN); [employer]; [id number] (Exp. Date: 30-Jun-2019); IntraMuscular; Deltoid Left.; 0.5 milliLiter(s); VIS (VIS Published: 07-Aug-2015, VIS Presented: 13-Sep-2018);   influenza, injectable, quadrivalent, preservative free; 03-Dec-2019 13:24; Benita Agosto (RN); [employer]; [id number] (Exp. Date: 30-Jun-2020); IntraMuscular; Deltoid Left.; 0.5 milliLiter(s); VIS (VIS Published: 15-Aug-2019, VIS Presented: 03-Dec-2019);

## 2022-01-28 NOTE — DISCHARGE NOTE NURSING/CASE MANAGEMENT/SOCIAL WORK - PATIENT PORTAL LINK FT
You can access the FollowMyHealth Patient Portal offered by [facility] by registering at the following website: [email or web address]. By joining [facility]’s FollowMyHealth portal, you will also be able to view your health information using other applications (apps) compatible with our system.

## 2022-01-28 NOTE — DISCHARGE NOTE PROVIDER - NSDCMRMEDTOKEN_GEN_ALL_CORE_FT
acetaminophen 325 mg oral tablet: 2 tab(s) orally every 6 hours, As needed, Temp greater or equal to 38C (100.4F), Mild Pain (1 - 3)  aspirin 81 mg oral delayed release tablet: 1 tab(s) orally once a day  B Complex 50 oral tablet, extended release: 1 tab(s) orally once a day  clopidogrel 75 mg oral tablet: 1 tab(s) orally once a day  dicyclomine 20 mg oral tablet: 1 tab(s) orally 3 times a day, As Needed  fenofibrate 160 mg oral tablet: 1 tab(s) orally once a day  insulin lispro 100 units/mL subcutaneous solution: 25 unit(s) subcutaneous 3 times a day (with meals)  isosorbide mononitrate 30 mg oral tablet, extended release: 1 tab(s) orally once a day  Jardiance 25 mg oral tablet: 1 tab(s) orally once a day (in the morning)  Lipitor 40 mg oral tablet: 1 tab(s) orally once a day (at bedtime)  metoprolol tartrate 25 mg oral tablet: 1 tab(s) orally 2 times a day  Toujeo SoloStar 300 units/mL subcutaneous solution: 85 unit(s) subcutaneous 2 times a day  Vitamin D3 25 mcg (1000 intl units) oral tablet: 1 tab(s) orally once a day

## 2022-01-28 NOTE — PROGRESS NOTE ADULT - SUBJECTIVE AND OBJECTIVE BOX
Cardiology Progress Note    HPI: 55yo/M with PMN CAD s/p BEATRICE x3 (mLAD, dLAD, mLCx), on dual APT, HTN, hyperlipidemia, diabetes presented with epigastric and chest discomfort. Patient states he was not feeling well since he was discharged from , vague abd discomfort. Seen as outpatient by DR Cooper and started on Cipro/flagyl. The past few days has been having epigastric/ retrosternal pain, intermittent, radiating to the jaw, no N/V/diaphoresis. In ED guaiac positive, brown stool. HH down to 10. Started on Protonix drip (23 Jan 2022 14:48)    1/24. Chart reviewed. D/w hospitalist.  CP/heaviness/pressure.   EGD today that was negative.   Now feeling better. SR on tele.      1/25. Pt remains anemia- Hgb 15 to 10.  No CP/SOB last pm.   Will have NST today followed by colonoscopy likely tomorrow.   D/w GI. SR on tele.      1/26. NST negative for ischemia.   Some CP with movement. No SOB.   SBP stable at present.    1/27 More pain yesterday taken to cath lab, severe pLAD  S/P BEATRICE   Doing well this morning, no angina    1/28 Doing well, no angina, converted COVID positive  Awaiting colonoscopy    PAST MEDICAL & SURGICAL HISTORY:  Essential hypertension  Obstructive sleep apnea  Diabetes mellitus  HLD (hyperlipidemia)  CAD (coronary artery disease)  Pancreatitis  History of coronary artery stent placement  Placed 9/12/18 by Dr. Kaye  S/P cholecystectomy    Allergies  penicillin (Hives)  Intolerances    SOCIAL HISTORY: Denies tobacco, etoh abuse or illicit drug use    FAMILY HISTORY:  Family history of diabetes mellitus (Sibling)    MEDICATIONS  (STANDING):  aspirin  chewable 81 milliGRAM(s) Oral daily  atorvastatin 40 milliGRAM(s) Oral at bedtime  clopidogrel Tablet 75 milliGRAM(s) Oral daily  dextrose 40% Gel 15 Gram(s) Oral once  dextrose 5%. 1000 milliLiter(s) (50 mL/Hr) IV Continuous <Continuous>  dextrose 5%. 1000 milliLiter(s) (100 mL/Hr) IV Continuous <Continuous>  dextrose 50% Injectable 25 Gram(s) IV Push once  dextrose 50% Injectable 12.5 Gram(s) IV Push once  dextrose 50% Injectable 25 Gram(s) IV Push once  glucagon  Injectable 1 milliGRAM(s) IntraMuscular once  insulin glargine Injectable (LANTUS) 20 Unit(s) SubCutaneous every morning  insulin glargine Injectable (LANTUS) 30 Unit(s) SubCutaneous at bedtime  insulin lispro (ADMELOG) corrective regimen sliding scale   SubCutaneous three times a day before meals  insulin lispro (ADMELOG) corrective regimen sliding scale   SubCutaneous at bedtime  insulin lispro Injectable (ADMELOG) 6 Unit(s) SubCutaneous three times a day before meals  metoprolol succinate ER 25 milliGRAM(s) Oral daily  sodium chloride 0.9%. 1000 milliLiter(s) (75 mL/Hr) IV Continuous <Continuous>    MEDICATIONS  (PRN):  acetaminophen     Tablet .. 650 milliGRAM(s) Oral every 6 hours PRN Temp greater or equal to 38C (100.4F), Mild Pain (1 - 3)  aluminum hydroxide/magnesium hydroxide/simethicone Suspension 30 milliLiter(s) Oral every 4 hours PRN Dyspepsia  melatonin 3 milliGRAM(s) Oral at bedtime PRN Insomnia  nitroglycerin     SubLingual 0.4 milliGRAM(s) SubLingual every 5 minutes PRN Chest Pain  ondansetron Injectable 4 milliGRAM(s) IV Push every 8 hours PRN Nausea and/or Vomiting    Vital Signs Last 24 Hrs  T(C): 36.5 (24 Jan 2022 10:44), Max: 36.8 (23 Jan 2022 16:25)  T(F): 97.7 (24 Jan 2022 10:44), Max: 98.2 (23 Jan 2022 16:25)  HR: 97 (24 Jan 2022 15:28) (74 - 97)  BP: 189/92 (24 Jan 2022 15:28) (117/63 - 189/92)  BP(mean): --  RR: 20 (24 Jan 2022 15:28) (16 - 20)  SpO2: 97% (23 Jan 2022 23:23) (97% - 100%)    REVIEW OF SYSTEMS:    CONSTITUTIONAL:  As per HPI.  HEENT:  Eyes:  No diplopia or blurred vision. ENT:  No earache, sore throat or runny nose.  CARDIOVASCULAR:  +CP/pressure.  RESPIRATORY:  No cough, shortness of breath, PND or orthopnea.  GASTROINTESTINAL:  No nausea, vomiting or diarrhea.  GENITOURINARY:  No dysuria, frequency or urgency.  MUSCULOSKELETAL:  As per HPI.  NEUROLOGIC:  No paresthesias, fasciculations, seizures or weakness.    PHYSICAL EXAMINATION:    GENERAL APPEARANCE:  Pt. is not currently dyspneic, in no distress. Pt. is alert, oriented, and pleasant.  HEENT:  Pupils are normal and react normally. No icterus. Mucous membranes well colored.  NECK:  Supple. No lymphadenopathy. Jugular venous pressure not elevated. Carotids equal.   HEART:   The cardiac impulse has a normal quality. There are no murmurs, rubs or gallops noted  CHEST:  Chest is clear to auscultation. Normal respiratory effort.  ABDOMEN:  Soft and nontender.   EXTREMITIES:  There is no edema.     LABS:                        10.0   6.18  )-----------( 284      ( 24 Jan 2022 09:41 )             31.9     01-24    142  |  114<H>  |  20  ----------------------------<  113<H>  4.3   |  25  |  1.30    Ca    8.7      24 Jan 2022 09:41  Mg     2.3     01-23    TPro  7.0  /  Alb  3.4  /  TBili  0.4  /  DBili  x   /  AST  25  /  ALT  37  /  AlkPhos  54  01-23    LIVER FUNCTIONS - ( 23 Jan 2022 12:23 )  Alb: 3.4 g/dL / Pro: 7.0 gm/dL / ALK PHOS: 54 U/L / ALT: 37 U/L / AST: 25 U/L / GGT: x           EKG: SR @ 91. 1st degree AV block. RBBB. No dynamic ST changes.     TELEMETRY: SR    CARDIAC TESTS: < from: Cardiac Cath Lab - Adult (10.02.20 @ 11:39) >   Impression     Interventional Conclusions     Successful Coronary Intervention BEATRICE of mid , and distal LAD.     Recommendations     Aggressive medical management of coronary artery disease and its   underlying risk factors.   Continue current medications.    < end of copied text >  < from: Cardiac Cath Lab - Adult (09.18.20 @ 09:53) >   Impression     Diagnostic Conclusions   Two vessel disease as decribed   LCx being the culprit     Interventional Conclusions     Successful Coronary Intervention BEATRICE of proximal LCx.     Recommendations     Aggressive medical management of coronary artery disease and its   underlying risk factors.   Dual antiplatelet therapy for at least one year   Staged PCI of LAD in 2-3 weeks    < end of copied text >  < from: TTE Echo Complete w/o Contrast w/ Doppler (09.17.20 @ 10:22) >   The left ventricle is normal in size, wall thickness, wall motion and   contractility. Estimated left ventricular ejection fraction is 60%.   Mild diastolic dysfunction (stage I).   Trace aortic regurgitation.   Mild aortic stenosis.        ASSESSMENT & PLAN:

## 2022-01-28 NOTE — DISCHARGE NOTE PROVIDER - HOSPITAL COURSE
55yo/M with PMN CAD s/p BEATRICE x3 (mLAD, dLAD, mLCx), on dual APT, HTN, hyperlipidemia, diabetes presented with epigastric and chest discomfort. Patient states he was not feeling well since he was discharged from , vague abd discomfort. Seen as outpatient by DR Cooper and started on Cipro/flagyl. The past few days has been having epigastric/ retrosternal pain, intermittent, radiating to the jaw, no N/V/diaphoresis. In ED guaiac positive.    # Active Chest pain - now resolved   # CAD s/p stents x3 (mLAD, dLAD, mLCx)  - pt. could not complete nuclear stress test on 1/15 due to chest pain  S/P LHC revealing 90% pLAD  S/P BEATRICE  Continue with dual antiplatelet  cleared for discharge by cardiology - Dr. Kaye - will follow up outpatient     # Anemia - suspect acute blood loss anemia due to GIB  - patient with significant HH drop since 1/3-> 1/23 - now H stable around 10.0-11.0 (from 14.4 at baseline)  - no signs of active bleeding  - EGD performed-negative  - s/p colonoscopy on 1/28 showing......    #COVID positive   pt. tested positive for COVID for pre-procedure test   pt. is asymptomatic , afebrile, no leukocytosis  will need to quarantine for 10 days on discharge      #Diabetes  - HgA1C is 13.7 on 1/2   - on high dose of insulin - will resume home regimen  - follow up outpatient with PCP    #Hyperlipidemia  - cont statin    PHYSICAL EXAMINATION:  Vital Signs Last 24 Hrs  T(C): 36.4 (28 Jan 2022 08:40), Max: 36.7 (27 Jan 2022 23:04)  T(F): 97.6 (28 Jan 2022 08:40), Max: 98.1 (27 Jan 2022 23:04)  HR: 81 (28 Jan 2022 08:40) (81 - 84)  BP: 154/94 (28 Jan 2022 08:40) (154/91 - 173/93)  BP(mean): 113 (28 Jan 2022 08:40) (113 - 113)  RR: 18 (28 Jan 2022 08:40) (18 - 20)  SpO2: 100% (28 Jan 2022 08:40) (98% - 100%)  GENERAL APPEARANCE:  Pt. is not currently dyspneic, in no distress. Pt. is alert, oriented, and pleasant.  HEENT:  Pupils are normal and react normally. No icterus. Mucous membranes well colored.  NECK:  Supple. No lymphadenopathy. Jugular venous pressure not elevated. Carotids equal.   HEART:   The cardiac impulse has a normal quality. There are no murmurs, rubs or gallops noted  CHEST:  Chest is clear to auscultation. Normal respiratory effort.  ABDOMEN:  Soft and nontender.   EXTREMITIES:  There is no edema.

## 2022-01-28 NOTE — DISCHARGE NOTE NURSING/CASE MANAGEMENT/SOCIAL WORK - NSDCPEFALRISK_GEN_ALL_CORE
For information on Fall & Injury Prevention, visit: [email or web address] OR  [email or web address] OR  https://www.cdc.gov/steadi/patient.html

## 2022-01-28 NOTE — CHART NOTE - NSCHARTNOTEFT_GEN_A_CORE
HPI: 57yo/M with PMN CAD s/p BEATRICE x3 (mLAD, dLAD, mLCx), on dual APT, HTN, hyperlipidemia, diabetes presented with epigastric and chest discomfort here for suspected blood loss anemia 2/2 GIB,     Called overnight for positive COVID test. Pt has just received notification of + COVID test from state, and is angry that he is still drinking bowel prep, asking if he could get colonoscopy in light of his test results. Endorsed that it is GI's decision to proceed or not. Held bowel prep for now.  UPDATE: Spoke with nurse about continuing bowel prep. Pt has been angry at staff and likely not amenable at this point. Plan is to restart bowel prep in the AM if pt is going for colonoscopy in the PM.    Vital Signs Last 24 Hrs  T(C): 36.7 (27 Jan 2022 23:04), Max: 36.7 (27 Jan 2022 08:26)  T(F): 98.1 (27 Jan 2022 23:04), Max: 98.1 (27 Jan 2022 08:26)  HR: 81 (27 Jan 2022 23:04) (76 - 84)  BP: 154/91 (27 Jan 2022 23:04) (154/91 - 173/93)  BP(mean): --  RR: 20 (27 Jan 2022 21:54) (18 - 20)  SpO2: 100% (27 Jan 2022 23:04) (96% - 100%)

## 2022-01-28 NOTE — DISCHARGE NOTE PROVIDER - CARE PROVIDER_API CALL
Scotty Kaye)  Cardiovascular Disease; Interventional Cardiology  [address] [place] [address]  Phone: [phone]  Fax: [phone]  Follow Up Time:     Lefty Cooper)  Gastroenterology; Internal Medicine  [address] [place] [address]  Phone: [phone]  Fax: [phone]  Follow Up Time:     Ron Rogel)  Family Medicine  [address], [address]  [place] [address]  Phone: [phone]  Fax: [phone]  Follow Up Time:

## 2022-01-28 NOTE — PROGRESS NOTE ADULT - PROVIDER SPECIALTY LIST ADULT
Hospitalist
Intervent Cardiology
Cardiology
Gastroenterology
Hospitalist
Intervent Cardiology
Cardiology
Cardiology
Gastroenterology
Gastroenterology
Cardiology
Hospitalist
Hospitalist

## 2022-01-28 NOTE — PROGRESS NOTE ADULT - REASON FOR ADMISSION
Chest pain, epigastric pain

## 2022-01-28 NOTE — DISCHARGE NOTE PROVIDER - PROVIDER TOKENS
PROVIDER:[TOKEN:[[id number]]],PROVIDER:[TOKEN:[[id number]]],PROVIDER:[TOKEN:[[id number]]]

## 2022-01-28 NOTE — PROGRESS NOTE ADULT - ASSESSMENT
# Active Chest pain  # CAD s/p stents x3 (mLAD, dLAD, mLCx)  - pt. could not complete nuclear stress test on 1/15 due to chest pain  - s/p LHC with PCI to prox. LAD on 1/26   - monitor     # Anemia - suspect acute blood loss anemia due to GIB  - patient with significant HH drop since 1/3-> 1/23 - now H stable around 10.0 (from 14.4 at baseline)  - no signs of active bleeding  - EGD performed-negative  - will start colon prep tonight for colonoscopy tomorrow 1/28 - D/W Dr. Cooper   - npo p midnight and swab for COVID      #Diabetes  - ON high dose of insulins at home  - Will place on Lantus 20Units+30 Units as will be NPO for tomorrow  - Adjust further based on BGM's    #Hyperlipidemia  - cont statin    
A/P:  57yo/M with PMN CAD s/p BEATRICE x3 (mLAD, dLAD, mLCx), on dual APT, HTN, hyperlipidemia, diabetes presented with epigastric and chest discomfort.     1. CP. Typical symptoms. Trops negative x 4 and EKG does not show dynamic changes.  Pt does have multiple risk factors including CAD s/p multiple PCI.   Cont asa/plavix/statin/imdur.  NST negative for ischemia.     2. CAD s/p PCI. As above. Cont medical mgmt.    3. HTN. Bbl increased to BID. SBP now in 120s last 2 readings. Cont to follow.    4. Dyslipidemia. Cont statin for a goal LDL of < 70.     5. Anemia. CBC pending. Pt is optimized for a colonoscopy from a cardiac standpoint.    6. DVT proph. Replete lytes as needed.         
# Active Chest pain  # CAD s/p stents x3 (mLAD, dLAD, mLCx)  - pt. could not complete nuclear stress test on 1/15 due to chest pain   - will have LHC tomorrow - D/W Dr Kaye  - COVId Swab, npo p midnight     # Anemia   - patient with significant HH drop since 1/3-> 1/23  - no signs of active bleeding  - EGD performed-negative  - outpt colonoscopy 2/2020 was negative except diverticular disease and fair prep in right colon.  - will need repeat colonoscopy and capsule endoscopy      #Diabetes  - ON high dose of insulins at home  - Will place on Lantus 20Units+30 Units as will be NPO for tomorrow  - Adjust further based on BGM's    #Hyperlipidemia  - cont statin    #COVID pending. Vaccinated x3 Pfizer
A/P:  57yo/M with PMN CAD s/p BEATRICE x3 (mLAD, dLAD, mLCx), on dual APT, HTN, hyperlipidemia, diabetes presented with epigastric and chest discomfort.   S/P LHC revealing 90% pLAD  S/P BEATRICE  Converted to Covid positive  Doing well  Continue with dual antiplatelet  Consider D/C planning after GI work-up        
Acute anemia, FOBT+ stool in setting of ASA/plavix/NSAIDs and EGD 1/24/22 negative.  Pending cardiac eval given several episodes of CP.    Rec:  -will need colonoscopy after cardiac evaluation for ischemia  -hold NSAIDs  -d/w pt  -d/w Dr. Jarrett at bedside
# Active Chest pain  # CAD s/p stents x3 (mLAD, dLAD, mLCx)  - pt. could not complete nuclear stress test on 1/15 due to chest pain  - s/p LHC with PCI to prox. LAD on 1/26   - monitor     # Anemia   - patient with significant HH drop since 1/3-> 1/23 - now H improving   - no signs of active bleeding  - EGD performed-negative  - outpt colonoscopy 2/2020 was negative except diverticular disease and fair prep in right colon.  - will need repeat colonoscopy and capsule endoscopy - will d/w Dr. Cooper      #Diabetes  - ON high dose of insulins at home  - Will place on Lantus 20Units+30 Units as will be NPO for tomorrow  - Adjust further based on BGM's    #Hyperlipidemia  - cont statin    
A/P:  55yo/M with PMN CAD s/p BEATRICE x3 (mLAD, dLAD, mLCx), on dual APT, HTN, hyperlipidemia, diabetes presented with epigastric and chest discomfort.   S/P LHC revealing 90% pLAD  S/P BEATRICE  Doing well  Continue with dual antiplatelet  Consider D/C planning after GI work-up        
A/P:  57yo/M with PMN CAD s/p BEATRICE x3 (mLAD, dLAD, mLCx), on dual APT, HTN, hyperlipidemia, diabetes presented with epigastric and chest discomfort.     1. CP. Typical symptoms. Trops negative x 4 and EKG does not show dynamic changes.  ?Angina vs ACS vs GERD/esophageal spasm/noncardiac.  Pt does have multiple risk factors including CAD s/p multiple PCI.   Cont asa/plavix/statin/imdur.  Pt to have NST today. If negative will have further anemia w/u (colonsocopy). If +, will need LHC.  2Decho pending.     2. CAD s/p PCI. As above. Cont medical mgmt.    3. HTN. Elevated- will increase Bbl to BID.    4. Dyslipidemia. Cont statin for a goal LDL of < 70.     5. DVT proph. Replete lytes as needed.

## 2022-01-28 NOTE — DISCHARGE NOTE PROVIDER - NSDCCPCAREPLAN_GEN_ALL_CORE_FT
PRINCIPAL DISCHARGE DIAGNOSIS  Diagnosis: Chest pain  Assessment and Plan of Treatment: # Active Chest pain - now resolved   # CAD s/p stents x3 (mLAD, dLAD, mLCx)  - pt. could not complete nuclear stress test on 1/15 due to chest pain  S/P [facility] revealing 90% pLAD  S/P BEATRICE  Continue with dual antiplatelet  cleared for discharge by cardiology - Dr. Kaye - will follow up outpatient         SECONDARY DISCHARGE DIAGNOSES  Diagnosis: Anemia due to acute blood loss  Assessment and Plan of Treatment: # Anemia - suspect acute blood loss anemia due to GIB  - patient with significant HH drop since 1/3-> 1/23 - now H stable around 10.0-11.0 (from 4 at baseline)  - no signs of active bleeding  - EGD performed-negative  - s/p colonoscopy on 1/28 showing......      Diagnosis: 2019 novel coronavirus disease (COVID-19)  Assessment and Plan of Treatment: #COVID positive   pt. tested positive for COVID for pre-procedure test   pt. is asymptomatic , afebrile, no leukocytosis  will need to quarantine for 10 days on discharge

## 2022-01-28 NOTE — PROGRESS NOTE ADULT - SUBJECTIVE AND OBJECTIVE BOX
Colonoscopy with excellent prep to TI/cecum negative except diverticulosis.  Resume diet and d/c home  will do capsule endoscopy as outpt in 1-2 weeks

## 2022-01-28 NOTE — DISCHARGE NOTE PROVIDER - NSDCFUADDINST_GEN_ALL_CORE_FT
- follow up with cardiology, GI and PCP in one week  - quarantine for 10 days for COVID - tested positive on 1/27/22

## 2022-01-31 ENCOUNTER — NON-APPOINTMENT (OUTPATIENT)
Age: 57
End: 2022-01-31

## 2022-02-03 ENCOUNTER — APPOINTMENT (OUTPATIENT)
Dept: FAMILY MEDICINE | Facility: CLINIC | Age: 57
End: 2022-02-03
Payer: COMMERCIAL

## 2022-02-04 DIAGNOSIS — Z79.4 LONG TERM (CURRENT) USE OF INSULIN: ICD-10-CM

## 2022-02-04 DIAGNOSIS — Z95.5 PRESENCE OF CORONARY ANGIOPLASTY IMPLANT AND GRAFT: ICD-10-CM

## 2022-02-04 DIAGNOSIS — Z79.02 LONG TERM (CURRENT) USE OF ANTITHROMBOTICS/ANTIPLATELETS: ICD-10-CM

## 2022-02-04 DIAGNOSIS — I45.10 UNSPECIFIED RIGHT BUNDLE-BRANCH BLOCK: ICD-10-CM

## 2022-02-04 DIAGNOSIS — K57.50 DIVERTICULOSIS OF BOTH SMALL AND LARGE INTESTINE WITHOUT PERFORATION OR ABSCESS WITHOUT BLEEDING: ICD-10-CM

## 2022-02-04 DIAGNOSIS — E11.9 TYPE 2 DIABETES MELLITUS WITHOUT COMPLICATIONS: ICD-10-CM

## 2022-02-04 DIAGNOSIS — I10 ESSENTIAL (PRIMARY) HYPERTENSION: ICD-10-CM

## 2022-02-04 DIAGNOSIS — I25.119 ATHEROSCLEROTIC HEART DISEASE OF NATIVE CORONARY ARTERY WITH UNSPECIFIED ANGINA PECTORIS: ICD-10-CM

## 2022-02-04 DIAGNOSIS — D62 ACUTE POSTHEMORRHAGIC ANEMIA: ICD-10-CM

## 2022-02-04 DIAGNOSIS — G47.33 OBSTRUCTIVE SLEEP APNEA (ADULT) (PEDIATRIC): ICD-10-CM

## 2022-02-04 DIAGNOSIS — U07.1 COVID-19: ICD-10-CM

## 2022-02-04 DIAGNOSIS — E78.5 HYPERLIPIDEMIA, UNSPECIFIED: ICD-10-CM

## 2022-02-04 DIAGNOSIS — K92.2 GASTROINTESTINAL HEMORRHAGE, UNSPECIFIED: ICD-10-CM

## 2022-02-04 DIAGNOSIS — K63.89 OTHER SPECIFIED DISEASES OF INTESTINE: ICD-10-CM

## 2022-02-04 DIAGNOSIS — K29.70 GASTRITIS, UNSPECIFIED, WITHOUT BLEEDING: ICD-10-CM

## 2022-02-04 DIAGNOSIS — Z88.0 ALLERGY STATUS TO PENICILLIN: ICD-10-CM

## 2022-02-06 ENCOUNTER — INPATIENT (INPATIENT)
Facility: HOSPITAL | Age: 57
LOS: 3 days | Discharge: HOME | DRG: 246 | End: 2022-02-10
Attending: HOSPITALIST | Admitting: STUDENT IN AN ORGANIZED HEALTH CARE EDUCATION/TRAINING PROGRAM
Payer: COMMERCIAL

## 2022-02-06 VITALS — HEIGHT: 72 IN | WEIGHT: 240.08 LBS

## 2022-02-06 DIAGNOSIS — D50.9 IRON DEFICIENCY ANEMIA, UNSPECIFIED: ICD-10-CM

## 2022-02-06 DIAGNOSIS — G47.33 OBSTRUCTIVE SLEEP APNEA (ADULT) (PEDIATRIC): ICD-10-CM

## 2022-02-06 DIAGNOSIS — Z79.4 LONG TERM (CURRENT) USE OF INSULIN: ICD-10-CM

## 2022-02-06 DIAGNOSIS — Z79.02 LONG TERM (CURRENT) USE OF ANTITHROMBOTICS/ANTIPLATELETS: ICD-10-CM

## 2022-02-06 DIAGNOSIS — N17.9 ACUTE KIDNEY FAILURE, UNSPECIFIED: ICD-10-CM

## 2022-02-06 DIAGNOSIS — Z95.5 PRESENCE OF CORONARY ANGIOPLASTY IMPLANT AND GRAFT: Chronic | ICD-10-CM

## 2022-02-06 DIAGNOSIS — R07.9 CHEST PAIN, UNSPECIFIED: ICD-10-CM

## 2022-02-06 DIAGNOSIS — I25.110 ATHEROSCLEROTIC HEART DISEASE OF NATIVE CORONARY ARTERY WITH UNSTABLE ANGINA PECTORIS: ICD-10-CM

## 2022-02-06 DIAGNOSIS — Z82.49 FAMILY HISTORY OF ISCHEMIC HEART DISEASE AND OTHER DISEASES OF THE CIRCULATORY SYSTEM: ICD-10-CM

## 2022-02-06 DIAGNOSIS — I10 ESSENTIAL (PRIMARY) HYPERTENSION: ICD-10-CM

## 2022-02-06 DIAGNOSIS — Z90.49 ACQUIRED ABSENCE OF OTHER SPECIFIED PARTS OF DIGESTIVE TRACT: Chronic | ICD-10-CM

## 2022-02-06 DIAGNOSIS — Z88.0 ALLERGY STATUS TO PENICILLIN: ICD-10-CM

## 2022-02-06 DIAGNOSIS — E11.65 TYPE 2 DIABETES MELLITUS WITH HYPERGLYCEMIA: ICD-10-CM

## 2022-02-06 DIAGNOSIS — E78.5 HYPERLIPIDEMIA, UNSPECIFIED: ICD-10-CM

## 2022-02-06 DIAGNOSIS — U07.1 COVID-19: ICD-10-CM

## 2022-02-06 DIAGNOSIS — Z95.5 PRESENCE OF CORONARY ANGIOPLASTY IMPLANT AND GRAFT: ICD-10-CM

## 2022-02-06 DIAGNOSIS — Z79.82 LONG TERM (CURRENT) USE OF ASPIRIN: ICD-10-CM

## 2022-02-06 LAB
ALBUMIN SERPL ELPH-MCNC: 3.4 G/DL — SIGNIFICANT CHANGE UP (ref 3.3–5)
ALP SERPL-CCNC: 64 U/L — SIGNIFICANT CHANGE UP (ref 40–120)
ALT FLD-CCNC: 46 U/L — SIGNIFICANT CHANGE UP (ref 12–78)
ANION GAP SERPL CALC-SCNC: 5 MMOL/L — SIGNIFICANT CHANGE UP (ref 5–17)
AST SERPL-CCNC: 27 U/L — SIGNIFICANT CHANGE UP (ref 15–37)
BASOPHILS # BLD AUTO: 0.14 K/UL — SIGNIFICANT CHANGE UP (ref 0–0.2)
BASOPHILS NFR BLD AUTO: 1.6 % — SIGNIFICANT CHANGE UP (ref 0–2)
BILIRUB SERPL-MCNC: 0.4 MG/DL — SIGNIFICANT CHANGE UP (ref 0.2–1.2)
BUN SERPL-MCNC: 33 MG/DL — HIGH (ref 7–23)
CALCIUM SERPL-MCNC: 8.9 MG/DL — SIGNIFICANT CHANGE UP (ref 8.5–10.1)
CHLORIDE SERPL-SCNC: 105 MMOL/L — SIGNIFICANT CHANGE UP (ref 96–108)
CO2 SERPL-SCNC: 27 MMOL/L — SIGNIFICANT CHANGE UP (ref 22–31)
CREAT SERPL-MCNC: 1.71 MG/DL — HIGH (ref 0.5–1.3)
D DIMER BLD IA.RAPID-MCNC: <150 NG/ML DDU — SIGNIFICANT CHANGE UP
EOSINOPHIL # BLD AUTO: 0.35 K/UL — SIGNIFICANT CHANGE UP (ref 0–0.5)
EOSINOPHIL NFR BLD AUTO: 3.9 % — SIGNIFICANT CHANGE UP (ref 0–6)
GLUCOSE SERPL-MCNC: 382 MG/DL — HIGH (ref 70–99)
HCT VFR BLD CALC: 33.5 % — LOW (ref 39–50)
HGB BLD-MCNC: 10.7 G/DL — LOW (ref 13–17)
IMM GRANULOCYTES NFR BLD AUTO: 1.5 % — SIGNIFICANT CHANGE UP (ref 0–1.5)
LYMPHOCYTES # BLD AUTO: 2.09 K/UL — SIGNIFICANT CHANGE UP (ref 1–3.3)
LYMPHOCYTES # BLD AUTO: 23.5 % — SIGNIFICANT CHANGE UP (ref 13–44)
MCHC RBC-ENTMCNC: 27.6 PG — SIGNIFICANT CHANGE UP (ref 27–34)
MCHC RBC-ENTMCNC: 31.9 GM/DL — LOW (ref 32–36)
MCV RBC AUTO: 86.6 FL — SIGNIFICANT CHANGE UP (ref 80–100)
MONOCYTES # BLD AUTO: 0.93 K/UL — HIGH (ref 0–0.9)
MONOCYTES NFR BLD AUTO: 10.5 % — SIGNIFICANT CHANGE UP (ref 2–14)
NEUTROPHILS # BLD AUTO: 5.25 K/UL — SIGNIFICANT CHANGE UP (ref 1.8–7.4)
NEUTROPHILS NFR BLD AUTO: 59 % — SIGNIFICANT CHANGE UP (ref 43–77)
PLATELET # BLD AUTO: 405 K/UL — HIGH (ref 150–400)
POTASSIUM SERPL-MCNC: 4 MMOL/L — SIGNIFICANT CHANGE UP (ref 3.5–5.3)
POTASSIUM SERPL-SCNC: 4 MMOL/L — SIGNIFICANT CHANGE UP (ref 3.5–5.3)
PROT SERPL-MCNC: 7.2 GM/DL — SIGNIFICANT CHANGE UP (ref 6–8.3)
RBC # BLD: 3.87 M/UL — LOW (ref 4.2–5.8)
RBC # FLD: 13.9 % — SIGNIFICANT CHANGE UP (ref 10.3–14.5)
SODIUM SERPL-SCNC: 137 MMOL/L — SIGNIFICANT CHANGE UP (ref 135–145)
TROPONIN I, HIGH SENSITIVITY RESULT: 15.86 NG/L — SIGNIFICANT CHANGE UP
WBC # BLD: 8.89 K/UL — SIGNIFICANT CHANGE UP (ref 3.8–10.5)
WBC # FLD AUTO: 8.89 K/UL — SIGNIFICANT CHANGE UP (ref 3.8–10.5)

## 2022-02-06 PROCEDURE — 93010 ELECTROCARDIOGRAM REPORT: CPT

## 2022-02-06 PROCEDURE — 99285 EMERGENCY DEPT VISIT HI MDM: CPT

## 2022-02-06 PROCEDURE — 71045 X-RAY EXAM CHEST 1 VIEW: CPT | Mod: 26

## 2022-02-06 NOTE — ED PROVIDER NOTE - CARE PLAN
1 Principal Discharge DX:	Exertional chest pain   Principal Discharge DX:	Exertional chest pain  Secondary Diagnosis:	VIKKI (acute kidney injury)

## 2022-02-06 NOTE — ED PROVIDER NOTE - PROGRESS NOTE DETAILS
55 y/o M presents with CP x 2 weeks. Pt s/p cath on 1/26. pt reports intermittent CP for the last 2 weeks, pain was worse with exertion today, radiating to his neck and numbness to R forearm associated with SOB. Saw Dr. Kaye on thursday, had BP meds adjusted. No other complaints at this time. -Rehan Rocha PA-C Discussed with Dr. urrutia who will admit. -Rehan Rocha PA-C

## 2022-02-06 NOTE — ED PROVIDER NOTE - OBJECTIVE STATEMENT
pt c/o exertional CP and SOB gradually worsening since dc from  for cardiac stent placement with Denice.  pain is pressure-like, radiates to the jaw and RUE.  last saw denice 4d ago, his BP meds were adjusted.  pt denies n/v/diaphoresis.

## 2022-02-06 NOTE — ED ADULT TRIAGE NOTE - CHIEF COMPLAINT QUOTE
Pt with hx of HTN presents to the ED c/o non radiating chest pain with exertion x 2 weeks. States he also experienced SOB with the chest pain. Hx of stent placement on 1/26 with dr. galdamez. States pain has not improved. Denies dizziness, nausea, numbness/tingling. Was covid + on 1/27 while in the hospital. Sent in for stat ekg.

## 2022-02-06 NOTE — ED PROVIDER NOTE - ATTENDING CONTRIBUTION TO CARE
I, Yamileth Linn MD,  performed the initial face to face bedside interview with this patient regarding history of present illness, review of symptoms and relevant past medical, social and family history.  I completed an independent physical examination.  I was the initial provider who evaluated this patient.   I personally saw the patient and performed a substantive portion of the visit including all aspects of the medical decision making.  I have signed out the follow up of any pending tests (i.e. labs, radiological studies) to the ACP.  I have communicated the patient’s plan of care and disposition with the ACP.

## 2022-02-06 NOTE — ED PROVIDER NOTE - NS ED MD DISPO ISOLATION TYPES
None Simponi Counseling:  I discussed with the patient the risks of golimumab including but not limited to myelosuppression, immunosuppression, autoimmune hepatitis, demyelinating diseases, lymphoma, and serious infections.  The patient understands that monitoring is required including a PPD at baseline and must alert us or the primary physician if symptoms of infection or other concerning signs are noted.

## 2022-02-07 DIAGNOSIS — R07.9 CHEST PAIN, UNSPECIFIED: ICD-10-CM

## 2022-02-07 LAB
A1C WITH ESTIMATED AVERAGE GLUCOSE RESULT: 9.3 % — HIGH (ref 4–5.6)
ALBUMIN SERPL ELPH-MCNC: 3.1 G/DL — LOW (ref 3.3–5)
ALP SERPL-CCNC: 53 U/L — SIGNIFICANT CHANGE UP (ref 40–120)
ALT FLD-CCNC: 40 U/L — SIGNIFICANT CHANGE UP (ref 12–78)
ANION GAP SERPL CALC-SCNC: 4 MMOL/L — LOW (ref 5–17)
APPEARANCE UR: CLEAR — SIGNIFICANT CHANGE UP
APTT BLD: 35.4 SEC — SIGNIFICANT CHANGE UP (ref 27.5–35.5)
AST SERPL-CCNC: 20 U/L — SIGNIFICANT CHANGE UP (ref 15–37)
BASOPHILS # BLD AUTO: 0.11 K/UL — SIGNIFICANT CHANGE UP (ref 0–0.2)
BASOPHILS NFR BLD AUTO: 1.4 % — SIGNIFICANT CHANGE UP (ref 0–2)
BILIRUB SERPL-MCNC: 0.4 MG/DL — SIGNIFICANT CHANGE UP (ref 0.2–1.2)
BILIRUB UR-MCNC: NEGATIVE — SIGNIFICANT CHANGE UP
BUN SERPL-MCNC: 30 MG/DL — HIGH (ref 7–23)
CALCIUM SERPL-MCNC: 8.7 MG/DL — SIGNIFICANT CHANGE UP (ref 8.5–10.1)
CHLORIDE SERPL-SCNC: 110 MMOL/L — HIGH (ref 96–108)
CHOLEST SERPL-MCNC: 107 MG/DL — SIGNIFICANT CHANGE UP
CO2 SERPL-SCNC: 27 MMOL/L — SIGNIFICANT CHANGE UP (ref 22–31)
COLOR SPEC: YELLOW — SIGNIFICANT CHANGE UP
CREAT SERPL-MCNC: 1.46 MG/DL — HIGH (ref 0.5–1.3)
DIFF PNL FLD: NEGATIVE — SIGNIFICANT CHANGE UP
EOSINOPHIL # BLD AUTO: 0.25 K/UL — SIGNIFICANT CHANGE UP (ref 0–0.5)
EOSINOPHIL NFR BLD AUTO: 3.1 % — SIGNIFICANT CHANGE UP (ref 0–6)
ESTIMATED AVERAGE GLUCOSE: 220 MG/DL — HIGH (ref 68–114)
GLUCOSE BLDC GLUCOMTR-MCNC: 179 MG/DL — HIGH (ref 70–99)
GLUCOSE SERPL-MCNC: 204 MG/DL — HIGH (ref 70–99)
GLUCOSE UR QL: 1000 MG/DL
HCT VFR BLD CALC: 31.9 % — LOW (ref 39–50)
HDLC SERPL-MCNC: 17 MG/DL — LOW
HGB BLD-MCNC: 10.1 G/DL — LOW (ref 13–17)
IMM GRANULOCYTES NFR BLD AUTO: 1.8 % — HIGH (ref 0–1.5)
INR BLD: 1.06 RATIO — SIGNIFICANT CHANGE UP (ref 0.88–1.16)
KETONES UR-MCNC: NEGATIVE — SIGNIFICANT CHANGE UP
LEUKOCYTE ESTERASE UR-ACNC: NEGATIVE — SIGNIFICANT CHANGE UP
LIPID PNL WITH DIRECT LDL SERPL: SIGNIFICANT CHANGE UP MG/DL
LYMPHOCYTES # BLD AUTO: 2.14 K/UL — SIGNIFICANT CHANGE UP (ref 1–3.3)
LYMPHOCYTES # BLD AUTO: 27 % — SIGNIFICANT CHANGE UP (ref 13–44)
MCHC RBC-ENTMCNC: 27.3 PG — SIGNIFICANT CHANGE UP (ref 27–34)
MCHC RBC-ENTMCNC: 31.7 GM/DL — LOW (ref 32–36)
MCV RBC AUTO: 86.2 FL — SIGNIFICANT CHANGE UP (ref 80–100)
MONOCYTES # BLD AUTO: 0.86 K/UL — SIGNIFICANT CHANGE UP (ref 0–0.9)
MONOCYTES NFR BLD AUTO: 10.8 % — SIGNIFICANT CHANGE UP (ref 2–14)
NEUTROPHILS # BLD AUTO: 4.44 K/UL — SIGNIFICANT CHANGE UP (ref 1.8–7.4)
NEUTROPHILS NFR BLD AUTO: 55.9 % — SIGNIFICANT CHANGE UP (ref 43–77)
NITRITE UR-MCNC: NEGATIVE — SIGNIFICANT CHANGE UP
NON HDL CHOLESTEROL: 89 MG/DL — SIGNIFICANT CHANGE UP
PH UR: 5 — SIGNIFICANT CHANGE UP (ref 5–8)
PLATELET # BLD AUTO: 381 K/UL — SIGNIFICANT CHANGE UP (ref 150–400)
POTASSIUM SERPL-MCNC: 4 MMOL/L — SIGNIFICANT CHANGE UP (ref 3.5–5.3)
POTASSIUM SERPL-SCNC: 4 MMOL/L — SIGNIFICANT CHANGE UP (ref 3.5–5.3)
PROT SERPL-MCNC: 6.7 GM/DL — SIGNIFICANT CHANGE UP (ref 6–8.3)
PROT UR-MCNC: SIGNIFICANT CHANGE UP
PROTHROM AB SERPL-ACNC: 12.3 SEC — SIGNIFICANT CHANGE UP (ref 10.6–13.6)
RBC # BLD: 3.7 M/UL — LOW (ref 4.2–5.8)
RBC # FLD: 13.9 % — SIGNIFICANT CHANGE UP (ref 10.3–14.5)
SODIUM SERPL-SCNC: 141 MMOL/L — SIGNIFICANT CHANGE UP (ref 135–145)
SP GR SPEC: 1.01 — SIGNIFICANT CHANGE UP (ref 1.01–1.02)
TRIGL SERPL-MCNC: 429 MG/DL — HIGH
TROPONIN I, HIGH SENSITIVITY RESULT: 16.1 NG/L — SIGNIFICANT CHANGE UP
TROPONIN I, HIGH SENSITIVITY RESULT: 17.55 NG/L — SIGNIFICANT CHANGE UP
TROPONIN I, HIGH SENSITIVITY RESULT: 19.84 NG/L — SIGNIFICANT CHANGE UP
UROBILINOGEN FLD QL: NEGATIVE — SIGNIFICANT CHANGE UP
WBC # BLD: 7.94 K/UL — SIGNIFICANT CHANGE UP (ref 3.8–10.5)
WBC # FLD AUTO: 7.94 K/UL — SIGNIFICANT CHANGE UP (ref 3.8–10.5)

## 2022-02-07 PROCEDURE — 82962 GLUCOSE BLOOD TEST: CPT

## 2022-02-07 PROCEDURE — C1769: CPT

## 2022-02-07 PROCEDURE — 36415 COLL VENOUS BLD VENIPUNCTURE: CPT

## 2022-02-07 PROCEDURE — C1887: CPT

## 2022-02-07 PROCEDURE — 80053 COMPREHEN METABOLIC PANEL: CPT

## 2022-02-07 PROCEDURE — C1894: CPT

## 2022-02-07 PROCEDURE — G0269: CPT

## 2022-02-07 PROCEDURE — C1874: CPT

## 2022-02-07 PROCEDURE — 96372 THER/PROPH/DIAG INJ SC/IM: CPT

## 2022-02-07 PROCEDURE — C1725: CPT

## 2022-02-07 PROCEDURE — 80061 LIPID PANEL: CPT

## 2022-02-07 PROCEDURE — 85610 PROTHROMBIN TIME: CPT

## 2022-02-07 PROCEDURE — 99223 1ST HOSP IP/OBS HIGH 75: CPT

## 2022-02-07 PROCEDURE — 92921: CPT | Mod: LD

## 2022-02-07 PROCEDURE — 84484 ASSAY OF TROPONIN QUANT: CPT

## 2022-02-07 PROCEDURE — 93306 TTE W/DOPPLER COMPLETE: CPT

## 2022-02-07 PROCEDURE — 86850 RBC ANTIBODY SCREEN: CPT

## 2022-02-07 PROCEDURE — 99285 EMERGENCY DEPT VISIT HI MDM: CPT | Mod: 25

## 2022-02-07 PROCEDURE — 85730 THROMBOPLASTIN TIME PARTIAL: CPT

## 2022-02-07 PROCEDURE — U0003: CPT

## 2022-02-07 PROCEDURE — 86901 BLOOD TYPING SEROLOGIC RH(D): CPT

## 2022-02-07 PROCEDURE — C1760: CPT

## 2022-02-07 PROCEDURE — U0005: CPT

## 2022-02-07 PROCEDURE — 93005 ELECTROCARDIOGRAM TRACING: CPT

## 2022-02-07 PROCEDURE — 86900 BLOOD TYPING SEROLOGIC ABO: CPT

## 2022-02-07 PROCEDURE — 80048 BASIC METABOLIC PNL TOTAL CA: CPT

## 2022-02-07 PROCEDURE — 85025 COMPLETE CBC W/AUTO DIFF WBC: CPT

## 2022-02-07 PROCEDURE — 93306 TTE W/DOPPLER COMPLETE: CPT | Mod: 26

## 2022-02-07 PROCEDURE — 81003 URINALYSIS AUTO W/O SCOPE: CPT

## 2022-02-07 PROCEDURE — C9600: CPT | Mod: LD

## 2022-02-07 PROCEDURE — 93454 CORONARY ARTERY ANGIO S&I: CPT | Mod: 59

## 2022-02-07 PROCEDURE — 93010 ELECTROCARDIOGRAM REPORT: CPT

## 2022-02-07 PROCEDURE — 83036 HEMOGLOBIN GLYCOSYLATED A1C: CPT

## 2022-02-07 RX ORDER — INSULIN LISPRO 100/ML
VIAL (ML) SUBCUTANEOUS AT BEDTIME
Refills: 0 | Status: DISCONTINUED | OUTPATIENT
Start: 2022-02-07 | End: 2022-02-10

## 2022-02-07 RX ORDER — INSULIN LISPRO 100/ML
VIAL (ML) SUBCUTANEOUS
Refills: 0 | Status: DISCONTINUED | OUTPATIENT
Start: 2022-02-07 | End: 2022-02-10

## 2022-02-07 RX ORDER — INSULIN LISPRO 100/ML
25 VIAL (ML) SUBCUTANEOUS
Refills: 0 | Status: DISCONTINUED | OUTPATIENT
Start: 2022-02-07 | End: 2022-02-07

## 2022-02-07 RX ORDER — DEXTROSE 50 % IN WATER 50 %
25 SYRINGE (ML) INTRAVENOUS ONCE
Refills: 0 | Status: DISCONTINUED | OUTPATIENT
Start: 2022-02-07 | End: 2022-02-10

## 2022-02-07 RX ORDER — CHOLECALCIFEROL (VITAMIN D3) 125 MCG
1000 CAPSULE ORAL DAILY
Refills: 0 | Status: DISCONTINUED | OUTPATIENT
Start: 2022-02-07 | End: 2022-02-10

## 2022-02-07 RX ORDER — SODIUM CHLORIDE 9 MG/ML
1000 INJECTION, SOLUTION INTRAVENOUS
Refills: 0 | Status: DISCONTINUED | OUTPATIENT
Start: 2022-02-07 | End: 2022-02-10

## 2022-02-07 RX ORDER — HEPARIN SODIUM 5000 [USP'U]/ML
5000 INJECTION INTRAVENOUS; SUBCUTANEOUS EVERY 8 HOURS
Refills: 0 | Status: DISCONTINUED | OUTPATIENT
Start: 2022-02-07 | End: 2022-02-10

## 2022-02-07 RX ORDER — FENOFIBRATE,MICRONIZED 130 MG
145 CAPSULE ORAL DAILY
Refills: 0 | Status: DISCONTINUED | OUTPATIENT
Start: 2022-02-07 | End: 2022-02-10

## 2022-02-07 RX ORDER — CLOPIDOGREL BISULFATE 75 MG/1
75 TABLET, FILM COATED ORAL DAILY
Refills: 0 | Status: DISCONTINUED | OUTPATIENT
Start: 2022-02-07 | End: 2022-02-10

## 2022-02-07 RX ORDER — ISOSORBIDE MONONITRATE 60 MG/1
30 TABLET, EXTENDED RELEASE ORAL DAILY
Refills: 0 | Status: DISCONTINUED | OUTPATIENT
Start: 2022-02-07 | End: 2022-02-10

## 2022-02-07 RX ORDER — ASPIRIN/CALCIUM CARB/MAGNESIUM 324 MG
81 TABLET ORAL DAILY
Refills: 0 | Status: DISCONTINUED | OUTPATIENT
Start: 2022-02-07 | End: 2022-02-10

## 2022-02-07 RX ORDER — ALPRAZOLAM 0.25 MG
0.25 TABLET ORAL ONCE
Refills: 0 | Status: DISCONTINUED | OUTPATIENT
Start: 2022-02-07 | End: 2022-02-07

## 2022-02-07 RX ORDER — DEXTROSE 50 % IN WATER 50 %
12.5 SYRINGE (ML) INTRAVENOUS ONCE
Refills: 0 | Status: DISCONTINUED | OUTPATIENT
Start: 2022-02-07 | End: 2022-02-10

## 2022-02-07 RX ORDER — DEXTROSE 50 % IN WATER 50 %
15 SYRINGE (ML) INTRAVENOUS ONCE
Refills: 0 | Status: DISCONTINUED | OUTPATIENT
Start: 2022-02-07 | End: 2022-02-10

## 2022-02-07 RX ORDER — METOPROLOL TARTRATE 50 MG
200 TABLET ORAL DAILY
Refills: 0 | Status: DISCONTINUED | OUTPATIENT
Start: 2022-02-07 | End: 2022-02-10

## 2022-02-07 RX ORDER — SODIUM CHLORIDE 9 MG/ML
1000 INJECTION INTRAMUSCULAR; INTRAVENOUS; SUBCUTANEOUS
Refills: 0 | Status: DISCONTINUED | OUTPATIENT
Start: 2022-02-07 | End: 2022-02-10

## 2022-02-07 RX ORDER — GLUCAGON INJECTION, SOLUTION 0.5 MG/.1ML
1 INJECTION, SOLUTION SUBCUTANEOUS ONCE
Refills: 0 | Status: DISCONTINUED | OUTPATIENT
Start: 2022-02-07 | End: 2022-02-10

## 2022-02-07 RX ORDER — ACETAMINOPHEN 500 MG
650 TABLET ORAL EVERY 6 HOURS
Refills: 0 | Status: COMPLETED | OUTPATIENT
Start: 2022-02-07 | End: 2022-02-08

## 2022-02-07 RX ORDER — ATORVASTATIN CALCIUM 80 MG/1
40 TABLET, FILM COATED ORAL AT BEDTIME
Refills: 0 | Status: DISCONTINUED | OUTPATIENT
Start: 2022-02-07 | End: 2022-02-10

## 2022-02-07 RX ORDER — INSULIN GLARGINE 100 [IU]/ML
85 INJECTION, SOLUTION SUBCUTANEOUS AT BEDTIME
Refills: 0 | Status: DISCONTINUED | OUTPATIENT
Start: 2022-02-07 | End: 2022-02-08

## 2022-02-07 RX ADMIN — HEPARIN SODIUM 5000 UNIT(S): 5000 INJECTION INTRAVENOUS; SUBCUTANEOUS at 22:03

## 2022-02-07 RX ADMIN — INSULIN GLARGINE 85 UNIT(S): 100 INJECTION, SOLUTION SUBCUTANEOUS at 22:11

## 2022-02-07 RX ADMIN — Medication 81 MILLIGRAM(S): at 01:35

## 2022-02-07 RX ADMIN — Medication 200 MILLIGRAM(S): at 10:42

## 2022-02-07 RX ADMIN — Medication 2: at 08:24

## 2022-02-07 RX ADMIN — ISOSORBIDE MONONITRATE 30 MILLIGRAM(S): 60 TABLET, EXTENDED RELEASE ORAL at 10:42

## 2022-02-07 RX ADMIN — Medication 650 MILLIGRAM(S): at 20:10

## 2022-02-07 RX ADMIN — CLOPIDOGREL BISULFATE 75 MILLIGRAM(S): 75 TABLET, FILM COATED ORAL at 10:42

## 2022-02-07 RX ADMIN — SODIUM CHLORIDE 100 MILLILITER(S): 9 INJECTION INTRAMUSCULAR; INTRAVENOUS; SUBCUTANEOUS at 05:20

## 2022-02-07 RX ADMIN — Medication 0.25 MILLIGRAM(S): at 22:49

## 2022-02-07 RX ADMIN — SODIUM CHLORIDE 100 MILLILITER(S): 9 INJECTION INTRAMUSCULAR; INTRAVENOUS; SUBCUTANEOUS at 01:33

## 2022-02-07 RX ADMIN — ATORVASTATIN CALCIUM 40 MILLIGRAM(S): 80 TABLET, FILM COATED ORAL at 22:03

## 2022-02-07 RX ADMIN — Medication 4: at 12:04

## 2022-02-07 RX ADMIN — HEPARIN SODIUM 5000 UNIT(S): 5000 INJECTION INTRAVENOUS; SUBCUTANEOUS at 05:22

## 2022-02-07 RX ADMIN — Medication 81 MILLIGRAM(S): at 10:42

## 2022-02-07 RX ADMIN — Medication 145 MILLIGRAM(S): at 12:04

## 2022-02-07 RX ADMIN — HEPARIN SODIUM 5000 UNIT(S): 5000 INJECTION INTRAVENOUS; SUBCUTANEOUS at 14:30

## 2022-02-07 RX ADMIN — Medication 1000 UNIT(S): at 10:42

## 2022-02-07 RX ADMIN — Medication 6: at 16:48

## 2022-02-07 RX ADMIN — Medication 1 TABLET(S): at 10:42

## 2022-02-07 RX ADMIN — Medication 650 MILLIGRAM(S): at 20:33

## 2022-02-07 NOTE — ED ADULT NURSE NOTE - OBJECTIVE STATEMENT
PT. reports to ED for chest pain. Pt. aox3 reports since yesterday had dull midsternal chest pain. Pt. reports pain is nonradiating and worsens with exertion.  Pt. reports SOB, denies nausea, numbness, tingling.  Pt. recently in  January 27th for placement of two stents with Dr. Snyder.  Pt. COVID positive Jan 27. PMH HTN, cardiac stents x4.  Cardiac monitor in place, EKG complete.

## 2022-02-07 NOTE — ED ADULT NURSE NOTE - NSIMPLEMENTINTERV_GEN_ALL_ED
Implemented All Universal Safety Interventions:  [place] to call system. Call bell, personal items and telephone within reach. Instruct patient to call for assistance. Room bathroom lighting operational. Non-slip footwear when patient is off stretcher. Physically safe environment: no spills, clutter or unnecessary equipment. Stretcher in lowest position, wheels locked, appropriate side rails in place.

## 2022-02-07 NOTE — PATIENT PROFILE ADULT - FALL HARM RISK - HARM RISK INTERVENTIONS

## 2022-02-07 NOTE — ED ADULT NURSE REASSESSMENT NOTE - NS ED NURSE REASSESS COMMENT FT1
Pt. resting comfortably in bed, urine sent.  Pt. has no complaints at this item, provided water and gave report to 3E RN.

## 2022-02-07 NOTE — CONSULT NOTE ADULT - ASSESSMENT
Angina on excertion  Possibly residual disease at the site of ostial D1 with POBA  Agree with Imdur  Will monitor symptoms clinically  Recent Covid infection  May consider repeat LHC on wednesday

## 2022-02-07 NOTE — H&P ADULT - ASSESSMENT
55 y/o M w/ PMH of CAD s/p PCI, HTN, dyslipidemia, DM2, pancreatitis, obstructive sleep apnea, p/w chest pain      *Chest pain w/ h/o CAD s/p PCI  -ASA + Plavix   -Cardio consult  -BB + Statin  -Tele monitoring  -Echo  -Trend troponins  -EKG Reviewed     *VIKKI  -IVF and trend creatinine in AM to check for improvement  -If no improvement, will need nephro consult  -I/Os  -Avoid nephrotoxic agents  -UA     *COVID19 positive on 1/27/22  -No respiratory distress  -Isolation   -Pulse ox monitoring     *DM2  -Humalog ISS  -Diabetic diet     *Microcytic anemia  -Trend H/H and if stable -> f/u outpatient for further management     *H/o HTN / dyslipidemia / pancreatitis / RENETTA  -C/w home meds and f/u outpatient for further management if conditions remain stable during hospitalization     *DVT ppx   -Heparin SubQ

## 2022-02-07 NOTE — PATIENT PROFILE ADULT - HAVE YOU EXPERIENCED VIOLENCE OR A TRAUMATIC EVENT?
MD Shikha aLm, LPN 17 hours ago (6:04 PM)      Ok to hold plavix/ASA for 5-7 days, resume ASAP post procedure.  (Routing comment)         no

## 2022-02-07 NOTE — CONSULT NOTE ADULT - SUBJECTIVE AND OBJECTIVE BOX
Patient is a 56y old  Male who presents with a chief complaint of chest pain (2022 00:40)      HPI:  55 y/o M w/ PMH of CAD s/p PCI, HTN, dyslipidemia, DM2, pancreatitis, obstructive sleep apnea, p/w chest pain. Patient had stent placed recently on 22. Patient states he was CP free after stent placement for a few days. However, CP returned after a few days. CP is substernal, sharp in quality, radiates to neck and occasionally associated with RUE numbness. CP is intermittent, and currently patient is not experiencing any CP. States CP can occur when he is exerting himself or when he is sitting and resting. Denies nausea, vomiting, diophoresis, palpitations. +Occasional cough. Denies runny nose, sore throat, fever, chills, nausea, vomiting, abdominal pain.   Recent PCI with stent in pLAD, and POBA of D1  Some relief after the initial procedure, but still symptomatic.      PSH: PCI, cholecystectomy, shoulder surgery     Social Hx: Denies x 3    Family Hx: Both parents had heart disease  (2022 00:40)      PAST MEDICAL & SURGICAL HISTORY:  Essential hypertension    Obstructive sleep apnea    Diabetes mellitus    HLD (hyperlipidemia)    CAD (coronary artery disease)    Pancreatitis    History of coronary artery stent placement  Placed 18 and 2022    S/P cholecystectomy        HPI:        PREVIOUS DIAGNOSTIC TESTING:      ECHO  FINDINGS:    STRESS  FINDINGS:    CATHETERIZATION  FINDINGS:    MEDICATIONS  (STANDING):  aspirin enteric coated 81 milliGRAM(s) Oral daily  atorvastatin 40 milliGRAM(s) Oral at bedtime  cholecalciferol 1000 Unit(s) Oral daily  clopidogrel Tablet 75 milliGRAM(s) Oral daily  dextrose 40% Gel 15 Gram(s) Oral once  dextrose 5%. 1000 milliLiter(s) (50 mL/Hr) IV Continuous <Continuous>  dextrose 5%. 1000 milliLiter(s) (100 mL/Hr) IV Continuous <Continuous>  dextrose 50% Injectable 25 Gram(s) IV Push once  dextrose 50% Injectable 12.5 Gram(s) IV Push once  dextrose 50% Injectable 25 Gram(s) IV Push once  fenofibrate Tablet 145 milliGRAM(s) Oral daily  glucagon  Injectable 1 milliGRAM(s) IntraMuscular once  heparin   Injectable 5000 Unit(s) SubCutaneous every 8 hours  insulin glargine SubCutaneous Injection (LANTUS) - Peds 85 Unit(s) SubCutaneous at bedtime  insulin lispro (ADMELOG) corrective regimen sliding scale   SubCutaneous three times a day before meals  insulin lispro (ADMELOG) corrective regimen sliding scale   SubCutaneous at bedtime  isosorbide   mononitrate ER Tablet (IMDUR) 30 milliGRAM(s) Oral daily  metoprolol succinate  milliGRAM(s) Oral daily  multivitamin 1 Tablet(s) Oral daily  sodium chloride 0.9%. 1000 milliLiter(s) (100 mL/Hr) IV Continuous <Continuous>    MEDICATIONS  (PRN):  dicyclomine 20 milliGRAM(s) Oral three times a day before meals PRN IBS      FAMILY HISTORY:  Family history of diabetes mellitus (Sibling)        SOCIAL HISTORY:    CIGARETTES:    ALCOHOL:    .         Vital Signs Last 24 Hrs  T(C): 36.4 (2022 08:25), Max: 37.2 (2022 22:19)  T(F): 97.6 (2022 08:25), Max: 98.9 (2022 22:19)  HR: 76 (2022 08:25) (76 - 80)  BP: 138/76 (2022 08:25) (131/85 - 156/84)  BP(mean): 95 (2022 03:25) (95 - 104)  RR: 18 (2022 08:25) (16 - 18)  SpO2: 99% (2022 08:25) (99% - 100%)    PHYSICAL EXAM-    Constitutional: The patient appears to be normal, well developed, well nourished and alert and oriented to time, place and person. The patient does not appear acutely ill. The patient is alert.     Head: Head is normocephalic and atraumatic.      Neck: The patient's neck is supple without enlargement, has no palpable thyromegaly nor thyroid nodules and has no jugular venous distention. No audible carotid bruits. There are strong carotid pulses bilaterally. No JVD.     Cardiovascular: Regular rate and rhythm without S3, S4. No murmurs or rubs are appreciated.      Respiratory:  The patient has no rales and no rhonchi. The patient has no wheezes.     Abdomen: Soft, nontender, nondistended with positive bowel sounds.      Extremity: No tenderness. There is no pitting edema, skin discoloration, clubbing and cyanosis.         INTERPRETATION OF TELEMETRY:    ECG:    I&O's Detail      LABS:                        10.1   7.94  )-----------( 381      ( 2022 04:35 )             31.9     02-07    141  |  110<H>  |  30<H>  ----------------------------<  204<H>  4.0   |  27  |  1.46<H>    Ca    8.7      2022 04:35    TPro  6.7  /  Alb  3.1<L>  /  TBili  0.4  /  DBili  x   /  AST  20  /  ALT  40  /  AlkPhos  53  02-07        PT/INR - ( 2022 04:35 )   PT: 12.3 sec;   INR: 1.06 ratio         PTT - ( 2022 04:35 )  PTT:35.4 sec  Urinalysis Basic - ( 2022 03:30 )    Color: Yellow / Appearance: Clear / S.015 / pH: x  Gluc: x / Ketone: Negative  / Bili: Negative / Urobili: Negative   Blood: x / Protein: See Note / Nitrite: Negative   Leuk Esterase: Negative / RBC: x / WBC x   Sq Epi: x / Non Sq Epi: x / Bacteria: x      I&O's Summary    BNP  RADIOLOGY & ADDITIONAL STUDIES:

## 2022-02-08 LAB — SARS-COV-2 RNA SPEC QL NAA+PROBE: DETECTED

## 2022-02-08 PROCEDURE — 99233 SBSQ HOSP IP/OBS HIGH 50: CPT

## 2022-02-08 PROCEDURE — 93010 ELECTROCARDIOGRAM REPORT: CPT

## 2022-02-08 RX ORDER — SODIUM CHLORIDE 9 MG/ML
1000 INJECTION INTRAMUSCULAR; INTRAVENOUS; SUBCUTANEOUS
Refills: 0 | Status: DISCONTINUED | OUTPATIENT
Start: 2022-02-08 | End: 2022-02-10

## 2022-02-08 RX ORDER — LANOLIN ALCOHOL/MO/W.PET/CERES
5 CREAM (GRAM) TOPICAL ONCE
Refills: 0 | Status: COMPLETED | OUTPATIENT
Start: 2022-02-08 | End: 2022-02-08

## 2022-02-08 RX ORDER — INSULIN GLARGINE 100 [IU]/ML
45 INJECTION, SOLUTION SUBCUTANEOUS AT BEDTIME
Refills: 0 | Status: DISCONTINUED | OUTPATIENT
Start: 2022-02-08 | End: 2022-02-09

## 2022-02-08 RX ADMIN — Medication 1000 UNIT(S): at 10:59

## 2022-02-08 RX ADMIN — HEPARIN SODIUM 5000 UNIT(S): 5000 INJECTION INTRAVENOUS; SUBCUTANEOUS at 14:25

## 2022-02-08 RX ADMIN — ATORVASTATIN CALCIUM 40 MILLIGRAM(S): 80 TABLET, FILM COATED ORAL at 22:05

## 2022-02-08 RX ADMIN — Medication 4: at 17:31

## 2022-02-08 RX ADMIN — Medication 1 TABLET(S): at 11:00

## 2022-02-08 RX ADMIN — ISOSORBIDE MONONITRATE 30 MILLIGRAM(S): 60 TABLET, EXTENDED RELEASE ORAL at 10:59

## 2022-02-08 RX ADMIN — Medication 145 MILLIGRAM(S): at 10:59

## 2022-02-08 RX ADMIN — Medication 81 MILLIGRAM(S): at 11:02

## 2022-02-08 RX ADMIN — INSULIN GLARGINE 45 UNIT(S): 100 INJECTION, SOLUTION SUBCUTANEOUS at 22:05

## 2022-02-08 RX ADMIN — Medication 2: at 12:30

## 2022-02-08 RX ADMIN — HEPARIN SODIUM 5000 UNIT(S): 5000 INJECTION INTRAVENOUS; SUBCUTANEOUS at 05:19

## 2022-02-08 RX ADMIN — Medication 2: at 08:33

## 2022-02-08 RX ADMIN — Medication 200 MILLIGRAM(S): at 11:00

## 2022-02-08 RX ADMIN — Medication 5 MILLIGRAM(S): at 22:05

## 2022-02-08 RX ADMIN — HEPARIN SODIUM 5000 UNIT(S): 5000 INJECTION INTRAVENOUS; SUBCUTANEOUS at 22:05

## 2022-02-08 RX ADMIN — CLOPIDOGREL BISULFATE 75 MILLIGRAM(S): 75 TABLET, FILM COATED ORAL at 10:58

## 2022-02-09 LAB
ANION GAP SERPL CALC-SCNC: 3 MMOL/L — LOW (ref 5–17)
BUN SERPL-MCNC: 24 MG/DL — HIGH (ref 7–23)
CALCIUM SERPL-MCNC: 8.7 MG/DL — SIGNIFICANT CHANGE UP (ref 8.5–10.1)
CHLORIDE SERPL-SCNC: 110 MMOL/L — HIGH (ref 96–108)
CO2 SERPL-SCNC: 27 MMOL/L — SIGNIFICANT CHANGE UP (ref 22–31)
CREAT SERPL-MCNC: 1.29 MG/DL — SIGNIFICANT CHANGE UP (ref 0.5–1.3)
GLUCOSE SERPL-MCNC: 217 MG/DL — HIGH (ref 70–99)
POTASSIUM SERPL-MCNC: 4.4 MMOL/L — SIGNIFICANT CHANGE UP (ref 3.5–5.3)
POTASSIUM SERPL-SCNC: 4.4 MMOL/L — SIGNIFICANT CHANGE UP (ref 3.5–5.3)
SODIUM SERPL-SCNC: 140 MMOL/L — SIGNIFICANT CHANGE UP (ref 135–145)

## 2022-02-09 PROCEDURE — 93010 ELECTROCARDIOGRAM REPORT: CPT

## 2022-02-09 PROCEDURE — 99232 SBSQ HOSP IP/OBS MODERATE 35: CPT

## 2022-02-09 RX ORDER — SODIUM CHLORIDE 9 MG/ML
1000 INJECTION INTRAMUSCULAR; INTRAVENOUS; SUBCUTANEOUS
Refills: 0 | Status: DISCONTINUED | OUTPATIENT
Start: 2022-02-09 | End: 2022-02-10

## 2022-02-09 RX ORDER — INSULIN GLARGINE 100 [IU]/ML
85 INJECTION, SOLUTION SUBCUTANEOUS AT BEDTIME
Refills: 0 | Status: DISCONTINUED | OUTPATIENT
Start: 2022-02-09 | End: 2022-02-10

## 2022-02-09 RX ADMIN — Medication 1000 UNIT(S): at 15:26

## 2022-02-09 RX ADMIN — INSULIN GLARGINE 85 UNIT(S): 100 INJECTION, SOLUTION SUBCUTANEOUS at 21:45

## 2022-02-09 RX ADMIN — SODIUM CHLORIDE 100 MILLILITER(S): 9 INJECTION INTRAMUSCULAR; INTRAVENOUS; SUBCUTANEOUS at 12:01

## 2022-02-09 RX ADMIN — HEPARIN SODIUM 5000 UNIT(S): 5000 INJECTION INTRAVENOUS; SUBCUTANEOUS at 15:28

## 2022-02-09 RX ADMIN — Medication 145 MILLIGRAM(S): at 15:27

## 2022-02-09 RX ADMIN — Medication 650 MILLIGRAM(S): at 20:16

## 2022-02-09 RX ADMIN — ATORVASTATIN CALCIUM 40 MILLIGRAM(S): 80 TABLET, FILM COATED ORAL at 21:45

## 2022-02-09 RX ADMIN — HEPARIN SODIUM 5000 UNIT(S): 5000 INJECTION INTRAVENOUS; SUBCUTANEOUS at 21:45

## 2022-02-09 RX ADMIN — CLOPIDOGREL BISULFATE 75 MILLIGRAM(S): 75 TABLET, FILM COATED ORAL at 05:41

## 2022-02-09 RX ADMIN — ISOSORBIDE MONONITRATE 30 MILLIGRAM(S): 60 TABLET, EXTENDED RELEASE ORAL at 15:28

## 2022-02-09 RX ADMIN — Medication 1 TABLET(S): at 15:27

## 2022-02-09 RX ADMIN — SODIUM CHLORIDE 100 MILLILITER(S): 9 INJECTION INTRAMUSCULAR; INTRAVENOUS; SUBCUTANEOUS at 15:31

## 2022-02-09 RX ADMIN — Medication 650 MILLIGRAM(S): at 21:30

## 2022-02-09 RX ADMIN — Medication 6: at 17:39

## 2022-02-09 RX ADMIN — Medication 200 MILLIGRAM(S): at 15:28

## 2022-02-09 RX ADMIN — Medication 81 MILLIGRAM(S): at 05:41

## 2022-02-09 NOTE — PACU DISCHARGE NOTE - COMMENTS
report given to ERICK NICOLE   pt transported to 3E with COREY Love and héctor boucher   post cath care rendered via teach back

## 2022-02-09 NOTE — CHART NOTE - NSCHARTNOTEFT_GEN_A_CORE
Patient is a 56y old  Male who presents with a chief complaint of chest pain s/p LHC with kellen to LAD x 2    status post cath via RFA. Site clean, dry, intact. Pulses present, capillary refill appropriate.  no signs of hematoma present, surrounding area soft. VSS no c/o pain.  Post cath instructions and medications reviewed, patient verbalizes and understands instructions. Patient resting comfortably in bed.   Cath team to follow up in AM

## 2022-02-09 NOTE — PHARMACOTHERAPY INTERVENTION NOTE - COMMENTS
Recommended reducing lantus dose from 85 units to 45 units at bedtime since the patient will be NPO after midnight due to cath.
Recommended increasing lantus dose to 85 units at bedtime as the patient is no longer NPO.

## 2022-02-09 NOTE — CHART NOTE - NSCHARTNOTEFT_GEN_A_CORE
Nurse Practitioner Progress note:     HPI:  57 y/o M w/ PMH of CAD s/p PCI, HTN, dyslipidemia, DM2, pancreatitis, obstructive sleep apnea, p/w chest pain. Patient had stent placed recently on 1/26/22. Patient states he was CP free after stent placement for a few days. However, CP returned after a few days. CP is substernal, sharp in quality, radiates to neck and occasionally associated with RUE numbness. CP is intermittent, and currently patient is not experiencing any CP. States CP can occur when he is exerting himself or when he is sitting and resting. Denies nausea, vomiting, diaphoresis, palpitations. +Occasional cough. Denies runny nose, sore throat, fever, chills, nausea, vomiting, abdominal pain.   Pt. referred for [facility] for further evaluation       T(C): 36.8 (02-09-22 @ 07:58), Max: 36.8 (02-09-22 @ 07:58)  HR: 73 (02-09-22 @ 11:00) (73 - 76)  BP: 157/82 (02-09-22 @ 11:00) (124/70 - 157/82)  RR: 12 (02-09-22 @ 11:00) (12 - 18)  SpO2: 97% (02-09-22 @ 11:00) (97% - 99%)  Wt(kg): --    PHYSICAL EXAM:  Neurologic: Non-focal, AxOx3.  No neuro deficits  Vascular: Peripheral pulses palpable 2+ bilaterally  Procedure Site: Rt. groin mynxx closure device site benign soft no bleeding no hematoma +1PP  	      PROCEDURE RESULTS:  S/P C S/P BEATRICE to LAD S/P BEATRICE to distal LAD    ASSESSMENT/PLAN: 	  57 y/o M w/ PMH of CAD s/p PCI, HTN, dyslipidemia, DM2, pancreatitis, obstructive sleep apnea, p/w chest pain. Patient had stent placed recently on 1/26/22. Patient states he was CP free after stent placement for a few days. However, CP returned after a few days. CP is substernal, sharp in quality, radiates to neck and occasionally associated with RUE numbness. CP is intermittent, and currently patient is not experiencing any CP. States CP can occur when he is exerting himself or when he is sitting and resting. Denies nausea, vomiting, diaphoresis, palpitations. +Occasional cough. Denies runny nose, sore throat, fever, chills, nausea, vomiting, abdominal pain.   Pt. referred for [facility] for further evaluation     -Admit to CICU  -VS, labs, diet, activity as per PCI orders  -IV hydration  -Encourage PO fluids  -ASA 81mg  -Plavix 75mg  -Imdur 30mg  -Toprol XL 200mg   -Lipitor 40mg  -Fenofibrate 145mg   -Plan of care D/W pt. and MD  -Discussed therapeutic lifestyle changes to reduce risk factors such as following a cardiac diet, weight loss, maintaining a healthy weight, exercise, smoking cessation, medication compliance, and regular follow-up  with MD to know our numbers (BP, cholesterol, weight, and glucose  -If pt. remains stable overnight possible D/C in AM  - Follow-up AM labs/EKG/site check  -Follow-up with attending/cardiologist

## 2022-02-10 ENCOUNTER — TRANSCRIPTION ENCOUNTER (OUTPATIENT)
Age: 57
End: 2022-02-10

## 2022-02-10 VITALS
OXYGEN SATURATION: 100 % | HEART RATE: 69 BPM | RESPIRATION RATE: 18 BRPM | DIASTOLIC BLOOD PRESSURE: 72 MMHG | SYSTOLIC BLOOD PRESSURE: 139 MMHG | TEMPERATURE: 98 F

## 2022-02-10 LAB
ANION GAP SERPL CALC-SCNC: 2 MMOL/L — LOW (ref 5–17)
BASOPHILS # BLD AUTO: 0.14 K/UL — SIGNIFICANT CHANGE UP (ref 0–0.2)
BASOPHILS NFR BLD AUTO: 2 % — SIGNIFICANT CHANGE UP (ref 0–2)
BUN SERPL-MCNC: 22 MG/DL — SIGNIFICANT CHANGE UP (ref 7–23)
CALCIUM SERPL-MCNC: 8.9 MG/DL — SIGNIFICANT CHANGE UP (ref 8.5–10.1)
CHLORIDE SERPL-SCNC: 111 MMOL/L — HIGH (ref 96–108)
CO2 SERPL-SCNC: 27 MMOL/L — SIGNIFICANT CHANGE UP (ref 22–31)
CREAT SERPL-MCNC: 1.25 MG/DL — SIGNIFICANT CHANGE UP (ref 0.5–1.3)
EOSINOPHIL # BLD AUTO: 0.14 K/UL — SIGNIFICANT CHANGE UP (ref 0–0.5)
EOSINOPHIL NFR BLD AUTO: 2 % — SIGNIFICANT CHANGE UP (ref 0–6)
GLUCOSE BLDC GLUCOMTR-MCNC: 202 MG/DL — HIGH (ref 70–99)
GLUCOSE SERPL-MCNC: 202 MG/DL — HIGH (ref 70–99)
HCT VFR BLD CALC: 28.7 % — LOW (ref 39–50)
HGB BLD-MCNC: 9 G/DL — LOW (ref 13–17)
LYMPHOCYTES # BLD AUTO: 1.56 K/UL — SIGNIFICANT CHANGE UP (ref 1–3.3)
LYMPHOCYTES # BLD AUTO: 23 % — SIGNIFICANT CHANGE UP (ref 13–44)
MCHC RBC-ENTMCNC: 27 PG — SIGNIFICANT CHANGE UP (ref 27–34)
MCHC RBC-ENTMCNC: 31.4 GM/DL — LOW (ref 32–36)
MCV RBC AUTO: 86.2 FL — SIGNIFICANT CHANGE UP (ref 80–100)
MONOCYTES # BLD AUTO: 0.68 K/UL — SIGNIFICANT CHANGE UP (ref 0–0.9)
MONOCYTES NFR BLD AUTO: 10 % — SIGNIFICANT CHANGE UP (ref 2–14)
NEUTROPHILS # BLD AUTO: 4.15 K/UL — SIGNIFICANT CHANGE UP (ref 1.8–7.4)
NEUTROPHILS NFR BLD AUTO: 61 % — SIGNIFICANT CHANGE UP (ref 43–77)
NRBC # BLD: SIGNIFICANT CHANGE UP /100 WBCS (ref 0–0)
PLATELET # BLD AUTO: 322 K/UL — SIGNIFICANT CHANGE UP (ref 150–400)
POTASSIUM SERPL-MCNC: 4.3 MMOL/L — SIGNIFICANT CHANGE UP (ref 3.5–5.3)
POTASSIUM SERPL-SCNC: 4.3 MMOL/L — SIGNIFICANT CHANGE UP (ref 3.5–5.3)
RBC # BLD: 3.33 M/UL — LOW (ref 4.2–5.8)
RBC # FLD: 14.2 % — SIGNIFICANT CHANGE UP (ref 10.3–14.5)
SODIUM SERPL-SCNC: 140 MMOL/L — SIGNIFICANT CHANGE UP (ref 135–145)
WBC # BLD: 6.8 K/UL — SIGNIFICANT CHANGE UP (ref 3.8–10.5)
WBC # FLD AUTO: 6.8 K/UL — SIGNIFICANT CHANGE UP (ref 3.8–10.5)

## 2022-02-10 PROCEDURE — 93010 ELECTROCARDIOGRAM REPORT: CPT | Mod: 76

## 2022-02-10 PROCEDURE — 99239 HOSP IP/OBS DSCHRG MGMT >30: CPT

## 2022-02-10 RX ORDER — ISOSORBIDE MONONITRATE 60 MG/1
1 TABLET, EXTENDED RELEASE ORAL
Qty: 0 | Refills: 0 | DISCHARGE
Start: 2022-02-10

## 2022-02-10 RX ORDER — ASPIRIN/CALCIUM CARB/MAGNESIUM 324 MG
1 TABLET ORAL
Qty: 0 | Refills: 0 | DISCHARGE

## 2022-02-10 RX ORDER — CLOPIDOGREL BISULFATE 75 MG/1
1 TABLET, FILM COATED ORAL
Qty: 30 | Refills: 0
Start: 2022-02-10 | End: 2022-03-11

## 2022-02-10 RX ORDER — METOPROLOL TARTRATE 50 MG
1 TABLET ORAL
Qty: 30 | Refills: 0
Start: 2022-02-10 | End: 2022-03-11

## 2022-02-10 RX ORDER — METOPROLOL TARTRATE 50 MG
1 TABLET ORAL
Qty: 0 | Refills: 0 | DISCHARGE

## 2022-02-10 RX ORDER — ATORVASTATIN CALCIUM 80 MG/1
1 TABLET, FILM COATED ORAL
Qty: 0 | Refills: 0 | DISCHARGE

## 2022-02-10 RX ORDER — ATORVASTATIN CALCIUM 80 MG/1
1 TABLET, FILM COATED ORAL
Qty: 30 | Refills: 0
Start: 2022-02-10 | End: 2022-03-11

## 2022-02-10 RX ORDER — ASPIRIN/CALCIUM CARB/MAGNESIUM 324 MG
1 TABLET ORAL
Qty: 30 | Refills: 0
Start: 2022-02-10 | End: 2022-03-11

## 2022-02-10 RX ORDER — ATORVASTATIN CALCIUM 80 MG/1
80 TABLET, FILM COATED ORAL AT BEDTIME
Refills: 0 | Status: DISCONTINUED | OUTPATIENT
Start: 2022-02-10 | End: 2022-02-10

## 2022-02-10 RX ADMIN — Medication 145 MILLIGRAM(S): at 09:18

## 2022-02-10 RX ADMIN — Medication 4: at 12:16

## 2022-02-10 RX ADMIN — Medication 4: at 07:45

## 2022-02-10 RX ADMIN — Medication 81 MILLIGRAM(S): at 09:18

## 2022-02-10 RX ADMIN — Medication 1 TABLET(S): at 09:18

## 2022-02-10 RX ADMIN — Medication 1000 UNIT(S): at 09:18

## 2022-02-10 RX ADMIN — HEPARIN SODIUM 5000 UNIT(S): 5000 INJECTION INTRAVENOUS; SUBCUTANEOUS at 06:23

## 2022-02-10 RX ADMIN — CLOPIDOGREL BISULFATE 75 MILLIGRAM(S): 75 TABLET, FILM COATED ORAL at 09:18

## 2022-02-10 RX ADMIN — Medication 200 MILLIGRAM(S): at 09:18

## 2022-02-10 NOTE — DISCHARGE NOTE PROVIDER - NSDCMRMEDTOKEN_GEN_ALL_CORE_FT
aspirin 81 mg oral delayed release tablet: 1 tab(s) orally once a day  atorvastatin 80 mg oral tablet: 1 tab(s) orally once a day (at bedtime)  B Complex 50 oral tablet, extended release: 1 tab(s) orally once a day  clopidogrel 75 mg oral tablet: 1 tab(s) orally once a day  dicyclomine 20 mg oral tablet: 1 tab(s) orally 3 times a day, As Needed  fenofibrate 160 mg oral tablet: 1 tab(s) orally once a day  insulin lispro 100 units/mL subcutaneous solution: 25 unit(s) subcutaneous 3 times a day (with meals)  isosorbide mononitrate 30 mg oral tablet, extended release: 1 tab(s) orally once a day  Jardiance 25 mg oral tablet: 1 tab(s) orally once a day (in the morning)  metoprolol succinate 200 mg oral tablet, extended release: 1 tab(s) orally once a day  Toujeo SoloStar 300 units/mL subcutaneous solution: 85 unit(s) subcutaneous 2 times a day  Vitamin D3 25 mcg (1000 intl units) oral tablet: 1 tab(s) orally once a day

## 2022-02-10 NOTE — DISCHARGE NOTE NURSING/CASE MANAGEMENT/SOCIAL WORK - NSDCVIVACCINE_GEN_ALL_CORE_FT
influenza, injectable, quadrivalent, preservative free; 12-Oct-2017 15:08; Kim Samano (RN); [employer]; [id number]; IntraMuscular; Deltoid Left.; 0.5 milliLiter(s); VIS (VIS Published: 07-Aug-2015, VIS Presented: 12-Oct-2017);   influenza, injectable, quadrivalent, preservative free; 13-Sep-2018 12:04; Eliane Otero (RN); [employer]; [id number] (Exp. Date: 30-Jun-2019); IntraMuscular; Deltoid Left.; 0.5 milliLiter(s); VIS (VIS Published: 07-Aug-2015, VIS Presented: 13-Sep-2018);   influenza, injectable, quadrivalent, preservative free; 03-Dec-2019 13:24; Benita Agosto (RN); [employer]; [id number] (Exp. Date: 30-Jun-2020); IntraMuscular; Deltoid Left.; 0.5 milliLiter(s); VIS (VIS Published: 15-Aug-2019, VIS Presented: 03-Dec-2019);

## 2022-02-10 NOTE — PROGRESS NOTE ADULT - PROVIDER SPECIALTY LIST ADULT
Report given to Ana Christianson RN.      Elmer Scherer RN  01/04/20 [id number]
Cardiology
Hospitalist
Cardiology
Hospitalist
Hospitalist
Cardiology

## 2022-02-10 NOTE — DISCHARGE NOTE PROVIDER - NSDCFUSCHEDAPPT_GEN_ALL_CORE_FT
JAMES GONZALEZ ; 02/16/2022 ; NPP [facility] [address]  JAMES GONZALEZ ; 04/20/2022 ; [facility] [address]

## 2022-02-10 NOTE — PROGRESS NOTE ADULT - ASSESSMENT
A/P: 57 y/o M w/ PMH of CAD s/p PCI, HTN, dyslipidemia, DM2, pancreatitis, obstructive sleep apnea, p/w chest pain.    1. CP. Doubt ISR of recently placed stent. Pt has been compliants.  Possibly residual disease at the site of ostial D1 with POBA. Will have LHC today.   2Decho with normal LV fxn, mild MR, possible bicuspid AV.   Cont curent meds including nitrates     2. Recent Covid infection 12/21, already quarantined.    3. HTN. Well controlled, cont current meds.     4. Dyslipidemia. Cont statin for a goal LDL of < 70.     5. DVT proph. Keep NPO for now.     
A/P: 57 y/o M w/ PMH of CAD s/p PCI, HTN, dyslipidemia, DM2, pancreatitis, obstructive sleep apnea, p/w chest pain.    1. Chest pain- s/p pCI of LAD yesterday.  Multiple PCIs in past.  Continue DAPT.   Will increase the atorvastatin to 80mg at HS.  LDL unable to be calculated in last lab work.  TG high.  On fenofibrate.  needs repeat Lipid panel in one month.  on full dose metoprolol.    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  He will get submax treadmill stress test as outpt and he has to start cardiac obi prior to home exercise program.   Adivsed the importance of exercise.    2. Recent Covid infection 12/21, already quarantined.    3. HTN. Well controlled, cont current meds.     4. Dyslipidemia. Cont statin for a goal LDL of < 70.  Med changes as above.    5. DVT proph.     6.  DM- poorly controlled.  He needs rigorous followup and DM management with goal Hgb A1c of 6.5.    Other medical issues- Management per primary team.   Thank you for allowing me to participate in the care of this patient. Please feel free to contact me with any questions.     
Angina on excertion and pain again last night   Possibly residual disease at the site of ostial D1 with POBA  cont curent meds including nitrates   because he had more Sxs will likely need cath tomorrow   Recent Covid infection, already quarantined

## 2022-02-10 NOTE — CHART NOTE - NSCHARTNOTEFT_GEN_A_CORE
Nurse Practitioner Progress note:   55 y/o M w/ PMH of CAD s/p PCI, HTN, dyslipidemia, DM2, pancreatitis, obstructive sleep apnea, p/w chest pain. Patient had stent placed recently on 1/26/22.   s/p PCI of LAD on 2/9/22.Denies chest pain, shortness of breath, dizziness or palpitations at this time  PHYSICAL EXAM:    Constitutional: NAD  Neuro: Alert and oriented x 3 Gait steady Speech clear No focal deficits  Neck: No JVD No bruit  Respiratory: CTAB  Cardiovascular: S1 and S2, RRR,   Gastrointestinal: BS+, soft, NT/ND  Extremities: No clubbing cyanosis or edema No varicosities  Vascular: 2+ peripheral pulses  Psychiatric: Normal mood, normal affect  Musculoskeletal: 5/5 strength b/l upper and lower extremities  Right groin femoral procedure site CDI.  no bleeding, no hematoma, site soft, non tender, positive pedal pulses bilaterally      T(C): 36.4 (02-10-22 @ 07:29), Max: 36.5 (02-09-22 @ 20:08)  HR: 69 (02-10-22 @ 07:29) (68 - 94)  BP: 139/72 (02-10-22 @ 07:29) (117/71 - 157/82)  RR: 18 (02-10-22 @ 07:29) (12 - 18)  SpO2: 100% (02-10-22 @ 07:29) (97% - 100%)    CBC Full  -  ( 10 Feb 2022 07:42 )  WBC Count : 6.80 K/uL  RBC Count : 3.33 M/uL  Hemoglobin : 9.0 g/dL  Hematocrit : 28.7 %  Platelet Count - Automated : 322 K/uL  Mean Cell Volume : 86.2 fl  Mean Cell Hemoglobin : 27.0 pg  Mean Cell Hemoglobin Concentration : 31.4 gm/dL  Auto Neutrophil # : 4.15 K/uL  Auto Lymphocyte # : 1.56 K/uL  Auto Monocyte # : 0.68 K/uL  Auto Eosinophil # : 0.14 K/uL  Auto Basophil # : 0.14 K/uL  Auto Neutrophil % : 61.0 %  Auto Lymphocyte % : 23.0 %  Auto Monocyte % : 10.0 %  Auto Eosinophil % : 2.0 %  Auto Basophil % : 2.0 %    02-10    140  |  111<H>  |  22  ----------------------------<  202<H>  4.3   |  27  |  1.25    Ca    8.9      10 Feb 2022 07:42    MEDICATIONS  (STANDING):  aspirin enteric coated 81 milliGRAM(s) Oral daily  atorvastatin 80 milliGRAM(s) Oral at bedtime  cholecalciferol 1000 Unit(s) Oral daily  clopidogrel Tablet 75 milliGRAM(s) Oral daily  dextrose 40% Gel 15 Gram(s) Oral once  dextrose 5%. 1000 milliLiter(s) (50 mL/Hr) IV Continuous <Continuous>  dextrose 5%. 1000 milliLiter(s) (100 mL/Hr) IV Continuous <Continuous>  dextrose 50% Injectable 25 Gram(s) IV Push once  dextrose 50% Injectable 12.5 Gram(s) IV Push once  dextrose 50% Injectable 25 Gram(s) IV Push once  fenofibrate Tablet 145 milliGRAM(s) Oral daily  glucagon  Injectable 1 milliGRAM(s) IntraMuscular once  heparin   Injectable 5000 Unit(s) SubCutaneous every 8 hours  insulin glargine Injectable (LANTUS) 85 Unit(s) SubCutaneous at bedtime  insulin lispro (ADMELOG) corrective regimen sliding scale   SubCutaneous three times a day before meals  insulin lispro (ADMELOG) corrective regimen sliding scale   SubCutaneous at bedtime  isosorbide   mononitrate ER Tablet (IMDUR) 30 milliGRAM(s) Oral daily  metoprolol succinate  milliGRAM(s) Oral daily  multivitamin 1 Tablet(s) Oral daily  sodium chloride 0.9%. 1000 milliLiter(s) (100 mL/Hr) IV Continuous <Continuous>  sodium chloride 0.9%. 1000 milliLiter(s) (83 mL/Hr) IV Continuous <Continuous>  sodium chloride 0.9%. 1000 milliLiter(s) (100 mL/Hr) IV Continuous <Continuous>    MEDICATIONS  (PRN):  dicyclomine 20 milliGRAM(s) Oral three times a day before meals PRN IBS        HPI:  55 y/o M w/ PMH of CAD s/p PCI, HTN, dyslipidemia, DM2, pancreatitis, obstructive sleep apnea, p/w chest pain. Patient had stent placed recently on 1/26/22. Patient states he was CP free after stent placement for a few days. However, CP returned after a few days. CP is substernal, sharp in quality, radiates to neck and occasionally associated with RUE numbness. CP is intermittent, and currently patient is not experiencing any CP. States CP can occur when he is exerting himself or when he is sitting and resting. Denies nausea, vomiting, diophoresis, palpitations. +Occasional cough. Denies runny nose, sore throat, fever, chills, nausea, vomiting, abdominal pain.   Cardiac rehab recommended to pt per Dr Hicks; pt agreed for rehab. Information will be faxed to        s/p PCI of LAD    ASSESSMENT/PLAN:    Coronary artery disease  -Aspirin 81mg PO daily  -Plavix 75mg PO daily  -atorvastatin 80mg PO QHS   -Plan of care discussed with patient and MD  -Follow-up with attending MD   within 1 week  -Discussed therapeutic lifestyle changes to reduce risk factors such as following a cardiac diet, weight loss, maintaining a healthy weight, exercise, smoking cessation, medication compliance, and regular follow-up  with MD Nurse Practitioner Progress note:   57 y/o M w/ PMH of CAD s/p PCI, HTN, dyslipidemia, DM2, pancreatitis, obstructive sleep apnea, p/w chest pain. Patient had stent placed recently on 1/26/22.   s/p PCI of LAD on 2/9/22.Denies chest pain, shortness of breath, dizziness or palpitations at this time    PHYSICAL EXAM:  Constitutional: NAD  Neuro: Alert and oriented x 3 Gait steady Speech clear No focal deficits  Neck: No JVD No bruit  Respiratory: CTAB  Cardiovascular: S1 and S2, RRR,   Gastrointestinal: BS+, soft, NT/ND  Extremities: No clubbing cyanosis or edema No varicosities  Vascular: 2+ peripheral pulses  Psychiatric: Normal mood, normal affect  Musculoskeletal: 5/5 strength b/l upper and lower extremities  Right groin femoral procedure site CDI.  no bleeding, no hematoma, site soft, non tender, positive pedal pulses bilaterally      T(C): 36.4 (02-10-22 @ 07:29), Max: 36.5 (02-09-22 @ 20:08)  HR: 69 (02-10-22 @ 07:29) (68 - 94)  BP: 139/72 (02-10-22 @ 07:29) (117/71 - 157/82)  RR: 18 (02-10-22 @ 07:29) (12 - 18)  SpO2: 100% (02-10-22 @ 07:29) (97% - 100%)    CBC Full  -  ( 10 Feb 2022 07:42 )  WBC Count : 6.80 K/uL  RBC Count : 3.33 M/uL  Hemoglobin : 9.0 g/dL  Hematocrit : 28.7 %  Platelet Count - Automated : 322 K/uL  Mean Cell Volume : 86.2 fl  Mean Cell Hemoglobin : 27.0 pg  Mean Cell Hemoglobin Concentration : 31.4 gm/dL  Auto Neutrophil # : 4.15 K/uL  Auto Lymphocyte # : 1.56 K/uL  Auto Monocyte # : 0.68 K/uL  Auto Eosinophil # : 0.14 K/uL  Auto Basophil # : 0.14 K/uL  Auto Neutrophil % : 61.0 %  Auto Lymphocyte % : 23.0 %  Auto Monocyte % : 10.0 %  Auto Eosinophil % : 2.0 %  Auto Basophil % : 2.0 %    02-10    140  |  111<H>  |  22  ----------------------------<  202<H>  4.3   |  27  |  1.25    Ca    8.9      10 Feb 2022 07:42    Tele: NSR with rare PVC  EKG:     MEDICATIONS  (STANDING):  aspirin enteric coated 81 milliGRAM(s) Oral daily  atorvastatin 80 milliGRAM(s) Oral at bedtime  cholecalciferol 1000 Unit(s) Oral daily  clopidogrel Tablet 75 milliGRAM(s) Oral daily  dextrose 40% Gel 15 Gram(s) Oral once  dextrose 5%. 1000 milliLiter(s) (50 mL/Hr) IV Continuous <Continuous>  dextrose 5%. 1000 milliLiter(s) (100 mL/Hr) IV Continuous <Continuous>  dextrose 50% Injectable 25 Gram(s) IV Push once  dextrose 50% Injectable 12.5 Gram(s) IV Push once  dextrose 50% Injectable 25 Gram(s) IV Push once  fenofibrate Tablet 145 milliGRAM(s) Oral daily  glucagon  Injectable 1 milliGRAM(s) IntraMuscular once  heparin   Injectable 5000 Unit(s) SubCutaneous every 8 hours  insulin glargine Injectable (LANTUS) 85 Unit(s) SubCutaneous at bedtime  insulin lispro (ADMELOG) corrective regimen sliding scale   SubCutaneous three times a day before meals  insulin lispro (ADMELOG) corrective regimen sliding scale   SubCutaneous at bedtime  isosorbide   mononitrate ER Tablet (IMDUR) 30 milliGRAM(s) Oral daily  metoprolol succinate  milliGRAM(s) Oral daily  multivitamin 1 Tablet(s) Oral daily  sodium chloride 0.9%. 1000 milliLiter(s) (100 mL/Hr) IV Continuous <Continuous>  sodium chloride 0.9%. 1000 milliLiter(s) (83 mL/Hr) IV Continuous <Continuous>  sodium chloride 0.9%. 1000 milliLiter(s) (100 mL/Hr) IV Continuous <Continuous>    MEDICATIONS  (PRN):  dicyclomine 20 milliGRAM(s) Oral three times a day before meals PRN IBS        HPI:  57 y/o M w/ PMH of CAD s/p PCI, HTN, dyslipidemia, DM2, pancreatitis, obstructive sleep apnea, p/w chest pain. Patient had stent placed recently on 1/26/22. Patient states he was CP free after stent placement for a few days. However, CP returned after a few days. CP is substernal, sharp in quality, radiates to neck and occasionally associated with RUE numbness. CP is intermittent, and currently patient is not experiencing any CP. States CP can occur when he is exerting himself or when he is sitting and resting. Denies nausea, vomiting, diophoresis, palpitations. +Occasional cough. Denies runny nose, sore throat, fever, chills, nausea, vomiting, abdominal pain.   Cardiac rehab recommended to pt per Dr Hicks; pt agreed for rehab. Information will be faxed to        s/p PCI of LAD    ASSESSMENT/PLAN:    Coronary artery disease  -Aspirin 81mg PO daily  -Plavix 75mg PO daily  -atorvastatin 80mg PO QHS   -Plan of care discussed with patient and MD  -Follow-up with attending MD   within 1 week  -Discussed therapeutic lifestyle changes to reduce risk factors such as following a cardiac diet, weight loss, maintaining a healthy weight, exercise, smoking cessation, medication compliance, and regular follow-up  with MD Nurse Practitioner Progress note:   55 y/o M w/ PMH of CAD s/p PCI, HTN, dyslipidemia, DM2, pancreatitis, obstructive sleep apnea, p/w chest pain. Patient had stent placed recently on 1/26/22.   s/p PCI of LAD on 2/9/22.Denies chest pain, shortness of breath, dizziness or palpitations at this time    PHYSICAL EXAM:  Constitutional: NAD  Neuro: Alert and oriented x 3 Gait steady Speech clear No focal deficits  Neck: No JVD No bruit  Respiratory: CTAB  Cardiovascular: S1 and S2, RRR,   Gastrointestinal: BS+, soft, NT/ND  Extremities: No clubbing cyanosis or edema No varicosities  Vascular: 2+ peripheral pulses  Psychiatric: Normal mood, normal affect  Musculoskeletal: 5/5 strength b/l upper and lower extremities  Right groin femoral procedure site CDI.  no bleeding, no hematoma, site soft, non tender, positive pedal pulses bilaterally      T(C): 36.4 (02-10-22 @ 07:29), Max: 36.5 (02-09-22 @ 20:08)  HR: 69 (02-10-22 @ 07:29) (68 - 94)  BP: 139/72 (02-10-22 @ 07:29) (117/71 - 157/82)  RR: 18 (02-10-22 @ 07:29) (12 - 18)  SpO2: 100% (02-10-22 @ 07:29) (97% - 100%)    CBC Full  -  ( 10 Feb 2022 07:42 )  WBC Count : 6.80 K/uL  RBC Count : 3.33 M/uL  Hemoglobin : 9.0 g/dL  Hematocrit : 28.7 %  Platelet Count - Automated : 322 K/uL  Mean Cell Volume : 86.2 fl  Mean Cell Hemoglobin : 27.0 pg  Mean Cell Hemoglobin Concentration : 31.4 gm/dL  Auto Neutrophil # : 4.15 K/uL  Auto Lymphocyte # : 1.56 K/uL  Auto Monocyte # : 0.68 K/uL  Auto Eosinophil # : 0.14 K/uL  Auto Basophil # : 0.14 K/uL  Auto Neutrophil % : 61.0 %  Auto Lymphocyte % : 23.0 %  Auto Monocyte % : 10.0 %  Auto Eosinophil % : 2.0 %  Auto Basophil % : 2.0 %    02-10    140  |  111<H>  |  22  ----------------------------<  202<H>  4.3   |  27  |  1.25    Ca    8.9      10 Feb 2022 07:42    Tele: NSR with rare PVC  EKG: NSR    MEDICATIONS  (STANDING):  aspirin enteric coated 81 milliGRAM(s) Oral daily  atorvastatin 80 milliGRAM(s) Oral at bedtime  cholecalciferol 1000 Unit(s) Oral daily  clopidogrel Tablet 75 milliGRAM(s) Oral daily  dextrose 40% Gel 15 Gram(s) Oral once  dextrose 5%. 1000 milliLiter(s) (50 mL/Hr) IV Continuous <Continuous>  dextrose 5%. 1000 milliLiter(s) (100 mL/Hr) IV Continuous <Continuous>  dextrose 50% Injectable 25 Gram(s) IV Push once  dextrose 50% Injectable 12.5 Gram(s) IV Push once  dextrose 50% Injectable 25 Gram(s) IV Push once  fenofibrate Tablet 145 milliGRAM(s) Oral daily  glucagon  Injectable 1 milliGRAM(s) IntraMuscular once  heparin   Injectable 5000 Unit(s) SubCutaneous every 8 hours  insulin glargine Injectable (LANTUS) 85 Unit(s) SubCutaneous at bedtime  insulin lispro (ADMELOG) corrective regimen sliding scale   SubCutaneous three times a day before meals  insulin lispro (ADMELOG) corrective regimen sliding scale   SubCutaneous at bedtime  isosorbide   mononitrate ER Tablet (IMDUR) 30 milliGRAM(s) Oral daily  metoprolol succinate  milliGRAM(s) Oral daily  multivitamin 1 Tablet(s) Oral daily  sodium chloride 0.9%. 1000 milliLiter(s) (100 mL/Hr) IV Continuous <Continuous>  sodium chloride 0.9%. 1000 milliLiter(s) (83 mL/Hr) IV Continuous <Continuous>  sodium chloride 0.9%. 1000 milliLiter(s) (100 mL/Hr) IV Continuous <Continuous>    MEDICATIONS  (PRN):  dicyclomine 20 milliGRAM(s) Oral three times a day before meals PRN IBS        HPI:  55 y/o M w/ PMH of CAD s/p PCI, HTN, dyslipidemia, DM2, pancreatitis, obstructive sleep apnea, p/w chest pain. Patient had stent placed recently on 1/26/22. Patient states he was CP free after stent placement for a few days. However, CP returned after a few days. CP is substernal, sharp in quality, radiates to neck and occasionally associated with RUE numbness. CP is intermittent, and currently patient is not experiencing any CP. States CP can occur when he is exerting himself or when he is sitting and resting. Denies nausea, vomiting, diophoresis, palpitations. +Occasional cough. Denies runny nose, sore throat, fever, chills, nausea, vomiting, abdominal pain.   Cardiac rehab recommended to pt per Dr Hicks; pt agreed for rehab. Information will be faxed to        s/p PCI of LAD    ASSESSMENT/PLAN:    Coronary artery disease  -Aspirin 81mg PO daily  -Plavix 75mg PO daily  -atorvastatin 80mg PO QHS   -Plan of care discussed with patient and MD  -Follow-up with attending MD   within 1 week  -Discussed therapeutic lifestyle changes to reduce risk factors such as following a cardiac diet, weight loss, maintaining a healthy weight, exercise, smoking cessation, medication compliance, and regular follow-up  with MD

## 2022-02-10 NOTE — DISCHARGE NOTE PROVIDER - NSDCCPCAREPLAN_GEN_ALL_CORE_FT
PRINCIPAL DISCHARGE DIAGNOSIS  Diagnosis: Exertional chest pain  Assessment and Plan of Treatment:       SECONDARY DISCHARGE DIAGNOSES  Diagnosis: VIKKI (acute kidney injury)  Assessment and Plan of Treatment:

## 2022-02-10 NOTE — DISCHARGE NOTE NURSING/CASE MANAGEMENT/SOCIAL WORK - PATIENT PORTAL LINK FT
You can access the FollowMyHealth Patient Portal offered by [facility] by registering at the following website: [email or web address]. By joining [employer]’s FollowMyHealth portal, you will also be able to view your health information using other applications (apps) compatible with our system.

## 2022-02-10 NOTE — PROGRESS NOTE ADULT - SUBJECTIVE AND OBJECTIVE BOX
Cardiology Progress Note     HPI: 55 y/o M w/ PMH of CAD s/p PCI, HTN, dyslipidemia, DM2, pancreatitis, obstructive sleep apnea, p/w chest pain. Patient had stent placed recently on 22. Patient states he was CP free after stent placement for a few days. However, CP returned after a few days. CP is substernal, sharp in quality, radiates to neck and occasionally associated with RUE numbness. CP is intermittent, and currently patient is not experiencing any CP. States CP can occur when he is exerting himself or when he is sitting and resting. Denies nausea, vomiting, diophoresis, palpitations. +Occasional cough. Denies runny nose, sore throat, fever, chills, nausea, vomiting, abdominal pain.   Recent PCI with stent in pLAD, and POBA of D1  Some relief after the initial procedure, but still symptomatic.    . Awaiting [facility] today. Continues to have CP with minimal exertion. No SOB.   No events last pm.     PSH: PCI, cholecystectomy, shoulder surgery     Social Hx: Denies x 3    Family Hx: Both parents had heart disease  (2022 00:40)    PAST MEDICAL & SURGICAL HISTORY:  Essential hypertension  Obstructive sleep apnea  Diabetes mellitus  HLD (hyperlipidemia)  CAD (coronary artery disease)  Pancreatitis  History of coronary artery stent placement  Placed 18 and 2022  S/P cholecystectomy    MEDICATIONS  (STANDING):  aspirin enteric coated 81 milliGRAM(s) Oral daily  atorvastatin 40 milliGRAM(s) Oral at bedtime  cholecalciferol 1000 Unit(s) Oral daily  clopidogrel Tablet 75 milliGRAM(s) Oral daily  dextrose 40% Gel 15 Gram(s) Oral once  dextrose 5%. 1000 milliLiter(s) (50 mL/Hr) IV Continuous <Continuous>  dextrose 5%. 1000 milliLiter(s) (100 mL/Hr) IV Continuous <Continuous>  dextrose 50% Injectable 25 Gram(s) IV Push once  dextrose 50% Injectable 12.5 Gram(s) IV Push once  dextrose 50% Injectable 25 Gram(s) IV Push once  fenofibrate Tablet 145 milliGRAM(s) Oral daily  glucagon  Injectable 1 milliGRAM(s) IntraMuscular once  heparin   Injectable 5000 Unit(s) SubCutaneous every 8 hours  insulin glargine SubCutaneous Injection (LANTUS) - Peds 85 Unit(s) SubCutaneous at bedtime  insulin lispro (ADMELOG) corrective regimen sliding scale   SubCutaneous three times a day before meals  insulin lispro (ADMELOG) corrective regimen sliding scale   SubCutaneous at bedtime  isosorbide   mononitrate ER Tablet (IMDUR) 30 milliGRAM(s) Oral daily  metoprolol succinate  milliGRAM(s) Oral daily  multivitamin 1 Tablet(s) Oral daily  sodium chloride 0.9%. 1000 milliLiter(s) (100 mL/Hr) IV Continuous <Continuous>    MEDICATIONS  (PRN):  dicyclomine 20 milliGRAM(s) Oral three times a day before meals PRN IBS    FAMILY HISTORY:  Family history of diabetes mellitus (Sibling)    Vital Signs Last 24 Hrs  T(C): 36.4 (2022 08:25), Max: 37.2 (2022 22:19)  T(F): 97.6 (2022 08:25), Max: 98.9 (2022 22:19)  HR: 76 (2022 08:25) (76 - 80)  BP: 138/76 (2022 08:25) (131/85 - 156/84)  BP(mean): 95 (2022 03:25) (95 - 104)  RR: 18 (2022 08:25) (16 - 18)  SpO2: 99% (2022 08:25) (99% - 100%)    PHYSICAL EXAM-    Constitutional: The patient appears to be normal, well developed, well nourished and alert and oriented to time, place and person. The patient does not appear acutely ill. The patient is alert.   Head: Head is normocephalic and atraumatic.    Neck: The patient's neck is supple without enlargement, has no palpable thyromegaly nor thyroid nodules and has no jugular venous distention. No audible carotid bruits. There are strong carotid pulses bilaterally. No JVD.   Cardiovascular: Regular rate and rhythm without S3, S4. No murmurs or rubs are appreciated.    Respiratory:  The patient has no rales and no rhonchi. The patient has no wheezes.   Abdomen: Soft, nontender, nondistended with positive bowel sounds.    Extremity: No tenderness. There is no pitting edema, skin discoloration, clubbing and cyanosis.     INTERPRETATION OF TELEMETRY: SR    ECG: < from: 12 Lead ECG (22 @ 08:38) >  Normal sinus rhythm  Non-specific intra-ventricular conduction delay  Borderline ECG    LABS:                        10.1   7.94  )-----------( 381      ( 2022 04:35 )             31.9     02    141  |  110<H>  |  30<H>  ----------------------------<  204<H>  4.0   |  27  |  1.46<H>    Ca    8.7      2022 04:35    TPro  6.7  /  Alb  3.1<L>  /  TBili  0.4  /  DBili  x   /  AST  20  /  ALT  40  /  AlkPhos  53  02-07    PT/INR - ( 2022 04:35 )   PT: 12.3 sec;   INR: 1.06 ratio       PTT - ( 2022 04:35 )  PTT:35.4 sec  Urinalysis Basic - ( 2022 03:30 )    Color: Yellow / Appearance: Clear / S.015 / pH: x  Gluc: x / Ketone: Negative  / Bili: Negative / Urobili: Negative   Blood: x / Protein: See Note / Nitrite: Negative   Leuk Esterase: Negative / RBC: x / WBC x   Sq Epi: x / Non Sq Epi: x / Bacteria: x    I&O's Summary    BNP  RADIOLOGY & ADDITIONAL STUDIES: < from: Xray Chest 1 View- PORTABLE-Urgent (22 @ 23:19) >    No acute findings and no change    < end of copied text >  < from: TTE Echo Complete w/o Contrast w/ Doppler (22 @ 12:09) >  Mild concentric left ventricular hypertrophy is present.   Estimated left ventricular ejection fraction is 65-70 %.   A bicuspid aortic valve cannot be excluded.   Significant fibrocalcific changes noted to the aortic valve leaflets with   restriction in leaflet excursion.   Peak and mean transaortic gradients are 34 and 21 mmHg respectively; this   finding is consistent with mild to moderate aortic stenosis.   Mild (1+) eccentric aortic regurgitationis present.   The aortic root is mildly dilated .   Fibrocalcific changes noted to the mitral valve leaflets with preserved   leaflet excursion.   Trace mitral regurgitation is present.   The tricuspid valve leaflets are thin and pliable; valve motion is   normal.   Trace tricuspid valve regurgitation is present.    < end of copied text >  < from: Cardiac Catheterization (22 @ 11:30) >  Angiographic Findings     Cardiac Arteries and Lesion Findings    LAD:     Prox LAD: 90% stenosis reduced to 1%.Pre procedure JESS II flow was   noted.   Good runoff was present.The lesion was diagnosed as a high risk lesion.      < end of copied text >  < from: Cardiac Catheterization (22 @ 11:30) >  Diagnostic Conclusions   Severe ISR in mLAD and severe 90 % stenosis in pLAD     Interventional Conclusions     Successful Coronary Intervention BEATRICE of proximal LAD, POBA of mLAD and   POBA of D1 with 30% residual stenosis    < end of copied text >  
History of Present Illness:   55 y/o M w/ PMH of CAD s/p PCI, HTN, dyslipidemia, DM2, pancreatitis, obstructive sleep apnea, p/w chest pain. Patient had stent placed recently on 1/26/22. Patient states he was CP free after stent placement for a few days. However, CP returned after a few days. CP is substernal, sharp in quality, radiates to neck and occasionally associated with RUE numbness. CP is intermittent, and currently patient is not experiencing any CP. States CP can occur when he is exerting himself or when he is sitting and resting. Denies nausea, vomiting, diophoresis, palpitations. +Occasional cough. Denies runny nose, sore throat, fever, chills, nausea, vomiting, abdominal pain.     2.07: still has chest discomfort with minimal exertion  2.08: +chest pain   2.09: s/p cath, 2stents placed in LAD, no cp      REVIEW OF SYSTEMS:    CONSTITUTIONAL: No weakness, No fevers or chills  ENT: No ear ache, No sorethroat  NECK: No pain, No stiffness  RESPIRATORY: No cough, No wheezing, No hemoptysis; No dyspnea  CARDIOVASCULAR: + chest pain, No palpitations  GASTROINTESTINAL: No abd pain, No nausea, No vomiting, No hematemesis, No diarrhea or constipation. No melena, No hematochezia.  GENITOURINARY: No dysuria, No  hematuria  NEUROLOGICAL: No diplopia, No paresthesia, No motor dysfunction  MUSCULOSKELETAL: No arthralgia, No myalgia  SKIN: No rashes, or lesions   PSYCH: no anxiety, no suicidal ideation    All other review of systems is negative unless indicated above    Vital Signs Last 24 Hrs  T(C): 36.8 (09 Feb 2022 07:58), Max: 36.8 (09 Feb 2022 07:58)  T(F): 98.3 (09 Feb 2022 07:58), Max: 98.3 (09 Feb 2022 07:58)  HR: 74 (09 Feb 2022 11:45) (68 - 76)  BP: 140/55 (09 Feb 2022 11:45) (124/70 - 157/82)  BP(mean): --  RR: 16 (09 Feb 2022 11:45) (12 - 18)  SpO2: 98% (09 Feb 2022 11:45) (97% - 99%)    PHYSICAL EXAM:    GENERAL: NAD  HEENT:  NC/AT, EOMI, PERRLA, No scleral icterus, Moist mucous membranes  NECK: Supple, No JVD  CNS:  Alert & Oriented X3, Motor Strength 5/5 B/L upper and lower extremities; DTRs 2+ intact   LUNG: Normal Breath sounds, Clear to auscultation bilaterally, No rales, No rhonchi, No wheezing  HEART: RRR; No murmurs, No rubs  ABDOMEN: +BS, ST/ND/NT  GENITOURINARY: Voiding, Bladder not distended  EXTREMITIES:  2+ Peripheral Pulses, No clubbing, No cyanosis, No tibial edema  MUSCULOSKELTAL: Joints normal ROM, No TTP, No effusion  SKIN: no rashes  RECTAL: deferred, not indicated  BREAST: deferred                          10.1   7.94  )-----------( 381      ( 07 Feb 2022 04:35 )             31.9     02-07    141  |  110<H>  |  30<H>  ----------------------------<  204<H>  4.0   |  27  |  1.46<H>    Ca    8.7      07 Feb 2022 04:35    TPro  6.7  /  Alb  3.1<L>  /  TBili  0.4  /  DBili  x   /  AST  20  /  ALT  40  /  AlkPhos  53  02-07    Vancomycin levels:   Cultures:     MEDICATIONS  (STANDING):  aspirin enteric coated 81 milliGRAM(s) Oral daily  atorvastatin 40 milliGRAM(s) Oral at bedtime  cholecalciferol 1000 Unit(s) Oral daily  clopidogrel Tablet 75 milliGRAM(s) Oral daily  fenofibrate Tablet 145 milliGRAM(s) Oral daily  glucagon  Injectable 1 milliGRAM(s) IntraMuscular once  heparin   Injectable 5000 Unit(s) SubCutaneous every 8 hours  insulin glargine SubCutaneous Injection (LANTUS) - Peds 85 Unit(s) SubCutaneous at bedtime  insulin lispro (ADMELOG) corrective regimen sliding scale   SubCutaneous three times a day before meals  insulin lispro (ADMELOG) corrective regimen sliding scale   SubCutaneous at bedtime  isosorbide   mononitrate ER Tablet (IMDUR) 30 milliGRAM(s) Oral daily  metoprolol succinate  milliGRAM(s) Oral daily  multivitamin 1 Tablet(s) Oral daily  sodium chloride 0.9%. 1000 milliLiter(s) (100 mL/Hr) IV Continuous <Continuous>    MEDICATIONS  (PRN):  dicyclomine 20 milliGRAM(s) Oral three times a day before meals PRN IBS      all labs reviewed  all imaging reviewed          Assessment and Plan:     	  55 y/o M w/ PMH of CAD s/p PCI, HTN, dyslipidemia, DM2, pancreatitis, obstructive sleep apnea, p/w chest pain      *Unstable angina :  s/p previous PCI  -s/p PCI : 2stents placed in LAD  -ASA + Plavix   c/w BBlockers and statins  monitor in CICU     *VIKKI: resolved  -s/p IVF prior to cath  c/w oral hydration     *COVID19 positive on 1/27/22  -No respiratory distress  covid PCR negative x 2; ok to lift isolation     *DM2  -Humalog ISS  reduced Lantus dose prior to NPO; reinstate 85u qHS  -Diabetic diet     *Microcytic anemia  f/u outpatient for further management       *DVT ppx   -Heparin SubQ 
History of Present Illness:   55 y/o M w/ PMH of CAD s/p PCI, HTN, dyslipidemia, DM2, pancreatitis, obstructive sleep apnea, p/w chest pain. Patient had stent placed recently on 1/26/22. Patient states he was CP free after stent placement for a few days. However, CP returned after a few days. CP is substernal, sharp in quality, radiates to neck and occasionally associated with RUE numbness. CP is intermittent, and currently patient is not experiencing any CP. States CP can occur when he is exerting himself or when he is sitting and resting. Denies nausea, vomiting, diophoresis, palpitations. +Occasional cough. Denies runny nose, sore throat, fever, chills, nausea, vomiting, abdominal pain.     2.07: still has chest discomfort with minimal exertion      REVIEW OF SYSTEMS:    CONSTITUTIONAL: No weakness, No fevers or chills  ENT: No ear ache, No sorethroat  NECK: No pain, No stiffness  RESPIRATORY: No cough, No wheezing, No hemoptysis; No dyspnea  CARDIOVASCULAR: + chest pain, No palpitations  GASTROINTESTINAL: No abd pain, No nausea, No vomiting, No hematemesis, No diarrhea or constipation. No melena, No hematochezia.  GENITOURINARY: No dysuria, No  hematuria  NEUROLOGICAL: No diplopia, No paresthesia, No motor dysfunction  MUSCULOSKELETAL: No arthralgia, No myalgia  SKIN: No rashes, or lesions   PSYCH: no anxiety, no suicidal ideation    All other review of systems is negative unless indicated above    Vital Signs Last 24 Hrs  T(C): 36.4 (07 Feb 2022 08:25), Max: 37.2 (06 Feb 2022 22:19)  T(F): 97.6 (07 Feb 2022 08:25), Max: 98.9 (06 Feb 2022 22:19)  HR: 76 (07 Feb 2022 08:25) (76 - 80)  BP: 138/76 (07 Feb 2022 08:25) (131/85 - 156/84)  BP(mean): 95 (07 Feb 2022 03:25) (95 - 104)  RR: 18 (07 Feb 2022 08:25) (16 - 18)  SpO2: 99% (07 Feb 2022 08:25) (99% - 100%)    PHYSICAL EXAM:    GENERAL: NAD  HEENT:  NC/AT, EOMI, PERRLA, No scleral icterus, Moist mucous membranes  NECK: Supple, No JVD  CNS:  Alert & Oriented X3, Motor Strength 5/5 B/L upper and lower extremities; DTRs 2+ intact   LUNG: Normal Breath sounds, Clear to auscultation bilaterally, No rales, No rhonchi, No wheezing  HEART: RRR; No murmurs, No rubs  ABDOMEN: +BS, ST/ND/NT  GENITOURINARY: Voiding, Bladder not distended  EXTREMITIES:  2+ Peripheral Pulses, No clubbing, No cyanosis, No tibial edema  MUSCULOSKELTAL: Joints normal ROM, No TTP, No effusion  SKIN: no rashes  RECTAL: deferred, not indicated  BREAST: deferred                          10.1   7.94  )-----------( 381      ( 07 Feb 2022 04:35 )             31.9     02-07    141  |  110<H>  |  30<H>  ----------------------------<  204<H>  4.0   |  27  |  1.46<H>    Ca    8.7      07 Feb 2022 04:35    TPro  6.7  /  Alb  3.1<L>  /  TBili  0.4  /  DBili  x   /  AST  20  /  ALT  40  /  AlkPhos  53  02-07    Vancomycin levels:   Cultures:     MEDICATIONS  (STANDING):  aspirin enteric coated 81 milliGRAM(s) Oral daily  atorvastatin 40 milliGRAM(s) Oral at bedtime  cholecalciferol 1000 Unit(s) Oral daily  clopidogrel Tablet 75 milliGRAM(s) Oral daily  fenofibrate Tablet 145 milliGRAM(s) Oral daily  glucagon  Injectable 1 milliGRAM(s) IntraMuscular once  heparin   Injectable 5000 Unit(s) SubCutaneous every 8 hours  insulin glargine SubCutaneous Injection (LANTUS) - Peds 85 Unit(s) SubCutaneous at bedtime  insulin lispro (ADMELOG) corrective regimen sliding scale   SubCutaneous three times a day before meals  insulin lispro (ADMELOG) corrective regimen sliding scale   SubCutaneous at bedtime  isosorbide   mononitrate ER Tablet (IMDUR) 30 milliGRAM(s) Oral daily  metoprolol succinate  milliGRAM(s) Oral daily  multivitamin 1 Tablet(s) Oral daily  sodium chloride 0.9%. 1000 milliLiter(s) (100 mL/Hr) IV Continuous <Continuous>    MEDICATIONS  (PRN):  dicyclomine 20 milliGRAM(s) Oral three times a day before meals PRN IBS      all labs reviewed  all imaging reviewed          Assessment and Plan:     	  55 y/o M w/ PMH of CAD s/p PCI, HTN, dyslipidemia, DM2, pancreatitis, obstructive sleep apnea, p/w chest pain      *Chest pain w/ h/o CAD s/p PCI  -ASA + Plavix   -Cardio consult noted: add Imdur and monitor symptoms   c/w BBlockers and statins    *VIKKI: resolved  -IVF and trend creatinine in AM to check for improvement    *COVID19 positive on 1/27/22  -No respiratory distress  -Isolation   -Pulse ox monitoring   repeat PCR    *DM2  -Humalog ISS  -Diabetic diet     *Microcytic anemia  -Trend H/H and if stable -> f/u outpatient for further management     *H/o HTN / dyslipidemia / pancreatitis / RENETTA  -C/w home meds and f/u outpatient for further management if conditions remain stable during hospitalization     *DVT ppx   -Heparin SubQ 
Cardiology and heart failure attending.     HPI: 55 y/o M w/ PMH of CAD s/p PCI, HTN, dyslipidemia, DM2, pancreatitis, obstructive sleep apnea, p/w chest pain. He underwent LHC yesterday.  He denies any CP now.  Sitting and eating breakfast.     PSH: PCI, cholecystectomy, shoulder surgery     Social Hx: Denies x 3    Family Hx: Both parents had heart disease  (07 Feb 2022 00:40)    PAST MEDICAL & SURGICAL HISTORY:  Essential hypertension  Obstructive sleep apnea  Diabetes mellitus  HLD (hyperlipidemia)  CAD (coronary artery disease)  Pancreatitis  History of coronary artery stent placement  Placed 9/12/18 and 2/2022  S/P cholecystectomy    MEDICATIONS  (STANDING):  aspirin enteric coated 81 milliGRAM(s) Oral daily  atorvastatin 40 milliGRAM(s) Oral at bedtime  cholecalciferol 1000 Unit(s) Oral daily  clopidogrel Tablet 75 milliGRAM(s) Oral daily  dextrose 40% Gel 15 Gram(s) Oral once  dextrose 5%. 1000 milliLiter(s) (50 mL/Hr) IV Continuous <Continuous>  dextrose 5%. 1000 milliLiter(s) (100 mL/Hr) IV Continuous <Continuous>  dextrose 50% Injectable 25 Gram(s) IV Push once  dextrose 50% Injectable 12.5 Gram(s) IV Push once  dextrose 50% Injectable 25 Gram(s) IV Push once  fenofibrate Tablet 145 milliGRAM(s) Oral daily  glucagon  Injectable 1 milliGRAM(s) IntraMuscular once  heparin   Injectable 5000 Unit(s) SubCutaneous every 8 hours  insulin glargine SubCutaneous Injection (LANTUS) - Peds 85 Unit(s) SubCutaneous at bedtime  insulin lispro (ADMELOG) corrective regimen sliding scale   SubCutaneous three times a day before meals  insulin lispro (ADMELOG) corrective regimen sliding scale   SubCutaneous at bedtime  isosorbide   mononitrate ER Tablet (IMDUR) 30 milliGRAM(s) Oral daily  metoprolol succinate  milliGRAM(s) Oral daily  multivitamin 1 Tablet(s) Oral daily  sodium chloride 0.9%. 1000 milliLiter(s) (100 mL/Hr) IV Continuous <Continuous>    MEDICATIONS  (PRN):  dicyclomine 20 milliGRAM(s) Oral three times a day before meals PRN IBS    FAMILY HISTORY:  Family history of diabetes mellitus (Sibling)    ICU Vital Signs Last 24 Hrs  T(C): 36.4 (10 Feb 2022 07:29), Max: 36.5 (09 Feb 2022 20:08)  T(F): 97.5 (10 Feb 2022 07:29), Max: 97.7 (09 Feb 2022 20:08)  HR: 69 (10 Feb 2022 07:29) (68 - 94)  BP: 139/72 (10 Feb 2022 07:29) (117/71 - 157/82)  BP(mean): --  ABP: --  ABP(mean): --  RR: 18 (10 Feb 2022 07:29) (12 - 18)  SpO2: 100% (10 Feb 2022 07:29) (97% - 100%)      PHYSICAL EXAM-    Constitutional: obese male in no acute distress   Head: Head is normocephalic and atraumatic.    Neck:  no jugular venous distention. No JVD.   Cardiovascular: Regular rate and rhythm without S3, S4. No murmurs or rubs are appreciated.    Respiratory:  The patient has no rales and no rhonchi. The patient has no wheezes.   Abdomen: Soft, nontender, nondistended with positive bowel sounds.    Extremity: discoloration of the skin of the legs.     INTERPRETATION OF TELEMETRY: SR    ECG: < from: 12 Lead ECG (02.08.22 @ 08:38) >  Normal sinus rhythm  Non-specific intra-ventricular conduction delay  Borderline ECG    LABS:                                   9.0    6.80  )-----------( 322      ( 10 Feb 2022 07:42 )             28.7     02-09    140  |  110<H>  |  24<H>  ----------------------------<  217<H>  4.4   |  27  |  1.29    Ca    8.7      09 Feb 2022 07:11              02-07 Chol 107 LDL -- HDL 17<L> Trig 429<H>, 01-24 Chol 121 LDL -- HDL 25<L> Trig 256<H>      BNP  RADIOLOGY & ADDITIONAL STUDIES: < from: Xray Chest 1 View- PORTABLE-Urgent (02.06.22 @ 23:19) >    No acute findings and no change    < end of copied text >  < from: TTE Echo Complete w/o Contrast w/ Doppler (02.07.22 @ 12:09) >  Mild concentric left ventricular hypertrophy is present.   Estimated left ventricular ejection fraction is 65-70 %.   A bicuspid aortic valve cannot be excluded.   Significant fibrocalcific changes noted to the aortic valve leaflets with   restriction in leaflet excursion.   Peak and mean transaortic gradients are 34 and 21 mmHg respectively; this   finding is consistent with mild to moderate aortic stenosis.   Mild (1+) eccentric aortic regurgitationis present.   The aortic root is mildly dilated .   Fibrocalcific changes noted to the mitral valve leaflets with preserved   leaflet excursion.   Trace mitral regurgitation is present.   The tricuspid valve leaflets are thin and pliable; valve motion is   normal.   Trace tricuspid valve regurgitation is present.    < end of copied text >  < from: Cardiac Catheterization (01.26.22 @ 11:30) >  Angiographic Findings     Cardiac Arteries and Lesion Findings    LAD:     Prox LAD: 90% stenosis reduced to 1%.Pre procedure JESS II flow was   noted.   Good runoff was present.The lesion was diagnosed as a high risk lesion.      < end of copied text >  < from: Cardiac Catheterization (01.26.22 @ 11:30) >  Diagnostic Conclusions   Severe ISR in mLAD and severe 90 % stenosis in pLAD     Interventional Conclusions     Successful Coronary Intervention BEATRICE of proximal LAD, POBA of mLAD and   POBA of D1 with 30% residual stenosis    < end of copied text >  
History of Present Illness:   55 y/o M w/ PMH of CAD s/p PCI, HTN, dyslipidemia, DM2, pancreatitis, obstructive sleep apnea, p/w chest pain. Patient had stent placed recently on 1/26/22. Patient states he was CP free after stent placement for a few days. However, CP returned after a few days. CP is substernal, sharp in quality, radiates to neck and occasionally associated with RUE numbness. CP is intermittent, and currently patient is not experiencing any CP. States CP can occur when he is exerting himself or when he is sitting and resting. Denies nausea, vomiting, diophoresis, palpitations. +Occasional cough. Denies runny nose, sore throat, fever, chills, nausea, vomiting, abdominal pain.     2.07: still has chest discomfort with minimal exertion  2.08: +chest pain       REVIEW OF SYSTEMS:    CONSTITUTIONAL: No weakness, No fevers or chills  ENT: No ear ache, No sorethroat  NECK: No pain, No stiffness  RESPIRATORY: No cough, No wheezing, No hemoptysis; No dyspnea  CARDIOVASCULAR: + chest pain, No palpitations  GASTROINTESTINAL: No abd pain, No nausea, No vomiting, No hematemesis, No diarrhea or constipation. No melena, No hematochezia.  GENITOURINARY: No dysuria, No  hematuria  NEUROLOGICAL: No diplopia, No paresthesia, No motor dysfunction  MUSCULOSKELETAL: No arthralgia, No myalgia  SKIN: No rashes, or lesions   PSYCH: no anxiety, no suicidal ideation    All other review of systems is negative unless indicated above    Vital Signs Last 24 Hrs  T(C): 36.4 (08 Feb 2022 08:54), Max: 36.7 (07 Feb 2022 20:10)  T(F): 97.6 (08 Feb 2022 08:54), Max: 98.1 (07 Feb 2022 20:10)  HR: 71 (08 Feb 2022 08:54) (71 - 78)  BP: 144/70 (08 Feb 2022 08:54) (122/69 - 144/70)  BP(mean): 81 (07 Feb 2022 20:10) (81 - 81)  RR: 18 (08 Feb 2022 08:54) (18 - 18)  SpO2: 98% (08 Feb 2022 08:54) (98% - 98%)    PHYSICAL EXAM:    GENERAL: NAD  HEENT:  NC/AT, EOMI, PERRLA, No scleral icterus, Moist mucous membranes  NECK: Supple, No JVD  CNS:  Alert & Oriented X3, Motor Strength 5/5 B/L upper and lower extremities; DTRs 2+ intact   LUNG: Normal Breath sounds, Clear to auscultation bilaterally, No rales, No rhonchi, No wheezing  HEART: RRR; No murmurs, No rubs  ABDOMEN: +BS, ST/ND/NT  GENITOURINARY: Voiding, Bladder not distended  EXTREMITIES:  2+ Peripheral Pulses, No clubbing, No cyanosis, No tibial edema  MUSCULOSKELTAL: Joints normal ROM, No TTP, No effusion  SKIN: no rashes  RECTAL: deferred, not indicated  BREAST: deferred                          10.1   7.94  )-----------( 381      ( 07 Feb 2022 04:35 )             31.9     02-07    141  |  110<H>  |  30<H>  ----------------------------<  204<H>  4.0   |  27  |  1.46<H>    Ca    8.7      07 Feb 2022 04:35    TPro  6.7  /  Alb  3.1<L>  /  TBili  0.4  /  DBili  x   /  AST  20  /  ALT  40  /  AlkPhos  53  02-07    Vancomycin levels:   Cultures:     MEDICATIONS  (STANDING):  aspirin enteric coated 81 milliGRAM(s) Oral daily  atorvastatin 40 milliGRAM(s) Oral at bedtime  cholecalciferol 1000 Unit(s) Oral daily  clopidogrel Tablet 75 milliGRAM(s) Oral daily  fenofibrate Tablet 145 milliGRAM(s) Oral daily  glucagon  Injectable 1 milliGRAM(s) IntraMuscular once  heparin   Injectable 5000 Unit(s) SubCutaneous every 8 hours  insulin glargine SubCutaneous Injection (LANTUS) - Peds 85 Unit(s) SubCutaneous at bedtime  insulin lispro (ADMELOG) corrective regimen sliding scale   SubCutaneous three times a day before meals  insulin lispro (ADMELOG) corrective regimen sliding scale   SubCutaneous at bedtime  isosorbide   mononitrate ER Tablet (IMDUR) 30 milliGRAM(s) Oral daily  metoprolol succinate  milliGRAM(s) Oral daily  multivitamin 1 Tablet(s) Oral daily  sodium chloride 0.9%. 1000 milliLiter(s) (100 mL/Hr) IV Continuous <Continuous>    MEDICATIONS  (PRN):  dicyclomine 20 milliGRAM(s) Oral three times a day before meals PRN IBS      all labs reviewed  all imaging reviewed          Assessment and Plan:     	  55 y/o M w/ PMH of CAD s/p PCI, HTN, dyslipidemia, DM2, pancreatitis, obstructive sleep apnea, p/w chest pain      *Chest pain w/ h/o CAD s/p PCI: r/o UA  -ASA + Plavix   -Cardio consult noted: no response to Imdur, will need angiogram   c/w BBlockers and statins    *VIKKI: resolved  -c/w IVF prior to cath    *COVID19 positive on 1/27/22  -No respiratory distress  -Isolation   -Pulse ox monitoring   repeat PCR    *DM2  -Humalog ISS  reduce Lantus dose prior to NPO  -Diabetic diet     *Microcytic anemia  -Trend H/H and if stable -> f/u outpatient for further management     *H/o HTN / dyslipidemia / pancreatitis / RENETTA  -C/w home meds and f/u outpatient for further management if conditions remain stable during hospitalization     *DVT ppx   -Heparin SubQ 
Patient is a 56y old  Male who presents with a chief complaint of chest pain (2022 13:07)    2/8- more chest pain last night associated with diaphoresis    MEDICATIONS  (STANDING):  aspirin enteric coated 81 milliGRAM(s) Oral daily  atorvastatin 40 milliGRAM(s) Oral at bedtime  cholecalciferol 1000 Unit(s) Oral daily  clopidogrel Tablet 75 milliGRAM(s) Oral daily  dextrose 40% Gel 15 Gram(s) Oral once  dextrose 5%. 1000 milliLiter(s) (50 mL/Hr) IV Continuous <Continuous>  dextrose 5%. 1000 milliLiter(s) (100 mL/Hr) IV Continuous <Continuous>  dextrose 50% Injectable 25 Gram(s) IV Push once  dextrose 50% Injectable 12.5 Gram(s) IV Push once  dextrose 50% Injectable 25 Gram(s) IV Push once  fenofibrate Tablet 145 milliGRAM(s) Oral daily  glucagon  Injectable 1 milliGRAM(s) IntraMuscular once  heparin   Injectable 5000 Unit(s) SubCutaneous every 8 hours  insulin glargine SubCutaneous Injection (LANTUS) - Peds 85 Unit(s) SubCutaneous at bedtime  insulin lispro (ADMELOG) corrective regimen sliding scale   SubCutaneous three times a day before meals  insulin lispro (ADMELOG) corrective regimen sliding scale   SubCutaneous at bedtime  isosorbide   mononitrate ER Tablet (IMDUR) 30 milliGRAM(s) Oral daily  metoprolol succinate  milliGRAM(s) Oral daily  multivitamin 1 Tablet(s) Oral daily  sodium chloride 0.9%. 1000 milliLiter(s) (100 mL/Hr) IV Continuous <Continuous>    MEDICATIONS  (PRN):  acetaminophen     Tablet .. 650 milliGRAM(s) Oral every 6 hours PRN Mild Pain (1 - 3), Moderate Pain (4 - 6)  dicyclomine 20 milliGRAM(s) Oral three times a day before meals PRN IBS            Vital Signs Last 24 Hrs  T(C): 36.7 (2022 20:10), Max: 36.7 (2022 20:10)  T(F): 98.1 (2022 20:10), Max: 98.1 (2022 20:10)  HR: 78 (2022 20:10) (76 - 78)  BP: 122/69 (2022 20:10) (122/69 - 138/76)  BP(mean): 81 (2022 20:10) (81 - 81)  RR: 18 (2022 08:25) (18 - 18)  SpO2: 98% (2022 20:10) (98% - 99%)            INTERPRETATION OF TELEMETRY:    ECG:        LABS:                        10.1   7.94  )-----------( 381      ( 2022 04:35 )             31.9     02-07    141  |  110<H>  |  30<H>  ----------------------------<  204<H>  4.0   |  27  |  1.46<H>    Ca    8.7      2022 04:35    TPro  6.7  /  Alb  3.1<L>  /  TBili  0.4  /  DBili  x   /  AST  20  /  ALT  40  /  AlkPhos  53  02-07        PT/INR - ( 2022 04:35 )   PT: 12.3 sec;   INR: 1.06 ratio         PTT - ( 2022 04:35 )  PTT:35.4 sec  Urinalysis Basic - ( 2022 03:30 )    Color: Yellow / Appearance: Clear / S.015 / pH: x  Gluc: x / Ketone: Negative  / Bili: Negative / Urobili: Negative   Blood: x / Protein: See Note / Nitrite: Negative   Leuk Esterase: Negative / RBC: x / WBC x   Sq Epi: x / Non Sq Epi: x / Bacteria: x      I&O's Summary    2022 07:01  -  2022 07:00  --------------------------------------------------------  IN: 500 mL / OUT: 0 mL / NET: 500 mL      BNP  RADIOLOGY & ADDITIONAL STUDIES:

## 2022-02-10 NOTE — DISCHARGE NOTE PROVIDER - HOSPITAL COURSE
FROM H&P:    "55 y/o M w/ PMH of CAD s/p PCI, HTN, dyslipidemia, DM2, pancreatitis, obstructive sleep apnea, p/w chest pain. Patient had stent placed recently on 1/26/22. Patient states he was CP free after stent placement for a few days. However, CP returned after a few days. CP is substernal, sharp in quality, radiates to neck and occasionally associated with RUE numbness. CP is intermittent, and currently patient is not experiencing any CP. States CP can occur when he is exerting himself or when he is sitting and resting. Denies nausea, vomiting, diophoresis, palpitations. +Occasional cough. Denies runny nose, sore throat, fever, chills, nausea, vomiting, abdominal pain."    *Unstable angina :  s/p previous PCI  -s/p PCI (2/9) : 2stents placed in LAD  -ASA + Plavix   c/w BBlockers and statins      *VIKKI: resolved  IVF administered  c/w oral hydration     *COVID19 positive on 1/27/22  -No respiratory distress  covid PCR negative x 2; ok to lift isolation     *DM2  -Humalog ISS  reduced Lantus dose prior to NPO; reinstate 85u qHS  -Diabetic diet     *Microcytic anemia  f/u outpatient for further management    Asymptomatic and cleared by Cardiology. Discharge home in stable condition and close outpatient follow up with Cardiology.   PHYSICAL EXAM:    T(C): 36.4 (02-10-22 @ 07:29), Max: 36.5 (02-09-22 @ 20:08)  HR: 69 (02-10-22 @ 07:29) (69 - 94)  BP: 139/72 (02-10-22 @ 07:29) (117/71 - 144/77)  RR: 18 (02-10-22 @ 07:29) (16 - 18)  SpO2: 100% (02-10-22 @ 07:29) (97% - 100%)    General: AAOx3; NAD; Obese  Head: AT/NC  ENT: Moist Mucous Membranes; No Injury  Eyes: EOMI; PERRL  Neck: Non-tender; No JVD  CVS: RRR, S1&S2, No murmur, LE edema  Respiratory: Lungs CTA B/L; Normal Respiratory Effort  Abdomen/GI: Soft, non-tender, non-distended, no guarding, no rebound, normal bowel sounds  : No bladder distention, No Aaron  Extremities: No cyanosis, No clubbing,  MSK: No CVA tenderness, Normal ROM, No injury  Neuro: AAOx3, CNII-XII grossly intact, non-focal  Psych: Appropriate, Cooperative, No depression, No anxiety  Skin: Clean, Dry and Intact  Discharge management: 35 minutes  Date of Discharge/Service: 2/10/2022

## 2022-02-10 NOTE — DISCHARGE NOTE NURSING/CASE MANAGEMENT/SOCIAL WORK - NSDCPEFALRISK_GEN_ALL_CORE
For information on Fall & Injury Prevention, visit: [email or web address] OR  [email or web address] OR  https://www.cdc.gov/steadi/patient.html

## 2022-02-10 NOTE — DISCHARGE NOTE PROVIDER - CARE PROVIDER_API CALL
Ron Rogel)  Family Medicine  [address], [address]  [place] [address]  Phone: [phone]  Fax: [phone]  Established Patient  Follow Up Time: 1 week

## 2022-02-13 PROBLEM — K92.2 UPPER GASTROINTESTINAL BLEEDING: Status: ACTIVE | Noted: 2022-01-07

## 2022-02-14 ENCOUNTER — NON-APPOINTMENT (OUTPATIENT)
Age: 57
End: 2022-02-14

## 2022-02-16 ENCOUNTER — APPOINTMENT (OUTPATIENT)
Dept: FAMILY MEDICINE | Facility: CLINIC | Age: 57
End: 2022-02-16
Payer: COMMERCIAL

## 2022-02-16 VITALS
SYSTOLIC BLOOD PRESSURE: 118 MMHG | DIASTOLIC BLOOD PRESSURE: 58 MMHG | HEIGHT: 73 IN | OXYGEN SATURATION: 98 % | HEART RATE: 81 BPM | WEIGHT: 252 LBS | BODY MASS INDEX: 33.4 KG/M2 | TEMPERATURE: 97.8 F

## 2022-02-16 DIAGNOSIS — K92.2 GASTROINTESTINAL HEMORRHAGE, UNSPECIFIED: ICD-10-CM

## 2022-02-16 PROCEDURE — 99214 OFFICE O/P EST MOD 30 MIN: CPT

## 2022-02-16 RX ORDER — METOPROLOL SUCCINATE 25 MG/1
25 TABLET, EXTENDED RELEASE ORAL
Qty: 90 | Refills: 0 | Status: DISCONTINUED | COMMUNITY
Start: 2022-01-15 | End: 2022-02-16

## 2022-02-16 RX ORDER — ATORVASTATIN CALCIUM 40 MG/1
40 TABLET, FILM COATED ORAL DAILY
Refills: 0 | Status: DISCONTINUED | COMMUNITY
Start: 2021-06-03 | End: 2022-02-16

## 2022-02-16 NOTE — ASSESSMENT
[FreeTextEntry1] : He has been hospitalized 3 times in the past 2 months, once for a GI bleed and twice for cardiac stents. He is up to date on follow up with cardiology and is starting cardiac rehab next week. He is scheduled for a  capsule endoscopy next week since his EGD and colonoscopy were negative for any source of bleeding.

## 2022-02-16 NOTE — HISTORY OF PRESENT ILLNESS
[FreeTextEntry1] : JAMES GONZALEZ is a 56 year old male here for a follow up visit.  [de-identified] : He is here for hospital follow up. This was his second hospitalization in a month. He had been hospitalized from 1/1/22 to 1/3/22 for a GI bleed and acute renal failure due to NSAID use for a shoulder injury. He was discharged home with the plan for an outpatient EGD by Dr. Cooper.\par \par He was hospitalized again on 1/23/22 with chest pain and epigastric pain. He had not been feeling well since discharge after his previous hospitalization and had developed chest pain radiating to his jaw. He was found to be guaiac positive in the ER.  He was anemic during his hospitalization but an EGD and colonoscopy showed no active bleeding. He tested positive for COVID but was asymptomatic. He had a nuclear stress test which was negative but he continued to have chest pain so he had a cardiac cath which showed severe proximal LAD stenosis. He had 2 stents placed in the proximal LAD. He had 3 previous stents as well.\par \par He was admitted to the hospital again on 2/7 with chest pain. He had 2 more stents placed in the LAD on 2/9 and was discharged on 2/10.  His chest pain has improved but he still gets short of breath. He is starting cardiac rehab on Monday.

## 2022-02-16 NOTE — PLAN
[FreeTextEntry1] : He will continue all medications as prescribed, follow up with GI and cardiology as scheduled, and return for routine follow up.

## 2022-02-24 ENCOUNTER — FORM ENCOUNTER (OUTPATIENT)
Age: 57
End: 2022-02-24

## 2022-03-13 NOTE — DISCHARGE NOTE PROVIDER - NSDCQMAMI_CARD_ALL_CORE
No
Patient AOx3, calm, cooperative, reports headache, general weakness and tremors. States he drinks wine everyday and has been feeling very weak.

## 2022-04-15 ENCOUNTER — NON-APPOINTMENT (OUTPATIENT)
Age: 57
End: 2022-04-15

## 2022-04-20 ENCOUNTER — LABORATORY RESULT (OUTPATIENT)
Age: 57
End: 2022-04-20

## 2022-04-20 ENCOUNTER — APPOINTMENT (OUTPATIENT)
Dept: FAMILY MEDICINE | Facility: CLINIC | Age: 57
End: 2022-04-20
Payer: COMMERCIAL

## 2022-04-20 VITALS
HEIGHT: 72 IN | TEMPERATURE: 97.4 F | DIASTOLIC BLOOD PRESSURE: 78 MMHG | SYSTOLIC BLOOD PRESSURE: 152 MMHG | WEIGHT: 250 LBS | OXYGEN SATURATION: 97 % | HEART RATE: 91 BPM | BODY MASS INDEX: 33.86 KG/M2

## 2022-04-20 VITALS — DIASTOLIC BLOOD PRESSURE: 80 MMHG | SYSTOLIC BLOOD PRESSURE: 130 MMHG

## 2022-04-20 DIAGNOSIS — Z23 ENCOUNTER FOR IMMUNIZATION: ICD-10-CM

## 2022-04-20 PROCEDURE — 99396 PREV VISIT EST AGE 40-64: CPT | Mod: 25

## 2022-04-20 PROCEDURE — 36415 COLL VENOUS BLD VENIPUNCTURE: CPT

## 2022-04-20 PROCEDURE — 90750 HZV VACC RECOMBINANT IM: CPT | Mod: GY

## 2022-04-20 PROCEDURE — 90471 IMMUNIZATION ADMIN: CPT

## 2022-04-20 NOTE — ASSESSMENT
[FreeTextEntry1] : JAMES GONZALEZ is a 56 year old male here for a physical exam. \par \par He has a history of coronary artery disease, diabetes, hypertension, and hyperlipidemia. He had an episode of GI bleeding in 1/2022 resulting in hospitalization. He was also hospitalized in 1/2022 with chest pain. A cardiac cath showed severe proximal LAD stenosis and he had 2 stents placed. He returned to the hospital 2 weeks later and had 2 more stents placed. He was referred to cardiac rehab after this. He has been doing cardiac rehab for a month and will do this for another 8 weeks.\par \par He had a capsule endoscopy with Dr. Cooper which was reportedly negaive.\par \par His diabetes has not been well controlled on his home glucose monitoring. He feels that his blood sugar was improved when he was on Lantus in the hospital and would like to switch from Toujeo back to Lantus. He will discuss this with Dr. López when she returns from maternity leave. He requests today's labs be faxed to Dr. López's office.

## 2022-04-20 NOTE — PLAN
[FreeTextEntry1] : Continue all medications as prescribed. Check labs as above. Will adjust any medications based upon lab results.\par \par Reviewed age-appropriate preventive screening tests with patient. He is due for Shingrix and agreed to this vaccine today.\par \par Discussed clean eating (e.g. Mediterranean style diet) and recommendations for regular exercise/staying as physically active as possible.\par \par Reviewed importance of good self care (e.g. meditation, yoga, adequate rest, regular exercise, magnesium, clean eating, etc.).\par \par Follow up for next physical in one year.

## 2022-04-20 NOTE — HISTORY OF PRESENT ILLNESS
[FreeTextEntry1] : JAMES GONZALEZ is a 56 year old male here for a physical exam.  [de-identified] : His last physical exam was 10/2020\par \par Vaccines:\par Tetanus is up to date\par Pneumococcal vaccination is up to date\par Shingrix: never\par COVID vaccine is up to date\par \par His last dentist visit was less than one year ago\par His last eye doctor appointment was less than one year ago\par His last dermatologist visit was less than one year ago\par \par Colon cancer screening is up to date (colonoscopy 2/25/20, repeat 5 years)\par \par His diet is healthy overall\par Exercise: cardiac rehab

## 2022-04-20 NOTE — PHYSICAL EXAM
[No Carotid Bruits] : no carotid bruits [Soft] : abdomen soft [Non Tender] : non-tender [No Masses] : no abdominal mass palpated [Normal] : affect was normal and insight and judgment were intact [de-identified] : 2/6 systolic murmur at LUSB

## 2022-04-21 LAB
ALBUMIN SERPL ELPH-MCNC: 4.5 G/DL
ALP BLD-CCNC: 93 U/L
ALT SERPL-CCNC: 31 U/L
ANION GAP SERPL CALC-SCNC: 14 MMOL/L
AST SERPL-CCNC: 23 U/L
BASOPHILS # BLD AUTO: 0.11 K/UL
BASOPHILS NFR BLD AUTO: 1.3 %
BILIRUB SERPL-MCNC: 0.3 MG/DL
BUN SERPL-MCNC: 28 MG/DL
CALCIUM SERPL-MCNC: 9.5 MG/DL
CHLORIDE SERPL-SCNC: 99 MMOL/L
CHOLEST SERPL-MCNC: 181 MG/DL
CO2 SERPL-SCNC: 23 MMOL/L
CREAT SERPL-MCNC: 1.76 MG/DL
CREAT SPEC-SCNC: 41 MG/DL
EGFR: 45 ML/MIN/1.73M2
EOSINOPHIL # BLD AUTO: 0.24 K/UL
EOSINOPHIL NFR BLD AUTO: 2.8 %
ESTIMATED AVERAGE GLUCOSE: 272 MG/DL
GLUCOSE SERPL-MCNC: 385 MG/DL
HBA1C MFR BLD HPLC: 11.1 %
HCT VFR BLD CALC: 39.5 %
HDLC SERPL-MCNC: 22 MG/DL
HGB BLD-MCNC: 11.8 G/DL
IMM GRANULOCYTES NFR BLD AUTO: 1.2 %
LDLC SERPL CALC-MCNC: NORMAL MG/DL
LYMPHOCYTES # BLD AUTO: 1.72 K/UL
LYMPHOCYTES NFR BLD AUTO: 20.2 %
MAN DIFF?: NORMAL
MCHC RBC-ENTMCNC: 23.1 PG
MCHC RBC-ENTMCNC: 29.9 GM/DL
MCV RBC AUTO: 77.3 FL
MICROALBUMIN 24H UR DL<=1MG/L-MCNC: 30.2 MG/DL
MICROALBUMIN/CREAT 24H UR-RTO: 729 MG/G
MONOCYTES # BLD AUTO: 0.85 K/UL
MONOCYTES NFR BLD AUTO: 10 %
NEUTROPHILS # BLD AUTO: 5.51 K/UL
NEUTROPHILS NFR BLD AUTO: 64.5 %
NONHDLC SERPL-MCNC: 160 MG/DL
PLATELET # BLD AUTO: 344 K/UL
POTASSIUM SERPL-SCNC: 5 MMOL/L
PROT SERPL-MCNC: 7.1 G/DL
PSA SERPL-MCNC: 0.57 NG/ML
RBC # BLD: 5.11 M/UL
RBC # FLD: 16.9 %
SODIUM SERPL-SCNC: 136 MMOL/L
TRIGL SERPL-MCNC: 875 MG/DL
WBC # FLD AUTO: 8.53 K/UL

## 2022-04-25 ENCOUNTER — NON-APPOINTMENT (OUTPATIENT)
Age: 57
End: 2022-04-25

## 2022-04-26 ENCOUNTER — NON-APPOINTMENT (OUTPATIENT)
Age: 57
End: 2022-04-26

## 2022-07-11 NOTE — PATIENT PROFILE ADULT - HAS THE PATIENT BEEN ADMITTED FROM SHORT TERM REHAB?
"[id number] ([facility])  sent Rx request for the following:      Requested Prescriptions   Pending Prescriptions Disp Refills     MONO-LINYAH 0.25-35 MG-MCG tablet [Pharmacy Med Name: MONO-LINYAH 0.25MG-0.035MG TAB] 84 tablet 0     Sig: TAKE 1 TABLET BY MOUTH EVERY DAY *DUE FOR ANNUAL VISIT WITH PCP FOR FUTURE REFILLS*       Contraceptives Protocol Passed - 7/11/2022  8:03 AM        Passed - Patient is not a current smoker if age is 35 or older        Passed - Recent (12 mo) or future (30 days) visit within the authorizing provider's specialty     Patient has had an office visit with the authorizing provider or a provider within the authorizing providers department within the previous 12 mos or has a future within next 30 days. See \"Patient Info\" tab in inbasket, or \"Choose Columns\" in Meds & Orders section of the refill encounter.         Passed - Medication is active on med list        Passed - No active pregnancy on record        Passed - No positive pregnancy test in past 12 months     Last Prescription Date:   05/09/22  Last Fill Qty/Refills:         84, R-0  Last Office Visit:              07/06/22   Future Office visit:           None    Nuzhat Barros RN .............. 7/11/2022  8:19 AM      " no

## 2022-07-21 ENCOUNTER — APPOINTMENT (OUTPATIENT)
Dept: FAMILY MEDICINE | Facility: CLINIC | Age: 57
End: 2022-07-21

## 2022-07-21 VITALS
DIASTOLIC BLOOD PRESSURE: 72 MMHG | OXYGEN SATURATION: 95 % | HEART RATE: 72 BPM | SYSTOLIC BLOOD PRESSURE: 132 MMHG | HEIGHT: 72 IN | BODY MASS INDEX: 35.21 KG/M2 | TEMPERATURE: 97 F | WEIGHT: 260 LBS

## 2022-07-21 PROCEDURE — 99214 OFFICE O/P EST MOD 30 MIN: CPT | Mod: 25

## 2022-07-21 PROCEDURE — 90750 HZV VACC RECOMBINANT IM: CPT | Mod: GY

## 2022-07-21 PROCEDURE — 90471 IMMUNIZATION ADMIN: CPT

## 2022-07-21 RX ORDER — FLASH GLUCOSE SENSOR
KIT MISCELLANEOUS
Qty: 6 | Refills: 0 | Status: ACTIVE | COMMUNITY
Start: 2021-11-10

## 2022-07-21 RX ORDER — INSULIN LISPRO 100 [IU]/ML
100 INJECTION, SOLUTION INTRAVENOUS; SUBCUTANEOUS
Refills: 0 | Status: DISCONTINUED | COMMUNITY
End: 2022-07-21

## 2022-07-21 RX ORDER — ASPIRIN ENTERIC COATED TABLETS 81 MG 81 MG/1
81 TABLET, DELAYED RELEASE ORAL
Qty: 90 | Refills: 3 | Status: DISCONTINUED | COMMUNITY
Start: 2021-07-07 | End: 2022-07-21

## 2022-07-21 NOTE — PHYSICAL EXAM
[No Carotid Bruits] : no carotid bruits [Soft] : abdomen soft [Non Tender] : non-tender [No Masses] : no abdominal mass palpated [No Focal Deficits] : no focal deficits [Normal] : affect was normal and insight and judgment were intact [de-identified] : 2/6 systolic murmur at LUSB

## 2022-07-21 NOTE — ASSESSMENT
[FreeTextEntry1] : He is here to follow up on coronary artery disease, diabetes, hypertension, and hyperlipidemia. He has gained weight since his last visit but his blood pressure is stable. Unfortunately his diabetes is not well controlled. His HgbA1c was elevated at 12.4. The endocrinologist told him to increase his Toujeo and Lispro doses and repeat his labs in 3 months. His triglycerides were reportedly very high as well and he was told to discuss this with his cardiologist.

## 2022-07-21 NOTE — PLAN
[FreeTextEntry1] : Continue all medications as prescribed. Will request results of labs done by endocrinology.\par \par Reviewed age-appropriate preventive screening tests with patient.\par \par Discussed clean eating (e.g. Mediterranean style diet) and recommendations for regular exercise/staying as physically active as possible.\par \par Reviewed importance of good self care (e.g. meditation, yoga, adequate rest, regular exercise, magnesium, clean eating, etc.).\par

## 2022-07-21 NOTE — HISTORY OF PRESENT ILLNESS
[FreeTextEntry1] : JAMES GONZALEZ is a 56 year old male here for a physical exam.  [de-identified] : He has a history of coronary artery disease, diabetes, hypertension, and hyperlipidemia and is here for 3 month follow up. He had poorly controlled diabetes, anemia, and worsened renal failure at his physical in April. He was referred to an endocrinologist for management of his diabetes. He had been seeing Dr. López but she retired. He states he is now seeing another doctor at [facility] and had a recent visit there.

## 2022-08-02 ENCOUNTER — INPATIENT (INPATIENT)
Facility: HOSPITAL | Age: 57
LOS: 1 days | Discharge: HOME | DRG: 313 | End: 2022-08-04
Attending: FAMILY MEDICINE | Admitting: HOSPITALIST
Payer: COMMERCIAL

## 2022-08-02 ENCOUNTER — APPOINTMENT (OUTPATIENT)
Dept: FAMILY MEDICINE | Facility: CLINIC | Age: 57
End: 2022-08-02

## 2022-08-02 ENCOUNTER — NON-APPOINTMENT (OUTPATIENT)
Age: 57
End: 2022-08-02

## 2022-08-02 VITALS
BODY MASS INDEX: 35.21 KG/M2 | HEART RATE: 78 BPM | DIASTOLIC BLOOD PRESSURE: 78 MMHG | SYSTOLIC BLOOD PRESSURE: 132 MMHG | HEIGHT: 72 IN | WEIGHT: 260 LBS | TEMPERATURE: 97 F | OXYGEN SATURATION: 97 %

## 2022-08-02 VITALS
HEART RATE: 78 BPM | TEMPERATURE: 99 F | RESPIRATION RATE: 18 BRPM | OXYGEN SATURATION: 95 % | DIASTOLIC BLOOD PRESSURE: 75 MMHG | SYSTOLIC BLOOD PRESSURE: 139 MMHG

## 2022-08-02 DIAGNOSIS — Z95.5 PRESENCE OF CORONARY ANGIOPLASTY IMPLANT AND GRAFT: Chronic | ICD-10-CM

## 2022-08-02 DIAGNOSIS — Z90.49 ACQUIRED ABSENCE OF OTHER SPECIFIED PARTS OF DIGESTIVE TRACT: Chronic | ICD-10-CM

## 2022-08-02 DIAGNOSIS — R07.9 CHEST PAIN, UNSPECIFIED: ICD-10-CM

## 2022-08-02 DIAGNOSIS — R10.13 EPIGASTRIC PAIN: ICD-10-CM

## 2022-08-02 LAB
ALBUMIN SERPL ELPH-MCNC: 3.5 G/DL — SIGNIFICANT CHANGE UP (ref 3.3–5)
ALP SERPL-CCNC: 94 U/L — SIGNIFICANT CHANGE UP (ref 40–120)
ALT FLD-CCNC: 53 U/L — SIGNIFICANT CHANGE UP (ref 12–78)
ANION GAP SERPL CALC-SCNC: 8 MMOL/L — SIGNIFICANT CHANGE UP (ref 5–17)
APPEARANCE UR: CLEAR — SIGNIFICANT CHANGE UP
AST SERPL-CCNC: 29 U/L — SIGNIFICANT CHANGE UP (ref 15–37)
BASOPHILS # BLD AUTO: 0.1 K/UL — SIGNIFICANT CHANGE UP (ref 0–0.2)
BASOPHILS NFR BLD AUTO: 1 % — SIGNIFICANT CHANGE UP (ref 0–2)
BILIRUB SERPL-MCNC: 0.4 MG/DL — SIGNIFICANT CHANGE UP (ref 0.2–1.2)
BILIRUB UR-MCNC: NEGATIVE — SIGNIFICANT CHANGE UP
BUN SERPL-MCNC: 38 MG/DL — HIGH (ref 7–23)
CALCIUM SERPL-MCNC: 10.2 MG/DL — HIGH (ref 8.5–10.1)
CHLORIDE SERPL-SCNC: 103 MMOL/L — SIGNIFICANT CHANGE UP (ref 96–108)
CO2 SERPL-SCNC: 24 MMOL/L — SIGNIFICANT CHANGE UP (ref 22–31)
COLOR SPEC: YELLOW — SIGNIFICANT CHANGE UP
CREAT SERPL-MCNC: 1.56 MG/DL — HIGH (ref 0.5–1.3)
DIFF PNL FLD: NEGATIVE — SIGNIFICANT CHANGE UP
EGFR: 52 ML/MIN/1.73M2 — LOW
EOSINOPHIL # BLD AUTO: 0.25 K/UL — SIGNIFICANT CHANGE UP (ref 0–0.5)
EOSINOPHIL NFR BLD AUTO: 2.5 % — SIGNIFICANT CHANGE UP (ref 0–6)
FLUAV AG NPH QL: SIGNIFICANT CHANGE UP
FLUBV AG NPH QL: SIGNIFICANT CHANGE UP
GLUCOSE SERPL-MCNC: 195 MG/DL — HIGH (ref 70–99)
GLUCOSE UR QL: 1000 MG/DL
HCT VFR BLD CALC: 48.9 % — SIGNIFICANT CHANGE UP (ref 39–50)
HGB BLD-MCNC: 16.9 G/DL — SIGNIFICANT CHANGE UP (ref 13–17)
IMM GRANULOCYTES NFR BLD AUTO: 1.5 % — SIGNIFICANT CHANGE UP (ref 0–1.5)
KETONES UR-MCNC: NEGATIVE — SIGNIFICANT CHANGE UP
LACTATE SERPL-SCNC: 0.4 MMOL/L — LOW (ref 0.7–2)
LEUKOCYTE ESTERASE UR-ACNC: NEGATIVE — SIGNIFICANT CHANGE UP
LIDOCAIN IGE QN: 304 U/L — SIGNIFICANT CHANGE UP (ref 73–393)
LYMPHOCYTES # BLD AUTO: 2.08 K/UL — SIGNIFICANT CHANGE UP (ref 1–3.3)
LYMPHOCYTES # BLD AUTO: 20.5 % — SIGNIFICANT CHANGE UP (ref 13–44)
MAGNESIUM SERPL-MCNC: 2 MG/DL — SIGNIFICANT CHANGE UP (ref 1.6–2.6)
MCHC RBC-ENTMCNC: 29.1 PG — SIGNIFICANT CHANGE UP (ref 27–34)
MCHC RBC-ENTMCNC: 34.6 GM/DL — SIGNIFICANT CHANGE UP (ref 32–36)
MCV RBC AUTO: 84.2 FL — SIGNIFICANT CHANGE UP (ref 80–100)
MONOCYTES # BLD AUTO: 0.92 K/UL — HIGH (ref 0–0.9)
MONOCYTES NFR BLD AUTO: 9.1 % — SIGNIFICANT CHANGE UP (ref 2–14)
NEUTROPHILS # BLD AUTO: 6.64 K/UL — SIGNIFICANT CHANGE UP (ref 1.8–7.4)
NEUTROPHILS NFR BLD AUTO: 65.4 % — SIGNIFICANT CHANGE UP (ref 43–77)
NITRITE UR-MCNC: NEGATIVE — SIGNIFICANT CHANGE UP
PH UR: 5 — SIGNIFICANT CHANGE UP (ref 5–8)
PLATELET # BLD AUTO: 287 K/UL — SIGNIFICANT CHANGE UP (ref 150–400)
POTASSIUM SERPL-MCNC: 3.6 MMOL/L — SIGNIFICANT CHANGE UP (ref 3.5–5.3)
POTASSIUM SERPL-SCNC: 3.6 MMOL/L — SIGNIFICANT CHANGE UP (ref 3.5–5.3)
PROT SERPL-MCNC: 7.6 GM/DL — SIGNIFICANT CHANGE UP (ref 6–8.3)
PROT UR-MCNC: 100
RBC # BLD: 5.81 M/UL — HIGH (ref 4.2–5.8)
RBC # FLD: 15.2 % — HIGH (ref 10.3–14.5)
RSV RNA NPH QL NAA+NON-PROBE: SIGNIFICANT CHANGE UP
SARS-COV-2 RNA SPEC QL NAA+PROBE: SIGNIFICANT CHANGE UP
SODIUM SERPL-SCNC: 135 MMOL/L — SIGNIFICANT CHANGE UP (ref 135–145)
SP GR SPEC: 1.01 — SIGNIFICANT CHANGE UP (ref 1.01–1.02)
TROPONIN I, HIGH SENSITIVITY RESULT: 16.8 NG/L — SIGNIFICANT CHANGE UP
UROBILINOGEN FLD QL: NEGATIVE — SIGNIFICANT CHANGE UP
WBC # BLD: 10.14 K/UL — SIGNIFICANT CHANGE UP (ref 3.8–10.5)
WBC # FLD AUTO: 10.14 K/UL — SIGNIFICANT CHANGE UP (ref 3.8–10.5)

## 2022-08-02 PROCEDURE — 85025 COMPLETE CBC W/AUTO DIFF WBC: CPT

## 2022-08-02 PROCEDURE — 80061 LIPID PANEL: CPT

## 2022-08-02 PROCEDURE — 85610 PROTHROMBIN TIME: CPT

## 2022-08-02 PROCEDURE — 99223 1ST HOSP IP/OBS HIGH 75: CPT

## 2022-08-02 PROCEDURE — 84484 ASSAY OF TROPONIN QUANT: CPT

## 2022-08-02 PROCEDURE — 85027 COMPLETE CBC AUTOMATED: CPT

## 2022-08-02 PROCEDURE — 36415 COLL VENOUS BLD VENIPUNCTURE: CPT

## 2022-08-02 PROCEDURE — 93010 ELECTROCARDIOGRAM REPORT: CPT

## 2022-08-02 PROCEDURE — 80048 BASIC METABOLIC PNL TOTAL CA: CPT

## 2022-08-02 PROCEDURE — 87507 IADNA-DNA/RNA PROBE TQ 12-25: CPT

## 2022-08-02 PROCEDURE — 93005 ELECTROCARDIOGRAM TRACING: CPT

## 2022-08-02 PROCEDURE — 74177 CT ABD & PELVIS W/CONTRAST: CPT | Mod: 26,MD

## 2022-08-02 PROCEDURE — 82962 GLUCOSE BLOOD TEST: CPT

## 2022-08-02 PROCEDURE — 99285 EMERGENCY DEPT VISIT HI MDM: CPT

## 2022-08-02 PROCEDURE — 99214 OFFICE O/P EST MOD 30 MIN: CPT | Mod: 25

## 2022-08-02 PROCEDURE — 80053 COMPREHEN METABOLIC PANEL: CPT

## 2022-08-02 PROCEDURE — 71045 X-RAY EXAM CHEST 1 VIEW: CPT | Mod: 26

## 2022-08-02 PROCEDURE — 83036 HEMOGLOBIN GLYCOSYLATED A1C: CPT

## 2022-08-02 PROCEDURE — 93000 ELECTROCARDIOGRAM COMPLETE: CPT

## 2022-08-02 PROCEDURE — 85730 THROMBOPLASTIN TIME PARTIAL: CPT

## 2022-08-02 RX ORDER — INSULIN LISPRO 100/ML
25 VIAL (ML) SUBCUTANEOUS
Qty: 0 | Refills: 0 | DISCHARGE

## 2022-08-02 RX ORDER — SODIUM CHLORIDE 9 MG/ML
1000 INJECTION INTRAMUSCULAR; INTRAVENOUS; SUBCUTANEOUS ONCE
Refills: 0 | Status: COMPLETED | OUTPATIENT
Start: 2022-08-02 | End: 2022-08-02

## 2022-08-02 RX ORDER — MORPHINE SULFATE 50 MG/1
4 CAPSULE, EXTENDED RELEASE ORAL ONCE
Refills: 0 | Status: DISCONTINUED | OUTPATIENT
Start: 2022-08-02 | End: 2022-08-02

## 2022-08-02 RX ORDER — INSULIN GLARGINE 100 [IU]/ML
85 INJECTION, SOLUTION SUBCUTANEOUS
Qty: 0 | Refills: 0 | DISCHARGE

## 2022-08-02 RX ORDER — FENOFIBRATE,MICRONIZED 130 MG
1 CAPSULE ORAL
Qty: 0 | Refills: 0 | DISCHARGE

## 2022-08-02 RX ORDER — INSULIN LISPRO 100 [IU]/ML
100 INJECTION, SOLUTION INTRAVENOUS; SUBCUTANEOUS
Qty: 45 | Refills: 0 | Status: ACTIVE | COMMUNITY
Start: 2022-04-06

## 2022-08-02 RX ORDER — PEN NEEDLE, DIABETIC 29 G X1/2"
31G X 8 MM NEEDLE, DISPOSABLE MISCELLANEOUS
Qty: 100 | Refills: 0 | Status: ACTIVE | COMMUNITY
Start: 2022-04-06

## 2022-08-02 RX ORDER — ONDANSETRON 8 MG/1
4 TABLET, FILM COATED ORAL ONCE
Refills: 0 | Status: COMPLETED | OUTPATIENT
Start: 2022-08-02 | End: 2022-08-02

## 2022-08-02 RX ORDER — CHOLECALCIFEROL (VITAMIN D3) 125 MCG
1 CAPSULE ORAL
Qty: 0 | Refills: 0 | DISCHARGE

## 2022-08-02 RX ADMIN — SODIUM CHLORIDE 1000 MILLILITER(S): 9 INJECTION INTRAMUSCULAR; INTRAVENOUS; SUBCUTANEOUS at 18:36

## 2022-08-02 RX ADMIN — SODIUM CHLORIDE 2000 MILLILITER(S): 9 INJECTION INTRAMUSCULAR; INTRAVENOUS; SUBCUTANEOUS at 18:03

## 2022-08-02 NOTE — H&P ADULT - NEUROLOGICAL
normal/cranial nerves II-XII intact/sensation intact/responds to verbal commands/cranial nerves intact

## 2022-08-02 NOTE — H&P ADULT - ASSESSMENT
55 y/o M w/ PMH of CAD s/p PCI, pancreatitis, dyslipidemia, DM2, p/w CP / Abdominal pain.    *CP w/ h/o CAD s/o PCI  -ASA /Plavix/BB/statin (patient states he took ASA / plavix already today)  -Cardio consult  -Tele monitoring  -Echo  -Reviewed EKG: New +RBBB   -Troponin negative x 1    *Abdominal pain / diarrhea   -CT abd negative for acute findings   -GI consult     *Mild bladder wall thickening noted on CT -UA negative for UTI  -F/u outpatient urologist      *Suspect VIKKI  -IVF and recheck creatinine  -Avoid nephrotoxic agents  -I/Os   -Hold ACEi temporarily  -UA     *Inguinal hernias  -F/u outpatient     *DM2  -Humalog ISS + Basal insulin   -Diabetic diet     *DVT ppx   -SCDs

## 2022-08-02 NOTE — ED ADULT TRIAGE NOTE - CHIEF COMPLAINT QUOTE
Pt presents to the ED c/o abdominal and chest pain x3 weeks. Pt seen in Dr. Rogel office today and was told to come to the ED for EKG changes since February. PMH of 7 stents with Dr. Bueno, last stent placed September 2021. Pt sent for STAT EKG.

## 2022-08-02 NOTE — ED STATDOCS - NS ED ATTENDING STATEMENT MOD
This was a shared visit with the JOSE LUIS. I reviewed and verified the documentation and independently performed the documented:

## 2022-08-02 NOTE — PHYSICAL EXAM
[No JVD] : no jugular venous distention [No Edema] : there was no peripheral edema [Normal Bowel Sounds] : normal bowel sounds [Normal] : no rash [Coordination Grossly Intact] : coordination grossly intact [No Focal Deficits] : no focal deficits [Normal Gait] : normal gait [Normal Affect] : the affect was normal [Normal Insight/Judgement] : insight and judgment were intact [de-identified] : soft, distended; tenderness in the epigastric and LUQ with mild guarding, no rebound, no lower abdominal tenderness on palpation.

## 2022-08-02 NOTE — ASSESSMENT
[FreeTextEntry1] : Patient is a 57yo male with PMH CAD s/p PCI, poorly controlled IDDM, triglyceridemia (last check 875 in 04/2022), history of pancreatitis in the past who presents to the office complaining of intermittent abdominal pain x2-3 weeks with associated green stools mixed with pale appearing stools, similar to prior pancreatitis in the past.  Pt has also had chest pressure/pain over the past 2-3 weeks noticed mostly when laying flat.\par \par EKG performed today shows new RBBB when compared to prior in [facility] from 02/2022.  Pt states last PCI was in 09/2022.\par \par Pt tender in upper abdomen on palpation with guarding.\par \par Pt sent to  ED for evaluation, cardiac vs pancreatitis vs CBD stone, etc.\par Pt refused EMS transport, states he has been driving all day and feels well enough to drive himself.\par Pt understands it is important for him to go directly to the ED from here for evaluation.\par Vital signs are stable in office.  Pt is neuro intact without deficit.\par \par Case discussed with PA, Ed, at [facility].\par Will see patient in office upon discharge for hospital follow up.\par Case discussed with Dr. MARY Rogel.

## 2022-08-02 NOTE — PATIENT PROFILE ADULT - FALL HARM RISK - HARM RISK INTERVENTIONS

## 2022-08-02 NOTE — PHARMACOTHERAPY INTERVENTION NOTE - COMMENTS
Medication reconciliation completed.  Reviewed Medication list and confirmed med allergies with patient; confirmed with Dr. First Medarun.

## 2022-08-02 NOTE — ED STATDOCS - OBJECTIVE STATEMENT
59 y/o M with a PMHx of CAD x stents x7, pancreatitis 3 years ago, HLD, and cholecystectomy c/o diarrhea and low abd pain. pt states abd pain x 4 weeks s/p PO intake with severe RLQ pain. pt states immediately after pain he needs to go to the bathroom. Reports CP and abd pain is similar to pain associated with pancreatitis. pt states he saw Dr. Rogel, had an EKG done which indicated changes. Denies appendectomy. Cardio: Dr. Jonas. 55 y/o M with a PMHx of CAD x stents x7, pancreatitis 3 years ago, HLD, and cholecystectomy c/o diarrhea and low abd pain. pt states abd pain x 4 weeks s/p PO intake with severe RLQ pain. pt states immediately after pain he needs to go to the bathroom. Reports CP and abd pain is similar to pain associated with pancreatitis. pt states he saw Dr. Rogel, had an EKG done which indicated changes. Denies appendectomy and N/V. Cardio: Dr. Jonas.

## 2022-08-02 NOTE — HISTORY OF PRESENT ILLNESS
[FreeTextEntry8] : Pt is a 55yo male presenting to the office complaining of right sided and epigastric abdominal pain.\par Pain is intermittent x2-3 weeks, radiates intermittently to the right back.\par States worse with certain foods, particularly fatty foods.\par Reports associated diarrhea.\par Diarrhea green color, when stools are somewhat solid turns more beige.\par Denies fevers.\par States he has pressure in the chest after he eats and when he lays back.  States chest pain he has had does not feel exactly the same as prior anginal pain, but does have some similarities.  States he sometimes has palpitations when he lays back as well which is not necessarily abnormal for him.\par Pt s/p cholecystectomy.\par Feels similar to Pancreatitis in the past.\par Last episode was Friday.\par \par Cardiology:  Dr. Marie.  Has appointment in 2-3 weeks.\par Pt checks BS at home, average 248 on Zaheer michael shown to me in office.  BS did spike up to 450 last night.

## 2022-08-02 NOTE — REVIEW OF SYSTEMS
[Chest Pain] : chest pain [Palpitations] : palpitations [Abdominal Pain] : abdominal pain [Diarrhea] : diarrhea [Negative] : Heme/Lymph [Nausea] : no nausea [Constipation] : no constipation [Vomiting] : no vomiting [Melena] : no melena

## 2022-08-02 NOTE — ED STATDOCS - PROGRESS NOTE DETAILS
55 yo male with a PMH of CAD, s/p 7 stents, HLD, pancreatitis approx 3-4 years ago presents with abd pain x 2-3 weeks. Pain located to the periumbilical region ad the RLQ. States when he gets the pain he feels heaviness in his chest. Pt was seen by PMD = burns who examined and sent him in the ER due to EKG changes. Pt states that the chest pain feels like the other times he had angina.   Cardio= polena  GI= gandolfo  Will obtain labs, CT, ekg, cxr, and reeval. -Mazin Hoyt PA-C

## 2022-08-02 NOTE — ED STATDOCS - SKIN, MLM
Verified Results  CBC WITH AUTOMATED DIFFERENTIAL [id number] 12:01AM KATIE DE LA GARZA   [Jul 11, 2018 6:58PM KATIE DE LA GARZA]  1. call pt kidney function slightly diminished.  Similar to previous labs. Kidney function is stable  2. Cholesterol is at 156. This is very good. CPM  3. thyroid test is normal. Liver froilan and cbc are unremarkab.e     Test Name Result Flag Reference   WHITE BLOOD COUNT 5.2 K/mcL  4.2-11.0   RED CELL COUNT 4.26 mil/mcL  4.00-5.20   HEMOGLOBIN 13.0 g/dl  12.0-15.5   HEMATOCRIT 40.6 %  36.0-46.5   MEAN CORPUSCULAR VOLUME 95.3 fL  78.0-100.0   MEAN CORPUSCULAR HEMOGLOBIN 30.5 pg  26.0-34.0   MEAN CORPUSCULAR HGB CONC 32.0 g/dl  32.0-36.5   RDW-CV 12.9 %  11.0-15.0   PLATELET COUNT 285 K/mcL  140-450   LAURA% 59 %     LYM% 26 %     MON% 9 %     EOS% 5 %     BASO% 1 %     LAURA ABS 3.0 K/mcL  1.8-7.7   LYM ABS 1.3 K/mcL  1.0-4.0   MON ABS 0.5 K/mcL  0.3-0.9   EOS ABS 0.3 K/mcL  0.1-0.5   BASO ABS 0.1 K/mcL  0.0-0.3   DIFF TYPE      AUTOMATED DIFFERENTIAL   NRBC 0 /100 WBC  0   PERCENT IMMATURE GRANULOCYTES 0 %     ABSOLUTE IMMATURE GRANULOCYTES 0.0 K/mcL  0-0.2     COMP METABOLIC PNL [id number] 12:01AM KATIE DE LA GARZA   [Jul 11, 2018 6:58PM KATIE DE LA GARZA]  1. call pt kidney function slightly diminished.  Similar to previous labs. Kidney function is stable  2. Cholesterol is at 156. This is very good. CPM  3. thyroid test is normal. Liver froilan and cbc are unremarkab.e     Test Name Result Flag Reference   SODIUM 142 mmol/L  135-145   POTASSIUM 3.7 mmol/L  3.4-5.1   CHLORIDE 103 mmol/L     CARBON DIOXIDE 28 mmol/L  21-32   ANION GAP 15 mmol/L  10-20   GLUCOSE 101 mg/dl H 65-99   BUN 15 mg/dl  6-20   CREATININE 1.12 mg/dl H 0.51-0.95   GFR EST.AFRICAN AMER 58     eGFR 30-59 mL/min/1.73m2 = Moderate decrease in kidney function. Stage 3 CKD (chronic kidney disease) or moderate kidney disease.   GFR EST.NONAFRI AMER 50     eGFR 30-59 mL/min/1.73m2 = Moderate decrease in kidney function. Stage 3 CKD (chronic  kidney disease) or moderate kidney disease.   BUN/CREATININE RATIO 13  7-25   BILIRUBIN TOTAL 0.5 mg/dl  0.2-1.0   GOT/AST 26 Units/L  <38   ALKALINE PHOSPHATASE 78 Units/L     ALBUMIN 4.2 g/dl  3.6-5.1   TOTAL PROTEIN 7.1 g/dl  6.4-8.2   GLOBULIN (CALCULATED) 2.9 g/dl  2.0-4.0   A/G RATIO 1.4  1.0-2.4   CALCIUM 9.3 mg/dl  8.4-10.2   GPT/ALT 22 Units/L  <79   FASTING STATUS UNKNOWN hrs       LIPID PNL [id number] 12:01AM KATIE DE LA GARZA   [Jul 11, 2018 6:58PM KATIE DE LA GARZA]  1. call pt kidney function slightly diminished.  Similar to previous labs. Kidney function is stable  2. Cholesterol is at 156. This is very good. CPM  3. thyroid test is normal. Liver froilan and cbc are unremarkab.e     Test Name Result Flag Reference   FASTING STATUS UNKNOWN hrs     CHOLESTEROL 156 mg/dl  <200   Desirable            <200  Borderline High      200 to 239  High                 >=240   HDL CHOLESTEROL 74 mg/dl  >49   Low            <40  Borderline Low 40 to 49  Near Optimal   50 to 59  Optimal        >=60   TRIGLYCERIDES 66 mg/dl  <150   Normal                   <150  Borderline High          150 to 199  High                     200 to 499  Very High                >=500   LDL CHOLESTEROL (CALCULATED) 69 mg/dl  <130   OPTIMAL               <100  NEAR OPTIMAL          100-129  BORDERLINE HIGH       130-159  HIGH                  160-189  VERY HIGH             >=190   NON-HDL CHOLESTEROL 82 mg/dl     Therapeutic Target:  CHD and risk equivalents <130  Multiple risk factors    <160  0 to 1 risk factors      <190   CHOLESTEROL/HDL RATIO 2.1  <4.5     TSH [id number] 12:01AM KATIE DE LA GARZA   [Jul 11, 2018 6:58PM KATIE DE LA GARZA]  1. call pt kidney function slightly diminished.  Similar to previous labs. Kidney function is stable  2. Cholesterol is at 156. This is very good. CPM  3. thyroid test is normal. Liver froilan and cbc are unremarkab.e     Test Name Result Flag Reference   TSH 1.459 mcUnits/mL  0.350-5.000     URINALYSIS SCREEN with  MICROSCOPIC IF INDICATED AND URINE CULTURE REFLEX [id number] 12:01AM KATIE DE LA GARZA   [Jul 11, 2018 6:58PM KATIE DE LA GARZA]  1. call pt kidney function slightly diminished.  Similar to previous labs. Kidney function is stable  2. Cholesterol is at 156. This is very good. CPM  3. thyroid test is normal. Liver froilan and cbc are unremarkab.e     Test Name Result Flag Reference   URINE COLOR COLORLESS A YELLOW   APPEARANCE, URINE CLEAR     URINE GLUCOSE NEGATIVE mg/dl  NEGATIVE   URINE BILIRUBIN NEGATIVE  NEGATIVE   KETONES NEGATIVE mg/dl  NEGATIVE   URINE SPECIFIC GRAVITY 1.006  1.005-1.030   RBC-URINE NEGATIVE  NEGATIVE   PH-URINE 6.0 Units  5.0-7.0   URINE PROTEIN NEGATIVE mg/dl  NEGATIVE   UROBILINOGEN-URINE 0.2 mg/dl  0.0-1.0   NITRITE NEGATIVE  NEGATIVE   WBC-URINE NEGATIVE  NEGATIVE   SPECIMEN TYPE      URINE, CLEAN CATCH/MIDSTREAM     MICROALBUMIN, URINE [id number] 12:01AM KATIE DE LA GARZA   [Jul 11, 2018 6:58PM FREDERIC KATIE]  1. call pt kidney function slightly diminished.  Similar to previous labs. Kidney function is stable  2. Cholesterol is at 156. This is very good. CPM  3. thyroid test is normal. Liver froilan and cbc are unremarkab.e     Test Name Result Flag Reference   MICROALBUMIN <0.50 mg/dl     CREATININE RANDOM URINE 10.70 mg/dl     MICROALB/CREAT RATIO   <30   UNABLE TO CALCULATE DUE TO LOW ANALYTE CONCENTRATION. mg/g        skin normal color for race, warm, dry and intact.

## 2022-08-03 LAB
A1C WITH ESTIMATED AVERAGE GLUCOSE RESULT: 10.9 % — HIGH (ref 4–5.6)
ALBUMIN SERPL ELPH-MCNC: 3.1 G/DL — LOW (ref 3.3–5)
ALP SERPL-CCNC: 70 U/L — SIGNIFICANT CHANGE UP (ref 40–120)
ALT FLD-CCNC: 45 U/L — SIGNIFICANT CHANGE UP (ref 12–78)
ANION GAP SERPL CALC-SCNC: 7 MMOL/L — SIGNIFICANT CHANGE UP (ref 5–17)
APTT BLD: 34.9 SEC — SIGNIFICANT CHANGE UP (ref 27.5–35.5)
AST SERPL-CCNC: 26 U/L — SIGNIFICANT CHANGE UP (ref 15–37)
BASOPHILS # BLD AUTO: 0.09 K/UL — SIGNIFICANT CHANGE UP (ref 0–0.2)
BASOPHILS NFR BLD AUTO: 1.1 % — SIGNIFICANT CHANGE UP (ref 0–2)
BILIRUB SERPL-MCNC: 0.6 MG/DL — SIGNIFICANT CHANGE UP (ref 0.2–1.2)
BUN SERPL-MCNC: 29 MG/DL — HIGH (ref 7–23)
CALCIUM SERPL-MCNC: 9.3 MG/DL — SIGNIFICANT CHANGE UP (ref 8.5–10.1)
CHLORIDE SERPL-SCNC: 108 MMOL/L — SIGNIFICANT CHANGE UP (ref 96–108)
CHOLEST SERPL-MCNC: 210 MG/DL — HIGH
CO2 SERPL-SCNC: 25 MMOL/L — SIGNIFICANT CHANGE UP (ref 22–31)
CREAT SERPL-MCNC: 1.32 MG/DL — HIGH (ref 0.5–1.3)
CULTURE RESULTS: SIGNIFICANT CHANGE UP
EGFR: 64 ML/MIN/1.73M2 — SIGNIFICANT CHANGE UP
EOSINOPHIL # BLD AUTO: 0.25 K/UL — SIGNIFICANT CHANGE UP (ref 0–0.5)
EOSINOPHIL NFR BLD AUTO: 3.1 % — SIGNIFICANT CHANGE UP (ref 0–6)
ESTIMATED AVERAGE GLUCOSE: 266 MG/DL — HIGH (ref 68–114)
GLUCOSE SERPL-MCNC: 165 MG/DL — HIGH (ref 70–99)
HCT VFR BLD CALC: 47.7 % — SIGNIFICANT CHANGE UP (ref 39–50)
HDLC SERPL-MCNC: 22 MG/DL — LOW
HGB BLD-MCNC: 16 G/DL — SIGNIFICANT CHANGE UP (ref 13–17)
IMM GRANULOCYTES NFR BLD AUTO: 1.4 % — SIGNIFICANT CHANGE UP (ref 0–1.5)
INR BLD: 1.07 RATIO — SIGNIFICANT CHANGE UP (ref 0.88–1.16)
LIPID PNL WITH DIRECT LDL SERPL: SIGNIFICANT CHANGE UP MG/DL
LYMPHOCYTES # BLD AUTO: 1.76 K/UL — SIGNIFICANT CHANGE UP (ref 1–3.3)
LYMPHOCYTES # BLD AUTO: 21.6 % — SIGNIFICANT CHANGE UP (ref 13–44)
MCHC RBC-ENTMCNC: 28.8 PG — SIGNIFICANT CHANGE UP (ref 27–34)
MCHC RBC-ENTMCNC: 33.5 GM/DL — SIGNIFICANT CHANGE UP (ref 32–36)
MCV RBC AUTO: 85.8 FL — SIGNIFICANT CHANGE UP (ref 80–100)
MONOCYTES # BLD AUTO: 0.73 K/UL — SIGNIFICANT CHANGE UP (ref 0–0.9)
MONOCYTES NFR BLD AUTO: 9 % — SIGNIFICANT CHANGE UP (ref 2–14)
NEUTROPHILS # BLD AUTO: 5.19 K/UL — SIGNIFICANT CHANGE UP (ref 1.8–7.4)
NEUTROPHILS NFR BLD AUTO: 63.8 % — SIGNIFICANT CHANGE UP (ref 43–77)
NON HDL CHOLESTEROL: 188 MG/DL — HIGH
PLATELET # BLD AUTO: 231 K/UL — SIGNIFICANT CHANGE UP (ref 150–400)
POTASSIUM SERPL-MCNC: 3.9 MMOL/L — SIGNIFICANT CHANGE UP (ref 3.5–5.3)
POTASSIUM SERPL-SCNC: 3.9 MMOL/L — SIGNIFICANT CHANGE UP (ref 3.5–5.3)
PROT SERPL-MCNC: 6.7 GM/DL — SIGNIFICANT CHANGE UP (ref 6–8.3)
PROTHROM AB SERPL-ACNC: 12.4 SEC — SIGNIFICANT CHANGE UP (ref 10.5–13.4)
RBC # BLD: 5.56 M/UL — SIGNIFICANT CHANGE UP (ref 4.2–5.8)
RBC # FLD: 15.4 % — HIGH (ref 10.3–14.5)
SODIUM SERPL-SCNC: 140 MMOL/L — SIGNIFICANT CHANGE UP (ref 135–145)
SPECIMEN SOURCE: SIGNIFICANT CHANGE UP
TRIGL SERPL-MCNC: 750 MG/DL — HIGH
WBC # BLD: 8.13 K/UL — SIGNIFICANT CHANGE UP (ref 3.8–10.5)
WBC # FLD AUTO: 8.13 K/UL — SIGNIFICANT CHANGE UP (ref 3.8–10.5)

## 2022-08-03 PROCEDURE — 99232 SBSQ HOSP IP/OBS MODERATE 35: CPT

## 2022-08-03 RX ORDER — INSULIN LISPRO 100/ML
55 VIAL (ML) SUBCUTANEOUS
Refills: 0 | Status: DISCONTINUED | OUTPATIENT
Start: 2022-08-03 | End: 2022-08-03

## 2022-08-03 RX ORDER — ACETAMINOPHEN 500 MG
650 TABLET ORAL EVERY 6 HOURS
Refills: 0 | Status: DISCONTINUED | OUTPATIENT
Start: 2022-08-03 | End: 2022-08-04

## 2022-08-03 RX ORDER — INSULIN GLARGINE 100 [IU]/ML
110 INJECTION, SOLUTION SUBCUTANEOUS EVERY MORNING
Refills: 0 | Status: DISCONTINUED | OUTPATIENT
Start: 2022-08-03 | End: 2022-08-03

## 2022-08-03 RX ORDER — MAGNESIUM OXIDE 400 MG ORAL TABLET 241.3 MG
400 TABLET ORAL DAILY
Refills: 0 | Status: DISCONTINUED | OUTPATIENT
Start: 2022-08-03 | End: 2022-08-04

## 2022-08-03 RX ORDER — INSULIN LISPRO 100/ML
50 VIAL (ML) SUBCUTANEOUS
Refills: 0 | Status: DISCONTINUED | OUTPATIENT
Start: 2022-08-03 | End: 2022-08-04

## 2022-08-03 RX ORDER — INSULIN GLARGINE 100 [IU]/ML
110 INJECTION, SOLUTION SUBCUTANEOUS AT BEDTIME
Refills: 0 | Status: DISCONTINUED | OUTPATIENT
Start: 2022-08-03 | End: 2022-08-04

## 2022-08-03 RX ORDER — METOPROLOL TARTRATE 50 MG
200 TABLET ORAL DAILY
Refills: 0 | Status: DISCONTINUED | OUTPATIENT
Start: 2022-08-03 | End: 2022-08-04

## 2022-08-03 RX ORDER — ASPIRIN/CALCIUM CARB/MAGNESIUM 324 MG
81 TABLET ORAL DAILY
Refills: 0 | Status: DISCONTINUED | OUTPATIENT
Start: 2022-08-02 | End: 2022-08-04

## 2022-08-03 RX ORDER — DEXTROSE 50 % IN WATER 50 %
12.5 SYRINGE (ML) INTRAVENOUS ONCE
Refills: 0 | Status: DISCONTINUED | OUTPATIENT
Start: 2022-08-03 | End: 2022-08-04

## 2022-08-03 RX ORDER — FERROUS SULFATE 325(65) MG
325 TABLET ORAL DAILY
Refills: 0 | Status: DISCONTINUED | OUTPATIENT
Start: 2022-08-03 | End: 2022-08-04

## 2022-08-03 RX ORDER — GLUCAGON INJECTION, SOLUTION 0.5 MG/.1ML
1 INJECTION, SOLUTION SUBCUTANEOUS ONCE
Refills: 0 | Status: DISCONTINUED | OUTPATIENT
Start: 2022-08-03 | End: 2022-08-04

## 2022-08-03 RX ORDER — DEXTROSE 50 % IN WATER 50 %
25 SYRINGE (ML) INTRAVENOUS ONCE
Refills: 0 | Status: DISCONTINUED | OUTPATIENT
Start: 2022-08-03 | End: 2022-08-04

## 2022-08-03 RX ORDER — SODIUM CHLORIDE 9 MG/ML
1000 INJECTION, SOLUTION INTRAVENOUS
Refills: 0 | Status: DISCONTINUED | OUTPATIENT
Start: 2022-08-03 | End: 2022-08-04

## 2022-08-03 RX ORDER — DEXTROSE 50 % IN WATER 50 %
15 SYRINGE (ML) INTRAVENOUS ONCE
Refills: 0 | Status: DISCONTINUED | OUTPATIENT
Start: 2022-08-03 | End: 2022-08-04

## 2022-08-03 RX ORDER — ATORVASTATIN CALCIUM 80 MG/1
40 TABLET, FILM COATED ORAL AT BEDTIME
Refills: 0 | Status: DISCONTINUED | OUTPATIENT
Start: 2022-08-03 | End: 2022-08-04

## 2022-08-03 RX ORDER — RANOLAZINE 500 MG/1
500 TABLET, FILM COATED, EXTENDED RELEASE ORAL
Refills: 0 | Status: DISCONTINUED | OUTPATIENT
Start: 2022-08-02 | End: 2022-08-04

## 2022-08-03 RX ORDER — CLOPIDOGREL BISULFATE 75 MG/1
75 TABLET, FILM COATED ORAL DAILY
Refills: 0 | Status: DISCONTINUED | OUTPATIENT
Start: 2022-08-03 | End: 2022-08-04

## 2022-08-03 RX ORDER — SENNA PLUS 8.6 MG/1
1 TABLET ORAL ONCE
Refills: 0 | Status: DISCONTINUED | OUTPATIENT
Start: 2022-08-03 | End: 2022-08-04

## 2022-08-03 RX ADMIN — CLOPIDOGREL BISULFATE 75 MILLIGRAM(S): 75 TABLET, FILM COATED ORAL at 12:03

## 2022-08-03 RX ADMIN — Medication 200 MILLIGRAM(S): at 12:08

## 2022-08-03 RX ADMIN — RANOLAZINE 500 MILLIGRAM(S): 500 TABLET, FILM COATED, EXTENDED RELEASE ORAL at 21:52

## 2022-08-03 RX ADMIN — Medication 81 MILLIGRAM(S): at 12:03

## 2022-08-03 RX ADMIN — ATORVASTATIN CALCIUM 40 MILLIGRAM(S): 80 TABLET, FILM COATED ORAL at 21:52

## 2022-08-03 RX ADMIN — Medication 50 UNIT(S): at 13:32

## 2022-08-03 RX ADMIN — Medication 325 MILLIGRAM(S): at 12:03

## 2022-08-03 RX ADMIN — Medication 1 TABLET(S): at 12:03

## 2022-08-03 RX ADMIN — MAGNESIUM OXIDE 400 MG ORAL TABLET 400 MILLIGRAM(S): 241.3 TABLET ORAL at 12:07

## 2022-08-03 RX ADMIN — RANOLAZINE 500 MILLIGRAM(S): 500 TABLET, FILM COATED, EXTENDED RELEASE ORAL at 12:03

## 2022-08-03 NOTE — CONSULT NOTE ADULT - SUBJECTIVE AND OBJECTIVE BOX
Patient is a 56y old  Male who presents with a chief complaint of CP / Abdominal pain (02 Aug 2022 23:53)      HPI:  57 y/o M w/ PMH of CAD s/p PCI, pancreatitis, dyslipidemia, DM2, p/w CP / Abdominal pain. Patient states abdominal pain started about 3-4 weeks ago, and is in RLQ, sharp, localized, and happens intermittently after he eats food and then he has diarrhea. States that he has had days where he had multiple episodes of diarrhea 1-2 times per week. Today patient had 3 episodes of diarrhea, however, the last time before today was 6 days ago. Patient states that for the last 1 week when he gets the abdominal pain, he also gets CP. CP is pressure in quality, in substernal area, and does not radiate to arm / jaw / back. Denies nausea, vomiting, dysuria, increased frequency, fever, chills.   Chest pain different than the usual angina  Last stents in 2022, overall feeling better since then    PSH: Cholecystectomy, shoulder surgery, PCI     Social Hx: Denies x 3    Family Hx: Mother - DM2, Father - kidney disease  (02 Aug 2022 23:53)      PAST MEDICAL & SURGICAL HISTORY:  Essential hypertension      Obstructive sleep apnea      Diabetes mellitus      HLD (hyperlipidemia)      CAD (coronary artery disease)      Pancreatitis      History of coronary artery stent placement  Placed 18 by Dr. Kaye      S/P cholecystectomy          HPI:                PREVIOUS DIAGNOSTIC TESTING:      ECHO  FINDINGS:    STRESS  FINDINGS:    CATHETERIZATION  FINDINGS:    MEDICATIONS  (STANDING):  aspirin enteric coated 81 milliGRAM(s) Oral daily  atorvastatin 40 milliGRAM(s) Oral at bedtime  clopidogrel Tablet 75 milliGRAM(s) Oral daily  dextrose 5%. 1000 milliLiter(s) (100 mL/Hr) IV Continuous <Continuous>  dextrose 5%. 1000 milliLiter(s) (50 mL/Hr) IV Continuous <Continuous>  dextrose 50% Injectable 25 Gram(s) IV Push once  dextrose 50% Injectable 12.5 Gram(s) IV Push once  dextrose 50% Injectable 25 Gram(s) IV Push once  ferrous    sulfate 325 milliGRAM(s) Oral daily  glucagon  Injectable 1 milliGRAM(s) IntraMuscular once  insulin glargine Injectable (LANTUS) 110 Unit(s) SubCutaneous at bedtime  insulin lispro Injectable (ADMELOG) 50 Unit(s) SubCutaneous three times a day with meals  magnesium oxide 400 milliGRAM(s) Oral daily  metoprolol succinate  milliGRAM(s) Oral daily  multivitamin 1 Tablet(s) Oral daily  ranolazine 500 milliGRAM(s) Oral two times a day    MEDICATIONS  (PRN):  acetaminophen     Tablet .. 650 milliGRAM(s) Oral every 6 hours PRN Temp greater or equal to 38C (100.4F), Mild Pain (1 - 3)  dextrose Oral Gel 15 Gram(s) Oral once PRN Blood Glucose LESS THAN 70 milliGRAM(s)/deciliter      FAMILY HISTORY:  Family history of diabetes mellitus (Sibling)        SOCIAL HISTORY:    CIGARETTES:    ALCOHOL:          Vital Signs Last 24 Hrs  T(C): 36.9 (03 Aug 2022 09:08), Max: 37 (02 Aug 2022 16:13)  T(F): 98.4 (03 Aug 2022 09:08), Max: 98.6 (02 Aug 2022 16:13)  HR: 72 (03 Aug 2022 09:08) (68 - 78)  BP: 140/72 (03 Aug 2022 09:08) (139/75 - 157/76)  BP(mean): 91 (02 Aug 2022 22:38) (91 - 94)  RR: 18 (03 Aug 2022 09:08) (17 - 18)  SpO2: 95% (03 Aug 2022 09:08) (95% - 100%)    Parameters below as of 03 Aug 2022 09:08  Patient On (Oxygen Delivery Method): room air        PHYSICAL EXAM-    Constitutional: The patient appears to be normal, well developed, well nourished and alert and oriented to time, place and person. The patient does not appear acutely ill. The patient is alert.     Head: Head is normocephalic and atraumatic.      Neck: The patient's neck is supple without enlargement, has no palpable thyromegaly nor thyroid nodules and has no jugular venous distention. No audible carotid bruits. There are strong carotid pulses bilaterally. No JVD.     Cardiovascular: Regular rate and rhythm without S3, S4. No murmurs or rubs are appreciated.      Respiratory:  The patient has no rales and no rhonchi. The patient has no wheezes.     Abdomen: Soft, nontender, nondistended with positive bowel sounds.      Extremity: No tenderness. There is no pitting edema, skin discoloration, clubbing and cyanosis.       INTERPRETATION OF TELEMETRY:    ECG:    I&O's Detail      LABS:                        16.0   8.13  )-----------( 231      ( 03 Aug 2022 07:24 )             47.7     08-03    140  |  108  |  29<H>  ----------------------------<  165<H>  3.9   |  25  |  1.32<H>    Ca    9.3      03 Aug 2022 07:24  Mg     2.0     08-02    TPro  6.7  /  Alb  3.1<L>  /  TBili  0.6  /  DBili  x   /  AST  26  /  ALT  45  /  AlkPhos  70  08-03        PT/INR - ( 03 Aug 2022 07:24 )   PT: 12.4 sec;   INR: 1.07 ratio         PTT - ( 03 Aug 2022 07:24 )  PTT:34.9 sec  Urinalysis Basic - ( 02 Aug 2022 16:57 )    Color: Yellow / Appearance: Clear / S.015 / pH: x  Gluc: x / Ketone: Negative  / Bili: Negative / Urobili: Negative   Blood: x / Protein: 100 / Nitrite: Negative   Leuk Esterase: Negative / RBC: 0-2 /HPF / WBC 0-2   Sq Epi: x / Non Sq Epi: Negative / Bacteria: Negative      I&O's Summary    BNP  RADIOLOGY & ADDITIONAL STUDIES:
GI consult    HPI:  57 y/o M w/ PMH of CAD s/p PCI, pancreatitis, dyslipidemia, DM2, p/w CP / Abdominal pain. Patient states abdominal pain started about 3-4 weeks ago, and is in RLQ, sharp, localized, and happens intermittently after he eats food and then he has diarrhea. States that he has had days where he had multiple episodes of diarrhea 1-2 times per week. Today patient had 3 episodes of diarrhea, however, the last time before today was 6 days ago. Patient states that for the last 1 week when he gets the abdominal pain, he also gets CP. CP is pressure in quality, in substernal area, and does not radiate to arm / jaw / back. Denies nausea, vomiting, dysuria, increased frequency, fever, chills.     Pt known to me from prior eval for anemia, Had EGD/colon/VCE within the year that was negative. Has poorly controlled DM2. No recent changes in meds except increasing insulin. No WL or fever. Has a few urgent BMs in the AM, foul smelling gas, and yellow/green stool later in the day. Has a history of pancreatitis in past. No findings on CT or labs. Pending stool studies.    PSH: Cholecystectomy, shoulder surgery, PCI     Social Hx: Denies x 3    Family Hx: Mother - DM2, Father - kidney disease  (02 Aug 2022 23:53)      PAST MEDICAL & SURGICAL HISTORY:  Essential hypertension      Obstructive sleep apnea      Diabetes mellitus      HLD (hyperlipidemia)      CAD (coronary artery disease)      Pancreatitis      History of coronary artery stent placement  Placed 9/12/18 by Dr. Kaye      S/P cholecystectomy          Home Medications:  atorvastatin 40 mg oral tablet: 1 tab(s) orally once a day (at bedtime) (02 Aug 2022 23:55)  B Complex 50 oral tablet, extended release: 1 tab(s) orally once a day (02 Aug 2022 23:55)  ferrous sulfate 325 mg (65 mg elemental iron) oral tablet: 1 tab(s) orally once a day (02 Aug 2022 23:55)  fosinopril 20 mg oral tablet: 1 tab(s) orally once a day (02 Aug 2022 23:55)  insulin lispro 100 units/mL subcutaneous solution: 55 unit(s) subcutaneous 3 times a day (with meals) (02 Aug 2022 23:55)  Jardiance 25 mg oral tablet: 1 tab(s) orally once a day (in the morning) (02 Aug 2022 23:55)  magnesium oxide 400 mg oral tablet: 1 tab(s) orally once a day (02 Aug 2022 23:55)  ranolazine 500 mg oral tablet, extended release: 1 tab(s) orally 2 times a day (02 Aug 2022 23:55)  Toujeo SoloStar 300 units/mL subcutaneous solution: 110 unit(s) subcutaneous 2 times a day (02 Aug 2022 23:55)  Vitamin D2 1.25 mg (50,000 intl units) oral capsule: 1 cap(s) orally once a week on mondays (02 Aug 2022 23:55)      MEDICATIONS  (STANDING):  aspirin enteric coated 81 milliGRAM(s) Oral daily  atorvastatin 40 milliGRAM(s) Oral at bedtime  clopidogrel Tablet 75 milliGRAM(s) Oral daily  dextrose 5%. 1000 milliLiter(s) (100 mL/Hr) IV Continuous <Continuous>  dextrose 5%. 1000 milliLiter(s) (50 mL/Hr) IV Continuous <Continuous>  dextrose 50% Injectable 25 Gram(s) IV Push once  dextrose 50% Injectable 12.5 Gram(s) IV Push once  dextrose 50% Injectable 25 Gram(s) IV Push once  ferrous    sulfate 325 milliGRAM(s) Oral daily  glucagon  Injectable 1 milliGRAM(s) IntraMuscular once  insulin glargine Injectable (LANTUS) 110 Unit(s) SubCutaneous at bedtime  insulin lispro Injectable (ADMELOG) 50 Unit(s) SubCutaneous three times a day with meals  magnesium oxide 400 milliGRAM(s) Oral daily  metoprolol succinate  milliGRAM(s) Oral daily  multivitamin 1 Tablet(s) Oral daily  ranolazine 500 milliGRAM(s) Oral two times a day  senna 1 Tablet(s) Oral once    MEDICATIONS  (PRN):  acetaminophen     Tablet .. 650 milliGRAM(s) Oral every 6 hours PRN Temp greater or equal to 38C (100.4F), Mild Pain (1 - 3)  dextrose Oral Gel 15 Gram(s) Oral once PRN Blood Glucose LESS THAN 70 milliGRAM(s)/deciliter      Allergies    penicillin (Hives)    Intolerances  FAMILY HISTORY:  Family history of diabetes mellitus (Sibling)        ROS  As above  Otherwise unremarkable, all systems reviewed    PE:  Vital Signs Last 24 Hrs  T(C): 36.9 (03 Aug 2022 21:36), Max: 36.9 (03 Aug 2022 09:08)  T(F): 98.4 (03 Aug 2022 21:36), Max: 98.5 (03 Aug 2022 16:07)  HR: 69 (03 Aug 2022 21:36) (69 - 72)  BP: 143/80 (03 Aug 2022 21:36) (140/72 - 150/82)  BP(mean): --  RR: 18 (03 Aug 2022 21:36) (18 - 18)  SpO2: 96% (03 Aug 2022 21:36) (95% - 98%)    Parameters below as of 03 Aug 2022 21:36  Patient On (Oxygen Delivery Method): room air        Constitutional: NAD, well-developed, A+Ox3  Anicteric   Respiratory: CTABL, breathing comfortably  Cardiovascular: S1 and S2, RRR  Gastrointestinal: +BS, soft, non tender, non distended, no mass  Extremities: warm, well perfused, no edema  Psychiatric: Normal mood, normal affect  Neuro: moves all extremities, grossly intact  Skin: No rashes or lesions    LABS:                        16.0   8.13  )-----------( 231      ( 03 Aug 2022 07:24 )             47.7     08-03    140  |  108  |  29<H>  ----------------------------<  165<H>  3.9   |  25  |  1.32<H>    Ca    9.3      03 Aug 2022 07:24  Mg     2.0     08-02    TPro  6.7  /  Alb  3.1<L>  /  TBili  0.6  /  DBili  x   /  AST  26  /  ALT  45  /  AlkPhos  70  08-03    PT/INR - ( 03 Aug 2022 07:24 )   PT: 12.4 sec;   INR: 1.07 ratio         PTT - ( 03 Aug 2022 07:24 )  PTT:34.9 sec  LIVER FUNCTIONS - ( 03 Aug 2022 07:24 )  Alb: 3.1 g/dL / Pro: 6.7 gm/dL / ALK PHOS: 70 U/L / ALT: 45 U/L / AST: 26 U/L / GGT: x             RADIOLOGY & ADDITIONAL STUDIES:      ACC: [id number] EXAM:  CT ABDOMEN AND PELVIS IC                          PROCEDURE DATE:  08/02/2022          INTERPRETATION:  CLINICAL INFORMATION: Abdominal pain    COMPARISON: 2/13/2020 .    CONTRAST/COMPLICATIONS:  IV Contrast: Omnipaque 350  90 cc given.   10 cc discarded  Oral Contrast: None  Complications: None reported    PROCEDURE:  CT of the Abdomen and Pelvis was performed.  Sagittal and coronal reformats were performed.    FINDINGS:  LOWER CHEST: Coronary artery calcification and/or stents    LIVER: Within normal limits.  BILE DUCTS: Normal caliber.  GALLBLADDER: Cholecystectomy.  SPLEEN: Within normal limits.  PANCREAS: Within normal limits.  ADRENALS: Within normal limits.  KIDNEYS/URETERS: Within normal limits.    BLADDER: Mild wall thickening may be due to underdistention.  REPRODUCTIVE ORGANS: Prostate within normal limits.    BOWEL: No bowel obstruction. Appendix is not visualized. No evidence of   inflammation in the pericecal region.. There is diverticulosis of the   sigmoid colon without diverticulitis  PERITONEUM: No ascites.  VESSELS: Within normal limits.  RETROPERITONEUM/LYMPH NODES: No lymphadenopathy.  ABDOMINAL WALL: Small fat-containing inguinal hernias, left greater than   right.  BONES: Degenerative changes    IMPRESSION:  No acute pathology  Mild nonspecific bladder wall thickening  Cholecystectomy  Diverticulosis without diverticulitis        --- End of Report ---            HENNA CORDON MD; Attending Radiologist  This document has been electronically signed. Aug  2 2022  6:22PM

## 2022-08-03 NOTE — CONSULT NOTE ADULT - ASSESSMENT
56M with poorly controlled DM2, chronic diarrhea, RLQ pain that has resolved.   Unlikely infectious diarrhea, suspect related to poor DM control and autonomic neuropathy, vs pancreatic insufficiency or related to dietary habits in setting of cholecystectomy plus above issues.   Had pan endoscopic eval recently therefore less concerned about IBD or luminal malignancy.    Recommend:  -improve mgmt of diabetes  -low fat diet  -check stool tests (ordered already) for completion  -as outpatient can try pancreatic enzyme supplement trial and further eval for chronic diarrhea in diabetic  -no GI objection to d/c  -d/w pt in detail
Atypical chest pain from his usual presentation  R/O for AMI  Hemodynamically stable  Consider DC planning from cardiac point of view  To follow as outpatient for possible stress test

## 2022-08-03 NOTE — PROGRESS NOTE ADULT - SUBJECTIVE AND OBJECTIVE BOX
Chief Complaint: abdominal pain and diarrhea     Subjective and objective   55 y/o M w/ PMH of CAD s/p PCI, pancreatitis, dyslipidemia, DM2, p/w CP / Abdominal pain. Patient states abdominal pain started about 3-4 weeks ago, and is in RLQ, sharp, localized, and happens intermittently after he eats food and then he has diarrhea. States that he has had days where he had multiple episodes of diarrhea 1-2 times per week. Today patient had 3 episodes of diarrhea, however, the last time before today was 6 days ago. Patient states that for the last 1 week when he gets the abdominal pain, he also gets CP. CP is pressure in quality, in substernal area, and does not radiate to arm / jaw / back. Denies nausea, vomiting, dysuria, increased frequency, fever, chills    REVIEW OF SYSTEMS:  All other review of systems is negative unless indicated above    Vital Signs Last 24 Hrs  T(C): 36.9 (03 Aug 2022 16:07), Max: 36.9 (02 Aug 2022 22:38)  T(F): 98.5 (03 Aug 2022 16:07), Max: 98.5 (03 Aug 2022 16:07)  HR: 71 (03 Aug 2022 16:07) (68 - 72)  BP: 150/82 (03 Aug 2022 16:07) (139/76 - 157/76)  BP(mean): 91 (02 Aug 2022 22:38) (91 - 91)  RR: 18 (03 Aug 2022 16:07) (17 - 18)  SpO2: 98% (03 Aug 2022 16:07) (95% - 100%)    Parameters below as of 03 Aug 2022 16:07  Patient On (Oxygen Delivery Method): room air        I&O's Summary      CAPILLARY BLOOD GLUCOSE      POCT Blood Glucose.: 216 mg/dL (03 Aug 2022 13:26)  POCT Blood Glucose.: 177 mg/dL (03 Aug 2022 08:32)      PHYSICAL EXAM:    Constitutional: NAD, awake and alert, well-developed  HEENT: PERR, EOMI, Normal Hearing, MMM  Neck: Soft and supple, No LAD, No JVD  Respiratory: Breath sounds are clear bilaterally, No wheezing, rales or rhonchi  Cardiovascular: S1 and S2, regular rate and rhythm, no Murmurs, gallops or rubs  Gastrointestinal: Bowel Sounds present, soft, nontender, nondistended, no guarding, no rebound  Extremities: No peripheral edema  Vascular: 2+ peripheral pulses  Neurological: A/O x 3, no focal deficits  Musculoskeletal: 5/5 strength b/l upper and lower extremities  Skin: No rashes    Medications:  MEDICATIONS  (STANDING):  aspirin enteric coated 81 milliGRAM(s) Oral daily  atorvastatin 40 milliGRAM(s) Oral at bedtime  clopidogrel Tablet 75 milliGRAM(s) Oral daily  dextrose 5%. 1000 milliLiter(s) (100 mL/Hr) IV Continuous <Continuous>  dextrose 5%. 1000 milliLiter(s) (50 mL/Hr) IV Continuous <Continuous>  dextrose 50% Injectable 25 Gram(s) IV Push once  dextrose 50% Injectable 12.5 Gram(s) IV Push once  dextrose 50% Injectable 25 Gram(s) IV Push once  ferrous    sulfate 325 milliGRAM(s) Oral daily  glucagon  Injectable 1 milliGRAM(s) IntraMuscular once  insulin glargine Injectable (LANTUS) 110 Unit(s) SubCutaneous at bedtime  insulin lispro Injectable (ADMELOG) 50 Unit(s) SubCutaneous three times a day with meals  magnesium oxide 400 milliGRAM(s) Oral daily  metoprolol succinate  milliGRAM(s) Oral daily  multivitamin 1 Tablet(s) Oral daily  ranolazine 500 milliGRAM(s) Oral two times a day      Labs: All Labs Reviewed:                        16.0   8.13  )-----------( 231      ( 03 Aug 2022 07:24 )             47.7     08-03    140  |  108  |  29<H>  ----------------------------<  165<H>  3.9   |  25  |  1.32<H>    Ca    9.3      03 Aug 2022 07:24  Mg     2.0     08-02    TPro  6.7  /  Alb  3.1<L>  /  TBili  0.6  /  DBili  x   /  AST  26  /  ALT  45  /  AlkPhos  70  08-03    PT/INR - ( 03 Aug 2022 07:24 )   PT: 12.4 sec;   INR: 1.07 ratio         PTT - ( 03 Aug 2022 07:24 )  PTT:34.9 sec  Urinalysis Basic - ( 02 Aug 2022 16:57 )    Color: Yellow / Appearance: Clear / S.015 / pH: x  Gluc: x / Ketone: Negative  / Bili: Negative / Urobili: Negative   Blood: x / Protein: 100 / Nitrite: Negative   Leuk Esterase: Negative / RBC: 0-2 /HPF / WBC 0-2   Sq Epi: x / Non Sq Epi: Negative / Bacteria: Negative    RADIOLOGY/EKG: all tests reviewed  n< from: CT Abdomen and Pelvis w/ IV Cont (22 @ 17:45) >    ACC: [id number] EXAM:  CT ABDOMEN AND PELVIS IC                          PROCEDURE DATE:  2022          INTERPRETATION:  CLINICAL INFORMATION: Abdominal pain    COMPARISON: 2020 .    CONTRAST/COMPLICATIONS:  IV Contrast: Omnipaque 350  90 cc given.   10 cc discarded  Oral Contrast: None  Complications: None reported    PROCEDURE:  CT of the Abdomen and Pelvis was performed.  Sagittal and coronal reformats were performed.    FINDINGS:  LOWER CHEST: Coronary artery calcification and/or stents    LIVER: Within normal limits.  BILE DUCTS: Normal caliber.  GALLBLADDER: Cholecystectomy.  SPLEEN: Within normal limits.  PANCREAS: Within normal limits.  ADRENALS: Within normal limits.  KIDNEYS/URETERS: Within normal limits.    BLADDER: Mildwall thickening may be due to underdistention.  REPRODUCTIVE ORGANS: Prostate within normal limits.    BOWEL: No bowel obstruction. Appendix is not visualized. No evidence of   inflammation in the pericecal region.. There is diverticulosis of the   sigmoid colon without diverticulitis  PERITONEUM: No ascites.  VESSELS: Within normal limits.  RETROPERITONEUM/LYMPH NODES: No lymphadenopathy.  ABDOMINAL WALL: Small fat-containing inguinal hernias, left greater than   right.  BONES: Degenerative changes    IMPRESSION:  No acute pathology  Mild nonspecific bladder wall thickening  Cholecystectomy  Diverticulosis without diverticulitis    < end of copied text >      DVT PPX: ambulation     Advance Directive: full code     Disposition: GI cobsult

## 2022-08-03 NOTE — PROGRESS NOTE ADULT - ASSESSMENT
*CP w/ h/o CAD s/o PCI  -ASA /Plavix/BB/statin (patient states he took ASA / plavix already today)  -Cardio consult  -Tele monitoring  -Echo  -Reviewed EKG: New +RBBB   -Troponin negative    *Abdominal pain / diarrhea   -CT abd negative for acute findings   - check GI PCR   -GI consult - Dr. Cooper     *Mild bladder wall thickening noted on CT -UA negative for UTI  -F/u outpatient urologist      *Suspect VIKKI  -IVF and recheck creatinine  -Avoid nephrotoxic agents  -I/Os   -Hold ACEi temporarily  -UA     *Inguinal hernias  -F/u outpatient     *DM2  -Humalog ISS + Basal insulin   -Diabetic diet

## 2022-08-04 ENCOUNTER — NON-APPOINTMENT (OUTPATIENT)
Age: 57
End: 2022-08-04

## 2022-08-04 ENCOUNTER — TRANSCRIPTION ENCOUNTER (OUTPATIENT)
Age: 57
End: 2022-08-04

## 2022-08-04 VITALS
OXYGEN SATURATION: 96 % | SYSTOLIC BLOOD PRESSURE: 157 MMHG | DIASTOLIC BLOOD PRESSURE: 85 MMHG | HEART RATE: 67 BPM | TEMPERATURE: 98 F | RESPIRATION RATE: 18 BRPM

## 2022-08-04 LAB
ANION GAP SERPL CALC-SCNC: 7 MMOL/L — SIGNIFICANT CHANGE UP (ref 5–17)
BUN SERPL-MCNC: 23 MG/DL — SIGNIFICANT CHANGE UP (ref 7–23)
CALCIUM SERPL-MCNC: 9 MG/DL — SIGNIFICANT CHANGE UP (ref 8.5–10.1)
CHLORIDE SERPL-SCNC: 108 MMOL/L — SIGNIFICANT CHANGE UP (ref 96–108)
CO2 SERPL-SCNC: 25 MMOL/L — SIGNIFICANT CHANGE UP (ref 22–31)
CREAT SERPL-MCNC: 1.21 MG/DL — SIGNIFICANT CHANGE UP (ref 0.5–1.3)
EGFR: 71 ML/MIN/1.73M2 — SIGNIFICANT CHANGE UP
GLUCOSE SERPL-MCNC: 159 MG/DL — HIGH (ref 70–99)
HCT VFR BLD CALC: 47.5 % — SIGNIFICANT CHANGE UP (ref 39–50)
HGB BLD-MCNC: 16.3 G/DL — SIGNIFICANT CHANGE UP (ref 13–17)
MCHC RBC-ENTMCNC: 29.5 PG — SIGNIFICANT CHANGE UP (ref 27–34)
MCHC RBC-ENTMCNC: 34.3 GM/DL — SIGNIFICANT CHANGE UP (ref 32–36)
MCV RBC AUTO: 85.9 FL — SIGNIFICANT CHANGE UP (ref 80–100)
PLATELET # BLD AUTO: 240 K/UL — SIGNIFICANT CHANGE UP (ref 150–400)
POTASSIUM SERPL-MCNC: 4.2 MMOL/L — SIGNIFICANT CHANGE UP (ref 3.5–5.3)
POTASSIUM SERPL-SCNC: 4.2 MMOL/L — SIGNIFICANT CHANGE UP (ref 3.5–5.3)
RBC # BLD: 5.53 M/UL — SIGNIFICANT CHANGE UP (ref 4.2–5.8)
RBC # FLD: 15.2 % — HIGH (ref 10.3–14.5)
SODIUM SERPL-SCNC: 140 MMOL/L — SIGNIFICANT CHANGE UP (ref 135–145)
WBC # BLD: 7.17 K/UL — SIGNIFICANT CHANGE UP (ref 3.8–10.5)
WBC # FLD AUTO: 7.17 K/UL — SIGNIFICANT CHANGE UP (ref 3.8–10.5)

## 2022-08-04 PROCEDURE — 99239 HOSP IP/OBS DSCHRG MGMT >30: CPT

## 2022-08-04 PROCEDURE — 93010 ELECTROCARDIOGRAM REPORT: CPT

## 2022-08-04 RX ORDER — LIPASE/PROTEASE/AMYLASE 16-48-48K
1 CAPSULE,DELAYED RELEASE (ENTERIC COATED) ORAL
Refills: 0 | Status: DISCONTINUED | OUTPATIENT
Start: 2022-08-04 | End: 2022-08-04

## 2022-08-04 RX ORDER — LIPASE/PROTEASE/AMYLASE 16-48-48K
1 CAPSULE,DELAYED RELEASE (ENTERIC COATED) ORAL
Qty: 90 | Refills: 0
Start: 2022-08-04 | End: 2022-09-02

## 2022-08-04 RX ADMIN — Medication 200 MILLIGRAM(S): at 11:38

## 2022-08-04 RX ADMIN — MAGNESIUM OXIDE 400 MG ORAL TABLET 400 MILLIGRAM(S): 241.3 TABLET ORAL at 11:37

## 2022-08-04 RX ADMIN — RANOLAZINE 500 MILLIGRAM(S): 500 TABLET, FILM COATED, EXTENDED RELEASE ORAL at 11:40

## 2022-08-04 RX ADMIN — Medication 325 MILLIGRAM(S): at 11:38

## 2022-08-04 RX ADMIN — CLOPIDOGREL BISULFATE 75 MILLIGRAM(S): 75 TABLET, FILM COATED ORAL at 11:37

## 2022-08-04 RX ADMIN — Medication 50 UNIT(S): at 09:02

## 2022-08-04 RX ADMIN — Medication 50 UNIT(S): at 11:40

## 2022-08-04 RX ADMIN — Medication 81 MILLIGRAM(S): at 11:37

## 2022-08-04 RX ADMIN — Medication 1 TABLET(S): at 11:38

## 2022-08-04 NOTE — DISCHARGE NOTE PROVIDER - NSDCFUSCHEDAPPT_GEN_ALL_CORE_FT
Ron Rogel  [facility]  FAMILYMED [address]  Scheduled Appointment: 08/15/2022

## 2022-08-04 NOTE — DISCHARGE NOTE PROVIDER - CARE PROVIDER_API CALL
Scotty Kaye)  Cardiovascular Disease; Interventional Cardiology  [address] [place] [address]  Phone: [phone]  Fax: [phone]  Follow Up Time:     Lefty Cooper)  Gastroenterology; Internal Medicine  [address] [place] [address]  Phone: [phone]  Fax: [phone]  Follow Up Time:     Ron Rogel)  Family Medicine  [address] [facility], [address]  [place] [address]  Phone: [phone]  Fax: [phone]  Follow Up Time:

## 2022-08-04 NOTE — DISCHARGE NOTE PROVIDER - NSDCCPCAREPLAN_GEN_ALL_CORE_FT
PRINCIPAL DISCHARGE DIAGNOSIS  Diagnosis: Atypical chest pain  Assessment and Plan of Treatment: your cardiac workyup is normal   you ere seen by your cardiologist and cleared to be discharged home   you will follow up outpatient with your cardiologist      SECONDARY DISCHARGE DIAGNOSES  Diagnosis: Abdominal pain  Assessment and Plan of Treatment: your stool test did not show any infection and your CT of your abdomen was normal   at present you do not have abdominal pain and/or diarrhea   you were seen by your gastroenterologist - you do not need any procedures   eat a diet low in fat   you were started on a trial of poancreatic enzymes as sugested by Dr. Cooper - GI  you will follow up with Dr. Cooper outpatient in one week

## 2022-08-04 NOTE — DISCHARGE NOTE PROVIDER - ATTENDING DISCHARGE PHYSICAL EXAMINATION:
PHYSICAL EXAM:  General: Well developed; in no acute distress  Eyes: PERRLA, EOMI; conjunctiva and sclera clear  Head: Normocephalic; atraumatic  ENMT: No nasal discharge; airway clear  Respiratory: No wheezes, rales or rhonchi  Cardiovascular: Regular rate and rhythm. S1 and S2 Normal;   Gastrointestinal: Soft non-tender non-distended; Normal bowel sounds  Genitourinary: No  suprapubic  tenderness  Extremities: No  edema  Neurological: Alert and oriented x3, non focal   Musculoskeletal: Normal muscle tone, without deformities  Psychiatric: Cooperative and appropriate

## 2022-08-04 NOTE — DISCHARGE NOTE PROVIDER - NSDCMRMEDTOKEN_GEN_ALL_CORE_FT
aspirin 81 mg oral delayed release tablet: 1 tab(s) orally once a day  atorvastatin 40 mg oral tablet: 1 tab(s) orally once a day (at bedtime)  B Complex 50 oral tablet, extended release: 1 tab(s) orally once a day  clopidogrel 75 mg oral tablet: 1 tab(s) orally once a day  ferrous sulfate 325 mg (65 mg elemental iron) oral tablet: 1 tab(s) orally once a day  fosinopril 20 mg oral tablet: 1 tab(s) orally once a day  insulin lispro 100 units/mL subcutaneous solution: 55 unit(s) subcutaneous 3 times a day (with meals)  Jardiance 25 mg oral tablet: 1 tab(s) orally once a day (in the morning)  magnesium oxide 400 mg oral tablet: 1 tab(s) orally once a day  metoprolol succinate 200 mg oral tablet, extended release: 1 tab(s) orally once a day  pancrelipase 36,000 units-114,000 units-180,000 units oral delayed release capsule: 1 cap(s) orally 3 times a day (with meals)  ranolazine 500 mg oral tablet, extended release: 1 tab(s) orally 2 times a day  Toujeo SoloStar 300 units/mL subcutaneous solution: 110 unit(s) subcutaneous 2 times a day  Vitamin D2 1.25 mg (50,000 intl units) oral capsule: 1 cap(s) orally once a week on mondays

## 2022-08-04 NOTE — DISCHARGE NOTE NURSING/CASE MANAGEMENT/SOCIAL WORK - PATIENT PORTAL LINK FT
You can access the FollowMyHealth Patient Portal offered by [facility] by registering at the following website: [email or web address]. By joining [facility]’s FollowMyHealth portal, you will also be able to view your health information using other applications (apps) compatible with our system.

## 2022-08-04 NOTE — DISCHARGE NOTE NURSING/CASE MANAGEMENT/SOCIAL WORK - NSDCPEFALRISK_GEN_ALL_CORE
For information on Fall & Injury Prevention, visit: [email or web address] OR  [email or web address] OR  https://www.cdc.gov/steadi/patient.html

## 2022-08-04 NOTE — DISCHARGE NOTE PROVIDER - HOSPITAL COURSE
55 y/o M w/ PMH of CAD s/p PCI, pancreatitis, dyslipidemia, DM2, p/w CP / Abdominal pain. Patient states abdominal pain started about 3-4 weeks ago, and is in RLQ, sharp, localized, and happens intermittently after he eats food and then he has diarrhea. States that he has had days where he had multiple episodes of diarrhea 1-2 times per week. Today patient had 3 episodes of diarrhea, however, the last time before today was 6 days ago. Patient states that for the last 1 week when he gets the abdominal pain, he also gets CP. CP is pressure in quality, in substernal area, and does not radiate to arm / jaw / back. Denies nausea, vomiting, dysuria, increased frequency, fever, chills    # Atypical chest pain due to musculoskeletal   h/o CAD s/o PCI  -ASA /Plavix/BB/statin  -Reviewed EKG: New +RBBB   -Troponin negative    # Abdominal pain / diarrhea   -CT abd negative for acute findings   - check GI PCR - negative   - started on pancreatic enzyme supplement trial   - eat a diet low in fat   - Follow with Dr. Cooper outpatient      # Mild bladder wall thickening noted on CT   -UA negative for UTI  -F/u outpatient urologist      # VIKKI - resolved   - resolved after IV fluids - creatinine wnl   - resume ACEI on discharge     Pt. is stable for discharge, will follow up with GI, cardiology and PCP outpatient in one week.      PHYSICAL EXAM:  Vital Signs Last 24 Hrs  T(C): 36.7 (04 Aug 2022 08:33), Max: 36.9 (03 Aug 2022 16:07)  T(F): 98.1 (04 Aug 2022 08:33), Max: 98.5 (03 Aug 2022 16:07)  HR: 67 (04 Aug 2022 08:33) (67 - 71)  BP: 157/85 (04 Aug 2022 08:33) (143/80 - 157/85)  BP(mean): --  RR: 18 (04 Aug 2022 08:33) (18 - 18)  SpO2: 96% (04 Aug 2022 08:33) (96% - 98%)    Parameters below as of 04 Aug 2022 08:33  Patient On (Oxygen Delivery Method): room air      Constitutional: NAD, awake and alert, well-developed  HEENT: PERR, EOMI, Normal Hearing, MMM  Neck: Soft and supple, No LAD, No JVD  Respiratory: Breath sounds are clear bilaterally, No wheezing, rales or rhonchi  Cardiovascular: S1 and S2, regular rate and rhythm, no Murmurs, gallops or rubs  Gastrointestinal: Bowel Sounds present, soft, nontender, nondistended, no guarding, no rebound  Extremities: No peripheral edema  Vascular: 2+ peripheral pulses  Neurological: A/O x 3, no focal deficits  Musculoskeletal: 5/5 strength b/l upper and lower extremities  Skin: No rashes

## 2022-08-04 NOTE — DISCHARGE NOTE NURSING/CASE MANAGEMENT/SOCIAL WORK - NSDCVIVACCINE_GEN_ALL_CORE_FT
influenza, injectable, quadrivalent, preservative free; 12-Oct-2017 15:08; Kim Samano (RN); [employer]; [id number]; IntraMuscular; Deltoid Left.; 0.5 milliLiter(s); VIS (VIS Published: 07-Aug-2015, VIS Presented: 12-Oct-2017);   influenza, injectable, quadrivalent, preservative free; 13-Sep-2018 12:04; Eliane Otero (RN); [employer]; [id number] (Exp. Date: 30-Jun-2019); IntraMuscular; Deltoid Left.; 0.5 milliLiter(s); VIS (VIS Published: 07-Aug-2015, VIS Presented: 13-Sep-2018);   influenza, injectable, quadrivalent, preservative free; 03-Dec-2019 13:24; Benita Agosto (RN); [employer]; [id number] (Exp. Date: 30-Jun-2020); IntraMuscular; Deltoid Left.; 0.5 milliLiter(s); VIS (VIS Published: 15-Aug-2019, VIS Presented: 03-Dec-2019);

## 2022-08-05 ENCOUNTER — FORM ENCOUNTER (OUTPATIENT)
Age: 57
End: 2022-08-05

## 2022-08-08 DIAGNOSIS — Z88.0 ALLERGY STATUS TO PENICILLIN: ICD-10-CM

## 2022-08-08 DIAGNOSIS — E11.40 TYPE 2 DIABETES MELLITUS WITH DIABETIC NEUROPATHY, UNSPECIFIED: ICD-10-CM

## 2022-08-08 DIAGNOSIS — I25.10 ATHEROSCLEROTIC HEART DISEASE OF NATIVE CORONARY ARTERY WITHOUT ANGINA PECTORIS: ICD-10-CM

## 2022-08-08 DIAGNOSIS — R19.7 DIARRHEA, UNSPECIFIED: ICD-10-CM

## 2022-08-08 DIAGNOSIS — N17.9 ACUTE KIDNEY FAILURE, UNSPECIFIED: ICD-10-CM

## 2022-08-08 DIAGNOSIS — Z79.82 LONG TERM (CURRENT) USE OF ASPIRIN: ICD-10-CM

## 2022-08-08 DIAGNOSIS — E78.5 HYPERLIPIDEMIA, UNSPECIFIED: ICD-10-CM

## 2022-08-08 DIAGNOSIS — R10.9 UNSPECIFIED ABDOMINAL PAIN: ICD-10-CM

## 2022-08-08 DIAGNOSIS — R07.89 OTHER CHEST PAIN: ICD-10-CM

## 2022-08-08 DIAGNOSIS — K40.90 UNILATERAL INGUINAL HERNIA, WITHOUT OBSTRUCTION OR GANGRENE, NOT SPECIFIED AS RECURRENT: ICD-10-CM

## 2022-08-08 DIAGNOSIS — N32.89 OTHER SPECIFIED DISORDERS OF BLADDER: ICD-10-CM

## 2022-08-08 DIAGNOSIS — Z79.4 LONG TERM (CURRENT) USE OF INSULIN: ICD-10-CM

## 2022-08-15 ENCOUNTER — APPOINTMENT (OUTPATIENT)
Dept: FAMILY MEDICINE | Facility: CLINIC | Age: 57
End: 2022-08-15

## 2022-08-15 VITALS
HEART RATE: 72 BPM | SYSTOLIC BLOOD PRESSURE: 128 MMHG | DIASTOLIC BLOOD PRESSURE: 88 MMHG | OXYGEN SATURATION: 98 % | HEIGHT: 72 IN | WEIGHT: 260 LBS | BODY MASS INDEX: 35.21 KG/M2 | TEMPERATURE: 97.2 F

## 2022-08-15 PROCEDURE — 99214 OFFICE O/P EST MOD 30 MIN: CPT

## 2022-08-15 NOTE — ASSESSMENT
[FreeTextEntry1] : He is here to follow up on coronary artery disease, diabetes, hypertension, and hyperlipidemia. He has very high triglycerides which could cause pancreatitis but he reportedly did not have pancreatitis based upon evaluation in the hospital. The working diagnosis from the hospital team was autonomic neuropathy related to poorly controlled diabetes. \par \par He is feeling better since he started Creon, prescribed by Dr. Cooper. He continues to have check pain however. He is scheduled for a coronary angiogram in 2 days with Dr. Kaye.\par \par He needs diabetes education and better control of his blood sugar. This should be managed by his endocrinologist. He is scheduled to see a nutritionist. He forgot to take his medication this morning and his blood sugar is high on his CGM (307). \par

## 2022-08-15 NOTE — PHYSICAL EXAM
[No Carotid Bruits] : no carotid bruits [Soft] : abdomen soft [Non Tender] : non-tender [No Masses] : no abdominal mass palpated [No Focal Deficits] : no focal deficits [Normal] : affect was normal and insight and judgment were intact [de-identified] : 2/6 systolic murmur at LUSB

## 2022-08-15 NOTE — PLAN
[FreeTextEntry1] : Continue all medications as prescribed. Follow up with Dr. Kaye for cardiac cath this week.\par \par Reviewed age-appropriate preventive screening tests with patient.\par \par Discussed clean eating (e.g. Mediterranean style diet) and recommendations for regular exercise/staying as physically active as possible.\par \par Reviewed importance of good self care (e.g. meditation, yoga, adequate rest, regular exercise, magnesium, clean eating, etc.).\par

## 2022-08-15 NOTE — HISTORY OF PRESENT ILLNESS
[FreeTextEntry1] : JAMES GONZALEZ is a 56 year old male here for a follow up visit.  [de-identified] : He is here for hospital follow up. He presented to the office on 8/2 complaining of 2-3 weeks of epigastric pain radiating to his right back. He has a history of pancreatitis and his symptoms felt similar to previous episodes of pancreatitis. He was sent to the ER for evaluation. He was admitted to the hospital for evaluation. All workup was negative and he was discharged home on 8/4. He was seen by GI who prescribed Creon for pancreatic enzyme failure. The patient feels that his symptoms are improved on this medication.\par \par He has a history of coronary artery disease, diabetes, hypertension, and hyperlipidemia. He had poorly controlled diabetes, anemia, and worsened renal failure at his physical in April. He was referred to an endocrinologist for management of his diabetes. The endocrinologist increased his insulin and advised him to return in 3 months.

## 2022-08-16 NOTE — CHART NOTE - NSCHARTNOTEFT_GEN_A_CORE
Preop Phone Call:  - Able to Reach Patient:  yes  - Info given to: patient having cardiac catheterization  -  and allergies confirmed: yes  - Medication Information Given: yes  - Medications To Take (specify) Ok to take a.m. meds w/sip of water  - Medications To Hold (Specify): Hold HCTZ and DM med in the morning tomorrow, Have Toreema Manleyar 12 unit tonight (half dose of usual dose)   - Arrival Time: 10: 15 am   - NPO after: MN   - Understanding of Information Verbalized: yes  will have a  over the age of 18  for d/c home

## 2022-08-16 NOTE — H&P ADULT - NSHPLABSRESULTS_GEN_ALL_CORE
Cardiac cath (2/9/22): s/p BEATRICE to distal LAD, PTCA on ostial D1  Cardiac cath (1/26/22): s/p BEATRICE to proximal LAD, POBA to mid LAD and D1  Cardiaccath < from: Cardiac Cath Lab - Adult (09.12.18 @ 09:05) >  Diagnostic Conclusions   Single vessel CAD with 80% pLAD   Normal LV systolic function. Estimated LV ejection fraction is 60 %.  Interventional Conclusions  Successful Coronary Intervention BEATRICE of proximal LAD.  Recommendations  Aggressive medical management of coronary artery disease and its  underlying risk factors.   Dual antiplatelet therapy for at least one year  < end of copied text > 8/17/2022  EKG  SR rate 69    Cardiac cath (2/9/22): s/p BEATRICE to distal LAD, PTCA on ostial D1  Cardiac cath (1/26/22): s/p BEATRICE to proximal LAD, POBA to mid LAD and D1  Cardiaccath < from: Cardiac Cath Lab - Adult (09.12.18 @ 09:05) >  Diagnostic Conclusions   Single vessel CAD with 80% pLAD   Normal LV systolic function. Estimated LV ejection fraction is 60 %.  Interventional Conclusions  Successful Coronary Intervention BEATRICE of proximal LAD.  Recommendations  Aggressive medical management of coronary artery disease and its  underlying risk factors.   Dual antiplatelet therapy for at least one year  < end of copied text >

## 2022-08-16 NOTE — H&P ADULT - NSHPPHYSICALEXAM_GEN_ALL_CORE
PHYSICAL EXAM  GENERAL: NAD, AAOx3, morbid obesity  HEAD:  Atraumatic, Normocephalic  EYES: EOMI, PERRLA, conjunctiva and sclera clear  NECK: Supple, No JVD, No LAD  CHEST/LUNG: Clear to auscultation bilaterally; No wheeze  HEART: s1 s2 Regular rate and rhythm; No murmurs, rubs, or gallops  ABDOMEN: Soft, Nontender, Nondistended; Bowel sounds present X 4 quadrants   EXTREMITIES:  2+ Peripheral Pulses, No clubbing, cyanosis, or edema  SKIN: B/L LE ulcers   NEURO: nonfocal CN/motor/sensory/reflexes  Psych: normal affect and behavior, calm and cooperative

## 2022-08-16 NOTE — H&P ADULT - ASSESSMENT
ASA class:  Cr:  GFR;  Bleeding risk:  Gordy score:   56 y.o male with PMhx of HTN, HLD, DM, symptomatic NSVT on BB, CAD, s/p LAD stents in multiple time (recent in 2/2022), recent admission with atypical anginal symptoms and discharged after negative blood work presented to cardiology office for evaluation of atypical anginal pain, which is getting more in frequency.   Pt referred for cardiac cath for further ischemic evaluation.     ASA class: II  Cr:  1.32  GFR; 64  Bleeding risk:  0.8%  Gordy score:  1pt

## 2022-08-16 NOTE — H&P ADULT - NSICDXFAMILYHX_GEN_ALL_CORE_FT
FAMILY HISTORY:  Sibling  Still living? Unknown  Family history of diabetes mellitus, Age at diagnosis: Age Unknown     FAMILY HISTORY:  Father  Still living? Unknown  FH: heart disease, Age at diagnosis: Age Unknown    Mother  Still living? Unknown  FH: heart disease, Age at diagnosis: Age Unknown    Sibling  Still living? Unknown  Family history of diabetes mellitus, Age at diagnosis: Age Unknown  FH: heart disease, Age at diagnosis: Age Unknown

## 2022-08-16 NOTE — H&P ADULT - PROBLEM SELECTOR PLAN 1
- LEIF protocol pre hydration with NS 250cc Iv bolus x1   - Procedure risks, alternatives, benefits and potential complications were discussed in detail. Risks include but not limited to bleeding, infection, allergy, renal failure requiring dialysis, stroke, vascular injury, pericardial tamponade, arrhythmias, MI and even death.   - Informed consent obtained and Pt verbalizes and understands preprocedure instructions. - LEIF protocol pre hydration with NS 250cc Iv bolus x1   -Consent obtained for cardiac catheterization w/ coronary angiogram and possible stent placement. Pt is competent, has capacity, and understands risks and benefits of procedure. Risks and benefits discussed. Risk discussed included, but not limited to MI, stroke, mortality, major bleeding, arrythmia, or infection. All questions answered

## 2022-08-16 NOTE — H&P ADULT - HISTORY OF PRESENT ILLNESS
56 y.o male with PMhx of HTN, HLD, DM, symptomatic NSVT on BB, CAD, s/p LAD stents in multiple time (recent in 2/2022), recent admission with atypical anginal symptoms and discharged after negative blood work presented to cardiology office for evaluation of atypical anginal pain, which is getting more in frequency.   Pt referred for cardiac cath for further ischemic evaluation.   COVID-19 PCR (8/14/22): NotDetect

## 2022-08-17 ENCOUNTER — OUTPATIENT (OUTPATIENT)
Dept: OUTPATIENT SERVICES | Facility: HOSPITAL | Age: 57
LOS: 1 days | Discharge: HOME | End: 2022-08-17
Payer: COMMERCIAL

## 2022-08-17 VITALS
SYSTOLIC BLOOD PRESSURE: 156 MMHG | RESPIRATION RATE: 16 BRPM | WEIGHT: 257.94 LBS | DIASTOLIC BLOOD PRESSURE: 77 MMHG | OXYGEN SATURATION: 98 % | TEMPERATURE: 98 F | HEIGHT: 72 IN | HEART RATE: 71 BPM

## 2022-08-17 VITALS
HEART RATE: 65 BPM | SYSTOLIC BLOOD PRESSURE: 143 MMHG | RESPIRATION RATE: 16 BRPM | DIASTOLIC BLOOD PRESSURE: 75 MMHG | OXYGEN SATURATION: 98 %

## 2022-08-17 DIAGNOSIS — I21.4 NON-ST ELEVATION (NSTEMI) MYOCARDIAL INFARCTION: ICD-10-CM

## 2022-08-17 DIAGNOSIS — Z95.5 PRESENCE OF CORONARY ANGIOPLASTY IMPLANT AND GRAFT: Chronic | ICD-10-CM

## 2022-08-17 DIAGNOSIS — R07.9 CHEST PAIN, UNSPECIFIED: ICD-10-CM

## 2022-08-17 DIAGNOSIS — Z90.49 ACQUIRED ABSENCE OF OTHER SPECIFIED PARTS OF DIGESTIVE TRACT: Chronic | ICD-10-CM

## 2022-08-17 PROCEDURE — C9600: CPT | Mod: LD

## 2022-08-17 PROCEDURE — 36415 COLL VENOUS BLD VENIPUNCTURE: CPT

## 2022-08-17 PROCEDURE — 86900 BLOOD TYPING SEROLOGIC ABO: CPT

## 2022-08-17 PROCEDURE — C1894: CPT

## 2022-08-17 PROCEDURE — C1760: CPT

## 2022-08-17 PROCEDURE — C1769: CPT

## 2022-08-17 PROCEDURE — 86901 BLOOD TYPING SEROLOGIC RH(D): CPT

## 2022-08-17 PROCEDURE — 86850 RBC ANTIBODY SCREEN: CPT

## 2022-08-17 PROCEDURE — C1874: CPT

## 2022-08-17 PROCEDURE — 93005 ELECTROCARDIOGRAM TRACING: CPT

## 2022-08-17 PROCEDURE — 93010 ELECTROCARDIOGRAM REPORT: CPT

## 2022-08-17 PROCEDURE — C1725: CPT

## 2022-08-17 PROCEDURE — C1887: CPT

## 2022-08-17 RX ORDER — LISINOPRIL 2.5 MG/1
20 TABLET ORAL DAILY
Refills: 0 | Status: DISCONTINUED | OUTPATIENT
Start: 2022-08-17 | End: 2022-08-17

## 2022-08-17 RX ORDER — SODIUM CHLORIDE 9 MG/ML
250 INJECTION INTRAMUSCULAR; INTRAVENOUS; SUBCUTANEOUS ONCE
Refills: 0 | Status: COMPLETED | OUTPATIENT
Start: 2022-08-17 | End: 2022-08-17

## 2022-08-17 RX ORDER — MAGNESIUM OXIDE 400 MG ORAL TABLET 241.3 MG
400 TABLET ORAL DAILY
Refills: 0 | Status: DISCONTINUED | OUTPATIENT
Start: 2022-08-17 | End: 2022-08-17

## 2022-08-17 RX ORDER — METOPROLOL TARTRATE 50 MG
200 TABLET ORAL DAILY
Refills: 0 | Status: DISCONTINUED | OUTPATIENT
Start: 2022-08-17 | End: 2022-08-17

## 2022-08-17 RX ORDER — LIPASE/PROTEASE/AMYLASE 16-48-48K
1 CAPSULE,DELAYED RELEASE (ENTERIC COATED) ORAL
Refills: 0 | Status: DISCONTINUED | OUTPATIENT
Start: 2022-08-17 | End: 2022-08-17

## 2022-08-17 RX ORDER — ASPIRIN/CALCIUM CARB/MAGNESIUM 324 MG
81 TABLET ORAL DAILY
Refills: 0 | Status: DISCONTINUED | OUTPATIENT
Start: 2022-08-18 | End: 2022-08-17

## 2022-08-17 RX ORDER — SODIUM CHLORIDE 9 MG/ML
1000 INJECTION INTRAMUSCULAR; INTRAVENOUS; SUBCUTANEOUS
Refills: 0 | Status: DISCONTINUED | OUTPATIENT
Start: 2022-08-17 | End: 2022-08-17

## 2022-08-17 RX ORDER — ATORVASTATIN CALCIUM 80 MG/1
40 TABLET, FILM COATED ORAL AT BEDTIME
Refills: 0 | Status: DISCONTINUED | OUTPATIENT
Start: 2022-08-17 | End: 2022-08-17

## 2022-08-17 RX ORDER — FERROUS SULFATE 325(65) MG
325 TABLET ORAL DAILY
Refills: 0 | Status: DISCONTINUED | OUTPATIENT
Start: 2022-08-17 | End: 2022-08-17

## 2022-08-17 RX ORDER — RANOLAZINE 500 MG/1
500 TABLET, FILM COATED, EXTENDED RELEASE ORAL
Refills: 0 | Status: DISCONTINUED | OUTPATIENT
Start: 2022-08-17 | End: 2022-08-17

## 2022-08-17 RX ORDER — CLOPIDOGREL BISULFATE 75 MG/1
75 TABLET, FILM COATED ORAL DAILY
Refills: 0 | Status: DISCONTINUED | OUTPATIENT
Start: 2022-08-18 | End: 2022-08-17

## 2022-08-17 RX ADMIN — SODIUM CHLORIDE 230 MILLILITER(S): 9 INJECTION INTRAMUSCULAR; INTRAVENOUS; SUBCUTANEOUS at 13:22

## 2022-08-17 RX ADMIN — SODIUM CHLORIDE 250 MILLILITER(S): 9 INJECTION INTRAMUSCULAR; INTRAVENOUS; SUBCUTANEOUS at 10:42

## 2022-08-17 NOTE — ASU PATIENT PROFILE, ADULT - FALL HARM RISK - UNIVERSAL INTERVENTIONS
Bed in lowest position, wheels locked, appropriate side rails in place/Call bell, personal items and telephone in reach/Instruct patient to call for assistance before getting out of bed or chair/Non-slip footwear when patient is out of bed/[place] to call system/Physically safe environment - no spills, clutter or unnecessary equipment/Purposeful Proactive Rounding/Room/bathroom lighting operational, light cord in reach

## 2022-08-17 NOTE — PACU DISCHARGE NOTE - COMMENTS
Patient s/p LHC with stent. Patient A&Ox4, VSS. Right groin dressing clean dry intact, no s/s of hematoma or bleeding noted. Patient discharged home as per orders. PIVL removed. No evidence of infiltration noted at discharge. Patient with no complaints of pain, SOB, or chest pain at discharge. All discharge paperwork reviewed in including medication list, stent card and mynx closure device brochure given to patient. Patient verbalized understanding to all and without concerns at this time. Patient safely discharged off unit without incident

## 2022-08-17 NOTE — PACU DISCHARGE NOTE - MENTAL STATUS: PATIENT PARTICIPATION
Awake COLONOSCOPY performed by Caitie Shaikh MD at [facility]    MO EGD TRANSORAL BIOPSY SINGLE/MULTIPLE N/A 8/22/2017    EGD BIOPSY performed by Caitie Shaikh MD at [address] ECHOCARDIOGRAM  05/26/2011    LV mildly dilated, systolic function mildly reduced. EF 40-45%. Mild diffuse hypokinesis. Mild lateral wall was hypokinetic. Apical lateral wall was dyskinetic. Wall thickness was at upper limits of normal. LA moderately dilated. RV and RA mildly dilated. Systolic function mildly reduced. Mild MR and TR.  TRANSTHORACIC ECHOCARDIOGRAM  01/14/2008    Biatrial enlargement, RV dilated. Atheromatous aortic root. Borderline LVH. Normal LV systolic function. EF 55%. Prosthetic MV appears to be functioning well. Mildly sclerosed AV. Trivial MR, mild TR, trivial PI, RVSP 47mmHg.  TRANSTHORACIC ECHOCARDIOGRAM  2-08-00    Moderate MV stenosis based on available echo doppler data.  UPPER GASTROINTESTINAL ENDOSCOPY  08/2017       Social History     Tobacco Use    Smoking status: Never Smoker    Smokeless tobacco: Never Used    Tobacco comment: Friends were heavy smokers   Substance Use Topics    Alcohol use: Yes     Alcohol/week: 0.0 standard drinks     Comment: Occassionally    Drug use: No       Allergies   Allergen Reactions    Adalat [Nifedipine] Itching     redness    Amlodipine Swelling     If take more than 5mg legs swell, hot and red    Potassium-Containing Compounds      SHIMA    Tape [Adhesive Tape] Rash     Skin pulls off       Current Outpatient Medications   Medication Sig Dispense Refill    amoxicillin (AMOXIL) 500 MG capsule Take by mouth Prior to teeth cleaning.  mometasone (ELOCON) 0.1 % cream Apply topically daily. 50 g 0    Acetaminophen (TYLENOL PO) Take by mouth See Admin Instructions Don't take more than 3,000 mg each day.       hydrALAZINE (APRESOLINE) 50 MG tablet TAKE 1 TABLET THREE TIMES A  tablet 3  JANUVIA 100 MG tablet TAKE 1 TABLET DAILY 90 tablet 3    nitroGLYCERIN (NITROLINGUAL) 0.4 MG/SPRAY 0.4 mg spray Indications: Angina Pectoris 1 spray SL q5 minutes prn chest pain. If third spray does not relieve pain, call 9-1-1. 1 Bottle 0    warfarin (COUMADIN) 1 MG tablet TAKE 2 TABLETS DAILY EXCEPT TAKE 1 TABLET DAILY ON MONDAYS 180 tablet 3    glimepiride (AMARYL) 2 MG tablet TAKE 1 TABLET TWICE A  tablet 3    blood glucose test strips (ADVOCATE REDI-CODE) strip USE ONCE DAILY 100 strip 0    furosemide (LASIX) 40 MG tablet TAKE 1 TABLET DAILY 90 tablet 3    loperamide (IMODIUM) 2 MG capsule Take 2 mg by mouth 4 times daily as needed for Diarrhea      simvastatin (ZOCOR) 10 MG tablet TAKE 1 TABLET AT BEDTIME 90 tablet 4    UNABLE TO FIND Apply topically See Admin Instructions Indications: Pain Lavendar oil      metoprolol tartrate (LOPRESSOR) 50 MG tablet TAKE 1 TABLET TWICE A  tablet 4    olmesartan (BENICAR) 40 MG tablet TAKE 1 TABLET DAILY 90 tablet 4    metFORMIN (GLUCOPHAGE-XR) 500 MG extended release tablet TAKE 4 TABLETS DAILY 360 tablet 4    fluorouracil (EFUDEX) 5 % cream Apply topically daily Indications: precancer treatment Off it for intervals, then back on.  metroNIDAZOLE (METROCREAM) 0.75 % cream Apply topically See Admin Instructions       Blood Glucose Monitoring Suppl (ADVOCATE BLOOD GLUCOSE MONITOR) CONNIE Dx: 250.00 1 Device 0    fluticasone (FLONASE) 50 MCG/ACT nasal spray 2 sprays by Nasal route daily (Patient taking differently: 2 sprays by Nasal route daily as needed ) 1 Bottle 6     No current facility-administered medications for this visit.         Family History   Problem Relation Age of Onset    Hypertension Mother     Stroke Mother     Diabetes Mother     Cancer Father     Heart Attack Brother     Heart Disease Brother     High Blood Pressure Brother         Review of Systems -   Review of Systems Constitutional: Negative for activity change, appetite change, chills and fever. HENT: Negative for congestion and postnasal drip. Eyes: Negative. Respiratory: Negative for cough, chest tightness, shortness of breath, wheezing and stridor. Cardiovascular: Negative for chest pain and leg swelling. Gastrointestinal: Negative for diarrhea and nausea. Endocrine: Negative. Genitourinary: Negative. Musculoskeletal: Negative. Negative for arthralgias and back pain. Skin: Negative. Allergic/Immunologic: Negative. Neurological: Negative. Negative for dizziness and light-headedness. Psychiatric/Behavioral: Negative. All other systems reviewed and are negative. Physical Exam:    BMI:  Body mass index is 31.01 kg/m². Wt Readings from Last 3 Encounters:   12/29/20 210 lb (95.3 kg)   07/23/20 218 lb (98.9 kg)   06/23/20 218 lb 9.6 oz (99.2 kg)     Weight lost 8 lbs over 5 months  Vitals: /64 (Site: Left Upper Arm, Position: Sitting)   Pulse 83   Temp 96.5 °F (35.8 °C) (Tympanic)   Ht 5' 9\" (1.753 m)   Wt 210 lb (95.3 kg)   SpO2 98% Comment: room air  BMI 31.01 kg/m²       Physical Exam  Constitutional:       Appearance: She is well-developed. HENT:      Head: Normocephalic and atraumatic. Right Ear: External ear normal.      Left Ear: External ear normal.   Eyes:      Conjunctiva/sclera: Conjunctivae normal.      Pupils: Pupils are equal, round, and reactive to light. Neck:      Musculoskeletal: Normal range of motion and neck supple. Cardiovascular:      Rate and Rhythm: Normal rate and regular rhythm. Heart sounds: Normal heart sounds. Pulmonary:      Effort: Pulmonary effort is normal.      Breath sounds: Normal breath sounds. Musculoskeletal: Normal range of motion. Skin:     General: Skin is warm and dry. Neurological:      Mental Status: She is alert and oriented to person, place, and time.    Psychiatric:         Behavior: Behavior normal. Thought Content: Thought content normal.         Judgment: Judgment normal.           ASSESSMENT/DIAGNOSIS     Diagnosis Orders   1. Obstructive sleep apnea on CPAP     2. Obesity (BMI 30-39. 9)              Plan   Do you need any equipment today? Yes update mask  - Discussed improving compliance  - Also discussed surgical options if unable to improve compliance   - She is agreeable to trying to improve compliance   - Download reviewed and discussed with patient  - She  was advised to continue current positive airway pressure therapy with above described pressure. - She  advised to keep good compliance with current recommended pressure to get optimal results and clinical improvement  - Recommend 7-9 hours of sleep with PAP  - She was advised to call [facility] regarding supplies if needed.   -She call my office for earlier appointment if needed for worsening of sleep symptoms.   - She was instructed on weight loss  - Regi Long was educated about my impression and plan. Patient verbalizesunderstanding.   We will see Ki Sandra back in: 3 months with download    Information added by my medical assistant/LPN was reviewed today         Julia Ashley PA-C, [facility]  12/29/2020       Total time for visit was 40 min

## 2022-08-17 NOTE — PROGRESS NOTE ADULT - SUBJECTIVE AND OBJECTIVE BOX
Cath Lab Nurse Practitioner Note  HPI:  56 y.o male with PMhx of HTN, HLD, DM, symptomatic NSVT on BB, CAD, s/p LAD stents in multiple time (recent in 2/2022), recent admission with atypical anginal symptoms and discharged after negative blood work presented to cardiology office for evaluation of atypical anginal pain, which is getting more in frequency.   Pt referred for cardiac cath for further ischemic evaluation.   COVID-19 PCR (8/14/22): NotDetect  (16 Aug 2022 15:25)    s/p LHC : ISR kellen x 1 to pLAD  Pt denies chest pain/SOB/palpitations post cath.      Vital Signs Last 24 Hrs  T(C): 36.7 (17 Aug 2022 10:19), Max: 36.7 (17 Aug 2022 10:19)  T(F): 98 (17 Aug 2022 10:19), Max: 98 (17 Aug 2022 10:19)  HR: 71 (17 Aug 2022 10:19) (71 - 71)  BP: 156/77 (17 Aug 2022 10:19) (156/77 - 156/77)  RR: 16 (17 Aug 2022 10:19) (16 - 16)  SpO2: 98% (17 Aug 2022 10:19) (98% - 98%)    Parameters below as of 17 Aug 2022 10:19  Patient On (Oxygen Delivery Method): room air  MEDICATIONS  (STANDING):  sodium chloride 0.9%. 1000 milliLiter(s) (230 mL/Hr) IV Continuous <Continuous>      PHYSICAL EXAM  GENERAL: NAD, AAOx3  HEAD:  Atraumatic, Normocephalic  EYES: EOMI, PERRLA, conjunctiva and sclera clear  NECK: Supple, No JVD, No LAD  CHEST/LUNG: Clear to auscultation bilaterally; No wheeze  HEART: s1 s2 Regular rate and rhythm; No murmurs, rubs, or gallops  ABDOMEN: Soft, Nontender, Nondistended; Bowel sounds present X 4 quadrants   EXTREMITIES:  2+ Peripheral Pulses, No clubbing, cyanosis, or edema  SKIN: No rashes or lesions,  b/l LE not red, cool to touch,, no open skin no drainage  NEURO: nonfocal CN/motor/sensory/reflexes  Psych: normal affect and behavior, calm and cooperative   PROCEDURE SITE: RFA with mynx ,Site is without hematoma or bleeding. Sensation and ZENON intact. Distal pulses palpable 2+, capillary refill < 2 seconds. Patient denies pain, numbness, tingling, CP or SOB. Clean dry dressing applied     EKG: Post PCI  NSR RBBB  rate 68    PROCEDURE RESULTS: full report to follow     ASSESSMENT : HPI:  56 y.o male with PMhx of HTN, HLD, DM, symptomatic NSVT on BB, CAD, s/p LAD stents in multiple time (recent in 2/2022), recent admission with atypical anginal symptoms and discharged after negative blood work presented to cardiology office for evaluation of atypical anginal pain, which is getting more in frequency.   Pt referred for cardiac cath for further ischemic evaluation.   COVID-19 PCR (8/14/22): NotDetect  (16 Aug 2022 15:25)    PLAN:  CAD, s/p KELLEN to pLAD   Admit to CCU/CICU  -IV hydration NS @ 230 cc/hr x 4 hours  VS, lab, diet and activity per PCI post orders  -continue ASA/plavix/b-blocker/statin  -f/u EKG in AM, AM Labs  -plan discussed with patient and Dr Kaye   -Discussed therapeutic lifestyle changes to reduce risk factors such as following a cardiac diet, weight loss, maintaining a healthy weight, exercise, smoking cessation, medication compliance, and regular follow-up  with PCP/Cardioloigst  --Post cath instructions reviewed, patient verbalizes and understands instructions

## 2022-08-19 DIAGNOSIS — I25.110 ATHEROSCLEROTIC HEART DISEASE OF NATIVE CORONARY ARTERY WITH UNSTABLE ANGINA PECTORIS: ICD-10-CM

## 2022-08-19 DIAGNOSIS — Z95.5 PRESENCE OF CORONARY ANGIOPLASTY IMPLANT AND GRAFT: ICD-10-CM

## 2022-08-19 DIAGNOSIS — I10 ESSENTIAL (PRIMARY) HYPERTENSION: ICD-10-CM

## 2022-10-06 ENCOUNTER — APPOINTMENT (OUTPATIENT)
Dept: FAMILY MEDICINE | Facility: CLINIC | Age: 57
End: 2022-10-06

## 2022-10-06 ENCOUNTER — NON-APPOINTMENT (OUTPATIENT)
Age: 57
End: 2022-10-06

## 2022-10-06 VITALS
OXYGEN SATURATION: 97 % | TEMPERATURE: 96.2 F | HEART RATE: 76 BPM | SYSTOLIC BLOOD PRESSURE: 122 MMHG | DIASTOLIC BLOOD PRESSURE: 80 MMHG | WEIGHT: 255 LBS | BODY MASS INDEX: 34.54 KG/M2 | HEIGHT: 72 IN

## 2022-10-06 DIAGNOSIS — J06.9 ACUTE UPPER RESPIRATORY INFECTION, UNSPECIFIED: ICD-10-CM

## 2022-10-06 LAB — S PYO AG SPEC QL IA: NEGATIVE

## 2022-10-06 PROCEDURE — 99213 OFFICE O/P EST LOW 20 MIN: CPT | Mod: 25

## 2022-10-06 PROCEDURE — 87880 STREP A ASSAY W/OPTIC: CPT | Mod: QW

## 2022-10-06 NOTE — ASSESSMENT
[FreeTextEntry1] : Patient is a 58yo male presenting to the office complaining of 2 days sore throat, several days of left earache.\par \par Rapid strep negative in office.\par Throat culture sent to lab.\par Likely viral.\par Warm salt water gargles, warm tea/water, Ibuprofen/Acetaminophen as needed for pain/fever.\par Flonse encouraged.\par Supportive care including increased fluids, Ibuprofen/Acetaminophen as needed for pain/aches/fevers, OTC mucolytics(Mucinex or Robitussin)/nasal decongestants (Sudafed or Coricidin), nasal saline spray or Neti pot as needed.  Also recommend use of nasal steroid sprays (i.e. Flonase), Afrin (OTC decongestant spray) used SPARINGLY (not for more than 2-3 days in a row) can help decrease nasal congestion and help sinuses to drain.\par \par Call the office or go to the ED immediately if you develop new, worsening or concerning symptoms including high fever, severe headache/worst headache of your life, confusion, dizziness/lightheadedness, loss of consciousness, severe chest pain, difficulty breathing, shortness of breath, severe abdominal pain, excessive vomiting/diarrhea, inability to feel/move the extremities, or any other concerning symptoms.\par

## 2022-10-06 NOTE — REVIEW OF SYSTEMS
[Earache] : earache [Postnasal Drip] : postnasal drip [Sore Throat] : sore throat [Cough] : cough [Negative] : Heme/Lymph [Nasal Discharge] : no nasal discharge [Shortness Of Breath] : no shortness of breath [Wheezing] : no wheezing

## 2022-10-06 NOTE — PHYSICAL EXAM
[No Edema] : there was no peripheral edema [Normal] : no rash [Coordination Grossly Intact] : coordination grossly intact [No Focal Deficits] : no focal deficits [Normal Gait] : normal gait [Normal Affect] : the affect was normal [Normal Insight/Judgement] : insight and judgment were intact [de-identified] : effusion left TM, no bulging/erythema or blunted light reflex; erythema in pharynx with small amount of white exudates, uvula midline, no trismus

## 2022-10-06 NOTE — HISTORY OF PRESENT ILLNESS
[FreeTextEntry8] : Pt is a 56yo male presenting to the office complaining of sore throat.\par Pt reports several days of left ear pain\par Reports sore throat that began yesterday.\par States throat is red with white spots.\par Has mild cough in the morning.\par Denies fever, body aches.\par No known sick contacts.\par Pt states he tried Benadryl Allergy with some improvement.\par Has not tested himself for COVID, declines testing today.

## 2022-10-10 LAB — BACTERIA THROAT CULT: NORMAL

## 2022-10-12 ENCOUNTER — NON-APPOINTMENT (OUTPATIENT)
Age: 57
End: 2022-10-12

## 2022-11-10 ENCOUNTER — NON-APPOINTMENT (OUTPATIENT)
Age: 57
End: 2022-11-10

## 2022-11-10 ENCOUNTER — APPOINTMENT (OUTPATIENT)
Dept: FAMILY MEDICINE | Facility: CLINIC | Age: 57
End: 2022-11-10

## 2022-11-10 VITALS
HEART RATE: 80 BPM | OXYGEN SATURATION: 98 % | SYSTOLIC BLOOD PRESSURE: 142 MMHG | BODY MASS INDEX: 34.54 KG/M2 | DIASTOLIC BLOOD PRESSURE: 84 MMHG | WEIGHT: 255 LBS | TEMPERATURE: 97 F | HEIGHT: 72 IN

## 2022-11-10 VITALS — DIASTOLIC BLOOD PRESSURE: 82 MMHG | SYSTOLIC BLOOD PRESSURE: 138 MMHG

## 2022-11-10 DIAGNOSIS — S81.811A LACERATION W/OUT FOREIGN BODY, RIGHT LOWER LEG, INITIAL ENCOUNTER: ICD-10-CM

## 2022-11-10 PROCEDURE — 99213 OFFICE O/P EST LOW 20 MIN: CPT

## 2022-11-10 NOTE — HISTORY OF PRESENT ILLNESS
[FreeTextEntry8] : Patient presents with an infected cut on his shin. He states that he was climbing up on the back of his truck last week when he fell and cut his right shin on his tailgate. He has been applying bacitracin and ensuring that the cut is covered; however he believes he is allergic to bacitracin due to the redness following applying it to the wound. He adds that he has been experiencing pain when he walks. He denies any fever or purulent drainage.

## 2022-11-10 NOTE — ASSESSMENT
[FreeTextEntry1] : Likely cellulitis of right shin surrounding a laceration. Would recommend treatment with oral antibiotics due to the location of the wound overlying the tibia.

## 2022-11-10 NOTE — PLAN
[FreeTextEntry1] : Recommended applying Vaseline to the wound to prevent dryness. Prescribed Bactrim (oral) and Bactroban (topical). Informed to come for a follow up next week if wound/symptoms do not improve. Recommended reapplying bandage before bed and in the morning and to take antibiotics twice daily. If the infection does not improve he may need hospitalization for IV antibiotics to prevent osteomyelitis of the shin.

## 2022-11-10 NOTE — REVIEW OF SYSTEMS
[Negative] : Heme/Lymph [Fever] : no fever [FreeTextEntry9] : c [de-identified] : cut on right shin, redness

## 2022-11-10 NOTE — PHYSICAL EXAM
[No Respiratory Distress] : no respiratory distress  [No Joint Swelling] : no joint swelling [Grossly Normal Strength/Tone] : grossly normal strength/tone [No Focal Deficits] : no focal deficits [Normal] : affect was normal and insight and judgment were intact [de-identified] : linear laceration/abrasion on the right shin, granulation tissue is visible but there is surrounding redness of the wound. It is not warm to the touch

## 2022-12-21 ENCOUNTER — FORM ENCOUNTER (OUTPATIENT)
Age: 57
End: 2022-12-21

## 2023-02-21 ENCOUNTER — APPOINTMENT (OUTPATIENT)
Dept: FAMILY MEDICINE | Facility: CLINIC | Age: 58
End: 2023-02-21
Payer: MEDICARE

## 2023-02-21 VITALS
BODY MASS INDEX: 34.54 KG/M2 | SYSTOLIC BLOOD PRESSURE: 142 MMHG | HEIGHT: 72 IN | TEMPERATURE: 97 F | DIASTOLIC BLOOD PRESSURE: 82 MMHG | WEIGHT: 255 LBS | OXYGEN SATURATION: 98 % | HEART RATE: 77 BPM

## 2023-02-21 VITALS — SYSTOLIC BLOOD PRESSURE: 145 MMHG | DIASTOLIC BLOOD PRESSURE: 85 MMHG

## 2023-02-21 DIAGNOSIS — R09.82 POSTNASAL DRIP: ICD-10-CM

## 2023-02-21 DIAGNOSIS — J02.9 ACUTE PHARYNGITIS, UNSPECIFIED: ICD-10-CM

## 2023-02-21 PROCEDURE — 99214 OFFICE O/P EST MOD 30 MIN: CPT

## 2023-02-21 NOTE — REVIEW OF SYSTEMS
[Headache] : headache [Negative] : Heme/Lymph [Chills] : chills [Vision Problems] : vision problems [Sore Throat] : sore throat [Cough] : cough [FreeTextEntry4] : burn on back of throat

## 2023-02-21 NOTE — ASSESSMENT
[FreeTextEntry1] : He has a history of poorly controlled diabetes managed by endocrinology. DIscussed that his headaches may be related to the eye injections but could also be due to strep throat. His recent symptoms of sore throat, chills, and cough are indicative of a URI or viral or bacterial pharyngitis.\par \par His blood pressure is high today but probably not high enough to explain his headaches.\par \par His diabetes is not well controlled and he is looking for a new endocrinologist.\par \par

## 2023-02-21 NOTE — PLAN
[FreeTextEntry1] : Ordered throat culture and prescribed a Z-pack. Will adjust medication as needed based on lab results. Informed patient to call the office if symptoms do not improve over the next couple of days. \par \par Face-to-face time spent with patient, over half in discussion of the above diagnoses and treatment plan: 25 minutes.\par

## 2023-02-21 NOTE — PHYSICAL EXAM
[No Rash] : no rash [No Focal Deficits] : no focal deficits [Normal] : affect was normal and insight and judgment were intact [de-identified] : floaters in left eye  [de-identified] : redness in back of throat, left sided congested nasal mucosa  [de-identified] : tightness

## 2023-02-21 NOTE — HISTORY OF PRESENT ILLNESS
[FreeTextEntry8] : Patient presents with a sore throat as well as a headache for the past couple of weeks. He states that the onset of his headaches began around the same time he had gotten shots in his left eye for diabetes-related macular bleeding. His headaches start in his temples and migrate towards the back of his head; primarily left sided. He had discussed this with his ophthalmologist who did not believe the headaches were a side effect of the shots. He adds that he gets spots in his vision occasionally following the shots and describes it as a "veil" over his eyes. Applying a wet wash cloth to his forehead tends to alleviate his headache. \par \par He would also like to discuss his sore throat that he had noticed two nights ago. He states that he had increased difficulty swallowing and felt a burning sensation which worsened this morning. He denies any known strep exposure. He adds that he has been having chills and coughing as well.

## 2023-02-23 ENCOUNTER — NON-APPOINTMENT (OUTPATIENT)
Age: 58
End: 2023-02-23

## 2023-02-23 DIAGNOSIS — R05.9 COUGH, UNSPECIFIED: ICD-10-CM

## 2023-02-23 LAB — BACTERIA THROAT CULT: NORMAL

## 2023-02-24 ENCOUNTER — APPOINTMENT (OUTPATIENT)
Dept: RADIOLOGY | Facility: CLINIC | Age: 58
End: 2023-02-24
Payer: MEDICARE

## 2023-02-24 ENCOUNTER — OUTPATIENT (OUTPATIENT)
Dept: OUTPATIENT SERVICES | Facility: HOSPITAL | Age: 58
LOS: 1 days | End: 2023-02-24
Payer: MEDICARE

## 2023-02-24 DIAGNOSIS — R05.9 COUGH, UNSPECIFIED: ICD-10-CM

## 2023-02-24 DIAGNOSIS — Z90.49 ACQUIRED ABSENCE OF OTHER SPECIFIED PARTS OF DIGESTIVE TRACT: Chronic | ICD-10-CM

## 2023-02-24 DIAGNOSIS — Z95.5 PRESENCE OF CORONARY ANGIOPLASTY IMPLANT AND GRAFT: Chronic | ICD-10-CM

## 2023-02-24 PROCEDURE — 71046 X-RAY EXAM CHEST 2 VIEWS: CPT | Mod: 26

## 2023-02-24 PROCEDURE — 71046 X-RAY EXAM CHEST 2 VIEWS: CPT

## 2023-02-27 ENCOUNTER — NON-APPOINTMENT (OUTPATIENT)
Age: 58
End: 2023-02-27

## 2023-02-28 ENCOUNTER — NON-APPOINTMENT (OUTPATIENT)
Age: 58
End: 2023-02-28

## 2023-04-30 ENCOUNTER — FORM ENCOUNTER (OUTPATIENT)
Age: 58
End: 2023-04-30

## 2023-05-21 ENCOUNTER — FORM ENCOUNTER (OUTPATIENT)
Age: 58
End: 2023-05-21

## 2023-05-24 ENCOUNTER — NON-APPOINTMENT (OUTPATIENT)
Age: 58
End: 2023-05-24

## 2023-06-01 ENCOUNTER — APPOINTMENT (OUTPATIENT)
Dept: FAMILY MEDICINE | Facility: CLINIC | Age: 58
End: 2023-06-01
Payer: COMMERCIAL

## 2023-06-01 VITALS
DIASTOLIC BLOOD PRESSURE: 70 MMHG | BODY MASS INDEX: 35.76 KG/M2 | HEART RATE: 80 BPM | HEIGHT: 72 IN | WEIGHT: 264 LBS | OXYGEN SATURATION: 97 % | TEMPERATURE: 97.2 F | SYSTOLIC BLOOD PRESSURE: 126 MMHG

## 2023-06-01 DIAGNOSIS — Z01.818 ENCOUNTER FOR OTHER PREPROCEDURAL EXAMINATION: ICD-10-CM

## 2023-06-01 PROCEDURE — 99214 OFFICE O/P EST MOD 30 MIN: CPT

## 2023-06-01 RX ORDER — FOSINOPRIL SODIUM 20 MG/1
20 TABLET ORAL DAILY
Refills: 0 | Status: DISCONTINUED | COMMUNITY
Start: 2021-06-03 | End: 2023-06-01

## 2023-06-01 RX ORDER — ISOSORBIDE MONONITRATE 30 MG
30 TABLET, EXTENDED RELEASE 24 HR ORAL
Refills: 0 | Status: DISCONTINUED | COMMUNITY
End: 2023-06-01

## 2023-06-01 RX ORDER — MUPIROCIN 20 MG/G
2 OINTMENT TOPICAL TWICE DAILY
Qty: 1 | Refills: 1 | Status: DISCONTINUED | COMMUNITY
Start: 2022-11-10 | End: 2023-06-01

## 2023-06-01 RX ORDER — ATORVASTATIN CALCIUM 80 MG/1
80 TABLET, FILM COATED ORAL DAILY
Qty: 90 | Refills: 3 | Status: DISCONTINUED | COMMUNITY
End: 2023-06-01

## 2023-06-01 RX ORDER — PANCRELIPASE 36000; 180000; 114000 [USP'U]/1; [USP'U]/1; [USP'U]/1
36000-114000 CAPSULE, DELAYED RELEASE PELLETS ORAL
Qty: 90 | Refills: 0 | Status: DISCONTINUED | COMMUNITY
Start: 2022-08-04 | End: 2023-06-01

## 2023-06-01 RX ORDER — CHOLECALCIFEROL (VITAMIN D3) 1250 MCG
1.25 MG CAPSULE ORAL
Refills: 0 | Status: DISCONTINUED | COMMUNITY
End: 2023-06-01

## 2023-06-01 RX ORDER — INSULIN DEGLUDEC INJECTION 200 U/ML
200 INJECTION, SOLUTION SUBCUTANEOUS
Qty: 12 | Refills: 0 | Status: DISCONTINUED | COMMUNITY
Start: 2022-07-06 | End: 2023-06-01

## 2023-06-01 RX ORDER — AZITHROMYCIN 250 MG/1
250 TABLET, FILM COATED ORAL
Qty: 1 | Refills: 0 | Status: DISCONTINUED | COMMUNITY
Start: 2023-02-21 | End: 2023-06-01

## 2023-06-01 RX ORDER — INSULIN LISPRO 100 [IU]/ML
100 INJECTION, SOLUTION INTRAVENOUS; SUBCUTANEOUS
Refills: 0 | Status: DISCONTINUED | COMMUNITY
End: 2023-06-01

## 2023-06-01 RX ORDER — FOSINOPRIL SODIUM 10 MG/1
10 TABLET ORAL
Qty: 90 | Refills: 0 | Status: DISCONTINUED | COMMUNITY
Start: 2021-11-10 | End: 2023-06-01

## 2023-06-01 RX ORDER — ERGOCALCIFEROL 1.25 MG/1
1.25 MG CAPSULE, LIQUID FILLED ORAL
Qty: 12 | Refills: 0 | Status: DISCONTINUED | COMMUNITY
Start: 2022-07-15 | End: 2023-06-01

## 2023-06-01 RX ORDER — PANTOPRAZOLE 40 MG/1
40 TABLET, DELAYED RELEASE ORAL TWICE DAILY
Qty: 180 | Refills: 1 | Status: DISCONTINUED | COMMUNITY
End: 2023-06-01

## 2023-06-01 RX ORDER — COLCHICINE 0.6 MG/1
0.6 TABLET ORAL
Qty: 3 | Refills: 1 | Status: DISCONTINUED | COMMUNITY
Start: 2021-06-03 | End: 2023-06-01

## 2023-06-01 RX ORDER — SULFAMETHOXAZOLE AND TRIMETHOPRIM 800; 160 MG/1; MG/1
800-160 TABLET ORAL TWICE DAILY
Qty: 14 | Refills: 0 | Status: DISCONTINUED | COMMUNITY
Start: 2022-11-10 | End: 2023-06-01

## 2023-06-01 NOTE — HISTORY OF PRESENT ILLNESS
[Coronary Artery Disease] : coronary artery disease [No Pertinent Pulmonary History] : no history of asthma, COPD, sleep apnea, or smoking [No Adverse Anesthesia Reaction] : no adverse anesthesia reaction in self or family member [Diabetes] : diabetes [(Patient denies any chest pain, claudication, dyspnea on exertion, orthopnea, palpitations or syncope)] : Patient denies any chest pain, claudication, dyspnea on exertion, orthopnea, palpitations or syncope [Aortic Stenosis] : no aortic stenosis [Atrial Fibrillation] : no atrial fibrillation [Recent Myocardial Infarction] : no recent myocardial infarction [Implantable Device/Pacemaker] : no implantable device/pacemaker [Chronic Anticoagulation] : no chronic anticoagulation [Chronic Kidney Disease] : no chronic kidney disease [FreeTextEntry1] : Non-Clearing Vitreous Hemorrhage of the Left Eye [FreeTextEntry2] : 6/8/2023 [FreeTextEntry3] : Dr. Solis Knapp at [facility]  [FreeTextEntry4] : 58 yo M, PMH CAD, DM2, HLD, HTN, gout presents for medical clearance.

## 2023-08-30 NOTE — H&P ADULT - NS NEC GEN PE MLT EXAM PC
Anesthesia Pre Eval Note    Anesthesia ROS/Med Hx    Overall Review:  EKG was reviewed, Echo was reviewed and Stress test was reviewed       Pulmonary Review:    Positive for sleep apnea - BiPAP and home oxygen  Positive for COPD - Severity: severe and oxygen dependant      Neuro/Psych Review:  Negative for seizures   Negative for TIA  Negative for CVA    Cardiovascular Review:    Positive for CHF  Positive for cardiomyopathy  Positive for pulmonary hypertension  Positive for past MI  Positive for CAD  Positive for hypertension  Positive for hyperlipidemia    GI/HEPATIC/RENAL Review:    Positive for GERDNegative for liver disease  Negative for renal disease    End/Other Review:  Negative for diabetes  Negative for obesity   Positive for anemia    Overall Review of Systems Comments:  LVEF 42%. Mild RV Systolic dysfunction.  Mild Tricuspid regurgitation.    Pulmonary arterial  Hypertension, unresponsive to Nitric Oxide.      Relevant Problems   Anesthesia Problems   (+) DEANN on CPAP       Physical Exam     Airway   Mallampati: III  TM Distance: >3 FB  Neck ROM: Full  Neck: Able to place in sniff position  TMJ Mobility: Good    Cardiovascular    Cardio Rhythm: Regular  Cardio Rate: Normal    Head Assessment  Head assessment: Normocephalic and Atraumatic    General Assessment  General Assessment: Alert and oriented and No acute distress    Pulmonary Exam    Patient Demonstrates:  Decreased Breath Sounds    Abdominal Exam    Patient Demonstrates:  Scaphoid      Anesthesia Plan:    ASA Status: 4  Anesthesia Type: MAC    Preferred Airway Type: Mask  Premedication: None    Checklist  Reviewed: Lab Results, EKG, Patient Summary, Beta Blocker Status, DNR Status, Allergies, Medications, Problem list, Past Med History and NPO Status  Consent/Risks Discussed Statement:  The proposed anesthetic plan, including its risks and benefits, have been discussed with the Patient along with the risks and benefits of alternatives. Questions  were encouraged and answered and the patient and/or representative understands and agrees to proceed.    I have discussed elements of the patient's history or examination, as noted above and/or as follows, that place the patient at higher risk of complications; heart disease, hematological disease, pulmonary disease and sleep apnea.    I discussed with the patient (and/or patient's legal representative) the risks and benefits of the proposed anesthesia plan, MAC, which may include services performed by other anesthesia providers.    Alternative anesthesia plans, if available, were reviewed with the patient (and/or patient's legal representative). Discussion has been held with the patient (and/or patient's legal representative) regarding risks of anesthesia, which include intra-operative awareness and emergent situations that may require change in anesthesia plan.    The patient (and/or patient's legal representative) has indicated understanding, his/her questions have been answered, and he/she wishes to proceed with the planned anesthetic.       No bruits; no thyromegaly or nodules

## 2023-09-22 NOTE — PATIENT PROFILE ADULT. - EXTENSIONS OF SELF_ADULT
[facility] Medicine  Progress Note    Patient Name: Jo Alegre  MRN: [id number]  Patient Class: IP- Inpatient   Admission Date: 9/18/2023  Length of Stay: 3 days  Attending Physician: Mia Allen MD  Primary Care Provider: Kraig Alberto MD        Subjective:     Principal Problem:Congestive heart failure        HPI:  Ms. Alegre is an 84 year old woman with PMHx of HTN, HDL, HFrEF s/p AICD, Afib- presented with one day hx of sob started yesterday - pt says she was fine day before yesterday but around morning time she started having sob not associated with any chest pain, palpitations, or fever, chills, cough or abdominal pain. She did not have any dietary discretion, has been adherent to her home meds. She also noted leg swelling bilateral yesterday. She was at cardio office for her device interrogation which as per patient is fine.     In ER she was found to have left pleural effusion and elevated BNP. Pt is being admitted for diuresis.        Overview/Hospital Course:  Patient monitor closely during hospitalization.  She was noted to have acute hypoxemic respiratory failure and AFib RVR.  Cardiology consulted.  Patient noted to have left-sided large pleural effusion on chest x-ray therefore Interventional Radiology was consulted for thoracentesis.  Oral anticoagulants held.      Interval History:  Patient is lying down without subjective complaints.  Multiple family members are at bedside.  Currently on 4 liter/minute supplemental oxygen via nasal cannula.  Normal sinus rhythm controlled with oral amiodarone.      Review of Systems   Constitutional:  Positive for activity change and fatigue.   Respiratory:  Positive for shortness of breath.    Cardiovascular:  Positive for leg swelling.   Neurological:  Positive for weakness (generalized).     Objective:     Vital Signs (Most Recent):  Temp: 98.1 °F (36.7 °C) (09/22/23 0301)  Pulse: 82 (09/22/23 0840)  Resp: 20 (09/22/23 0840)  BP:  (!) 87/61 (09/22/23 0600)  SpO2: 96 % (09/22/23 0840) Vital Signs (24h Range):  Temp:  [98 °F (36.7 °C)-98.2 °F (36.8 °C)] 98.1 °F (36.7 °C)  Pulse:  [] 82  Resp:  [20-37] 20  SpO2:  [82 %-100 %] 96 %  BP: ()/(44-95) 87/61     Weight: 96.3 kg (212 lb 4.9 oz)  Body mass index is 33.25 kg/m².    Intake/Output Summary (Last 24 hours) at 9/22/2023 1019  Last data filed at 9/22/2023 0506  Gross per 24 hour   Intake 1774.04 ml   Output 1600 ml   Net 174.04 ml           Physical Exam  Constitutional:       General: She is not in acute distress.     Appearance: She is ill-appearing.   Cardiovascular:      Rate and Rhythm: Rhythm irregular.      Heart sounds: Murmur heard.      Comments: Permanent pacemaker noted, +1-2 pitting edema bilaterally  Pulmonary:      Effort: Respiratory distress present.      Breath sounds: Wheezing and rales present.   Neurological:      Mental Status: She is alert.             Significant Labs: All pertinent labs within the past 24 hours have been reviewed.  BMP:   Recent Labs   Lab 09/22/23  0314      *   K 5.2*   CL 97   CO2 28   BUN 29*   CREATININE 1.5*   CALCIUM 9.0   MG 2.1       CBC:   Recent Labs   Lab 09/21/23  0505 09/22/23  0314   WBC 10.06 9.60   HGB 9.6* 9.6*   HCT 31.5* 31.7*    400       Microbiology Results (last 7 days)       Procedure Component Value Units Date/Time    Blood culture [[id number]] Collected: 09/19/23 1338    Order Status: Completed Specimen: Blood Updated: 09/21/23 1432     Blood Culture, Routine No Growth to date      No Growth to date      No Growth to date    Gram stain [[id number]] Collected: 09/20/23 1035    Order Status: Completed Specimen: Pleural Fluid from Lung, Left Updated: 09/21/23 1420     Gram Stain Result Moderate WBC's      No organisms seen    Culture, Body Fluid (Aerobic) w/ GS [[id number]] Collected: 09/20/23 1035    Order Status: Completed Specimen: Pleural Fluid from Lung, Left Updated: 09/21/23 1010      None AEROBIC CULTURE - FLUID No growth    AFB culture (includes stain) [[id number]] Collected: 09/20/23 1035    Order Status: Sent Specimen: Pleural Fluid from Lung, Left Updated: 09/20/23 1052    Culture, Respiratory with Gram Stain [[id number]]     Order Status: No result Specimen: Respiratory           Significant Imaging:   ECHO:    Left Ventricle: Moderately increased wall thickness. There is concentric remodeling. There is mildly reduced systolic function with a visually estimated ejection fraction of 40 - 50%.    Left Atrium: Left atrium is dilated.    Aortic Valve: There is mild aortic valve sclerosis.    Mitral Valve: There is moderate regurgitation.    Tricuspid Valve: There is mild regurgitation.    Pulmonary Artery: The estimated pulmonary artery systolic pressure is 30 mmHg.    IVC/SVC: Normal venous pressure at 3 mmHg.    Pericardium: There is a small effusion. No indication of cardiac tamponade.    CXR:  1. Moderate left pleural effusion and adjacent atelectasis/consolidation.  2. Small interstitial opacity at the right lung base.    CXR:  1. Interval worsening, moderate to large left pleural effusion and adjacent atelectasis/consolidation.  2. Unchanged mild scattered central hilar interstitial opacity.    US Thoracentesis:  2000 cc yellow turbid pleural fluid removed    CXR: Negative for pneumothorax, post left-sided thoracentesis.    CXR:  Interval progression of pulmonary opacities in the left mid and lower lung zone. There is similar blunting of left costophrenic angle.    Minor platelike atelectasis within the right midlung. There is mild right-sided interstitial prominence.      Assessment/Plan:      * Congestive heart failure  Elevated BNP  C/w Bumex- allergic to lasix.  Receiving IV Bumex 1 mg daily.  I/Os, daily weight   Consult cardio  C/w GDMT    ACP (advance care planning)  Advance Care Planning     Date: 09/20/2023    Living Will  During this visit, I engaged the patient  in the  voluntary advance care planning process.  The patient and I reviewed the role for advance directives and their purpose in directing future healthcare if the patient's unable to speak for herself.  At this point in time, the patient does have full decision-making capacity.  We discussed different extreme health states that she could experience, and reviewed what kind of medical care she would want in those situations.  The patient communicated that if she were comatose and had little chance of a meaningful recovery, she would not want machines/life-sustaining treatments used but she is okay to receive CPR if needed.  Patient's 2 daughters were present during interview.  I informed patient I will be placing DNI code status in patient's medical chart.  I spent a total of 16 minutes engaging the patient in this advance care planning discussion.                Acute respiratory failure with hypoxia  Patient with Hypoxic Respiratory failure which is Acute.  she is not on home oxygen. Supplemental oxygen was provided and noted- Oxygen Concentration (%):  [36-40] 36    .   Signs/symptoms of respiratory failure include- tachypnea, increased work of breathing, respiratory distress and use of accessory muscles. Contributing diagnoses includes - CHF Labs and images were reviewed. Patient Has recent ABG, which has been reviewed. Will treat underlying causes and adjust management of respiratory failure as follows- bronchodilators ordered.  Patient with worsening of hypoxia presently on 5 L nasal cannula high-flow oxygen, Bumex 1 mg IV x1 dose now.    Recurrent left pleural effusion  Status post left thoracentesis when 2000 cc yellow turbid fluid removed on September 20, 2023 by Pulmonary Medicine.  Postprocedure chest x-ray without pneumothorax.      Obstructive sleep apnea syndrome  C/w CPAP      COPD (chronic obstructive pulmonary disease) per patient  C/w breathing rx      Acute on chronic combined systolic and diastolic  congestive heart failure  Patient is identified as having Combined Systolic and Diastolic heart failure that is Acute on chronic. CHF is currently uncontrolled due to Continued edema of extremities, >3 pillow orthopnea, Rales/crackles on pulmonary exam and Pulmonary edema/pleural effusion on CXR. Latest ECHO performed and demonstrates- Results for orders placed during the hospital encounter of 05/15/23    Echo    Interpretation Summary  · The left ventricle is mildly enlarged with mild concentric hypertrophy and moderately decreased systolic function.  · The estimated ejection fraction is 40%.  · Grade I left ventricular diastolic dysfunction.  · There is left ventricular global hypokinesis.  · Normal right ventricular size with normal right ventricular systolic function.  · Severe left atrial enlargement.  · Moderate-to-severe mitral regurgitation.  · Mild tricuspid regurgitation.  · Elevated central venous pressure (15 mmHg).  · The estimated PA systolic pressure is 45 mmHg.  · There is pulmonary hypertension.  · Trivial anterior pericardial effusion.  . Continue bumex and digoxin and monitor clinical status closely. Monitor on telemetry. Patient is on CHF pathway.  Monitor strict Is&Os and daily weights.  Place on fluid restriction of 1.5 L. Cardiology has been consulted. Continue to stress to patient importance of self efficacy and  on diet for CHF. Last BNP reviewed- and noted below   Recent Labs   Lab 09/18/23  2201   *   .  Review of chest x-ray suggestive of reaccumulating left pleural effusion.  Patient instructed to adhere with fluid restriction.  Case discussed with Dr. Mendoza.  Will give additional dose of intravenous Bumex 1 mg this evening.    SOB (shortness of breath)  As above      Paroxysmal atrial fibrillation  Currently in Afib-   C/w Amio and BB  On Xarelto.  Patient on digoxin Monday Wednesday Friday. Follow cardiology recommendations.     Stage 3 chronic kidney disease  At  baseline      Cardiomyopathy with implantable cardioverter-defibrillator  Not sure if ischemic or non-ischemic  Device interrogated yesterday  C/w Bumex 1 mg IV daily   C/w GDMT  I/Os, daily weight      Mixed dyslipidemia  C/w statin      Essential hypertension  C/w home meds and including her Midodrine   Patient with chronic hypotension.  Will hold ACE-inhibitor and ARB at this time.      Continue PT and OT.  Transferred to medicine telemetry floor.  Patient encouraged to improve oral protein intake.  Discussed with multiple family members.  Answered all questions.  VTE Risk Mitigation (From admission, onward)         Ordered     rivaroxaban tablet 15 mg  With dinner         09/20/23 1212     IP VTE HIGH RISK PATIENT  Once         09/19/23 1301     Place sequential compression device  Until discontinued         09/19/23 1301     Reason for No Pharmacological VTE Prophylaxis  Once        Question:  Reasons:  Answer:  Already adequately anticoagulated on oral Anticoagulants    09/19/23 1301     Place sequential compression device  Until discontinued         09/19/23 1043                Discharge Planning   JOANA: 9/25/2023     Code Status: Partial Code   Is the patient medically ready for discharge?:     Reason for patient still in hospital (select all that apply): Patient trending condition and Consult recommendations  Discharge Plan A: Home with family            Critical care time spent on the evaluation and treatment of severe organ dysfunction, review of pertinent labs and imaging studies, discussions with consulting providers and discussions with patient/family: 33 minutes.      Mia Allen MD  Department of Hospital Medicine   [facility]

## 2023-09-28 ENCOUNTER — INPATIENT (INPATIENT)
Facility: HOSPITAL | Age: 58
LOS: 1 days | Discharge: HOME | DRG: 247 | End: 2023-09-30
Attending: INTERNAL MEDICINE | Admitting: INTERNAL MEDICINE
Payer: COMMERCIAL

## 2023-09-28 VITALS — WEIGHT: 259.93 LBS | HEIGHT: 72 IN
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
## 2023-09-28 DIAGNOSIS — Z88.0 ALLERGY STATUS TO PENICILLIN: ICD-10-CM

## 2023-09-28 DIAGNOSIS — Z79.82 LONG TERM (CURRENT) USE OF ASPIRIN: ICD-10-CM

## 2023-09-28 DIAGNOSIS — N17.9 ACUTE KIDNEY FAILURE, UNSPECIFIED: ICD-10-CM

## 2023-09-28 DIAGNOSIS — I13.10 HYPERTENSIVE HEART AND CHRONIC KIDNEY DISEASE WITHOUT HEART FAILURE, WITH STAGE 1 THROUGH STAGE 4 CHRONIC KIDNEY DISEASE, OR UNSPECIFIED CHRONIC KIDNEY DISEASE: ICD-10-CM

## 2023-09-28 DIAGNOSIS — E86.0 DEHYDRATION: ICD-10-CM

## 2023-09-28 DIAGNOSIS — K86.89 OTHER SPECIFIED DISEASES OF PANCREAS: ICD-10-CM

## 2023-09-28 DIAGNOSIS — T82.855A STENOSIS OF CORONARY ARTERY STENT, INITIAL ENCOUNTER: ICD-10-CM

## 2023-09-28 DIAGNOSIS — I45.10 UNSPECIFIED RIGHT BUNDLE-BRANCH BLOCK: ICD-10-CM

## 2023-09-28 DIAGNOSIS — I25.110 ATHEROSCLEROTIC HEART DISEASE OF NATIVE CORONARY ARTERY WITH UNSTABLE ANGINA PECTORIS: ICD-10-CM

## 2023-09-28 DIAGNOSIS — E78.5 HYPERLIPIDEMIA, UNSPECIFIED: ICD-10-CM

## 2023-09-28 DIAGNOSIS — N18.30 CHRONIC KIDNEY DISEASE, STAGE 3 UNSPECIFIED: ICD-10-CM

## 2023-09-28 DIAGNOSIS — Z79.02 LONG TERM (CURRENT) USE OF ANTITHROMBOTICS/ANTIPLATELETS: ICD-10-CM

## 2023-09-28 DIAGNOSIS — G47.33 OBSTRUCTIVE SLEEP APNEA (ADULT) (PEDIATRIC): ICD-10-CM

## 2023-09-28 DIAGNOSIS — E11.22 TYPE 2 DIABETES MELLITUS WITH DIABETIC CHRONIC KIDNEY DISEASE: ICD-10-CM

## 2023-09-28 DIAGNOSIS — I44.0 ATRIOVENTRICULAR BLOCK, FIRST DEGREE: ICD-10-CM

## 2023-09-28 DIAGNOSIS — Z82.49 FAMILY HISTORY OF ISCHEMIC HEART DISEASE AND OTHER DISEASES OF THE CIRCULATORY SYSTEM: ICD-10-CM
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
## 2023-09-28 DIAGNOSIS — Z90.49 ACQUIRED ABSENCE OF OTHER SPECIFIED PARTS OF DIGESTIVE TRACT: Chronic | ICD-10-CM

## 2023-09-28 DIAGNOSIS — Z90.49 ACQUIRED ABSENCE OF OTHER SPECIFIED PARTS OF DIGESTIVE TRACT: ICD-10-CM

## 2023-09-28 DIAGNOSIS — Z91.013 ALLERGY TO SEAFOOD: ICD-10-CM

## 2023-09-28 DIAGNOSIS — Z79.4 LONG TERM (CURRENT) USE OF INSULIN: ICD-10-CM

## 2023-09-28 DIAGNOSIS — Z95.5 PRESENCE OF CORONARY ANGIOPLASTY IMPLANT AND GRAFT: Chronic | ICD-10-CM

## 2023-09-28 DIAGNOSIS — Z79.84 LONG TERM (CURRENT) USE OF ORAL HYPOGLYCEMIC DRUGS: ICD-10-CM

## 2023-09-28 DIAGNOSIS — Z83.3 FAMILY HISTORY OF DIABETES MELLITUS: ICD-10-CM

## 2023-09-28 LAB
ALBUMIN SERPL ELPH-MCNC: 3.5 G/DL — SIGNIFICANT CHANGE UP (ref 3.3–5)
ALP SERPL-CCNC: 76 U/L — SIGNIFICANT CHANGE UP (ref 40–120)
ALT FLD-CCNC: 79 U/L — HIGH (ref 12–78)
ANION GAP SERPL CALC-SCNC: 5 MMOL/L — SIGNIFICANT CHANGE UP (ref 5–17)
APTT BLD: 34 SEC — SIGNIFICANT CHANGE UP (ref 24.5–35.6)
AST SERPL-CCNC: 37 U/L — SIGNIFICANT CHANGE UP (ref 15–37)
BASOPHILS # BLD AUTO: 0.08 K/UL — SIGNIFICANT CHANGE UP (ref 0–0.2)
BASOPHILS NFR BLD AUTO: 1.1 % — SIGNIFICANT CHANGE UP (ref 0–2)
BILIRUB SERPL-MCNC: 0.5 MG/DL — SIGNIFICANT CHANGE UP (ref 0.2–1.2)
BUN SERPL-MCNC: 43 MG/DL — HIGH (ref 7–23)
CALCIUM SERPL-MCNC: 8.7 MG/DL — SIGNIFICANT CHANGE UP (ref 8.5–10.1)
CHLORIDE SERPL-SCNC: 104 MMOL/L — SIGNIFICANT CHANGE UP (ref 96–108)
CO2 SERPL-SCNC: 28 MMOL/L — SIGNIFICANT CHANGE UP (ref 22–31)
CREAT SERPL-MCNC: 2.22 MG/DL — HIGH (ref 0.5–1.3)
EGFR: 34 ML/MIN/1.73M2 — LOW
EOSINOPHIL # BLD AUTO: 0.29 K/UL — SIGNIFICANT CHANGE UP (ref 0–0.5)
EOSINOPHIL NFR BLD AUTO: 4 % — SIGNIFICANT CHANGE UP (ref 0–6)
GLUCOSE SERPL-MCNC: 342 MG/DL — HIGH (ref 70–99)
HCT VFR BLD CALC: 37.3 % — LOW (ref 39–50)
HGB BLD-MCNC: 13.3 G/DL — SIGNIFICANT CHANGE UP (ref 13–17)
IMM GRANULOCYTES NFR BLD AUTO: 1.4 % — HIGH (ref 0–0.9)
INR BLD: 0.9 RATIO — SIGNIFICANT CHANGE UP (ref 0.85–1.18)
LYMPHOCYTES # BLD AUTO: 1.93 K/UL — SIGNIFICANT CHANGE UP (ref 1–3.3)
LYMPHOCYTES # BLD AUTO: 26.5 % — SIGNIFICANT CHANGE UP (ref 13–44)
MAGNESIUM SERPL-MCNC: 2 MG/DL — SIGNIFICANT CHANGE UP (ref 1.6–2.6)
MCHC RBC-ENTMCNC: 32.1 PG — SIGNIFICANT CHANGE UP (ref 27–34)
MCHC RBC-ENTMCNC: 35.7 GM/DL — SIGNIFICANT CHANGE UP (ref 32–36)
MCV RBC AUTO: 90.1 FL — SIGNIFICANT CHANGE UP (ref 80–100)
MONOCYTES # BLD AUTO: 0.82 K/UL — SIGNIFICANT CHANGE UP (ref 0–0.9)
MONOCYTES NFR BLD AUTO: 11.2 % — SIGNIFICANT CHANGE UP (ref 2–14)
NEUTROPHILS # BLD AUTO: 4.07 K/UL — SIGNIFICANT CHANGE UP (ref 1.8–7.4)
NEUTROPHILS NFR BLD AUTO: 55.8 % — SIGNIFICANT CHANGE UP (ref 43–77)
NT-PROBNP SERPL-SCNC: 77 PG/ML — SIGNIFICANT CHANGE UP (ref 0–125)
PLATELET # BLD AUTO: 355 K/UL — SIGNIFICANT CHANGE UP (ref 150–400)
POTASSIUM SERPL-MCNC: 3.9 MMOL/L — SIGNIFICANT CHANGE UP (ref 3.5–5.3)
POTASSIUM SERPL-SCNC: 3.9 MMOL/L — SIGNIFICANT CHANGE UP (ref 3.5–5.3)
PROT SERPL-MCNC: 7.3 GM/DL — SIGNIFICANT CHANGE UP (ref 6–8.3)
PROTHROM AB SERPL-ACNC: 10.2 SEC — SIGNIFICANT CHANGE UP (ref 9.5–13)
RBC # BLD: 4.14 M/UL — LOW (ref 4.2–5.8)
RBC # FLD: 14.1 % — SIGNIFICANT CHANGE UP (ref 10.3–14.5)
SODIUM SERPL-SCNC: 137 MMOL/L — SIGNIFICANT CHANGE UP (ref 135–145)
TROPONIN I, HIGH SENSITIVITY RESULT: 17.9 NG/L — SIGNIFICANT CHANGE UP
WBC # BLD: 7.29 K/UL — SIGNIFICANT CHANGE UP (ref 3.8–10.5)
WBC # FLD AUTO: 7.29 K/UL — SIGNIFICANT CHANGE UP (ref 3.8–10.5)

## 2023-09-28 PROCEDURE — 71045 X-RAY EXAM CHEST 1 VIEW: CPT | Mod: 26

## 2023-09-28 PROCEDURE — 93010 ELECTROCARDIOGRAM REPORT: CPT

## 2023-09-28 PROCEDURE — 99285 EMERGENCY DEPT VISIT HI MDM: CPT

## 2023-09-28 RX ORDER — SODIUM CHLORIDE 9 MG/ML
1000 INJECTION INTRAMUSCULAR; INTRAVENOUS; SUBCUTANEOUS ONCE
Refills: 0 | Status: COMPLETED | OUTPATIENT
Start: 2023-09-28 | End: 2023-09-28

## 2023-09-28 RX ORDER — FAMOTIDINE 10 MG/ML
20 INJECTION INTRAVENOUS ONCE
Refills: 0 | Status: COMPLETED | OUTPATIENT
Start: 2023-09-28 | End: 2023-09-28

## 2023-09-28 RX ADMIN — Medication 30 MILLILITER(S): at 21:12

## 2023-09-28 RX ADMIN — FAMOTIDINE 20 MILLIGRAM(S): 10 INJECTION INTRAVENOUS at 21:12

## 2023-09-28 RX ADMIN — SODIUM CHLORIDE 1000 MILLILITER(S): 9 INJECTION INTRAMUSCULAR; INTRAVENOUS; SUBCUTANEOUS at 23:15

## 2023-09-28 NOTE — ED ADULT NURSE NOTE - OBJECTIVE STATEMENT
pt is A/Ox4 from home and is ambulatory. pt to the ED with c/o CP radiating down the right arm, and mild SOB starting this afternoon. pt denies N/V/D, numbness and tingling, or LOC. pt with PMH of 8 Cardiac stents and on Plavix. pt respirations even and unlabored. pt in NAD. Per MD orders pt IV placed, labs sent, Cardiac monitor placed, and EKG completed

## 2023-09-28 NOTE — ED STATDOCS - NSFOLLOWUPINSTRUCTIONS_ED_ALL_ED_FT
Follow up with cardiologist.     Return to the Emergency Department for worsening or persistent symptoms, and/or ANY NEW OR CONCERNING SYMPTOMS. If you have issues obtaining follow up, please call: [phone] or [phone]  to obtain a doctor or specialist who takes your insurance in your area.     Chest Pain    WHAT YOU NEED TO KNOW:    Chest pain can be caused by a range of conditions, from not serious to life-threatening. Chest pain can be a symptom of a digestive problem, such as acid reflux or a stomach ulcer. An anxiety attack or a strong emotion, such as anger, can also cause chest pain. Infection, inflammation, or a fracture in the bones or cartilage in your chest can cause pain or discomfort. Sometimes chest pain or pressure is caused by poor blood flow to your heart (angina). Chest pain may also be caused by life-threatening conditions such as a heart attack or blood clot in your lungs.     DISCHARGE INSTRUCTIONS:    Call 911 if:     You have any of the following signs of a heart attack:   Squeezing, pressure, or pain in your chest       and any of the following:   Discomfort or pain in your back, neck, jaw, stomach, or arm       Shortness of breath      Nausea or vomiting      Lightheadedness or a sudden cold sweat        Return to the emergency department if:     You have chest discomfort that gets worse, even with medicine.      You cough or vomit blood.       Your bowel movements are black or bloody.       You cannot stop vomiting, or it hurts to swallow.     Contact your healthcare provider if:     You have questions or concerns about your condition or care.        Medicines:     Medicines may be given to treat the cause of your chest pain. Examples include pain medicine, anxiety medicine, or medicines to increase blood flow to your heart.       Do not take certain medicines without asking your healthcare provider first. These include NSAIDs, herbal or vitamin supplements, or hormones (estrogen or progestin).       Take your medicine as directed. Contact your healthcare provider if you think your medicine is not helping or if you have side effects. Tell him or her if you are allergic to any medicine. Keep a list of the medicines, vitamins, and herbs you take. Include the amounts, and when and why you take them. Bring the list or the pill bottles to follow-up visits. Carry your medicine list with you in case of an emergency.    Follow up with your healthcare provider within 72 hours, or as directed: You may need to return for more tests to find the cause of your chest pain. You may be referred to a specialist, such as a cardiologist or gastroenterologist. Write down your questions so you remember to ask them during your visits.     Healthy living tips: The following are general healthy guidelines. If your chest pain is caused by a heart problem, your healthcare provider will give you specific guidelines to follow.    Do not smoke. Nicotine and other chemicals in cigarettes and cigars can cause lung and heart damage. Ask your healthcare provider for information if you currently smoke and need help to quit. E-cigarettes or smokeless tobacco still contain nicotine. Talk to your healthcare provider before you use these products.       Eat a variety of healthy, low-fat, low-salt foods. Healthy foods include fruits, vegetables, whole-grain breads, low-fat dairy products, beans, lean meats, and fish. Ask for more information about a heart healthy diet.      Drink plenty of water every day. Your body is made of mostly water. Water helps your body to control your temperature and blood pressure. Ask your healthcare provider how much water you should drink every day.      Ask about activity. Your healthcare provider will tell you which activities to limit or avoid. Ask when you can drive, return to work, and have sex. Ask about the best exercise plan for you.      Maintain a healthy weight. Ask your healthcare provider how much you should weigh. Ask him or her to help you create a weight loss plan if you are overweight.       Get the flu and pneumonia vaccines. All adults should get the influenza (flu) vaccine. Get it every year as soon as it becomes available. The pneumococcal vaccine is given to adults aged 65 years or older. The vaccine is given every 5 years to prevent pneumococcal disease, such as pneumonia.    If you have a stent:     Carry your stent card with you at all times.       Let all healthcare providers know that you have a stent.

## 2023-09-28 NOTE — ED STATDOCS - CLINICAL SUMMARY MEDICAL DECISION MAKING FREE TEXT BOX
58-year-old male high risk chest pain presents for right-sided chest pain associated with some shortness of breath.  Pain started while exerting himself at work, has decreased lately but is still present.  Pain is better after drinking water.  Will evaluate for ACS, peptic ulcer disease, GERD, pancreatitis, less likely pneumonia or pneumothorax.  Plan for EKG, chest x-ray, blood work with 2 troponins.  Reassess for dispo after work-up complete.

## 2023-09-28 NOTE — ED STATDOCS - OBJECTIVE STATEMENT
57 y/o male with PMHx of CAD stents x8, pancreatitis, HLD, and cholecystectomy presents to ED c/o right sided chest pain onset 1130AM. Denies sharp pain at this time, states he has midsternal chest tightness and pressure at present. States pain occurred when he was getting out of his truck, went around, and then went back inside, reports pain was associated with shortness of breath. Adds that he has some fullness to his throat. Pt took daily dose of Plavix and baby aspirin today. Wife at bedside noted swelling to left leg. Noted relieving factor to chest pain is drinking cold water. Cardiologist: Dr. Jonas.

## 2023-09-28 NOTE — ED STATDOCS - NSICDXFAMILYHX_GEN_ALL_CORE_FT
FAMILY HISTORY:  Father  Still living? Unknown  FH: heart disease, Age at diagnosis: Age Unknown    Mother  Still living? Unknown  FH: heart disease, Age at diagnosis: Age Unknown    Sibling  Still living? Unknown  Family history of diabetes mellitus, Age at diagnosis: Age Unknown  FH: heart disease, Age at diagnosis: Age Unknown

## 2023-09-28 NOTE — ED ADULT NURSE NOTE - CHIEF COMPLAINT QUOTE
Pt with c/o CP that radiates down right arm and up into neck.  PMHx 8 stents, on plavix.  Pt sent for stat EKG

## 2023-09-28 NOTE — ED STATDOCS - PROGRESS NOTE DETAILS
57 y/o male with PMH CAD s/p stents x 8 on ASA and plavix, HLD, pancreatitis, cholecystectomy presents to ED c/o right sided chest pain since 11:30 this morning. Notes pain presented with exertion while getting out of truck and a/w SOB. Describes pain as a sharp tightness, improved with drinkning cold water. Cardiologist Dr. Kaye. PE nonfocal. Plan for labs, EKG, CXR, tropx2, reassess. - Yamileth Denny PA-C 59 y/o male with PMH CAD s/p stents x 8 on ASA and plavix, HLD, pancreatitis, cholecystectomy presents to ED c/o right sided chest pain since 11:30 this morning. Notes pain presented with exertion while getting out of truck and a/w SOB. Describes pain as a sharp tightness, improved with drinking cold water. Cardiologist Dr. Kaye. PE nonfocal. Plan for labs, EKG, CXR, tropx2, reassess. - Yamileth Denny PA-C Labs and imaging reviewed. Cr elevated 2.22, BUN 43. Will give IVF. Troponin negative, 2nd troponin ordered. Rest of labs within normal limits. Will reassess. - Yamileth Denny PA-C Labs and imaging reviewed. Cr elevated 2.22, BUN 43. Will give IVF and repeat BNP. Troponin negative, 2nd troponin ordered. Rest of labs within normal limits. Will reassess. - Yamileth Denny PA-C Labs and imaging reviewed. Cr elevated 2.22, BUN 43. Pt most likely dehydrated. Will give IVF and repeat BNP. Troponin negative, 2nd troponin ordered. Rest of labs within normal limits. Will reassess. - Yamileth Denny PA-C Troponin negative x2.  Creatinine improved with IV fluids.  Patient still having 1 out of 10 right-sided chest pain, but states the pain is significantly worse with some associated shortness of breath when he is ambulating.  Given the potential for unstable angina and patient's significant coronary artery disease history, would admit for provocative testing.  Patient and wife in agreement w/ plan.  Patient endorsed to hospitalist for admission on telemetry.

## 2023-09-28 NOTE — ED ADULT NURSE NOTE - NSFALLHARMRISKINTERV_ED_ALL_ED

## 2023-09-28 NOTE — ED ADULT TRIAGE NOTE - CHIEF COMPLAINT QUOTE
Pt with c/o CP that radiates down right arm and up into neck.  PMHx 8 stents, on plavix.  Pt sent for stat EKG 37

## 2023-09-29 ENCOUNTER — TRANSCRIPTION ENCOUNTER (OUTPATIENT)
Age: 58
End: 2023-09-29

## 2023-09-29 DIAGNOSIS — R07.9 CHEST PAIN, UNSPECIFIED: ICD-10-CM

## 2023-09-29 LAB
ANION GAP SERPL CALC-SCNC: 4 MMOL/L — LOW (ref 5–17)
ANION GAP SERPL CALC-SCNC: 4 MMOL/L — LOW (ref 5–17)
ANION GAP SERPL CALC-SCNC: 5 MMOL/L — SIGNIFICANT CHANGE UP (ref 5–17)
BLD GP AB SCN SERPL QL: SIGNIFICANT CHANGE UP
BUN SERPL-MCNC: 34 MG/DL — HIGH (ref 7–23)
BUN SERPL-MCNC: 35 MG/DL — HIGH (ref 7–23)
BUN SERPL-MCNC: 40 MG/DL — HIGH (ref 7–23)
CALCIUM SERPL-MCNC: 8.5 MG/DL — SIGNIFICANT CHANGE UP (ref 8.5–10.1)
CALCIUM SERPL-MCNC: 8.6 MG/DL — SIGNIFICANT CHANGE UP (ref 8.5–10.1)
CALCIUM SERPL-MCNC: 9.3 MG/DL — SIGNIFICANT CHANGE UP (ref 8.5–10.1)
CHLORIDE SERPL-SCNC: 105 MMOL/L — SIGNIFICANT CHANGE UP (ref 96–108)
CHLORIDE SERPL-SCNC: 106 MMOL/L — SIGNIFICANT CHANGE UP (ref 96–108)
CHLORIDE SERPL-SCNC: 107 MMOL/L — SIGNIFICANT CHANGE UP (ref 96–108)
CO2 SERPL-SCNC: 25 MMOL/L — SIGNIFICANT CHANGE UP (ref 22–31)
CO2 SERPL-SCNC: 25 MMOL/L — SIGNIFICANT CHANGE UP (ref 22–31)
CO2 SERPL-SCNC: 26 MMOL/L — SIGNIFICANT CHANGE UP (ref 22–31)
CREAT SERPL-MCNC: 1.69 MG/DL — HIGH (ref 0.5–1.3)
CREAT SERPL-MCNC: 1.85 MG/DL — HIGH (ref 0.5–1.3)
CREAT SERPL-MCNC: 1.98 MG/DL — HIGH (ref 0.5–1.3)
D DIMER BLD IA.RAPID-MCNC: <150 NG/ML DDU — SIGNIFICANT CHANGE UP
EGFR: 39 ML/MIN/1.73M2 — LOW
EGFR: 42 ML/MIN/1.73M2 — LOW
EGFR: 47 ML/MIN/1.73M2 — LOW
ERYTHROCYTE [SEDIMENTATION RATE] IN BLOOD: 17 MM/HR — SIGNIFICANT CHANGE UP (ref 0–20)
GLUCOSE BLDC GLUCOMTR-MCNC: 225 MG/DL — HIGH (ref 70–99)
GLUCOSE BLDC GLUCOMTR-MCNC: 235 MG/DL — HIGH (ref 70–99)
GLUCOSE BLDC GLUCOMTR-MCNC: 274 MG/DL — HIGH (ref 70–99)
GLUCOSE BLDC GLUCOMTR-MCNC: 300 MG/DL — HIGH (ref 70–99)
GLUCOSE BLDC GLUCOMTR-MCNC: 307 MG/DL — HIGH (ref 70–99)
GLUCOSE SERPL-MCNC: 299 MG/DL — HIGH (ref 70–99)
GLUCOSE SERPL-MCNC: 316 MG/DL — HIGH (ref 70–99)
GLUCOSE SERPL-MCNC: 320 MG/DL — HIGH (ref 70–99)
POTASSIUM SERPL-MCNC: 3.9 MMOL/L — SIGNIFICANT CHANGE UP (ref 3.5–5.3)
POTASSIUM SERPL-MCNC: 4.1 MMOL/L — SIGNIFICANT CHANGE UP (ref 3.5–5.3)
POTASSIUM SERPL-MCNC: 4.4 MMOL/L — SIGNIFICANT CHANGE UP (ref 3.5–5.3)
POTASSIUM SERPL-SCNC: 3.9 MMOL/L — SIGNIFICANT CHANGE UP (ref 3.5–5.3)
POTASSIUM SERPL-SCNC: 4.1 MMOL/L — SIGNIFICANT CHANGE UP (ref 3.5–5.3)
POTASSIUM SERPL-SCNC: 4.4 MMOL/L — SIGNIFICANT CHANGE UP (ref 3.5–5.3)
SODIUM SERPL-SCNC: 135 MMOL/L — SIGNIFICANT CHANGE UP (ref 135–145)
SODIUM SERPL-SCNC: 136 MMOL/L — SIGNIFICANT CHANGE UP (ref 135–145)
SODIUM SERPL-SCNC: 136 MMOL/L — SIGNIFICANT CHANGE UP (ref 135–145)
TROPONIN I, HIGH SENSITIVITY RESULT: 18.57 NG/L — SIGNIFICANT CHANGE UP
TROPONIN I, HIGH SENSITIVITY RESULT: 20.22 NG/L — SIGNIFICANT CHANGE UP

## 2023-09-29 PROCEDURE — 86850 RBC ANTIBODY SCREEN: CPT

## 2023-09-29 PROCEDURE — 92921: CPT | Mod: LD

## 2023-09-29 PROCEDURE — 80048 BASIC METABOLIC PNL TOTAL CA: CPT

## 2023-09-29 PROCEDURE — C1874: CPT

## 2023-09-29 PROCEDURE — 93010 ELECTROCARDIOGRAM REPORT: CPT

## 2023-09-29 PROCEDURE — 93005 ELECTROCARDIOGRAM TRACING: CPT

## 2023-09-29 PROCEDURE — 80061 LIPID PANEL: CPT

## 2023-09-29 PROCEDURE — 93970 EXTREMITY STUDY: CPT

## 2023-09-29 PROCEDURE — C1769: CPT

## 2023-09-29 PROCEDURE — C1887: CPT

## 2023-09-29 PROCEDURE — 85652 RBC SED RATE AUTOMATED: CPT

## 2023-09-29 PROCEDURE — 82962 GLUCOSE BLOOD TEST: CPT

## 2023-09-29 PROCEDURE — C1725: CPT

## 2023-09-29 PROCEDURE — 85025 COMPLETE CBC W/AUTO DIFF WBC: CPT

## 2023-09-29 PROCEDURE — 86900 BLOOD TYPING SEROLOGIC ABO: CPT

## 2023-09-29 PROCEDURE — 99223 1ST HOSP IP/OBS HIGH 75: CPT

## 2023-09-29 PROCEDURE — 93454 CORONARY ARTERY ANGIO S&I: CPT | Mod: 59

## 2023-09-29 PROCEDURE — 36415 COLL VENOUS BLD VENIPUNCTURE: CPT

## 2023-09-29 PROCEDURE — 85379 FIBRIN DEGRADATION QUANT: CPT

## 2023-09-29 PROCEDURE — 86901 BLOOD TYPING SEROLOGIC RH(D): CPT

## 2023-09-29 PROCEDURE — C9600: CPT | Mod: LD

## 2023-09-29 PROCEDURE — C1894: CPT

## 2023-09-29 PROCEDURE — 83036 HEMOGLOBIN GLYCOSYLATED A1C: CPT

## 2023-09-29 PROCEDURE — C1760: CPT

## 2023-09-29 PROCEDURE — 84484 ASSAY OF TROPONIN QUANT: CPT

## 2023-09-29 RX ORDER — INSULIN LISPRO 100/ML
55 VIAL (ML) SUBCUTANEOUS
Qty: 0 | Refills: 0 | DISCHARGE

## 2023-09-29 RX ORDER — SODIUM CHLORIDE 9 MG/ML
1000 INJECTION INTRAMUSCULAR; INTRAVENOUS; SUBCUTANEOUS
Refills: 0 | Status: DISCONTINUED | OUTPATIENT
Start: 2023-09-29 | End: 2023-09-29

## 2023-09-29 RX ORDER — SODIUM CHLORIDE 9 MG/ML
1000 INJECTION INTRAMUSCULAR; INTRAVENOUS; SUBCUTANEOUS
Refills: 0 | Status: DISCONTINUED | OUTPATIENT
Start: 2023-09-29 | End: 2023-09-30

## 2023-09-29 RX ORDER — CLOPIDOGREL BISULFATE 75 MG/1
75 TABLET, FILM COATED ORAL DAILY
Refills: 0 | Status: DISCONTINUED | OUTPATIENT
Start: 2023-09-29 | End: 2023-09-30

## 2023-09-29 RX ORDER — INSULIN LISPRO 100/ML
VIAL (ML) SUBCUTANEOUS
Refills: 0 | Status: DISCONTINUED | OUTPATIENT
Start: 2023-09-29 | End: 2023-09-30

## 2023-09-29 RX ORDER — HUMAN INSULIN 100 [IU]/ML
50 INJECTION, SUSPENSION SUBCUTANEOUS ONCE
Refills: 0 | Status: COMPLETED | OUTPATIENT
Start: 2023-09-29 | End: 2023-09-29

## 2023-09-29 RX ORDER — INSULIN GLARGINE 100 [IU]/ML
110 INJECTION, SOLUTION SUBCUTANEOUS
Qty: 0 | Refills: 0 | DISCHARGE

## 2023-09-29 RX ORDER — METOPROLOL TARTRATE 50 MG
200 TABLET ORAL DAILY
Refills: 0 | Status: DISCONTINUED | OUTPATIENT
Start: 2023-09-29 | End: 2023-09-30

## 2023-09-29 RX ORDER — SODIUM CHLORIDE 9 MG/ML
1000 INJECTION, SOLUTION INTRAVENOUS
Refills: 0 | Status: DISCONTINUED | OUTPATIENT
Start: 2023-09-29 | End: 2023-09-30

## 2023-09-29 RX ORDER — DEXTROSE 50 % IN WATER 50 %
25 SYRINGE (ML) INTRAVENOUS ONCE
Refills: 0 | Status: DISCONTINUED | OUTPATIENT
Start: 2023-09-29 | End: 2023-09-30

## 2023-09-29 RX ORDER — ATORVASTATIN CALCIUM 80 MG/1
40 TABLET, FILM COATED ORAL AT BEDTIME
Refills: 0 | Status: DISCONTINUED | OUTPATIENT
Start: 2023-09-29 | End: 2023-09-30

## 2023-09-29 RX ORDER — LIPASE/PROTEASE/AMYLASE 16-48-48K
1 CAPSULE,DELAYED RELEASE (ENTERIC COATED) ORAL
Refills: 0 | Status: DISCONTINUED | OUTPATIENT
Start: 2023-09-29 | End: 2023-09-29

## 2023-09-29 RX ORDER — DEXTROSE 50 % IN WATER 50 %
12.5 SYRINGE (ML) INTRAVENOUS ONCE
Refills: 0 | Status: DISCONTINUED | OUTPATIENT
Start: 2023-09-29 | End: 2023-09-30

## 2023-09-29 RX ORDER — LISINOPRIL 2.5 MG/1
20 TABLET ORAL DAILY
Refills: 0 | Status: DISCONTINUED | OUTPATIENT
Start: 2023-09-29 | End: 2023-09-29

## 2023-09-29 RX ORDER — SODIUM CHLORIDE 9 MG/ML
250 INJECTION INTRAMUSCULAR; INTRAVENOUS; SUBCUTANEOUS ONCE
Refills: 0 | Status: COMPLETED | OUTPATIENT
Start: 2023-09-29 | End: 2023-09-29

## 2023-09-29 RX ORDER — GLUCAGON INJECTION, SOLUTION 0.5 MG/.1ML
1 INJECTION, SOLUTION SUBCUTANEOUS ONCE
Refills: 0 | Status: DISCONTINUED | OUTPATIENT
Start: 2023-09-29 | End: 2023-09-30

## 2023-09-29 RX ORDER — EMPAGLIFLOZIN 10 MG/1
1 TABLET, FILM COATED ORAL
Qty: 0 | Refills: 0 | DISCHARGE

## 2023-09-29 RX ORDER — DEXTROSE 50 % IN WATER 50 %
15 SYRINGE (ML) INTRAVENOUS ONCE
Refills: 0 | Status: DISCONTINUED | OUTPATIENT
Start: 2023-09-29 | End: 2023-09-30

## 2023-09-29 RX ORDER — ASPIRIN/CALCIUM CARB/MAGNESIUM 324 MG
324 TABLET ORAL ONCE
Refills: 0 | Status: COMPLETED | OUTPATIENT
Start: 2023-09-29 | End: 2023-09-29

## 2023-09-29 RX ORDER — FERROUS SULFATE 325(65) MG
325 TABLET ORAL DAILY
Refills: 0 | Status: DISCONTINUED | OUTPATIENT
Start: 2023-09-29 | End: 2023-09-30

## 2023-09-29 RX ORDER — ERGOCALCIFEROL 1.25 MG/1
1 CAPSULE ORAL
Qty: 0 | Refills: 0 | DISCHARGE

## 2023-09-29 RX ORDER — INSULIN GLARGINE 100 [IU]/ML
100 INJECTION, SOLUTION SUBCUTANEOUS EVERY MORNING
Refills: 0 | Status: DISCONTINUED | OUTPATIENT
Start: 2023-09-30 | End: 2023-09-30

## 2023-09-29 RX ORDER — INSULIN LISPRO 100/ML
45 VIAL (ML) SUBCUTANEOUS
Refills: 0 | Status: DISCONTINUED | OUTPATIENT
Start: 2023-09-29 | End: 2023-09-30

## 2023-09-29 RX ORDER — MAGNESIUM OXIDE 400 MG ORAL TABLET 241.3 MG
400 TABLET ORAL DAILY
Refills: 0 | Status: DISCONTINUED | OUTPATIENT
Start: 2023-09-29 | End: 2023-09-30

## 2023-09-29 RX ORDER — ASPIRIN/CALCIUM CARB/MAGNESIUM 324 MG
81 TABLET ORAL DAILY
Refills: 0 | Status: DISCONTINUED | OUTPATIENT
Start: 2023-09-29 | End: 2023-09-30

## 2023-09-29 RX ORDER — RANOLAZINE 500 MG/1
500 TABLET, FILM COATED, EXTENDED RELEASE ORAL
Refills: 0 | Status: DISCONTINUED | OUTPATIENT
Start: 2023-09-29 | End: 2023-09-30

## 2023-09-29 RX ADMIN — Medication 325 MILLIGRAM(S): at 14:46

## 2023-09-29 RX ADMIN — CLOPIDOGREL BISULFATE 75 MILLIGRAM(S): 75 TABLET, FILM COATED ORAL at 10:52

## 2023-09-29 RX ADMIN — Medication 81 MILLIGRAM(S): at 10:52

## 2023-09-29 RX ADMIN — SODIUM CHLORIDE 75 MILLILITER(S): 9 INJECTION INTRAMUSCULAR; INTRAVENOUS; SUBCUTANEOUS at 11:14

## 2023-09-29 RX ADMIN — ATORVASTATIN CALCIUM 40 MILLIGRAM(S): 80 TABLET, FILM COATED ORAL at 21:51

## 2023-09-29 RX ADMIN — Medication 324 MILLIGRAM(S): at 02:29

## 2023-09-29 RX ADMIN — Medication 6: at 17:14

## 2023-09-29 RX ADMIN — MAGNESIUM OXIDE 400 MG ORAL TABLET 400 MILLIGRAM(S): 241.3 TABLET ORAL at 14:46

## 2023-09-29 RX ADMIN — Medication 45 UNIT(S): at 17:13

## 2023-09-29 RX ADMIN — SODIUM CHLORIDE 234 MILLILITER(S): 9 INJECTION INTRAMUSCULAR; INTRAVENOUS; SUBCUTANEOUS at 13:05

## 2023-09-29 RX ADMIN — HUMAN INSULIN 50 UNIT(S): 100 INJECTION, SUSPENSION SUBCUTANEOUS at 21:52

## 2023-09-29 RX ADMIN — SODIUM CHLORIDE 250 MILLILITER(S): 9 INJECTION INTRAMUSCULAR; INTRAVENOUS; SUBCUTANEOUS at 17:10

## 2023-09-29 RX ADMIN — RANOLAZINE 500 MILLIGRAM(S): 500 TABLET, FILM COATED, EXTENDED RELEASE ORAL at 21:52

## 2023-09-29 RX ADMIN — SODIUM CHLORIDE 75 MILLILITER(S): 9 INJECTION INTRAMUSCULAR; INTRAVENOUS; SUBCUTANEOUS at 11:29

## 2023-09-29 RX ADMIN — Medication 200 MILLIGRAM(S): at 11:05

## 2023-09-29 NOTE — PATIENT PROFILE ADULT - FALL HARM RISK - HARM RISK INTERVENTIONS
Monitor gait and stability/Reinforce activity limits and safety measures with patient and family/Tailored Fall Risk Interventions/Bed in lowest position, wheels locked, appropriate side rails in place/Call bell, personal items and telephone in reach/Instruct patient to call for assistance before getting out of bed or chair/Non-slip footwear when patient is out of bed/[place] to call system/Physically safe environment - no spills, clutter or unnecessary equipment/Purposeful Proactive Rounding/Room/bathroom lighting operational, light cord in reach

## 2023-09-29 NOTE — BRIEF OPERATIVE NOTE - NSICDXBRIEFPOSTOP_GEN_ALL_CORE_FT
POST-OP DIAGNOSIS:  Coronary stent restenosis, initial encounter 29-Sep-2023 14:22:24  Shagufta Guerra

## 2023-09-29 NOTE — H&P ADULT - HISTORY OF PRESENT ILLNESS
59 y/o male with PMHx of DM type II,  CAD s/p stents x8, Pancreatitis, HLD, Ckd stg III,  presents to ED c/o right sided chest pain onset 1130AM. Denies sharp pain at this time, states he has midsternal chest tightness and pressure at present. States pain occurred when he was getting out of his truck, went around, and then went back inside, reports pain was associated with shortness of breath. Adds that he has some fullness to his throat. Pt took daily dose of Plavix and baby aspirin today. Wife at bedside noted swelling to left leg. Noted relieving factor to chest pain is drinking cold water.    9/29.:  s/p LHC with PCI of LAD        REVIEW OF SYSTEMS:    CONSTITUTIONAL: No weakness, No fevers or chills  ENT: No ear ache, No sorethroat  NECK: No pain, No stiffness  RESPIRATORY: No cough, No wheezing, No hemoptysis; No dyspnea  CARDIOVASCULAR: + chest pain, No palpitations  GASTROINTESTINAL: No abd pain, No nausea, No vomiting, No hematemesis, No diarrhea or constipation. No melena, No hematochezia.  GENITOURINARY: No dysuria, No  hematuria  NEUROLOGICAL: No diplopia, No paresthesia, No motor dysfunction  MUSCULOSKELETAL: No arthralgia, No myalgia  SKIN: No rashes, or lesions   PSYCH: no anxiety, no suicidal ideation    All other review of systems is negative unless indicated above    Vital Signs Last 24 Hrs  T(C): 37.1 (29 Sep 2023 14:30), Max: 37.1 (29 Sep 2023 14:30)  T(F): 98.8 (29 Sep 2023 14:30), Max: 98.8 (29 Sep 2023 14:30)  HR: 69 (29 Sep 2023 14:30) (64 - 86)  BP: 151/76 (29 Sep 2023 14:30) (125/71 - 167/84)  BP(mean): 92 (29 Sep 2023 08:50) (84 - 100)  RR: 16 (29 Sep 2023 14:30) (16 - 20)  SpO2: 99% (29 Sep 2023 14:30) (96% - 100%)    Parameters below as of 29 Sep 2023 14:30  Patient On (Oxygen Delivery Method): room air        PHYSICAL EXAM:    GENERAL: NAD  HEENT:  NC/AT, EOMI, PERRLA, No scleral icterus, Moist mucous membranes  NECK: Supple, No JVD  CNS:  Alert & Oriented X3, Motor Strength 5/5 B/L upper and lower extremities; DTRs 2+ intact   LUNG: Normal Breath sounds, Clear to auscultation bilaterally, No rales, No rhonchi, No wheezing  HEART: RRR; No murmurs, No rubs  ABDOMEN: +BS, ST/ND/NT  GENITOURINARY: Voiding, Bladder not distended  EXTREMITIES:  2+ Peripheral Pulses, No clubbing, No cyanosis, No tibial edema  MUSCULOSKELTAL: Joints normal ROM, No TTP, No effusion  VAGINAL: deferred  SKIN: no rashes  RECTAL: deferred, not indicated  BREAST: deferred                          13.3   7.29  )-----------( 355      ( 28 Sep 2023 20:45 )             37.3     09-29    136  |  106  |  34<H>  ----------------------------<  299<H>  4.4   |  26  |  1.85<H>    Ca    9.3      29 Sep 2023 11:26  Mg     2.0     09-28    TPro  7.3  /  Alb  3.5  /  TBili  0.5  /  DBili  x   /  AST  37  /  ALT  79<H>  /  AlkPhos  76  09-28    Vancomycin levels:   Cultures:     MEDICATIONS  (STANDING):  aspirin enteric coated 81 milliGRAM(s) Oral daily  atorvastatin 40 milliGRAM(s) Oral at bedtime  clopidogrel Tablet 75 milliGRAM(s) Oral daily  ferrous    sulfate 325 milliGRAM(s) Oral daily  glucagon  Injectable 1 milliGRAM(s) IntraMuscular once  insulin lispro (ADMELOG) corrective regimen sliding scale   SubCutaneous three times a day before meals  insulin lispro Injectable (ADMELOG) 45 Unit(s) SubCutaneous three times a day before meals  insulin NPH human recombinant 50 Unit(s) SubCutaneous once  magnesium oxide 400 milliGRAM(s) Oral daily  metoprolol succinate  milliGRAM(s) Oral daily  ranolazine 500 milliGRAM(s) Oral two times a day  sodium chloride 0.9% Bolus 250 milliLiter(s) IV Bolus once  sodium chloride 0.9%. 1000 milliLiter(s) (234 mL/Hr) IV Continuous <Continuous>    MEDICATIONS  (PRN):  dextrose Oral Gel 15 Gram(s) Oral once PRN Blood Glucose LESS THAN 70 milliGRAM(s)/deciliter      all labs reviewed  all imaging reviewed    a/p:    1. Chest Pain: likely Unstable angina  s/p PCI of LAD, in stent stenosis   c/w ASA/Plavix/BB    2. RLE edema:  will get Sonogram of LE to r/o DVT    3. VIKKI on Ckd stg III:  c/w IVF 59 y/o male with PMHx of DM type II,  CAD s/p stents x8, Pancreatitis, HLD, Ckd stg III,  presents to ED c/o right sided chest pain onset 1130AM. Denies sharp pain at this time, states he has midsternal chest tightness and pressure at present. States pain occurred when he was getting out of his truck, went around, and then went back inside, reports pain was associated with shortness of breath. Adds that he has some fullness to his throat. Pt took daily dose of Plavix and baby aspirin today. Wife at bedside noted swelling to left leg. Noted relieving factor to chest pain is drinking cold water.    9/29.:  s/p LHC with PCI of LAD        REVIEW OF SYSTEMS:    CONSTITUTIONAL: No weakness, No fevers or chills  ENT: No ear ache, No sorethroat  NECK: No pain, No stiffness  RESPIRATORY: No cough, No wheezing, No hemoptysis; No dyspnea  CARDIOVASCULAR: + chest pain, No palpitations  GASTROINTESTINAL: No abd pain, No nausea, No vomiting, No hematemesis, No diarrhea or constipation. No melena, No hematochezia.  GENITOURINARY: No dysuria, No  hematuria  NEUROLOGICAL: No diplopia, No paresthesia, No motor dysfunction  MUSCULOSKELETAL: No arthralgia, No myalgia  SKIN: No rashes, or lesions   PSYCH: no anxiety, no suicidal ideation    All other review of systems is negative unless indicated above    Vital Signs Last 24 Hrs  T(C): 37.1 (29 Sep 2023 14:30), Max: 37.1 (29 Sep 2023 14:30)  T(F): 98.8 (29 Sep 2023 14:30), Max: 98.8 (29 Sep 2023 14:30)  HR: 69 (29 Sep 2023 14:30) (64 - 86)  BP: 151/76 (29 Sep 2023 14:30) (125/71 - 167/84)  BP(mean): 92 (29 Sep 2023 08:50) (84 - 100)  RR: 16 (29 Sep 2023 14:30) (16 - 20)  SpO2: 99% (29 Sep 2023 14:30) (96% - 100%)    Parameters below as of 29 Sep 2023 14:30  Patient On (Oxygen Delivery Method): room air        PHYSICAL EXAM:    GENERAL: NAD  HEENT:  NC/AT, EOMI, PERRLA, No scleral icterus, Moist mucous membranes  NECK: Supple, No JVD  CNS:  Alert & Oriented X3, Motor Strength 5/5 B/L upper and lower extremities; DTRs 2+ intact   LUNG: Normal Breath sounds, Clear to auscultation bilaterally, No rales, No rhonchi, No wheezing  HEART: RRR; No murmurs, No rubs  ABDOMEN: +BS, ST/ND/NT  GENITOURINARY: Voiding, Bladder not distended  EXTREMITIES:  2+ Peripheral Pulses, No clubbing, No cyanosis, No tibial edema  MUSCULOSKELTAL: Joints normal ROM, No TTP, No effusion  VAGINAL: deferred  SKIN: no rashes  RECTAL: deferred, not indicated  BREAST: deferred                          13.3   7.29  )-----------( 355      ( 28 Sep 2023 20:45 )             37.3     09-29    136  |  106  |  34<H>  ----------------------------<  299<H>  4.4   |  26  |  1.85<H>    Ca    9.3      29 Sep 2023 11:26  Mg     2.0     09-28    TPro  7.3  /  Alb  3.5  /  TBili  0.5  /  DBili  x   /  AST  37  /  ALT  79<H>  /  AlkPhos  76  09-28    Vancomycin levels:   Cultures:     MEDICATIONS  (STANDING):  aspirin enteric coated 81 milliGRAM(s) Oral daily  atorvastatin 40 milliGRAM(s) Oral at bedtime  clopidogrel Tablet 75 milliGRAM(s) Oral daily  ferrous    sulfate 325 milliGRAM(s) Oral daily  glucagon  Injectable 1 milliGRAM(s) IntraMuscular once  insulin lispro (ADMELOG) corrective regimen sliding scale   SubCutaneous three times a day before meals  insulin lispro Injectable (ADMELOG) 45 Unit(s) SubCutaneous three times a day before meals  insulin NPH human recombinant 50 Unit(s) SubCutaneous once  magnesium oxide 400 milliGRAM(s) Oral daily  metoprolol succinate  milliGRAM(s) Oral daily  ranolazine 500 milliGRAM(s) Oral two times a day  sodium chloride 0.9% Bolus 250 milliLiter(s) IV Bolus once  sodium chloride 0.9%. 1000 milliLiter(s) (234 mL/Hr) IV Continuous <Continuous>    MEDICATIONS  (PRN):  dextrose Oral Gel 15 Gram(s) Oral once PRN Blood Glucose LESS THAN 70 milliGRAM(s)/deciliter      all labs reviewed  all imaging reviewed    a/p:    1. Chest Pain: likely Unstable angina  s/p PCI of LAD, in stent stenosis   c/w ASA/Plavix/BB    2. RLE edema:  will get Sonogram of LE to r/o DVT    3. VIKKI on Ckd stg III:  c/w IVF    4. DM type II:  Lantus / Admelog TID pre meals

## 2023-09-29 NOTE — CHART NOTE - NSCHARTNOTEFT_GEN_A_CORE
59 y/o male with PMHx of CAD stents x8, pancreatitis, HLD, and cholecystectomy presents to ED c/o right sided chest pain onset 1130AM. Denies sharp pain at this time, states he has midsternal chest tightness and pressure at present. States pain occurred when he was getting out of his truck, went around, and then went back inside, reports pain was associated with shortness of breath. Adds that he has some fullness to his throat. Pt took daily dose of Plavix and baby aspirin today. Wife at bedside noted swelling to left leg. Noted relieving factor to chest pain is drinking cold water.  This AM, he was walking in flowers similar type feeling   Consent obtained for cardiac catheterization w/ coronary angiogram and possible stent placement, with possible sedation and analgesia. Pt is competent, has capacity, and understands risks and benefits of procedure. Risks and benefits discussed. Risk discussed included, but not limited to MI, stroke, mortality, major bleeding, arrythmia, or infection. All questions answered     ASA:  Bleeding  Risk score:  Creatinine:  GFR:  Gordy score: 59 y/o male with PMHx of CAD stents x8, pancreatitis, HLD, and cholecystectomy presents to ED c/o right sided chest pain onset 1130AM. Denies sharp pain at this time, states he has midsternal chest tightness and pressure at present. States pain occurred when he was getting out of his truck, went around, and then went back inside, reports pain was associated with shortness of breath. Adds that he has some fullness to his throat. Pt took daily dose of Plavix and baby aspirin today. Wife at bedside noted swelling to left leg. Noted relieving factor to chest pain is drinking cold water.  This AM, he was walking in flowers similar type feeling. Now reports right sided CP 4/10-Improved from presentation  Consent obtained for cardiac catheterization w/ coronary angiogram and possible stent placement, with possible sedation and analgesia. Pt is competent, has capacity, and understands risks and benefits of procedure. Risks and benefits discussed. Risk discussed included, but not limited to MI, stroke, mortality, major bleeding, arrythmia, or infection. All questions answered   Vital Signs Last 24 Hrs  T(C): 36.6 (29 Sep 2023 11:01), Max: 36.8 (29 Sep 2023 01:34)  T(F): 97.8 (29 Sep 2023 11:01), Max: 98.2 (29 Sep 2023 01:34)  HR: 73 (29 Sep 2023 11:01) (64 - 86)  BP: 167/84 (29 Sep 2023 11:01) (125/71 - 167/84)  BP(mean): 92 (29 Sep 2023 08:50) (84 - 100)  RR: 18 (29 Sep 2023 11:01) (17 - 20)  SpO2: 98% (29 Sep 2023 11:01) (97% - 100%)  Parameters below as of 29 Sep 2023 11:01  Patient On (Oxygen Delivery Method): room air  ASA:II  Bleeding  Risk score:0.9%  Creatinine:1.98  GFR:39  Gordy score: 1 point

## 2023-09-29 NOTE — PROGRESS NOTE ADULT - SUBJECTIVE AND OBJECTIVE BOX
Nurse Practitioner Progress note:   59 y/o male with PMHx of CAD stents x8, pancreatitis, HLD, and cholecystectomy presents to ED c/o right sided chest pain onset 1130AM. Denies sharp pain at this time, states he has midsternal chest tightness and pressure at present. States pain occurred when he was getting out of his truck, went around, and then went back inside, reports pain was associated with shortness of breath. Adds that he has some fullness to his throat. Pt took daily dose of Plavix and baby aspirin the day of admission. Noted relieving factor to chest pain is drinking cold water.  This AM, he was walking in flowers and experienced similar type feeling.  Right sided CP 4/10-Improved from presentation prior to cardiac cath  s/p PCI  Denies chest pain, shortness of breath, dizziness or palpitations post procedure  Right groin procedure site with Mynx closure; no bleeding, no hematoma, site soft, non tender, positive pedal pulses bilaterally    T(C): 36.2 (09-29-23 @ 11:34), Max: 36.8 (09-29-23 @ 01:34)  HR: 68 (09-29-23 @ 14:05) (64 - 86)  BP: 132/75 (09-29-23 @ 14:05) (125/71 - 167/84)  RR: 16 (09-29-23 @ 14:05) (16 - 20)  SpO2: 99% (09-29-23 @ 14:05) (96% - 100%)    EKG: NSR No acute ischemic changes   CBC Full  -  ( 28 Sep 2023 20:45 )  WBC Count : 7.29 K/uL  RBC Count : 4.14 M/uL  Hemoglobin : 13.3 g/dL  Hematocrit : 37.3 %  Platelet Count - Automated : 355 K/uL  Mean Cell Volume : 90.1 fl  Mean Cell Hemoglobin : 32.1 pg  Mean Cell Hemoglobin Concentration : 35.7 gm/dL  Auto Neutrophil # : 4.07 K/uL  Auto Lymphocyte # : 1.93 K/uL  Auto Monocyte # : 0.82 K/uL  Auto Eosinophil # : 0.29 K/uL  Auto Basophil # : 0.08 K/uL  Auto Neutrophil % : 55.8 %  Auto Lymphocyte % : 26.5 %  Auto Monocyte % : 11.2 %  Auto Eosinophil % : 4.0 %  Auto Basophil % : 1.1 %    09-29    136  |  106  |  34<H>  ----------------------------<  299<H>  4.4   |  26  |  1.85<H>    Ca    9.3      29 Sep 2023 11:26  Mg     2.0     09-28    TPro  7.3  /  Alb  3.5  /  TBili  0.5  /  DBili  x   /  AST  37  /  ALT  79<H>  /  AlkPhos  76  09-28    MEDICATIONS  (STANDING):  aspirin enteric coated 81 milliGRAM(s) Oral daily  atorvastatin 40 milliGRAM(s) Oral at bedtime  clopidogrel Tablet 75 milliGRAM(s) Oral daily  ferrous    sulfate 325 milliGRAM(s) Oral daily  magnesium oxide 400 milliGRAM(s) Oral daily  metoprolol succinate  milliGRAM(s) Oral daily  ranolazine 500 milliGRAM(s) Oral two times a day  sodium chloride 0.9% Bolus 250 milliLiter(s) IV Bolus once  sodium chloride 0.9%. 1000 milliLiter(s) (234 mL/Hr) IV Continuous <Continuous>    MEDICATIONS  (PRN):    HPI:59 y/o male with PMHx of CAD stents x8, pancreatitis, HLD, and cholecystectomy presents to ED c/o right sided chest pain onset 1130AM. Denies sharp pain at this time, states he has midsternal chest tightness and pressure at present. States pain occurred when he was getting out of his truck, went around, and then went back inside, reports pain was associated with shortness of breath. Adds that he has some fullness to his throat. Pt took daily dose of Plavix and baby aspirin the day of admission. Noted relieving factor to chest pain is drinking cold water.  This AM, he was walking in flowers and experienced similar type feeling.  Right sided CP 4/10-Improved from presentation prior to cardiac cath     s/p PCI of ISR of LADx2 and PTCA of diag    ASSESSMENT/PLAN:    Coronary artery disease  --IV hydration for LEIF prevention  -Encourage PO fluids  -Aspirin 81 mg PO daily  -Plavix 75mg PO daily  -Continue BB, statin, Renaxa  --Plan of care discussed with patient and MD  -Follow-up with attending MD within 1 week after discharge  -Discussed therapeutic lifestyle changes to reduce risk factors such as following a cardiac diet, weight loss, maintaining a healthy weight, exercise, smoking cessation, medication compliance, and regular follow-up  with MD  Nurse Practitioner Progress note:   59 y/o male with PMHx of CAD stents x8, pancreatitis, HLD, and cholecystectomy presents to ED c/o right sided chest pain onset 1130AM. Denies sharp pain at this time, states he has midsternal chest tightness and pressure at present. States pain occurred when he was getting out of his truck, went around, and then went back inside, reports pain was associated with shortness of breath. Adds that he has some fullness to his throat. Pt took daily dose of Plavix and baby aspirin the day of admission. Noted relieving factor to chest pain is drinking cold water.  This AM, he was walking in flowers and experienced similar type feeling.  Right sided CP 4/10-Improved from presentation prior to cardiac cath  s/p PCI  Denies chest pain, shortness of breath, dizziness or palpitations post procedure    PHYSICAL EXAM:  Constitutional: NAD  Neuro: Alert and oriented x 3 Speech clear No focal deficits  Respiratory: CTAB  Cardiovascular: S1 and S2, RRR,   Gastrointestinal: BS+, soft, NT/ND  Extremities: No clubbing cyanosis. LLE slightly more swollen than RLE  Vascular: 2+ peripheral pulses  Psychiatric: Normal mood, normal affect  Musculoskeletal: 5/5 strength b/l upper and lower extremities      Right groin procedure site with Mynx closure; no bleeding, no hematoma, site soft, non tender, positive pedal pulses bilaterally    T(C): 36.2 (09-29-23 @ 11:34), Max: 36.8 (09-29-23 @ 01:34)  HR: 68 (09-29-23 @ 14:05) (64 - 86)  BP: 132/75 (09-29-23 @ 14:05) (125/71 - 167/84)  RR: 16 (09-29-23 @ 14:05) (16 - 20)  SpO2: 99% (09-29-23 @ 14:05) (96% - 100%)    EKG: NSR No acute ischemic changes   CBC Full  -  ( 28 Sep 2023 20:45 )  WBC Count : 7.29 K/uL  RBC Count : 4.14 M/uL  Hemoglobin : 13.3 g/dL  Hematocrit : 37.3 %  Platelet Count - Automated : 355 K/uL  Mean Cell Volume : 90.1 fl  Mean Cell Hemoglobin : 32.1 pg  Mean Cell Hemoglobin Concentration : 35.7 gm/dL  Auto Neutrophil # : 4.07 K/uL  Auto Lymphocyte # : 1.93 K/uL  Auto Monocyte # : 0.82 K/uL  Auto Eosinophil # : 0.29 K/uL  Auto Basophil # : 0.08 K/uL  Auto Neutrophil % : 55.8 %  Auto Lymphocyte % : 26.5 %  Auto Monocyte % : 11.2 %  Auto Eosinophil % : 4.0 %  Auto Basophil % : 1.1 %    09-29    136  |  106  |  34<H>  ----------------------------<  299<H>  4.4   |  26  |  1.85<H>    Ca    9.3      29 Sep 2023 11:26  Mg     2.0     09-28    TPro  7.3  /  Alb  3.5  /  TBili  0.5  /  DBili  x   /  AST  37  /  ALT  79<H>  /  AlkPhos  76  09-28    MEDICATIONS  (STANDING):  aspirin enteric coated 81 milliGRAM(s) Oral daily  atorvastatin 40 milliGRAM(s) Oral at bedtime  clopidogrel Tablet 75 milliGRAM(s) Oral daily  ferrous    sulfate 325 milliGRAM(s) Oral daily  magnesium oxide 400 milliGRAM(s) Oral daily  metoprolol succinate  milliGRAM(s) Oral daily  ranolazine 500 milliGRAM(s) Oral two times a day  sodium chloride 0.9% Bolus 250 milliLiter(s) IV Bolus once  sodium chloride 0.9%. 1000 milliLiter(s) (234 mL/Hr) IV Continuous <Continuous>    MEDICATIONS  (PRN):    HPI:59 y/o male with PMHx of CAD stents x8, pancreatitis, HLD, and cholecystectomy presents to ED c/o right sided chest pain onset 1130AM. Denies sharp pain at this time, states he has midsternal chest tightness and pressure at present. States pain occurred when he was getting out of his truck, went around, and then went back inside, reports pain was associated with shortness of breath. Adds that he has some fullness to his throat. Pt took daily dose of Plavix and baby aspirin the day of admission. Noted relieving factor to chest pain is drinking cold water.  This AM, he was walking in flowers and experienced similar type feeling.  Right sided CP 4/10-Improved from presentation prior to cardiac cath     s/p PCI of ISR of LADx2 and PTCA of diag    ASSESSMENT/PLAN:    Coronary artery disease  --IV hydration for LEIF prevention  -Encourage PO fluids  -Aspirin 81 mg PO daily  -Plavix 75mg PO daily  -Continue BB, statin, Ranexa  --Plan of care discussed with patient and MD  -Further management per primary team  -Follow-up with attending MD within 1 week after discharge  -Discussed therapeutic lifestyle changes to reduce risk factors such as following a cardiac diet, weight loss, maintaining a healthy weight, exercise, smoking cessation, medication compliance, and regular follow-up  with MD

## 2023-09-29 NOTE — DISCHARGE NOTE NURSING/CASE MANAGEMENT/SOCIAL WORK - PATIENT PORTAL LINK FT
You can access the FollowMyHealth Patient Portal offered by [facility] by registering at the following website: [email or web address]. By joining [facility]’s FollowMyHealth portal, you will also be able to view your health information using other applications (apps) compatible with our system.

## 2023-09-29 NOTE — CONSULT NOTE ADULT - SUBJECTIVE AND OBJECTIVE BOX
CHIEF COMPLAINT: Patient is a 58y old  Male who presents with a chief complaint of chest pain    ED HPI: 57 y/o male with PMHx of CAD stents x8, pancreatitis, HLD, and cholecystectomy presents to ED c/o right sided chest pain onset 1130AM. Denies sharp pain at this time, states he has midsternal chest tightness and pressure at present. States pain occurred when he was getting out of his truck, went around, and then went back inside, reports pain was associated with shortness of breath. Adds that he has some fullness to his throat. Pt took daily dose of Plavix and baby aspirin today. Wife at bedside noted swelling to left leg. Noted relieving factor to chest pain is drinking cold water.    9/29. Cardiology consulted for chest pain        PREVIOUS DIAGNOSTIC TESTING:    ECHO: FINDINGS: 12/2022: TTE: EF 55-60%, moderate aortic stenosis, dilated aortic root, trace TR/MR  8/2022: CARDIAC CATH: Successful Coronary Intervention BEATRICE of proximal LAD.    PHYSICAL EXAM:  T(C): 36.4 (29 Sep 2023 08:50), Max: 36.8 (29 Sep 2023 01:34)  T(F): 97.6 (29 Sep 2023 08:50), Max: 98.2 (29 Sep 2023 01:34)  HR: 64 (29 Sep 2023 08:50) (64 - 86)  BP: 151/73 (29 Sep 2023 08:50) (125/71 - 151/73)  BP(mean): 92 (29 Sep 2023 08:50) (84 - 100)  RR: 18 (29 Sep 2023 08:50) (17 - 20)  SpO2: 99% (29 Sep 2023 08:50) (97% - 100%)    Parameters below as of 29 Sep 2023 08:50  Patient On (Oxygen Delivery Method): room air    Constitutional: NAD, awake and alert  HEENT: PERR, EOMI, Normal Hearing, MMM  Neck: Soft and supple, No LAD, No JVD  Respiratory: Breath sounds are clear bilaterally, No wheezing, rales or rhonchi  Cardiovascular: S1 and S2, regular rate and rhythm, no Murmurs, gallops or rubs  Gastrointestinal: Bowel Sounds present, soft, nontender, nondistended, no guarding, no rebound  Extremities: No peripheral edema  Vascular: 2+ peripheral pulses  Neurological: A/O x 3, no focal deficits  Musculoskeletal: 5/5 strength b/l upper and lower extremities  Skin: No rashes    =======================================    INTERPRETATION OF TELEMETRY: SR    ECG: SR 1st degree AVB    ========================================    LABS:                        13.3   7.29  )-----------( 355      ( 28 Sep 2023 20:45 )             37.3     09-29    136  |  107  |  40<H>  ----------------------------<  316<H>  3.9   |  25  |  1.98<H>    Ca    8.6      29 Sep 2023 00:41  Mg     2.0     09-28    TPro  7.3  /  Alb  3.5  /  TBili  0.5  /  DBili  x   /  AST  37  /  ALT  79<H>  /  AlkPhos  76  09-28    PT/INR - ( 28 Sep 2023 20:45 )   PT: 10.2 sec;   INR: 0.90 ratio      PTT - ( 28 Sep 2023 20:45 )  PTT:34.0 sec    Troponin <-20.22, <-17.90    BNP 77    RADIOLOGY & ADDITIONAL STUDIES:    9/28/23: CXR: Clear, awaiting official read CHIEF COMPLAINT: Patient is a 58y old  Male who presents with a chief complaint of chest pain    ED HPI: 57 y/o male with PMHx of CAD stents x8, pancreatitis, HLD, and cholecystectomy presents to ED c/o right sided chest pain onset 1130AM. Denies sharp pain at this time, states he has midsternal chest tightness and pressure at present. States pain occurred when he was getting out of his truck, went around, and then went back inside, reports pain was associated with shortness of breath. Adds that he has some fullness to his throat. Pt took daily dose of Plavix and baby aspirin today. Wife at bedside noted swelling to left leg. Noted relieving factor to chest pain is drinking cold water.    9/29. Cardiology consulted for chest pain        PREVIOUS DIAGNOSTIC TESTING:    ECHO: FINDINGS: 12/2022: TTE: EF 55-60%, moderate aortic stenosis, dilated aortic root, trace TR/MR  8/2022: CARDIAC CATH: Successful Coronary Intervention BEATRICE of proximal LAD.    Home Medications:  atorvastatin 40 mg oral tablet: 1 tab(s) orally once a day (at bedtime) (17 Aug 2022 10:37)  B Complex 50 oral tablet, extended release: 1 tab(s) orally once a day (17 Aug 2022 10:37)  ferrous sulfate 325 mg (65 mg elemental iron) oral tablet: 1 tab(s) orally once a day (17 Aug 2022 10:37)  fosinopril 20 mg oral tablet: 1 tab(s) orally once a day (17 Aug 2022 10:37)  insulin lispro 100 units/mL subcutaneous solution: 55 unit(s) subcutaneous 3 times a day (with meals) (17 Aug 2022 10:37)  Jardiance 25 mg oral tablet: 1 tab(s) orally once a day (in the morning) (17 Aug 2022 10:37)  magnesium oxide 400 mg oral tablet: 1 tab(s) orally once a day (17 Aug 2022 10:37)  ranolazine 500 mg oral tablet, extended release: 1 tab(s) orally 2 times a day (17 Aug 2022 10:37)  Toujeo SoloStar 300 units/mL subcutaneous solution: 110 unit(s) subcutaneous 2 times a day (17 Aug 2022 10:37)  Vitamin D2 1.25 mg (50,000 intl units) oral capsule: 1 cap(s) orally once a week on mondays (17 Aug 2022 10:37)      PHYSICAL EXAM:  T(C): 36.4 (29 Sep 2023 08:50), Max: 36.8 (29 Sep 2023 01:34)  T(F): 97.6 (29 Sep 2023 08:50), Max: 98.2 (29 Sep 2023 01:34)  HR: 64 (29 Sep 2023 08:50) (64 - 86)  BP: 151/73 (29 Sep 2023 08:50) (125/71 - 151/73)  BP(mean): 92 (29 Sep 2023 08:50) (84 - 100)  RR: 18 (29 Sep 2023 08:50) (17 - 20)  SpO2: 99% (29 Sep 2023 08:50) (97% - 100%)    Parameters below as of 29 Sep 2023 08:50  Patient On (Oxygen Delivery Method): room air    Constitutional: NAD, awake and alert  HEENT: PERR, EOMI, Normal Hearing, MMM  Neck: Soft and supple, No LAD, No JVD  Respiratory: Breath sounds are clear bilaterally, No wheezing, rales or rhonchi  Cardiovascular: S1 and S2, regular rate and rhythm, no Murmurs, gallops or rubs  Gastrointestinal: Bowel Sounds present, soft, nontender, nondistended, no guarding, no rebound  Extremities: No peripheral edema  Vascular: 2+ peripheral pulses  Neurological: A/O x 3, no focal deficits  Musculoskeletal: 5/5 strength b/l upper and lower extremities  Skin: No rashes    =======================================    INTERPRETATION OF TELEMETRY: SR    ECG: SR 1st degree AVB    ========================================    LABS:                        13.3   7.29  )-----------( 355      ( 28 Sep 2023 20:45 )             37.3     09-29    136  |  107  |  40<H>  ----------------------------<  316<H>  3.9   |  25  |  1.98<H>    Ca    8.6      29 Sep 2023 00:41  Mg     2.0     09-28    TPro  7.3  /  Alb  3.5  /  TBili  0.5  /  DBili  x   /  AST  37  /  ALT  79<H>  /  AlkPhos  76  09-28    PT/INR - ( 28 Sep 2023 20:45 )   PT: 10.2 sec;   INR: 0.90 ratio      PTT - ( 28 Sep 2023 20:45 )  PTT:34.0 sec    Troponin <-20.22, <-17.90    BNP 77    RADIOLOGY & ADDITIONAL STUDIES:    9/28/23: CXR: Clear, awaiting official read CHIEF COMPLAINT: Patient is a 58y old  Male who presents with a chief complaint of chest pain    ED HPI: 57 y/o male with PMHx of CAD stents x8, pancreatitis, HLD, and cholecystectomy presents to ED c/o right sided chest pain onset 1130AM. Denies sharp pain at this time, states he has midsternal chest tightness and pressure at present. States pain occurred when he was getting out of his truck, went around, and then went back inside, reports pain was associated with shortness of breath. Adds that he has some fullness to his throat. Pt took daily dose of Plavix and baby aspirin today. Wife at bedside noted swelling to left leg. Noted relieving factor to chest pain is drinking cold water.    9/29. Cardiology consulted for chest pain  Patient reported initial chest pain episode he got out of his truck, when to close the door, felt chest pressure/sob  Went to sit in the truck, started radiating down his arm, lasted about 30 seconds  Felt like he couldt catch his breath  This AM, he was walking in flowers similar type feeling and with conversation at bedside he stated this pressure reoccurred w/ sob  He doesnt check his BP, but feels palps and thinks his BP is high      PREVIOUS DIAGNOSTIC TESTING:    ECHO: FINDINGS: 12/2022: TTE: EF 55-60%, moderate aortic stenosis, dilated aortic root, trace TR/MR  8/2022: CARDIAC CATH: Successful Coronary Intervention BEATRICE of proximal LAD.    Home Medications:  atorvastatin 40 mg oral tablet: 1 tab(s) orally once a day (at bedtime) (17 Aug 2022 10:37)  B Complex 50 oral tablet, extended release: 1 tab(s) orally once a day (17 Aug 2022 10:37)  ferrous sulfate 325 mg (65 mg elemental iron) oral tablet: 1 tab(s) orally once a day (17 Aug 2022 10:37)  fosinopril 20 mg oral tablet: 1 tab(s) orally once a day (17 Aug 2022 10:37)  insulin lispro 100 units/mL subcutaneous solution: 55 unit(s) subcutaneous 3 times a day (with meals) (17 Aug 2022 10:37)  Jardiance 25 mg oral tablet: 1 tab(s) orally once a day (in the morning) (17 Aug 2022 10:37)  magnesium oxide 400 mg oral tablet: 1 tab(s) orally once a day (17 Aug 2022 10:37)  ranolazine 500 mg oral tablet, extended release: 1 tab(s) orally 2 times a day (17 Aug 2022 10:37)  Toujeo SoloStar 300 units/mL subcutaneous solution: 110 unit(s) subcutaneous 2 times a day (17 Aug 2022 10:37)  Vitamin D2 1.25 mg (50,000 intl units) oral capsule: 1 cap(s) orally once a week on mondays (17 Aug 2022 10:37)    PHYSICAL EXAM:  T(C): 36.4 (29 Sep 2023 08:50), Max: 36.8 (29 Sep 2023 01:34)  T(F): 97.6 (29 Sep 2023 08:50), Max: 98.2 (29 Sep 2023 01:34)  HR: 64 (29 Sep 2023 08:50) (64 - 86)  BP: 151/73 (29 Sep 2023 08:50) (125/71 - 151/73)  BP(mean): 92 (29 Sep 2023 08:50) (84 - 100)  RR: 18 (29 Sep 2023 08:50) (17 - 20)  SpO2: 99% (29 Sep 2023 08:50) (97% - 100%)    Parameters below as of 29 Sep 2023 08:50  Patient On (Oxygen Delivery Method): room air    Constitutional: NAD, awake and alert  HEENT: PERR, EOMI, Normal Hearing, MMM  Neck: Soft and supple, No LAD, No JVD  Respiratory: Breath sounds are clear bilaterally, No wheezing, rales or rhonchi  Cardiovascular: S1 and S2, regular rate and rhythm, no Murmurs, gallops or rubs  Gastrointestinal: Bowel Sounds present, soft, nontender, nondistended, no guarding, no rebound  Extremities: No peripheral edema  Vascular: 2+ peripheral pulses  Neurological: A/O x 3, no focal deficits  Musculoskeletal: 5/5 strength b/l upper and lower extremities  Skin: No rashes    =======================================    INTERPRETATION OF TELEMETRY: SR    ECG: SR 1st degree AVB    ========================================    LABS:                        13.3   7.29  )-----------( 355      ( 28 Sep 2023 20:45 )             37.3     09-29    136  |  107  |  40<H>  ----------------------------<  316<H>  3.9   |  25  |  1.98<H>    Ca    8.6      29 Sep 2023 00:41  Mg     2.0     09-28    TPro  7.3  /  Alb  3.5  /  TBili  0.5  /  DBili  x   /  AST  37  /  ALT  79<H>  /  AlkPhos  76  09-28    PT/INR - ( 28 Sep 2023 20:45 )   PT: 10.2 sec;   INR: 0.90 ratio      PTT - ( 28 Sep 2023 20:45 )  PTT:34.0 sec    Troponin <-20.22, <-17.90    BNP 77    RADIOLOGY & ADDITIONAL STUDIES:    9/28/23: CXR: Clear, awaiting official read

## 2023-09-29 NOTE — DISCHARGE NOTE NURSING/CASE MANAGEMENT/SOCIAL WORK - NSDCVIVACCINE_GEN_ALL_CORE_FT
influenza, injectable, quadrivalent, preservative free; 12-Oct-2017 15:08; Kim Samano (RN); [employer]; [id number]; IntraMuscular; Deltoid Left.; 0.5 milliLiter(s); VIS (VIS Published: 07-Aug-2015, VIS Presented: 12-Oct-2017);   influenza, injectable, quadrivalent, preservative free; 13-Sep-2018 12:04; Eliane Otero (RN); [employer]; [id number] (Exp. Date: 30-Jun-2019); IntraMuscular; Deltoid Left.; 0.5 milliLiter(s); VIS (VIS Published: 07-Aug-2015, VIS Presented: 13-Sep-2018);   influenza, injectable, quadrivalent, preservative free; 03-Dec-2019 13:24; Benita Agosto (RN); [employer]; [id number] (Exp. Date: 30-Jun-2020); IntraMuscular; Deltoid Left.; 0.5 milliLiter(s); VIS (VIS Published: 15-Aug-2019, VIS Presented: 03-Dec-2019);

## 2023-09-29 NOTE — PACU DISCHARGE NOTE - COMMENTS
Verbal report given to Keshia on [address].  Right groin with no s/s of bleeding or hematoma.  VSS as charted.  NS infusing.  Awaiting transport back to room. Lunch ordered.

## 2023-09-29 NOTE — BRIEF OPERATIVE NOTE - NSICDXBRIEFPROCEDURE_GEN_ALL_CORE_FT
PROCEDURES:  Percutaneous coronary intervention, with stent insertion 29-Sep-2023 14:20:08  Shagufta Guerra  PTCA (percutaneous transluminal coronary angioplasty) 29-Sep-2023 14:20:43  Shagufta uGerra

## 2023-09-29 NOTE — CHART NOTE - NSCHARTNOTEFT_GEN_A_CORE
Cardiac rehab was recommended to pt who refused. Patient was educated on benefits of cardiac rehab. Information including AHA website given to pt

## 2023-09-29 NOTE — PATIENT PROFILE ADULT - CAREGIVER PHONE NUMBER
Anesthesia Pre Eval Note    Anesthesia ROS/Med Hx    Overall Review:  EKG was reviewed     Anesthetic Complication History:  Patient does not have a history of anesthetic complications      Pulmonary Review:    Positive for asthma  The patient is a current smoker.     Cardiovascular Review:    Positive for past MI  Positive for CAD  Positive for hypertension  Positive for hyperlipidemia    End/Other Review:    Positive for hypothyroidism  Positive for arthritis  Positive for substance abuse     Overall Review of Systems Comments:    CAD (coronary artery disease)                                 Angina                                                          Comment: classic, after exertion    RAD (reactive airway disease)                                 Essential (primary) hypertension                              Myocardial infarction (CMS/HCC)                               Thyroid disease                                               High cholesterol                                              Abnormal CT scan                                2018          Dysphagia                                       2018          Myocardial infarct (CMS/HCC)                                  Poor historian                                                Tobacco use                                     06/26/2018    Presence of stent in LAD coronary artery        07/05/2018    Body mass index (BMI) 19.9 or less, adult                     Cocaine abuse in remission                           Comment: States, \"That's why I had a heart attack\".    Atherosclerosis of native coronary artery of n*               Asthma                                                        Wears glasses                                                 Additional Results:  EKG:  Encounter Date: 04/14/22  -ELECTROCARDIOGRAM 12-LEAD:                                                                 Narrative    Normal sinus rhythm    Biatrial  enlargement    Abnormal ECG    65 bpm                                                                Impression    agree    ALLERGIES:  No Known Allergies       Last Labs        Component                Value               Date/Time                  WBC                      10.5                04/14/2022 1134            RBC                      4.49 (L)            04/14/2022 1134            HGB                      14.3                04/14/2022 1134            HCT                      42.4                04/14/2022 1134            MCV                      94.4                04/14/2022 1134            MCH                      31.8                04/14/2022 1134            MCHC                     33.7                04/14/2022 1134            RDW-CV                   11.7                04/14/2022 1134            Sodium                   139                 09/07/2022 1600            Potassium                4.2                 09/07/2022 1600            Chloride                 103                 09/07/2022 1600            Carbon Dioxide           29                  09/07/2022 1600            Glucose                  80                  09/07/2022 1600            BUN                      11                  09/07/2022 1600            Creatinine               0.90                09/07/2022 1600            Glomerular Filtrati*     >90                 09/07/2022 1600            Calcium                  9.7                 09/07/2022 1600            PLT                      292                 04/14/2022 1134        Past Medical History:  2018: Abnormal CT scan  No date: Angina      Comment:  classic, after exertion  No date: Asthma  No date: Atherosclerosis of native coronary artery of native heart   without angina pectoris  No date: Body mass index (BMI) 19.9 or less, adult  No date: CAD (coronary artery disease)  No date: Cocaine abuse in remission (CMS/[facility])      Comment:  States, \"That's why I had a heart  attack\".  2018: Dysphagia  No date: Essential (primary) hypertension  No date: High cholesterol  No date: Myocardial infarct (CMS/HCC)  No date: Myocardial infarction (CMS/HCC)  No date: Poor historian  07/05/2018: Presence of stent in LAD coronary artery  No date: RAD (reactive airway disease)  No date: Thyroid disease  06/26/2018: Tobacco use  No date: Wears glasses    Past Surgical History:  08/09/2018: Cardiac catherization      Comment:  Dr. Brennan   01/03/2019: Colonoscopy      Comment:  Per Dr. Cisneros repeat in 10 years.  01/03/2019: Esophagogastroduodenoscopy      Comment:  Dr. Cisneros  08/29/2011: Extensor tendon of forearm / wrist repair; Right      Comment:  wrist, radial side   No date: Fracture surgery  04/27/2022: Knee arthroscopy w/ meniscectomy; Right      Comment:  right knee arthroscopy with partial medial                menisectomy-Dr. Drew Schmid  01/01/2004: Ptca      Comment:  with stent placement       Prior to Admission medications :  Medication levothyroxine 75 MCG tablet, Sig Take 1 tablet by mouth 6 days a week. Monday through Saturday only. NOT SUNDAY, Start Date 6/20/22, End Date , Taking? Yes, Authorizing Provider Kasey Joseph APNP    Medication hydroCHLOROthiazide (HYDRODIURIL) 25 MG tablet, Sig Take 1 tablet by mouth daily., Start Date 6/20/22, End Date , Taking? Yes, Authorizing Provider Kasey Joseph, APNP    Medication famotidine (PEPCID) 20 MG tablet, Sig Take 1 tablet by mouth in the morning and 1 tablet before bedtime., Start Date 6/20/22, End Date , Taking? Yes, Authorizing Provider Kasey Joseph APNP    Medication enalapril (VASOTEC) 5 MG tablet, Sig Take 1 tablet by mouth daily., Start Date 6/20/22, End Date , Taking? Yes, Authorizing Provider Kasey Joseph, APNP    Medication aspirin (Aspirin Low Dose) 81 MG EC tablet, Sig Take 1 tablet by mouth daily., Start Date 6/20/22, End Date , Taking? Yes, Authorizing Provider Kasey Joseph  APNP    Medication fluticasone-salmeterol (Advair Diskus) 100-50 MCG/ACT inhaler, Sig Inhale 1 puff into the lungs in the morning and 1 puff before bedtime., Start Date 6/20/22, End Date 6/20/23, Taking? Yes, Authorizing Provider Kasey Joseph, APNP    Medication rosuvastatin (CRESTOR) 40 MG tablet, Sig TAKE ONE TABLET BY MOUTH EVERY DAY  Patient taking differently: Take 40 mg by mouth every morning., Start Date 4/25/22, End Date , Taking? Yes, Authorizing Provider Mack Gann NP    Medication albuterol 108 (90 Base) MCG/ACT inhaler, Sig Inhale 2 puffs into the lungs four times daily., Start Date 9/19/19, End Date , Taking? Yes, Authorizing Provider Dacia Pizarro, APNP    Medication ARTIFICIAL TEARS 0.1-0.3 % Solution, Sig Dry eye sometimes, Start Date 8/7/17, End Date , Taking? Yes, Authorizing Provider Rodrick Adair MD    Medication HYDROcodone-acetaminophen (NORCO) 5-325 MG per tablet, Sig Take 1-2 tablets by mouth every 4 hours as needed for Pain. To use after surgery scheduled on 4/27/2022, Start Date 4/26/22, End Date , Taking? , Authorizing Provider Tamara Domingo NP    Medication nitroGLYcerin (Nitro-Dur) 0.4 MG/HR patch, Sig Place 1 patch onto the skin daily. Remove patch 12 hours after applying, Start Date 10/15/20, End Date , Taking? , Authorizing Provider Mack Gann NP    Medication nitroGLYcerin (NITROSTAT) 0.4 MG sublingual tablet, Sig Place 1 tablet under the tongue every 5 minutes as needed for Chest pain., Start Date 7/5/18, End Date , Taking? , Authorizing Provider Mack Gann NP         Patient Vitals in the past 24 hrs:  09/09/22 0917, BP:130/60, Temp:36.6 °C (97.9 °F), Temp src:Temporal, Pulse:(!) 48, Resp:16, SpO2:99 %, Height:6' 1\" (1.854 m), Weight:65.8 kg (145 lb 1 oz)      Relevant Problems   No relevant active problems       Physical Exam     Airway   Mallampati: II  TM Distance: >3 FB  Neck ROM: Full  TMJ Mobility: Good    Cardiovascular  Cardiovascular  exam normal    Head Assessment  Head assessment: Normocephalic and Atraumatic    General Assessment  General Assessment: Alert and oriented and No acute distress    Dental Exam    Patient has:  Poor Dentition    Pulmonary Exam  Pulmonary exam normal    Abdominal Exam  Abdominal exam normal      Anesthesia Plan:    ASA Status: 3  Anesthesia Type: General    Induction: Intravenous  Preferred Airway Type: LMA  Maintenance: Inhalational  Premedication: IV      Post-op Pain Management: Per Surgeon      Checklist  Reviewed: Lab Results, EKG, Consultations, Past Med History, NPO Status, Patient Summary, Allergies, Beta Blocker Status, Medications, Nursing Notes and Problem list  Consent/Risks Discussed Statement:  The proposed anesthetic plan, including its risks and benefits, have been discussed with the Patient along with the risks and benefits of alternatives. Questions were encouraged and answered and the patient and/or representative understands and agrees to proceed.    I have discussed elements of the patient's history or examination, as noted above and/or as follows, that place the patient at higher risk of complications; heart disease, smoking and vascular disease.    I discussed with the patient (and/or patient's legal representative) the risks and benefits of the proposed anesthesia plan, General, which may include services performed by other anesthesia providers.    Alternative anesthesia plans, if available, were reviewed with the patient (and/or patient's legal representative). Discussion has been held with the patient (and/or patient's legal representative) regarding risks of anesthesia, which include allergic reaction, intra-operative awareness, headache, hypotension, sore throat, vomiting and nausea and emergent situations that may require change in anesthesia plan.    The patient (and/or patient's legal representative) has indicated understanding, his/her questions have been answered, and he/she wishes to  proceed with the planned anesthetic.      Blood Products: Not Anticipated     [phone]

## 2023-09-30 ENCOUNTER — TRANSCRIPTION ENCOUNTER (OUTPATIENT)
Age: 58
End: 2023-09-30

## 2023-09-30 VITALS
DIASTOLIC BLOOD PRESSURE: 74 MMHG | HEART RATE: 74 BPM | OXYGEN SATURATION: 98 % | RESPIRATION RATE: 18 BRPM | SYSTOLIC BLOOD PRESSURE: 126 MMHG | TEMPERATURE: 98 F

## 2023-09-30 LAB
A1C WITH ESTIMATED AVERAGE GLUCOSE RESULT: 9.8 % — HIGH (ref 4–5.6)
ANION GAP SERPL CALC-SCNC: 2 MMOL/L — LOW (ref 5–17)
BASOPHILS # BLD AUTO: 0.08 K/UL — SIGNIFICANT CHANGE UP (ref 0–0.2)
BASOPHILS NFR BLD AUTO: 1.1 % — SIGNIFICANT CHANGE UP (ref 0–2)
BUN SERPL-MCNC: 28 MG/DL — HIGH (ref 7–23)
CALCIUM SERPL-MCNC: 9 MG/DL — SIGNIFICANT CHANGE UP (ref 8.5–10.1)
CHLORIDE SERPL-SCNC: 110 MMOL/L — HIGH (ref 96–108)
CHOLEST SERPL-MCNC: 139 MG/DL — SIGNIFICANT CHANGE UP
CO2 SERPL-SCNC: 27 MMOL/L — SIGNIFICANT CHANGE UP (ref 22–31)
CREAT SERPL-MCNC: 1.72 MG/DL — HIGH (ref 0.5–1.3)
EGFR: 46 ML/MIN/1.73M2 — LOW
EOSINOPHIL # BLD AUTO: 0.31 K/UL — SIGNIFICANT CHANGE UP (ref 0–0.5)
EOSINOPHIL NFR BLD AUTO: 4.3 % — SIGNIFICANT CHANGE UP (ref 0–6)
ESTIMATED AVERAGE GLUCOSE: 235 MG/DL — HIGH (ref 68–114)
GLUCOSE BLDC GLUCOMTR-MCNC: 185 MG/DL — HIGH (ref 70–99)
GLUCOSE BLDC GLUCOMTR-MCNC: 266 MG/DL — HIGH (ref 70–99)
GLUCOSE SERPL-MCNC: 137 MG/DL — HIGH (ref 70–99)
HCT VFR BLD CALC: 37 % — LOW (ref 39–50)
HDLC SERPL-MCNC: 21 MG/DL — LOW
HGB BLD-MCNC: 12.3 G/DL — LOW (ref 13–17)
IMM GRANULOCYTES NFR BLD AUTO: 1.7 % — HIGH (ref 0–0.9)
LIPID PNL WITH DIRECT LDL SERPL: 50 MG/DL — SIGNIFICANT CHANGE UP
LYMPHOCYTES # BLD AUTO: 1.59 K/UL — SIGNIFICANT CHANGE UP (ref 1–3.3)
LYMPHOCYTES # BLD AUTO: 22.3 % — SIGNIFICANT CHANGE UP (ref 13–44)
MCHC RBC-ENTMCNC: 30.1 PG — SIGNIFICANT CHANGE UP (ref 27–34)
MCHC RBC-ENTMCNC: 33.2 GM/DL — SIGNIFICANT CHANGE UP (ref 32–36)
MCV RBC AUTO: 90.5 FL — SIGNIFICANT CHANGE UP (ref 80–100)
MONOCYTES # BLD AUTO: 0.78 K/UL — SIGNIFICANT CHANGE UP (ref 0–0.9)
MONOCYTES NFR BLD AUTO: 10.9 % — SIGNIFICANT CHANGE UP (ref 2–14)
NEUTROPHILS # BLD AUTO: 4.25 K/UL — SIGNIFICANT CHANGE UP (ref 1.8–7.4)
NEUTROPHILS NFR BLD AUTO: 59.7 % — SIGNIFICANT CHANGE UP (ref 43–77)
NON HDL CHOLESTEROL: 118 MG/DL — SIGNIFICANT CHANGE UP
PLATELET # BLD AUTO: 298 K/UL — SIGNIFICANT CHANGE UP (ref 150–400)
POTASSIUM SERPL-MCNC: 4.2 MMOL/L — SIGNIFICANT CHANGE UP (ref 3.5–5.3)
POTASSIUM SERPL-SCNC: 4.2 MMOL/L — SIGNIFICANT CHANGE UP (ref 3.5–5.3)
RBC # BLD: 4.09 M/UL — LOW (ref 4.2–5.8)
RBC # FLD: 13.9 % — SIGNIFICANT CHANGE UP (ref 10.3–14.5)
SODIUM SERPL-SCNC: 139 MMOL/L — SIGNIFICANT CHANGE UP (ref 135–145)
TRIGL SERPL-MCNC: 453 MG/DL — HIGH
WBC # BLD: 7.13 K/UL — SIGNIFICANT CHANGE UP (ref 3.8–10.5)
WBC # FLD AUTO: 7.13 K/UL — SIGNIFICANT CHANGE UP (ref 3.8–10.5)

## 2023-09-30 PROCEDURE — 93970 EXTREMITY STUDY: CPT | Mod: 26

## 2023-09-30 PROCEDURE — 99239 HOSP IP/OBS DSCHRG MGMT >30: CPT

## 2023-09-30 RX ADMIN — Medication 200 MILLIGRAM(S): at 10:28

## 2023-09-30 RX ADMIN — Medication 45 UNIT(S): at 10:29

## 2023-09-30 RX ADMIN — RANOLAZINE 500 MILLIGRAM(S): 500 TABLET, FILM COATED, EXTENDED RELEASE ORAL at 10:27

## 2023-09-30 RX ADMIN — Medication 81 MILLIGRAM(S): at 10:27

## 2023-09-30 RX ADMIN — Medication 325 MILLIGRAM(S): at 10:28

## 2023-09-30 RX ADMIN — Medication 6: at 10:33

## 2023-09-30 RX ADMIN — MAGNESIUM OXIDE 400 MG ORAL TABLET 400 MILLIGRAM(S): 241.3 TABLET ORAL at 10:37

## 2023-09-30 RX ADMIN — INSULIN GLARGINE 100 UNIT(S): 100 INJECTION, SOLUTION SUBCUTANEOUS at 10:25

## 2023-09-30 RX ADMIN — CLOPIDOGREL BISULFATE 75 MILLIGRAM(S): 75 TABLET, FILM COATED ORAL at 10:27

## 2023-09-30 NOTE — PROGRESS NOTE ADULT - SUBJECTIVE AND OBJECTIVE BOX
58 year old male, history of DM on Insulin, comes to ER with another episode of chest pain.  He has a history of multiple Cardiac stents since the 90's. Apparently, again, even though Troponins were normal, he was cathed and found to have instent restenosis, which according to the patient is his usual presentation.  Post Cath, his symptoms have not reappeared.  He is feeling well.  His wife note one of his legs was swollen, but a Venous Duplex was negative.    ~Patient is a 58y Male with a known history of :    HPI:  57 y/o male with PMHx of DM type II,  CAD s/p stents x8, Pancreatitis, HLD, Ckd stg III,  presents to ED c/o right sided chest pain onset 1130AM. Denies sharp pain at this time, states he has midsternal chest tightness and pressure at present. States pain occurred when he was getting out of his truck, went around, and then went back inside, reports pain was associated with shortness of breath. Adds that he has some fullness to his throat. Pt took daily dose of Plavix and baby aspirin today. Wife at bedside noted swelling to left leg. Noted relieving factor to chest pain is drinking cold water.    9/29.:  s/p LHC with PCI of LAD        REVIEW OF SYSTEMS:    CONSTITUTIONAL: No weakness, No fevers or chills  ENT: No ear ache, No sorethroat  NECK: No pain, No stiffness  RESPIRATORY: No cough, No wheezing, No hemoptysis; No dyspnea  CARDIOVASCULAR: + chest pain, No palpitations  GASTROINTESTINAL: No abd pain, No nausea, No vomiting, No hematemesis, No diarrhea or constipation. No melena, No hematochezia.  GENITOURINARY: No dysuria, No  hematuria  NEUROLOGICAL: No diplopia, No paresthesia, No motor dysfunction  MUSCULOSKELETAL: No arthralgia, No myalgia  SKIN: No rashes, or lesions   PSYCH: no anxiety, no suicidal ideation    All other review of systems is negative unless indicated above    Vital Signs Last 24 Hrs  T(C): 37.1 (29 Sep 2023 14:30), Max: 37.1 (29 Sep 2023 14:30)  T(F): 98.8 (29 Sep 2023 14:30), Max: 98.8 (29 Sep 2023 14:30)  HR: 69 (29 Sep 2023 14:30) (64 - 86)  BP: 151/76 (29 Sep 2023 14:30) (125/71 - 167/84)  BP(mean): 92 (29 Sep 2023 08:50) (84 - 100)  RR: 16 (29 Sep 2023 14:30) (16 - 20)  SpO2: 99% (29 Sep 2023 14:30) (96% - 100%)    Parameters below as of 29 Sep 2023 14:30  Patient On (Oxygen Delivery Method): room air        PHYSICAL EXAM:    GENERAL: NAD  HEENT:  NC/AT, EOMI, PERRLA, No scleral icterus, Moist mucous membranes  NECK: Supple, No JVD  CNS:  Alert & Oriented X3, Motor Strength 5/5 B/L upper and lower extremities; DTRs 2+ intact   LUNG: Normal Breath sounds, Clear to auscultation bilaterally, No rales, No rhonchi, No wheezing  HEART: RRR; II/IV systolic blow over AO and apex, No rubs  ABDOMEN: +BS, ST/ND/NT  GENITOURINARY: Voiding, Bladder not distended  EXTREMITIES:  2+ Peripheral Pulses, No clubbing, No cyanosis, No tibial edema  MUSCULOSKELTAL: Joints normal ROM, No TTP, No effusion  VAGINAL: deferred  SKIN: no rashes  RECTAL: deferred, not indicated  BREAST: deferred                          13.3   7.29  )-----------( 355      ( 28 Sep 2023 20:45 )             37.3     09-29    136  |  106  |  34<H>  ----------------------------<  299<H>  4.4   |  26  |  1.85<H>    Ca    9.3      29 Sep 2023 11:26  Mg     2.0     09-28    TPro  7.3  /  Alb  3.5  /  TBili  0.5  /  DBili  x   /  AST  37  /  ALT  79<H>  /  AlkPhos  76  09-28    Vancomycin levels:   Cultures:     MEDICATIONS  (STANDING):  aspirin enteric coated 81 milliGRAM(s) Oral daily  atorvastatin 40 milliGRAM(s) Oral at bedtime  clopidogrel Tablet 75 milliGRAM(s) Oral daily  ferrous    sulfate 325 milliGRAM(s) Oral daily  glucagon  Injectable 1 milliGRAM(s) IntraMuscular once  insulin lispro (ADMELOG) corrective regimen sliding scale   SubCutaneous three times a day before meals  insulin lispro Injectable (ADMELOG) 45 Unit(s) SubCutaneous three times a day before meals  insulin NPH human recombinant 50 Unit(s) SubCutaneous once  magnesium oxide 400 milliGRAM(s) Oral daily  metoprolol succinate  milliGRAM(s) Oral daily  ranolazine 500 milliGRAM(s) Oral two times a day  sodium chloride 0.9% Bolus 250 milliLiter(s) IV Bolus once  sodium chloride 0.9%. 1000 milliLiter(s) (234 mL/Hr) IV Continuous <Continuous>    MEDICATIONS  (PRN):  dextrose Oral Gel 15 Gram(s) Oral once PRN Blood Glucose LESS THAN 70 milliGRAM(s)/deciliter      all labs reviewed  all imaging reviewed    a/p:    1. Chest Pain: likely Unstable angina  s/p PCI of LAD, in stent stenosis   c/w ASA/Plavix/BB    2. RLE edema:  will get Sonogram of LE to r/o DVT    3. VIKKI on Ckd stg III:  c/w IVF    4. DM type II:  Lantus / Admelog TID pre meals  (29 Sep 2023 15:47)        REVIEW OF SYSTEMS:    CONSTITUTIONAL: No fever, weight loss, or fatigue  EYES: No eye pain, visual disturbances, or discharge  ENMT:  No difficulty hearing, tinnitus, vertigo; No sinus or throat pain  NECK: No pain or stiffness  RESPIRATORY: No cough, wheezing, chills or hemoptysis; No shortness of breath  CARDIOVASCULAR: No chest pain, palpitations, dizziness, or leg swelling  GASTROINTESTINAL: No abdominal or epigastric pain. No nausea, vomiting, or hematemesis; No diarrhea or constipation. No melena or hematochezia.  NEUROLOGICAL: No headaches, memory loss, loss of strength, numbness, or tremors  SKIN: No itching, burning, rashes, or lesions   MUSCULOSKELETAL: No joint pain or swelling; No muscle, back, or extremity pain  PSYCHIATRIC: No depression, anxiety, mood swings, or difficulty sleeping  HEME/LYMPH: No easy bruising, or bleeding gums  ALLERGY AND IMMUNOLOGIC: No hives or eczema    MEDICATIONS  (STANDING):  aspirin enteric coated 81 milliGRAM(s) Oral daily  atorvastatin 40 milliGRAM(s) Oral at bedtime  clopidogrel Tablet 75 milliGRAM(s) Oral daily  dextrose 5%. 1000 milliLiter(s) (100 mL/Hr) IV Continuous <Continuous>  dextrose 5%. 1000 milliLiter(s) (50 mL/Hr) IV Continuous <Continuous>  dextrose 50% Injectable 12.5 Gram(s) IV Push once  dextrose 50% Injectable 25 Gram(s) IV Push once  dextrose 50% Injectable 25 Gram(s) IV Push once  ferrous    sulfate 325 milliGRAM(s) Oral daily  glucagon  Injectable 1 milliGRAM(s) IntraMuscular once  insulin glargine Injectable (LANTUS) 100 Unit(s) SubCutaneous every morning  insulin lispro (ADMELOG) corrective regimen sliding scale   SubCutaneous three times a day before meals  insulin lispro Injectable (ADMELOG) 45 Unit(s) SubCutaneous three times a day before meals  magnesium oxide 400 milliGRAM(s) Oral daily  metoprolol succinate  milliGRAM(s) Oral daily  ranolazine 500 milliGRAM(s) Oral two times a day  sodium chloride 0.9%. 1000 milliLiter(s) (234 mL/Hr) IV Continuous <Continuous>    MEDICATIONS  (PRN):  dextrose Oral Gel 15 Gram(s) Oral once PRN Blood Glucose LESS THAN 70 milliGRAM(s)/deciliter      ALLERGIES: penicillin (Hives)      FAMILY HISTORY:  Family history of diabetes mellitus (Sibling)    FH: heart disease (Sibling, Father, Mother)        Social history:  Alochol:   Smoking:   Drug Use:   Marital Status:     PHYSICAL EXAMINATION:  -----------------------------  T(C): 36.6 (09-30-23 @ 08:38), Max: 37.1 (09-29-23 @ 14:30)  HR: 70 (09-30-23 @ 08:38) (68 - 78)  BP: 160/83 (09-30-23 @ 08:38) (125/75 - 167/84)  RR: 18 (09-30-23 @ 08:38) (16 - 18)  SpO2: 99% (09-30-23 @ 08:38) (96% - 100%)  Wt(kg): --    09-29 @ 07:01  -  09-30 @ 07:00  --------------------------------------------------------  IN:    sodium chloride 0.9%: 468 mL    Sodium Chloride 0.9% Bolus: 250 mL  Total IN: 718 mL    OUT:    Voided (mL): 900 mL  Total OUT: 900 mL    Total NET: -182 mL          Weight (kg): 117.889 (09-29 @ 11:34)    Constitutional: well developed, normal appearance, well groomed, well nourished, no deformities and no acute distress.   Eyes: the conjunctiva exhibited no abnormalities and the eyelids demonstrated no xanthelasmas.   HEENT: normal oral mucosa, no oral pallor and no oral cyanosis.   Neck: normal jugular venous A waves present, normal jugular venous V waves present and no jugular venous curry A waves.   Pulmonary: no respiratory distress, normal respiratory rhythm and effort, no accessory muscle use and lungs were clear to auscultation bilaterally.   Cardiovascular: heart rate and rhythm were normal, normal S1 and S2 and no murmur, gallop, rub, heave or thrill are present.   Abdomen: soft, non-tender, no hepato-splenomegaly and no abdominal mass palpated.   Musculoskeletal: the gait could not be assessed..   Extremities: no clubbing of the fingernails, no localized cyanosis, no petechial hemorrhages and no ischemic changes.   Skin: normal skin color and pigmentation, no rash, no venous stasis, no skin lesions, no skin ulcer and no xanthoma was observed.   Psychiatric: oriented to person, place, and time, the affect was normal, the mood was normal and not feeling anxious.     LABS:   --------  CBC Full  -  ( 30 Sep 2023 06:38 )  WBC Count : 7.13 K/uL  RBC Count : 4.09 M/uL  Hemoglobin : 12.3 g/dL  Hematocrit : 37.0 %  Platelet Count - Automated : 298 K/uL  Mean Cell Volume : 90.5 fl  Mean Cell Hemoglobin : 30.1 pg  Mean Cell Hemoglobin Concentration : 33.2 gm/dL  Auto Neutrophil # : 4.25 K/uL  Auto Lymphocyte # : 1.59 K/uL  Auto Monocyte # : 0.78 K/uL  Auto Eosinophil # : 0.31 K/uL  Auto Basophil # : 0.08 K/uL  Auto Neutrophil % : 59.7 %  Auto Lymphocyte % : 22.3 %  Auto Monocyte % : 10.9 %  Auto Eosinophil % : 4.3 %  Auto Basophil % : 1.1 %      09-30    139  |  110<H>  |  28<H>  ----------------------------<  137<H>  4.2   |  27  |  1.72<H>    Ca    9.0      30 Sep 2023 06:38  Mg     2.0     09-28    TPro  7.3  /  Alb  3.5  /  TBili  0.5  /  DBili  x   /  AST  37  /  ALT  79<H>  /  AlkPhos  76  09-28       PT/INR - ( 28 Sep 2023 20:45 )   PT: 10.2 sec;   INR: 0.90 ratio         PTT - ( 28 Sep 2023 20:45 )  PTT:34.0 sec              Radiology:

## 2023-09-30 NOTE — DISCHARGE NOTE PROVIDER - NSDCCPCAREPLAN_GEN_ALL_CORE_FT
PRINCIPAL DISCHARGE DIAGNOSIS  Diagnosis: Chest pain  Assessment and Plan of Treatment: s/p PCI of LAD

## 2023-09-30 NOTE — DISCHARGE NOTE PROVIDER - NSDCMRMEDTOKEN_GEN_ALL_CORE_FT
aspirin 81 mg oral delayed release tablet: 1 tab(s) orally once a day  atorvastatin 40 mg oral tablet: 1 tab(s) orally once a day (at bedtime)  clopidogrel 75 mg oral tablet: 1 tab(s) orally once a day  D3 25 mcg (1000 intl units) oral tablet: 1 tab(s) orally once a day  Farxiga 10 mg oral tablet: 1 tab(s) orally once a day  fenofibrate 160 mg oral tablet: 1 tab(s) orally once a day  ferrous sulfate 325 mg (65 mg elemental iron) oral tablet: 1 tab(s) orally once a day  fosinopril 20 mg oral tablet: 1 tab(s) orally once a day  hydroCHLOROthiazide 25 mg oral tablet: 1 tab(s) orally once a day  Insulin Lispro KwikPen 100 units/mL injectable solution: 56 injectable 3 times a day before meals  magnesium oxide 400 mg oral tablet: 1 tab(s) orally once a day  metoprolol succinate 200 mg oral tablet, extended release: 1 tab(s) orally once a day  ranolazine 500 mg oral tablet, extended release: 1 tab(s) orally 2 times a day  Toujeo Max SoloStar 300 units/mL subcutaneous solution: 140 subcutaneous once a day in the morning  Vitamin B Complex w/ C: take 1 tablet once daily

## 2023-09-30 NOTE — PROGRESS NOTE ADULT - SUBJECTIVE AND OBJECTIVE BOX
Cardiology NP     Patient is a 58y old  Male who presents with a chief complaint of Cheat pain (30 Sep 2023 09:12)      HPI:  57 y/o male with PMHx of CAD stents x8, pancreatitis, HLD, and cholecystectomy presents to ED c/o right sided chest pain onset 1130AM. Denies sharp pain at this time, states he has midsternal chest tightness and pressure at present. States pain occurred when he was getting out of his truck, went around, and then went back inside, reports pain was associated with shortness of breath. Adds that he has some fullness to his throat. Pt took daily dose of Plavix and baby aspirin the day of admission. Noted relieving factor to chest pain is drinking cold water.  This AM, he was walking in flowers and experienced similar type feeling.  Right sided CP 4/10-Improved from presentation prior to cardiac cath  s/p PCI  Denies chest pain, shortness of breath, dizziness or palpitations post procedure        PAST MEDICAL & SURGICAL HISTORY:  Essential hypertension    Obstructive sleep apnea    Diabetes mellitus    HLD (hyperlipidemia)    CAD (coronary artery disease)    Pancreatitis    No significant past surgical history    History of coronary artery stent placement  Placed 9/12/18 by Dr. Kaye    S/P cholecystectomy        MEDICATIONS  (STANDING):  aspirin enteric coated 81 milliGRAM(s) Oral daily  atorvastatin 40 milliGRAM(s) Oral at bedtime  clopidogrel Tablet 75 milliGRAM(s) Oral daily  dextrose 5%. 1000 milliLiter(s) (50 mL/Hr) IV Continuous <Continuous>  dextrose 5%. 1000 milliLiter(s) (100 mL/Hr) IV Continuous <Continuous>  dextrose 50% Injectable 25 Gram(s) IV Push once  dextrose 50% Injectable 25 Gram(s) IV Push once  dextrose 50% Injectable 12.5 Gram(s) IV Push once  ferrous    sulfate 325 milliGRAM(s) Oral daily  glucagon  Injectable 1 milliGRAM(s) IntraMuscular once  insulin glargine Injectable (LANTUS) 100 Unit(s) SubCutaneous every morning  insulin lispro (ADMELOG) corrective regimen sliding scale   SubCutaneous three times a day before meals  insulin lispro Injectable (ADMELOG) 45 Unit(s) SubCutaneous three times a day before meals  magnesium oxide 400 milliGRAM(s) Oral daily  metoprolol succinate  milliGRAM(s) Oral daily  ranolazine 500 milliGRAM(s) Oral two times a day  sodium chloride 0.9%. 1000 milliLiter(s) (234 mL/Hr) IV Continuous <Continuous>    MEDICATIONS  (PRN):  dextrose Oral Gel 15 Gram(s) Oral once PRN Blood Glucose LESS THAN 70 milliGRAM(s)/deciliter      Allergies    penicillin (Hives)      REVIEW OF SYSTEMS: As mentioned in HPI all others Negative     Vital Signs Last 24 Hrs  T(C): 36.6 (30 Sep 2023 08:38), Max: 37.1 (29 Sep 2023 14:30)  T(F): 97.9 (30 Sep 2023 08:38), Max: 98.8 (29 Sep 2023 14:30)  HR: 70 (30 Sep 2023 08:38) (68 - 78)  BP: 160/83 (30 Sep 2023 08:38) (125/75 - 160/83)  BP(mean): --  RR: 18 (30 Sep 2023 08:38) (16 - 18)  SpO2: 99% (30 Sep 2023 08:38) (96% - 100%)    Parameters below as of 30 Sep 2023 08:38  Patient On (Oxygen Delivery Method): room air        PHYSICAL EXAM:  GENERAL: NAD, well-groomed, well-developed  HEAD:  Atraumatic, Normocephalic  EYES: EOMI, PERRLA, conjunctiva and sclera clear  ENMT: No tonsillar erythema, exudates, or enlargement; Moist mucous membranes, Good dentition, No lesions  NECK: Supple, No JVD, Normal thyroid  NERVOUS SYSTEM:  Alert & Oriented X3, Good concentration; Motor Strength 5/5 B/L upper and lower extremities;   CHEST/LUNG: Clear to percussion bilaterally; No rales, rhonchi, wheezing, or rubs  HEART: Regular rate and rhythm; No murmurs, rubs, or gallops  ABDOMEN: Soft, Nontender, Nondistended; Bowel sounds present  EXTREMITIES:  2+ Peripheral Pulses, No clubbing, cyanosis, or edema  GROINS: Right groin soft non tender,    LABS:                        12.3   7.13  )-----------( 298      ( 30 Sep 2023 06:38 )             37.0     09-30    139  |  110<H>  |  28<H>  ----------------------------<  137<H>  4.2   |  27  |  1.72<H>    Ca    9.0      30 Sep 2023 06:38  Mg     2.0     09-28    TPro  7.3  /  Alb  3.5  /  TBili  0.5  /  DBili  x   /  AST  37  /  ALT  79<H>  /  AlkPhos  76  09-28    PT/INR - ( 28 Sep 2023 20:45 )   PT: 10.2 sec;   INR: 0.90 ratio         PTT - ( 28 Sep 2023 20:45 )  PTT:34.0 sec

## 2023-09-30 NOTE — DISCHARGE NOTE PROVIDER - HOSPITAL COURSE
59 y/o male with PMHx of DM type II,  CAD s/p stents x8, Pancreatitis, HLD, Ckd stg III,  presents to ED c/o right sided chest pain onset 1130AM. Denies sharp pain at this time, states he has midsternal chest tightness and pressure at present. States pain occurred when he was getting out of his truck, went around, and then went back inside, reports pain was associated with shortness of breath. Adds that he has some fullness to his throat. Pt took daily dose of Plavix and baby aspirin today. Wife at bedside noted swelling to left leg. Noted relieving factor to chest pain is drinking cold water.    9/29.:  s/p LHC with PCI of LAD  9.30: no cp, no sob, no n/v/d            REVIEW OF SYSTEMS:    CONSTITUTIONAL: No weakness, No fevers or chills  ENT: No ear ache, No sorethroat  NECK: No pain, No stiffness  RESPIRATORY: No cough, No wheezing, No hemoptysis; No dyspnea  CARDIOVASCULAR: No chest pain, No palpitations  GASTROINTESTINAL: No abd pain, No nausea, No vomiting, No hematemesis, No diarrhea or constipation. No melena, No hematochezia.  GENITOURINARY: No dysuria, No  hematuria  NEUROLOGICAL: No diplopia, No paresthesia, No motor dysfunction  MUSCULOSKELETAL: No arthralgia, No myalgia  SKIN: No rashes, or lesions   PSYCH: no anxiety, no suicidal ideation    All other review of systems is negative unless indicated above    Vital Signs Last 24 Hrs  T(C): 36.7 (30 Sep 2023 13:21), Max: 37.1 (29 Sep 2023 14:30)  T(F): 98 (30 Sep 2023 13:21), Max: 98.8 (29 Sep 2023 14:30)  HR: 74 (30 Sep 2023 13:21) (69 - 78)  BP: 126/74 (30 Sep 2023 13:21) (126/74 - 160/83)  BP(mean): --  RR: 18 (30 Sep 2023 13:21) (16 - 18)  SpO2: 98% (30 Sep 2023 13:21) (97% - 99%)    Parameters below as of 30 Sep 2023 13:21  Patient On (Oxygen Delivery Method): room air    PHYSICAL EXAM:    GENERAL: NAD  HEENT:  NC/AT, EOMI, PERRLA, No scleral icterus, Moist mucous membranes  NECK: Supple, No JVD  CNS:  Alert & Oriented X3, Motor Strength 5/5 B/L upper and lower extremities; DTRs 2+ intact   LUNG: Normal Breath sounds, Clear to auscultation bilaterally, No rales, No rhonchi, No wheezing  HEART: RRR; No murmurs, No rubs  ABDOMEN: +BS, ST/ND/NT  GENITOURINARY: Voiding, Bladder not distended  EXTREMITIES:  2+ Peripheral Pulses, No clubbing, No cyanosis, No tibial edema  MUSCULOSKELTAL: Joints normal ROM, No TTP, No effusion  VAGINAL: deferred  SKIN: no rashes  RECTAL: deferred, not indicated  BREAST: deferred                          12.3   7.13  )-----------( 298      ( 30 Sep 2023 06:38 )             37.0     09-30    139  |  110<H>  |  28<H>  ----------------------------<  137<H>  4.2   |  27  |  1.72<H>      a/p:    1. Chest Pain: likely Unstable angina  s/p PCI of LAD, in stent stenosis   c/w ASA/Plavix/BB   outpatient cardio f/u    2. RLE edema:  will get Sonogram of LE : no DVT    3. VIKKI on Ckd stg III: improved  c/w IVF    4. DM type II:  c/w Lantus   c/w Admelog TID pre meals

## 2023-09-30 NOTE — DISCHARGE NOTE PROVIDER - PROVIDER TOKENS
Diagnosis: Right knee osteoarthritis    Procedure: Right knee Visco supplementation injection #3    The patient returns today for their third and final Euflexxa injection in the right knee. The risks, benefits, and complications of the injections were again discussed in detail with the patient. The risks discussed include but are not limited to infection, skin reactions, hot swollen joints, and anaphylaxis. The patient gave verbal informed consent for the injection. The patient's skin was prepped with 3 sterile gauze pads soaked with alcohol solution and the knee joint was injected with 2 mL of Euflexxa intra-articularly under sterile conditions. Technique: Under sterile conditions a SoniTiffin ultrasound unit with a variable frequency (6.0-15.0 MHz) linear transducer was used to localize the placement of a 22-gauge needle into the knee joint. Findings: Successful needle placement for intra-articular Visco supplementation injection. Final images were taken and saved for permanent record. The patient tolerated the injection reasonably well. The patient was given instructions to ice of the knee and avoid strenuous activity for 24-48 hours. The patient was instructed to call the office immediately if there is increased pain, redness, warmth, fever, or chills. We will see the patient back on an as-needed basis  from this point.
PROVIDER:[TOKEN:[[id number]],FOLLOWUP:[1 week]]

## 2023-09-30 NOTE — PROGRESS NOTE ADULT - ASSESSMENT
ASESSMENT AND PLAN  Patient admitted post successful PCI of ISR of LADx2 and PTCA of diag  tolerated procedure well  no events overnight  patient ambulating well  ekg, tele and labs reviewed, VSS  Aspirin 81 mg PO daily  Plavix 75mg PO daily  Continue BB, statin, Ranexa  procedure, outcome and f/u care reviewed with patient/MD  patient seen and assessed and is cleared for discharge home  f/u with MD in 1-2 weeks  
58 year old male, history of DM, and multiple cardiac stents.  He again had chest pain without Troponin elevations, and again found to have an instent stent restnosis.  At present he is symptom free, and would like to go home.    Suggest:  No Cardiac objection to the patient being discharged home.  To follow up with his Cardiologist.

## 2023-09-30 NOTE — DISCHARGE NOTE PROVIDER - CARE PROVIDER_API CALL
Scotty Kaye  Interventional Cardiology  [address] [place] [address]  Phone: [phone]  Fax: [phone]  Follow Up Time: 1 week

## 2023-10-03 ENCOUNTER — INPATIENT (INPATIENT)
Facility: HOSPITAL | Age: 58
LOS: 3 days | Discharge: ANOTHER HOSPITAL | DRG: 287 | End: 2023-10-07
Attending: HOSPITALIST | Admitting: INTERNAL MEDICINE
Payer: COMMERCIAL

## 2023-10-03 VITALS
SYSTOLIC BLOOD PRESSURE: 127 MMHG | TEMPERATURE: 99 F | DIASTOLIC BLOOD PRESSURE: 74 MMHG | OXYGEN SATURATION: 98 % | HEART RATE: 76 BPM | RESPIRATION RATE: 18 BRPM

## 2023-10-03 DIAGNOSIS — R07.9 CHEST PAIN, UNSPECIFIED: ICD-10-CM

## 2023-10-03 DIAGNOSIS — Z90.49 ACQUIRED ABSENCE OF OTHER SPECIFIED PARTS OF DIGESTIVE TRACT: Chronic | ICD-10-CM

## 2023-10-03 DIAGNOSIS — Z95.5 PRESENCE OF CORONARY ANGIOPLASTY IMPLANT AND GRAFT: Chronic | ICD-10-CM

## 2023-10-03 LAB
ALBUMIN SERPL ELPH-MCNC: 3.6 G/DL — SIGNIFICANT CHANGE UP (ref 3.3–5)
ALP SERPL-CCNC: 59 U/L — SIGNIFICANT CHANGE UP (ref 40–120)
ALT FLD-CCNC: 65 U/L — SIGNIFICANT CHANGE UP (ref 12–78)
ANION GAP SERPL CALC-SCNC: 5 MMOL/L — SIGNIFICANT CHANGE UP (ref 5–17)
AST SERPL-CCNC: 41 U/L — HIGH (ref 15–37)
BASOPHILS # BLD AUTO: 0 K/UL — SIGNIFICANT CHANGE UP (ref 0–0.2)
BASOPHILS NFR BLD AUTO: 0 % — SIGNIFICANT CHANGE UP (ref 0–2)
BILIRUB SERPL-MCNC: 0.6 MG/DL — SIGNIFICANT CHANGE UP (ref 0.2–1.2)
BUN SERPL-MCNC: 29 MG/DL — HIGH (ref 7–23)
CALCIUM SERPL-MCNC: 9.2 MG/DL — SIGNIFICANT CHANGE UP (ref 8.5–10.1)
CHLORIDE SERPL-SCNC: 101 MMOL/L — SIGNIFICANT CHANGE UP (ref 96–108)
CO2 SERPL-SCNC: 27 MMOL/L — SIGNIFICANT CHANGE UP (ref 22–31)
CREAT SERPL-MCNC: 2.17 MG/DL — HIGH (ref 0.5–1.3)
DACRYOCYTES BLD QL SMEAR: SLIGHT — SIGNIFICANT CHANGE UP
EGFR: 35 ML/MIN/1.73M2 — LOW
EOSINOPHIL # BLD AUTO: 0.35 K/UL — SIGNIFICANT CHANGE UP (ref 0–0.5)
EOSINOPHIL NFR BLD AUTO: 5 % — SIGNIFICANT CHANGE UP (ref 0–6)
GLUCOSE BLDC GLUCOMTR-MCNC: 263 MG/DL — HIGH (ref 70–99)
GLUCOSE SERPL-MCNC: 327 MG/DL — HIGH (ref 70–99)
HCT VFR BLD CALC: 43.8 % — SIGNIFICANT CHANGE UP (ref 39–50)
HGB BLD-MCNC: 14.6 G/DL — SIGNIFICANT CHANGE UP (ref 13–17)
LYMPHOCYTES # BLD AUTO: 1.45 K/UL — SIGNIFICANT CHANGE UP (ref 1–3.3)
LYMPHOCYTES # BLD AUTO: 21 % — SIGNIFICANT CHANGE UP (ref 13–44)
MAGNESIUM SERPL-MCNC: 2.4 MG/DL — SIGNIFICANT CHANGE UP (ref 1.6–2.6)
MANUAL SMEAR VERIFICATION: SIGNIFICANT CHANGE UP
MCHC RBC-ENTMCNC: 30 PG — SIGNIFICANT CHANGE UP (ref 27–34)
MCHC RBC-ENTMCNC: 33.3 GM/DL — SIGNIFICANT CHANGE UP (ref 32–36)
MCV RBC AUTO: 90.1 FL — SIGNIFICANT CHANGE UP (ref 80–100)
MONOCYTES # BLD AUTO: 0.83 K/UL — SIGNIFICANT CHANGE UP (ref 0–0.9)
MONOCYTES NFR BLD AUTO: 12 % — SIGNIFICANT CHANGE UP (ref 2–14)
NEUTROPHILS # BLD AUTO: 4.15 K/UL — SIGNIFICANT CHANGE UP (ref 1.8–7.4)
NEUTROPHILS NFR BLD AUTO: 58 % — SIGNIFICANT CHANGE UP (ref 43–77)
NEUTS BAND # BLD: 2 % — SIGNIFICANT CHANGE UP (ref 0–8)
NRBC # BLD: 0 /100 — SIGNIFICANT CHANGE UP (ref 0–0)
NRBC # BLD: SIGNIFICANT CHANGE UP /100 WBCS (ref 0–0)
NT-PROBNP SERPL-SCNC: 61 PG/ML — SIGNIFICANT CHANGE UP (ref 0–125)
PLAT MORPH BLD: NORMAL — SIGNIFICANT CHANGE UP
PLATELET # BLD AUTO: 334 K/UL — SIGNIFICANT CHANGE UP (ref 150–400)
POLYCHROMASIA BLD QL SMEAR: SLIGHT — SIGNIFICANT CHANGE UP
POTASSIUM SERPL-MCNC: 4.4 MMOL/L — SIGNIFICANT CHANGE UP (ref 3.5–5.3)
POTASSIUM SERPL-SCNC: 4.4 MMOL/L — SIGNIFICANT CHANGE UP (ref 3.5–5.3)
PROT SERPL-MCNC: 7.9 GM/DL — SIGNIFICANT CHANGE UP (ref 6–8.3)
RBC # BLD: 4.86 M/UL — SIGNIFICANT CHANGE UP (ref 4.2–5.8)
RBC # FLD: 13.8 % — SIGNIFICANT CHANGE UP (ref 10.3–14.5)
RBC BLD AUTO: SIGNIFICANT CHANGE UP
SODIUM SERPL-SCNC: 133 MMOL/L — LOW (ref 135–145)
STOMATOCYTES BLD QL SMEAR: SLIGHT — SIGNIFICANT CHANGE UP
TROPONIN I, HIGH SENSITIVITY RESULT: 16.72 NG/L — SIGNIFICANT CHANGE UP
TROPONIN I, HIGH SENSITIVITY RESULT: 18.48 NG/L — SIGNIFICANT CHANGE UP
VARIANT LYMPHS # BLD: 2 % — SIGNIFICANT CHANGE UP (ref 0–6)
WBC # BLD: 6.92 K/UL — SIGNIFICANT CHANGE UP (ref 3.8–10.5)
WBC # FLD AUTO: 6.92 K/UL — SIGNIFICANT CHANGE UP (ref 3.8–10.5)

## 2023-10-03 PROCEDURE — 93306 TTE W/DOPPLER COMPLETE: CPT

## 2023-10-03 PROCEDURE — 36415 COLL VENOUS BLD VENIPUNCTURE: CPT

## 2023-10-03 PROCEDURE — 71045 X-RAY EXAM CHEST 1 VIEW: CPT | Mod: 26

## 2023-10-03 PROCEDURE — 82962 GLUCOSE BLOOD TEST: CPT

## 2023-10-03 PROCEDURE — 99285 EMERGENCY DEPT VISIT HI MDM: CPT

## 2023-10-03 PROCEDURE — 84443 ASSAY THYROID STIM HORMONE: CPT

## 2023-10-03 PROCEDURE — C1769: CPT

## 2023-10-03 PROCEDURE — 82570 ASSAY OF URINE CREATININE: CPT

## 2023-10-03 PROCEDURE — 93454 CORONARY ARTERY ANGIO S&I: CPT

## 2023-10-03 PROCEDURE — 84156 ASSAY OF PROTEIN URINE: CPT

## 2023-10-03 PROCEDURE — 71250 CT THORAX DX C-: CPT

## 2023-10-03 PROCEDURE — C1894: CPT

## 2023-10-03 PROCEDURE — 76770 US EXAM ABDO BACK WALL COMP: CPT

## 2023-10-03 PROCEDURE — C1889: CPT

## 2023-10-03 PROCEDURE — C1887: CPT

## 2023-10-03 PROCEDURE — 84484 ASSAY OF TROPONIN QUANT: CPT

## 2023-10-03 PROCEDURE — 99223 1ST HOSP IP/OBS HIGH 75: CPT

## 2023-10-03 PROCEDURE — 80048 BASIC METABOLIC PNL TOTAL CA: CPT

## 2023-10-03 PROCEDURE — 93010 ELECTROCARDIOGRAM REPORT: CPT

## 2023-10-03 RX ORDER — SODIUM CHLORIDE 9 MG/ML
1000 INJECTION, SOLUTION INTRAVENOUS
Refills: 0 | Status: DISCONTINUED | OUTPATIENT
Start: 2023-10-03 | End: 2023-10-07

## 2023-10-03 RX ORDER — ONDANSETRON 8 MG/1
4 TABLET, FILM COATED ORAL EVERY 6 HOURS
Refills: 0 | Status: DISCONTINUED | OUTPATIENT
Start: 2023-10-03 | End: 2023-10-07

## 2023-10-03 RX ORDER — RANOLAZINE 500 MG/1
500 TABLET, FILM COATED, EXTENDED RELEASE ORAL
Refills: 0 | Status: DISCONTINUED | OUTPATIENT
Start: 2023-10-03 | End: 2023-10-07

## 2023-10-03 RX ORDER — CLOPIDOGREL BISULFATE 75 MG/1
75 TABLET, FILM COATED ORAL DAILY
Refills: 0 | Status: DISCONTINUED | OUTPATIENT
Start: 2023-10-04 | End: 2023-10-07

## 2023-10-03 RX ORDER — NITROGLYCERIN 6.5 MG
0.4 CAPSULE, EXTENDED RELEASE ORAL
Refills: 0 | Status: DISCONTINUED | OUTPATIENT
Start: 2023-10-03 | End: 2023-10-07

## 2023-10-03 RX ORDER — METOPROLOL TARTRATE 50 MG
200 TABLET ORAL DAILY
Refills: 0 | Status: DISCONTINUED | OUTPATIENT
Start: 2023-10-03 | End: 2023-10-07

## 2023-10-03 RX ORDER — GLUCAGON INJECTION, SOLUTION 0.5 MG/.1ML
1 INJECTION, SOLUTION SUBCUTANEOUS ONCE
Refills: 0 | Status: DISCONTINUED | OUTPATIENT
Start: 2023-10-03 | End: 2023-10-07

## 2023-10-03 RX ORDER — DEXTROSE 50 % IN WATER 50 %
12.5 SYRINGE (ML) INTRAVENOUS ONCE
Refills: 0 | Status: DISCONTINUED | OUTPATIENT
Start: 2023-10-03 | End: 2023-10-07

## 2023-10-03 RX ORDER — SODIUM CHLORIDE 9 MG/ML
1000 INJECTION INTRAMUSCULAR; INTRAVENOUS; SUBCUTANEOUS
Refills: 0 | Status: DISCONTINUED | OUTPATIENT
Start: 2023-10-03 | End: 2023-10-04

## 2023-10-03 RX ORDER — INSULIN LISPRO 100/ML
VIAL (ML) SUBCUTANEOUS AT BEDTIME
Refills: 0 | Status: DISCONTINUED | OUTPATIENT
Start: 2023-10-03 | End: 2023-10-07

## 2023-10-03 RX ORDER — DEXTROSE 50 % IN WATER 50 %
25 SYRINGE (ML) INTRAVENOUS ONCE
Refills: 0 | Status: DISCONTINUED | OUTPATIENT
Start: 2023-10-03 | End: 2023-10-07

## 2023-10-03 RX ORDER — DEXTROSE 50 % IN WATER 50 %
15 SYRINGE (ML) INTRAVENOUS ONCE
Refills: 0 | Status: DISCONTINUED | OUTPATIENT
Start: 2023-10-03 | End: 2023-10-07

## 2023-10-03 RX ORDER — ATORVASTATIN CALCIUM 80 MG/1
40 TABLET, FILM COATED ORAL AT BEDTIME
Refills: 0 | Status: DISCONTINUED | OUTPATIENT
Start: 2023-10-03 | End: 2023-10-07

## 2023-10-03 RX ORDER — INSULIN LISPRO 100/ML
45 VIAL (ML) SUBCUTANEOUS
Refills: 0 | Status: DISCONTINUED | OUTPATIENT
Start: 2023-10-03 | End: 2023-10-07

## 2023-10-03 RX ORDER — ASPIRIN/CALCIUM CARB/MAGNESIUM 324 MG
81 TABLET ORAL DAILY
Refills: 0 | Status: DISCONTINUED | OUTPATIENT
Start: 2023-10-03 | End: 2023-10-07

## 2023-10-03 RX ORDER — INSULIN LISPRO 100/ML
VIAL (ML) SUBCUTANEOUS
Refills: 0 | Status: DISCONTINUED | OUTPATIENT
Start: 2023-10-03 | End: 2023-10-07

## 2023-10-03 RX ORDER — FENOFIBRATE,MICRONIZED 130 MG
145 CAPSULE ORAL AT BEDTIME
Refills: 0 | Status: DISCONTINUED | OUTPATIENT
Start: 2023-10-03 | End: 2023-10-07

## 2023-10-03 RX ORDER — INSULIN GLARGINE 100 [IU]/ML
112 INJECTION, SOLUTION SUBCUTANEOUS AT BEDTIME
Refills: 0 | Status: DISCONTINUED | OUTPATIENT
Start: 2023-10-03 | End: 2023-10-05

## 2023-10-03 RX ADMIN — Medication 2: at 23:39

## 2023-10-03 RX ADMIN — RANOLAZINE 500 MILLIGRAM(S): 500 TABLET, FILM COATED, EXTENDED RELEASE ORAL at 23:34

## 2023-10-03 RX ADMIN — ATORVASTATIN CALCIUM 40 MILLIGRAM(S): 80 TABLET, FILM COATED ORAL at 23:34

## 2023-10-03 RX ADMIN — INSULIN GLARGINE 140 UNIT(S): 100 INJECTION, SOLUTION SUBCUTANEOUS at 23:42

## 2023-10-03 RX ADMIN — SODIUM CHLORIDE 125 MILLILITER(S): 9 INJECTION INTRAMUSCULAR; INTRAVENOUS; SUBCUTANEOUS at 22:51

## 2023-10-03 NOTE — ED PROVIDER NOTE - CLINICAL SUMMARY MEDICAL DECISION MAKING FREE TEXT BOX
Pt with chest discomfort after stent placement on 9/29. Will check cardiacs, EKG, r/o ACS. Dr. Kaye at bedside requesting admission.

## 2023-10-03 NOTE — H&P ADULT - ASSESSMENT
#chest pain, Rt sided, recent PCI w/ stent  - admit to tele  - resume home meds: asa, plavix, bb, statin  - cardio cs- f/u recs if pt needs repeat LHC  - check serial trops  - will check CT chest noncont    #Acute on CKD3  - in setting of recent cath  - will start IVF  - check am labs  - consult nephro- pt requests Dr Cheney  - hold ARB    #T2DM  - will start lantus at home dose of 140u and ademalog 56units TIDAC  - add SSI   - monitor FS    #Class II obesity  - recommend pt to have outpt evaluation and tx given his metabolic syndrome    #HTN- bp stable  - resume bb, HCTZ  - hold ARB given VIKKI    #PPX- SCDs, ambulate   58M w/PMH HTN, HLD, T2DM, CKD 3, Obesity Class II, CAD s/p 8 stents, recent admit 9/29-30 for Rt chest pain, s/p PCI w/ LAD stent on 9/29, he presents Today, c/o Rt chest pain once again.  In ED, Na 133, Cr 2.1 (1.7 prior), Gluc 327, trop neg, cxr neg.       #chest pain, Rt sided, recent PCI w/ stent  - admit to tele  - resume home meds: asa, plavix, bb, statin  - cardio cs- f/u recs if pt needs repeat LHC  - check serial trops  - will check CT chest noncont    #Acute on CKD3  - in setting of recent cath  - will start IVF  - check am labs  - consult nephro- pt requests Dr Cheney  - hold ARB    #T2DM w/hypergycemia  - will start lantus at home dose of 140u and ademalog 56units TIDAC  - add SSI   - monitor FS  - Last HA1c on 9/29 =9.8    #Class II obesity  - recommend pt to have outpt evaluation and tx given his metabolic syndrome    #HTN- bp stable  - resume bb, HCTZ  - hold ARB given VIKKI    #PPX- SCDs, ambulate

## 2023-10-03 NOTE — ED ADULT NURSE NOTE - OBJECTIVE STATEMENT
pt c/o chest pain , "not feeling right s/p cardiac stent on Friday here at . denies n/v/d/fever/chills. pmh: cardiac stents, HBP, DMII.

## 2023-10-03 NOTE — ED ADULT NURSE NOTE - NS ED NURSE LEVEL OF CONSCIOUSNESS SPEECH
Department of Anesthesiology  Preprocedure Note       Name:  Yvan Angel   Age:  57 y.o.  :  1973                                          MRN:  [id number]         Date:  12/10/2021      Surgeon: Félix Osullivan):  Estrella Costello MD    Procedure: Procedure(s):  LEFT KNEE ARTHROSCOPY, PARTIAL MEDIAL MENISCECTOMY    Medications prior to admission:   Prior to Admission medications    Medication Sig Start Date End Date Taking?  Authorizing Provider   loratadine (CLARITIN) 10 MG tablet Take 10 mg by mouth daily   Yes Historical Provider, MD   Esomeprazole Magnesium (NEXIUM PO) Take 1 tablet by mouth daily   Yes Historical Provider, MD       Current medications:    Current Facility-Administered Medications   Medication Dose Route Frequency Provider Last Rate Last Admin    ceFAZolin (ANCEF) 3,000 mg in dextrose 5 % 100 mL IVPB  3,000 mg IntraVENous On Call to 309 Chanel Kaiser MD        lactated ringers infusion   IntraVENous Continuous Estrella Costello  mL/hr at 12/10/21 0909 New Bag at 12/10/21 0909       Allergies:  No Known Allergies    Problem List:    Patient Active Problem List   Diagnosis Code    Aspiration into lower respiratory tract T17.800A       Past Medical History:        Diagnosis Date    Disk displacements, intervertebral     L4 L5    Dislocation, coccyx closed     GERD (gastroesophageal reflux disease)        Past Surgical History:        Procedure Laterality Date    SHOULDER ARTHROSCOPY Right 2015       Social History:    Social History     Tobacco Use    Smoking status: Former Smoker    Smokeless tobacco: Never Used   Substance Use Topics    Alcohol use: Yes     Comment: occ                                Counseling given: Not Answered      Vital Signs (Current):   Vitals:    12/10/21 0906   BP: 138/88   Pulse: 89   Resp: 20   Temp: 36.9 °C (98.4 °F)   SpO2: 95%   Weight: 283 lb 9.6 oz (128.6 kg)   Height: 6' 3\" (1.905 m)                                              BP Readings from Last 3 Encounters:   12/10/21 138/88   06/22/19 133/80   07/03/18 126/77       NPO Status: Time of last liquid consumption: 2200                        Time of last solid consumption: 2030                        Date of last liquid consumption: 12/09/21                        Date of last solid food consumption: 12/09/21    BMI:   Wt Readings from Last 3 Encounters:   12/10/21 283 lb 9.6 oz (128.6 kg)   05/17/19 271 lb (122.9 kg)   05/02/18 264 lb (119.7 kg)     Body mass index is 35.45 kg/m². CBC:   Lab Results   Component Value Date    WBC 5.9 11/03/2021    RBC 5.55 11/03/2021    HGB 14.6 11/03/2021    HCT 45.2 11/03/2021    MCV 81.4 11/03/2021    RDW 12.7 11/03/2021     11/03/2021       CMP:   Lab Results   Component Value Date     11/03/2021    K 4.3 11/03/2021     11/03/2021    CO2 28 11/03/2021    BUN 17 11/03/2021    CREATININE 0.95 11/03/2021    GFRAA >60 11/03/2021    LABGLOM >60 11/03/2021    GLUCOSE 80 11/03/2021    PROT 7.4 02/12/2021    CALCIUM 9.1 11/03/2021    BILITOT 0.95 02/12/2021    ALKPHOS 47 02/12/2021    AST 27 02/12/2021    ALT 46 02/12/2021       POC Tests: No results for input(s): POCGLU, POCNA, POCK, POCCL, POCBUN, POCHEMO, POCHCT in the last 72 hours.     Coags: No results found for: PROTIME, INR, APTT    HCG (If Applicable): No results found for: PREGTESTUR, PREGSERUM, HCG, HCGQUANT     ABGs:   Lab Results   Component Value Date    PHART 7.315 02/13/2015    PO2ART 89.7 02/13/2015    BJT0STK 50.4 02/13/2015    YWB5ZNI 25.1 02/13/2015    S0UYWODM 96.1 02/13/2015        Type & Screen (If Applicable):  No results found for: LABABO, LABRH    Drug/Infectious Status (If Applicable):  No results found for: HIV, HEPCAB    COVID-19 Screening (If Applicable):   Lab Results   Component Value Date    COVID19 Not Detected 12/10/2021           Anesthesia Evaluation  Patient summary reviewed and Nursing notes reviewed history of anesthetic complications (pt states that he aspirated at some point during a shoulder surgery. He as since been placed on an H2 blocker and has no symptoms when taking his meds regularly, no symptoms in the last few days, took his nexium with a sip of water this a.m.):   Airway: Mallampati: II  TM distance: >3 FB   Neck ROM: full  Mouth opening: > = 3 FB Dental: normal exam         Pulmonary:Negative Pulmonary ROS and normal exam  breath sounds clear to auscultation                            ROS comment: X smoker   Cardiovascular:Negative CV ROS  Exercise tolerance: good (>4 METS),         ECG reviewed  Rhythm: regular  Rate: normal                    Neuro/Psych:   Negative Neuro/Psych ROS               ROS comment: History of disc displacement GI/Hepatic/Renal:   (+) GERD: no interval change,           Endo/Other: Negative Endo/Other ROS                    Abdominal:   (+) obese,     Abdomen: soft. Vascular: negative vascular ROS. Other Findings:           Anesthesia Plan      general     ASA 2       Induction: intravenous. MIPS: Postoperative opioids intended and Prophylactic antiemetics administered. Anesthetic plan and risks discussed with patient. Plan discussed with attending.                   TAMARA Ruiz - CRNA   12/10/2021 Speaking Coherently

## 2023-10-03 NOTE — ED ADULT NURSE NOTE - NSFALLHARMRISKINTERV_ED_ALL_ED

## 2023-10-03 NOTE — ED ADULT TRIAGE NOTE - CHIEF COMPLAINT QUOTE
pt c/o chest pain , "not feeling right s/p cardiac stent on Friday here at . denies n/v/d/fever/chills. pmh: cardiac stents, HBP, DMII. ekg in triage

## 2023-10-03 NOTE — ED PROVIDER NOTE - OBJECTIVE STATEMENT
57 y/o male with a PMHx of CAD, DM, essential HTN, HLD, RENETTA, pancreatitis presents to the ED c/o chest discomfort starting today. Pt s/p percutaneous coronary intervention with stent insertion and PCTA on 9/29 with Dr. Kaye. No other complaints at this time.
Hgb 11.0 on admission, no s&s of active bleeding-patient with hx of anemia likely 2/2 to CKD  -trend CBC for now  -transfuse for Hgb<7

## 2023-10-03 NOTE — H&P ADULT - HISTORY OF PRESENT ILLNESS
HPI:      PAST MEDICAL & SURGICAL HISTORY:  Essential hypertension  Obstructive sleep apnea  Diabetes mellitus  HLD (hyperlipidemia)  CAD (coronary artery disease)  Pancreatitis  History of coronary artery stent placement Placed 9/12/18 and 9/29/2023 by Dr. Kaye  S/P cholecystectomy      Review of Systems:   CONSTITUTIONAL: No fever.  EYES: No eye pain or discharge.  ENMT:  No sinus or throat pain  NECK: No pain or stiffness  RESPIRATORY: No change in cough, wheezing, chills or hemoptysis; No shortness of breath  CARDIOVASCULAR: ++ chest pain, No palpitations, dizziness, or leg swelling  GASTROINTESTINAL: No abdominal or epigastric pain. No nausea, vomiting, or hematemesis; No diarrhea or constipation. No melena or hematochezia.  GENITOURINARY: No dysuria or incontinence  NEUROLOGICAL: No headaches, memory loss, loss of strength, numbness, or tremors  SKIN: No rashes.  MUSCULOSKELETAL: No joint pain or swelling; No muscle, back, or extremity pain  PSYCHIATRIC: No depression, anxiety, mood swings, or difficulty sleeping    Social History:   lives w/ wife, ind ADLs  denies smoking, rare etoh    FAMILY HISTORY:  Family history of diabetes mellitus (Sibling),  heart disease (Sibling, Father, Mother)        MEDICATIONS  (STANDING):  aspirin enteric coated 81 milliGRAM(s) Oral daily  atorvastatin 40 milliGRAM(s) Oral at bedtime  dextrose 5%. 1000 milliLiter(s) (100 mL/Hr) IV Continuous <Continuous>  dextrose 5%. 1000 milliLiter(s) (50 mL/Hr) IV Continuous <Continuous>  dextrose 50% Injectable 25 Gram(s) IV Push once  dextrose 50% Injectable 12.5 Gram(s) IV Push once  dextrose 50% Injectable 25 Gram(s) IV Push once  fenofibrate Tablet 160 milliGRAM(s) Oral at bedtime  glucagon  Injectable 1 milliGRAM(s) IntraMuscular once  hydrochlorothiazide 25 milliGRAM(s) Oral daily  insulin glargine Injectable (LANTUS) 140 Unit(s) SubCutaneous at bedtime  insulin lispro (ADMELOG) corrective regimen sliding scale   SubCutaneous at bedtime  insulin lispro (ADMELOG) corrective regimen sliding scale   SubCutaneous three times a day before meals  insulin lispro Injectable (ADMELOG) 56 Unit(s) SubCutaneous three times a day before meals  metoprolol succinate  milliGRAM(s) Oral daily  ranolazine 500 milliGRAM(s) Oral two times a day  sodium chloride 0.9%. 1000 milliLiter(s) (125 mL/Hr) IV Continuous <Continuous>    MEDICATIONS  (PRN):  dextrose Oral Gel 15 Gram(s) Oral once PRN Blood Glucose LESS THAN 70 milliGRAM(s)/deciliter  nitroglycerin     SubLingual 0.4 milliGRAM(s) SubLingual every 5 minutes PRN Chest Pain  ondansetron Injectable 4 milliGRAM(s) IV Push every 6 hours PRN Nausea        PHYSICAL EXAM:  Vital Signs Last 24 Hrs  T(C): 36.9 (03 Oct 2023 15:21), Max: 37 (03 Oct 2023 12:19)  T(F): 98.4 (03 Oct 2023 15:21), Max: 98.6 (03 Oct 2023 12:19)  HR: 75 (03 Oct 2023 15:21) (75 - 76)  BP: 112/77 (03 Oct 2023 15:21) (112/77 - 127/74)  BP(mean): 91 (03 Oct 2023 12:19) (91 - 91)  RR: 16 (03 Oct 2023 15:21) (16 - 18)  SpO2: 98% (03 Oct 2023 15:21) (98% - 98%)    Parameters below as of 03 Oct 2023 15:21  Patient On (Oxygen Delivery Method): room air      GENERAL: NAD, well-developed  HEAD:  Atraumatic, Normocephalic  EYES: EOMI, PERRLA, conjunctiva and sclera clear  ENT: normal hearing, no nasal discharge, throat clear, dentition normal  NECK: Supple, No JVD, no LAD, no thyromegaly   CHEST/LUNG: Clear to auscultation bilaterally; No wheeze, respirations unlabored  HEART: Regular rate and rhythm; No murmurs, rubs, or gallops  ABDOMEN: Soft, Nontender, Nondistended; Bowel sounds present, no HSM  EXTREMITIES:  2+ Peripheral Pulses, No clubbing, cyanosis, or edema  PSYCH: AAOx3, normal behavior  NEUROLOGY: non-focal, sensory and cn 2-12 intact, speech/language intact  SKIN: No visible rashes or lesions    LABS:                        14.6   6.92  )-----------( 334      ( 03 Oct 2023 13:13 )             43.8     10-03    133<L>  |  101  |  29<H>  ----------------------------<  327<H>  4.4   |  27  |  2.17<H>    Ca    9.2      03 Oct 2023 13:13  Mg     2.4     10-03    TPro  7.9  /  Alb  3.6  /  TBili  0.6  /  DBili  x   /  AST  41<H>  /  ALT  65  /  AlkPhos  59  10-03          Urinalysis Basic - ( 03 Oct 2023 13:13 )    Color: x / Appearance: x / SG: x / pH: x  Gluc: 327 mg/dL / Ketone: x  / Bili: x / Urobili: x   Blood: x / Protein: x / Nitrite: x   Leuk Esterase: x / RBC: x / WBC x   Sq Epi: x / Non Sq Epi: x / Bacteria: x        RADIOLOGY & ADDITIONAL TESTS:    Imaging Personally Reviewed:  cxr neg   EKG Personally Reviewed: no ST changes    Consultant(s) Notes Reviewed:      Care Discussed with Consultants/Other Providers:   HPI:  58M w/PMH HTN, HLD, T2DM, CKD 3, Obesity Class II, CAD s/p 8 stents, recent admit 9/29-30 for Rt chest pain, s/p PCI w/ LAD stent on 9/29, he presents Today, c/o Rt chest pain once again. This began last night, not as severe as first time, no radiation to neck or RUE as last time.  NO e/a factors, occurred at rest, waxing and waning. No associated symptoms.      In ED, Na 133, Cr 2.1 (1.7 prior), Gluc 327, trop neg, cxr neg.     PAST MEDICAL & SURGICAL HISTORY:  Essential hypertension  Obstructive sleep apnea  Diabetes mellitus  HLD (hyperlipidemia)  CAD (coronary artery disease)  Pancreatitis  History of coronary artery stent placement Placed 9/12/18 and 9/29/2023 by Dr. Kaye  S/P cholecystectomy      Review of Systems:   CONSTITUTIONAL: No fever.  EYES: No eye pain or discharge.  ENMT:  No sinus or throat pain  NECK: No pain or stiffness  RESPIRATORY: No change in cough, wheezing, chills or hemoptysis; No shortness of breath  CARDIOVASCULAR: ++ chest pain, No palpitations, dizziness, or leg swelling  GASTROINTESTINAL: No abdominal or epigastric pain. No nausea, vomiting, or hematemesis; No diarrhea or constipation. No melena or hematochezia.  GENITOURINARY: No dysuria or incontinence  NEUROLOGICAL: No headaches, memory loss, loss of strength, numbness, or tremors  SKIN: No rashes.  MUSCULOSKELETAL: No joint pain or swelling; No muscle, back, or extremity pain  PSYCHIATRIC: No depression, anxiety, mood swings, or difficulty sleeping    Social History:   lives w/ wife, ind ADLs  denies smoking, rare etoh    FAMILY HISTORY:  Family history of diabetes mellitus (Sibling),  heart disease (Sibling, Father, Mother)        MEDICATIONS  (STANDING):  aspirin enteric coated 81 milliGRAM(s) Oral daily  atorvastatin 40 milliGRAM(s) Oral at bedtime  dextrose 5%. 1000 milliLiter(s) (100 mL/Hr) IV Continuous <Continuous>  dextrose 5%. 1000 milliLiter(s) (50 mL/Hr) IV Continuous <Continuous>  dextrose 50% Injectable 25 Gram(s) IV Push once  dextrose 50% Injectable 12.5 Gram(s) IV Push once  dextrose 50% Injectable 25 Gram(s) IV Push once  fenofibrate Tablet 160 milliGRAM(s) Oral at bedtime  glucagon  Injectable 1 milliGRAM(s) IntraMuscular once  hydrochlorothiazide 25 milliGRAM(s) Oral daily  insulin glargine Injectable (LANTUS) 140 Unit(s) SubCutaneous at bedtime  insulin lispro (ADMELOG) corrective regimen sliding scale   SubCutaneous at bedtime  insulin lispro (ADMELOG) corrective regimen sliding scale   SubCutaneous three times a day before meals  insulin lispro Injectable (ADMELOG) 56 Unit(s) SubCutaneous three times a day before meals  metoprolol succinate  milliGRAM(s) Oral daily  ranolazine 500 milliGRAM(s) Oral two times a day  sodium chloride 0.9%. 1000 milliLiter(s) (125 mL/Hr) IV Continuous <Continuous>    MEDICATIONS  (PRN):  dextrose Oral Gel 15 Gram(s) Oral once PRN Blood Glucose LESS THAN 70 milliGRAM(s)/deciliter  nitroglycerin     SubLingual 0.4 milliGRAM(s) SubLingual every 5 minutes PRN Chest Pain  ondansetron Injectable 4 milliGRAM(s) IV Push every 6 hours PRN Nausea        PHYSICAL EXAM:  Vital Signs Last 24 Hrs  T(C): 36.9 (03 Oct 2023 15:21), Max: 37 (03 Oct 2023 12:19)  T(F): 98.4 (03 Oct 2023 15:21), Max: 98.6 (03 Oct 2023 12:19)  HR: 75 (03 Oct 2023 15:21) (75 - 76)  BP: 112/77 (03 Oct 2023 15:21) (112/77 - 127/74)  BP(mean): 91 (03 Oct 2023 12:19) (91 - 91)  RR: 16 (03 Oct 2023 15:21) (16 - 18)  SpO2: 98% (03 Oct 2023 15:21) (98% - 98%)    Parameters below as of 03 Oct 2023 15:21  Patient On (Oxygen Delivery Method): room air      GENERAL: NAD,   HEAD:  Atraumatic, Normocephalic  EYES: EOMI, PERRLA, conjunctiva and sclera clear  ENT: normal hearing, no nasal discharge, throat clear, dentition normal  NECK: Supple, No JVD, no LAD, no thyromegaly   CHEST/LUNG: Clear to auscultation bilaterally; No wheeze, respirations unlabored  HEART: Regular rate and rhythm; No murmurs, rubs, or gallops  ABDOMEN: Soft, Nontender, Nondistended; Bowel sounds present, no HSM  EXTREMITIES:  2+ Peripheral Pulses, No clubbing, cyanosis, or edema  PSYCH: AAOx3, normal behavior  NEUROLOGY: non-focal, sensory and cn 2-12 intact, speech/language intact  SKIN: No visible rashes or lesions    LABS:                        14.6   6.92  )-----------( 334      ( 03 Oct 2023 13:13 )             43.8     10-03    133<L>  |  101  |  29<H>  ----------------------------<  327<H>  4.4   |  27  |  2.17<H>    Ca    9.2      03 Oct 2023 13:13  Mg     2.4     10-03    TPro  7.9  /  Alb  3.6  /  TBili  0.6  /  DBili  x   /  AST  41<H>  /  ALT  65  /  AlkPhos  59  10-03          Urinalysis Basic - ( 03 Oct 2023 13:13 )    Color: x / Appearance: x / SG: x / pH: x  Gluc: 327 mg/dL / Ketone: x  / Bili: x / Urobili: x   Blood: x / Protein: x / Nitrite: x   Leuk Esterase: x / RBC: x / WBC x   Sq Epi: x / Non Sq Epi: x / Bacteria: x        RADIOLOGY & ADDITIONAL TESTS:    Imaging Personally Reviewed:  cxr neg   EKG Personally Reviewed: no ST changes    Consultant(s) Notes Reviewed:      Care Discussed with Consultants/Other Providers:

## 2023-10-04 LAB
ANION GAP SERPL CALC-SCNC: 3 MMOL/L — LOW (ref 5–17)
BUN SERPL-MCNC: 33 MG/DL — HIGH (ref 7–23)
CALCIUM SERPL-MCNC: 9.3 MG/DL — SIGNIFICANT CHANGE UP (ref 8.5–10.1)
CHLORIDE SERPL-SCNC: 105 MMOL/L — SIGNIFICANT CHANGE UP (ref 96–108)
CO2 SERPL-SCNC: 26 MMOL/L — SIGNIFICANT CHANGE UP (ref 22–31)
CREAT ?TM UR-MCNC: 99 MG/DL — SIGNIFICANT CHANGE UP
CREAT SERPL-MCNC: 2.11 MG/DL — HIGH (ref 0.5–1.3)
EGFR: 36 ML/MIN/1.73M2 — LOW
GLUCOSE BLDC GLUCOMTR-MCNC: 128 MG/DL — HIGH (ref 70–99)
GLUCOSE BLDC GLUCOMTR-MCNC: 129 MG/DL — HIGH (ref 70–99)
GLUCOSE BLDC GLUCOMTR-MCNC: 148 MG/DL — HIGH (ref 70–99)
GLUCOSE BLDC GLUCOMTR-MCNC: 98 MG/DL — SIGNIFICANT CHANGE UP (ref 70–99)
GLUCOSE SERPL-MCNC: 161 MG/DL — HIGH (ref 70–99)
POTASSIUM SERPL-MCNC: 4.1 MMOL/L — SIGNIFICANT CHANGE UP (ref 3.5–5.3)
POTASSIUM SERPL-SCNC: 4.1 MMOL/L — SIGNIFICANT CHANGE UP (ref 3.5–5.3)
PROT ?TM UR-MCNC: 57 MG/DL — HIGH (ref 0–12)
PROT/CREAT UR-RTO: 0.6 RATIO — HIGH (ref 0–0.2)
SODIUM SERPL-SCNC: 134 MMOL/L — LOW (ref 135–145)
TROPONIN I, HIGH SENSITIVITY RESULT: 18.57 NG/L — SIGNIFICANT CHANGE UP

## 2023-10-04 PROCEDURE — 93306 TTE W/DOPPLER COMPLETE: CPT | Mod: 26

## 2023-10-04 PROCEDURE — 71250 CT THORAX DX C-: CPT | Mod: 26

## 2023-10-04 PROCEDURE — 76770 US EXAM ABDO BACK WALL COMP: CPT | Mod: 26

## 2023-10-04 PROCEDURE — 99232 SBSQ HOSP IP/OBS MODERATE 35: CPT

## 2023-10-04 RX ORDER — INSULIN LISPRO 100/ML
45 VIAL (ML) SUBCUTANEOUS ONCE
Refills: 0 | Status: DISCONTINUED | OUTPATIENT
Start: 2023-10-04 | End: 2023-10-04

## 2023-10-04 RX ORDER — ISOSORBIDE MONONITRATE 60 MG/1
30 TABLET, EXTENDED RELEASE ORAL DAILY
Refills: 0 | Status: DISCONTINUED | OUTPATIENT
Start: 2023-10-04 | End: 2023-10-07

## 2023-10-04 RX ORDER — ACETAMINOPHEN 500 MG
650 TABLET ORAL EVERY 6 HOURS
Refills: 0 | Status: DISCONTINUED | OUTPATIENT
Start: 2023-10-04 | End: 2023-10-07

## 2023-10-04 RX ADMIN — Medication 45 UNIT(S): at 17:17

## 2023-10-04 RX ADMIN — CLOPIDOGREL BISULFATE 75 MILLIGRAM(S): 75 TABLET, FILM COATED ORAL at 11:02

## 2023-10-04 RX ADMIN — Medication 650 MILLIGRAM(S): at 20:10

## 2023-10-04 RX ADMIN — ATORVASTATIN CALCIUM 40 MILLIGRAM(S): 80 TABLET, FILM COATED ORAL at 22:34

## 2023-10-04 RX ADMIN — Medication 56 UNIT(S): at 08:39

## 2023-10-04 RX ADMIN — Medication 45 UNIT(S): at 12:52

## 2023-10-04 RX ADMIN — RANOLAZINE 500 MILLIGRAM(S): 500 TABLET, FILM COATED, EXTENDED RELEASE ORAL at 22:35

## 2023-10-04 RX ADMIN — Medication 81 MILLIGRAM(S): at 12:29

## 2023-10-04 RX ADMIN — Medication 145 MILLIGRAM(S): at 22:34

## 2023-10-04 RX ADMIN — ISOSORBIDE MONONITRATE 30 MILLIGRAM(S): 60 TABLET, EXTENDED RELEASE ORAL at 12:55

## 2023-10-04 RX ADMIN — RANOLAZINE 500 MILLIGRAM(S): 500 TABLET, FILM COATED, EXTENDED RELEASE ORAL at 11:02

## 2023-10-04 RX ADMIN — Medication 200 MILLIGRAM(S): at 11:01

## 2023-10-04 NOTE — PROGRESS NOTE ADULT - ASSESSMENT
58M w/PMH HTN, HLD, T2DM, CKD 3, Obesity Class II, CAD s/p 8 stents, recent admit 9/29-30 for Rt chest pain, s/p PCI w/ LAD stent on 9/29, he presents Today, c/o Rt chest pain once again.  In ED, Na 133, Cr 2.1 (1.7 prior), Gluc 327, trop neg, cxr neg.     #CP, recent PCI, likely angina  -trops neg  -echo ordered  -continue asa, plavix, statin, BB, ranexa  -imdur added  -f/u further cards recs    #CKD IIIB  -renal bladder u/s unremarkable  -IVF stopped  -ARB and HCTZ currently held  -f/u renal recs    #T2DM w/hyperglycemia  -a1c 9.8  -titrating insulin regimen, monitor BG    #DVT ppx- SCDs    Possible d/c 10/5 pending echo read, med management by cards

## 2023-10-04 NOTE — PATIENT PROFILE ADULT - FALL HARM RISK - HARM RISK INTERVENTIONS

## 2023-10-04 NOTE — PROGRESS NOTE ADULT - SUBJECTIVE AND OBJECTIVE BOX
HOSPITALIST ATTENDING PROGRESS NOTE    Chart and meds reviewed.  Patient seen and examined.    CC: CP    Subjective: Denies current CP.    All other systems reviewed and found to be negative with the exception of what has been described above.    MEDICATIONS  (STANDING):  aspirin enteric coated 81 milliGRAM(s) Oral daily  atorvastatin 40 milliGRAM(s) Oral at bedtime  clopidogrel Tablet 75 milliGRAM(s) Oral daily  dextrose 5%. 1000 milliLiter(s) (50 mL/Hr) IV Continuous <Continuous>  dextrose 5%. 1000 milliLiter(s) (100 mL/Hr) IV Continuous <Continuous>  dextrose 50% Injectable 12.5 Gram(s) IV Push once  dextrose 50% Injectable 25 Gram(s) IV Push once  dextrose 50% Injectable 25 Gram(s) IV Push once  fenofibrate Tablet 145 milliGRAM(s) Oral at bedtime  glucagon  Injectable 1 milliGRAM(s) IntraMuscular once  insulin glargine Injectable (LANTUS) 112 Unit(s) SubCutaneous at bedtime  insulin lispro (ADMELOG) corrective regimen sliding scale   SubCutaneous at bedtime  insulin lispro (ADMELOG) corrective regimen sliding scale   SubCutaneous three times a day before meals  insulin lispro Injectable (ADMELOG) 45 Unit(s) SubCutaneous three times a day before meals  isosorbide   mononitrate ER Tablet (IMDUR) 30 milliGRAM(s) Oral daily  metoprolol succinate  milliGRAM(s) Oral daily  ranolazine 500 milliGRAM(s) Oral two times a day    MEDICATIONS  (PRN):  dextrose Oral Gel 15 Gram(s) Oral once PRN Blood Glucose LESS THAN 70 milliGRAM(s)/deciliter  nitroglycerin     SubLingual 0.4 milliGRAM(s) SubLingual every 5 minutes PRN Chest Pain  ondansetron Injectable 4 milliGRAM(s) IV Push every 6 hours PRN Nausea      VITALS:  T(F): 98 (10-04-23 @ 07:48), Max: 99 (10-03-23 @ 19:41)  HR: 76 (10-04-23 @ 07:48) (75 - 80)  BP: 119/63 (10-04-23 @ 07:48) (119/63 - 129/76)  RR: 18 (10-04-23 @ 07:48) (18 - 18)  SpO2: 97% (10-04-23 @ 07:48) (97% - 99%)  Wt(kg): --    I&O's Summary      CAPILLARY BLOOD GLUCOSE      POCT Blood Glucose.: 98 mg/dL (04 Oct 2023 11:53)  POCT Blood Glucose.: 148 mg/dL (04 Oct 2023 07:59)  POCT Blood Glucose.: 263 mg/dL (03 Oct 2023 23:37)      PHYSICAL EXAM:  Gen: NAD  HEENT:  pupils equal and reactive, EOMI, no oropharyngeal lesions, erythema, exudates, oral thrush  NECK:   supple, no carotid bruits, no palpable lymph nodes, no thyromegaly  CV:  +S1, +S2, regular, no murmurs or rubs  RESP:   lungs clear to auscultation bilaterally, no wheezing, rales, rhonchi, good air entry bilaterally  BREAST:  not examined  GI:  abdomen soft, non-tender, non-distended, normal BS, no bruits, no abdominal masses, no palpable masses  RECTAL:  not examined  :  not examined  MSK:   normal muscle tone, no atrophy, no rigidity, no contractions  EXT:  no clubbing, no cyanosis, no edema, no calf pain, swelling or erythema  VASCULAR:  pulses equal and symmetric in the upper and lower extremities  NEURO:  AAOX3, no focal neurological deficits, follows all commands, able to move extremities spontaneously  SKIN:  no ulcers, lesions or rashes    LABS:                            14.6   6.92  )-----------( 334      ( 03 Oct 2023 13:13 )             43.8     10-04    134<L>  |  105  |  33<H>  ----------------------------<  161<H>  4.1   |  26  |  2.11<H>    Ca    9.3      04 Oct 2023 06:27  Mg     2.4     10-03    TPro  7.9  /  Alb  3.6  /  TBili  0.6  /  DBili  x   /  AST  41<H>  /  ALT  65  /  AlkPhos  59  10-03        LIVER FUNCTIONS - ( 03 Oct 2023 13:13 )  Alb: 3.6 g/dL / Pro: 7.9 gm/dL / ALK PHOS: 59 U/L / ALT: 65 U/L / AST: 41 U/L / GGT: x             Urinalysis Basic - ( 04 Oct 2023 06:27 )    Color: x / Appearance: x / SG: x / pH: x  Gluc: 161 mg/dL / Ketone: x  / Bili: x / Urobili: x   Blood: x / Protein: x / Nitrite: x   Leuk Esterase: x / RBC: x / WBC x   Sq Epi: x / Non Sq Epi: x / Bacteria: x    CULTURES:  no new    Additional results/Imaging, I have personally reviewed:  CXR 10/3/23: no acute finding or change    CT chest noncon 10/3/23: No acute pathology. Left coronary artery stent    Renal bladder u/s 10/4/23: No evidence for bilateral hydronephrosis.    Echo 10/4/23 done, read pending    Telemetry, personally reviewed:  10/4/23; sinus 70s, 1st degree AVB, RBBB

## 2023-10-04 NOTE — CONSULT NOTE ADULT - ASSESSMENT
58M w/PMH HTN, HLD, T2DM x 20 years, suspected CKD 3B, Obesity Class II, CAD s/p 8 stents, recent admit 9/29-30 for Rt chest pain, s/p PCI w/ LAD stent on 9/29, he presents with Rt chest pain once again, renal evaluation of CKD. Mom and dad with CKD, known to us. Dad was ESRD and passed away years ago. He himself has not seen renal, he is aware of CKD    CKD IIIB suspected  -Obtain outpatient labs, creatinine prior to last admit(stents) was similar to current. Suspect near baseline from long standing DM  -Renal imaging if none available  -Urine protein  -can stop ivf if po at goal  -No nsaids/contrast  -Eventual ace/arb and sglt-2 likely suggested  -needs outpatient follow up    Chest pain  -[facility] follow up, recent cath/stents  -Tele    dc with RN staff, team  will follow with you  
58M w/PMH HTN, HLD, T2DM, CKD 3, Obesity Class II, CAD s/p 8 stents, recent admit 9/29-30 for Rt chest pain, s/p PCI w/ LAD stent on 9/29, he presents Today, c/o Rt chest pain once again. This began last night, not as severe as first time, no radiation to neck or RUE as last time.  NO e/a factors, occurred at rest, waxing and waning. No associated symptoms.      # Chest pain. Probable angina  - S/p [facility] on 9/29:  s/p PCI of ISR of LADx2 and PTCA of diag  - Monitored on tele, no events  - Continue with home medications -- Aspirin, Plavix, BB, Statin, Ranexa  - Check echocardiogram  - Start Imdur 30mg daily    #HLD. Continue statin and fenofibrate     #HTN. Continue Metoprolol and ACEi    #IDDMT2. On ISS and Jardiance. On statin therapy. Recent a1c 9.8    Case D/w Dr. Irby and Dr. Kaye

## 2023-10-04 NOTE — PHARMACOTHERAPY INTERVENTION NOTE - COMMENTS
Case discussed with Dr. Betancur. Patient has type 2 diabetes and is taking Toujeo 140 units at bedtime and lispro 56 units TID AC at home. Patient was started on Lantus 140 units at bedtime and Admelog 56 units TID AC on admission. Given fasting POCT was 148 mg/dl and pre-lunch POCT was 98 mg/dl, recommended decreasing Lantus by 20% to 112 units at bedtime and decreasing Admelog by 20% to 45 units TID AC.
Medication reconciliation completed.  Reviewed Medication list and confirmed med allergies with patient; confirmed with Dr. First Medarun.

## 2023-10-04 NOTE — CONSULT NOTE ADULT - SUBJECTIVE AND OBJECTIVE BOX
58M w/PMH HTN, HLD, T2DM x 20 years, suspected CKD 3B, Obesity Class II, CAD s/p 8 stents, recent admit 9/29-30 for Rt chest pain, s/p PCI w/ LAD stent on 9/29, he presents with Rt chest pain once again, renal evaluation of CKD. Mom and dad with CKD, known to us. Dad was ESRD and passed away years ago. He himself has not seen renal, he is aware of CKD       PAST MEDICAL & SURGICAL HISTORY:  Essential hypertension      Obstructive sleep apnea      Diabetes mellitus      HLD (hyperlipidemia)      CAD (coronary artery disease)      Pancreatitis      History of coronary artery stent placement  Placed 9/12/18 by Dr. Kaye      S/P cholecystectomy          MEDICATIONS  (STANDING):  aspirin enteric coated 81 milliGRAM(s) Oral daily  atorvastatin 40 milliGRAM(s) Oral at bedtime  clopidogrel Tablet 75 milliGRAM(s) Oral daily  dextrose 5%. 1000 milliLiter(s) (50 mL/Hr) IV Continuous <Continuous>  dextrose 5%. 1000 milliLiter(s) (100 mL/Hr) IV Continuous <Continuous>  dextrose 50% Injectable 12.5 Gram(s) IV Push once  dextrose 50% Injectable 25 Gram(s) IV Push once  dextrose 50% Injectable 25 Gram(s) IV Push once  fenofibrate Tablet 145 milliGRAM(s) Oral at bedtime  glucagon  Injectable 1 milliGRAM(s) IntraMuscular once  insulin glargine Injectable (LANTUS) 140 Unit(s) SubCutaneous at bedtime  insulin lispro (ADMELOG) corrective regimen sliding scale   SubCutaneous at bedtime  insulin lispro (ADMELOG) corrective regimen sliding scale   SubCutaneous three times a day before meals  insulin lispro Injectable (ADMELOG) 56 Unit(s) SubCutaneous three times a day before meals  metoprolol succinate  milliGRAM(s) Oral daily  ranolazine 500 milliGRAM(s) Oral two times a day  sodium chloride 0.9%. 1000 milliLiter(s) (125 mL/Hr) IV Continuous <Continuous>    MEDICATIONS  (PRN):  dextrose Oral Gel 15 Gram(s) Oral once PRN Blood Glucose LESS THAN 70 milliGRAM(s)/deciliter  nitroglycerin     SubLingual 0.4 milliGRAM(s) SubLingual every 5 minutes PRN Chest Pain  ondansetron Injectable 4 milliGRAM(s) IV Push every 6 hours PRN Nausea      Allergies    penicillin (Hives)    Intolerances        SOCIAL HISTORY:    FAMILY HISTORY:  Family history of diabetes mellitus (Sibling)    FH: heart disease (Sibling, Father, Mother)        REVIEW OF SYSTEMS:    CONSTITUTIONAL: No weakness, fevers or chills  EYES/ENT: No visual changes;  No vertigo or throat pain   NECK: No pain or stiffness  RESPIRATORY: No cough, wheezing, hemoptysis; No shortness of breath  CARDIOVASCULAR: No c urrent chest pain or palpitations  GASTROINTESTINAL: No abdominal or epigastric pain. No nausea, vomiting, or hematemesis; No diarrhea or constipation. No melena or hematochezia.  GENITOURINARY: No dysuria, frequency or hematuria  NEUROLOGICAL: No numbness or weakness  SKIN: No itching, burning, rashes, or lesions   All other review of systems is negative unless indicated above.      T(C): , Max: 37.2 (10-03-23 @ 19:41)  T(F): , Max: 99 (10-03-23 @ 19:41)  HR: 76 (10-04-23 @ 07:48)  BP: 119/63 (10-04-23 @ 07:48)  BP(mean): 95 (10-03-23 @ 19:41)  RR: 18 (10-04-23 @ 07:48)  SpO2: 97% (10-04-23 @ 07:48)  Wt(kg): --    Height (cm): 182.9 (10-03 @ 12:20)  Weight (kg): 117.9 (10-03 @ 12:20)  BMI (kg/m2): 35.2 (10-03 @ 12:20)  BSA (m2): 2.38 (10-03 @ 12:20)    PHYSICAL EXAM:    Constitutional: NAD   HEENT: PERRLA, EOMI,  MMM  Neck: No LAD, No JVD  Respiratory: CTAB  Cardiovascular: S1 and S2, RRR  Gastrointestinal: obese  Extremities: No peripheral edema  Neurological: A/O x 3   : No Aaron  Skin: No rashes  Access: Not applicable        LABS:                        14.6   6.92  )-----------( 334      ( 03 Oct 2023 13:13 )             43.8     04 Oct 2023 06:27    134    |  105    |  33     ----------------------------<  161    4.1     |  26     |  2.11   03 Oct 2023 13:13    133    |  101    |  29     ----------------------------<  327    4.4     |  27     |  2.17     Ca    9.3        04 Oct 2023 06:27  Ca    9.2        03 Oct 2023 13:13  Mg     2.4       03 Oct 2023 13:13    TPro  7.9    /  Alb  3.6    /  TBili  0.6    /  DBili  x      /  AST  41     /  ALT  65     /  AlkPhos  59     03 Oct 2023 13:13          Urine Studies:  Urinalysis Basic - ( 04 Oct 2023 06:27 )    Color: x / Appearance: x / SG: x / pH: x  Gluc: 161 mg/dL / Ketone: x  / Bili: x / Urobili: x   Blood: x / Protein: x / Nitrite: x   Leuk Esterase: x / RBC: x / WBC x   Sq Epi: x / Non Sq Epi: x / Bacteria: x            RADIOLOGY & ADDITIONAL STUDIES:

## 2023-10-04 NOTE — CONSULT NOTE ADULT - SUBJECTIVE AND OBJECTIVE BOX
CHIEF COMPLAINT: Patient is a 58y old  Male who presents with a chief complaint of chest pain (03 Oct 2023 19:09)    FROM H&P: 58M w/PMH HTN, HLD, T2DM, CKD 3, Obesity Class II, CAD s/p 8 stents, recent admit - for Rt chest pain, s/p PCI w/ LAD stent on , he presents Today, c/o Rt chest pain once again. This began last night, not as severe as first time, no radiation to neck or RUE as last time.  NO e/a factors, occurred at rest, waxing and waning. No associated symptoms.    -- In ED, Na 133, Cr 2.1 (1.7 prior), Gluc 327, trop neg, cxr neg.         PAST MEDICAL & SURGICAL HISTORY:  Essential hypertension  Obstructive sleep apnea  Diabetes mellitus  HLD (hyperlipidemia)  CAD (coronary artery disease)  Pancreatitis  History of coronary artery stent placement Placed 18 and 2023 by Dr. Kaye  S/P cholecystectomy    SOCIAL HISTORY:  lives w/ wife, ind ADLs  denies smoking, rare etoh    FAMILY HISTORY:  Family history of diabetes mellitus (Sibling),  heart disease (Sibling, Father, Mother)    MEDICATIONS:  aspirin enteric coated 81 milliGRAM(s) Oral daily  atorvastatin 40 milliGRAM(s) Oral at bedtime  dextrose 5%. 1000 milliLiter(s) (100 mL/Hr) IV Continuous <Continuous>  dextrose 5%. 1000 milliLiter(s) (50 mL/Hr) IV Continuous <Continuous>  dextrose 50% Injectable 25 Gram(s) IV Push once  dextrose 50% Injectable 12.5 Gram(s) IV Push once  dextrose 50% Injectable 25 Gram(s) IV Push once  fenofibrate Tablet 160 milliGRAM(s) Oral at bedtime  glucagon  Injectable 1 milliGRAM(s) IntraMuscular once  hydrochlorothiazide 25 milliGRAM(s) Oral daily  insulin glargine Injectable (LANTUS) 140 Unit(s) SubCutaneous at bedtime  insulin lispro (ADMELOG) corrective regimen sliding scale   SubCutaneous at bedtime  insulin lispro (ADMELOG) corrective regimen sliding scale   SubCutaneous three times a day before meals  insulin lispro Injectable (ADMELOG) 56 Unit(s) SubCutaneous three times a day before meals  metoprolol succinate  milliGRAM(s) Oral daily  ranolazine 500 milliGRAM(s) Oral two times a day  sodium chloride 0.9%. 1000 milliLiter(s) (125 mL/Hr) IV Continuous <Continuous>    MEDICATIONS  (PRN):  dextrose Oral Gel 15 Gram(s) Oral once PRN Blood Glucose LESS THAN 70 milliGRAM(s)/deciliter  nitroglycerin     SubLingual 0.4 milliGRAM(s) SubLingual every 5 minutes PRN Chest Pain  ondansetron Injectable 4 milliGRAM(s) IV Push every 6 hours PRN Nausea    HOME MEDICATIONS:  atorvastatin 40 mg oral tablet: 1 tab(s) orally once a day (at bedtime) (03 Oct 2023 15:21)  D3 25 mcg (1000 intl units) oral tablet: 1 tab(s) orally once a day (03 Oct 2023 15:21)  Farxiga 10 mg oral tablet: 1 tab(s) orally once a day (03 Oct 2023 15:21)  fenofibrate 160 mg oral tablet: 1 tab(s) orally once a day (03 Oct 2023 15:21)  ferrous sulfate 325 mg (65 mg elemental iron) oral tablet: 1 tab(s) orally once a day (03 Oct 2023 15:21)  fosinopril 20 mg oral tablet: 1 tab(s) orally once a day (03 Oct 2023 15:21)  hydroCHLOROthiazide 25 mg oral tablet: 1 tab(s) orally once a day (03 Oct 2023 15:21)  Insulin Lispro KwikPen 100 units/mL injectable solution: 56 injectable 3 times a day before meals (03 Oct 2023 15:21)  magnesium oxide 400 mg oral tablet: 1 tab(s) orally once a day (03 Oct 2023 15:21)  ranolazine 500 mg oral tablet, extended release: 1 tab(s) orally 2 times a day (03 Oct 2023 15:21)  Toujeo Max SoloStar 300 units/mL subcutaneous solution: 140 subcutaneous once a day in the morning (03 Oct 2023 15:21)  Vitamin B Complex with C oral tablet: 1 tab(s) orally once a day (03 Oct 2023 15:19)    PHYSICAL EXAM:  T(C): 36.7 (04 Oct 2023 07:48), Max: 37.2 (03 Oct 2023 19:41)  T(F): 98 (04 Oct 2023 07:48), Max: 99 (03 Oct 2023 19:41)  HR: 76 (04 Oct 2023 07:48) (75 - 80)  BP: 119/63 (04 Oct 2023 07:48) (112/77 - 129/76)  BP(mean): 95 (03 Oct 2023 19:41) (91 - 95)  RR: 18 (04 Oct 2023 07:48) (16 - 18)  SpO2: 97% (04 Oct 2023 07:48) (97% - 99%)    Parameters below as of 04 Oct 2023 07:48  Patient On (Oxygen Delivery Method): room air    Constitutional: NAD, awake and alert  HEENT: PERR, EOMI, Normal Hearing, MMM  Neck: Soft and supple, No LAD, No JVD  Respiratory: Breath sounds are clear bilaterally, No wheezing, rales or rhonchi  Cardiovascular: S1 and S2, regular rate and rhythm, no Murmurs, gallops or rubs  Gastrointestinal: Bowel Sounds present, soft, nontender, nondistended, no guarding, no rebound  Extremities: No peripheral edema  Vascular: 2+ peripheral pulses  Neurological: A/O x 3, no focal deficits  Musculoskeletal: 5/5 strength b/l upper and lower extremities  Skin: No rashes    =======================================    INTERPRETATION OF TELEMETRY:    ECG: SR, RBBB    ========================================    LABS:                        14.6   6.92  )-----------( 334      ( 03 Oct 2023 13:13 )             43.8     10-    134<L>  |  105  |  33<H>  ----------------------------<  161<H>  4.1   |  26  |  2.11<H>    Ca    9.3      04 Oct 2023 06:27  Mg     2.4     10-    TPro  7.9  /  Alb  3.6  /  TBili  0.6  /  DBili  x   /  AST  41<H>  /  ALT  65  /  AlkPhos  59  10    TroponinI hsT: <-18.57, <-16.72, <-18.48, <-18.57, <-20.22    BNP 61    CARDIAC TESTIN/2022: TTE: Mild concentric left ventricular hypertrophy is present. Estimated left ventricular ejection fraction is 65-70 %. A bicuspid aortic valve cannot be excluded. Significant fibrocalcific changes noted to the aortic valve leaflets with restriction in leaflet excursion. Peak and mean transaortic gradients are 34 and 21 mmHg respectively; this finding is consistent with mild to moderate aortic stenosis. Mild (1+) eccentric aortic regurgitationis present. The aortic root is mildly dilated . Fibrocalcific changes noted to the mitral valve leaflets with preserved leaflet excursion. Trace mitral regurgitation is present. The tricuspid valve leaflets are thin and pliable; valve motion is normal. Trace tricuspid valve regurgitation is present.      RADIOLOGY & ADDITIONAL STUDIES:    10/3/23: CXR: Clear CHIEF COMPLAINT: Patient is a 58y old  Male who presents with a chief complaint of chest pain (03 Oct 2023 19:09)    FROM H&P: 58M w/PMH HTN, HLD, T2DM, CKD 3, Obesity Class II, CAD s/p 8 stents, recent admit - for Rt chest pain, s/p PCI w/ LAD stent on , he presents Today, c/o Rt chest pain once again. This began last night, not as severe as first time, no radiation to neck or RUE as last time.  NO e/a factors, occurred at rest, waxing and waning. No associated symptoms.    -- In ED, Na 133, Cr 2.1 (1.7 prior), Gluc 327, trop neg, cxr neg.     10/4. Patient seen and examined this AM  Felt this pressure again last night  Remained in hospital, monitored on tele, no events  Troponin negative. EKG without ischemic changes      PAST MEDICAL & SURGICAL HISTORY:  Essential hypertension  Obstructive sleep apnea  Diabetes mellitus  HLD (hyperlipidemia)  CAD (coronary artery disease)  Pancreatitis  History of coronary artery stent placement Placed 18 and 2023 by Dr. Kaye  S/P cholecystectomy    SOCIAL HISTORY:  lives w/ wife, ind ADLs  denies smoking, rare etoh    FAMILY HISTORY:  Family history of diabetes mellitus (Sibling),  heart disease (Sibling, Father, Mother)    MEDICATIONS:  aspirin enteric coated 81 milliGRAM(s) Oral daily  atorvastatin 40 milliGRAM(s) Oral at bedtime  dextrose 5%. 1000 milliLiter(s) (100 mL/Hr) IV Continuous <Continuous>  dextrose 5%. 1000 milliLiter(s) (50 mL/Hr) IV Continuous <Continuous>  dextrose 50% Injectable 25 Gram(s) IV Push once  dextrose 50% Injectable 12.5 Gram(s) IV Push once  dextrose 50% Injectable 25 Gram(s) IV Push once  fenofibrate Tablet 160 milliGRAM(s) Oral at bedtime  glucagon  Injectable 1 milliGRAM(s) IntraMuscular once  hydrochlorothiazide 25 milliGRAM(s) Oral daily  insulin glargine Injectable (LANTUS) 140 Unit(s) SubCutaneous at bedtime  insulin lispro (ADMELOG) corrective regimen sliding scale   SubCutaneous at bedtime  insulin lispro (ADMELOG) corrective regimen sliding scale   SubCutaneous three times a day before meals  insulin lispro Injectable (ADMELOG) 56 Unit(s) SubCutaneous three times a day before meals  metoprolol succinate  milliGRAM(s) Oral daily  ranolazine 500 milliGRAM(s) Oral two times a day  sodium chloride 0.9%. 1000 milliLiter(s) (125 mL/Hr) IV Continuous <Continuous>    MEDICATIONS  (PRN):  dextrose Oral Gel 15 Gram(s) Oral once PRN Blood Glucose LESS THAN 70 milliGRAM(s)/deciliter  nitroglycerin     SubLingual 0.4 milliGRAM(s) SubLingual every 5 minutes PRN Chest Pain  ondansetron Injectable 4 milliGRAM(s) IV Push every 6 hours PRN Nausea    HOME MEDICATIONS:  atorvastatin 40 mg oral tablet: 1 tab(s) orally once a day (at bedtime) (03 Oct 2023 15:21)  D3 25 mcg (1000 intl units) oral tablet: 1 tab(s) orally once a day (03 Oct 2023 15:21)  Farxiga 10 mg oral tablet: 1 tab(s) orally once a day (03 Oct 2023 15:21)  fenofibrate 160 mg oral tablet: 1 tab(s) orally once a day (03 Oct 2023 15:21)  ferrous sulfate 325 mg (65 mg elemental iron) oral tablet: 1 tab(s) orally once a day (03 Oct 2023 15:21)  fosinopril 20 mg oral tablet: 1 tab(s) orally once a day (03 Oct 2023 15:21)  hydroCHLOROthiazide 25 mg oral tablet: 1 tab(s) orally once a day (03 Oct 2023 15:21)  Insulin Lispro KwikPen 100 units/mL injectable solution: 56 injectable 3 times a day before meals (03 Oct 2023 15:21)  magnesium oxide 400 mg oral tablet: 1 tab(s) orally once a day (03 Oct 2023 15:21)  ranolazine 500 mg oral tablet, extended release: 1 tab(s) orally 2 times a day (03 Oct 2023 15:21)  Toujeo Max SoloStar 300 units/mL subcutaneous solution: 140 subcutaneous once a day in the morning (03 Oct 2023 15:21)  Vitamin B Complex with C oral tablet: 1 tab(s) orally once a day (03 Oct 2023 15:19)    PHYSICAL EXAM:  T(C): 36.7 (04 Oct 2023 07:48), Max: 37.2 (03 Oct 2023 19:41)  T(F): 98 (04 Oct 2023 07:48), Max: 99 (03 Oct 2023 19:41)  HR: 76 (04 Oct 2023 07:48) (75 - 80)  BP: 119/63 (04 Oct 2023 07:48) (112/77 - 129/76)  BP(mean): 95 (03 Oct 2023 19:41) (91 - 95)  RR: 18 (04 Oct 2023 07:48) (16 - 18)  SpO2: 97% (04 Oct 2023 07:48) (97% - 99%)    Parameters below as of 04 Oct 2023 07:48  Patient On (Oxygen Delivery Method): room air    Constitutional: NAD, awake and alert  HEENT: PERR, EOMI, Normal Hearing, MMM  Neck: Soft and supple, No LAD, No JVD  Respiratory: Breath sounds are clear bilaterally, No wheezing, rales or rhonchi  Cardiovascular: S1 and S2, regular rate and rhythm, no Murmurs, gallops or rubs  Gastrointestinal: Bowel Sounds present, soft, nontender, nondistended, no guarding, no rebound  Extremities: No peripheral edema  Vascular: 2+ peripheral pulses  Neurological: A/O x 3, no focal deficits  Musculoskeletal: 5/5 strength b/l upper and lower extremities  Skin: No rashes    =======================================    INTERPRETATION OF TELEMETRY: SR    ECG: SR, RBBB    ========================================    LABS:                        14.6   6.92  )-----------( 334      ( 03 Oct 2023 13:13 )             43.8     10-    134<L>  |  105  |  33<H>  ----------------------------<  161<H>  4.1   |  26  |  2.11<H>    Ca    9.3      04 Oct 2023 06:27  Mg     2.4     10-03    TPro  7.9  /  Alb  3.6  /  TBili  0.6  /  DBili  x   /  AST  41<H>  /  ALT  65  /  AlkPhos  59  10-03    TroponinI hsT: <-18.57, <-16.72, <-18.48, <-18.57, <-20.22    BNP 61    CARDIAC TESTIN/2022: TTE: Mild concentric left ventricular hypertrophy is present. Estimated left ventricular ejection fraction is 65-70 %. A bicuspid aortic valve cannot be excluded. Significant fibrocalcific changes noted to the aortic valve leaflets with restriction in leaflet excursion. Peak and mean transaortic gradients are 34 and 21 mmHg respectively; this finding is consistent with mild to moderate aortic stenosis. Mild (1+) eccentric aortic regurgitationis present. The aortic root is mildly dilated . Fibrocalcific changes noted to the mitral valve leaflets with preserved leaflet excursion. Trace mitral regurgitation is present. The tricuspid valve leaflets are thin and pliable; valve motion is normal. Trace tricuspid valve regurgitation is present.      RADIOLOGY & ADDITIONAL STUDIES:    10/3/23: CXR: Clear

## 2023-10-04 NOTE — PATIENT PROFILE ADULT - FALL HARM RISK - TYPE OF ASSESSMENT
Incoming call from patient, returning VM. Lab results and recommendations from [facility] relayed. Pt verbalized understanding, will  urine sample cup as recommended to complete urine sample and is agreeable to scheduling lab appt in 1 month to complete repeat CBC.    Pt scheduled for lab appt at [facility] 10/11/23 at 9am.         Admission

## 2023-10-05 LAB
ADD ON TEST-SPECIMEN IN LAB: SIGNIFICANT CHANGE UP
ANION GAP SERPL CALC-SCNC: 5 MMOL/L — SIGNIFICANT CHANGE UP (ref 5–17)
BUN SERPL-MCNC: 37 MG/DL — HIGH (ref 7–23)
CALCIUM SERPL-MCNC: 8.8 MG/DL — SIGNIFICANT CHANGE UP (ref 8.5–10.1)
CHLORIDE SERPL-SCNC: 108 MMOL/L — SIGNIFICANT CHANGE UP (ref 96–108)
CO2 SERPL-SCNC: 25 MMOL/L — SIGNIFICANT CHANGE UP (ref 22–31)
CREAT SERPL-MCNC: 1.95 MG/DL — HIGH (ref 0.5–1.3)
EGFR: 40 ML/MIN/1.73M2 — LOW
GLUCOSE BLDC GLUCOMTR-MCNC: 142 MG/DL — HIGH (ref 70–99)
GLUCOSE BLDC GLUCOMTR-MCNC: 145 MG/DL — HIGH (ref 70–99)
GLUCOSE BLDC GLUCOMTR-MCNC: 171 MG/DL — HIGH (ref 70–99)
GLUCOSE BLDC GLUCOMTR-MCNC: 211 MG/DL — HIGH (ref 70–99)
GLUCOSE SERPL-MCNC: 176 MG/DL — HIGH (ref 70–99)
POTASSIUM SERPL-MCNC: 3.9 MMOL/L — SIGNIFICANT CHANGE UP (ref 3.5–5.3)
POTASSIUM SERPL-SCNC: 3.9 MMOL/L — SIGNIFICANT CHANGE UP (ref 3.5–5.3)
SODIUM SERPL-SCNC: 138 MMOL/L — SIGNIFICANT CHANGE UP (ref 135–145)
TSH SERPL-MCNC: 2.97 UU/ML — SIGNIFICANT CHANGE UP (ref 0.34–4.82)

## 2023-10-05 PROCEDURE — 99233 SBSQ HOSP IP/OBS HIGH 50: CPT

## 2023-10-05 RX ORDER — INSULIN GLARGINE 100 [IU]/ML
60 INJECTION, SOLUTION SUBCUTANEOUS AT BEDTIME
Refills: 0 | Status: DISCONTINUED | OUTPATIENT
Start: 2023-10-05 | End: 2023-10-07

## 2023-10-05 RX ORDER — INSULIN GLARGINE 100 [IU]/ML
85 INJECTION, SOLUTION SUBCUTANEOUS AT BEDTIME
Refills: 0 | Status: DISCONTINUED | OUTPATIENT
Start: 2023-10-05 | End: 2023-10-05

## 2023-10-05 RX ADMIN — ATORVASTATIN CALCIUM 40 MILLIGRAM(S): 80 TABLET, FILM COATED ORAL at 21:30

## 2023-10-05 RX ADMIN — INSULIN GLARGINE 60 UNIT(S): 100 INJECTION, SOLUTION SUBCUTANEOUS at 21:31

## 2023-10-05 RX ADMIN — Medication 200 MILLIGRAM(S): at 11:22

## 2023-10-05 RX ADMIN — Medication 145 MILLIGRAM(S): at 21:30

## 2023-10-05 RX ADMIN — Medication 2: at 09:03

## 2023-10-05 RX ADMIN — Medication 45 UNIT(S): at 18:00

## 2023-10-05 RX ADMIN — ISOSORBIDE MONONITRATE 30 MILLIGRAM(S): 60 TABLET, EXTENDED RELEASE ORAL at 11:17

## 2023-10-05 RX ADMIN — Medication 45 UNIT(S): at 13:23

## 2023-10-05 RX ADMIN — RANOLAZINE 500 MILLIGRAM(S): 500 TABLET, FILM COATED, EXTENDED RELEASE ORAL at 21:30

## 2023-10-05 RX ADMIN — Medication 81 MILLIGRAM(S): at 11:18

## 2023-10-05 RX ADMIN — CLOPIDOGREL BISULFATE 75 MILLIGRAM(S): 75 TABLET, FILM COATED ORAL at 11:17

## 2023-10-05 RX ADMIN — Medication 45 UNIT(S): at 09:05

## 2023-10-05 RX ADMIN — RANOLAZINE 500 MILLIGRAM(S): 500 TABLET, FILM COATED, EXTENDED RELEASE ORAL at 11:18

## 2023-10-05 NOTE — PROGRESS NOTE ADULT - ASSESSMENT
58M w/PMH HTN, HLD, T2DM x 20 years, suspected CKD 3B, Obesity Class II, CAD s/p 8 stents, recent admit 9/29-30 for Rt chest pain, s/p PCI w/ LAD stent on 9/29, he presents with Rt chest pain once again, renal evaluation of CKD. Mom and dad with CKD, known to us. Dad was ESRD and passed away years ago. He himself has not seen renal, he is aware of CKD    CKD IIIB suspected sec to DM/HTN    unknown baseline - obtain outpatient labs but suspect Cr near his baseline now     -Urine protein 600 mg ( pt on Fosinopril and Farxiga outpatient ) can resume this and monitor   -No nsaids/contrast    needs CKD surveillance and outpatient follow up advised     Chest pain w recent cath/stents   ++ severe AS - await cardiology options on this - -IF further IV contrast required would prehydrate patient - pt advised on renal risks of contrast     Will follow with you

## 2023-10-05 NOTE — PROGRESS NOTE ADULT - ASSESSMENT
58M w/PMH HTN, HLD, T2DM, CKD 3, Obesity Class II, CAD s/p 8 stents, recent admit 9/29-30 for Rt chest pain, s/p PCI w/ LAD stent on 9/29, he presents Today, c/o Rt chest pain once again. This began last night, not as severe as first time, no radiation to neck or RUE as last time.  NO e/a factors, occurred at rest, waxing and waning. No associated symptoms.      # Chest pain in the setting probable Severe AS  Ongoing episodes of chest discomfort overnight.  - S/p LHC on 9/29:  s/p PCI of ISR of LADx2 and PTCA of diag  - Monitored on tele, no events  - Continue with home medications -- Aspirin, Plavix, BB, Statin, Ranexa  - 10/2023: TTE: Moderate to severe aortic stenosis. EF 60-65 %.    - Started on Imdur 30mg daily    #HLD. Continue statin and fenofibrate     #HTN. Continue Metoprolol and ACEi    #IDDMT2. On ISS and Jardiance. On statin therapy. Recent a1c 9.8    Case D/w Dr. Duckworth and Vargas Kaye to follow up later today.

## 2023-10-05 NOTE — PROGRESS NOTE ADULT - SUBJECTIVE AND OBJECTIVE BOX
58M w/PMH HTN, HLD, T2DM x 20 years, suspected CKD 3B, Obesity Class II, CAD s/p 8 stents, recent admit 9/29-30 for Rt chest pain, s/p PCI w/ LAD stent on 9/29, he presents with Rt chest pain once again, renal evaluation of CKD.  pt parents -  Mom and dad with CKD, well known to us. Father was ESRD on HD and passed away some years ago.   Pt was advised to see renal and he is aware of CKD but had not done so    today    pt feeling ok    no sob or cp   pt denies nsaid use   pt denies soda intake   denies smoke    pt admits his Dm has not been well controlled with last A1c 11 outpateint     - 9.8 during this admission                PAST MEDICAL & SURGICAL HISTORY:  Essential hypertension    Obstructive sleep apnea    Diabetes mellitus    HLD (hyperlipidemia)    CAD (coronary artery disease)      Pancreatitis      History of coronary artery stent placement  Placed 9/12/18 by Dr. Kaye      S/P cholecystectomy    Home Medications:  atorvastatin 40 mg oral tablet: 1 tab(s) orally once a day (at bedtime) (03 Oct 2023 15:21)  D3 25 mcg (1000 intl units) oral tablet: 1 tab(s) orally once a day (03 Oct 2023 15:21)  Farxiga 10 mg oral tablet: 1 tab(s) orally once a day (03 Oct 2023 15:21)  fenofibrate 160 mg oral tablet: 1 tab(s) orally once a day (03 Oct 2023 15:21)  ferrous sulfate 325 mg (65 mg elemental iron) oral tablet: 1 tab(s) orally once a day (03 Oct 2023 15:21)  fosinopril 20 mg oral tablet: 1 tab(s) orally once a day (03 Oct 2023 15:21)  hydroCHLOROthiazide 25 mg oral tablet: 1 tab(s) orally once a day (03 Oct 2023 15:21)  Insulin Lispro KwikPen 100 units/mL injectable solution: 56 injectable 3 times a day before meals (03 Oct 2023 15:21)  magnesium oxide 400 mg oral tablet: 1 tab(s) orally once a day (03 Oct 2023 15:21)  ranolazine 500 mg oral tablet, extended release: 1 tab(s) orally 2 times a day (03 Oct 2023 15:21)  Toujeo Max SoloStar 300 units/mL subcutaneous solution: 140 subcutaneous once a day in the morning (03 Oct 2023 15:21)  Vitamin B Complex with C oral tablet: 1 tab(s) orally once a day (03 Oct 2023 15:19)    MEDICATIONS  (STANDING):  aspirin enteric coated 81 milliGRAM(s) Oral daily  atorvastatin 40 milliGRAM(s) Oral at bedtime  clopidogrel Tablet 75 milliGRAM(s) Oral daily  dextrose 5%. 1000 milliLiter(s) (50 mL/Hr) IV Continuous <Continuous>  dextrose 5%. 1000 milliLiter(s) (100 mL/Hr) IV Continuous <Continuous>  dextrose 50% Injectable 12.5 Gram(s) IV Push once  dextrose 50% Injectable 25 Gram(s) IV Push once  dextrose 50% Injectable 25 Gram(s) IV Push once  fenofibrate Tablet 145 milliGRAM(s) Oral at bedtime  glucagon  Injectable 1 milliGRAM(s) IntraMuscular once  insulin glargine Injectable (LANTUS) 112 Unit(s) SubCutaneous at bedtime  insulin lispro (ADMELOG) corrective regimen sliding scale   SubCutaneous three times a day before meals  insulin lispro (ADMELOG) corrective regimen sliding scale   SubCutaneous at bedtime  insulin lispro Injectable (ADMELOG) 45 Unit(s) SubCutaneous three times a day before meals  isosorbide   mononitrate ER Tablet (IMDUR) 30 milliGRAM(s) Oral daily  metoprolol succinate  milliGRAM(s) Oral daily  ranolazine 500 milliGRAM(s) Oral two times a day      Allergies    penicillin (Hives)    Intolerances        SOCIAL HISTORY:    FAMILY HISTORY:  Family history of diabetes mellitus (Sibling)    FH: heart disease (Sibling, Father, Mother)        REVIEW OF SYSTEMS:    CONSTITUTIONAL: No weakness, fevers or chills  EYES/ENT: No visual changes;  No vertigo or throat pain   NECK: No pain or stiffness  RESPIRATORY: No cough, wheezing, hemoptysis; No shortness of breath  CARDIOVASCULAR: No c urrent chest pain or palpitations  GASTROINTESTINAL: No abdominal or epigastric pain. No nausea, vomiting, or hematemesis; No diarrhea or constipation. No melena or hematochezia.  GENITOURINARY: No dysuria, frequency or hematuria  NEUROLOGICAL: No numbness or weakness  SKIN: No itching, burning, rashes, or lesions   All other review of systems is negative unless indicated above.      Vital Signs Last 24 Hrs  T(C): 36.6 (05 Oct 2023 08:22), Max: 36.6 (05 Oct 2023 06:01)  T(F): 97.8 (05 Oct 2023 08:22), Max: 97.9 (05 Oct 2023 06:01)  HR: 71 (05 Oct 2023 08:22) (66 - 78)  BP: 124/79 (05 Oct 2023 08:22) (115/56 - 124/79)  BP(mean): --  RR: 18 (05 Oct 2023 08:22) (18 - 18)  SpO2: 99% (05 Oct 2023 08:22) (97% - 99%)    Parameters below as of 05 Oct 2023 08:22  Patient On (Oxygen Delivery Method): room air      PHYSICAL EXAM:    Constitutional: NAD   HEENT: , EOMI,  MMM  Neck: No LAD, No JVD  Respiratory: CTAB  Cardiovascular: S1 and S2, RRR  Gastrointestinal: obese  Extremities: No peripheral edema  Neurological: A/O x 3   : No Aaron  Skin: No rashes  Access: Not applicable        LABS:               138    |  108    |  37     ----------------------------<  176       05 Oct 2023 06:31  3.9     |  25     |  1.95     134    |  105    |  33     ----------------------------<  161       04 Oct 2023 06:27  4.1     |  26     |  2.11     133    |  101    |  29     ----------------------------<  327       03 Oct 2023 13:13  4.4     |  27     |  2.17     Ca    8.8        05 Oct 2023 06:31  Ca    9.3        04 Oct 2023 06:27      Mg     2.4       03 Oct 2023 13:13    TPro  7.9    /  Alb  3.6    /  TBili  0.6    /        03 Oct 2023 13:13  DBili  x      /  AST  41     /  ALT  65     /  AlkPhos  59                                 14.6   6.92  )-----------( 334      ( 03 Oct 2023 13:13 )             43.8         Urine Studies:  Protein/Creatinine Ratio Calculation: 0.6 Ratio (10.04.23 @ 14:00)     Urine Microscopic-Add On (NC) (08.02.22 @ 16:57)   Epithelial Cells: Negative  Bacteria: Negative  Hyaline Casts: Negative /LPF  White Blood Cell - Urine: 0-2  Red Blood Cell - Urine: 0-2 /HPF  Urinalysis Basic - ( 04 Oct 2023 06:27 )    ACC: [id number] EXAM:  US KIDNEYS AND BLADDER   ORDERED BY: BEBO SCOTT     PROCEDURE DATE:  10/04/2023          INTERPRETATION:  CLINICAL INFORMATION: Renal insufficiency.    COMPARISON: CT abdomen/pelvis August 2, 2022.    TECHNIQUE: Sonography ofthe kidneys and bladder.    FINDINGS:  Right kidney: 11.7 cm. No renal mass, hydronephrosis or calculi.    Left kidney: 11.7 cm. No renal mass, hydronephrosis or calculi.    Urinary bladder: Minimally distended, precluding assessment.    The prostateappears increased in size with volume of 34 cc.    IMPRESSION:  No evidence for bilateral hydronephrosis.          RADIOLOGY & ADDITIONAL STUDIES:

## 2023-10-05 NOTE — PROGRESS NOTE ADULT - ASSESSMENT
58M w/PMH HTN, HLD, T2DM, CKD 3, Obesity Class II, CAD s/p 8 stents, recent admit 9/29-30 for Rt chest pain, s/p PCI w/ LAD stent on 9/29, he presents Today, c/o Rt chest pain once again.  In ED, Na 133, Cr 2.1 (1.7 prior), Gluc 327, trop neg, cxr neg.     #CP, recent PCI, likely angina, now w echo w mod-severe AS  -trops neg  -echo w mod to severe AS  -continue asa, plavix, statin, BB, ranexa  -imdur added  -observe o/n, might need repeat LHC vs MARIBEL, f/u Polena recs  -f/u further cards recs    #CKD IIIB  -renal bladder u/s unremarkable  -IVF stopped  -ARB and HCTZ currently held  -f/u renal recs    #T2DM w/hyperglycemia  -a1c 9.8  -titrating insulin regimen, monitor BG    #DVT ppx- SCDs    Possible d/c 10/6 pending cards recs

## 2023-10-05 NOTE — PROGRESS NOTE ADULT - SUBJECTIVE AND OBJECTIVE BOX
CHIEF COMPLAINT: Patient is a 58y old  Male who presents with a chief complaint of chest pain (03 Oct 2023 19:09)    FROM H&P: 58M w/PMH HTN, HLD, T2DM, CKD 3, Obesity Class II, CAD s/p 8 stents, recent admit 9/29-30 for Rt chest pain, s/p PCI w/ LAD stent on 9/29, he presents Today, c/o Rt chest pain once again. This began last night, not as severe as first time, no radiation to neck or RUE as last time.  NO e/a factors, occurred at rest, waxing and waning. No associated symptoms.    -- In ED, Na 133, Cr 2.1 (1.7 prior), Gluc 327, trop neg, cxr neg.     10/4. Patient seen and examined this AM  Felt this pressure again last night  Remained in hospital, monitored on tele, no events  Troponin negative. EKG without ischemic changes    10/5. Still with episodes of chest pressure, walking/lifting a chair.    PAST MEDICAL & SURGICAL HISTORY:  Essential hypertension  Obstructive sleep apnea  Diabetes mellitus  HLD (hyperlipidemia)  CAD (coronary artery disease)  Pancreatitis  History of coronary artery stent placement Placed 9/12/18 and 9/29/2023 by Dr. Kaye  S/P cholecystectomy    SOCIAL HISTORY:  lives w/ wife, ind ADLs  denies smoking, rare etoh    FAMILY HISTORY:  Family history of diabetes mellitus (Sibling),  heart disease (Sibling, Father, Mother)    MEDICATIONS:  MEDICATIONS  (STANDING):  aspirin enteric coated 81 milliGRAM(s) Oral daily  atorvastatin 40 milliGRAM(s) Oral at bedtime  clopidogrel Tablet 75 milliGRAM(s) Oral daily  dextrose 5%. 1000 milliLiter(s) (50 mL/Hr) IV Continuous <Continuous>  dextrose 5%. 1000 milliLiter(s) (100 mL/Hr) IV Continuous <Continuous>  dextrose 50% Injectable 12.5 Gram(s) IV Push once  dextrose 50% Injectable 25 Gram(s) IV Push once  dextrose 50% Injectable 25 Gram(s) IV Push once  fenofibrate Tablet 145 milliGRAM(s) Oral at bedtime  glucagon  Injectable 1 milliGRAM(s) IntraMuscular once  insulin glargine Injectable (LANTUS) 112 Unit(s) SubCutaneous at bedtime  insulin lispro (ADMELOG) corrective regimen sliding scale   SubCutaneous at bedtime  insulin lispro (ADMELOG) corrective regimen sliding scale   SubCutaneous three times a day before meals  insulin lispro Injectable (ADMELOG) 45 Unit(s) SubCutaneous three times a day before meals  isosorbide   mononitrate ER Tablet (IMDUR) 30 milliGRAM(s) Oral daily  metoprolol succinate  milliGRAM(s) Oral daily  ranolazine 500 milliGRAM(s) Oral two times a day    MEDICATIONS  (PRN):  acetaminophen     Tablet .. 650 milliGRAM(s) Oral every 6 hours PRN Temp greater or equal to 38C (100.4F), Mild Pain (1 - 3)  dextrose Oral Gel 15 Gram(s) Oral once PRN Blood Glucose LESS THAN 70 milliGRAM(s)/deciliter  nitroglycerin     SubLingual 0.4 milliGRAM(s) SubLingual every 5 minutes PRN Chest Pain  ondansetron Injectable 4 milliGRAM(s) IV Push every 6 hours PRN Nausea      HOME MEDICATIONS:  atorvastatin 40 mg oral tablet: 1 tab(s) orally once a day (at bedtime) (03 Oct 2023 15:21)  D3 25 mcg (1000 intl units) oral tablet: 1 tab(s) orally once a day (03 Oct 2023 15:21)  Farxiga 10 mg oral tablet: 1 tab(s) orally once a day (03 Oct 2023 15:21)  fenofibrate 160 mg oral tablet: 1 tab(s) orally once a day (03 Oct 2023 15:21)  ferrous sulfate 325 mg (65 mg elemental iron) oral tablet: 1 tab(s) orally once a day (03 Oct 2023 15:21)  fosinopril 20 mg oral tablet: 1 tab(s) orally once a day (03 Oct 2023 15:21)  hydroCHLOROthiazide 25 mg oral tablet: 1 tab(s) orally once a day (03 Oct 2023 15:21)  Insulin Lispro KwikPen 100 units/mL injectable solution: 56 injectable 3 times a day before meals (03 Oct 2023 15:21)  magnesium oxide 400 mg oral tablet: 1 tab(s) orally once a day (03 Oct 2023 15:21)  ranolazine 500 mg oral tablet, extended release: 1 tab(s) orally 2 times a day (03 Oct 2023 15:21)  Toujeo Max SoloStar 300 units/mL subcutaneous solution: 140 subcutaneous once a day in the morning (03 Oct 2023 15:21)  Vitamin B Complex with C oral tablet: 1 tab(s) orally once a day (03 Oct 2023 15:19)    PHYSICAL EXAM:  T(C): 36.6 (05 Oct 2023 08:22), Max: 36.6 (05 Oct 2023 06:01)  T(F): 97.8 (05 Oct 2023 08:22), Max: 97.9 (05 Oct 2023 06:01)  HR: 71 (05 Oct 2023 08:22) (66 - 78)  BP: 124/79 (05 Oct 2023 08:22) (115/56 - 124/79)  RR: 18 (05 Oct 2023 08:22) (18 - 18)  SpO2: 99% (05 Oct 2023 08:22) (97% - 99%)    Parameters below as of 05 Oct 2023 08:22  Patient On (Oxygen Delivery Method): room air    Constitutional: NAD, awake and alert  HEENT: PERR, EOMI, Normal Hearing, MMM  Neck: Soft and supple, No LAD, No JVD  Respiratory: Breath sounds are clear bilaterally, No wheezing, rales or rhonchi  Cardiovascular: S1 and S2, regular rate and rhythm, no Murmurs, gallops or rubs  Gastrointestinal: Bowel Sounds present, soft, nontender, nondistended, no guarding, no rebound  Extremities: No peripheral edema  Vascular: 2+ peripheral pulses  Neurological: A/O x 3, no focal deficits  Musculoskeletal: 5/5 strength b/l upper and lower extremities  Skin: No rashes    =======================================    INTERPRETATION OF TELEMETRY: SR    ECG: SR, RBBB    ========================================    LABS:  10-05    138  |  108  |  37<H>  ----------------------------<  176<H>  3.9   |  25  |  1.95<H>    Ca    8.8      05 Oct 2023 06:31  Mg     2.4     10-03    TPro  7.9  /  Alb  3.6  /  TBili  0.6  /  DBili  x   /  AST  41<H>  /  ALT  65  /  AlkPhos  59  10-03    Troponin: 18.48 ng/L, Troponin: 16.72 ng/L, Troponin: 18.57 ng/L    BNP 61    CARDIAC TESTING:    10/2023: TTE: The mitral valve leaflets appear thickened. Trace mitral regurgitation is present. The aortic valve is not well visualized, but appears to be Significant fibrocalcific changes noted to the aortic valve leaflets with restriction in leaflet excursion. Peak and mean transaortic gradients are 50 and 27 mmHg respectively; VTI index 0.26 FIDEL 1.0 sq cm suggestive moderate to severe aortic stenosis. ( clinical correlation recommended ) The tricuspid valve leaflets are thin and pliable; valve motion is normal. Trace tricuspid valve regurgitation is present. The left ventricle is normal in size, wall motion and contractility as seen in limited views. Mild concentric left ventricular hypertrophy is present. Estimated left ventricular ejection fraction is 60-65 %. Grade I Diastolic Dysfunction is present with normal LAP. Normal appearing right ventricle structure and function.    2/2022: TTE: Mild concentric left ventricular hypertrophy is present. Estimated left ventricular ejection fraction is 65-70 %. A bicuspid aortic valve cannot be excluded. Significant fibrocalcific changes noted to the aortic valve leaflets with restriction in leaflet excursion. Peak and mean transaortic gradients are 34 and 21 mmHg respectively; this finding is consistent with mild to moderate aortic stenosis. Mild (1+) eccentric aortic regurgitation is present. The aortic root is mildly dilated . Fibrocalcific changes noted to the mitral valve leaflets with preserved leaflet excursion. Trace mitral regurgitation is present. The tricuspid valve leaflets are thin and pliable; valve motion is normal. Trace tricuspid valve regurgitation is present.      RADIOLOGY & ADDITIONAL STUDIES:    10/3/23: CXR: Clear

## 2023-10-05 NOTE — PROGRESS NOTE ADULT - TIME BILLING
Time spent  coordinating the patient's care. This includes reviewing documentation pertinent to this admission, results and imaging. Further tests, medications, and procedures have been ordered as indicated. Laboratory results and the plan of care were communicated to the patient and wife. Discussed care plan with consultants including cards. Supporting documentation was completed and added to the patient's chart.

## 2023-10-05 NOTE — PROGRESS NOTE ADULT - SUBJECTIVE AND OBJECTIVE BOX
HOSPITALIST ATTENDING PROGRESS NOTE    Chart and meds reviewed.  Patient seen and examined.    CC: CP    Subjective: Reports CANNON today, and some chest pressure when moving chair in room today.     All other systems reviewed and found to be negative with the exception of what has been described above.    MEDICATIONS  (STANDING):  aspirin enteric coated 81 milliGRAM(s) Oral daily  atorvastatin 40 milliGRAM(s) Oral at bedtime  clopidogrel Tablet 75 milliGRAM(s) Oral daily  dextrose 5%. 1000 milliLiter(s) (100 mL/Hr) IV Continuous <Continuous>  dextrose 5%. 1000 milliLiter(s) (50 mL/Hr) IV Continuous <Continuous>  dextrose 50% Injectable 25 Gram(s) IV Push once  dextrose 50% Injectable 12.5 Gram(s) IV Push once  dextrose 50% Injectable 25 Gram(s) IV Push once  fenofibrate Tablet 145 milliGRAM(s) Oral at bedtime  glucagon  Injectable 1 milliGRAM(s) IntraMuscular once  insulin glargine Injectable (LANTUS) 112 Unit(s) SubCutaneous at bedtime  insulin lispro (ADMELOG) corrective regimen sliding scale   SubCutaneous at bedtime  insulin lispro (ADMELOG) corrective regimen sliding scale   SubCutaneous three times a day before meals  insulin lispro Injectable (ADMELOG) 45 Unit(s) SubCutaneous three times a day before meals  isosorbide   mononitrate ER Tablet (IMDUR) 30 milliGRAM(s) Oral daily  metoprolol succinate  milliGRAM(s) Oral daily  ranolazine 500 milliGRAM(s) Oral two times a day    MEDICATIONS  (PRN):  acetaminophen     Tablet .. 650 milliGRAM(s) Oral every 6 hours PRN Temp greater or equal to 38C (100.4F), Mild Pain (1 - 3)  dextrose Oral Gel 15 Gram(s) Oral once PRN Blood Glucose LESS THAN 70 milliGRAM(s)/deciliter  nitroglycerin     SubLingual 0.4 milliGRAM(s) SubLingual every 5 minutes PRN Chest Pain  ondansetron Injectable 4 milliGRAM(s) IV Push every 6 hours PRN Nausea      VITALS:  T(F): 97.8 (10-05-23 @ 08:22), Max: 97.9 (10-05-23 @ 06:01)  HR: 71 (10-05-23 @ 08:22) (66 - 78)  BP: 124/79 (10-05-23 @ 08:22) (115/56 - 124/79)  RR: 18 (10-05-23 @ 08:22) (18 - 18)  SpO2: 99% (10-05-23 @ 08:22) (97% - 99%)  Wt(kg): --    I&O's Summary      CAPILLARY BLOOD GLUCOSE      POCT Blood Glucose.: 145 mg/dL (05 Oct 2023 12:43)  POCT Blood Glucose.: 171 mg/dL (05 Oct 2023 08:59)  POCT Blood Glucose.: 129 mg/dL (04 Oct 2023 20:46)  POCT Blood Glucose.: 128 mg/dL (04 Oct 2023 16:36)      PHYSICAL EXAM:  Gen: NAD  HEENT:  pupils equal and reactive, EOMI, no oropharyngeal lesions, erythema, exudates, oral thrush  NECK:   supple, no carotid bruits, no palpable lymph nodes, no thyromegaly  CV:  +S1, +S2, regular, no murmurs or rubs  RESP:   lungs clear to auscultation bilaterally, no wheezing, rales, rhonchi, good air entry bilaterally  BREAST:  not examined  GI:  abdomen soft, non-tender, non-distended, normal BS, no bruits, no abdominal masses, no palpable masses  RECTAL:  not examined  :  not examined  MSK:   normal muscle tone, no atrophy, no rigidity, no contractions  EXT:  no clubbing, no cyanosis, no edema, no calf pain, swelling or erythema  VASCULAR:  pulses equal and symmetric in the upper and lower extremities  NEURO:  AAOX3, no focal neurological deficits, follows all commands, able to move extremities spontaneously  SKIN:  no ulcers, lesions or rashes    LABS:        10-05    138  |  108  |  37<H>  ----------------------------<  176<H>  3.9   |  25  |  1.95<H>    Ca    8.8      05 Oct 2023 06:31              Urinalysis Basic - ( 05 Oct 2023 06:31 )    Color: x / Appearance: x / SG: x / pH: x  Gluc: 176 mg/dL / Ketone: x  / Bili: x / Urobili: x   Blood: x / Protein: x / Nitrite: x   Leuk Esterase: x / RBC: x / WBC x   Sq Epi: x / Non Sq Epi: x / Bacteria: x    CULTURES:  no new    Additional results/Imaging, I have personally reviewed:  CXR 10/3/23: no acute finding or change    CT chest noncon 10/3/23: No acute pathology. Left coronary artery stent    Renal bladder u/s 10/4/23: No evidence for bilateral hydronephrosis.    Echo 10/4/23: The mitral valve leaflets appear thickened. Trace mitral regurgitation is present. The aortic valve is not well visualized, but appears to be Significant fibrocalcific changes noted to the aortic valve leaflets with restriction in leaflet excursion. Peak and mean transaortic gradients are 50 and 27 mmHg respectively; VTI index 0.26 FIDEL 1.0 sq cm suggestive moderate to severe aortic stenosis. ( clinical correlation recommended ) The tricuspid valve leaflets are thin and pliable; valve motion is normal. Trace tricuspid valve regurgitation is present. The left ventricle is normal in size, wall motion and contractility as seen in limited views. Mild concentric left ventricular hypertrophy is present. Estimated left ventricular ejection fraction is 60-65 %. Grade I Diastolic Dysfunction is present with normal LAP. Normal appearing right ventricle structure and function.      Telemetry, personally reviewed:  10/4/23; sinus 70s, 1st degree AVB, RBBB

## 2023-10-06 ENCOUNTER — TRANSCRIPTION ENCOUNTER (OUTPATIENT)
Age: 58
End: 2023-10-06

## 2023-10-06 VITALS
TEMPERATURE: 98 F | RESPIRATION RATE: 18 BRPM | SYSTOLIC BLOOD PRESSURE: 127 MMHG | DIASTOLIC BLOOD PRESSURE: 79 MMHG | OXYGEN SATURATION: 98 % | HEART RATE: 83 BPM

## 2023-10-06 LAB
ANION GAP SERPL CALC-SCNC: 4 MMOL/L — LOW (ref 5–17)
BUN SERPL-MCNC: 33 MG/DL — HIGH (ref 7–23)
CALCIUM SERPL-MCNC: 8.9 MG/DL — SIGNIFICANT CHANGE UP (ref 8.5–10.1)
CHLORIDE SERPL-SCNC: 109 MMOL/L — HIGH (ref 96–108)
CO2 SERPL-SCNC: 25 MMOL/L — SIGNIFICANT CHANGE UP (ref 22–31)
CREAT SERPL-MCNC: 1.7 MG/DL — HIGH (ref 0.5–1.3)
EGFR: 47 ML/MIN/1.73M2 — LOW
GLUCOSE BLDC GLUCOMTR-MCNC: 121 MG/DL — HIGH (ref 70–99)
GLUCOSE BLDC GLUCOMTR-MCNC: 189 MG/DL — HIGH (ref 70–99)
GLUCOSE BLDC GLUCOMTR-MCNC: 289 MG/DL — HIGH (ref 70–99)
GLUCOSE SERPL-MCNC: 134 MG/DL — HIGH (ref 70–99)
POTASSIUM SERPL-MCNC: 4.3 MMOL/L — SIGNIFICANT CHANGE UP (ref 3.5–5.3)
POTASSIUM SERPL-SCNC: 4.3 MMOL/L — SIGNIFICANT CHANGE UP (ref 3.5–5.3)
SODIUM SERPL-SCNC: 138 MMOL/L — SIGNIFICANT CHANGE UP (ref 135–145)

## 2023-10-06 PROCEDURE — 99239 HOSP IP/OBS DSCHRG MGMT >30: CPT

## 2023-10-06 RX ORDER — INSULIN LISPRO 100/ML
56 VIAL (ML) SUBCUTANEOUS
Refills: 0 | DISCHARGE

## 2023-10-06 RX ORDER — CHOLECALCIFEROL (VITAMIN D3) 125 MCG
1 CAPSULE ORAL
Refills: 0 | DISCHARGE

## 2023-10-06 RX ORDER — INSULIN LISPRO 100/ML
45 VIAL (ML) SUBCUTANEOUS
Qty: 0 | Refills: 0 | DISCHARGE
Start: 2023-10-06

## 2023-10-06 RX ORDER — HYDROCHLOROTHIAZIDE 25 MG
1 TABLET ORAL
Refills: 0 | DISCHARGE

## 2023-10-06 RX ORDER — CLOPIDOGREL BISULFATE 75 MG/1
1 TABLET, FILM COATED ORAL
Qty: 0 | Refills: 0 | DISCHARGE
Start: 2023-10-06

## 2023-10-06 RX ORDER — MAGNESIUM OXIDE 400 MG ORAL TABLET 241.3 MG
1 TABLET ORAL
Qty: 0 | Refills: 0 | DISCHARGE

## 2023-10-06 RX ORDER — ISOSORBIDE MONONITRATE 60 MG/1
1 TABLET, EXTENDED RELEASE ORAL
Qty: 0 | Refills: 0 | DISCHARGE
Start: 2023-10-06

## 2023-10-06 RX ORDER — SODIUM CHLORIDE 9 MG/ML
1000 INJECTION INTRAMUSCULAR; INTRAVENOUS; SUBCUTANEOUS
Refills: 0 | Status: DISCONTINUED | OUTPATIENT
Start: 2023-10-06 | End: 2023-10-07

## 2023-10-06 RX ORDER — ASPIRIN/CALCIUM CARB/MAGNESIUM 324 MG
1 TABLET ORAL
Qty: 0 | Refills: 0 | DISCHARGE
Start: 2023-10-06

## 2023-10-06 RX ORDER — INSULIN GLARGINE 100 [IU]/ML
140 INJECTION, SOLUTION SUBCUTANEOUS
Refills: 0 | DISCHARGE

## 2023-10-06 RX ORDER — INSULIN GLARGINE 100 [IU]/ML
60 INJECTION, SOLUTION SUBCUTANEOUS
Qty: 0 | Refills: 0 | DISCHARGE
Start: 2023-10-06

## 2023-10-06 RX ORDER — DAPAGLIFLOZIN 10 MG/1
1 TABLET, FILM COATED ORAL
Refills: 0 | DISCHARGE

## 2023-10-06 RX ORDER — FERROUS SULFATE 325(65) MG
1 TABLET ORAL
Qty: 0 | Refills: 0 | DISCHARGE

## 2023-10-06 RX ORDER — FOSINOPRIL SODIUM 10 MG/1
1 TABLET ORAL
Qty: 0 | Refills: 0 | DISCHARGE

## 2023-10-06 RX ORDER — SODIUM CHLORIDE 9 MG/ML
3 INJECTION INTRAMUSCULAR; INTRAVENOUS; SUBCUTANEOUS EVERY 8 HOURS
Refills: 0 | Status: DISCONTINUED | OUTPATIENT
Start: 2023-10-07 | End: 2023-10-13

## 2023-10-06 RX ORDER — METOPROLOL TARTRATE 50 MG
1 TABLET ORAL
Qty: 0 | Refills: 0 | DISCHARGE
Start: 2023-10-06

## 2023-10-06 RX ADMIN — RANOLAZINE 500 MILLIGRAM(S): 500 TABLET, FILM COATED, EXTENDED RELEASE ORAL at 21:20

## 2023-10-06 RX ADMIN — Medication 6: at 18:03

## 2023-10-06 RX ADMIN — INSULIN GLARGINE 60 UNIT(S): 100 INJECTION, SOLUTION SUBCUTANEOUS at 21:21

## 2023-10-06 RX ADMIN — Medication 45 UNIT(S): at 18:04

## 2023-10-06 RX ADMIN — ATORVASTATIN CALCIUM 40 MILLIGRAM(S): 80 TABLET, FILM COATED ORAL at 21:21

## 2023-10-06 RX ADMIN — CLOPIDOGREL BISULFATE 75 MILLIGRAM(S): 75 TABLET, FILM COATED ORAL at 14:04

## 2023-10-06 RX ADMIN — RANOLAZINE 500 MILLIGRAM(S): 500 TABLET, FILM COATED, EXTENDED RELEASE ORAL at 14:03

## 2023-10-06 RX ADMIN — Medication 145 MILLIGRAM(S): at 21:20

## 2023-10-06 RX ADMIN — ISOSORBIDE MONONITRATE 30 MILLIGRAM(S): 60 TABLET, EXTENDED RELEASE ORAL at 14:02

## 2023-10-06 RX ADMIN — Medication 81 MILLIGRAM(S): at 14:02

## 2023-10-06 RX ADMIN — Medication 200 MILLIGRAM(S): at 14:04

## 2023-10-06 NOTE — DISCHARGE NOTE PROVIDER - NSDCPNSUBOBJ_GEN_ALL_CORE
VITALS:  T(F): 98.3 (10-06-23 @ 09:54), Max: 98.3 (10-06-23 @ 09:54)  HR: 75 (10-06-23 @ 13:51) (63 - 75)  BP: 146/78 (10-06-23 @ 13:51) (118/75 - 148/83)  RR: 18 (10-06-23 @ 12:45) (16 - 22)  SpO2: 98% (10-06-23 @ 12:45) (97% - 98%)    PHYSICAL EXAM:  Gen: NAD  HEENT:  pupils equal and reactive, EOMI, no oropharyngeal lesions, erythema, exudates, oral thrush  NECK:   supple, no carotid bruits, no palpable lymph nodes, no thyromegaly  CV:  +S1, +S2, regular, no murmurs or rubs  RESP:   lungs clear to auscultation bilaterally, no wheezing, rales, rhonchi, good air entry bilaterally  BREAST:  not examined  GI:  abdomen soft, non-tender, non-distended, normal BS, no bruits, no abdominal masses, no palpable masses  RECTAL:  not examined  :  not examined  MSK:   normal muscle tone, no atrophy, no rigidity, no contractions  EXT:  no clubbing, no cyanosis, no edema, no calf pain, swelling or erythema  VASCULAR:  pulses equal and symmetric in the upper and lower extremities  NEURO:  AAOX3, no focal neurological deficits, follows all commands, able to move extremities spontaneously  SKIN:  no ulcers, lesions or rashes    CXR 10/3/23: no acute finding or change    CT chest noncon 10/3/23: No acute pathology. Left coronary artery stent    Renal bladder u/s 10/4/23: No evidence for bilateral hydronephrosis.    Echo 10/4/23: The mitral valve leaflets appear thickened. Trace mitral regurgitation is present. The aortic valve is not well visualized, but appears to be Significant fibrocalcific changes noted to the aortic valve leaflets with restriction in leaflet excursion. Peak and mean transaortic gradients are 50 and 27 mmHg respectively; VTI index 0.26 FIDEL 1.0 sq cm suggestive moderate to severe aortic stenosis. ( clinical correlation recommended ) The tricuspid valve leaflets are thin and pliable; valve motion is normal. Trace tricuspid valve regurgitation is present. The left ventricle is normal in size, wall motion and contractility as seen in limited views. Mild concentric left ventricular hypertrophy is present. Estimated left ventricular ejection fraction is 60-65 %. Grade I Diastolic Dysfunction is present with normal LAP. Normal appearing right ventricle structure and function.

## 2023-10-06 NOTE — DISCHARGE NOTE PROVIDER - HOSPITAL COURSE
CC: CP  HPI and Hospital Course: 58M w/PMH HTN, HLD, T2DM, CKD 3, Obesity Class II, CAD s/p 8 stents, recent admit 9/29-30 for Rt chest pain, s/p PCI w/ LAD stent on 9/29, he presents Today, c/o Rt chest pain once again.  In ED, Na 133, Cr 2.1 (1.7 prior), Gluc 327, trop neg, cxr neg.     #CP, recent PCI, [facility] on 10/6/23 showing patent stents, has bicuspid aortic valve, severe AS  -trops neg  -echo w mod to severe AS  -continue asa, plavix, statin, BB, ranexa  -imdur added  -transfer for severe AS surgical eval    #CKD IIIB  -renal bladder u/s unremarkable  -IVF stopped  -ARB and HCTZ currently held  -f/u renal recs    #T2DM w/hyperglycemia  -a1c 9.8  -titrating insulin regimen, monitor BG    #DVT ppx- SCDs    Transfer to [facility].  Signout given VIOLETA Montes.    I have spent 34 min on day of d/c coordinating care.

## 2023-10-06 NOTE — PROGRESS NOTE ADULT - SUBJECTIVE AND OBJECTIVE BOX
58M w/PMH HTN, HLD, T2DM x 20 years, suspected CKD 3B, Obesity Class II, CAD s/p 8 stents, recent admit 9/29-30 for Rt chest pain, s/p PCI w/ LAD stent on 9/29, he presents with Rt chest pain once again, renal evaluation of CKD.  pt parents -  Mom and dad with CKD, well known to us. Father was ESRD on HD and passed away some years ago.   Pt was advised to see renal and he is aware of CKD but had not done so    today    pt feeling ok    no sob or cp  awaiting cath    eval AS       PAST MEDICAL & SURGICAL HISTORY:  Essential hypertension    Obstructive sleep apnea    Diabetes mellitus    HLD (hyperlipidemia)    CAD (coronary artery disease)      Pancreatitis      History of coronary artery stent placement  Placed 9/12/18 by Dr. Kaye      S/P cholecystectomy    Home Medications:  atorvastatin 40 mg oral tablet: 1 tab(s) orally once a day (at bedtime) (03 Oct 2023 15:21)  D3 25 mcg (1000 intl units) oral tablet: 1 tab(s) orally once a day (03 Oct 2023 15:21)  Farxiga 10 mg oral tablet: 1 tab(s) orally once a day (03 Oct 2023 15:21)  fenofibrate 160 mg oral tablet: 1 tab(s) orally once a day (03 Oct 2023 15:21)  ferrous sulfate 325 mg (65 mg elemental iron) oral tablet: 1 tab(s) orally once a day (03 Oct 2023 15:21)  fosinopril 20 mg oral tablet: 1 tab(s) orally once a day (03 Oct 2023 15:21)  hydroCHLOROthiazide 25 mg oral tablet: 1 tab(s) orally once a day (03 Oct 2023 15:21)  Insulin Lispro KwikPen 100 units/mL injectable solution: 56 injectable 3 times a day before meals (03 Oct 2023 15:21)  magnesium oxide 400 mg oral tablet: 1 tab(s) orally once a day (03 Oct 2023 15:21)  ranolazine 500 mg oral tablet, extended release: 1 tab(s) orally 2 times a day (03 Oct 2023 15:21)  Toujeo Max SoloStar 300 units/mL subcutaneous solution: 140 subcutaneous once a day in the morning (03 Oct 2023 15:21)  Vitamin B Complex with C oral tablet: 1 tab(s) orally once a day (03 Oct 2023 15:19)    MEDICATIONS  (STANDING):  aspirin enteric coated 81 milliGRAM(s) Oral daily  atorvastatin 40 milliGRAM(s) Oral at bedtime  clopidogrel Tablet 75 milliGRAM(s) Oral daily  dextrose 5%. 1000 milliLiter(s) (50 mL/Hr) IV Continuous <Continuous>  dextrose 5%. 1000 milliLiter(s) (100 mL/Hr) IV Continuous <Continuous>  dextrose 50% Injectable 12.5 Gram(s) IV Push once  dextrose 50% Injectable 25 Gram(s) IV Push once  dextrose 50% Injectable 25 Gram(s) IV Push once  fenofibrate Tablet 145 milliGRAM(s) Oral at bedtime  glucagon  Injectable 1 milliGRAM(s) IntraMuscular once  insulin glargine Injectable (LANTUS) 112 Unit(s) SubCutaneous at bedtime  insulin lispro (ADMELOG) corrective regimen sliding scale   SubCutaneous three times a day before meals  insulin lispro (ADMELOG) corrective regimen sliding scale   SubCutaneous at bedtime  insulin lispro Injectable (ADMELOG) 45 Unit(s) SubCutaneous three times a day before meals  isosorbide   mononitrate ER Tablet (IMDUR) 30 milliGRAM(s) Oral daily  metoprolol succinate  milliGRAM(s) Oral daily  ranolazine 500 milliGRAM(s) Oral two times a day      Allergies    penicillin (Hives)    Intolerances        SOCIAL HISTORY:    FAMILY HISTORY:  Family history of diabetes mellitus (Sibling)    FH: heart disease (Sibling, Father, Mother)        REVIEW OF SYSTEMS:    CONSTITUTIONAL: No weakness, fevers or chills  EYES/ENT: No visual changes;  No vertigo or throat pain   NECK: No pain or stiffness  RESPIRATORY: No cough, wheezing, hemoptysis; No shortness of breath  CARDIOVASCULAR: No c urrent chest pain or palpitations  GASTROINTESTINAL: No abdominal or epigastric pain. No nausea, vomiting, or hematemesis; No diarrhea or constipation. No melena or hematochezia.  GENITOURINARY: No dysuria, frequency or hematuria  NEUROLOGICAL: No numbness or weakness  SKIN: No itching, burning, rashes, or lesions   All other review of systems is negative unless indicated above.      Vital Signs Last 24 Hrs  T(C): 36.6 (05 Oct 2023 08:22), Max: 36.6 (05 Oct 2023 06:01)  T(F): 97.8 (05 Oct 2023 08:22), Max: 97.9 (05 Oct 2023 06:01)  HR: 71 (05 Oct 2023 08:22) (66 - 78)  BP: 124/79 (05 Oct 2023 08:22) (115/56 - 124/79)  BP(mean): --  RR: 18 (05 Oct 2023 08:22) (18 - 18)  SpO2: 99% (05 Oct 2023 08:22) (97% - 99%)    Parameters below as of 05 Oct 2023 08:22  Patient On (Oxygen Delivery Method): room air      PHYSICAL EXAM:    Constitutional: NAD   HEENT: , EOMI,  MMM  Neck: No LAD, No JVD  Respiratory: CTAB  Cardiovascular: S1 and S2, RRR  Gastrointestinal: obese  Extremities: No peripheral edema  Neurological: A/O x 3   : No Aaron  Skin: No rashes  Access: Not applicable        LABS:               138    |  108    |  37     ----------------------------<  176       05 Oct 2023 06:31  3.9     |  25     |  1.95     134    |  105    |  33     ----------------------------<  161       04 Oct 2023 06:27  4.1     |  26     |  2.11     133    |  101    |  29     ----------------------------<  327       03 Oct 2023 13:13  4.4     |  27     |  2.17     Ca    8.8        05 Oct 2023 06:31  Ca    9.3        04 Oct 2023 06:27      Mg     2.4       03 Oct 2023 13:13    TPro  7.9    /  Alb  3.6    /  TBili  0.6    /        03 Oct 2023 13:13  DBili  x      /  AST  41     /  ALT  65     /  AlkPhos  59                                 14.6   6.92  )-----------( 334      ( 03 Oct 2023 13:13 )             43.8         Urine Studies:  Protein/Creatinine Ratio Calculation: 0.6 Ratio (10.04.23 @ 14:00)     Urine Microscopic-Add On (NC) (08.02.22 @ 16:57)   Epithelial Cells: Negative  Bacteria: Negative  Hyaline Casts: Negative /LPF  White Blood Cell - Urine: 0-2  Red Blood Cell - Urine: 0-2 /HPF  Urinalysis Basic - ( 04 Oct 2023 06:27 )    ACC: [id number] EXAM:  US KIDNEYS AND BLADDER   ORDERED BY: BEBO SCOTT     PROCEDURE DATE:  10/04/2023          INTERPRETATION:  CLINICAL INFORMATION: Renal insufficiency.    COMPARISON: CT abdomen/pelvis August 2, 2022.    TECHNIQUE: Sonography ofthe kidneys and bladder.    FINDINGS:  Right kidney: 11.7 cm. No renal mass, hydronephrosis or calculi.    Left kidney: 11.7 cm. No renal mass, hydronephrosis or calculi.    Urinary bladder: Minimally distended, precluding assessment.    The prostateappears increased in size with volume of 34 cc.    IMPRESSION:  No evidence for bilateral hydronephrosis.          RADIOLOGY & ADDITIONAL STUDIES:

## 2023-10-06 NOTE — DISCHARGE NOTE PROVIDER - NSDCCPCAREPLAN_GEN_ALL_CORE_FT
PRINCIPAL DISCHARGE DIAGNOSIS  Diagnosis: Severe aortic stenosis  Assessment and Plan of Treatment: Transfer for further eval.

## 2023-10-06 NOTE — PROGRESS NOTE ADULT - ASSESSMENT
58M w/PMH HTN, HLD, T2DM x 20 years, suspected CKD 3B, Obesity Class II, CAD s/p 8 stents, recent admit 9/29-30 for Rt chest pain, s/p PCI w/ LAD stent on 9/29, he presents with Rt chest pain once again, renal evaluation of CKD. Mom and dad with CKD, known to us. Dad was ESRD and passed away years ago. He himself has not seen renal, he is aware of CKD    CKD IIIB suspected sec to DM/HTN    unknown baseline - obtain outpatient labs but suspect Cr near his baseline now    pending labs today    start IVF in ligh of planned LHC for AS eval    -Urine protein 600 mg ( pt on Fosinopril and Farxiga outpatient ) can resume this and monitor   -No nsaids/contrast    needs CKD surveillance and outpatient follow up advised     Chest pain w recent cath/stents   ++ severe AS - await cardiology options on this -   pt advised on renal risks of contrast     Will follow with you    Dr Jon covering weekend

## 2023-10-06 NOTE — PROGRESS NOTE ADULT - SUBJECTIVE AND OBJECTIVE BOX
{\rtf1\tnbtzj12575\ansi\hfjvqkk6072\ftnbj\uc1\deff0  {\fonttbl{\f0 \fnil Segoe UI;}{\f1 \fnil \fcharset0 Segoe UI;}{\f2 \fnil Times New Riaz;}}  {\colortbl ;\upq642\czawl506\vyhv170 ;\red0\green0\blue0 ;\red0\green0\gtru734 ;\red0\green0\blue0 ;}  {\stylesheet{\f0\fs20 Normal;}{\cs1 Default Paragraph Font;}{\cs2\f0\fs16 Line Number;}{\cs3\f2\fs24\ul\cf3 Hyperlink;}}  {\*\revtbl{Unknown;}}  \ohkmgm53531\tbjndz16012\nqgsg0998\cgbot7972\gdnmj7161\dkiqf2774\omasval320\vwjhtfp545\nogrowautofit\cpkwwu882\formshade\nofeaturethrottle1\dntblnsbdb\fet4\aendnotes\aftnnrlc\pgbrdrhead\pgbrdrfoot  \sectd\nprpun10283\yaogob32135\guttersxn0\onlnfiky7270\dflnypyk3735\lofibist1454\sssauimr9099\fjiyvjc808\aerpthp180\sbkpage\pgncont\pgndec  \plain\plain\f0\fs24\pard\plain\f0\fs24\plain\f0\fs20\qutd4619\hich\f0\dbch\f0\loch\f0\fs20  Department of Cardiology\par                                                               Division of Interventional Cardiology\par                                                               [facility]\par                                                                            [address] \par                                                                             [place] [address]\par                                                                                 [phone]         \par  \par  \par  HPI: 58M w/PMH HTN, HLD, T2DM, CKD 3, Obesity Class II, CAD s/p 8 stents, recent admit 9/29-30 for Rt chest pain, s/p PCI w/ LAD stent on 9/29, he presents Today, c/o Rt chest pain once again. This began last night, not as severe as first time, no radiation   to neck or RUE as last time.  NO e/a factors, occurred at rest, waxing and waning. No associated symptoms.  \par  \par  In ED, Na 133, Cr 2.1 (1.7 prior), Gluc 327, trop neg, cxr neg. \par  \par  -plan for cardiac cath for ischemic work up\par  -Consent obtained for cardiac catheterization w/ coronary angiogram and possible stent placement, with possible sedation and analgia. Pt is competent, has capacity, and understands risks and benefits of procedure. Risks and benefits discussed. Risk discussed   included, but not limited to MI, stroke, mortality, major bleeding, arrythmia, or infection. All questions answered \par  -LEIF bolus excluded maintenance fluid infusing \par  \par  ASA class: II\par  Creatinine: 1.95\par  \ql\plain\f0\fs24\plain\f0\fs20\fsrx0610\hich\f0\dbch\f0\loch\f0\fs20 GFR: 40\par  Bleeding  Risk score:0.9%\par  Gordy score: 1 point.\plain\f1\fs20\ayek9527\hich\f1\dbch\f1\loch\f1\cf2\fs20\strike\pard\plain\f0\fs24\plain\f1\fs20\frve2362\hich\f1\dbch\f1\loch\f1\cf2\fs20\strike\plain\f1\fs20\dzat2334\hich\f1\dbch\f1\loch\f1\cf2\fs20\plain\f0\fs20\jezv4677\hich\f0\dbch\f0\loch\f0\fs20\par  \par  Vital Signs\par  Vital Signs Last 24 Hrs\par  T(C): 36.8 (06 Oct 2023 09:54), Max: 36.8 (06 Oct 2023 09:54)\par  T(F): 98.3 (06 Oct 2023 09:54), Max: 98.3 (06 Oct 2023 09:54)\par  HR: 71 (06 Oct 2023 09:54) (67 - 71)\par  BP: 118/75 (06 Oct 2023 09:54) (118/75 - 143/84)\par  BP(mean): --\par  RR: 22 (06 Oct 2023 09:54) (18 - 22)\par  SpO2: 98% (06 Oct 2023 09:54) (97% - 98%)\par  \par  Parameters below as of 06 Oct 2023 09:54\par  Patient On (Oxygen Delivery Method): room air\par  \par  \par  \par  \par  Labs:\par  \par  10-05\par  \par  138  |  108  |  37<H>\par  ----------------------------<  176<H>\par  3.9   |  25  |  1.95<H>\par  \par  Ca    8.8      05 Oct 2023 06:31\par  \par  \par  \par  \par  \par  \par  MEDICATIONS  (STANDING):\par  aspirin enteric coated 81 milliGRAM(s) Oral daily\par  atorvastatin 40 milliGRAM(s) Oral at bedtime\par  clopidogrel Tablet 75 milliGRAM(s) Oral daily\par  dextrose 5%. 1000 milliLiter(s) (50 mL/Hr) IV Continuous <Continuous>\par  dextrose 5%. 1000 milliLiter(s) (100 mL/Hr) IV Continuous <Continuous>\par  dextrose 50% Injectable 12.5 Gram(s) IV Push once\par  dextrose 50% Injectable 25 Gram(s) IV Push once\par  dextrose 50% Injectable 25 Gram(s) IV Push once\par  fenofibrate Tablet 145 milliGRAM(s) Oral at bedtime\par  glucagon  Injectable 1 milliGRAM(s) IntraMuscular once\par  insulin glargine Injectable (LANTUS) 60 Unit(s) SubCutaneous at bedtime\par  insulin lispro (ADMELOG) corrective regimen sliding scale   SubCutaneous three times a day before meals\par  insulin lispro (ADMELOG) corrective regimen sliding scale   SubCutaneous at bedtime\par  insulin lispro Injectable (ADMELOG) 45 Unit(s) SubCutaneous three times a day before meals\par  isosorbide   mononitrate ER Tablet (IMDUR) 30 milliGRAM(s) Oral daily\par  metoprolol succinate  milliGRAM(s) Oral daily\par  ranolazine 500 milliGRAM(s) Oral two times a day\par  sodium chloride 0.9%. 1000 milliLiter(s) (50 mL/Hr) IV Continuous <Continuous>\par  \par  \par  s/p LHC :\par  Pt denies chest pain/SOB/palpitations post cath.\par  \par  \par  PHYSICAL EXAM\par  GENERAL: NAD, AAOx3\par  CHEST/LUNG: Clear to auscultation bilaterally; No wheeze\par  HEART: s1 s2 Regular rate and rhythm; No murmurs, rubs, or gallops\par  ABDOMEN: Soft, Nontender, Nondistended; Bowel sounds present X 4 quadrants \par  EXTREMITIES:  2+ Peripheral Pulses, No clubbing, cyanosis, or edema\par  Psych: normal affect and behavior, calm and cooperative \par  PROCEDURE SITE: ----- ,Site is without hematoma or bleeding. Sensation and ZENON intact. Distal pulses palpable 2+, capillary refill < 2 seconds. Patient denies pain, numbness, tingling, CP or SOB.  \par  \par  EKG\par  \par  PROCEDURE RESULTS\par  \par  \par  \par  \par  PLAN:\par  #CAD\par  \par  \par  \par  \ql\plain\f0\fs24\plain\f0\fs20\fhbk3734\hich\f0\dbch\f0\loch\f0\fs20\par  }    Department of Cardiology                                                               Division of Interventional Cardiology                                                               [facility] /[address] [address]                                                                                 [phone]               HPI: 58M w/PMH HTN, HLD, T2DM, CKD 3, Obesity Class II, CAD s/p 8 stents, recent admit 9/29-30 for Rt chest pain, s/p PCI w/ LAD stent on 9/29, he presents Today, c/o Rt chest pain once again. This began last night, not as severe as first time, no radiation to neck or RUE as last time.  NO e/a factors, occurred at rest, waxing and waning. No associated symptoms.      In ED, Na 133, Cr 2.1 (1.7 prior), Gluc 327, trop neg, cxr neg.     -plan for cardiac cath for ischemic work up  -Consent obtained for cardiac catheterization w/ coronary angiogram and possible stent placement, with possible sedation and analgia. Pt is competent, has capacity, and understands risks and benefits of procedure. Risks and benefits discussed. Risk discussed included, but not limited to MI, stroke, mortality, major bleeding, arrythmia, or infection. All questions answered   -LEIF bolus excluded maintenance fluid infusing     ASA class: II  Creatinine: 1.95  GFR: 40  Bleeding  Risk score:0.9%  Gordy score: 1 point.    Vital Signs  Vital Signs Last 24 Hrs  T(C): 36.8 (06 Oct 2023 09:54), Max: 36.8 (06 Oct 2023 09:54)  T(F): 98.3 (06 Oct 2023 09:54), Max: 98.3 (06 Oct 2023 09:54)  HR: 71 (06 Oct 2023 09:54) (67 - 71)  BP: 118/75 (06 Oct 2023 09:54) (118/75 - 143/84)  BP(mean): --  RR: 22 (06 Oct 2023 09:54) (18 - 22)  SpO2: 98% (06 Oct 2023 09:54) (97% - 98%)    Parameters below as of 06 Oct 2023 09:54  Patient On (Oxygen Delivery Method): room air          Labs:    10-05    138  |  108  |  37<H>  ----------------------------<  176<H>  3.9   |  25  |  1.95<H>    Ca    8.8      05 Oct 2023 06:31              MEDICATIONS  (STANDING):  aspirin enteric coated 81 milliGRAM(s) Oral daily  atorvastatin 40 milliGRAM(s) Oral at bedtime  clopidogrel Tablet 75 milliGRAM(s) Oral daily  dextrose 5%. 1000 milliLiter(s) (50 mL/Hr) IV Continuous <Continuous>  dextrose 5%. 1000 milliLiter(s) (100 mL/Hr) IV Continuous <Continuous>  dextrose 50% Injectable 12.5 Gram(s) IV Push once  dextrose 50% Injectable 25 Gram(s) IV Push once  dextrose 50% Injectable 25 Gram(s) IV Push once  fenofibrate Tablet 145 milliGRAM(s) Oral at bedtime  glucagon  Injectable 1 milliGRAM(s) IntraMuscular once  insulin glargine Injectable (LANTUS) 60 Unit(s) SubCutaneous at bedtime  insulin lispro (ADMELOG) corrective regimen sliding scale   SubCutaneous three times a day before meals  insulin lispro (ADMELOG) corrective regimen sliding scale   SubCutaneous at bedtime  insulin lispro Injectable (ADMELOG) 45 Unit(s) SubCutaneous three times a day before meals  isosorbide   mononitrate ER Tablet (IMDUR) 30 milliGRAM(s) Oral daily  metoprolol succinate  milliGRAM(s) Oral daily  ranolazine 500 milliGRAM(s) Oral two times a day  sodium chloride 0.9%. 1000 milliLiter(s) (50 mL/Hr) IV Continuous <Continuous>      s/p LHC : revealing patent stents no new disease, unable to cross valve on RHC     Pt denies chest pain/SOB/palpitations post cath.      PHYSICAL EXAM  GENERAL: NAD, AAOx3  CHEST/LUNG: Clear to auscultation bilaterally; No wheeze  HEART: s1 s2 Regular rate and rhythm; No murmurs, rubs, or gallops  ABDOMEN: Soft, Nontender, Nondistended; Bowel sounds present X 4 quadrants   EXTREMITIES:  2+ Peripheral Pulses, No clubbing, cyanosis, or edema  Psych: normal affect and behavior, calm and cooperative   PROCEDURE SITE: RFA accessed, sheath was removed by NP. Site is without hematoma or bleeding. Sensation and ZENON intact. Distal pulses palpable 2+, capillary refill < 2 seconds. Patient denies pain, numbness, tingling, CP or SOB.          PROCEDURE RESULTS  full report to follow           PLAN:  #CAD  -Return to inpatient room  -VS, diet, activity as per post cath orders  - IVF per LEIF protocol - maintenance fluids  -Encourage PO fluids  -Continue current medications  -Post cath instructions reviewed, post sedation instructions reviewed; patient verbalizes and understands instructions  -Discussed therapeutic lifestyle changes to reduce risk factors such as following a cardiac diet, weight loss, maintaining a healthy weight, exercise, smoking cessation, medication compliance, and regular follow-up  with PCP/Cardioloigst  -recommend cardiac MRI for further assess   -Plan of care discussed with patient, RN and Dr. Kaye   - rest of care per primary and general cardiology

## 2023-10-06 NOTE — DISCHARGE NOTE NURSING/CASE MANAGEMENT/SOCIAL WORK - PATIENT PORTAL LINK FT
You can access the FollowMyHealth Patient Portal offered by [facility] by registering at the following website: [email or web address]. By joining [employer]’s FollowMyHealth portal, you will also be able to view your health information using other applications (apps) compatible with our system.

## 2023-10-06 NOTE — DISCHARGE NOTE NURSING/CASE MANAGEMENT/SOCIAL WORK - NSDCVIVACCINE_GEN_ALL_CORE_FT
influenza, injectable, quadrivalent, preservative free; 12-Oct-2017 15:08; Kim Samano (RN); [employer]; [id number]; IntraMuscular; Deltoid Left.; 0.5 milliLiter(s); VIS (VIS Published: 07-Aug-2015, VIS Presented: 12-Oct-2017);   influenza, injectable, quadrivalent, preservative free; 13-Sep-2018 12:04; Eliane Otero (RN); [employer]; [id number] (Exp. Date: 30-Jun-2019); IntraMuscular; Deltoid Left.; 0.5 milliLiter(s); VIS (VIS Published: 07-Aug-2015, VIS Presented: 13-Sep-2018);   influenza, injectable, quadrivalent, preservative free; 03-Dec-2019 13:24; Benita Agosto (RN); [employer]; [id number] (Exp. Date: 30-Jun-2020); IntraMuscular; Deltoid Left.; 0.5 milliLiter(s); VIS (VIS Published: 15-Aug-2019, VIS Presented: 03-Dec-2019);

## 2023-10-06 NOTE — DISCHARGE NOTE PROVIDER - CARE PROVIDER_API CALL
Scotty Kaye  Interventional Cardiology  [address] [place] [address]  Phone: [phone]  Fax: [phone]  Follow Up Time: Routine

## 2023-10-06 NOTE — PROGRESS NOTE ADULT - SUBJECTIVE AND OBJECTIVE BOX
CHIEF COMPLAINT: Patient is a 58y old  Male who presents with a chief complaint of chest pain (03 Oct 2023 19:09)    FROM H&P: 58M w/PMH HTN, HLD, T2DM, CKD 3, Obesity Class II, CAD s/p 8 stents, recent admit 9/29-30 for Rt chest pain, s/p PCI w/ LAD stent on 9/29, he presents Today, c/o Rt chest pain once again. This began last night, not as severe as first time, no radiation to neck or RUE as last time.  NO e/a factors, occurred at rest, waxing and waning. No associated symptoms.    -- In ED, Na 133, Cr 2.1 (1.7 prior), Gluc 327, trop neg, cxr neg.     10/4. Patient seen and examined this AM. Felt this pressure again last night. Remained in hospital, monitored on tele, no events. Troponin negative. EKG without ischemic changes    10/5. Still with episodes of chest pressure, walking/lifting a chair.    10/6.     PAST MEDICAL & SURGICAL HISTORY:  Essential hypertension  Obstructive sleep apnea  Diabetes mellitus  HLD (hyperlipidemia)  CAD (coronary artery disease)  Pancreatitis  History of coronary artery stent placement Placed 9/12/18 and 9/29/2023 by Dr. Kaye  S/P cholecystectomy    SOCIAL HISTORY:  lives w/ wife, ind ADLs  denies smoking, rare etoh    FAMILY HISTORY:  Family history of diabetes mellitus (Sibling),  heart disease (Sibling, Father, Mother)    MEDICATIONS:  MEDICATIONS  (STANDING):  aspirin enteric coated 81 milliGRAM(s) Oral daily  atorvastatin 40 milliGRAM(s) Oral at bedtime  clopidogrel Tablet 75 milliGRAM(s) Oral daily  dextrose 5%. 1000 milliLiter(s) (50 mL/Hr) IV Continuous <Continuous>  dextrose 5%. 1000 milliLiter(s) (100 mL/Hr) IV Continuous <Continuous>  dextrose 50% Injectable 12.5 Gram(s) IV Push once  dextrose 50% Injectable 25 Gram(s) IV Push once  dextrose 50% Injectable 25 Gram(s) IV Push once  fenofibrate Tablet 145 milliGRAM(s) Oral at bedtime  glucagon  Injectable 1 milliGRAM(s) IntraMuscular once  insulin glargine Injectable (LANTUS) 112 Unit(s) SubCutaneous at bedtime  insulin lispro (ADMELOG) corrective regimen sliding scale   SubCutaneous at bedtime  insulin lispro (ADMELOG) corrective regimen sliding scale   SubCutaneous three times a day before meals  insulin lispro Injectable (ADMELOG) 45 Unit(s) SubCutaneous three times a day before meals  isosorbide   mononitrate ER Tablet (IMDUR) 30 milliGRAM(s) Oral daily  metoprolol succinate  milliGRAM(s) Oral daily  ranolazine 500 milliGRAM(s) Oral two times a day    MEDICATIONS  (PRN):  acetaminophen     Tablet .. 650 milliGRAM(s) Oral every 6 hours PRN Temp greater or equal to 38C (100.4F), Mild Pain (1 - 3)  dextrose Oral Gel 15 Gram(s) Oral once PRN Blood Glucose LESS THAN 70 milliGRAM(s)/deciliter  nitroglycerin     SubLingual 0.4 milliGRAM(s) SubLingual every 5 minutes PRN Chest Pain  ondansetron Injectable 4 milliGRAM(s) IV Push every 6 hours PRN Nausea      HOME MEDICATIONS:  atorvastatin 40 mg oral tablet: 1 tab(s) orally once a day (at bedtime) (03 Oct 2023 15:21)  D3 25 mcg (1000 intl units) oral tablet: 1 tab(s) orally once a day (03 Oct 2023 15:21)  Farxiga 10 mg oral tablet: 1 tab(s) orally once a day (03 Oct 2023 15:21)  fenofibrate 160 mg oral tablet: 1 tab(s) orally once a day (03 Oct 2023 15:21)  ferrous sulfate 325 mg (65 mg elemental iron) oral tablet: 1 tab(s) orally once a day (03 Oct 2023 15:21)  fosinopril 20 mg oral tablet: 1 tab(s) orally once a day (03 Oct 2023 15:21)  hydroCHLOROthiazide 25 mg oral tablet: 1 tab(s) orally once a day (03 Oct 2023 15:21)  Insulin Lispro KwikPen 100 units/mL injectable solution: 56 injectable 3 times a day before meals (03 Oct 2023 15:21)  magnesium oxide 400 mg oral tablet: 1 tab(s) orally once a day (03 Oct 2023 15:21)  ranolazine 500 mg oral tablet, extended release: 1 tab(s) orally 2 times a day (03 Oct 2023 15:21)  Toujeo Max SoloStar 300 units/mL subcutaneous solution: 140 subcutaneous once a day in the morning (03 Oct 2023 15:21)  Vitamin B Complex with C oral tablet: 1 tab(s) orally once a day (03 Oct 2023 15:19)    PHYSICAL EXAM:  T(C): 36.6 (05 Oct 2023 08:22), Max: 36.6 (05 Oct 2023 06:01)  T(F): 97.8 (05 Oct 2023 08:22), Max: 97.9 (05 Oct 2023 06:01)  HR: 71 (05 Oct 2023 08:22) (66 - 78)  BP: 124/79 (05 Oct 2023 08:22) (115/56 - 124/79)  RR: 18 (05 Oct 2023 08:22) (18 - 18)  SpO2: 99% (05 Oct 2023 08:22) (97% - 99%)    Parameters below as of 05 Oct 2023 08:22  Patient On (Oxygen Delivery Method): room air    Constitutional: NAD, awake and alert  HEENT: PERR, EOMI, Normal Hearing, MMM  Neck: Soft and supple, No LAD, No JVD  Respiratory: Breath sounds are clear bilaterally, No wheezing, rales or rhonchi  Cardiovascular: S1 and S2, regular rate and rhythm, no Murmurs, gallops or rubs  Gastrointestinal: Bowel Sounds present, soft, nontender, nondistended, no guarding, no rebound  Extremities: No peripheral edema  Vascular: 2+ peripheral pulses  Neurological: A/O x 3, no focal deficits  Musculoskeletal: 5/5 strength b/l upper and lower extremities  Skin: No rashes    =======================================    INTERPRETATION OF TELEMETRY: SR    ECG: SR, RBBB    ========================================    LABS:  10-05    138  |  108  |  37<H>  ----------------------------<  176<H>  3.9   |  25  |  1.95<H>    Ca    8.8      05 Oct 2023 06:31  Mg     2.4     10-03    TPro  7.9  /  Alb  3.6  /  TBili  0.6  /  DBili  x   /  AST  41<H>  /  ALT  65  /  AlkPhos  59  10-03    Troponin: 18.48 ng/L, Troponin: 16.72 ng/L, Troponin: 18.57 ng/L    BNP 61    CARDIAC TESTING:    10/2023: TTE: The mitral valve leaflets appear thickened. Trace mitral regurgitation is present. The aortic valve is not well visualized, but appears to be Significant fibrocalcific changes noted to the aortic valve leaflets with restriction in leaflet excursion. Peak and mean transaortic gradients are 50 and 27 mmHg respectively; VTI index 0.26 FIDEL 1.0 sq cm suggestive moderate to severe aortic stenosis. ( clinical correlation recommended ) The tricuspid valve leaflets are thin and pliable; valve motion is normal. Trace tricuspid valve regurgitation is present. The left ventricle is normal in size, wall motion and contractility as seen in limited views. Mild concentric left ventricular hypertrophy is present. Estimated left ventricular ejection fraction is 60-65 %. Grade I Diastolic Dysfunction is present with normal LAP. Normal appearing right ventricle structure and function.    2/2022: TTE: Mild concentric left ventricular hypertrophy is present. Estimated left ventricular ejection fraction is 65-70 %. A bicuspid aortic valve cannot be excluded. Significant fibrocalcific changes noted to the aortic valve leaflets with restriction in leaflet excursion. Peak and mean transaortic gradients are 34 and 21 mmHg respectively; this finding is consistent with mild to moderate aortic stenosis. Mild (1+) eccentric aortic regurgitation is present. The aortic root is mildly dilated . Fibrocalcific changes noted to the mitral valve leaflets with preserved leaflet excursion. Trace mitral regurgitation is present. The tricuspid valve leaflets are thin and pliable; valve motion is normal. Trace tricuspid valve regurgitation is present.      RADIOLOGY & ADDITIONAL STUDIES:    10/3/23: CXR: Clear CHIEF COMPLAINT: Patient is a 58y old  Male who presents with a chief complaint of chest pain (03 Oct 2023 19:09)    FROM H&P: 58M w/PMH HTN, HLD, T2DM, CKD 3, Obesity Class II, CAD s/p 8 stents, recent admit 9/29-30 for Rt chest pain, s/p PCI w/ LAD stent on 9/29, he presents Today, c/o Rt chest pain once again. This began last night, not as severe as first time, no radiation to neck or RUE as last time.  NO e/a factors, occurred at rest, waxing and waning. No associated symptoms.    -- In ED, Na 133, Cr 2.1 (1.7 prior), Gluc 327, trop neg, cxr neg.     10/4. Patient seen and examined this AM. Felt this pressure again last night. Remained in hospital, monitored on tele, no events. Troponin negative. EKG without ischemic changes    10/5. Still with episodes of chest pressure, walking/lifting a chair.    10/6. Seen in cath lab  Awaiting final cardiac plan.    PAST MEDICAL & SURGICAL HISTORY:  Essential hypertension  Obstructive sleep apnea  Diabetes mellitus  HLD (hyperlipidemia)  CAD (coronary artery disease)  Pancreatitis  History of coronary artery stent placement Placed 9/12/18 and 9/29/2023 by Dr. Kaye  S/P cholecystectomy    SOCIAL HISTORY:  lives w/ wife, ind ADLs  denies smoking, rare etoh    FAMILY HISTORY:  Family history of diabetes mellitus (Sibling),  heart disease (Sibling, Father, Mother)    MEDICATIONS:  MEDICATIONS  (STANDING):  aspirin enteric coated 81 milliGRAM(s) Oral daily  atorvastatin 40 milliGRAM(s) Oral at bedtime  clopidogrel Tablet 75 milliGRAM(s) Oral daily  dextrose 5%. 1000 milliLiter(s) (50 mL/Hr) IV Continuous <Continuous>  dextrose 5%. 1000 milliLiter(s) (100 mL/Hr) IV Continuous <Continuous>  dextrose 50% Injectable 12.5 Gram(s) IV Push once  dextrose 50% Injectable 25 Gram(s) IV Push once  dextrose 50% Injectable 25 Gram(s) IV Push once  fenofibrate Tablet 145 milliGRAM(s) Oral at bedtime  glucagon  Injectable 1 milliGRAM(s) IntraMuscular once  insulin glargine Injectable (LANTUS) 112 Unit(s) SubCutaneous at bedtime  insulin lispro (ADMELOG) corrective regimen sliding scale   SubCutaneous at bedtime  insulin lispro (ADMELOG) corrective regimen sliding scale   SubCutaneous three times a day before meals  insulin lispro Injectable (ADMELOG) 45 Unit(s) SubCutaneous three times a day before meals  isosorbide   mononitrate ER Tablet (IMDUR) 30 milliGRAM(s) Oral daily  metoprolol succinate  milliGRAM(s) Oral daily  ranolazine 500 milliGRAM(s) Oral two times a day    MEDICATIONS  (PRN):  acetaminophen     Tablet .. 650 milliGRAM(s) Oral every 6 hours PRN Temp greater or equal to 38C (100.4F), Mild Pain (1 - 3)  dextrose Oral Gel 15 Gram(s) Oral once PRN Blood Glucose LESS THAN 70 milliGRAM(s)/deciliter  nitroglycerin     SubLingual 0.4 milliGRAM(s) SubLingual every 5 minutes PRN Chest Pain  ondansetron Injectable 4 milliGRAM(s) IV Push every 6 hours PRN Nausea      HOME MEDICATIONS:  atorvastatin 40 mg oral tablet: 1 tab(s) orally once a day (at bedtime) (03 Oct 2023 15:21)  D3 25 mcg (1000 intl units) oral tablet: 1 tab(s) orally once a day (03 Oct 2023 15:21)  Farxiga 10 mg oral tablet: 1 tab(s) orally once a day (03 Oct 2023 15:21)  fenofibrate 160 mg oral tablet: 1 tab(s) orally once a day (03 Oct 2023 15:21)  ferrous sulfate 325 mg (65 mg elemental iron) oral tablet: 1 tab(s) orally once a day (03 Oct 2023 15:21)  fosinopril 20 mg oral tablet: 1 tab(s) orally once a day (03 Oct 2023 15:21)  hydroCHLOROthiazide 25 mg oral tablet: 1 tab(s) orally once a day (03 Oct 2023 15:21)  Insulin Lispro KwikPen 100 units/mL injectable solution: 56 injectable 3 times a day before meals (03 Oct 2023 15:21)  magnesium oxide 400 mg oral tablet: 1 tab(s) orally once a day (03 Oct 2023 15:21)  ranolazine 500 mg oral tablet, extended release: 1 tab(s) orally 2 times a day (03 Oct 2023 15:21)  [employer]aj SoloStar 300 units/mL subcutaneous solution: 140 subcutaneous once a day in the morning (03 Oct 2023 15:21)  Vitamin B Complex with C oral tablet: 1 tab(s) orally once a day (03 Oct 2023 15:19)    PHYSICAL EXAM:  T(C): 36.6 (05 Oct 2023 08:22), Max: 36.6 (05 Oct 2023 06:01)  T(F): 97.8 (05 Oct 2023 08:22), Max: 97.9 (05 Oct 2023 06:01)  HR: 71 (05 Oct 2023 08:22) (66 - 78)  BP: 124/79 (05 Oct 2023 08:22) (115/56 - 124/79)  RR: 18 (05 Oct 2023 08:22) (18 - 18)  SpO2: 99% (05 Oct 2023 08:22) (97% - 99%)    Parameters below as of 05 Oct 2023 08:22  Patient On (Oxygen Delivery Method): room air    Constitutional: NAD, awake and alert  HEENT: PERR, EOMI, Normal Hearing, MMM  Neck: Soft and supple, No LAD, No JVD  Respiratory: Breath sounds are clear bilaterally, No wheezing, rales or rhonchi  Cardiovascular: S1 and S2, regular rate and rhythm, no Murmurs, gallops or rubs  Gastrointestinal: Bowel Sounds present, soft, nontender, nondistended, no guarding, no rebound  Extremities: No peripheral edema  Vascular: 2+ peripheral pulses  Neurological: A/O x 3, no focal deficits  Musculoskeletal: 5/5 strength b/l upper and lower extremities  Skin: No rashes    =======================================    INTERPRETATION OF TELEMETRY: SR    ECG: SR, RBBB    ========================================    LABS:  10-05    138  |  108  |  37<H>  ----------------------------<  176<H>  3.9   |  25  |  1.95<H>    Ca    8.8      05 Oct 2023 06:31  Mg     2.4     10-03    TPro  7.9  /  Alb  3.6  /  TBili  0.6  /  DBili  x   /  AST  41<H>  /  ALT  65  /  AlkPhos  59  10-03    Troponin: 18.48 ng/L, Troponin: 16.72 ng/L, Troponin: 18.57 ng/L    BNP 61    CARDIAC TESTING:    10/2023: TTE: The mitral valve leaflets appear thickened. Trace mitral regurgitation is present. The aortic valve is not well visualized, but appears to be Significant fibrocalcific changes noted to the aortic valve leaflets with restriction in leaflet excursion. Peak and mean transaortic gradients are 50 and 27 mmHg respectively; VTI index 0.26 FIDEL 1.0 sq cm suggestive moderate to severe aortic stenosis. ( clinical correlation recommended ) The tricuspid valve leaflets are thin and pliable; valve motion is normal. Trace tricuspid valve regurgitation is present. The left ventricle is normal in size, wall motion and contractility as seen in limited views. Mild concentric left ventricular hypertrophy is present. Estimated left ventricular ejection fraction is 60-65 %. Grade I Diastolic Dysfunction is present with normal LAP. Normal appearing right ventricle structure and function.    2/2022: TTE: Mild concentric left ventricular hypertrophy is present. Estimated left ventricular ejection fraction is 65-70 %. A bicuspid aortic valve cannot be excluded. Significant fibrocalcific changes noted to the aortic valve leaflets with restriction in leaflet excursion. Peak and mean transaortic gradients are 34 and 21 mmHg respectively; this finding is consistent with mild to moderate aortic stenosis. Mild (1+) eccentric aortic regurgitation is present. The aortic root is mildly dilated . Fibrocalcific changes noted to the mitral valve leaflets with preserved leaflet excursion. Trace mitral regurgitation is present. The tricuspid valve leaflets are thin and pliable; valve motion is normal. Trace tricuspid valve regurgitation is present.      RADIOLOGY & ADDITIONAL STUDIES:    10/3/23: CXR: Clear

## 2023-10-06 NOTE — ASU PREOP CHECKLIST - VERIFY SURGICAL SITE/SIDE WITH PATIENT
EXAM DATE: 18



PATIENT'S AGE: 64





Patient: ISABELLE CABALLERO



Facility: [place], [place]

Patient ID: [id number]

Site Patient ID: [id number].

Site Accession #: [id number].

: 1954

Study: CT Chest WITH [id number]/22/2018 8:16:07 AM

Ordering Physician: Jack Hayes



Final Report: 

HISTORY:

Chest pain.



COMPARISON:

Chest x-ray 2018.



TECHNIQUE:

Axial images were obtained through the chest following intravenous contrast.



FINDINGS:

Minimal dependent ground-glass opacities likely related to atelectasis. The 
lungs are otherwise clear. No suspicious pulmonary nodule. Tiny calcified 
nodule in the right upper lobe. No pleural or pericardial effusion. No thoracic 
lymphadenopathy. The adrenal glands are normal. The bones are intact.



IMPRESSION:

No acute abnormality.



Please note that all CT scans at this facility use dose modulation, iterative 
reconstruction, and/or weight-based dosing when appropriate to reduce radiation 
dose to as low as reasonably achievable.



Dictated by Gladis Mcmahon MD @ 2018 8:39AM

(Electronic Signature)



Report Signed by Proxy.
[facility]

## 2023-10-06 NOTE — PROGRESS NOTE ADULT - ASSESSMENT
58M w/PMH HTN, HLD, T2DM, CKD 3, Obesity Class II, CAD s/p 8 stents, recent admit 9/29-30 for Rt chest pain, s/p PCI w/ LAD stent on 9/29, he presents Today, c/o Rt chest pain once again. This began last night, not as severe as first time, no radiation to neck or RUE as last time.  NO e/a factors, occurred at rest, waxing and waning. No associated symptoms.      # Chest pain in the setting probable Severe AS  Ongoing episodes of chest discomfort overnight.  - S/p LHC on 9/29:  s/p PCI of ISR of LADx2 and PTCA of diag  - Monitored on tele, no events  - Continue with home medications -- Aspirin, Plavix, BB, Statin, Ranexa  - 10/2023: TTE: Moderate to severe aortic stenosis. EF 60-65 %.    - Started on Imdur 30mg daily    #HLD. Continue statin and fenofibrate     #HTN. Continue Metoprolol and ACEi    #IDDMT2. On ISS and Jardiance. On statin therapy. Recent a1c 9.8    Case D/w Dr. Duckworth and Vargas Kaye to follow up later today.   58M w/PMH HTN, HLD, T2DM, CKD 3, Obesity Class II, CAD s/p 8 stents, recent admit 9/29-30 for Rt chest pain, s/p PCI w/ LAD stent on 9/29, he presents Today, c/o Rt chest pain once again. This began last night, not as severe as first time, no radiation to neck or RUE as last time.  NO e/a factors, occurred at rest, waxing and waning. No associated symptoms.      # Chest pain in the setting probable Severe AS  Ongoing episodes of chest discomfort overnight.  - S/p LHC on 9/29:  s/p PCI of ISR of LADx2 and PTCA of diag  - Monitored on tele, no events  - Continue with home medications -- Aspirin, Plavix, BB, Statin, Ranexa  - 10/2023: TTE: Moderate to severe aortic stenosis. EF 60-65 %.    - Started on Imdur 30mg daily  - Cath today with no change, non obs disease, patent stents. Possible transfer TBD by Dr. Kaye. will need Cardiac MRI.    #HLD. Continue statin and fenofibrate     #HTN. Continue Metoprolol and ACEi    #IDDMT2. On ISS and Jardiance. On statin therapy. Recent a1c 9.8    Case D/w Dr. Jarrett and Vargas

## 2023-10-06 NOTE — DISCHARGE NOTE PROVIDER - NSDCMRMEDTOKEN_GEN_ALL_CORE_FT
aspirin 81 mg oral delayed release tablet: 1 tab(s) orally once a day  atorvastatin 40 mg oral tablet: 1 tab(s) orally once a day (at bedtime)  clopidogrel 75 mg oral tablet: 1 tab(s) orally once a day  fenofibrate 160 mg oral tablet: 1 tab(s) orally once a day  insulin glargine 100 units/mL subcutaneous solution: 60 unit(s) subcutaneous once a day (at bedtime)  insulin lispro 100 units/mL injectable solution: 45 unit(s) injectable 3 times a day (before meals)  isosorbide mononitrate 30 mg oral tablet, extended release: 1 tab(s) orally once a day  metoprolol succinate 200 mg oral tablet, extended release: 1 tab(s) orally once a day  ranolazine 500 mg oral tablet, extended release: 1 tab(s) orally 2 times a day

## 2023-10-07 ENCOUNTER — RESULT REVIEW (OUTPATIENT)
Age: 58
End: 2023-10-07

## 2023-10-07 ENCOUNTER — INPATIENT (INPATIENT)
Facility: HOSPITAL | Age: 58
LOS: 15 days | Discharge: HOME | DRG: 219 | End: 2023-10-23
Attending: THORACIC SURGERY (CARDIOTHORACIC VASCULAR SURGERY) | Admitting: THORACIC SURGERY (CARDIOTHORACIC VASCULAR SURGERY)
Payer: COMMERCIAL

## 2023-10-07 VITALS
SYSTOLIC BLOOD PRESSURE: 120 MMHG | DIASTOLIC BLOOD PRESSURE: 74 MMHG | OXYGEN SATURATION: 99 % | RESPIRATION RATE: 18 BRPM | TEMPERATURE: 98 F | HEART RATE: 73 BPM

## 2023-10-07 DIAGNOSIS — Z90.49 ACQUIRED ABSENCE OF OTHER SPECIFIED PARTS OF DIGESTIVE TRACT: Chronic | ICD-10-CM

## 2023-10-07 DIAGNOSIS — I35.0 NONRHEUMATIC AORTIC (VALVE) STENOSIS: ICD-10-CM

## 2023-10-07 DIAGNOSIS — I25.10 ATHEROSCLEROTIC HEART DISEASE OF NATIVE CORONARY ARTERY WITHOUT ANGINA PECTORIS: ICD-10-CM

## 2023-10-07 DIAGNOSIS — E78.5 HYPERLIPIDEMIA, UNSPECIFIED: ICD-10-CM

## 2023-10-07 DIAGNOSIS — N18.30 CHRONIC KIDNEY DISEASE, STAGE 3 UNSPECIFIED: ICD-10-CM

## 2023-10-07 DIAGNOSIS — Z29.9 ENCOUNTER FOR PROPHYLACTIC MEASURES, UNSPECIFIED: ICD-10-CM

## 2023-10-07 DIAGNOSIS — I10 ESSENTIAL (PRIMARY) HYPERTENSION: ICD-10-CM

## 2023-10-07 DIAGNOSIS — Z95.5 PRESENCE OF CORONARY ANGIOPLASTY IMPLANT AND GRAFT: Chronic | ICD-10-CM

## 2023-10-07 DIAGNOSIS — E11.9 TYPE 2 DIABETES MELLITUS WITHOUT COMPLICATIONS: ICD-10-CM

## 2023-10-07 LAB
A1C WITH ESTIMATED AVERAGE GLUCOSE RESULT: 9.6 % — HIGH (ref 4–5.6)
ALBUMIN SERPL ELPH-MCNC: 3.7 G/DL — SIGNIFICANT CHANGE UP (ref 3.3–5.2)
ALP SERPL-CCNC: 50 U/L — SIGNIFICANT CHANGE UP (ref 40–120)
ALT FLD-CCNC: 44 U/L — HIGH
ANION GAP SERPL CALC-SCNC: 10 MMOL/L — SIGNIFICANT CHANGE UP (ref 5–17)
ANISOCYTOSIS BLD QL: SLIGHT — SIGNIFICANT CHANGE UP
APPEARANCE UR: CLEAR — SIGNIFICANT CHANGE UP
APTT BLD: 35.3 SEC — SIGNIFICANT CHANGE UP (ref 24.5–35.6)
AST SERPL-CCNC: 30 U/L — SIGNIFICANT CHANGE UP
BACTERIA # UR AUTO: ABNORMAL
BASOPHILS # BLD AUTO: 0.07 K/UL — SIGNIFICANT CHANGE UP (ref 0–0.2)
BASOPHILS NFR BLD AUTO: 1.1 % — SIGNIFICANT CHANGE UP (ref 0–2)
BILIRUB SERPL-MCNC: 0.2 MG/DL — LOW (ref 0.4–2)
BILIRUB UR-MCNC: NEGATIVE — SIGNIFICANT CHANGE UP
BLD GP AB SCN SERPL QL: SIGNIFICANT CHANGE UP
BUN SERPL-MCNC: 29.2 MG/DL — HIGH (ref 8–20)
CALCIUM SERPL-MCNC: 8.9 MG/DL — SIGNIFICANT CHANGE UP (ref 8.4–10.5)
CHLORIDE SERPL-SCNC: 103 MMOL/L — SIGNIFICANT CHANGE UP (ref 96–108)
CO2 SERPL-SCNC: 24 MMOL/L — SIGNIFICANT CHANGE UP (ref 22–29)
COLOR SPEC: YELLOW — SIGNIFICANT CHANGE UP
CREAT SERPL-MCNC: 1.72 MG/DL — HIGH (ref 0.5–1.3)
DIFF PNL FLD: NEGATIVE — SIGNIFICANT CHANGE UP
EGFR: 46 ML/MIN/1.73M2 — LOW
EOSINOPHIL # BLD AUTO: 0.24 K/UL — SIGNIFICANT CHANGE UP (ref 0–0.5)
EOSINOPHIL NFR BLD AUTO: 3.7 % — SIGNIFICANT CHANGE UP (ref 0–6)
EPI CELLS # UR: NEGATIVE — SIGNIFICANT CHANGE UP
ESTIMATED AVERAGE GLUCOSE: 229 MG/DL — HIGH (ref 68–114)
GLUCOSE BLDC GLUCOMTR-MCNC: 135 MG/DL — HIGH (ref 70–99)
GLUCOSE BLDC GLUCOMTR-MCNC: 159 MG/DL — HIGH (ref 70–99)
GLUCOSE BLDC GLUCOMTR-MCNC: 193 MG/DL — HIGH (ref 70–99)
GLUCOSE BLDC GLUCOMTR-MCNC: 71 MG/DL — SIGNIFICANT CHANGE UP (ref 70–99)
GLUCOSE BLDC GLUCOMTR-MCNC: 81 MG/DL — SIGNIFICANT CHANGE UP (ref 70–99)
GLUCOSE SERPL-MCNC: 142 MG/DL — HIGH (ref 70–99)
GLUCOSE UR QL: 50 MG/DL
HCT VFR BLD CALC: 37.8 % — LOW (ref 39–50)
HGB BLD-MCNC: 12.3 G/DL — LOW (ref 13–17)
IMM GRANULOCYTES NFR BLD AUTO: 2.8 % — HIGH (ref 0–0.9)
INR BLD: 0.98 RATIO — SIGNIFICANT CHANGE UP (ref 0.85–1.18)
KETONES UR-MCNC: ABNORMAL
LEUKOCYTE ESTERASE UR-ACNC: NEGATIVE — SIGNIFICANT CHANGE UP
LYMPHOCYTES # BLD AUTO: 1.9 K/UL — SIGNIFICANT CHANGE UP (ref 1–3.3)
LYMPHOCYTES # BLD AUTO: 29.2 % — SIGNIFICANT CHANGE UP (ref 13–44)
MANUAL SMEAR VERIFICATION: SIGNIFICANT CHANGE UP
MCHC RBC-ENTMCNC: 29.5 PG — SIGNIFICANT CHANGE UP (ref 27–34)
MCHC RBC-ENTMCNC: 32.5 GM/DL — SIGNIFICANT CHANGE UP (ref 32–36)
MCV RBC AUTO: 90.6 FL — SIGNIFICANT CHANGE UP (ref 80–100)
MICROCYTES BLD QL: SLIGHT — SIGNIFICANT CHANGE UP
MONOCYTES # BLD AUTO: 0.68 K/UL — SIGNIFICANT CHANGE UP (ref 0–0.9)
MONOCYTES NFR BLD AUTO: 10.4 % — SIGNIFICANT CHANGE UP (ref 2–14)
MRSA PCR RESULT.: SIGNIFICANT CHANGE UP
NEUTROPHILS # BLD AUTO: 3.44 K/UL — SIGNIFICANT CHANGE UP (ref 1.8–7.4)
NEUTROPHILS NFR BLD AUTO: 52.8 % — SIGNIFICANT CHANGE UP (ref 43–77)
NITRITE UR-MCNC: NEGATIVE — SIGNIFICANT CHANGE UP
NT-PROBNP SERPL-SCNC: 98 PG/ML — SIGNIFICANT CHANGE UP (ref 0–300)
PA ADP PRP-ACNC: 153 PRU — LOW (ref 180–376)
PH UR: 5 — SIGNIFICANT CHANGE UP (ref 5–8)
PLAT MORPH BLD: NORMAL — SIGNIFICANT CHANGE UP
PLATELET # BLD AUTO: 313 K/UL — SIGNIFICANT CHANGE UP (ref 150–400)
POLYCHROMASIA BLD QL SMEAR: SLIGHT — SIGNIFICANT CHANGE UP
POTASSIUM SERPL-MCNC: 4.3 MMOL/L — SIGNIFICANT CHANGE UP (ref 3.5–5.3)
POTASSIUM SERPL-SCNC: 4.3 MMOL/L — SIGNIFICANT CHANGE UP (ref 3.5–5.3)
PREALB SERPL-MCNC: 21 MG/DL — SIGNIFICANT CHANGE UP (ref 18–38)
PROT SERPL-MCNC: 6.6 G/DL — SIGNIFICANT CHANGE UP (ref 6.6–8.7)
PROT UR-MCNC: 100
PROTHROM AB SERPL-ACNC: 10.9 SEC — SIGNIFICANT CHANGE UP (ref 9.5–13)
RBC # BLD: 4.17 M/UL — LOW (ref 4.2–5.8)
RBC # FLD: 13.4 % — SIGNIFICANT CHANGE UP (ref 10.3–14.5)
RBC BLD AUTO: ABNORMAL
RBC CASTS # UR COMP ASSIST: SIGNIFICANT CHANGE UP /HPF (ref 0–4)
S AUREUS DNA NOSE QL NAA+PROBE: DETECTED
SMUDGE CELLS # BLD: PRESENT — SIGNIFICANT CHANGE UP
SODIUM SERPL-SCNC: 137 MMOL/L — SIGNIFICANT CHANGE UP (ref 135–145)
SP GR SPEC: 1.02 — SIGNIFICANT CHANGE UP (ref 1.01–1.02)
T4 FREE SERPL-MCNC: 1.1 NG/DL — SIGNIFICANT CHANGE UP (ref 0.9–1.8)
TSH SERPL-MCNC: 4.61 UIU/ML — HIGH (ref 0.27–4.2)
UROBILINOGEN FLD QL: NEGATIVE MG/DL — SIGNIFICANT CHANGE UP
VARIANT LYMPHS # BLD: 3.5 % — SIGNIFICANT CHANGE UP (ref 0–6)
WBC # BLD: 6.51 K/UL — SIGNIFICANT CHANGE UP (ref 3.8–10.5)
WBC # FLD AUTO: 6.51 K/UL — SIGNIFICANT CHANGE UP (ref 3.8–10.5)
WBC UR QL: SIGNIFICANT CHANGE UP /HPF (ref 0–5)

## 2023-10-07 PROCEDURE — 93306 TTE W/DOPPLER COMPLETE: CPT | Mod: 26

## 2023-10-07 PROCEDURE — 99222 1ST HOSP IP/OBS MODERATE 55: CPT

## 2023-10-07 PROCEDURE — 71045 X-RAY EXAM CHEST 1 VIEW: CPT | Mod: 26

## 2023-10-07 PROCEDURE — 93880 EXTRACRANIAL BILAT STUDY: CPT | Mod: 26

## 2023-10-07 PROCEDURE — 88305 TISSUE EXAM BY PATHOLOGIST: CPT | Mod: 26

## 2023-10-07 PROCEDURE — 99223 1ST HOSP IP/OBS HIGH 75: CPT

## 2023-10-07 PROCEDURE — 88311 DECALCIFY TISSUE: CPT | Mod: 26

## 2023-10-07 PROCEDURE — 93010 ELECTROCARDIOGRAM REPORT: CPT

## 2023-10-07 RX ORDER — GLUCAGON INJECTION, SOLUTION 0.5 MG/.1ML
1 INJECTION, SOLUTION SUBCUTANEOUS ONCE
Refills: 0 | Status: DISCONTINUED | OUTPATIENT
Start: 2023-10-07 | End: 2023-10-13

## 2023-10-07 RX ORDER — HEPARIN SODIUM 5000 [USP'U]/ML
5000 INJECTION INTRAVENOUS; SUBCUTANEOUS EVERY 8 HOURS
Refills: 0 | Status: DISCONTINUED | OUTPATIENT
Start: 2023-10-07 | End: 2023-10-08

## 2023-10-07 RX ORDER — INSULIN GLARGINE 100 [IU]/ML
60 INJECTION, SOLUTION SUBCUTANEOUS AT BEDTIME
Refills: 0 | Status: DISCONTINUED | OUTPATIENT
Start: 2023-10-07 | End: 2023-10-08

## 2023-10-07 RX ORDER — DEXTROSE 50 % IN WATER 50 %
25 SYRINGE (ML) INTRAVENOUS ONCE
Refills: 0 | Status: DISCONTINUED | OUTPATIENT
Start: 2023-10-07 | End: 2023-10-13

## 2023-10-07 RX ORDER — DEXTROSE 50 % IN WATER 50 %
12.5 SYRINGE (ML) INTRAVENOUS ONCE
Refills: 0 | Status: DISCONTINUED | OUTPATIENT
Start: 2023-10-07 | End: 2023-10-13

## 2023-10-07 RX ORDER — SODIUM CHLORIDE 9 MG/ML
1000 INJECTION, SOLUTION INTRAVENOUS
Refills: 0 | Status: DISCONTINUED | OUTPATIENT
Start: 2023-10-07 | End: 2023-10-13

## 2023-10-07 RX ORDER — METOPROLOL TARTRATE 50 MG
200 TABLET ORAL DAILY
Refills: 0 | Status: DISCONTINUED | OUTPATIENT
Start: 2023-10-07 | End: 2023-10-13

## 2023-10-07 RX ORDER — MUPIROCIN 20 MG/G
1 OINTMENT TOPICAL
Refills: 0 | Status: COMPLETED | OUTPATIENT
Start: 2023-10-07 | End: 2023-10-12

## 2023-10-07 RX ORDER — ISOSORBIDE MONONITRATE 60 MG/1
30 TABLET, EXTENDED RELEASE ORAL DAILY
Refills: 0 | Status: DISCONTINUED | OUTPATIENT
Start: 2023-10-07 | End: 2023-10-11

## 2023-10-07 RX ORDER — CLOPIDOGREL BISULFATE 75 MG/1
75 TABLET, FILM COATED ORAL DAILY
Refills: 0 | Status: DISCONTINUED | OUTPATIENT
Start: 2023-10-07 | End: 2023-10-08

## 2023-10-07 RX ORDER — ASPIRIN/CALCIUM CARB/MAGNESIUM 324 MG
81 TABLET ORAL DAILY
Refills: 0 | Status: DISCONTINUED | OUTPATIENT
Start: 2023-10-07 | End: 2023-10-08

## 2023-10-07 RX ORDER — DEXTROSE 50 % IN WATER 50 %
15 SYRINGE (ML) INTRAVENOUS ONCE
Refills: 0 | Status: DISCONTINUED | OUTPATIENT
Start: 2023-10-07 | End: 2023-10-13

## 2023-10-07 RX ORDER — INSULIN LISPRO 100/ML
VIAL (ML) SUBCUTANEOUS
Refills: 0 | Status: DISCONTINUED | OUTPATIENT
Start: 2023-10-07 | End: 2023-10-13

## 2023-10-07 RX ORDER — RANOLAZINE 500 MG/1
500 TABLET, FILM COATED, EXTENDED RELEASE ORAL
Refills: 0 | Status: DISCONTINUED | OUTPATIENT
Start: 2023-10-07 | End: 2023-10-13

## 2023-10-07 RX ORDER — PANTOPRAZOLE SODIUM 20 MG/1
40 TABLET, DELAYED RELEASE ORAL
Refills: 0 | Status: DISCONTINUED | OUTPATIENT
Start: 2023-10-07 | End: 2023-10-13

## 2023-10-07 RX ORDER — ATORVASTATIN CALCIUM 80 MG/1
40 TABLET, FILM COATED ORAL AT BEDTIME
Refills: 0 | Status: DISCONTINUED | OUTPATIENT
Start: 2023-10-07 | End: 2023-10-11

## 2023-10-07 RX ORDER — INSULIN LISPRO 100/ML
45 VIAL (ML) SUBCUTANEOUS
Refills: 0 | Status: DISCONTINUED | OUTPATIENT
Start: 2023-10-07 | End: 2023-10-11

## 2023-10-07 RX ORDER — FENOFIBRATE,MICRONIZED 130 MG
145 CAPSULE ORAL DAILY
Refills: 0 | Status: DISCONTINUED | OUTPATIENT
Start: 2023-10-07 | End: 2023-10-13

## 2023-10-07 RX ADMIN — SODIUM CHLORIDE 3 MILLILITER(S): 9 INJECTION INTRAMUSCULAR; INTRAVENOUS; SUBCUTANEOUS at 22:19

## 2023-10-07 RX ADMIN — Medication 2: at 22:18

## 2023-10-07 RX ADMIN — SODIUM CHLORIDE 3 MILLILITER(S): 9 INJECTION INTRAMUSCULAR; INTRAVENOUS; SUBCUTANEOUS at 13:55

## 2023-10-07 RX ADMIN — Medication 45 UNIT(S): at 08:38

## 2023-10-07 RX ADMIN — MUPIROCIN 1 APPLICATION(S): 20 OINTMENT TOPICAL at 22:00

## 2023-10-07 RX ADMIN — RANOLAZINE 500 MILLIGRAM(S): 500 TABLET, FILM COATED, EXTENDED RELEASE ORAL at 05:27

## 2023-10-07 RX ADMIN — HEPARIN SODIUM 5000 UNIT(S): 5000 INJECTION INTRAVENOUS; SUBCUTANEOUS at 22:52

## 2023-10-07 RX ADMIN — Medication 145 MILLIGRAM(S): at 17:21

## 2023-10-07 RX ADMIN — Medication 81 MILLIGRAM(S): at 13:52

## 2023-10-07 RX ADMIN — Medication 45 UNIT(S): at 12:51

## 2023-10-07 RX ADMIN — INSULIN GLARGINE 60 UNIT(S): 100 INJECTION, SOLUTION SUBCUTANEOUS at 22:07

## 2023-10-07 RX ADMIN — Medication 200 MILLIGRAM(S): at 05:27

## 2023-10-07 RX ADMIN — RANOLAZINE 500 MILLIGRAM(S): 500 TABLET, FILM COATED, EXTENDED RELEASE ORAL at 17:20

## 2023-10-07 RX ADMIN — CLOPIDOGREL BISULFATE 75 MILLIGRAM(S): 75 TABLET, FILM COATED ORAL at 13:52

## 2023-10-07 RX ADMIN — PANTOPRAZOLE SODIUM 40 MILLIGRAM(S): 20 TABLET, DELAYED RELEASE ORAL at 05:28

## 2023-10-07 RX ADMIN — Medication 2: at 08:31

## 2023-10-07 RX ADMIN — ATORVASTATIN CALCIUM 40 MILLIGRAM(S): 80 TABLET, FILM COATED ORAL at 22:05

## 2023-10-07 RX ADMIN — ISOSORBIDE MONONITRATE 30 MILLIGRAM(S): 60 TABLET, EXTENDED RELEASE ORAL at 13:52

## 2023-10-07 NOTE — H&P ADULT - ASSESSMENT
58 year old male with a medical history of HTN, HLD, type 2 DM (HA1c 9.6 on Insulin), CKD 3, Obesity Class II, CAD s/p 8 stents, recent admission to [facility] 9/29-9/30 for chest pain s/p PCI of ISR of LADx2 and PTCA of diag on 9/29/23, since readmitted to [facility] 10/3 after presenting with recurrent chest pain and increased CANNON since recent PCI. TTE revealed aortic stenosis (FIDEL 1, mG 27, pG 50), could not rule out bicuspid valve. Patient was re-cathed 10/6 to confirm patent stents. Patient now transferred to [facility] 10/6/23 for surgical evaluation under Dr. Cardozo.

## 2023-10-07 NOTE — H&P ADULT - PROBLEM SELECTOR PLAN 6
Nephrology consult to be called today.  Avoid Nephrotoxic agents.   Just received dye during re-cath 10/6.   Trend BUN/Cr.  Renal US without acute findings 10/4 at [facility].

## 2023-10-07 NOTE — CONSULT NOTE ADULT - SUBJECTIVE AND OBJECTIVE BOX
[facility]                                              CARDIOLOGY AT [address], [place] [address]                                             Telephone: [phone]. Fax:[phone]                                                         CARDIOLOGY CONSULTATION NOTE                                                                                             Consult requested by:    History obtained by: Patient and medical record  Community Cardiologist:    obtained: Yes [  ] No [x  ]  Reason for Consultation:  Aortic stenosis  Avialable out pt records reviewed: Yes [  ] No [ x ]      This is a 58 year old male with a medical history of HTN, HLD, Uncontrolled DM, CKD 3, Obesity Class II, CAD s/p 8 stents, recent admission to [facility] 9/29-9/30 for chest pain s/p PCI of ISR of LADx2 and PTCA of diag on 9/29/23, since readmitted to [facility] 10/3 after presenting with recurrent chest pain and increased CANNON since recent PCI. TTE revealed aortic stenosis (FIDEL 1, mG 27, pG 50), could not rule out bicuspid valve. Patient was re-cathed 10/6 to confirm patent stents. Patient now transferred to [facility] 10/6/23 for surgical evaluation under Dr. Cardozo.  We were asked to see patient for aortic stenosis for bicuspid valve        CARDIAC TESTING 10/04/23  ECHO:   The mitral valve leaflets appear thickened.   Trace mitral regurgitation is present.   The aortic valve is not well visualized, but appears to be Significant   fibrocalcific changes noted to the aortic valve leaflets with restriction   in leaflet excursion.   Peak and mean transaortic gradients are 50 and 27 mmHg respectively; VTI   index 0.26 FIDEL 1.0 sq cm suggestive moderate to severe aortic stenosis. (   clinical correlation recommended )   The tricuspid valve leaflets are thin and pliable; valve motion is   normal.   Trace tricuspid valve regurgitation is present.   The left ventricle is normal in size, wall motion and contractility as   seen in limited views.   Mild concentric left ventricular hypertrophy is present.   Estimated left ventricular ejection fraction is 60-65 %.   Grade I Diastolic Dysfunction is present with normal LAP.   Normal appearing right ventricle structure and function.      PAST MEDICAL HISTORY  Aortic stenosis  Essential hypertension  Obstructive sleep apnea  Diabetes mellitus  HLD (hyperlipidemia)  CAD (coronary artery disease)  Pancreatitis    PAST SURGICAL HISTORY  History of coronary artery stent placement  S/P cholecystectomy      SOCIAL HISTORY:   CIGARETTES:   Former  ALCOHOL:  No  DRUGS:  NO      FAMILY HISTORY:  Family history of diabetes mellitus (Sibling)  FH: heart disease (Sibling, Father, Mother)  Family History of Cardiovascular Disease:  Yes [ x ] No [  ]  Coronary Artery Disease in first degree relative: Yes [ x ] No [  ]  Sudden Cardiac Death in First degree relative: Yes [  ] No [x  ]      HOME MEDICATIONS:  Fenfibrate  [id number] qd  Atorvastatin 40mg qd  Plavix 75mg qd  imdur 30mg qd  Metoprolol ER 200mg qd  Ranexa 500mg bid    CURRENT MEDICATIONS:  isosorbide   mononitrate ER Tablet (IMDUR) 30 milliGRAM(s) Oral daily  metoprolol succinate  milliGRAM(s) Oral daily  ranolazine 500 milliGRAM(s) Oral two times a day     pantoprazole    Tablet  aspirin enteric coated  atorvastatin  clopidogrel Tablet  dextrose 5%.  dextrose 5%.  dextrose 50% Injectable  dextrose 50% Injectable  fenofibrate Tablet  glucagon  Injectable  insulin glargine Injectable (LANTUS)  insulin lispro (ADMELOG) corrective regimen sliding scale  insulin lispro Injectable (ADMELOG)  sodium chloride 0.9% lock flush      ALLERGIES: Allergies  penicillin (Hives)    REVIEW OF SYMPTOMS:   CONSTITUTIONAL: No fever, no chills, no weight loss, no weight gain, no fatigue   ENMT:  No vertigo; No sinus or throat pain  NECK: No pain or stiffness  CARDIOVASCULAR: No chest pain, no dyspnea, no syncope/presyncope, no palpitations, no dizziness, no Orthopnea, no Paroxsymal nocturnal dyspnea  RESPIRATORY: no Shortness of breath, no cough, no wheezing  : No dysuria, no hematuria   GI: No dark color stool, no nausea, no diarrhea, no constipation, no abdominal pain   NEURO: No headache, no slurred speech   MUSCULOSKELETAL: No joint pain or swelling; No muscle, back, or extremity pain  PSYCH: No agitation, no anxiety.    ALL OTHER REVIEW OF SYSTEMS ARE NEGATIVE.      Vital Signs Last 24 Hrs  T(C): 36.7 (07 Oct 2023 08:01), Max: 36.9 (07 Oct 2023 00:53)  T(F): 98 (07 Oct 2023 08:01), Max: 98.4 (07 Oct 2023 00:53)  HR: 63 (07 Oct 2023 08:01) (62 - 83)  BP: 134/82 (07 Oct 2023 08:01) (120/74 - 146/78)  BP(mean): 88 (07 Oct 2023 05:26) (88 - 89)  RR: 16 (07 Oct 2023 08:01) (16 - 18)  SpO2: 96% (07 Oct 2023 08:01) (95% - 99%)    Parameters below as of 07 Oct 2023 08:01  Patient On (Oxygen Delivery Method): room air      INTAKE AND OUTPUT:   10-06 @ 07:01  -  10-07 @ 07:00  --------------------------------------------------------  IN: 90 mL / OUT: 0 mL / NET: 90 mL    10-07 @ 07:01  -  10-07 @ 11:52  --------------------------------------------------------  IN: 200 mL / OUT: 450 mL / NET: -250 mL        PHYSICAL EXAM:  Constitutional: Comfortable . No acute distress.   HEENT: Atraumatic and normocephalic , neck is supple . no JVD. No carotid bruit.  CNS: A&Ox3. No focal deficits.   Respiratory: CTAB, unlabored   Cardiovascular: RRR normal s1 s2. No murmur. No rubs or gallop.  Gastrointestinal: Soft, non-tender. +Bowel sounds.   MSK: full ROM extremities x 4  Extremities: No edema. No cyanosis   Psychiatric: Calm . no agitation.   Skin: Warm and dry, no ulcers on extremities       LABS:                        12.3   6.51  )-----------( 313      ( 07 Oct 2023 02:30 )             37.8     10-07    137  |  103  |  29.2<H>  ----------------------------<  142<H>  4.3   |  24.0  |  1.72<H>    Ca    8.9      07 Oct 2023 02:30    TPro  6.6  /  Alb  3.7  /  TBili  0.2<L>  /  DBili  x   /  AST  30  /  ALT  44<H>  /  AlkPhos  50  10-07      ;p-BNP=  PT/INR - ( 07 Oct 2023 02:30 )   PT: 10.9 sec;   INR: 0.98 ratio         PTT - ( 07 Oct 2023 02:30 )  PTT:35.3 sec  Urinalysis Basic - ( 07 Oct 2023 02:30 )    Color: x / Appearance: x / SG: x / pH: x  Gluc: 142 mg/dL / Ketone: x  / Bili: x / Urobili: x   Blood: x / Protein: x / Nitrite: x   Leuk Esterase: x / RBC: x / WBC x   Sq Epi: x / Non Sq Epi: x / Bacteria: x          INTERPRETATION OF TELEMETRY:     ECG:   Prior ECG: Yes [  ] No [  ]      RADIOLOGY & ADDITIONAL STUDIES:    X-ray:  reviewed by me.   CT scan:   MRI:   US:                                                [facility]                                              CARDIOLOGY AT [address], [place] [address]                                             Telephone: [phone]. Fax:[phone]                                                         CARDIOLOGY CONSULTATION NOTE                                                                                             Consult requested by:    History obtained by: Patient and medical record  Community Cardiologist:    obtained: Yes [  ] No [x  ]  Reason for Consultation:  Aortic stenosis  Avialable out pt records reviewed: Yes [  ] No [ x ]      This is a 58 year old male with a medical history of HTN, HLD, Uncontrolled DM, CKD 3, Obesity Class II, CAD s/p 8 stents, recent admission to [facility] 9/29-9/30 for chest pain s/p PCI of ISR of LADx2 and PTCA of diag on 9/29/23, since readmitted to [facility] 10/3 after presenting with recurrent chest pain and increased CANNON since recent PCI. TTE revealed aortic stenosis (FIDEL 1, mG 27, pG 50), could not rule out bicuspid valve. Patient was re-cathed 10/6 to confirm patent stents. Patient now transferred to [facility] 10/6/23 for surgical evaluation under Dr. Cardozo.  We were asked to see patient for aortic stenosis for bicuspid valve.  FIDEL 1, mG 27, pG 50), could not rule out bicuspid valve.  echo could not confirm bicuspid valve  Patient reports a stable anginal pattern with walking .         CARDIAC TESTING 10/04/23  ECHO:   The mitral valve leaflets appear thickened.   Trace mitral regurgitation is present.   The aortic valve is not well visualized, but appears to be Significant   fibrocalcific changes noted to the aortic valve leaflets with restriction   in leaflet excursion.   Peak and mean transaortic gradients are 50 and 27 mmHg respectively; VTI   index 0.26 FIDEL 1.0 sq cm suggestive moderate to severe aortic stenosis. (   clinical correlation recommended )   The tricuspid valve leaflets are thin and pliable; valve motion is   normal.   Trace tricuspid valve regurgitation is present.   The left ventricle is normal in size, wall motion and contractility as   seen in limited views.   Mild concentric left ventricular hypertrophy is present.   Estimated left ventricular ejection fraction is 60-65 %.   Grade I Diastolic Dysfunction is present with normal LAP.   Normal appearing right ventricle structure and function.      PAST MEDICAL HISTORY  Aortic stenosis  RENETTA  Essential hypertension  Obstructive sleep apnea  Diabetes mellitus  HLD (hyperlipidemia)  CAD (coronary artery disease)  Pancreatitis    PAST SURGICAL HISTORY  History of coronary artery stent placement  S/P cholecystectomy    SOCIAL HISTORY:   CIGARETTES:   Former  ALCOHOL:  No  DRUGS:  NO    FAMILY HISTORY:  Family history of diabetes mellitus (Sibling)  FH: heart disease (Sibling, Father, Mother)  Family History of Cardiovascular Disease:  Yes [ x ] No [  ]  Coronary Artery Disease in first degree relative: Yes [ x ] No [  ]  Sudden Cardiac Death in First degree relative: Yes [  ] No [x  ]    HOME MEDICATIONS:  Fenfibrate  [id number] qd  Atorvastatin 40mg qd  Plavix 75mg qd  imdur 30mg qd  Metoprolol ER 200mg qd  Ranexa 500mg bid    CURRENT MEDICATIONS:  isosorbide   mononitrate ER Tablet (IMDUR) 30 milliGRAM(s) Oral daily  metoprolol succinate  milliGRAM(s) Oral daily  ranolazine 500 milliGRAM(s) Oral two times a day  pantoprazole    Tablet  aspirin enteric coated  atorvastatin  clopidogrel Tablet  fenofibrate Tablet  glucagon  Injectable  insulin glargine Injectable (LANTUS)  insulin lispro (ADMELOG) corrective regimen sliding scale  insulin lispro Injectable (ADMELOG)  sodium chloride 0.9% lock flush    ALLERGIES: Allergies  penicillin (Hives)    REVIEW OF SYMPTOMS:   CONSTITUTIONAL: No fever, no chills, no weight loss, no weight gain, no fatigue   ENMT:  No vertigo; No sinus or throat pain  NECK: No pain or stiffness  CARDIOVASCULAR: No hx of chf, no cp at this times  denies syncopal events  RESPIRATORY: no Shortness of breath, no cough, no wheezing  : No dysuria, no hematuria   GI: No dark color stool, no nausea, no diarrhea, no constipation, no abdominal pain   NEURO: No headache, no slurred speech   MUSCULOSKELETAL: No joint pain or swelling; No muscle, back, or extremity pain  PSYCH: No agitation, no anxiety.    ALL OTHER REVIEW OF SYSTEMS ARE NEGATIVE.      Vital Signs Last 24 Hrs  T(C): 36.7 (07 Oct 2023 08:01), Max: 36.9 (07 Oct 2023 00:53)  T(F): 98 (07 Oct 2023 08:01), Max: 98.4 (07 Oct 2023 00:53)  HR: 63 (07 Oct 2023 08:01) (62 - 83)  BP: 134/82 (07 Oct 2023 08:01) (120/74 - 146/78)  BP(mean): 88 (07 Oct 2023 05:26) (88 - 89)  RR: 16 (07 Oct 2023 08:01) (16 - 18)  SpO2: 96% (07 Oct 2023 08:01) (95% - 99%)    Parameters below as of 07 Oct 2023 08:01  Patient On (Oxygen Delivery Method): room air      INTAKE AND OUTPUT:   10-06 @ 07:01  -  10-07 @ 07:00  --------------------------------------------------------  IN: 90 mL / OUT: 0 mL / NET: 90 mL  10-07 @ 07:01  -  10-07 @ 11:52  --------------------------------------------------------  IN: 200 mL / OUT: 450 mL / NET: -250 mL      PHYSICAL EXAM:  Constitutional: Comfortable . No acute distress.   HEENT: Atraumatic and normocephalic , neck is supple . no JVD. No carotid bruit.  CNS: A&Ox3. No focal deficits.   Respiratory: CTAB, unlabored   Cardiovascular: RRR normal s1 s2. 3/6 late peaking murmur at lsb  Gastrointestinal: Soft, non-tender. +Bowel sounds.   MSK: full ROM extremities x 4  Extremities: No edema. No cyanosis multiple scars on lower ext bilaterlly  Psychiatric: Calm . no agitation.   Skin: Warm and dry, no ulcers on extremities     LABS:                        12.3   6.51  )-----------( 313      ( 07 Oct 2023 02:30 )             37.8     10-07    137  |  103  |  29.2<H>  ----------------------------<  142<H>  4.3   |  24.0  |  1.72<H>    Ca    8.9      07 Oct 2023 02:30    TPro  6.6  /  Alb  3.7  /  TBili  0.2<L>  /  DBili  x   /  AST  30  /  ALT  44<H>  /  AlkPhos  50  10-07      ;p-BNP=  PT/INR - ( 07 Oct 2023 02:30 )   PT: 10.9 sec;   INR: 0.98 ratio    PTT - ( 07 Oct 2023 02:30 )  PTT:35.3 sec  Urinalysis Basic - ( 07 Oct 2023 02:30 )  Color: x / Appearance: x / SG: x / pH: x  Gluc: 142 mg/dL / Ketone: x  / Bili: x / Urobili: x   Blood: x / Protein: x / Nitrite: x   Leuk Esterase: x / RBC: x / WBC x   Sq Epi: x / Non Sq Epi: x / Bacteria: x    INTERPRETATION OF TELEMETRY: NSR  ECG: NSR non specific st changes RAD  Prior ECG: Yes [  ] No [x  ]    RADIOLOGY & ADDITIONAL STUDIES:    X-ray:  reviewed by me. NAPD

## 2023-10-07 NOTE — H&P ADULT - PROBLEM SELECTOR PLAN 2
Long standing history of CAD s/p 8 stents.  S/p recent PCI of ISR of LAD x 2 and PTCA of diag on 9/29/23 at [facility].   Patient was re-cathed 10/6 to confirm patent stents.  Continue Imdur and Ranexa.  Continue Toprol 200XL QD as tolerated by HR and BP.   Continue Lipitor and Tricor.

## 2023-10-07 NOTE — H&P ADULT - PROBLEM SELECTOR PLAN 7
VM received from patient stating she dropped off a urine specimen to the [email or web address] lab today. Patient reports worsening sx and is requesting a antibiotic called into [facility] in [facility]. RNROBINA returned call to patient -she reports malodorous urine, dysuria, frequency, lower abdominal pain. Results of urinalysis pending. Hydration encouraged.
SCDs for DVT prophylaxis. Chemical anticoagulation to start pending labs.  Protonix for GI prophylaxis.

## 2023-10-07 NOTE — H&P ADULT - HISTORY OF PRESENT ILLNESS
58 year old male with a medical history of HTN, HLD, type 2 DM (HA1c 9.6 on Insulin), CKD 3, Obesity Class II, CAD s/p 8 stents, recent admission to [facility] 9/29-9/30 for chest pain s/p PCI of ISR of LADx2 and PTCA of diag on 9/29/23, since readmitted to [facility] 10/3 after presenting with recurrent chest pain and increased CANNON since recent PCI. TTE revealed aortic stenosis (FIDEL 1, mG 27, pG 50), could not rule out bicuspid valve. Patient was re-cathed 10/6 to confirm patent stents. Patient now transferred to [facility] 10/6/23 for surgical evaluation under Dr. Cardozo.

## 2023-10-07 NOTE — CONSULT NOTE ADULT - PROBLEM SELECTOR RECOMMENDATION 9
Severe  FIDEL 1, mG 27, pG 50)  LV - left ventricle is normal in size, wall motion and contractility   No s/s of congestive heart failure  will get MARIBEL to assess aortic valve

## 2023-10-07 NOTE — H&P ADULT - NEGATIVE NEUROLOGICAL SYMPTOMS
no generalized seizures/no focal seizures/no syncope/no headache/no loss of consciousness/no hemiparesis/no confusion/no facial palsy

## 2023-10-07 NOTE — H&P ADULT - PROBLEM SELECTOR PLAN 5
HA1c 9.6 on Lantus 60u QHS and premeal insulin 45u TID.   Fingersticks AC/HS while inpatient with Lantus, premeal, and sliding scale insulin ordered for BG coverage.   Endocrine consult to be called today.

## 2023-10-07 NOTE — CONSULT NOTE ADULT - NS ATTEND AMEND GEN_ALL_CORE FT
-    58M hx HTN, HLD, Uncontrolled DM, CKD 3, Obesity Class II, CAD s/p 8 stents, recent admission to [facility] 9/29-9/30 for chest pain s/p PCI of ISR of LADx2 and PTCA of diag on 9/29/23, since readmitted to [facility] 10/3 after presenting with recurrent chest pain and increased CANNON since recent PCI. TTE revealed aortic stenosis (FIDEL 1, mG 27, pG 50), could not rule out bicuspid valve. Patient was re-cathed 10/6 to confirm patent stents. Patient now transferred to [facility] 10/6/23 for surgical evaluation under Dr. Cardozo.  We were asked to see patient for aortic stenosis for bicuspid valve.  FIDEL 1, mG 27, pG 50), could not rule out bicuspid valve.  echo could not confirm bicuspid valve  Patient reports a stable anginal pattern with walking .         Aortic stenosis:  Severe  FIDEL 1, mG 27, pG 50)  LV - left ventricle is normal in size, wall motion and contractility   No s/s of congestive heart failure  will get MARIBEL to assess aortic valve.    CAD s/p stent  last cath 10/6 stens patent  Imdur, Metoprolol, asa, atorvastatin and plavix

## 2023-10-07 NOTE — H&P ADULT - NSHPPHYSICALEXAM_GEN_ALL_CORE
General: Lying in bed in NAD  Neuro: A+O x 3, non-focal, speech clear and intact  HEENT:  NCAT, No conjuctival edema or icterus, no thrush.   Neck: Supple, trachea midline  Pulm: CTA bilaterally, no accessory muscle use noted  CV: regular rate, regular rhythm, +S1S2, +murmur   Abd: soft, NT, ND, + BS  Ext: DALLAS x 4, no edema, no cyanosis, distal motor/neuro/circ intact  Right groin cath site with dressing C/D/I, soft, no hemtoma noted  Skin: warm, dry, perfused, bruises vs scars anterior shins

## 2023-10-07 NOTE — H&P ADULT - NSHPLABSRESULTS_GEN_ALL_CORE
TTE:  < from: TTE Echo Complete w/o Contrast w/ Doppler (10.04.23 @ 14:12) >  Summary  The mitral valve leaflets appear thickened. Trace mitral regurgitation is present.  The aortic valve is not well visualized, but appears to be Significant fibrocalcific changes noted to the aortic valve leaflets with restriction in leaflet excursion. Peak and mean transaortic gradients are 50 and 27 mmHg respectively; VTI index 0.26 FIDEL 1.0 sq cm suggestive moderate to severe aortic stenosis. ( clinical correlation recommended ). The tricuspid valve leaflets are thin and pliable; valve motion is normal. Trace tricuspid valve regurgitation is present.  The left ventricle is normal in size, wall motion and contractility as seen in limited views. Mild concentric left ventricular hypertrophy is present. Estimated left ventricular ejection fraction is 60-65 %. Grade I Diastolic Dysfunction is present with normal LAP. Normal appearing right ventricle structure and function.  < end of copied text >    Cardiac Cath:  < from: Cardiac Catheterization (08.17.22 @ 12:28) >  Impression  Interventional Conclusions  Successful Coronary Intervention BEATRICE of proximal LAD.  < end of copied text >    CT Chest:  < from: CT Chest No Cont (10.04.23 @ 09:38) >  FINDINGS:  LUNGS AND AIRWAYS: Patent central airways.  Lungs are clear.  PLEURA: No pleural effusion. No pneumothorax  MEDIASTINUM AND LUCHO: No lymphadenopathy.  VESSELS: Mild atherosclerotic calcification of the aorta. Left coronary artery stent. Dense calcification at the level of the aortic valve  HEART: Heart size is normal. No pericardial effusion.  CHEST WALL AND LOWER NECK: Within normal limits.  VISUALIZED UPPER ABDOMEN: Cholecystectomy clips. Fatty infiltration of the liver  BONES: Degenerative changes of the spine are appreciated  IMPRESSION:  No acute pathology. Left coronary artery stent  < end of copied text >    Renal US:  < from: US Kidney and Bladder (10.04.23 @ 11:42) >  FINDINGS:  Right kidney: 11.7 cm. No renal mass, hydronephrosis or calculi.  Left kidney: 11.7 cm. No renal mass, hydronephrosis or calculi.  Urinary bladder: Minimally distended, precluding assessment.  The prostate appears increased in size with volume of 34 cc.  IMPRESSION:  No evidence for bilateral hydronephrosis.  < end of copied text >      10-06    138  |  109<H>  |  33<H>  ----------------------------<  134<H>  4.3   |  25  |  1.70<H>    Ca    8.9      06 Oct 2023 09:29

## 2023-10-07 NOTE — H&P ADULT - PROBLEM SELECTOR PLAN 1
TTE at [facility] revealed aortic stenosis (FIDEL 1, mG 27, pG 50), could not rule out bicuspid valve.  Transferred to [facility] 10/6/23 for surgical evaluation under Dr. Cardozo.  Preoperative workup underway.  Follow up b/l carotid US, PFTs, CXR, labs, UA, etc.   Continue Toprol 200XL QD as tolerated by HR and BP.   Nephrology and Endocrine consults to be called today.  Plan to be discussed / reviewed with CT Surgery attending / team during AM rounds.

## 2023-10-07 NOTE — H&P ADULT - NSHPSOCIALHISTORY_GEN_ALL_CORE
Occupation:   Lives with wife in a private home. Has adult daughter.  3 stairs to get into home. No stairs in home.  Functionally independent.

## 2023-10-07 NOTE — CONSULT NOTE ADULT - PROBLEM SELECTOR RECOMMENDATION 2
s/p stent  last cath stens open  Imdur, Metoprolol, asa, atorvastatin and plavix    CARDIAC MEDS  isosorbide   mononitrate ER Tablet (IMDUR) 30 milliGRAM(s) Oral daily  metoprolol succinate  milliGRAM(s) Oral daily  ranolazine 500 milliGRAM(s) Oral two times a day  aspirin enteric coated  atorvastatin 40mg qd  clopidogrel Tablet 75mg qd  fenofibrate Tablet

## 2023-10-07 NOTE — CONSULT NOTE ADULT - SUBJECTIVE AND OBJECTIVE BOX
HPI:  58 year old male with a medical history of HTN, HLD, type 2 DM (HA1c 9.6 on Insulin), CKD 3, Obesity Class II, CAD s/p 8 stents, recent admission to [facility] 9/29-9/30 for chest pain s/p PCI of ISR of LADx2 and PTCA of diag on 9/29/23, since readmitted to [facility] 10/3 after presenting with recurrent chest pain and increased CANNON since recent PCI. TTE revealed aortic stenosis (FIDEL 1, mG 27, pG 50), could not rule out bicuspid valve. Patient was re-cathed 10/6 to confirm patent stents. Patient now transferred to [facility] 10/6/23 for surgical evaluation under Dr. Cardozo.  (07 Oct 2023 02:39)  Consult requested due to uncontrolled diabetes.  h/o DM type 2 for more than 20 years, recently under the care of Dr. Ibarra. outpatient control has been poor despite very large doses of insulin (toujeo 140 units and mealtime insulin up to 55 units). he uses a shravan CGm, which reveals evidence of marked postprandial elevations as portion control of meals has been problematic.  He developed pancreatitis following use of trulicity, and was unable to tolerate a sglt-2.      PAST MEDICAL & SURGICAL HISTORY:  Essential hypertension      Obstructive sleep apnea      Diabetes mellitus      HLD (hyperlipidemia)      CAD (coronary artery disease)      Pancreatitis      History of coronary artery stent placement  Placed 9/12/18 by Dr. Kaye      S/P cholecystectomy          FAMILY HISTORY:  Family history of diabetes mellitus (Sibling)    FH: heart disease (Sibling, Father, Mother)        SOCIAL HISTORY:    REVIEW OF SYSTEMS:  as noted in HPI    MEDICATIONS  (STANDING):  aspirin enteric coated 81 milliGRAM(s) Oral daily  atorvastatin 40 milliGRAM(s) Oral at bedtime  clopidogrel Tablet 75 milliGRAM(s) Oral daily  dextrose 5%. 1000 milliLiter(s) (100 mL/Hr) IV Continuous <Continuous>  dextrose 5%. 1000 milliLiter(s) (50 mL/Hr) IV Continuous <Continuous>  dextrose 50% Injectable 25 Gram(s) IV Push once  dextrose 50% Injectable 12.5 Gram(s) IV Push once  fenofibrate Tablet 145 milliGRAM(s) Oral daily  glucagon  Injectable 1 milliGRAM(s) IntraMuscular once  insulin glargine Injectable (LANTUS) 60 Unit(s) SubCutaneous at bedtime  insulin lispro (ADMELOG) corrective regimen sliding scale   SubCutaneous Before meals and at bedtime  insulin lispro Injectable (ADMELOG) 45 Unit(s) SubCutaneous three times a day before meals  isosorbide   mononitrate ER Tablet (IMDUR) 30 milliGRAM(s) Oral daily  metoprolol succinate  milliGRAM(s) Oral daily  pantoprazole    Tablet 40 milliGRAM(s) Oral before breakfast  ranolazine 500 milliGRAM(s) Oral two times a day  sodium chloride 0.9% lock flush 3 milliLiter(s) IV Push every 8 hours    MEDICATIONS  (PRN):  dextrose Oral Gel 15 Gram(s) Oral once PRN Blood Glucose LESS THAN 70 milliGRAM(s)/deciliter      Allergies    penicillin (Hives)    Intolerances          PHYSICAL EXAM:    Vital Signs Last 24 Hrs  T(C): 36.7 (07 Oct 2023 13:49), Max: 36.9 (07 Oct 2023 00:53)  T(F): 98 (07 Oct 2023 13:49), Max: 98.4 (07 Oct 2023 00:53)  HR: 65 (07 Oct 2023 13:49) (62 - 83)  BP: 144/77 (07 Oct 2023 13:49) (120/74 - 144/77)  BP(mean): 88 (07 Oct 2023 05:26) (88 - 89)  RR: 17 (07 Oct 2023 13:49) (16 - 18)  SpO2: 96% (07 Oct 2023 13:49) (95% - 99%)    Parameters below as of 07 Oct 2023 13:49  Patient On (Oxygen Delivery Method): room air        General appearance: Well developed, well nourished. Generalized obesity    Eyes: Pupils equal    Neck: Trachea midline. No thyroid enlargement.    Lungs: Normal respiratory excursion. Lungs clear.    CV: Regular cardiac rhythm. Systolic murmur at base    Abdomen: Soft, non tender, no organomegaly or mass. Lipohypertrophy at injection sites    Musculoskeletal: No cyanosis, clubbing, or edema.     Skin: Warm and moist. No rash. No acanthosis.    Neuro: Cranial nerves intact. Normal motor function.     Psych: Normal affect, good judgement.            LABS:                        12.3   6.51  )-----------( 313      ( 07 Oct 2023 02:30 )             37.8     10-07    137  |  103  |  29.2<H>  ----------------------------<  142<H>  4.3   |  24.0  |  1.72<H>    Ca    8.9      07 Oct 2023 02:30    TPro  6.6  /  Alb  3.7  /  TBili  0.2<L>  /  DBili  x   /  AST  30  /  ALT  44<H>  /  AlkPhos  50  10-07    Urinalysis Basic - ( 07 Oct 2023 02:30 )    Color: x / Appearance: x / SG: x / pH: x  Gluc: 142 mg/dL / Ketone: x  / Bili: x / Urobili: x   Blood: x / Protein: x / Nitrite: x   Leuk Esterase: x / RBC: x / WBC x   Sq Epi: x / Non Sq Epi: x / Bacteria: x      LIVER FUNCTIONS - ( 07 Oct 2023 02:30 )  Alb: 3.7 g/dL / Pro: 6.6 g/dL / ALK PHOS: 50 U/L / ALT: 44 U/L / AST: 30 U/L / GGT: x           A1C 9.4        POCT Blood Glucose.: 81 mg/dL (10-07-23 @ 17:06)  POCT Blood Glucose.: 71 mg/dL (10-07-23 @ 17:04)  POCT Blood Glucose.: 135 mg/dL (10-07-23 @ 12:48)  POCT Blood Glucose.: 159 mg/dL (10-07-23 @ 08:24)  POCT Blood Glucose.: 189 mg/dL (10-06-23 @ 21:17)  POCT Blood Glucose.: 289 mg/dL (10-06-23 @ 17:41)  POCT Blood Glucose.: 121 mg/dL (10-06-23 @ 06:03)  POCT Blood Glucose.: 211 mg/dL (10-05-23 @ 21:27)  POCT Blood Glucose.: 142 mg/dL (10-05-23 @ 16:53)  POCT Blood Glucose.: 145 mg/dL (10-05-23 @ 12:43)  POCT Blood Glucose.: 171 mg/dL (10-05-23 @ 08:59)  POCT Blood Glucose.: 129 mg/dL (10-04-23 @ 20:46)      RADIOLOGY & ADDITIONAL STUDIES:

## 2023-10-08 LAB
ALBUMIN SERPL ELPH-MCNC: 3.5 G/DL — SIGNIFICANT CHANGE UP (ref 3.3–5.2)
ALP SERPL-CCNC: 43 U/L — SIGNIFICANT CHANGE UP (ref 40–120)
ALT FLD-CCNC: 38 U/L — SIGNIFICANT CHANGE UP
ANION GAP SERPL CALC-SCNC: 10 MMOL/L — SIGNIFICANT CHANGE UP (ref 5–17)
AST SERPL-CCNC: 25 U/L — SIGNIFICANT CHANGE UP
BASE EXCESS BLDA CALC-SCNC: -1.1 MMOL/L — SIGNIFICANT CHANGE UP (ref -2–3)
BASOPHILS # BLD AUTO: 0.06 K/UL — SIGNIFICANT CHANGE UP (ref 0–0.2)
BASOPHILS NFR BLD AUTO: 0.9 % — SIGNIFICANT CHANGE UP (ref 0–2)
BILIRUB SERPL-MCNC: 0.2 MG/DL — LOW (ref 0.4–2)
BUN SERPL-MCNC: 23.6 MG/DL — HIGH (ref 8–20)
CALCIUM SERPL-MCNC: 8.9 MG/DL — SIGNIFICANT CHANGE UP (ref 8.4–10.5)
CHLORIDE SERPL-SCNC: 103 MMOL/L — SIGNIFICANT CHANGE UP (ref 96–108)
CO2 SERPL-SCNC: 25 MMOL/L — SIGNIFICANT CHANGE UP (ref 22–29)
CREAT SERPL-MCNC: 1.5 MG/DL — HIGH (ref 0.5–1.3)
EGFR: 54 ML/MIN/1.73M2 — LOW
EOSINOPHIL # BLD AUTO: 0.22 K/UL — SIGNIFICANT CHANGE UP (ref 0–0.5)
EOSINOPHIL NFR BLD AUTO: 3.4 % — SIGNIFICANT CHANGE UP (ref 0–6)
GLUCOSE BLDC GLUCOMTR-MCNC: 161 MG/DL — HIGH (ref 70–99)
GLUCOSE BLDC GLUCOMTR-MCNC: 183 MG/DL — HIGH (ref 70–99)
GLUCOSE BLDC GLUCOMTR-MCNC: 186 MG/DL — HIGH (ref 70–99)
GLUCOSE BLDC GLUCOMTR-MCNC: 214 MG/DL — HIGH (ref 70–99)
GLUCOSE SERPL-MCNC: 188 MG/DL — HIGH (ref 70–99)
HCO3 BLDA-SCNC: 23 MMOL/L — SIGNIFICANT CHANGE UP (ref 21–28)
HCT VFR BLD CALC: 37.1 % — LOW (ref 39–50)
HGB BLD-MCNC: 12 G/DL — LOW (ref 13–17)
HOROWITZ INDEX BLDA+IHG-RTO: SIGNIFICANT CHANGE UP
IMM GRANULOCYTES NFR BLD AUTO: 2.8 % — HIGH (ref 0–0.9)
LYMPHOCYTES # BLD AUTO: 1.78 K/UL — SIGNIFICANT CHANGE UP (ref 1–3.3)
LYMPHOCYTES # BLD AUTO: 27.3 % — SIGNIFICANT CHANGE UP (ref 13–44)
MCHC RBC-ENTMCNC: 29.4 PG — SIGNIFICANT CHANGE UP (ref 27–34)
MCHC RBC-ENTMCNC: 32.3 GM/DL — SIGNIFICANT CHANGE UP (ref 32–36)
MCV RBC AUTO: 90.9 FL — SIGNIFICANT CHANGE UP (ref 80–100)
MONOCYTES # BLD AUTO: 0.63 K/UL — SIGNIFICANT CHANGE UP (ref 0–0.9)
MONOCYTES NFR BLD AUTO: 9.6 % — SIGNIFICANT CHANGE UP (ref 2–14)
NEUTROPHILS # BLD AUTO: 3.66 K/UL — SIGNIFICANT CHANGE UP (ref 1.8–7.4)
NEUTROPHILS NFR BLD AUTO: 56 % — SIGNIFICANT CHANGE UP (ref 43–77)
PCO2 BLDA: 36 MMHG — SIGNIFICANT CHANGE UP (ref 35–48)
PH BLDA: 7.42 — SIGNIFICANT CHANGE UP (ref 7.35–7.45)
PLATELET # BLD AUTO: 297 K/UL — SIGNIFICANT CHANGE UP (ref 150–400)
PO2 BLDA: 100 MMHG — SIGNIFICANT CHANGE UP (ref 83–108)
POTASSIUM SERPL-MCNC: 4.6 MMOL/L — SIGNIFICANT CHANGE UP (ref 3.5–5.3)
POTASSIUM SERPL-SCNC: 4.6 MMOL/L — SIGNIFICANT CHANGE UP (ref 3.5–5.3)
PROT SERPL-MCNC: 6.4 G/DL — LOW (ref 6.6–8.7)
RBC # BLD: 4.08 M/UL — LOW (ref 4.2–5.8)
RBC # FLD: 13.2 % — SIGNIFICANT CHANGE UP (ref 10.3–14.5)
SAO2 % BLDA: 99.4 % — HIGH (ref 94–98)
SODIUM SERPL-SCNC: 138 MMOL/L — SIGNIFICANT CHANGE UP (ref 135–145)
WBC # BLD: 6.53 K/UL — SIGNIFICANT CHANGE UP (ref 3.8–10.5)
WBC # FLD AUTO: 6.53 K/UL — SIGNIFICANT CHANGE UP (ref 3.8–10.5)

## 2023-10-08 PROCEDURE — 99232 SBSQ HOSP IP/OBS MODERATE 35: CPT

## 2023-10-08 PROCEDURE — 99222 1ST HOSP IP/OBS MODERATE 55: CPT

## 2023-10-08 PROCEDURE — 93970 EXTREMITY STUDY: CPT | Mod: 26

## 2023-10-08 RX ORDER — ASPIRIN/CALCIUM CARB/MAGNESIUM 324 MG
81 TABLET ORAL DAILY
Refills: 0 | Status: DISCONTINUED | OUTPATIENT
Start: 2023-10-08 | End: 2023-10-13

## 2023-10-08 RX ORDER — INSULIN GLARGINE 100 [IU]/ML
30 INJECTION, SOLUTION SUBCUTANEOUS AT BEDTIME
Refills: 0 | Status: DISCONTINUED | OUTPATIENT
Start: 2023-10-08 | End: 2023-10-09

## 2023-10-08 RX ADMIN — Medication 2: at 17:27

## 2023-10-08 RX ADMIN — RANOLAZINE 500 MILLIGRAM(S): 500 TABLET, FILM COATED, EXTENDED RELEASE ORAL at 17:26

## 2023-10-08 RX ADMIN — Medication 145 MILLIGRAM(S): at 11:34

## 2023-10-08 RX ADMIN — INSULIN GLARGINE 30 UNIT(S): 100 INJECTION, SOLUTION SUBCUTANEOUS at 22:12

## 2023-10-08 RX ADMIN — Medication 2: at 22:13

## 2023-10-08 RX ADMIN — PANTOPRAZOLE SODIUM 40 MILLIGRAM(S): 20 TABLET, DELAYED RELEASE ORAL at 05:32

## 2023-10-08 RX ADMIN — ATORVASTATIN CALCIUM 40 MILLIGRAM(S): 80 TABLET, FILM COATED ORAL at 21:17

## 2023-10-08 RX ADMIN — MUPIROCIN 1 APPLICATION(S): 20 OINTMENT TOPICAL at 05:35

## 2023-10-08 RX ADMIN — SODIUM CHLORIDE 3 MILLILITER(S): 9 INJECTION INTRAMUSCULAR; INTRAVENOUS; SUBCUTANEOUS at 21:16

## 2023-10-08 RX ADMIN — Medication 45 UNIT(S): at 12:15

## 2023-10-08 RX ADMIN — Medication 200 MILLIGRAM(S): at 05:32

## 2023-10-08 RX ADMIN — MUPIROCIN 1 APPLICATION(S): 20 OINTMENT TOPICAL at 17:26

## 2023-10-08 RX ADMIN — Medication 2: at 12:16

## 2023-10-08 RX ADMIN — RANOLAZINE 500 MILLIGRAM(S): 500 TABLET, FILM COATED, EXTENDED RELEASE ORAL at 05:31

## 2023-10-08 RX ADMIN — Medication 45 UNIT(S): at 08:43

## 2023-10-08 RX ADMIN — Medication 45 UNIT(S): at 17:27

## 2023-10-08 RX ADMIN — ISOSORBIDE MONONITRATE 30 MILLIGRAM(S): 60 TABLET, EXTENDED RELEASE ORAL at 11:34

## 2023-10-08 RX ADMIN — SODIUM CHLORIDE 3 MILLILITER(S): 9 INJECTION INTRAMUSCULAR; INTRAVENOUS; SUBCUTANEOUS at 05:32

## 2023-10-08 RX ADMIN — HEPARIN SODIUM 5000 UNIT(S): 5000 INJECTION INTRAVENOUS; SUBCUTANEOUS at 05:34

## 2023-10-08 RX ADMIN — Medication 4: at 08:43

## 2023-10-08 RX ADMIN — SODIUM CHLORIDE 3 MILLILITER(S): 9 INJECTION INTRAMUSCULAR; INTRAVENOUS; SUBCUTANEOUS at 14:00

## 2023-10-08 RX ADMIN — Medication 81 MILLIGRAM(S): at 11:34

## 2023-10-08 NOTE — PROGRESS NOTE ADULT - SUBJECTIVE AND OBJECTIVE BOX
Aortic Stenosis, preoperative evaluation for AVR    HPI:  58 year old male with a medical history of HTN, HLD, type 2 DM (HA1c 9.6 on Insulin), CKD 3, Obesity Class II, CAD s/p 8 stents, recent admission to [facility] - for chest pain s/p PCI of ISR of LADx2 and PTCA of diag on 23, since readmitted to [facility] 10/3 after presenting with recurrent chest pain and increased CANNON since recent PCI. TTE revealed aortic stenosis (FIDEL 1, mG 27, pG 50), could not rule out bicuspid valve. Patient was re-cathed 10/6 to confirm patent stents. Patient now transferred to [facility] 10/6/23 for surgical evaluation under Dr. Cardozo.  (07 Oct 2023 02:39)    PAST MEDICAL & SURGICAL HISTORY:  Essential hypertension  Obstructive sleep apnea  Diabetes mellitus  CAD (coronary artery disease)  Pancreatitis  HLD (hyperlipidemia)  History of coronary artery stent placement Placed 18 by Dr. Kaye  S/P cholecystectomy    FAMILY HISTORY:  Family history of diabetes mellitus (Sibling)  FH: heart disease (Sibling, Father, Mother)    Brief Hospital Course: 58 year old male with a medical history of HTN, HLD, type 2 DM (HA1c 9.6 on Insulin), CKD 3, Obesity Class II, CAD s/p 8 stents, recent admission to [facility] - for chest pain s/p PCI of ISR of LAD x 2 and PTCA of diag on 23, since readmitted to [facility] 10/3 after presenting with recurrent chest pain and increased CANNON since recent PCI. TTE revealed aortic stenosis (FIDEL 1, mG 27, pG 50), could not rule out bicuspid valve. Patient was re-cathed 10/6 to confirm patent stents. Patient now transferred to [facility] 10/6/23 for surgical evaluation under Dr. Cardozo.     Significant recent/past 24 hr events: No overnight events reported.    Subjective: Patient lying in bed in NAD. +Tolerating diet. +Passing gas. No pain elicited at this time. Denies fevers, chills, lightheadedness, dizziness, HA, CP, palpitations, SOB, cough, abdominal pain, N/V, diarrhea, numbness/tingling in extremities, or any other acute complaints. ROS negative x 10 systems except as noted above.    MEDICATIONS  (STANDING):  aspirin enteric coated 81 milliGRAM(s) Oral daily  atorvastatin 40 milliGRAM(s) Oral at bedtime  clopidogrel Tablet 75 milliGRAM(s) Oral daily  fenofibrate Tablet 145 milliGRAM(s) Oral daily  heparin   Injectable 5000 Unit(s) SubCutaneous every 8 hours  insulin glargine Injectable (LANTUS) 60 Unit(s) SubCutaneous at bedtime  insulin lispro (ADMELOG) corrective regimen sliding scale   SubCutaneous Before meals and at bedtime  insulin lispro Injectable (ADMELOG) 45 Unit(s) SubCutaneous three times a day before meals  isosorbide   mononitrate ER Tablet (IMDUR) 30 milliGRAM(s) Oral daily  metoprolol succinate  milliGRAM(s) Oral daily  mupirocin 2% Nasal 1 Application(s) Both Nostrils two times a day  pantoprazole    Tablet 40 milliGRAM(s) Oral before breakfast  ranolazine 500 milliGRAM(s) Oral two times a day  sodium chloride 0.9% lock flush 3 milliLiter(s) IV Push every 8 hours    MEDICATIONS  (PRN):  dextrose Oral Gel 15 Gram(s) Oral once PRN Blood Glucose LESS THAN 70 milliGRAM(s)/deciliter    Allergies: penicillin (Hives)    Vitals   T(C): 36.6 (08 Oct 2023 00:00), Max: 36.8 (07 Oct 2023 21:57)  T(F): 97.8 (08 Oct 2023 00:00), Max: 98.3 (07 Oct 2023 21:57)  HR: 59 (08 Oct 2023 00:00) (59 - 74)  BP: 136/84 (08 Oct 2023 00:00) (122/72 - 144/77)  BP(mean): 88 (07 Oct 2023 05:26) (88 - 88)  RR: 17 (08 Oct 2023 00:00) (16 - 18)  SpO2: 97% (08 Oct 2023 00:00) (95% - 100%)  O2 Parameters below as of 08 Oct 2023 00:00  Patient On (Oxygen Delivery Method): room air    I&O's Detail    06 Oct 2023 07:01  -  07 Oct 2023 07:00  --------------------------------------------------------  IN:    Oral Fluid: 90 mL  Total IN: 90 mL    OUT:  Total OUT: 0 mL    Total NET: 90 mL      07 Oct 2023 07:01  -  08 Oct 2023 02:51  --------------------------------------------------------  IN:    Oral Fluid: 200 mL  Total IN: 200 mL    OUT:    Voided (mL): 450 mL  Total OUT: 450 mL    Total NET: -250 mL    Physical Exam  General: Lying in bed in NAD  Neuro: A+O x 3, non-focal, speech clear and intact  HEENT:  NCAT, No conjuctival edema or icterus, no thrush.   Neck: Supple, trachea midline  Pulm: CTA bilaterally, no accessory muscle use noted  CV: regular rate, regular rhythm, +S1S2, +murmur   Abd: soft, NT, ND, + BS  Ext: [place] x 4, no edema, no cyanosis, distal motor/neuro/circ intact  Right groin cath site with dressing C/D/I, soft, no hemtoma noted  Skin: warm, dry, perfused, scars to anterior shins    LABS                        12.3   6.51  )-----------( 313      ( 07 Oct 2023 02:30 )             37.8     10-    137  |  103  |  29.2<H>  ----------------------------<  142<H>  4.3   |  24.0  |  1.72<H>    Ca    8.9      07 Oct 2023 02:30    TPro  6.6  /  Alb  3.7  /  TBili  0.2<L>  /  DBili  x   /  AST  30  /  ALT  44<H>  /  AlkPhos  50  10-07    PT/INR - ( 07 Oct 2023 02:30 )   PT: 10.9 sec;   INR: 0.98 ratio       PTT - ( 07 Oct 2023 02:30 )  PTT:35.3 sec    P2Y12 Plt Response Test . (10.07.23 @ 02:30)    P2Y12 Plt Reactivity: 153    Thyroid Stimulating Hormone, Serum (10.07.23 @ 02:30)    Thyroid Stimulating Hormone, Serum: 4.61 uIU/mL    A1C with Estimated Average Glucose (10.07.23 @ 02:30)    A1C with Estimated Average Glucose Result: 9.6 %   Estimated Average Glucose: 229 mg/dL    POCT Blood Glucose.: 193 mg/dL (10-07-23 @ 20:58)  POCT Blood Glucose.: 81 mg/dL (10-07-23 @ 17:06)  POCT Blood Glucose.: 71 mg/dL (10-07-23 @ 17:04)  POCT Blood Glucose.: 135 mg/dL (10-07-23 @ 12:48)  POCT Blood Glucose.: 159 mg/dL (10-07-23 @ 08:24)    Prealbumin, Serum (10.07.23 @ 02:30)    Prealbumin, Serum: 21 mg/dL    Pro-Brain Natriuretic Peptide (10.07.23 @ 02:30)    Pro-Brain Natriuretic Peptide: 98    Urinalysis Basic - ( 07 Oct 2023 18:20 )  Color: Yellow / Appearance: Clear / S.020 / pH: x  Gluc: x / Ketone: Trace  / Bili: Negative / Urobili: Negative mg/dL   Blood: x / Protein: 100 / Nitrite: Negative   Leuk Esterase: Negative / RBC: 0-2 /HPF / WBC 0-2 /HPF   Sq Epi: x / Non Sq Epi: x / Bacteria: Occasional    Cardiac Cath:  < from: Cardiac Catheterization (22 @ 12:28) >  Impression  Interventional Conclusions  Successful Coronary Intervention BEATRICE of proximal LAD.  < end of copied text >    TTE:  < from: TTE Echo Complete w/o Contrast w/ Doppler (10.04.23 @ 14:12) >  Summary  The mitral valve leaflets appear thickened. Trace mitral regurgitation is present.  The aortic valve is not well visualized, but appears to be Significant fibrocalcific changes noted to the aortic valve leaflets with restriction in leaflet excursion. Peak and mean transaortic gradients are 50 and 27 mmHg respectively; VTI index 0.26 FIDEL 1.0 sq cm suggestive moderate to severe aortic stenosis. ( clinical correlation recommended ). The tricuspid valve leaflets are thin and pliable; valve motion is normal. Trace tricuspid valve regurgitation is present.  The left ventricle is normal in size, wall motion and contractility as seen in limited views. Mild concentric left ventricular hypertrophy is present. Estimated left ventricular ejection fraction is 60-65 %. Grade I Diastolic Dysfunction is present with normal LAP. Normal appearing right ventricle structure and function.  < end of copied text >    B/l Carotid US:  < from: US Duplex Carotid Arteries Complete, Bilateral (10.07.23 @ 11:35) >  Antegrade flow is noted within both vertebral arteries.  IMPRESSION: No significant hemodynamic stenosis of either carotid artery.  < end of copied text >    Last CXR:  < from: Xray Chest 1 View- PORTABLE-Urgent (10.03.23 @ 13:39) >  INTERPRETATION:  Frontal chest on October 3, 2023 at 1:34 PM. Patient has chest pain.  Heart size is normal. Left coronary stent again noted.  Lungs remain clear.  Chest is similar to  this year.  IMPRESSION: No acute finding or change.  < end of copied text >    CT Chest:  < from: CT Chest No Cont (10.04.23 @ 09:38) >  FINDINGS:  LUNGS AND AIRWAYS: Patent central airways.  Lungs are clear.  PLEURA: No pleural effusion. No pneumothorax  MEDIASTINUM AND LUCHO: No lymphadenopathy.  VESSELS: Mild atherosclerotic calcification of the aorta. Left coronary artery stent. Dense calcification at the level of the aortic valve  HEART: Heart size is normal. No pericardial effusion.  CHEST WALL AND LOWER NECK: Within normal limits.  VISUALIZED UPPER ABDOMEN: Cholecystectomy clips. Fatty infiltration of the liver  BONES: Degenerative changes of the spine are appreciated  IMPRESSION:  No acute pathology. Left coronary artery stent  < end of copied text >    Renal US:  < from: US Kidney and Bladder (10.04.23 @ 11:42) >  FINDINGS:  Right kidney: 11.7 cm. No renal mass, hydronephrosis or calculi.  Left kidney: 11.7 cm. No renal mass, hydronephrosis or calculi.  Urinary bladder: Minimally distended, precluding assessment.  The prostate appears increased in size with volume of 34 cc.  IMPRESSION:  No evidence for bilateral hydronephrosis.  < end of copied text >

## 2023-10-08 NOTE — CONSULT NOTE ADULT - SUBJECTIVE AND OBJECTIVE BOX
History of present illness: Patient is a 58-year-old male with hypertension, hyperlipidemia, diabetes mellitus, CKD 3, CAD s/p PCI who initially presented to OSH with chest pain and dyspnea on exertion.  He got LHC done on 10/6 which revealed aortic stenosis for which patient was transferred to [facility] for surgical evaluation.  Nephrology consulted for CKD 3 with history of contrast exposure.  He denied increased urination frequency, dysuria, gross hematuria, fever, chills, skin rash or itching, nausea, vomiting, diarrhea, bleeding problems and joint pain or swelling. Review of other systems was negative.    Review of systems: All systems were reviewed in detail, pertinent positive and negative have been mentioned above, otherwise negative.    Allergies: Penicillin    Past medical and surgical history: Hypertension, hyperlipidemia, diabetes mellitus, obstructive sleep apnea, pancreatitis, CAD, PCI, cholecystectomy.    Home Medications:  aspirin 81 mg oral delayed release tablet: 1 tab(s) orally once a day (06 Oct 2023 14:55)  atorvastatin 40 mg oral tablet: 1 tab(s) orally once a day (at bedtime) (03 Oct 2023 15:21)  clopidogrel 75 mg oral tablet: 1 tab(s) orally once a day (06 Oct 2023 14:55)  fenofibrate 160 mg oral tablet: 1 tab(s) orally once a day (03 Oct 2023 15:21)  insulin glargine 100 units/mL subcutaneous solution: 60 unit(s) subcutaneous once a day (at bedtime) (06 Oct 2023 14:55)  insulin lispro 100 units/mL injectable solution: 45 unit(s) injectable 3 times a day (before meals) (06 Oct 2023 14:55)  isosorbide mononitrate 30 mg oral tablet, extended release: 1 tab(s) orally once a day (06 Oct 2023 14:55)  metoprolol succinate 200 mg oral tablet, extended release: 1 tab(s) orally once a day (06 Oct 2023 14:55)  ranolazine 500 mg oral tablet, extended release: 1 tab(s) orally 2 times a day (03 Oct 2023 15:21)    Family history: No pertinent family history of renal disease or electrolyte disorder in first degree relatives.    Social history: Denies tobacco, alcohol, drug abuse.    Vital Signs Last 24 Hrs  T(C): 37 (08 Oct 2023 05:28), Max: 37 (08 Oct 2023 05:28)  T(F): 98.6 (08 Oct 2023 05:28), Max: 98.6 (08 Oct 2023 05:28)  HR: 69 (08 Oct 2023 05:28) (59 - 74)  BP: 127/76 (08 Oct 2023 05:28) (127/76 - 144/77)  RR: 18 (08 Oct 2023 05:28) (17 - 18)  SpO2: 100% (08 Oct 2023 05:28) (96% - 100%)  Parameters below as of 08 Oct 2023 05:28  Patient On (Oxygen Delivery Method): room air    Physical Exam:  Gen: no acute distress  MS: alert, conversing normally  Eyes: EOMI, no icterus  HENT: NCAT, MMM  CV: rhythm reg reg, rate normal, no m/g/r, no LE edema  Chest: CTAB, no w/r/r,  Abd: soft, NT, ND, no CVAT  Neuro: moving all 4 limbs spontaneously, no tremor  Skin: dry, warm, no rash or jaundice    10-08    138  |  103  |  23.6<H>  ----------------------------<  188<H>  4.6   |  25.0  |  1.50<H>    Ca    8.9      08 Oct 2023 05:24    TPro  6.4<L>  /  Alb  3.5  /  TBili  0.2<L>  /  DBili  x   /  AST  25  /  ALT  38  /  AlkPhos  43  10-08                        12.0   6.53  )-----------( 297      ( 08 Oct 2023 05:24 )             37.1

## 2023-10-08 NOTE — PROGRESS NOTE ADULT - ASSESSMENT
58 year old male with a medical history of HTN, HLD, type 2 DM (HA1c 9.6 on Insulin), CKD 3, Obesity Class II, CAD s/p 8 stents, recent admission to [facility] 9/29-9/30 for chest pain s/p PCI of ISR of LAD x 2 and PTCA of diag on 9/29/23, since readmitted to [facility] 10/3 after presenting with recurrent chest pain and increased CANNON since recent PCI. TTE revealed aortic stenosis (FIDEL 1, mG 27, pG 50), could not rule out bicuspid valve. Patient was re-cathed 10/6 to confirm patent stents. Patient now transferred to [facility] 10/6/23 for surgical evaluation under Dr. Cardozo.

## 2023-10-08 NOTE — PROGRESS NOTE ADULT - PROBLEM SELECTOR PLAN 1
TTE at [facility] revealed aortic stenosis (FIDEL 1, mG 27, pG 50), could not rule out bicuspid valve.  Transferred to [facility] 10/6/23 for surgical evaluation under Dr. Cardozo.  Preoperative workup as above.   Continue Toprol 200XL QD as tolerated by HR and BP.   Nephrology consult to be called today.  Cardiology and Endocrinology following.   Plan to be discussed / reviewed with CT Surgery attending / team during AM rounds.

## 2023-10-09 LAB
ANION GAP SERPL CALC-SCNC: 12 MMOL/L — SIGNIFICANT CHANGE UP (ref 5–17)
BUN SERPL-MCNC: 20.8 MG/DL — HIGH (ref 8–20)
CALCIUM SERPL-MCNC: 9.2 MG/DL — SIGNIFICANT CHANGE UP (ref 8.4–10.5)
CHLORIDE SERPL-SCNC: 103 MMOL/L — SIGNIFICANT CHANGE UP (ref 96–108)
CO2 SERPL-SCNC: 25 MMOL/L — SIGNIFICANT CHANGE UP (ref 22–29)
CREAT SERPL-MCNC: 1.45 MG/DL — HIGH (ref 0.5–1.3)
EGFR: 57 ML/MIN/1.73M2 — LOW
GLUCOSE BLDC GLUCOMTR-MCNC: 138 MG/DL — HIGH (ref 70–99)
GLUCOSE BLDC GLUCOMTR-MCNC: 162 MG/DL — HIGH (ref 70–99)
GLUCOSE BLDC GLUCOMTR-MCNC: 167 MG/DL — HIGH (ref 70–99)
GLUCOSE BLDC GLUCOMTR-MCNC: 210 MG/DL — HIGH (ref 70–99)
GLUCOSE SERPL-MCNC: 129 MG/DL — HIGH (ref 70–99)
HCT VFR BLD CALC: 40 % — SIGNIFICANT CHANGE UP (ref 39–50)
HGB BLD-MCNC: 13.1 G/DL — SIGNIFICANT CHANGE UP (ref 13–17)
MAGNESIUM SERPL-MCNC: 1.8 MG/DL — SIGNIFICANT CHANGE UP (ref 1.6–2.6)
MCHC RBC-ENTMCNC: 29.6 PG — SIGNIFICANT CHANGE UP (ref 27–34)
MCHC RBC-ENTMCNC: 32.8 GM/DL — SIGNIFICANT CHANGE UP (ref 32–36)
MCV RBC AUTO: 90.5 FL — SIGNIFICANT CHANGE UP (ref 80–100)
PA ADP PRP-ACNC: 146 PRU — LOW (ref 180–376)
PLATELET # BLD AUTO: 309 K/UL — SIGNIFICANT CHANGE UP (ref 150–400)
POTASSIUM SERPL-MCNC: 4.8 MMOL/L — SIGNIFICANT CHANGE UP (ref 3.5–5.3)
POTASSIUM SERPL-SCNC: 4.8 MMOL/L — SIGNIFICANT CHANGE UP (ref 3.5–5.3)
RBC # BLD: 4.42 M/UL — SIGNIFICANT CHANGE UP (ref 4.2–5.8)
RBC # FLD: 13.3 % — SIGNIFICANT CHANGE UP (ref 10.3–14.5)
SODIUM SERPL-SCNC: 140 MMOL/L — SIGNIFICANT CHANGE UP (ref 135–145)
WBC # BLD: 6.23 K/UL — SIGNIFICANT CHANGE UP (ref 3.8–10.5)
WBC # FLD AUTO: 6.23 K/UL — SIGNIFICANT CHANGE UP (ref 3.8–10.5)

## 2023-10-09 PROCEDURE — 99232 SBSQ HOSP IP/OBS MODERATE 35: CPT

## 2023-10-09 PROCEDURE — 93320 DOPPLER ECHO COMPLETE: CPT | Mod: 26

## 2023-10-09 PROCEDURE — 93312 ECHO TRANSESOPHAGEAL: CPT | Mod: 26

## 2023-10-09 PROCEDURE — 93325 DOPPLER ECHO COLOR FLOW MAPG: CPT | Mod: 26

## 2023-10-09 PROCEDURE — 99233 SBSQ HOSP IP/OBS HIGH 50: CPT

## 2023-10-09 RX ORDER — INSULIN GLARGINE 100 [IU]/ML
60 INJECTION, SOLUTION SUBCUTANEOUS AT BEDTIME
Refills: 0 | Status: ACTIVE | OUTPATIENT
Start: 2023-10-09 | End: 2024-09-06

## 2023-10-09 RX ORDER — MAGNESIUM SULFATE 500 MG/ML
2 VIAL (ML) INJECTION ONCE
Refills: 0 | Status: COMPLETED | OUTPATIENT
Start: 2023-10-09 | End: 2023-10-09

## 2023-10-09 RX ORDER — CLOPIDOGREL BISULFATE 75 MG/1
75 TABLET, FILM COATED ORAL DAILY
Refills: 0 | Status: DISCONTINUED | OUTPATIENT
Start: 2023-10-10 | End: 2023-10-11

## 2023-10-09 RX ADMIN — ATORVASTATIN CALCIUM 40 MILLIGRAM(S): 80 TABLET, FILM COATED ORAL at 21:48

## 2023-10-09 RX ADMIN — PANTOPRAZOLE SODIUM 40 MILLIGRAM(S): 20 TABLET, DELAYED RELEASE ORAL at 05:05

## 2023-10-09 RX ADMIN — INSULIN GLARGINE 60 UNIT(S): 100 INJECTION, SOLUTION SUBCUTANEOUS at 21:47

## 2023-10-09 RX ADMIN — ISOSORBIDE MONONITRATE 30 MILLIGRAM(S): 60 TABLET, EXTENDED RELEASE ORAL at 15:38

## 2023-10-09 RX ADMIN — SODIUM CHLORIDE 3 MILLILITER(S): 9 INJECTION INTRAMUSCULAR; INTRAVENOUS; SUBCUTANEOUS at 06:44

## 2023-10-09 RX ADMIN — RANOLAZINE 500 MILLIGRAM(S): 500 TABLET, FILM COATED, EXTENDED RELEASE ORAL at 05:05

## 2023-10-09 RX ADMIN — Medication 2: at 08:39

## 2023-10-09 RX ADMIN — SODIUM CHLORIDE 3 MILLILITER(S): 9 INJECTION INTRAMUSCULAR; INTRAVENOUS; SUBCUTANEOUS at 20:45

## 2023-10-09 RX ADMIN — Medication 45 UNIT(S): at 18:19

## 2023-10-09 RX ADMIN — Medication 200 MILLIGRAM(S): at 05:05

## 2023-10-09 RX ADMIN — MUPIROCIN 1 APPLICATION(S): 20 OINTMENT TOPICAL at 05:05

## 2023-10-09 RX ADMIN — Medication 25 GRAM(S): at 09:51

## 2023-10-09 RX ADMIN — MUPIROCIN 1 APPLICATION(S): 20 OINTMENT TOPICAL at 18:26

## 2023-10-09 RX ADMIN — Medication 145 MILLIGRAM(S): at 15:38

## 2023-10-09 RX ADMIN — RANOLAZINE 500 MILLIGRAM(S): 500 TABLET, FILM COATED, EXTENDED RELEASE ORAL at 18:26

## 2023-10-09 RX ADMIN — Medication 81 MILLIGRAM(S): at 15:38

## 2023-10-09 RX ADMIN — Medication 4: at 21:46

## 2023-10-09 RX ADMIN — SODIUM CHLORIDE 3 MILLILITER(S): 9 INJECTION INTRAMUSCULAR; INTRAVENOUS; SUBCUTANEOUS at 15:08

## 2023-10-09 NOTE — PROGRESS NOTE ADULT - ASSESSMENT
Pt called back and stated that he can come in tomorrow at 4:45 to talk with Dr Hill about this. Appt scheduled.   Now s/p MARIBEL without gargle, tolerated well, arrived to recovery in NAD and HDS, plan for transfer back to in-patient unit after recovery.    -Formal MARIBEL report in EMR  -Post MARIBEL management/monitoring per protocol  -Maintain NPO until fully alert and awake   -Transfer back to in-patient unit after recovery    MARIBEL Summary:   1. Left ventricular ejection fraction, by visual estimation, is 60 to   65%.   2. Normal global left ventricular systolic function.   3. Normal right ventricular size and function, inadequate estimation of   RVSP.   4. Mildly enlarged left atrium.   5. No left atrial appendage thrombus and normal left atrial appendage   velocities.   6. Mild anterior mitral valve prolapse with mild mitral regurgitation.   7. Bicuspi aortic valve with fused raphe between the right-and-left   coronary cusps with moderate calcifications and moderate stenosis (FIDEL by   direct planimetry is 1.48 cm2), peak velocity of 3.0 m/s.   8. Mild aortic regurgitation.   9. Dilatation of the sinuses of Valsalva 4.3 cm (index to BSA of 1.80   cm/m2).  10. Color flow doppler and intravenous injection of agitated saline   demonstrates the presence of an intact intra atrial septum.  11. There is no evidence of pericardial effusion.  12. No prior MARIBEL study is available for comparison. Compared to the prior   TTE study from 10/4/23, peak velocity/gradient were higher by TTE.

## 2023-10-09 NOTE — PROGRESS NOTE ADULT - ASSESSMENT
58 year old male with a medical history of HTN, HLD, type 2 DM (HA1c 9.6 on Insulin), CKD 3, Obesity Class II, CAD s/p 8 stents, recent admission to [facility] 9/29-9/30 for chest pain s/p PCI of ISR of LAD x 2 and PTCA of diag on 9/29/23, since readmitted to [facility] 10/3 after presenting with recurrent chest pain and increased CANNON since recent PCI. TTE revealed aortic stenosis (FIDEL 1, mG 27, pG 50), could not rule out bicuspid valve. Patient was re-cathed 10/6 to confirm patent stents. Patient now transferred to [facility] 10/6/23 for surgical evaluation under Dr. Cardozo.    pending MARIBEL

## 2023-10-09 NOTE — PROGRESS NOTE ADULT - ASSESSMENT
58M with PMHx HTN, HLD, T2DM, CKD 3, obesity, CAD s/p 8 stents, recent admission to [facility] 9/29-9/30 for chest pain s/p PCI of ISR of LADx2 and PTCA of diag on 9/29/23, since readmitted to [facility] 10/3 after presenting with recurrent chest pain and increased CANNON since recent PCI. Transferred to [facility] 10/6/23 for surgical evaluation under Dr. Cardozo.    Consulted for diabetes management  Home diabetes meds: Toujeo 140 units daily, mealtime insulin 55 units (He developed pancreatitis following use of trulicity, and was unable to tolerate a sglt-2)  Current a1c: 9.6%  Outpatient Endocrinologist: Dr Ibarra    1. DM2 - on large doses of insulin at home  - Glucoses controlled on current regimen  - Received lantus 30 units last night because he was NPO  - If diet is resumed later, resume lantus 60 units qhs    2. CAD  - Surgical evaluation per CT surgery    3. HLD  - Continue statin

## 2023-10-09 NOTE — PROGRESS NOTE ADULT - SUBJECTIVE AND OBJECTIVE BOX
SUBJECTIVE   "i am okay"     INTERIM HISTORY SIGNIFICANT FOR   being evaluated AVR   pending MARIBEL today     Patient is a 58y old  Male who presents with a chief complaint of Aortic Stenosis (08 Oct 2023 08:28)    HPI:  58 year old male with a medical history of HTN, HLD, type 2 DM (HA1c 9.6 on Insulin), CKD 3, Obesity Class II, CAD s/p 8 stents, recent admission to [facility] 9/29-9/30 for chest pain s/p PCI of ISR of LADx2 and PTCA of diag on 9/29/23, since readmitted to [facility] 10/3 after presenting with recurrent chest pain and increased CANNON since recent PCI. TTE revealed aortic stenosis (FIDEL 1, mG 27, pG 50), could not rule out bicuspid valve. Patient was re-cathed 10/6 to confirm patent stents. Patient now transferred to [facility] 10/6/23 for surgical evaluation under Dr. Cardozo.  (07 Oct 2023 02:39)    OBJECTIVE  PAST MEDICAL & SURGICAL HISTORY:  Essential hypertension      Obstructive sleep apnea      Diabetes mellitus      HLD (hyperlipidemia)      CAD (coronary artery disease)      Pancreatitis      History of coronary artery stent placement  Placed 9/12/18 by Dr. Kaye      S/P cholecystectomy        penicillin (Hives)    Home Medications:  aspirin 81 mg oral delayed release tablet: 1 tab(s) orally once a day (06 Oct 2023 14:55)  atorvastatin 40 mg oral tablet: 1 tab(s) orally once a day (at bedtime) (03 Oct 2023 15:21)  clopidogrel 75 mg oral tablet: 1 tab(s) orally once a day (06 Oct 2023 14:55)  fenofibrate 160 mg oral tablet: 1 tab(s) orally once a day (03 Oct 2023 15:21)  insulin glargine 100 units/mL subcutaneous solution: 60 unit(s) subcutaneous once a day (at bedtime) (06 Oct 2023 14:55)  insulin lispro 100 units/mL injectable solution: 45 unit(s) injectable 3 times a day (before meals) (06 Oct 2023 14:55)  isosorbide mononitrate 30 mg oral tablet, extended release: 1 tab(s) orally once a day (06 Oct 2023 14:55)  metoprolol succinate 200 mg oral tablet, extended release: 1 tab(s) orally once a day (06 Oct 2023 14:55)  ranolazine 500 mg oral tablet, extended release: 1 tab(s) orally 2 times a day (03 Oct 2023 15:21)    VITALS  ICU Vital Signs Last 24 Hrs  T(C): 36.7 (09 Oct 2023 05:00), Max: 36.8 (08 Oct 2023 16:54)  T(F): 98 (09 Oct 2023 05:00), Max: 98.2 (08 Oct 2023 16:54)  HR: 61 (09 Oct 2023 05:00) (61 - 73)  BP: 151/70 (09 Oct 2023 05:00) (119/71 - 155/72)  RR: 17 (09 Oct 2023 05:00) (17 - 18)  SpO2: 99% (09 Oct 2023 05:00) (98% - 99%)    O2 Parameters below as of 09 Oct 2023 05:00  Patient On (Oxygen Delivery Method): room air          LABS                        12.0   6.53  )-----------( 297      ( 08 Oct 2023 05:24 )             37.1   10-08    138  |  103  |  23.6<H>  ----------------------------<  188<H>  4.6   |  25.0  |  1.50<H>    Ca    8.9      08 Oct 2023 05:24    TPro  6.4<L>  /  Alb  3.5  /  TBili  0.2<L>  /  DBili  x   /  AST  25  /  ALT  38  /  AlkPhos  43  10-08  CAPILLARY BLOOD GLUCOSE      POCT Blood Glucose.: 161 mg/dL (08 Oct 2023 21:44)      ABG - ( 08 Oct 2023 10:39 )  pH, Arterial: 7.420 pH, Blood: x     /  pCO2: 36    /  pO2: 100   / HCO3: 23    / Base Excess: -1.1  /  SaO2: 99.4                IN/OUT    10-07-23 @ 07:01  -  10-08-23 @ 07:00  --------------------------------------------------------  IN: 200 mL / OUT: 450 mL / NET: -250 mL    10-08-23 @ 07:01  -  10-09-23 @ 05:38  --------------------------------------------------------  IN: 920 mL / OUT: 0 mL / NET: 920 mL      IMAGING  personally reviewed imaging     CURRENT MEDICATIONS  MEDICATIONS  (STANDING):  aspirin  chewable 81 milliGRAM(s) Oral daily  atorvastatin 40 milliGRAM(s) Oral at bedtime  dextrose 5%. 1000 milliLiter(s) (50 mL/Hr) IV Continuous <Continuous>  dextrose 5%. 1000 milliLiter(s) (100 mL/Hr) IV Continuous <Continuous>  dextrose 50% Injectable 25 Gram(s) IV Push once  dextrose 50% Injectable 12.5 Gram(s) IV Push once  fenofibrate Tablet 145 milliGRAM(s) Oral daily  glucagon  Injectable 1 milliGRAM(s) IntraMuscular once  insulin glargine Injectable (LANTUS) 30 Unit(s) SubCutaneous at bedtime  insulin lispro (ADMELOG) corrective regimen sliding scale   SubCutaneous Before meals and at bedtime  insulin lispro Injectable (ADMELOG) 45 Unit(s) SubCutaneous three times a day before meals  isosorbide   mononitrate ER Tablet (IMDUR) 30 milliGRAM(s) Oral daily  metoprolol succinate  milliGRAM(s) Oral daily  mupirocin 2% Nasal 1 Application(s) Both Nostrils two times a day  pantoprazole    Tablet 40 milliGRAM(s) Oral before breakfast  ranolazine 500 milliGRAM(s) Oral two times a day  sodium chloride 0.9% lock flush 3 milliLiter(s) IV Push every 8 hours    MEDICATIONS  (PRN):  dextrose Oral Gel 15 Gram(s) Oral once PRN Blood Glucose LESS THAN 70 milliGRAM(s)/deciliter

## 2023-10-09 NOTE — PROGRESS NOTE ADULT - SUBJECTIVE AND OBJECTIVE BOX
INTERVAL EVENTS:  Follow up diabetes management    ROS: Denies chest pain, sob, abd pain.     MEDICATIONS  (STANDING):  aspirin  chewable 81 milliGRAM(s) Oral daily  atorvastatin 40 milliGRAM(s) Oral at bedtime  dextrose 5%. 1000 milliLiter(s) (50 mL/Hr) IV Continuous <Continuous>  dextrose 5%. 1000 milliLiter(s) (100 mL/Hr) IV Continuous <Continuous>  dextrose 50% Injectable 12.5 Gram(s) IV Push once  dextrose 50% Injectable 25 Gram(s) IV Push once  fenofibrate Tablet 145 milliGRAM(s) Oral daily  glucagon  Injectable 1 milliGRAM(s) IntraMuscular once  insulin glargine Injectable (LANTUS) 30 Unit(s) SubCutaneous at bedtime  insulin lispro (ADMELOG) corrective regimen sliding scale   SubCutaneous Before meals and at bedtime  insulin lispro Injectable (ADMELOG) 45 Unit(s) SubCutaneous three times a day before meals  isosorbide   mononitrate ER Tablet (IMDUR) 30 milliGRAM(s) Oral daily  metoprolol succinate  milliGRAM(s) Oral daily  mupirocin 2% Nasal 1 Application(s) Both Nostrils two times a day  pantoprazole    Tablet 40 milliGRAM(s) Oral before breakfast  ranolazine 500 milliGRAM(s) Oral two times a day  sodium chloride 0.9% lock flush 3 milliLiter(s) IV Push every 8 hours    MEDICATIONS  (PRN):  dextrose Oral Gel 15 Gram(s) Oral once PRN Blood Glucose LESS THAN 70 milliGRAM(s)/deciliter    Allergies  penicillin (Hives)    Vital Signs Last 24 Hrs  T(C): 36.8 (09 Oct 2023 12:00), Max: 36.8 (08 Oct 2023 16:54)  T(F): 98.2 (09 Oct 2023 12:00), Max: 98.2 (08 Oct 2023 16:54)  HR: 62 (09 Oct 2023 12:00) (61 - 73)  BP: 174/80 (09 Oct 2023 12:00) (119/71 - 174/80)  BP(mean): --  RR: 16 (09 Oct 2023 12:00) (16 - 18)  SpO2: 100% (09 Oct 2023 12:00) (98% - 100%)    Parameters below as of 09 Oct 2023 12:00  Patient On (Oxygen Delivery Method): room air    PHYSICAL EXAM:  General: No apparent distress  Neck: Supple, trachea midline, no thyromegaly  Respiratory: Lungs clear bilaterally, normal rate, effort  Cardiac: +S1, S2, no m/r/g  GI: +BS, soft, non tender, non distended  Extremities: No peripheral edema, no pedal lesions  Neuro: A+O X3, no tremor      LABS:                        13.1   6.23  )-----------( 309      ( 09 Oct 2023 05:26 )             40.0     10-09    140  |  103  |  20.8<H>  ----------------------------<  129<H>  4.8   |  25.0  |  1.45<H>    Ca    9.2      09 Oct 2023 05:26  Mg     1.8     10-09    TPro  6.4<L>  /  Alb  3.5  /  TBili  0.2<L>  /  DBili  x   /  AST  25  /  ALT  38  /  AlkPhos  43  10-08    Urinalysis Basic - ( 09 Oct 2023 05:26 )    Color: x / Appearance: x / SG: x / pH: x  Gluc: 129 mg/dL / Ketone: x  / Bili: x / Urobili: x   Blood: x / Protein: x / Nitrite: x   Leuk Esterase: x / RBC: x / WBC x   Sq Epi: x / Non Sq Epi: x / Bacteria: x    POCT Blood Glucose.: 162 mg/dL (10-09-23 @ 11:56)  POCT Blood Glucose.: 167 mg/dL (10-09-23 @ 08:10)  POCT Blood Glucose.: 161 mg/dL (10-08-23 @ 21:44)  POCT Blood Glucose.: 186 mg/dL (10-08-23 @ 17:08)    Thyroid Stimulating Hormone, Serum: 4.61 uIU/mL (10-07-23 @ 02:30)  Free Thyroxine, Serum: 1.1 ng/dL (10-07-23 @ 02:30)  Thyroid Stimulating Hormone, Serum: 2.97 uU/mL (10-05-23 @ 06:31)

## 2023-10-09 NOTE — PROGRESS NOTE ADULT - RESPIRATORY
clear to auscultation bilaterally/no wheezes/no rales/diminished breath sounds, L/diminished breath sounds, R

## 2023-10-09 NOTE — PROGRESS NOTE ADULT - SUBJECTIVE AND OBJECTIVE BOX
Department of Cardiology                                                                  [facility]/[place] [address] [address]                                                            Telephone: [phone]. Fax:[phone]                                                                                     Pre-MARIBEL Note        Narrative:  58 year old male with a medical history of HTN, HLD, type 2 DM (HA1c 9.6 on Insulin), CKD 3, Obesity Class II, CAD s/p 8 stents, recent admission to [facility] - for chest pain s/p PCI of ISR of LADx2 and PTCA of diag on 23, subsequently readmitted to [facility] 10/3 after presenting with recurrent chest pain and increased CANNON since recent PCI. TTE revealed aortic stenosis (FIDEL 1, mG 27, pG 50), could not rule out bicuspid valve. Patient was re-cathed 10/6 to confirm patent stents. Patient transferred to [facility] 10/6/23 for surgical evaluation under Dr. Cardozo. Now presents for MARIBEL to better assess AS severity and morphology.         ASA and Mallampati: Per Anesthesia    	  MEDICATIONS:  isosorbide   mononitrate ER Tablet (IMDUR) 30 milliGRAM(s) Oral daily  metoprolol succinate  milliGRAM(s) Oral daily  ranolazine 500 milliGRAM(s) Oral two times a day  pantoprazole    Tablet 40 milliGRAM(s) Oral before breakfast  atorvastatin 40 milliGRAM(s) Oral at bedtime  dextrose 50% Injectable 25 Gram(s) IV Push once  dextrose 50% Injectable 12.5 Gram(s) IV Push once  dextrose Oral Gel 15 Gram(s) Oral once PRN  fenofibrate Tablet 145 milliGRAM(s) Oral daily  glucagon  Injectable 1 milliGRAM(s) IntraMuscular once  insulin glargine Injectable (LANTUS) 30 Unit(s) SubCutaneous at bedtime  insulin lispro (ADMELOG) corrective regimen sliding scale   SubCutaneous Before meals and at bedtime  insulin lispro Injectable (ADMELOG) 45 Unit(s) SubCutaneous three times a day before meals  aspirin  chewable 81 milliGRAM(s) Oral daily  dextrose 5%. 1000 milliLiter(s) IV Continuous <Continuous>  dextrose 5%. 1000 milliLiter(s) IV Continuous <Continuous>  mupirocin 2% Nasal 1 Application(s) Both Nostrils two times a day  sodium chloride 0.9% lock flush 3 milliLiter(s) IV Push every 8 hours        PHYSICAL EXAM:    T(C): 36.8 (10-09-23 @ 12:00), Max: 36.8 (10-08-23 @ 16:54)  HR: 62 (10-09-23 @ 12:00) (61 - 73)  BP: 174/80 (10-09-23 @ 12:00) (119/71 - 174/80)  RR: 16 (10-09-23 @ 12:00) (16 - 18)  SpO2: 100% (10-09-23 @ 12:00) (98% - 100%)    I&O's Summary    08 Oct 2023 07:01  -  09 Oct 2023 07:00  --------------------------------------------------------  IN: 920 mL / OUT: 0 mL / NET: 920 mL      Daily Weight in k (09 Oct 2023 04:30)    Constitutional: A & O x 3  HEENT:   Normal oral mucosa, PERRL, EOMI	  Cardiovascular: Normal S1 S2, No JVD, No murmurs, No edema  Respiratory: Lungs clear to auscultation	  Gastrointestinal:  Soft, Non-tender, + BS	  Skin: No rashes, No ecchymoses, No cyanosis  Neurologic: Non-focal  Extremities: Normal range of motion, No clubbing, cyanosis or edema  Vascular: Peripheral pulses palpable 2+ bilaterally      TTE  AoV Area, Vmax: 0.73 cm² AoV Area, VTI: 0.75 cm² AoV Area, Vmn:  Ao VTI: 1.071  Tricuspid Valve and PA/RV Systolic Pressure: TR Max Velocity: 2.43 m/s RA   Pressure: 3 mmHg RVSP/PASP: 26.6 mmHg    Pulmonic Valve:  PV Max Velocity: 0.83 m/s PV Max P.8 mmHg PV Mean PG:      PHYSICIAN INTERPRETATION:  Left Ventricle: Endocardial visualization was enhanced with intravenous   echo contrast. The left ventricular internal cavity size is normal.  Global LV systolic function was normal. Left ventricular ejection   fraction, by visual estimation, is 65 to 70%. Normal segmental left   ventricular systolic function.  Right Ventricle: The right ventricular size is mildly enlarged. RV   systolic function is normal. TV S' 0.1 m/s.  Left Atrium: The left atrium is normal in size. Normal left atrial size.  Right Atrium: The right atrium is normal in size.  Pericardium: There is no evidence of pericardial effusion.  Mitral Valve: There is mild mitral annular calcification. No evidence of   mitral stenosis. Mild mitral valve regurgitation is seen.  Tricuspid Valve: Mild tricuspid regurgitation is visualized. The flow in   the hepatic veins is normal during ventricular systole.  Aortic Valve: The aortic valve is bicuspid. Severe aortic stenosis is   present. The peak aortic velocity was obtained from the right parasternal   view. Mild aortic valve regurgitation is seen. The Dimesionless Index   value is 0.17.  Pulmonic Valve: The pulmonic valve was not well visualized. Trace   pulmonic valve regurgitation.  Pulmonary Artery: The main pulmonary artery is normal in size.  Venous: The inferior vena cava is not well visualized.  In comparison to the previous echocardiogram(s): There are no prior   studies on this patient for comparison purposes.    Summary:   1.Technically difficult study.   2. Endocardial visualization was enhanced with intravenous echo contrast.   3. Left ventricular ejection fraction, by visual estimation, is 65 to   70%.   4. Normal global left ventricular systolic function.   5. There is moderate concentric left ventricular hypertrophy.   6. Diastolic function indeterminate.   7. Mildly enlarged right ventricle with normal RV systolic function,   estimated PASP 27 mmHg.   8. Normal left atrial size.   9. Mild mitral annular calcification.  10. Mild mitral valve regurgitation.  11. Mild tricuspid regurgitation.  12. Aortic valve is bicuspid with calcifications and severe stenosis,   peak velocity 4.2 m/s and mean gradient 45 mmHg, FIDEL (by VTI) 0.75 cm2,   dimensionless index 0.17  13. Mild aortic regurgitation.  14. Aortic root at the sinus of Valsalva measures 4.0 cm, normal when   indexed to BSA 1.68 cm/m2.  15. There is no evidence of pericardial effusion.    LABS:	 	                            13.1   6.23  )-----------( 309      ( 09 Oct 2023 05:26 )             40.0     10-    140  |  103  |  20.8<H>  ----------------------------<  129<H>  4.8   |  25.0  |  1.45<H>    Ca    9.2      09 Oct 2023 05:26  Mg     1.8     10-09    TPro  6.4<L>  /  Alb  3.5  /  TBili  0.2<L>  /  DBili  x   /  AST  25  /  ALT  38  /  AlkPhos  43  10-08    proBNP:  98    ASSESSMENT: 57yo male with multiple CVD risk factors, known CAD with multiple PCIs, adm to [facility] - for chest pain s/p PCI of ISR of LADx2 and PTCA of diag on 23, subsequently readmitted to [facility] 10/3 after presenting with recurrent chest pain and increased CANNON since recent PCI. TTE revealed aortic stenosis (FIDEL 1, mG 27, pG 50), could not rule out bicuspid valve. Patient was re-cathed 10/6 to confirm patent stents. Patient transferred to [facility] 10/6/23 for surgical evaluation under Dr. Cardozo. Now presents for MARIBEL to better assess AS severity and morphology.          -MARIBEL as ordered  -Labs and ECG reviewed  -Procedure discussed with patient; risks and benefits explained; questions answered  -Consent obtained by Echocardiographer and anesthesiologist                                                                         Department of Cardiology                                                                  [facility]/[place] [address]                                                            Telephone: [phone]. Fax:[phone]                                                                                     Pre-MARIBEL Note        Narrative:  58 year old male with a medical history of HTN, HLD, type 2 DM (HA1c 9.6 on Insulin), CKD 3, Obesity Class II, CAD s/p 8 stents, recent admission to [facility] - for chest pain s/p PCI of ISR of LADx2 and PTCA of diag on 23, subsequently readmitted to [facility] 10/3 after presenting with recurrent chest pain and increased CANNON since recent PCI. TTE revealed aortic stenosis (FIDEL 1, mG 27, pG 50), could not rule out bicuspid valve. Patient was re-cathed 10/6 to confirm patent stents. Patient transferred to [facility] 10/6/23 for surgical evaluation under Dr. Cardozo. Now presents for MARIBEL to better assess AS severity and morphology.         ASA and Mallampati: Per Anesthesia    	  MEDICATIONS:  isosorbide   mononitrate ER Tablet (IMDUR) 30 milliGRAM(s) Oral daily  metoprolol succinate  milliGRAM(s) Oral daily  ranolazine 500 milliGRAM(s) Oral two times a day  pantoprazole    Tablet 40 milliGRAM(s) Oral before breakfast  atorvastatin 40 milliGRAM(s) Oral at bedtime  dextrose 50% Injectable 25 Gram(s) IV Push once  dextrose 50% Injectable 12.5 Gram(s) IV Push once  dextrose Oral Gel 15 Gram(s) Oral once PRN  fenofibrate Tablet 145 milliGRAM(s) Oral daily  glucagon  Injectable 1 milliGRAM(s) IntraMuscular once  insulin glargine Injectable (LANTUS) 30 Unit(s) SubCutaneous at bedtime  insulin lispro (ADMELOG) corrective regimen sliding scale   SubCutaneous Before meals and at bedtime  insulin lispro Injectable (ADMELOG) 45 Unit(s) SubCutaneous three times a day before meals  aspirin  chewable 81 milliGRAM(s) Oral daily  dextrose 5%. 1000 milliLiter(s) IV Continuous <Continuous>  dextrose 5%. 1000 milliLiter(s) IV Continuous <Continuous>  mupirocin 2% Nasal 1 Application(s) Both Nostrils two times a day  sodium chloride 0.9% lock flush 3 milliLiter(s) IV Push every 8 hours        PHYSICAL EXAM:    T(C): 36.8 (10-09-23 @ 12:00), Max: 36.8 (10-08-23 @ 16:54)  HR: 62 (10-09-23 @ 12:00) (61 - 73)  BP: 174/80 (10-09-23 @ 12:00) (119/71 - 174/80)  RR: 16 (10-09-23 @ 12:00) (16 - 18)  SpO2: 100% (10-09-23 @ 12:00) (98% - 100%)    I&O's Summary    08 Oct 2023 07:01  -  09 Oct 2023 07:00  --------------------------------------------------------  IN: 920 mL / OUT: 0 mL / NET: 920 mL      Daily Weight in k (09 Oct 2023 04:30)    Constitutional: A & O x 3  HEENT:   Normal oral mucosa, PERRL, EOMI	  Cardiovascular: Normal S1 S2, No JVD, No murmurs, No edema  Respiratory: Lungs clear to auscultation	  Gastrointestinal:  Soft, Non-tender, + BS	  Skin: No rashes, No ecchymoses, No cyanosis  Neurologic: Non-focal  Extremities: Normal range of motion, No clubbing, cyanosis or edema  Vascular: Peripheral pulses palpable 2+ bilaterally      TTE 10/7/23:  AoV Area, Vmax: 0.73 cm² AoV Area, VTI: 0.75 cm² AoV Area, Vmn:  Ao VTI: 1.071  Tricuspid Valve and PA/RV Systolic Pressure: TR Max Velocity: 2.43 m/s RA   Pressure: 3 mmHg RVSP/PASP: 26.6 mmHg    Pulmonic Valve:  PV Max Velocity: 0.83 m/s PV Max P.8 mmHg PV Mean PG:      PHYSICIAN INTERPRETATION:  Left Ventricle: Endocardial visualization was enhanced with intravenous   echo contrast. The left ventricular internal cavity size is normal.  Global LV systolic function was normal. Left ventricular ejection   fraction, by visual estimation, is 65 to 70%. Normal segmental left   ventricular systolic function.  Right Ventricle: The right ventricular size is mildly enlarged. RV   systolic function is normal. TV S' 0.1 m/s.  Left Atrium: The left atrium is normal in size. Normal left atrial size.  Right Atrium: The right atrium is normal in size.  Pericardium: There is no evidence of pericardial effusion.  Mitral Valve: There is mild mitral annular calcification. No evidence of   mitral stenosis. Mild mitral valve regurgitation is seen.  Tricuspid Valve: Mild tricuspid regurgitation is visualized. The flow in   the hepatic veins is normal during ventricular systole.  Aortic Valve: The aortic valve is bicuspid. Severe aortic stenosis is   present. The peak aortic velocity was obtained from the right parasternal   view. Mild aortic valve regurgitation is seen. The Dimesionless Index   value is 0.17.  Pulmonic Valve: The pulmonic valve was not well visualized. Trace   pulmonic valve regurgitation.  Pulmonary Artery: The main pulmonary artery is normal in size.  Venous: The inferior vena cava is not well visualized.  In comparison to the previous echocardiogram(s): There are no prior   studies on this patient for comparison purposes.    Summary:   1.Technically difficult study.   2. Endocardial visualization was enhanced with intravenous echo contrast.   3. Left ventricular ejection fraction, by visual estimation, is 65 to   70%.   4. Normal global left ventricular systolic function.   5. There is moderate concentric left ventricular hypertrophy.   6. Diastolic function indeterminate.   7. Mildly enlarged right ventricle with normal RV systolic function,   estimated PASP 27 mmHg.   8. Normal left atrial size.   9. Mild mitral annular calcification.  10. Mild mitral valve regurgitation.  11. Mild tricuspid regurgitation.  12. Aortic valve is bicuspid with calcifications and severe stenosis,   peak velocity 4.2 m/s and mean gradient 45 mmHg, FIDEL (by VTI) 0.75 cm2,   dimensionless index 0.17  13. Mild aortic regurgitation.  14. Aortic root at the sinus of Valsalva measures 4.0 cm, normal when   indexed to BSA 1.68 cm/m2.  15. There is no evidence of pericardial effusion.    LABS:	 	                            13.1   6.23  )-----------( 309      ( 09 Oct 2023 05:26 )             40.0     10-    140  |  103  |  20.8<H>  ----------------------------<  129<H>  4.8   |  25.0  |  1.45<H>    Ca    9.2      09 Oct 2023 05:26  Mg     1.8     10-    TPro  6.4<L>  /  Alb  3.5  /  TBili  0.2<L>  /  DBili  x   /  AST  25  /  ALT  38  /  AlkPhos  43  10-08    proBNP:  98    ASSESSMENT: 57yo male with multiple CVD risk factors, known CAD with multiple PCIs, adm to [facility] - for chest pain s/p PCI of ISR of LADx2 and PTCA of diag on 23, subsequently readmitted to [facility] 10/3 after presenting with recurrent chest pain and increased CANNON since recent PCI. TTE revealed aortic stenosis (FIDEL 1, mG 27, pG 50), could not rule out bicuspid valve. Patient was re-cathed 10/6 to confirm patent stents. Patient transferred to [facility] 10/6/23 for surgical evaluation under Dr. Cardozo. Now presents for MARIBEL to better assess AS severity and morphology.          -MARIBEL as ordered  -Labs and ECG reviewed  -Procedure discussed with patient; risks and benefits explained; questions answered  -Consent obtained by Echocardiographer and anesthesiologist

## 2023-10-09 NOTE — PROGRESS NOTE ADULT - PROBLEM SELECTOR PLAN 1
TTE at [facility] revealed aortic stenosis (FIDEL 1, mG 27, pG 50), could not rule out bicuspid valve.  Transferred to [facility] 10/6/23 for surgical evaluation under Dr. Cardozo.  Preoperative workup as above.   Continue Toprol 200XL QD as tolerated by HR and BP.   Nephrology consult to be called today.  Cardiology and Endocrinology following.   Plan to be discussed / reviewed with CT Surgery attending / team during AM rounds.

## 2023-10-10 LAB
ANION GAP SERPL CALC-SCNC: 11 MMOL/L — SIGNIFICANT CHANGE UP (ref 5–17)
BUN SERPL-MCNC: 19.8 MG/DL — SIGNIFICANT CHANGE UP (ref 8–20)
CALCIUM SERPL-MCNC: 8.8 MG/DL — SIGNIFICANT CHANGE UP (ref 8.4–10.5)
CHLORIDE SERPL-SCNC: 102 MMOL/L — SIGNIFICANT CHANGE UP (ref 96–108)
CO2 SERPL-SCNC: 25 MMOL/L — SIGNIFICANT CHANGE UP (ref 22–29)
CREAT SERPL-MCNC: 1.45 MG/DL — HIGH (ref 0.5–1.3)
EGFR: 57 ML/MIN/1.73M2 — LOW
GLUCOSE BLDC GLUCOMTR-MCNC: 189 MG/DL — HIGH (ref 70–99)
GLUCOSE BLDC GLUCOMTR-MCNC: 224 MG/DL — HIGH (ref 70–99)
GLUCOSE BLDC GLUCOMTR-MCNC: 235 MG/DL — HIGH (ref 70–99)
GLUCOSE BLDC GLUCOMTR-MCNC: 239 MG/DL — HIGH (ref 70–99)
GLUCOSE BLDC GLUCOMTR-MCNC: 239 MG/DL — HIGH (ref 70–99)
GLUCOSE SERPL-MCNC: 167 MG/DL — HIGH (ref 70–99)
HCT VFR BLD CALC: 39.4 % — SIGNIFICANT CHANGE UP (ref 39–50)
HGB BLD-MCNC: 12.9 G/DL — LOW (ref 13–17)
MAGNESIUM SERPL-MCNC: 1.9 MG/DL — SIGNIFICANT CHANGE UP (ref 1.6–2.6)
MCHC RBC-ENTMCNC: 29.5 PG — SIGNIFICANT CHANGE UP (ref 27–34)
MCHC RBC-ENTMCNC: 32.7 GM/DL — SIGNIFICANT CHANGE UP (ref 32–36)
MCV RBC AUTO: 90.2 FL — SIGNIFICANT CHANGE UP (ref 80–100)
PLATELET # BLD AUTO: 324 K/UL — SIGNIFICANT CHANGE UP (ref 150–400)
POTASSIUM SERPL-MCNC: 4.4 MMOL/L — SIGNIFICANT CHANGE UP (ref 3.5–5.3)
POTASSIUM SERPL-SCNC: 4.4 MMOL/L — SIGNIFICANT CHANGE UP (ref 3.5–5.3)
RBC # BLD: 4.37 M/UL — SIGNIFICANT CHANGE UP (ref 4.2–5.8)
RBC # FLD: 13.2 % — SIGNIFICANT CHANGE UP (ref 10.3–14.5)
SODIUM SERPL-SCNC: 138 MMOL/L — SIGNIFICANT CHANGE UP (ref 135–145)
WBC # BLD: 6.39 K/UL — SIGNIFICANT CHANGE UP (ref 3.8–10.5)
WBC # FLD AUTO: 6.39 K/UL — SIGNIFICANT CHANGE UP (ref 3.8–10.5)

## 2023-10-10 PROCEDURE — 99233 SBSQ HOSP IP/OBS HIGH 50: CPT

## 2023-10-10 PROCEDURE — 99232 SBSQ HOSP IP/OBS MODERATE 35: CPT

## 2023-10-10 PROCEDURE — 71045 X-RAY EXAM CHEST 1 VIEW: CPT | Mod: 26

## 2023-10-10 RX ORDER — MAGNESIUM SULFATE 500 MG/ML
2 VIAL (ML) INJECTION ONCE
Refills: 0 | Status: COMPLETED | OUTPATIENT
Start: 2023-10-10 | End: 2023-10-10

## 2023-10-10 RX ORDER — ACETAMINOPHEN 500 MG
650 TABLET ORAL EVERY 6 HOURS
Refills: 0 | Status: DISCONTINUED | OUTPATIENT
Start: 2023-10-10 | End: 2023-10-13

## 2023-10-10 RX ORDER — TRAMADOL HYDROCHLORIDE 50 MG/1
25 TABLET ORAL ONCE
Refills: 0 | Status: DISCONTINUED | OUTPATIENT
Start: 2023-10-10 | End: 2023-10-10

## 2023-10-10 RX ADMIN — Medication 4: at 12:35

## 2023-10-10 RX ADMIN — Medication 81 MILLIGRAM(S): at 08:40

## 2023-10-10 RX ADMIN — Medication 650 MILLIGRAM(S): at 15:33

## 2023-10-10 RX ADMIN — Medication 45 UNIT(S): at 17:26

## 2023-10-10 RX ADMIN — Medication 45 UNIT(S): at 08:39

## 2023-10-10 RX ADMIN — RANOLAZINE 500 MILLIGRAM(S): 500 TABLET, FILM COATED, EXTENDED RELEASE ORAL at 17:26

## 2023-10-10 RX ADMIN — CLOPIDOGREL BISULFATE 75 MILLIGRAM(S): 75 TABLET, FILM COATED ORAL at 08:40

## 2023-10-10 RX ADMIN — PANTOPRAZOLE SODIUM 40 MILLIGRAM(S): 20 TABLET, DELAYED RELEASE ORAL at 05:08

## 2023-10-10 RX ADMIN — MUPIROCIN 1 APPLICATION(S): 20 OINTMENT TOPICAL at 17:26

## 2023-10-10 RX ADMIN — Medication 650 MILLIGRAM(S): at 16:33

## 2023-10-10 RX ADMIN — SODIUM CHLORIDE 3 MILLILITER(S): 9 INJECTION INTRAMUSCULAR; INTRAVENOUS; SUBCUTANEOUS at 14:13

## 2023-10-10 RX ADMIN — Medication 2: at 08:39

## 2023-10-10 RX ADMIN — Medication 4: at 17:26

## 2023-10-10 RX ADMIN — Medication 200 MILLIGRAM(S): at 05:07

## 2023-10-10 RX ADMIN — ISOSORBIDE MONONITRATE 30 MILLIGRAM(S): 60 TABLET, EXTENDED RELEASE ORAL at 08:41

## 2023-10-10 RX ADMIN — Medication 25 GRAM(S): at 12:35

## 2023-10-10 RX ADMIN — RANOLAZINE 500 MILLIGRAM(S): 500 TABLET, FILM COATED, EXTENDED RELEASE ORAL at 05:07

## 2023-10-10 RX ADMIN — Medication 45 UNIT(S): at 12:36

## 2023-10-10 RX ADMIN — MUPIROCIN 1 APPLICATION(S): 20 OINTMENT TOPICAL at 05:08

## 2023-10-10 RX ADMIN — Medication 145 MILLIGRAM(S): at 08:41

## 2023-10-10 RX ADMIN — TRAMADOL HYDROCHLORIDE 25 MILLIGRAM(S): 50 TABLET ORAL at 22:09

## 2023-10-10 RX ADMIN — SODIUM CHLORIDE 3 MILLILITER(S): 9 INJECTION INTRAMUSCULAR; INTRAVENOUS; SUBCUTANEOUS at 05:10

## 2023-10-10 RX ADMIN — TRAMADOL HYDROCHLORIDE 25 MILLIGRAM(S): 50 TABLET ORAL at 21:09

## 2023-10-10 NOTE — PROGRESS NOTE ADULT - SUBJECTIVE AND OBJECTIVE BOX
INTERVAL EVENTS:  Follow up diabetes management    ROS: Denies chest pain, sob, abd pain.     MEDICATIONS  (STANDING):  aspirin  chewable 81 milliGRAM(s) Oral daily  atorvastatin 40 milliGRAM(s) Oral at bedtime  clopidogrel Tablet 75 milliGRAM(s) Oral daily  dextrose 5%. 1000 milliLiter(s) (50 mL/Hr) IV Continuous <Continuous>  dextrose 5%. 1000 milliLiter(s) (100 mL/Hr) IV Continuous <Continuous>  dextrose 50% Injectable 12.5 Gram(s) IV Push once  dextrose 50% Injectable 25 Gram(s) IV Push once  fenofibrate Tablet 145 milliGRAM(s) Oral daily  glucagon  Injectable 1 milliGRAM(s) IntraMuscular once  insulin glargine Injectable (LANTUS) 60 Unit(s) SubCutaneous at bedtime  insulin lispro (ADMELOG) corrective regimen sliding scale   SubCutaneous Before meals and at bedtime  insulin lispro Injectable (ADMELOG) 45 Unit(s) SubCutaneous three times a day before meals  isosorbide   mononitrate ER Tablet (IMDUR) 30 milliGRAM(s) Oral daily  metoprolol succinate  milliGRAM(s) Oral daily  mupirocin 2% Nasal 1 Application(s) Both Nostrils two times a day  pantoprazole    Tablet 40 milliGRAM(s) Oral before breakfast  ranolazine 500 milliGRAM(s) Oral two times a day  sodium chloride 0.9% lock flush 3 milliLiter(s) IV Push every 8 hours    MEDICATIONS  (PRN):  dextrose Oral Gel 15 Gram(s) Oral once PRN Blood Glucose LESS THAN 70 milliGRAM(s)/deciliter    Allergies  penicillin (Hives)    Vital Signs Last 24 Hrs  T(C): 36.8 (10 Oct 2023 08:23), Max: 37.1 (09 Oct 2023 16:34)  T(F): 98.3 (10 Oct 2023 08:23), Max: 98.7 (09 Oct 2023 16:34)  HR: 64 (10 Oct 2023 13:03) (63 - 77)  BP: 146/77 (10 Oct 2023 13:03) (118/62 - 168/84)  BP(mean): --  RR: 18 (10 Oct 2023 13:03) (17 - 18)  SpO2: 99% (10 Oct 2023 13:03) (96% - 100%)    Parameters below as of 10 Oct 2023 13:03  Patient On (Oxygen Delivery Method): room air    PHYSICAL EXAM:  General: No apparent distress  Neck: Supple, trachea midline, no thyromegaly  Respiratory: Lungs clear bilaterally, normal rate, effort  Cardiac: +S1, S2, no m/r/g  GI: +BS, soft, non tender, non distended  Extremities: No peripheral edema, no pedal lesions  Neuro: A+O X3, no tremor    LABS:                        12.9   6.39  )-----------( 324      ( 10 Oct 2023 05:19 )             39.4     10-10    138  |  102  |  19.8  ----------------------------<  167<H>  4.4   |  25.0  |  1.45<H>    Ca    8.8      10 Oct 2023 05:19  Mg     1.9     10-10      Urinalysis Basic - ( 10 Oct 2023 05:19 )    Color: x / Appearance: x / SG: x / pH: x  Gluc: 167 mg/dL / Ketone: x  / Bili: x / Urobili: x   Blood: x / Protein: x / Nitrite: x   Leuk Esterase: x / RBC: x / WBC x   Sq Epi: x / Non Sq Epi: x / Bacteria: x    POCT Blood Glucose.: 235 mg/dL (10-10-23 @ 11:59)  POCT Blood Glucose.: 189 mg/dL (10-10-23 @ 08:25)  POCT Blood Glucose.: 210 mg/dL (10-09-23 @ 21:12)  POCT Blood Glucose.: 138 mg/dL (10-09-23 @ 17:28)    Thyroid Stimulating Hormone, Serum: 4.61 uIU/mL (10-07-23 @ 02:30)  Free Thyroxine, Serum: 1.1 ng/dL (10-07-23 @ 02:30)  Thyroid Stimulating Hormone, Serum: 2.97 uU/mL (10-05-23 @ 06:31)

## 2023-10-10 NOTE — PROGRESS NOTE ADULT - PROBLEM SELECTOR PLAN 1
.  - S/p [facility] on 9/29:  s/p PCI of ISR of LADx2 and PTCA of diag  - 10/6- chart note (no official cath report) Cath today with no change, non obs disease, patent stents. Possible transfer TBD by Dr. Kaye. will need Cardiac MRI.  - cont asa, statin, ranexa, imdur, fenofibrate  - will d/w Dr. Richards further medical optimization .  - S/p [facility] on 9/29:  s/p PCI of ISR of LADx2 and PTCA of diag  - 10/6- chart note (no official cath report) Cath today with no change, non obs disease, patent stents.   - troponin neg   - pro BNP non contributory   - cont asa, statin, ranexa, imdur, fenofibrate  - follow up with Dr. Kaey   - No further cardiac intervention at this time.

## 2023-10-10 NOTE — PROGRESS NOTE ADULT - PROBLEM SELECTOR PLAN 1
TTE at [facility] revealed aortic stenosis (FIDEL 1, mG 27, pG 50), could not rule out bicuspid valve.  Transferred to [facility] 10/6/23 for surgical evaluation under Dr. Cardozo.  Preoperative workup complete  Continue Toprol 200XL QD as tolerated by HR and BP.   MARIBEL performed 10/9 as above. Findings of moderate AS  Further surgical planning per Dr. Cardozo pending.   Plan to be discussed / reviewed with CT Surgery attending / team during AM rounds.

## 2023-10-10 NOTE — PROGRESS NOTE ADULT - ASSESSMENT
58M with PMHx HTN, HLD, T2DM, CKD 3, obesity, CAD s/p 8 stents, recent admission to [facility] 9/29-9/30 for chest pain s/p PCI of ISR of LADx2 and PTCA of diag on 9/29/23, since readmitted to [facility] 10/3 after presenting with recurrent chest pain and increased CANNON since recent PCI. Transferred to [facility] 10/6/23 for surgical evaluation under Dr. Cardozo.    Consulted for diabetes management  Home diabetes meds: Toujeo 140 units daily, mealtime insulin 55 units (He developed pancreatitis following use of trulicity, and was unable to tolerate a sglt-2)  Current a1c: 9.6%  Outpatient Endocrinologist: Dr Ibarra    1. DM2 - on large doses of insulin at home  - Glucoses controlled on current regimen  - Continue lantus 60 units qhs, premeal admelog 45 units tid  - Recommend to discharge on his previous regimen, he follows with Dr Ibarra    2. CAD  - Surgical evaluation per CT surgery    3. HLD  - Continue statin

## 2023-10-10 NOTE — PROGRESS NOTE ADULT - SUBJECTIVE AND OBJECTIVE BOX
[facility]                                                         CARDIOLOGY AT [facility]                                                                  [address] [facility] [place] [address]                                                         Telephone: [phone]. Fax:[phone]                                                                             PROGRESS NOTE    Reason for follow up:   Update:       Review of symptoms:   Cardiac:  No chest pain. No dyspnea. No palpitations.  Respiratory: no cough. No dyspnea  Gastrointestinal: No diarrhea. No abdominal pain. No bleeding.   Neuro: No focal neuro complaints.      Vitals:  T(C): 36.8 (10-10-23 @ 08:23), Max: 37.1 (10-09-23 @ 16:34)  HR: 66 (10-10-23 @ 08:23) (61 - 77)  BP: 148/91 (10-10-23 @ 08:23) (118/62 - 174/80)  RR: 17 (10-10-23 @ 08:23) (16 - 18)  SpO2: 100% (10-10-23 @ 08:23) (96% - 100%)  Wt(kg): --  I&O's Summary    09 Oct 2023 07:01  -  10 Oct 2023 07:00  --------------------------------------------------------  IN: 120 mL / OUT: 0 mL / NET: 120 mL            PHYSICAL EXAM:  Appearance: Comfortable. No acute distress  HEENT:  Atraumatic. Normocephalic.  Normal oral mucosa  Neurologic: A & O x 3, no gross focal deficits.  Cardiovascular: RRR S1 S2, No murmur, no rubs/gallops. No JVD  Respiratory: Lungs clear to auscultation, unlabored   Gastrointestinal:  Soft, Non-tender, + BS  Lower Extremities: 2+ Peripheral Pulses, No clubbing, cyanosis, or edema  Psychiatry: Patient is calm. No agitation.   Skin: warm and dry.      CURRENT CARDIAC MEDICATIONS:  isosorbide   mononitrate ER Tablet (IMDUR) 30 milliGRAM(s) Oral daily  metoprolol succinate  milliGRAM(s) Oral daily  ranolazine 500 milliGRAM(s) Oral two times a day        CURRENT OTHER MEDICATIONS:  pantoprazole    Tablet 40 milliGRAM(s) Oral before breakfast  atorvastatin 40 milliGRAM(s) Oral at bedtime  dextrose 50% Injectable 12.5 Gram(s) IV Push once, Stop order after: 1 Doses  dextrose 50% Injectable 25 Gram(s) IV Push once, Stop order after: 1 Doses  dextrose Oral Gel 15 Gram(s) Oral once, Stop order after: 1 Doses PRN Blood Glucose LESS THAN 70 milliGRAM(s)/deciliter  fenofibrate Tablet 145 milliGRAM(s) Oral daily  glucagon  Injectable 1 milliGRAM(s) IntraMuscular once, Stop order after: 1 Doses  insulin glargine Injectable (LANTUS) 60 Unit(s) SubCutaneous at bedtime  insulin lispro (ADMELOG) corrective regimen sliding scale   SubCutaneous Before meals and at bedtime  insulin lispro Injectable (ADMELOG) 45 Unit(s) SubCutaneous three times a day before meals  aspirin  chewable 81 milliGRAM(s) Oral daily  clopidogrel Tablet 75 milliGRAM(s) Oral daily  dextrose 5%. 1000 milliLiter(s) (50 mL/Hr) IV Continuous <Continuous>  dextrose 5%. 1000 milliLiter(s) (100 mL/Hr) IV Continuous <Continuous>  magnesium sulfate  IVPB 2 Gram(s) IV Intermittent once, Stop order after: 1 Doses  mupirocin 2% Nasal 1 Application(s) Both Nostrils two times a day, Stop order after: 10 Doses  sodium chloride 0.9% lock flush 3 milliLiter(s) IV Push every 8 hours        LABS:	 	                            12.9   6.39  )-----------( 324      ( 10 Oct 2023 05:19 )             39.4     10-10    138  |  102  |  19.8  ----------------------------<  167<H>  4.4   |  25.0  |  1.45<H>    Ca    8.8      10 Oct 2023 05:19  Mg     1.9     10-10      PT/INR/PTT ( 07 Oct 2023 02:30 )                       :                       :      10.9         :       35.3                  .        .                   .              .           .       0.98        .                                         Lipid Profile: Date: 09-30 @ 06:38  Total cholesterol 139; Direct LDL: --; HDL: 21; Triglycerides:453      TSH: Thyroid Stimulating Hormone, Serum: 4.61 uIU/mL  Thyroid Stimulating Hormone, Serum: 2.97 uU/mL        TELEMETRY: SR      DIAGNOSTIC TESTING:  [ ] Echocardiogram:   < from: MARIBEL Echo Doppler (10.09.23 @ 11:41) >  Summary:   1. Left ventricular ejection fraction, by visual estimation, is 60 to   65%.   2. Normal global left ventricular systolic function.   3. Normal right ventricular size and function, inadequate estimation of   RVSP.   4. Mildly enlarged left atrium.   5. No left atrial appendage thrombus and normal left atrial appendage   velocities.   6. Mild anterior mitral valve prolapse with mild mitral regurgitation.   7. Bicuspi aortic valve with fused raphe between the right-and-left   coronary cusps with moderate calcifications and moderate stenosis (FIDEL by   direct planimetry is 1.48 cm2), peak velocity of 3.0 m/s.   8. Mild aortic regurgitation.   9. Dilatation of the sinuses of Valsalva 4.3 cm (index to BSA of 1.80   cm/m2).  10. Color flow doppler and intravenous injection of agitated saline   demonstrates the presence of an intact intra atrial septum.  11. There is no evidence of pericardial effusion.  12. No prior MARIBEL study is available for comparison. Compared to the prior   TTE study from 10/4/23, peak velocity/gradient were higher by TTE.    DO Yoni Electronically signed on 10/9/2023 at 1:30:21 PM    < end of copied text >    < from: TTE Echo Complete w/ Contrast w/ Doppler (10.07.23 @ 11:30) >  Summary:   1.Technically difficult study.   2. Endocardial visualization was enhanced with intravenous echo contrast.   3. Left ventricular ejection fraction, by visual estimation, is 65 to   70%.   4. Normal global left ventricular systolic function.   5. There is moderate concentric left ventricular hypertrophy.   6. Diastolic function indeterminate.   7. Mildly enlarged right ventricle with normal RV systolic function,   estimated PASP 27 mmHg.   8. Normal left atrial size.   9. Mild mitral annular calcification.  10. Mild mitral valve regurgitation.  11. Mild tricuspid regurgitation.  12. Aortic valve is bicuspid with calcifications and severe stenosis,   peak velocity 4.2 m/s and mean gradient 45 mmHg, FIDEL (by VTI) 0.75 cm2,   dimensionless index 0.17  13. Mild aortic regurgitation.  14. Aortic root at the sinus of Valsalva measures 4.0 cm, normal when   indexed to BSA 1.68 cm/m2.  15. There is no evidence of pericardial effusion.    Leonard Castillo DO Electronically signed on 10/7/2023 at 1:45:55 PM    < end of copied text >      	                                                                [facility]                                                         CARDIOLOGY AT [facility]                                                                  [address] [address]                                                         Telephone: [phone]. Fax:[phone]                                                                             PROGRESS NOTE    Reason for follow up: CAD  Update: no chest pain      Review of symptoms:   Cardiac:  No chest pain. No dyspnea. No palpitations.  Respiratory: no cough. No dyspnea  Gastrointestinal: No diarrhea. No abdominal pain. No bleeding.   Neuro: No focal neuro complaints.      Vitals:  T(C): 36.8 (10-10-23 @ 08:23), Max: 37.1 (10-09-23 @ 16:34)  HR: 66 (10-10-23 @ 08:23) (61 - 77)  BP: 148/91 (10-10-23 @ 08:23) (118/62 - 174/80)  RR: 17 (10-10-23 @ 08:23) (16 - 18)  SpO2: 100% (10-10-23 @ 08:23) (96% - 100%)  Wt(kg): --  I&O's Summary    09 Oct 2023 07:01  -  10 Oct 2023 07:00  --------------------------------------------------------  IN: 120 mL / OUT: 0 mL / NET: 120 mL            PHYSICAL EXAM:  Appearance: Comfortable. No acute distress  HEENT:  Atraumatic. Normocephalic.  Normal oral mucosa  Neurologic: A & O x 3, no gross focal deficits.  Cardiovascular: RRR S1 S2, No murmur, no rubs/gallops. No JVD  Respiratory: Lungs clear to auscultation, unlabored   Gastrointestinal:  Soft, Non-tender, + BS  Lower Extremities: 2+ Peripheral Pulses, No clubbing, cyanosis, or edema  Psychiatry: Patient is calm. No agitation.   Skin: warm and dry.      CURRENT CARDIAC MEDICATIONS:  isosorbide   mononitrate ER Tablet (IMDUR) 30 milliGRAM(s) Oral daily  metoprolol succinate  milliGRAM(s) Oral daily  ranolazine 500 milliGRAM(s) Oral two times a day        CURRENT OTHER MEDICATIONS:  pantoprazole    Tablet 40 milliGRAM(s) Oral before breakfast  atorvastatin 40 milliGRAM(s) Oral at bedtime  dextrose 50% Injectable 12.5 Gram(s) IV Push once, Stop order after: 1 Doses  dextrose 50% Injectable 25 Gram(s) IV Push once, Stop order after: 1 Doses  dextrose Oral Gel 15 Gram(s) Oral once, Stop order after: 1 Doses PRN Blood Glucose LESS THAN 70 milliGRAM(s)/deciliter  fenofibrate Tablet 145 milliGRAM(s) Oral daily  glucagon  Injectable 1 milliGRAM(s) IntraMuscular once, Stop order after: 1 Doses  insulin glargine Injectable (LANTUS) 60 Unit(s) SubCutaneous at bedtime  insulin lispro (ADMELOG) corrective regimen sliding scale   SubCutaneous Before meals and at bedtime  insulin lispro Injectable (ADMELOG) 45 Unit(s) SubCutaneous three times a day before meals  aspirin  chewable 81 milliGRAM(s) Oral daily  clopidogrel Tablet 75 milliGRAM(s) Oral daily  dextrose 5%. 1000 milliLiter(s) (50 mL/Hr) IV Continuous <Continuous>  dextrose 5%. 1000 milliLiter(s) (100 mL/Hr) IV Continuous <Continuous>  magnesium sulfate  IVPB 2 Gram(s) IV Intermittent once, Stop order after: 1 Doses  mupirocin 2% Nasal 1 Application(s) Both Nostrils two times a day, Stop order after: 10 Doses  sodium chloride 0.9% lock flush 3 milliLiter(s) IV Push every 8 hours        LABS:	 	                            12.9   6.39  )-----------( 324      ( 10 Oct 2023 05:19 )             39.4     10-10    138  |  102  |  19.8  ----------------------------<  167<H>  4.4   |  25.0  |  1.45<H>    Ca    8.8      10 Oct 2023 05:19  Mg     1.9     10-10      PT/INR/PTT ( 07 Oct 2023 02:30 )                       :                       :      10.9         :       35.3                  .        .                   .              .           .       0.98        .                                         Lipid Profile: Date: 09-30 @ 06:38  Total cholesterol 139; Direct LDL: --; HDL: 21; Triglycerides:453      TSH: Thyroid Stimulating Hormone, Serum: 4.61 uIU/mL  Thyroid Stimulating Hormone, Serum: 2.97 uU/mL        TELEMETRY: SR      DIAGNOSTIC TESTING:  [ ] Echocardiogram:   < from: MARIBEL Echo Doppler (10.09.23 @ 11:41) >  Summary:   1. Left ventricular ejection fraction, by visual estimation, is 60 to   65%.   2. Normal global left ventricular systolic function.   3. Normal right ventricular size and function, inadequate estimation of   RVSP.   4. Mildly enlarged left atrium.   5. No left atrial appendage thrombus and normal left atrial appendage   velocities.   6. Mild anterior mitral valve prolapse with mild mitral regurgitation.   7. Bicuspi aortic valve with fused raphe between the right-and-left   coronary cusps with moderate calcifications and moderate stenosis (FIDEL by   direct planimetry is 1.48 cm2), peak velocity of 3.0 m/s.   8. Mild aortic regurgitation.   9. Dilatation of the sinuses of Valsalva 4.3 cm (index to BSA of 1.80   cm/m2).  10. Color flow doppler and intravenous injection of agitated saline   demonstrates the presence of an intact intra atrial septum.  11. There is no evidence of pericardial effusion.  12. No prior MARIBEL study is available for comparison. Compared to the prior   TTE study from 10/4/23, peak velocity/gradient were higher by TTE.    DO Yoni Electronically signed on 10/9/2023 at 1:30:21 PM    < end of copied text >    < from: TTE Echo Complete w/ Contrast w/ Doppler (10.07.23 @ 11:30) >  Summary:   1.Technically difficult study.   2. Endocardial visualization was enhanced with intravenous echo contrast.   3. Left ventricular ejection fraction, by visual estimation, is 65 to   70%.   4. Normal global left ventricular systolic function.   5. There is moderate concentric left ventricular hypertrophy.   6. Diastolic function indeterminate.   7. Mildly enlarged right ventricle with normal RV systolic function,   estimated PASP 27 mmHg.   8. Normal left atrial size.   9. Mild mitral annular calcification.  10. Mild mitral valve regurgitation.  11. Mild tricuspid regurgitation.  12. Aortic valve is bicuspid with calcifications and severe stenosis,   peak velocity 4.2 m/s and mean gradient 45 mmHg, FIDEL (by VTI) 0.75 cm2,   dimensionless index 0.17  13. Mild aortic regurgitation.  14. Aortic root at the sinus of Valsalva measures 4.0 cm, normal when   indexed to BSA 1.68 cm/m2.  15. There is no evidence of pericardial effusion.    Leonard Castillo DO Electronically signed on 10/7/2023 at 1:45:55 PM    < end of copied text >

## 2023-10-10 NOTE — PROGRESS NOTE ADULT - ASSESSMENT
58M hx HTN, HLD, Uncontrolled DM, CKD 3, Obesity Class II, CAD s/p 8 stents, recent admission to [facility] 9/29-9/30 for chest pain s/p PCI of ISR of LADx2 and PTCA of diag on 9/29/23, since readmitted to [facility] 10/3 after presenting with recurrent chest pain and increased CANNON since recent PCI. TTE revealed aortic stenosis (FIDEL 1, mG 27, pG 50), could not rule out bicuspid valve. Patient was re-cathed 10/6 to confirm patent stents. Patient now transferred to [facility] 10/6/23 for surgical evaluation under Dr. Cardozo.  We were asked to see patient for aortic stenosis for bicuspid valve.  FIDEL 1, mG 27, pG 50), could not rule out bicuspid valve.  echo could not confirm bicuspid valve  Patient reports a stable anginal pattern with walking    now s/p velvet showing moderate AS, bicuspid valve. No plans for sx intervention at this time.

## 2023-10-10 NOTE — PROGRESS NOTE ADULT - SUBJECTIVE AND OBJECTIVE BOX
Subjective - patient seen and evaluated bedside. Sitting comfortably in bed. Denies CP, SOB, HA, dizziness, n/v/d    Review of Systems: negative x 10 systems except as noted above    Brief summary:  58yMale tx hh eval for AS     Significant/[id number] events:  MARIBEL performed results below    PAST MEDICAL & SURGICAL HISTORY:  Essential hypertension      Obstructive sleep apnea      Diabetes mellitus      HLD (hyperlipidemia)      CAD (coronary artery disease)      Pancreatitis      History of coronary artery stent placement  Placed 18 by Dr. Kaye      S/P cholecystectomy            aspirin  chewable 81 milliGRAM(s) Oral daily  atorvastatin 40 milliGRAM(s) Oral at bedtime  clopidogrel Tablet 75 milliGRAM(s) Oral daily  dextrose 5%. 1000 milliLiter(s) IV Continuous <Continuous>  dextrose 5%. 1000 milliLiter(s) IV Continuous <Continuous>  dextrose 50% Injectable 25 Gram(s) IV Push once  dextrose 50% Injectable 12.5 Gram(s) IV Push once  dextrose Oral Gel 15 Gram(s) Oral once PRN  fenofibrate Tablet 145 milliGRAM(s) Oral daily  glucagon  Injectable 1 milliGRAM(s) IntraMuscular once  insulin glargine Injectable (LANTUS) 60 Unit(s) SubCutaneous at bedtime  insulin lispro (ADMELOG) corrective regimen sliding scale   SubCutaneous Before meals and at bedtime  insulin lispro Injectable (ADMELOG) 45 Unit(s) SubCutaneous three times a day before meals  isosorbide   mononitrate ER Tablet (IMDUR) 30 milliGRAM(s) Oral daily  metoprolol succinate  milliGRAM(s) Oral daily  mupirocin 2% Nasal 1 Application(s) Both Nostrils two times a day  pantoprazole    Tablet 40 milliGRAM(s) Oral before breakfast  ranolazine 500 milliGRAM(s) Oral two times a day  sodium chloride 0.9% lock flush 3 milliLiter(s) IV Push every 8 hours  MEDICATIONS  (PRN):  dextrose Oral Gel 15 Gram(s) Oral once PRN Blood Glucose LESS THAN 70 milliGRAM(s)/deciliter      Daily     Daily Weight in k (09 Oct 2023 04:30)      ABG - ( 08 Oct 2023 10:39 )  pH, Arterial: 7.420 pH, Blood: x     /  pCO2: 36    /  pO2: 100   / HCO3: 23    / Base Excess: -1.1  /  SaO2: 99.4                                    13.1   6.23  )-----------( 309      ( 09 Oct 2023 05:26 )             40.0   10    140  |  103  |  20.8<H>  ----------------------------<  129<H>  4.8   |  25.0  |  1.45<H>    Ca    9.2      09 Oct 2023 05:26  Mg     1.8     10-09    TPro  6.4<L>  /  Alb  3.5  /  TBili  0.2<L>  /  DBili  x   /  AST  25  /  ALT  38  /  AlkPhos  43  10-08            Objective:  T(C): 36.7 (10-10-23 @ 00:00), Max: 37.1 (10-09-23 @ 16:34)  HR: 67 (10-10-23 @ 00:00) (61 - 77)  BP: 118/62 (10-10-23 @ 00:00) (118/62 - 174/80)  RR: 18 (10-10-23 @ 00:00) (16 - 18)  SpO2: 98% (10-10-23 @ 00:00) (96% - 100%)  Wt(kg): --CAPILLARY BLOOD GLUCOSE      POCT Blood Glucose.: 210 mg/dL (09 Oct 2023 21:12)  POCT Blood Glucose.: 138 mg/dL (09 Oct 2023 17:28)  POCT Blood Glucose.: 162 mg/dL (09 Oct 2023 11:56)  POCT Blood Glucose.: 167 mg/dL (09 Oct 2023 08:10)  I&O's Summary    08 Oct 2023 07:01  -  09 Oct 2023 07:00  --------------------------------------------------------  IN: 920 mL / OUT: 0 mL / NET: 920 mL        Physical Exam  General: NAD  Neuro: A+O x 3, non-focal, speech clear and intact  Psych: Appropriate affect  HEENT:  NCAT, No conjuctival edema or icterus, no thrush.  Pulm: CTA, equal bilaterally  CV: RRR, +S1S2 +2/6 systolic murmur  Abd: soft, NT, ND, +BS  Ext: +DP Pulses b/l, no edema  Skin: Warm, dry, intact          Imaging:    < from: Xray Chest 1 View AP/PA. (10.07.23 @ 02:04) >  Impression:    No acute pulmonary disease.    < end of copied text >  < from: MARIBEL Echo Doppler (10.09.23 @ 11:41) >   1. Left ventricular ejection fraction, by visual estimation, is 60 to   65%.   2. Normal global left ventricular systolic function.   3. Normal right ventricular size and function, inadequate estimation of   RVSP.   4. Mildly enlarged left atrium.   5. No left atrial appendage thrombus and normal left atrial appendage   velocities.   6. Mild anterior mitral valve prolapse with mild mitral regurgitation.   7. Bicuspi aortic valve with fused raphe between the right-and-left   coronary cusps with moderate calcifications and moderate stenosis (FIDEL by   direct planimetry is 1.48 cm2), peak velocity of 3.0 m/s.   8. Mild aortic regurgitation.   9. Dilatation of the sinuses of Valsalva 4.3 cm (index to BSA of 1.80   cm/m2).  10. Color flow doppler and intravenous injection of agitated saline   demonstrates the presence of an intact intra atrial septum.  11. There is no evidence of pericardial effusion.  12. No prior MARIBEL study is available for comparison. Compared to the prior   TTE study from 10/4/23, peak velocity/gradient were higher by TTE.    < end of copied text >

## 2023-10-10 NOTE — PROGRESS NOTE ADULT - ASSESSMENT
58 year old male with a medical history of HTN, HLD, type 2 DM (HA1c 9.6 on Insulin), CKD 3, Obesity Class II, CAD s/p 8 stents, recent admission to [facility] 9/29-9/30 for chest pain s/p PCI of ISR of LAD x 2 and PTCA of diag on 9/29/23, since readmitted to [facility] 10/3 after presenting with recurrent chest pain and increased CANNON since recent PCI. TTE revealed aortic stenosis (FIDEL 1, mG 27, pG 50), could not rule out bicuspid valve. Patient was re-cathed 10/6 to confirm patent stents. Patient now transferred to [facility] 10/6/23 for surgical evaluation under Dr. Cardozo.

## 2023-10-11 LAB
ANION GAP SERPL CALC-SCNC: 12 MMOL/L — SIGNIFICANT CHANGE UP (ref 5–17)
BUN SERPL-MCNC: 19.3 MG/DL — SIGNIFICANT CHANGE UP (ref 8–20)
CALCIUM SERPL-MCNC: 9 MG/DL — SIGNIFICANT CHANGE UP (ref 8.4–10.5)
CHLORIDE SERPL-SCNC: 104 MMOL/L — SIGNIFICANT CHANGE UP (ref 96–108)
CO2 SERPL-SCNC: 24 MMOL/L — SIGNIFICANT CHANGE UP (ref 22–29)
CREAT SERPL-MCNC: 1.31 MG/DL — HIGH (ref 0.5–1.3)
EGFR: 64 ML/MIN/1.73M2 — SIGNIFICANT CHANGE UP
GLUCOSE BLDC GLUCOMTR-MCNC: 164 MG/DL — HIGH (ref 70–99)
GLUCOSE BLDC GLUCOMTR-MCNC: 176 MG/DL — HIGH (ref 70–99)
GLUCOSE BLDC GLUCOMTR-MCNC: 80 MG/DL — SIGNIFICANT CHANGE UP (ref 70–99)
GLUCOSE BLDC GLUCOMTR-MCNC: 88 MG/DL — SIGNIFICANT CHANGE UP (ref 70–99)
GLUCOSE SERPL-MCNC: 184 MG/DL — HIGH (ref 70–99)
HCT VFR BLD CALC: 40.5 % — SIGNIFICANT CHANGE UP (ref 39–50)
HGB BLD-MCNC: 13 G/DL — SIGNIFICANT CHANGE UP (ref 13–17)
MAGNESIUM SERPL-MCNC: 1.8 MG/DL — SIGNIFICANT CHANGE UP (ref 1.6–2.6)
MCHC RBC-ENTMCNC: 29 PG — SIGNIFICANT CHANGE UP (ref 27–34)
MCHC RBC-ENTMCNC: 32.1 GM/DL — SIGNIFICANT CHANGE UP (ref 32–36)
MCV RBC AUTO: 90.2 FL — SIGNIFICANT CHANGE UP (ref 80–100)
PLATELET # BLD AUTO: 322 K/UL — SIGNIFICANT CHANGE UP (ref 150–400)
POTASSIUM SERPL-MCNC: 4.6 MMOL/L — SIGNIFICANT CHANGE UP (ref 3.5–5.3)
POTASSIUM SERPL-SCNC: 4.6 MMOL/L — SIGNIFICANT CHANGE UP (ref 3.5–5.3)
RBC # BLD: 4.49 M/UL — SIGNIFICANT CHANGE UP (ref 4.2–5.8)
RBC # FLD: 13.2 % — SIGNIFICANT CHANGE UP (ref 10.3–14.5)
SODIUM SERPL-SCNC: 140 MMOL/L — SIGNIFICANT CHANGE UP (ref 135–145)
WBC # BLD: 6.59 K/UL — SIGNIFICANT CHANGE UP (ref 3.8–10.5)
WBC # FLD AUTO: 6.59 K/UL — SIGNIFICANT CHANGE UP (ref 3.8–10.5)

## 2023-10-11 PROCEDURE — 99232 SBSQ HOSP IP/OBS MODERATE 35: CPT

## 2023-10-11 PROCEDURE — 99233 SBSQ HOSP IP/OBS HIGH 50: CPT

## 2023-10-11 RX ORDER — ATORVASTATIN CALCIUM 80 MG/1
80 TABLET, FILM COATED ORAL AT BEDTIME
Refills: 0 | Status: DISCONTINUED | OUTPATIENT
Start: 2023-10-11 | End: 2023-10-13

## 2023-10-11 RX ORDER — INSULIN LISPRO 100/ML
23 VIAL (ML) SUBCUTANEOUS ONCE
Refills: 0 | Status: COMPLETED | OUTPATIENT
Start: 2023-10-11 | End: 2023-10-11

## 2023-10-11 RX ORDER — CEFUROXIME AXETIL 250 MG
1500 TABLET ORAL ONCE
Refills: 0 | Status: COMPLETED | OUTPATIENT
Start: 2023-10-13 | End: 2023-10-13

## 2023-10-11 RX ORDER — ISOSORBIDE MONONITRATE 60 MG/1
60 TABLET, EXTENDED RELEASE ORAL DAILY
Refills: 0 | Status: DISCONTINUED | OUTPATIENT
Start: 2023-10-11 | End: 2023-10-13

## 2023-10-11 RX ORDER — CHLORHEXIDINE GLUCONATE 213 G/1000ML
15 SOLUTION TOPICAL
Refills: 0 | Status: DISCONTINUED | OUTPATIENT
Start: 2023-10-11 | End: 2023-10-13

## 2023-10-11 RX ORDER — VANCOMYCIN HCL 1 G
1000 VIAL (EA) INTRAVENOUS ONCE
Refills: 0 | Status: COMPLETED | OUTPATIENT
Start: 2023-10-13 | End: 2023-10-13

## 2023-10-11 RX ORDER — CHLORHEXIDINE GLUCONATE 213 G/1000ML
1 SOLUTION TOPICAL
Refills: 0 | Status: DISCONTINUED | OUTPATIENT
Start: 2023-10-11 | End: 2023-10-13

## 2023-10-11 RX ORDER — INSULIN LISPRO 100/ML
35 VIAL (ML) SUBCUTANEOUS
Refills: 0 | Status: ACTIVE | OUTPATIENT
Start: 2023-10-11 | End: 2024-09-08

## 2023-10-11 RX ORDER — MAGNESIUM SULFATE 500 MG/ML
1 VIAL (ML) INJECTION ONCE
Refills: 0 | Status: COMPLETED | OUTPATIENT
Start: 2023-10-11 | End: 2023-10-11

## 2023-10-11 RX ADMIN — Medication 100 GRAM(S): at 08:25

## 2023-10-11 RX ADMIN — Medication 200 MILLIGRAM(S): at 05:16

## 2023-10-11 RX ADMIN — Medication 81 MILLIGRAM(S): at 08:25

## 2023-10-11 RX ADMIN — RANOLAZINE 500 MILLIGRAM(S): 500 TABLET, FILM COATED, EXTENDED RELEASE ORAL at 17:46

## 2023-10-11 RX ADMIN — INSULIN GLARGINE 60 UNIT(S): 100 INJECTION, SOLUTION SUBCUTANEOUS at 22:22

## 2023-10-11 RX ADMIN — ATORVASTATIN CALCIUM 80 MILLIGRAM(S): 80 TABLET, FILM COATED ORAL at 22:05

## 2023-10-11 RX ADMIN — Medication 23 UNIT(S): at 12:44

## 2023-10-11 RX ADMIN — ISOSORBIDE MONONITRATE 30 MILLIGRAM(S): 60 TABLET, EXTENDED RELEASE ORAL at 08:25

## 2023-10-11 RX ADMIN — Medication 145 MILLIGRAM(S): at 08:25

## 2023-10-11 RX ADMIN — CHLORHEXIDINE GLUCONATE 15 MILLILITER(S): 213 SOLUTION TOPICAL at 17:46

## 2023-10-11 RX ADMIN — Medication 2: at 17:47

## 2023-10-11 RX ADMIN — SODIUM CHLORIDE 3 MILLILITER(S): 9 INJECTION INTRAMUSCULAR; INTRAVENOUS; SUBCUTANEOUS at 13:13

## 2023-10-11 RX ADMIN — SODIUM CHLORIDE 3 MILLILITER(S): 9 INJECTION INTRAMUSCULAR; INTRAVENOUS; SUBCUTANEOUS at 05:16

## 2023-10-11 RX ADMIN — Medication 2: at 08:32

## 2023-10-11 RX ADMIN — MUPIROCIN 1 APPLICATION(S): 20 OINTMENT TOPICAL at 05:16

## 2023-10-11 RX ADMIN — CHLORHEXIDINE GLUCONATE 1 APPLICATION(S): 213 SOLUTION TOPICAL at 20:06

## 2023-10-11 RX ADMIN — MUPIROCIN 1 APPLICATION(S): 20 OINTMENT TOPICAL at 17:46

## 2023-10-11 RX ADMIN — RANOLAZINE 500 MILLIGRAM(S): 500 TABLET, FILM COATED, EXTENDED RELEASE ORAL at 05:16

## 2023-10-11 RX ADMIN — ISOSORBIDE MONONITRATE 60 MILLIGRAM(S): 60 TABLET, EXTENDED RELEASE ORAL at 17:46

## 2023-10-11 RX ADMIN — SODIUM CHLORIDE 3 MILLILITER(S): 9 INJECTION INTRAMUSCULAR; INTRAVENOUS; SUBCUTANEOUS at 21:09

## 2023-10-11 RX ADMIN — Medication 45 UNIT(S): at 08:33

## 2023-10-11 RX ADMIN — Medication 35 UNIT(S): at 17:47

## 2023-10-11 RX ADMIN — PANTOPRAZOLE SODIUM 40 MILLIGRAM(S): 20 TABLET, DELAYED RELEASE ORAL at 05:16

## 2023-10-11 NOTE — PROGRESS NOTE ADULT - ASSESSMENT
58 year old male with a medical history of HTN, HLD, type 2 DM (HA1c 9.6 on Insulin), CKD 3, Obesity Class II, CAD s/p 8 stents, recent admission to [facility] 9/29-9/30 for chest pain s/p PCI of ISR of LAD x 2 and PTCA of diag on 9/29/23, since readmitted to [facility] 10/3 after presenting with recurrent chest pain and increased CANNON since recent PCI. TTE revealed aortic stenosis (FIDEL 1, mG 27, pG 50), could not rule out bicuspid valve. Patient was re-cathed 10/6 to confirm patent stents. Patient now transferred to [facility] 10/6/23 for surgical evaluation under Dr. Cardozo.

## 2023-10-11 NOTE — PROGRESS NOTE ADULT - SUBJECTIVE AND OBJECTIVE BOX
INTERVAL EVENTS:  Follow up diabetes management    ROS: Complains of shortness of breath with exertion. Endorses good appetite.     MEDICATIONS  (STANDING):  aspirin  chewable 81 milliGRAM(s) Oral daily  atorvastatin 80 milliGRAM(s) Oral at bedtime  dextrose 5%. 1000 milliLiter(s) (50 mL/Hr) IV Continuous <Continuous>  dextrose 5%. 1000 milliLiter(s) (100 mL/Hr) IV Continuous <Continuous>  dextrose 50% Injectable 12.5 Gram(s) IV Push once  dextrose 50% Injectable 25 Gram(s) IV Push once  fenofibrate Tablet 145 milliGRAM(s) Oral daily  glucagon  Injectable 1 milliGRAM(s) IntraMuscular once  insulin glargine Injectable (LANTUS) 60 Unit(s) SubCutaneous at bedtime  insulin lispro (ADMELOG) corrective regimen sliding scale   SubCutaneous Before meals and at bedtime  insulin lispro Injectable (ADMELOG) 45 Unit(s) SubCutaneous three times a day before meals  insulin lispro Injectable (ADMELOG). 23 Unit(s) SubCutaneous once  isosorbide   mononitrate ER Tablet (IMDUR) 60 milliGRAM(s) Oral daily  metoprolol succinate  milliGRAM(s) Oral daily  mupirocin 2% Nasal 1 Application(s) Both Nostrils two times a day  pantoprazole    Tablet 40 milliGRAM(s) Oral before breakfast  ranolazine 500 milliGRAM(s) Oral two times a day  sodium chloride 0.9% lock flush 3 milliLiter(s) IV Push every 8 hours    MEDICATIONS  (PRN):  acetaminophen     Tablet .. 650 milliGRAM(s) Oral every 6 hours PRN Temp greater or equal to 38.5C (101.3F), Mild Pain (1 - 3), Moderate Pain (4 - 6), Severe Pain (7 - 10)  dextrose Oral Gel 15 Gram(s) Oral once PRN Blood Glucose LESS THAN 70 milliGRAM(s)/deciliter    Allergies  penicillin (Hives)    Vital Signs Last 24 Hrs  T(C): 36.8 (11 Oct 2023 12:19), Max: 37.2 (10 Oct 2023 16:35)  T(F): 98.2 (11 Oct 2023 12:19), Max: 99 (10 Oct 2023 16:35)  HR: 66 (11 Oct 2023 12:19) (59 - 79)  BP: 152/77 (11 Oct 2023 12:19) (128/77 - 160/84)  BP(mean): --  RR: 18 (11 Oct 2023 12:19) (16 - 18)  SpO2: 99% (11 Oct 2023 12:19) (94% - 100%)    Parameters below as of 11 Oct 2023 12:19  Patient On (Oxygen Delivery Method): room air    PHYSICAL EXAM:  General: No apparent distress  Neck: Supple, trachea midline, no thyromegaly  Respiratory: Lungs clear bilaterally, normal rate, effort  Cardiac: +S1, S2, no m/r/g  GI: +BS, soft, non tender, non distended  Extremities: No peripheral edema, no pedal lesions  Neuro: A+O X3, no tremor    LABS:                        13.0   6.59  )-----------( 322      ( 11 Oct 2023 05:51 )             40.5     10-11    140  |  104  |  19.3  ----------------------------<  184<H>  4.6   |  24.0  |  1.31<H>    Ca    9.0      11 Oct 2023 05:51  Mg     1.8     10-11      Urinalysis Basic - ( 11 Oct 2023 05:51 )    Color: x / Appearance: x / SG: x / pH: x  Gluc: 184 mg/dL / Ketone: x  / Bili: x / Urobili: x   Blood: x / Protein: x / Nitrite: x   Leuk Esterase: x / RBC: x / WBC x   Sq Epi: x / Non Sq Epi: x / Bacteria: x    POCT Blood Glucose.: 88 mg/dL (10-11-23 @ 12:03)  POCT Blood Glucose.: 176 mg/dL (10-11-23 @ 08:25)  POCT Blood Glucose.: 239 mg/dL (10-10-23 @ 21:04)  POCT Blood Glucose.: 224 mg/dL (10-10-23 @ 17:09)    Thyroid Stimulating Hormone, Serum: 4.61 uIU/mL (10-07-23 @ 02:30)  Free Thyroxine, Serum: 1.1 ng/dL (10-07-23 @ 02:30)  Thyroid Stimulating Hormone, Serum: 2.97 uU/mL (10-05-23 @ 06:31)

## 2023-10-11 NOTE — PROGRESS NOTE ADULT - PROBLEM SELECTOR PLAN 1
TTE at [facility] revealed aortic stenosis (FIDEL 1, mG 27, pG 50), could not rule out bicuspid valve.  Transferred to [facility] 10/6/23 for surgical evaluation under Dr. Cardozo.  Preoperative workup complete  Continue Toprol 200XL QD as tolerated by HR and BP.   MARIBEL performed 10/9 as above. Findings of moderate AS  Further surgical planning per Dr. Cardozo pending.   Plan to be discussed / reviewed with CT Surgery attending / team during AM rounds.

## 2023-10-11 NOTE — PROGRESS NOTE ADULT - ASSESSMENT
58M with PMHx HTN, HLD, T2DM, CKD 3, obesity, CAD s/p 8 stents, recent admission to [facility] 9/29-9/30 for chest pain s/p PCI of ISR of LADx2 and PTCA of diag on 9/29/23, since readmitted to [facility] 10/3 after presenting with recurrent chest pain and increased CANNON since recent PCI. Transferred to [facility] 10/6/23 for surgical evaluation under Dr. Cardozo.    Consulted for diabetes management  Home diabetes meds: Toujeo 140 units daily, mealtime insulin 55 units (He developed pancreatitis following use of trulicity, and was unable to tolerate a sglt-2)  Current a1c: 9.6%  Outpatient Endocrinologist: Dr Ibarra    1. DM2 - on large doses of insulin at home  - FS 88 at lunch, reduce premeal admelog to 35 units tid (was previously 45 units)  - Continue lantus 60 units qhs  - Recommend to discharge on his previous regimen, he follows with Dr Ibarra    2. CAD  - Surgical evaluation per CT surgery    3. HLD  - Continue statin

## 2023-10-11 NOTE — PROGRESS NOTE ADULT - SUBJECTIVE AND OBJECTIVE BOX
Subjective: Patient seen and assessed for reproducible chest pain/aortic stenosis evaluation. Patient states "The pain medication is helping to take the edge off, I have no pain right now." At time of exam, patient describes pain as intermittent to Right sternal border, otherwise Pt denies chest pain, palpitations, dizziness, headache, shortness of breath, abdominal pain or N/V/D/C.     Pertinent events of the past 24 hours: Uneventful, recovering as expected    Tele: NSR 60s    VITAL SIGNS  Vital Signs Last 24 Hrs  T(C): 36.7 (10-11-23 @ 00:50), Max: 37.2 (10-10-23 @ 16:35)  T(F): 98.1 (10-11-23 @ 00:50), Max: 99 (10-10-23 @ 16:35)  HR: 75 (10-11-23 @ 00:50) (64 - 77)  BP: 128/77 (10-11-23 @ 00:50) (128/77 - 159/72)  RR: 18 (10-11-23 @ 00:50) (17 - 18)  SpO2: 98% (10-11-23 @ 00:50) (94% - 100%)  on (O2)              MEDICATIONS  acetaminophen     Tablet .. 650 milliGRAM(s) Oral every 6 hours PRN  aspirin  chewable 81 milliGRAM(s) Oral daily  atorvastatin 40 milliGRAM(s) Oral at bedtime  clopidogrel Tablet 75 milliGRAM(s) Oral daily  dextrose 5%. 1000 milliLiter(s) IV Continuous <Continuous>  dextrose 5%. 1000 milliLiter(s) IV Continuous <Continuous>  dextrose 50% Injectable 12.5 Gram(s) IV Push once  dextrose 50% Injectable 25 Gram(s) IV Push once  dextrose Oral Gel 15 Gram(s) Oral once PRN  fenofibrate Tablet 145 milliGRAM(s) Oral daily  glucagon  Injectable 1 milliGRAM(s) IntraMuscular once  insulin glargine Injectable (LANTUS) 60 Unit(s) SubCutaneous at bedtime  insulin lispro (ADMELOG) corrective regimen sliding scale   SubCutaneous Before meals and at bedtime  insulin lispro Injectable (ADMELOG) 45 Unit(s) SubCutaneous three times a day before meals  isosorbide   mononitrate ER Tablet (IMDUR) 30 milliGRAM(s) Oral daily  metoprolol succinate  milliGRAM(s) Oral daily  mupirocin 2% Nasal 1 Application(s) Both Nostrils two times a day  pantoprazole    Tablet 40 milliGRAM(s) Oral before breakfast  ranolazine 500 milliGRAM(s) Oral two times a day  sodium chloride 0.9% lock flush 3 milliLiter(s) IV Push every 8 hours    PHYSICAL EXAM  Constitutional: NAD  Neuro: A+O x 3, non-focal, speech clear and intact  CV: regular rate, regular rhythm, +S1S2, no murmurs or rub  Pulm/chest: lung sounds CTA and equal bilaterally, no accessory muscle use noted  Abd: obese, +BS, soft, NT, ND  Ext: DALLAS x 4, no C/C/E  Skin: warm, well perfused  Psych: calm, appropriate affect    Intake and Output  10-09 @ 07:01  -  10-10 @ 07:00  --------------------------------------------------------  IN: 120 mL / OUT: 0 mL / NET: 120 mL    Weights:  Daily     Daily Weight in k.4 (10 Oct 2023 04:58)  Admit Wt: Drug Dosing Weight  Height (cm): 182.9 (03 Oct 2023 12:20)  Weight (kg): 117.9 (03 Oct 2023 12:20)  BMI (kg/m2): 35.2 (03 Oct 2023 12:20)  BSA (m2): 2.38 (03 Oct 2023 12:20)    All laboratory results, radiology and medications reviewed.    LABS  10-10    138  |  102  |  19.8  ----------------------------<  167<H>  4.4   |  25.0  |  1.45<H>    Ca    8.8      10 Oct 2023 05:19  Mg     1.9     10-10                                   12.9   6.39  )-----------( 324      ( 10 Oct 2023 05:19 )             39.4              CAPILLARY BLOOD GLUCOSE  POCT Blood Glucose.: 239 mg/dL (10 Oct 2023 21:04)  POCT Blood Glucose.: 224 mg/dL (10 Oct 2023 17:09)  POCT Blood Glucose.: 235 mg/dL (10 Oct 2023 11:59)  POCT Blood Glucose.: 189 mg/dL (10 Oct 2023 08:25)         < from: Xray Chest 1 View AP/PA. (10.07.23 @ 02:04) >    FINDINGS:  Heart/Vascular: The heart size, mediastinum, hilum and aorta are within   normal limits for projection. Coronary artery stent.  Pulmonary: Midline trachea. There is no focal infiltrate, congestion or   effusion.  Bones: There is no fracture.  Lines and catheter: None    Impression:    No acute pulmonary disease.    < end of copied text >      < from: MARIBEL Echo Doppler (10.09.23 @ 11:41) >    Summary:   1. Left ventricular ejection fraction, by visual estimation, is 60 to   65%.   2. Normal global left ventricular systolic function.   3. Normal right ventricular size and function, inadequate estimation of   RVSP.   4. Mildly enlarged left atrium.   5. No left atrial appendage thrombus and normal left atrial appendage   velocities.   6. Mild anterior mitral valve prolapse with mild mitral regurgitation.   7. Bicuspi aortic valve with fused raphe between the right-and-left   coronary cusps with moderate calcifications and moderate stenosis (FIDEL by   direct planimetry is 1.48 cm2), peak velocity of 3.0 m/s.   8. Mild aortic regurgitation.   9. Dilatation of the sinuses of Valsalva 4.3 cm (index to BSA of 1.80   cm/m2).  10. Color flow doppler and intravenous injection of agitated saline   demonstrates the presence of an intact intra atrial septum.  11. There is no evidence of pericardial effusion.  12. No prior MARIBEL study is available for comparison. Compared to the prior   TTE study from 10/4/23, peak velocity/gradient were higher by TTE.    < end of copied text >

## 2023-10-12 LAB
ANION GAP SERPL CALC-SCNC: 11 MMOL/L — SIGNIFICANT CHANGE UP (ref 5–17)
BLD GP AB SCN SERPL QL: SIGNIFICANT CHANGE UP
BUN SERPL-MCNC: 21.7 MG/DL — HIGH (ref 8–20)
CALCIUM SERPL-MCNC: 8.6 MG/DL — SIGNIFICANT CHANGE UP (ref 8.4–10.5)
CHLORIDE SERPL-SCNC: 104 MMOL/L — SIGNIFICANT CHANGE UP (ref 96–108)
CO2 SERPL-SCNC: 24 MMOL/L — SIGNIFICANT CHANGE UP (ref 22–29)
CREAT SERPL-MCNC: 1.45 MG/DL — HIGH (ref 0.5–1.3)
EGFR: 57 ML/MIN/1.73M2 — LOW
GLUCOSE BLDC GLUCOMTR-MCNC: 126 MG/DL — HIGH (ref 70–99)
GLUCOSE BLDC GLUCOMTR-MCNC: 129 MG/DL — HIGH (ref 70–99)
GLUCOSE BLDC GLUCOMTR-MCNC: 136 MG/DL — HIGH (ref 70–99)
GLUCOSE BLDC GLUCOMTR-MCNC: 164 MG/DL — HIGH (ref 70–99)
GLUCOSE BLDC GLUCOMTR-MCNC: 164 MG/DL — HIGH (ref 70–99)
GLUCOSE SERPL-MCNC: 113 MG/DL — HIGH (ref 70–99)
HCT VFR BLD CALC: 36.5 % — LOW (ref 39–50)
HGB BLD-MCNC: 12.3 G/DL — LOW (ref 13–17)
MAGNESIUM SERPL-MCNC: 1.8 MG/DL — SIGNIFICANT CHANGE UP (ref 1.6–2.6)
MCHC RBC-ENTMCNC: 29.7 PG — SIGNIFICANT CHANGE UP (ref 27–34)
MCHC RBC-ENTMCNC: 33.7 GM/DL — SIGNIFICANT CHANGE UP (ref 32–36)
MCV RBC AUTO: 88.2 FL — SIGNIFICANT CHANGE UP (ref 80–100)
PA ADP PRP-ACNC: 165 PRU — LOW (ref 180–376)
PLATELET # BLD AUTO: 325 K/UL — SIGNIFICANT CHANGE UP (ref 150–400)
POTASSIUM SERPL-MCNC: 4.2 MMOL/L — SIGNIFICANT CHANGE UP (ref 3.5–5.3)
POTASSIUM SERPL-SCNC: 4.2 MMOL/L — SIGNIFICANT CHANGE UP (ref 3.5–5.3)
RBC # BLD: 4.14 M/UL — LOW (ref 4.2–5.8)
RBC # FLD: 13.2 % — SIGNIFICANT CHANGE UP (ref 10.3–14.5)
SODIUM SERPL-SCNC: 139 MMOL/L — SIGNIFICANT CHANGE UP (ref 135–145)
WBC # BLD: 7.79 K/UL — SIGNIFICANT CHANGE UP (ref 3.8–10.5)
WBC # FLD AUTO: 7.79 K/UL — SIGNIFICANT CHANGE UP (ref 3.8–10.5)

## 2023-10-12 PROCEDURE — 99233 SBSQ HOSP IP/OBS HIGH 50: CPT

## 2023-10-12 PROCEDURE — 71045 X-RAY EXAM CHEST 1 VIEW: CPT | Mod: 26

## 2023-10-12 PROCEDURE — 99231 SBSQ HOSP IP/OBS SF/LOW 25: CPT | Mod: 57

## 2023-10-12 RX ORDER — INSULIN LISPRO 100/ML
28 VIAL (ML) SUBCUTANEOUS
Refills: 0 | Status: DISCONTINUED | OUTPATIENT
Start: 2023-10-12 | End: 2023-10-13

## 2023-10-12 RX ORDER — MAGNESIUM SULFATE 500 MG/ML
2 VIAL (ML) INJECTION ONCE
Refills: 0 | Status: COMPLETED | OUTPATIENT
Start: 2023-10-12 | End: 2023-10-12

## 2023-10-12 RX ORDER — INSULIN GLARGINE 100 [IU]/ML
30 INJECTION, SOLUTION SUBCUTANEOUS AT BEDTIME
Refills: 0 | Status: DISCONTINUED | OUTPATIENT
Start: 2023-10-12 | End: 2023-10-13

## 2023-10-12 RX ADMIN — Medication 28 UNIT(S): at 17:27

## 2023-10-12 RX ADMIN — RANOLAZINE 500 MILLIGRAM(S): 500 TABLET, FILM COATED, EXTENDED RELEASE ORAL at 05:36

## 2023-10-12 RX ADMIN — Medication 81 MILLIGRAM(S): at 09:31

## 2023-10-12 RX ADMIN — CHLORHEXIDINE GLUCONATE 15 MILLILITER(S): 213 SOLUTION TOPICAL at 17:27

## 2023-10-12 RX ADMIN — SODIUM CHLORIDE 3 MILLILITER(S): 9 INJECTION INTRAMUSCULAR; INTRAVENOUS; SUBCUTANEOUS at 21:20

## 2023-10-12 RX ADMIN — RANOLAZINE 500 MILLIGRAM(S): 500 TABLET, FILM COATED, EXTENDED RELEASE ORAL at 17:27

## 2023-10-12 RX ADMIN — Medication 35 UNIT(S): at 08:53

## 2023-10-12 RX ADMIN — MUPIROCIN 1 APPLICATION(S): 20 OINTMENT TOPICAL at 05:39

## 2023-10-12 RX ADMIN — CHLORHEXIDINE GLUCONATE 15 MILLILITER(S): 213 SOLUTION TOPICAL at 05:36

## 2023-10-12 RX ADMIN — Medication 145 MILLIGRAM(S): at 09:31

## 2023-10-12 RX ADMIN — SODIUM CHLORIDE 3 MILLILITER(S): 9 INJECTION INTRAMUSCULAR; INTRAVENOUS; SUBCUTANEOUS at 05:38

## 2023-10-12 RX ADMIN — Medication 25 GRAM(S): at 09:31

## 2023-10-12 RX ADMIN — CHLORHEXIDINE GLUCONATE 1 APPLICATION(S): 213 SOLUTION TOPICAL at 17:30

## 2023-10-12 RX ADMIN — ATORVASTATIN CALCIUM 80 MILLIGRAM(S): 80 TABLET, FILM COATED ORAL at 21:20

## 2023-10-12 RX ADMIN — SODIUM CHLORIDE 3 MILLILITER(S): 9 INJECTION INTRAMUSCULAR; INTRAVENOUS; SUBCUTANEOUS at 14:31

## 2023-10-12 RX ADMIN — INSULIN GLARGINE 30 UNIT(S): 100 INJECTION, SOLUTION SUBCUTANEOUS at 21:20

## 2023-10-12 RX ADMIN — ISOSORBIDE MONONITRATE 60 MILLIGRAM(S): 60 TABLET, EXTENDED RELEASE ORAL at 09:32

## 2023-10-12 RX ADMIN — CHLORHEXIDINE GLUCONATE 1 APPLICATION(S): 213 SOLUTION TOPICAL at 07:01

## 2023-10-12 RX ADMIN — PANTOPRAZOLE SODIUM 40 MILLIGRAM(S): 20 TABLET, DELAYED RELEASE ORAL at 05:42

## 2023-10-12 RX ADMIN — Medication 200 MILLIGRAM(S): at 05:35

## 2023-10-12 NOTE — PROGRESS NOTE ADULT - SUBJECTIVE AND OBJECTIVE BOX
Subjective: Patient seen and assessed for aortic stenosis. Patient states "I feel better after my shower, more relaxed." At time of exam, patient in NAD, Pt denies chest pain, palpitations, dizziness, headache, shortness of breath, abdominal pain or N/V/D/C.      Pertinent events of the past 24 hours: Uneventful, recovering as expected    Tele: NSR 60s    VITAL SIGNS  Vital Signs Last 24 Hrs  T(C): 36.7 (10-12-23 @ 00:33), Max: 36.9 (10-11-23 @ 20:42)  T(F): 98 (10-12-23 @ 00:33), Max: 98.4 (10-11-23 @ 20:42)  HR: 67 (10-12-23 @ 00:33) (59 - 79)  BP: 107/60 (10-12-23 @ 00:33) (107/60 - 160/84)  RR: 18 (10-12-23 @ 00:33) (16 - 18)  SpO2: 97% (10-12-23 @ 00:33) (97% - 100%)  on (O2)              MEDICATIONS  acetaminophen     Tablet .. 650 milliGRAM(s) Oral every 6 hours PRN  aspirin  chewable 81 milliGRAM(s) Oral daily  atorvastatin 80 milliGRAM(s) Oral at bedtime  chlorhexidine 0.12% Liquid 15 milliLiter(s) Swish and Spit two times a day  chlorhexidine 4% Liquid 1 Application(s) Topical two times a day  dextrose 5%. 1000 milliLiter(s) IV Continuous <Continuous>  dextrose 5%. 1000 milliLiter(s) IV Continuous <Continuous>  dextrose 50% Injectable 12.5 Gram(s) IV Push once  dextrose 50% Injectable 25 Gram(s) IV Push once  dextrose Oral Gel 15 Gram(s) Oral once PRN  fenofibrate Tablet 145 milliGRAM(s) Oral daily  glucagon  Injectable 1 milliGRAM(s) IntraMuscular once  insulin glargine Injectable (LANTUS) 60 Unit(s) SubCutaneous at bedtime  insulin lispro (ADMELOG) corrective regimen sliding scale   SubCutaneous Before meals and at bedtime  insulin lispro Injectable (ADMELOG) 35 Unit(s) SubCutaneous three times a day before meals  isosorbide   mononitrate ER Tablet (IMDUR) 60 milliGRAM(s) Oral daily  metoprolol succinate  milliGRAM(s) Oral daily  mupirocin 2% Nasal 1 Application(s) Both Nostrils two times a day  pantoprazole    Tablet 40 milliGRAM(s) Oral before breakfast  ranolazine 500 milliGRAM(s) Oral two times a day  sodium chloride 0.9% lock flush 3 milliLiter(s) IV Push every 8 hours    PHYSICAL EXAM  Constitutional: NAD  Neuro: A+O x 3, non-focal, speech clear and intact  CV: regular rate, regular rhythm, +S1S2, +systolic murmur   Pulm/chest: lung sounds CTA and equal bilaterally, no accessory muscle use noted  Abd: +BS, soft, NT, ND  Ext: DALLAS x 4, no C/C/E  Skin: warm, well perfused  Psych: calm, appropriate affect    Intake and Output  10-10 @ 07:01  -  10-11 @ 07:00  --------------------------------------------------------  IN: 400 mL / OUT: 0 mL / NET: 400 mL    10-11 @ 07:01  -  10-12 @ 00:43  --------------------------------------------------------  IN: 720 mL / OUT: 0 mL / NET: 720 mL    Weights:  Daily     Daily   Admit Wt: Drug Dosing Weight  Height (cm): 182.9 (03 Oct 2023 12:20)  Weight (kg): 117.9 (03 Oct 2023 12:20)  BMI (kg/m2): 35.2 (03 Oct 2023 12:20)  BSA (m2): 2.38 (03 Oct 2023 12:20)    All laboratory results, radiology and medications reviewed.    LABS  10-11    140  |  104  |  19.3  ----------------------------<  184<H>  4.6   |  24.0  |  1.31<H>    Ca    9.0      11 Oct 2023 05:51  Mg     1.8     10-11                                   13.0   6.59  )-----------( 322      ( 11 Oct 2023 05:51 )             40.5            CAPILLARY BLOOD GLUCOSE  POCT Blood Glucose.: 80 mg/dL (11 Oct 2023 22:14)  POCT Blood Glucose.: 164 mg/dL (11 Oct 2023 17:38)  POCT Blood Glucose.: 88 mg/dL (11 Oct 2023 12:03)  POCT Blood Glucose.: 176 mg/dL (11 Oct 2023 08:25)         < from: Xray Chest 1 View- PORTABLE-Routine (Xray Chest 1 View- PORTABLE-Routine in AM.) (10.10.23 @ 06:19) >    INTERPRETATION:  Chest one view    HISTORY: Preop    COMPARISON STUDY: 10/7/2023    Frontal expiratory view of the chest shows the heart to be borderline in   size. Coronary artery stent and right shoulder clips are again noted.    The lungs are clear and there is no evidence of pneumothorax nor pleural   effusion.    IMPRESSION:  No active pulmonary disease.    < end of copied text >    < from: MARIBEL Echo Doppler (10.09.23 @ 11:41) >      Summary:   1. Left ventricular ejection fraction, by visual estimation, is 60 to   65%.   2. Normal global left ventricular systolic function.   3. Normal right ventricular size and function, inadequate estimation of   RVSP.   4. Mildly enlarged left atrium.   5. No left atrial appendage thrombus and normal left atrial appendage   velocities.   6. Mild anterior mitral valve prolapse with mild mitral regurgitation.   7. Bicuspi aortic valve with fused raphe between the right-and-left   coronary cusps with moderate calcifications and moderate stenosis (FIDEL by   direct planimetry is 1.48 cm2), peak velocity of 3.0 m/s.   8. Mild aortic regurgitation.   9. Dilatation of the sinuses of Valsalva 4.3 cm (index to BSA of 1.80   cm/m2).  10. Color flow doppler and intravenous injection of agitated saline   demonstrates the presence of an intact intra atrial septum.  11. There is no evidence of pericardial effusion.  12. No prior MARIBEL study is available for comparison. Compared to the prior   TTE study from 10/4/23, peak velocity/gradient were higher by TTE.    < end of copied text >      < from: TTE Echo Complete w/ Contrast w/ Doppler (10.07.23 @ 11:30) >    Summary:   1.Technically difficult study.   2. Endocardial visualization was enhanced with intravenous echo contrast.   3. Left ventricular ejection fraction, by visual estimation, is 65 to   70%.   4. Normal global left ventricular systolic function.   5. There is moderate concentric left ventricular hypertrophy.   6. Diastolic function indeterminate.   7. Mildly enlarged right ventricle with normal RV systolic function,   estimated PASP 27 mmHg.   8. Normal left atrial size.   9. Mild mitral annular calcification.  10. Mild mitral valve regurgitation.  11. Mild tricuspid regurgitation.  12. Aortic valve is bicuspid with calcifications and severe stenosis,   peak velocity 4.2 m/s and mean gradient 45 mmHg, FIDEL (by VTI) 0.75 cm2,   dimensionless index 0.17  13. Mild aortic regurgitation.  14. Aortic root at the sinus of Valsalva measures 4.0 cm, normal when   indexed to BSA 1.68 cm/m2.  15. There is no evidence of pericardial effusion.    < end of copied text >

## 2023-10-12 NOTE — PROGRESS NOTE ADULT - ASSESSMENT
58M with PMHx HTN, HLD, T2DM, CKD 3, obesity, CAD s/p 8 stents, recent admission to [facility] 9/29-9/30 for chest pain s/p PCI of ISR of LADx2 and PTCA of diag on 9/29/23, since readmitted to [facility] 10/3 after presenting with recurrent chest pain and increased CANNON since recent PCI. Transferred to [facility] 10/6/23 for surgical evaluation under Dr. Cardozo.    Consulted for diabetes management  Home diabetes meds: Toujeo 140 units daily, mealtime insulin 55 units (He developed pancreatitis following use of trulicity, and was unable to tolerate a sglt-2)  Current a1c: 9.6%  Outpatient Endocrinologist: Dr Ibarra    1. DM2 - on large doses of insulin at home  - Decrease premeal admelog to 28 units tid  - NPO after midnight --> reduce lantus to 30 units qhs (1/2 his dose)  - Recommend to discharge on his previous regimen, he follows with Dr Ibarra    2. CAD/Aortic stenosis  - Surgical evaluation per CT surgery, plan for tomorrow. NPO after midnight    3. HLD  - Continue statin

## 2023-10-12 NOTE — PROGRESS NOTE ADULT - PROBLEM SELECTOR PLAN 1
TTE at [facility] revealed aortic stenosis (FIDEL 1, mG 27, pG 50), could not rule out bicuspid valve.  Transferred to [facility] 10/6/23 for surgical evaluation under Dr. Cardozo.  Preoperative workup complete  Continue Toprol 200XL QD as tolerated by HR and BP.   MARIBEL performed 10/9 with findings of moderate AS  Images and case reviewed by Dr. Cardozo and plan for sAVR tomorrow Friday 10/13  Plan to be discussed / reviewed with CT Surgery attending / team during AM rounds.

## 2023-10-12 NOTE — PROGRESS NOTE ADULT - SUBJECTIVE AND OBJECTIVE BOX
Renal function remains at baseline. Denied SOB and LE edema.    Vital Signs Last 24 Hrs  T(C): 36.9 (12 Oct 2023 12:00), Max: 36.9 (11 Oct 2023 20:42)  T(F): 98.5 (12 Oct 2023 12:00), Max: 98.5 (12 Oct 2023 12:00)  HR: 70 (12 Oct 2023 12:00) (61 - 70)  BP: 134/69 (12 Oct 2023 12:00) (107/60 - 162/73)  BP(mean): --  RR: 17 (12 Oct 2023 12:00) (16 - 18)  SpO2: 98% (12 Oct 2023 12:00) (97% - 99%)    Parameters below as of 12 Oct 2023 12:00  Patient On (Oxygen Delivery Method): room air    I&O's Summary    11 Oct 2023 07:01  -  12 Oct 2023 07:00  --------------------------------------------------------  IN: 920 mL / OUT: 500 mL / NET: 420 mL    Physical Exam  General: Pleasant obese male in NAD  HEENT: Normocephalic  Cardiac: S1S2 RRR  Respiratory: CTAB  Abdomen: Obese, soft  Extremities: No appreciable edema  Neuro: AAOx3  Psych: Calm     10-12    139  |  104  |  21.7<H>  ----------------------------<  113<H>  4.2   |  24.0  |  1.45<H>    Ca    8.6      12 Oct 2023 05:26  Mg     1.8     10-12                        12.3   7.79  )-----------( 325      ( 12 Oct 2023 05:26 )             36.5     MEDICATIONS  (STANDING):  aspirin  chewable 81 milliGRAM(s) Oral daily  atorvastatin 80 milliGRAM(s) Oral at bedtime  chlorhexidine 0.12% Liquid 15 milliLiter(s) Swish and Spit two times a day  chlorhexidine 4% Liquid 1 Application(s) Topical two times a day  dextrose 5%. 1000 milliLiter(s) (100 mL/Hr) IV Continuous <Continuous>  dextrose 5%. 1000 milliLiter(s) (50 mL/Hr) IV Continuous <Continuous>  dextrose 50% Injectable 25 Gram(s) IV Push once  dextrose 50% Injectable 12.5 Gram(s) IV Push once  fenofibrate Tablet 145 milliGRAM(s) Oral daily  glucagon  Injectable 1 milliGRAM(s) IntraMuscular once  insulin glargine Injectable (LANTUS) 30 Unit(s) SubCutaneous at bedtime  insulin lispro (ADMELOG) corrective regimen sliding scale   SubCutaneous Before meals and at bedtime  insulin lispro Injectable (ADMELOG) 28 Unit(s) SubCutaneous three times a day before meals  isosorbide   mononitrate ER Tablet (IMDUR) 60 milliGRAM(s) Oral daily  metoprolol succinate  milliGRAM(s) Oral daily  pantoprazole    Tablet 40 milliGRAM(s) Oral before breakfast  ranolazine 500 milliGRAM(s) Oral two times a day  sodium chloride 0.9% lock flush 3 milliLiter(s) IV Push every 8 hours    MEDICATIONS  (PRN):  acetaminophen     Tablet .. 650 milliGRAM(s) Oral every 6 hours PRN Temp greater or equal to 38.5C (101.3F), Mild Pain (1 - 3), Moderate Pain (4 - 6), Severe Pain (7 - 10)  dextrose Oral Gel 15 Gram(s) Oral once PRN Blood Glucose LESS THAN 70 milliGRAM(s)/deciliter

## 2023-10-12 NOTE — PROGRESS NOTE ADULT - ASSESSMENT
58 year old male with a medical history of excessive BMI, DM, HTN, HLD, CAD s/p 8 stents, CKD 3a with recent admission at [facility] 9/29-9/30 for chest pain s/p PCI of ISR of LADx2 and PTCA of diag on 9/29/23, then readmitted to [facility] 10/3 after presenting with recurrent chest pain and increased CANNON. TTE revealed aortic stenosis. Re-cathed 10/6 to confirm patent stents. Patient then transferred to [facility] 10/6/23 for surgical evaluation. Nephrology is following for CKD 3a.     -Baseline creatinine is ~1.3-1.5mg/dL (as reported by patient)  -Renal function remains at baseline while here  - protein, negative blood  -UPCR 0.6mg/g  -Renal ultrasound showed right kidney 11.7 cm + left kidney 11.7 cm - no hydronephrosis   -BP acceptable  -Anemia - hemoglobin is at goal  -Mineral Bone Disease - will check PTH and 25 Vitamin D   -Outpatient nephrologist is Dr Manzo   -Planned for surgical intervention tomorrow by Cardiothoracic - renal function is at baseline; no contraindication from renal standpoint     Wali Guerin DO  Nephrology

## 2023-10-12 NOTE — PROGRESS NOTE ADULT - SUBJECTIVE AND OBJECTIVE BOX
INTERVAL EVENTS:  Follow up diabetes management    ROS: Denies chest pain, sob, abd pain.    MEDICATIONS  (STANDING):  aspirin  chewable 81 milliGRAM(s) Oral daily  atorvastatin 80 milliGRAM(s) Oral at bedtime  chlorhexidine 0.12% Liquid 15 milliLiter(s) Swish and Spit two times a day  chlorhexidine 4% Liquid 1 Application(s) Topical two times a day  dextrose 5%. 1000 milliLiter(s) (100 mL/Hr) IV Continuous <Continuous>  dextrose 5%. 1000 milliLiter(s) (50 mL/Hr) IV Continuous <Continuous>  dextrose 50% Injectable 12.5 Gram(s) IV Push once  dextrose 50% Injectable 25 Gram(s) IV Push once  fenofibrate Tablet 145 milliGRAM(s) Oral daily  glucagon  Injectable 1 milliGRAM(s) IntraMuscular once  insulin glargine Injectable (LANTUS) 30 Unit(s) SubCutaneous at bedtime  insulin lispro (ADMELOG) corrective regimen sliding scale   SubCutaneous Before meals and at bedtime  insulin lispro Injectable (ADMELOG) 28 Unit(s) SubCutaneous three times a day before meals  isosorbide   mononitrate ER Tablet (IMDUR) 60 milliGRAM(s) Oral daily  metoprolol succinate  milliGRAM(s) Oral daily  pantoprazole    Tablet 40 milliGRAM(s) Oral before breakfast  ranolazine 500 milliGRAM(s) Oral two times a day  sodium chloride 0.9% lock flush 3 milliLiter(s) IV Push every 8 hours    MEDICATIONS  (PRN):  acetaminophen     Tablet .. 650 milliGRAM(s) Oral every 6 hours PRN Temp greater or equal to 38.5C (101.3F), Mild Pain (1 - 3), Moderate Pain (4 - 6), Severe Pain (7 - 10)  dextrose Oral Gel 15 Gram(s) Oral once PRN Blood Glucose LESS THAN 70 milliGRAM(s)/deciliter    Allergies  penicillin (Hives)    Vital Signs Last 24 Hrs  T(C): 36.9 (12 Oct 2023 12:00), Max: 36.9 (11 Oct 2023 20:42)  T(F): 98.5 (12 Oct 2023 12:00), Max: 98.5 (12 Oct 2023 12:00)  HR: 70 (12 Oct 2023 12:00) (61 - 70)  BP: 134/69 (12 Oct 2023 12:00) (107/60 - 162/73)  BP(mean): --  RR: 17 (12 Oct 2023 12:00) (16 - 18)  SpO2: 98% (12 Oct 2023 12:00) (97% - 99%)    Parameters below as of 12 Oct 2023 12:00  Patient On (Oxygen Delivery Method): room air    PHYSICAL EXAM:  General: No apparent distress  Neck: Supple, trachea midline, no thyromegaly  Respiratory: Lungs clear bilaterally, normal rate, effort  Cardiac: +S1, S2, no m/r/g  GI: +BS, soft, non tender, non distended  Extremities: No peripheral edema, no pedal lesions  Neuro: A+O X3, no tremor      LABS:                        12.3   7.79  )-----------( 325      ( 12 Oct 2023 05:26 )             36.5     10-12    139  |  104  |  21.7<H>  ----------------------------<  113<H>  4.2   |  24.0  |  1.45<H>    Ca    8.6      12 Oct 2023 05:26  Mg     1.8     10-12      Urinalysis Basic - ( 12 Oct 2023 05:26 )    Color: x / Appearance: x / SG: x / pH: x  Gluc: 113 mg/dL / Ketone: x  / Bili: x / Urobili: x   Blood: x / Protein: x / Nitrite: x   Leuk Esterase: x / RBC: x / WBC x   Sq Epi: x / Non Sq Epi: x / Bacteria: x    POCT Blood Glucose.: 164 mg/dL (10-12-23 @ 11:58)  POCT Blood Glucose.: 136 mg/dL (10-12-23 @ 08:42)  POCT Blood Glucose.: 80 mg/dL (10-11-23 @ 22:14)  POCT Blood Glucose.: 164 mg/dL (10-11-23 @ 17:38)    Thyroid Stimulating Hormone, Serum: 4.61 uIU/mL (10-07-23 @ 02:30)  Free Thyroxine, Serum: 1.1 ng/dL (10-07-23 @ 02:30)  Thyroid Stimulating Hormone, Serum: 2.97 uU/mL (10-05-23 @ 06:31)

## 2023-10-12 NOTE — PROGRESS NOTE ADULT - ASSESSMENT
58 year old male with a medical history of HTN, HLD, type 2 DM (HA1c 9.6 on Insulin), CKD 3, Obesity Class II, CAD s/p 8 stents, recent admission to [facility] 9/29-9/30 for  chest pain s/p PCI of ISR of LAD x 2 and PTCA of diag on 9/29/23, since readmitted to [facility] 10/3 after presenting with recurrent chest pain and increased CANNON since recent PCI. TTE revealed aortic stenosis (FIDEL 1, mG 27, pG 50), could not rule out bicuspid valve. Patient was re-cathed 10/6 to confirm patent stents. Patient now transferred to [facility] 10/6/23 for surgical evaluation under Dr. Cardozo.

## 2023-10-13 ENCOUNTER — TRANSCRIPTION ENCOUNTER (OUTPATIENT)
Age: 58
End: 2023-10-13

## 2023-10-13 ENCOUNTER — APPOINTMENT (OUTPATIENT)
Dept: CARDIOTHORACIC SURGERY | Facility: HOSPITAL | Age: 58
End: 2023-10-13

## 2023-10-13 LAB
24R-OH-CALCIDIOL SERPL-MCNC: 28.2 NG/ML — LOW (ref 30–80)
ALBUMIN SERPL ELPH-MCNC: 3.3 G/DL — SIGNIFICANT CHANGE UP (ref 3.3–5.2)
ALP SERPL-CCNC: 33 U/L — LOW (ref 40–120)
ALT FLD-CCNC: 45 U/L — HIGH
ANION GAP SERPL CALC-SCNC: 11 MMOL/L — SIGNIFICANT CHANGE UP (ref 5–17)
ANION GAP SERPL CALC-SCNC: 12 MMOL/L — SIGNIFICANT CHANGE UP (ref 5–17)
ANISOCYTOSIS BLD QL: SLIGHT — SIGNIFICANT CHANGE UP
APTT BLD: 36.3 SEC — HIGH (ref 24.5–35.6)
AST SERPL-CCNC: 66 U/L — HIGH
BASE EXCESS BLDA CALC-SCNC: -0.8 MMOL/L — SIGNIFICANT CHANGE UP (ref -2–3)
BASE EXCESS BLDA CALC-SCNC: -2.4 MMOL/L — LOW (ref -2–3)
BASE EXCESS BLDA CALC-SCNC: -3.5 MMOL/L — LOW (ref -2–3)
BASE EXCESS BLDA CALC-SCNC: 0.4 MMOL/L — SIGNIFICANT CHANGE UP (ref -2–3)
BASE EXCESS BLDA CALC-SCNC: 0.6 MMOL/L — SIGNIFICANT CHANGE UP (ref -2–3)
BASE EXCESS BLDA CALC-SCNC: 1.2 MMOL/L — SIGNIFICANT CHANGE UP (ref -2–3)
BASE EXCESS BLDA CALC-SCNC: 2.2 MMOL/L — SIGNIFICANT CHANGE UP (ref -2–3)
BASE EXCESS BLDA CALC-SCNC: 2.8 MMOL/L — SIGNIFICANT CHANGE UP (ref -2–3)
BASE EXCESS BLDV CALC-SCNC: -4.6 MMOL/L — LOW (ref -2–3)
BASE EXCESS BLDV CALC-SCNC: 0.9 MMOL/L — SIGNIFICANT CHANGE UP (ref -2–3)
BASE EXCESS BLDV CALC-SCNC: 1.5 MMOL/L — SIGNIFICANT CHANGE UP (ref -2–3)
BASOPHILS # BLD AUTO: 0.2 K/UL — SIGNIFICANT CHANGE UP (ref 0–0.2)
BASOPHILS NFR BLD AUTO: 0.9 % — SIGNIFICANT CHANGE UP (ref 0–2)
BILIRUB SERPL-MCNC: 0.6 MG/DL — SIGNIFICANT CHANGE UP (ref 0.4–2)
BUN SERPL-MCNC: 18.7 MG/DL — SIGNIFICANT CHANGE UP (ref 8–20)
BUN SERPL-MCNC: 21 MG/DL — HIGH (ref 8–20)
CA-I BLDA-SCNC: 1.02 MMOL/L — LOW (ref 1.15–1.33)
CA-I BLDA-SCNC: 1.08 MMOL/L — LOW (ref 1.15–1.33)
CA-I BLDA-SCNC: 1.1 MMOL/L — LOW (ref 1.15–1.33)
CA-I BLDA-SCNC: 1.11 MMOL/L — LOW (ref 1.15–1.33)
CA-I BLDA-SCNC: 1.17 MMOL/L — SIGNIFICANT CHANGE UP (ref 1.15–1.33)
CA-I BLDA-SCNC: 1.21 MMOL/L — SIGNIFICANT CHANGE UP (ref 1.15–1.33)
CA-I BLDA-SCNC: 1.33 MMOL/L — SIGNIFICANT CHANGE UP (ref 1.15–1.33)
CA-I BLDA-SCNC: 1.75 MMOL/L — CRITICAL HIGH (ref 1.15–1.33)
CA-I SERPL-SCNC: 0.91 MMOL/L — LOW (ref 1.15–1.33)
CA-I SERPL-SCNC: 1.06 MMOL/L — LOW (ref 1.15–1.33)
CA-I SERPL-SCNC: 1.08 MMOL/L — LOW (ref 1.15–1.33)
CALCIUM SERPL-MCNC: 8.9 MG/DL — SIGNIFICANT CHANGE UP (ref 8.4–10.5)
CALCIUM SERPL-MCNC: 9.3 MG/DL — SIGNIFICANT CHANGE UP (ref 8.4–10.5)
CALCIUM SERPL-MCNC: 9.3 MG/DL — SIGNIFICANT CHANGE UP (ref 8.4–10.5)
CHLORIDE BLDA-SCNC: 105 MMOL/L — SIGNIFICANT CHANGE UP (ref 96–108)
CHLORIDE BLDA-SCNC: 106 MMOL/L — SIGNIFICANT CHANGE UP (ref 96–108)
CHLORIDE BLDA-SCNC: 106 MMOL/L — SIGNIFICANT CHANGE UP (ref 96–108)
CHLORIDE BLDA-SCNC: 107 MMOL/L — SIGNIFICANT CHANGE UP (ref 96–108)
CHLORIDE BLDA-SCNC: 107 MMOL/L — SIGNIFICANT CHANGE UP (ref 96–108)
CHLORIDE BLDA-SCNC: 108 MMOL/L — SIGNIFICANT CHANGE UP (ref 96–108)
CHLORIDE BLDV-SCNC: 106 MMOL/L — SIGNIFICANT CHANGE UP (ref 96–108)
CHLORIDE BLDV-SCNC: 106 MMOL/L — SIGNIFICANT CHANGE UP (ref 96–108)
CHLORIDE BLDV-SCNC: 107 MMOL/L — SIGNIFICANT CHANGE UP (ref 96–108)
CHLORIDE SERPL-SCNC: 103 MMOL/L — SIGNIFICANT CHANGE UP (ref 96–108)
CHLORIDE SERPL-SCNC: 104 MMOL/L — SIGNIFICANT CHANGE UP (ref 96–108)
CK MB CFR SERPL CALC: 16.7 NG/ML — HIGH (ref 0–6.7)
CK SERPL-CCNC: 183 U/L — SIGNIFICANT CHANGE UP (ref 30–200)
CO2 SERPL-SCNC: 23 MMOL/L — SIGNIFICANT CHANGE UP (ref 22–29)
CO2 SERPL-SCNC: 26 MMOL/L — SIGNIFICANT CHANGE UP (ref 22–29)
COHGB MFR BLDA: 1.1 % — SIGNIFICANT CHANGE UP
COHGB MFR BLDA: 1.3 % — SIGNIFICANT CHANGE UP
COHGB MFR BLDA: 1.5 % — SIGNIFICANT CHANGE UP
COHGB MFR BLDA: 1.6 % — SIGNIFICANT CHANGE UP
COHGB MFR BLDA: 1.7 % — SIGNIFICANT CHANGE UP
COHGB MFR BLDA: 1.8 % — SIGNIFICANT CHANGE UP
COHGB MFR BLDA: 1.8 % — SIGNIFICANT CHANGE UP
COHGB MFR BLDA: 1.9 % — SIGNIFICANT CHANGE UP
COHGB MFR BLDV: 2 % — SIGNIFICANT CHANGE UP
COHGB MFR BLDV: 2.1 % — SIGNIFICANT CHANGE UP
COHGB MFR BLDV: 2.1 % — SIGNIFICANT CHANGE UP
CREAT SERPL-MCNC: 1.32 MG/DL — HIGH (ref 0.5–1.3)
CREAT SERPL-MCNC: 1.53 MG/DL — HIGH (ref 0.5–1.3)
EGFR: 53 ML/MIN/1.73M2 — LOW
EGFR: 63 ML/MIN/1.73M2 — SIGNIFICANT CHANGE UP
EOSINOPHIL # BLD AUTO: 0.2 K/UL — SIGNIFICANT CHANGE UP (ref 0–0.5)
EOSINOPHIL NFR BLD AUTO: 0.9 % — SIGNIFICANT CHANGE UP (ref 0–6)
GAS PNL BLDA: SIGNIFICANT CHANGE UP
GAS PNL BLDA: SIGNIFICANT CHANGE UP
GAS PNL BLDV: 136 MMOL/L — SIGNIFICANT CHANGE UP (ref 136–145)
GAS PNL BLDV: 136 MMOL/L — SIGNIFICANT CHANGE UP (ref 136–145)
GAS PNL BLDV: 137 MMOL/L — SIGNIFICANT CHANGE UP (ref 136–145)
GLUCOSE BLDA-MCNC: 161 MG/DL — HIGH (ref 70–99)
GLUCOSE BLDA-MCNC: 164 MG/DL — HIGH (ref 70–99)
GLUCOSE BLDA-MCNC: 175 MG/DL — HIGH (ref 70–99)
GLUCOSE BLDA-MCNC: 176 MG/DL — HIGH (ref 70–99)
GLUCOSE BLDA-MCNC: 180 MG/DL — HIGH (ref 70–99)
GLUCOSE BLDA-MCNC: 182 MG/DL — HIGH (ref 70–99)
GLUCOSE BLDA-MCNC: 198 MG/DL — HIGH (ref 70–99)
GLUCOSE BLDA-MCNC: 200 MG/DL — HIGH (ref 70–99)
GLUCOSE BLDC GLUCOMTR-MCNC: 129 MG/DL — HIGH (ref 70–99)
GLUCOSE BLDC GLUCOMTR-MCNC: 154 MG/DL — HIGH (ref 70–99)
GLUCOSE BLDC GLUCOMTR-MCNC: 159 MG/DL — HIGH (ref 70–99)
GLUCOSE BLDC GLUCOMTR-MCNC: 161 MG/DL — HIGH (ref 70–99)
GLUCOSE BLDC GLUCOMTR-MCNC: 173 MG/DL — HIGH (ref 70–99)
GLUCOSE BLDC GLUCOMTR-MCNC: 178 MG/DL — HIGH (ref 70–99)
GLUCOSE BLDC GLUCOMTR-MCNC: 178 MG/DL — HIGH (ref 70–99)
GLUCOSE BLDC GLUCOMTR-MCNC: 179 MG/DL — HIGH (ref 70–99)
GLUCOSE BLDC GLUCOMTR-MCNC: 183 MG/DL — HIGH (ref 70–99)
GLUCOSE BLDC GLUCOMTR-MCNC: 184 MG/DL — HIGH (ref 70–99)
GLUCOSE BLDV-MCNC: 142 MG/DL — HIGH (ref 70–99)
GLUCOSE BLDV-MCNC: 162 MG/DL — HIGH (ref 70–99)
GLUCOSE BLDV-MCNC: 176 MG/DL — HIGH (ref 70–99)
GLUCOSE SERPL-MCNC: 140 MG/DL — HIGH (ref 70–99)
GLUCOSE SERPL-MCNC: 187 MG/DL — HIGH (ref 70–99)
HCO3 BLDA-SCNC: 23 MMOL/L — SIGNIFICANT CHANGE UP (ref 21–28)
HCO3 BLDA-SCNC: 24 MMOL/L — SIGNIFICANT CHANGE UP (ref 21–28)
HCO3 BLDA-SCNC: 24 MMOL/L — SIGNIFICANT CHANGE UP (ref 21–28)
HCO3 BLDA-SCNC: 26 MMOL/L — SIGNIFICANT CHANGE UP (ref 21–28)
HCO3 BLDA-SCNC: 27 MMOL/L — SIGNIFICANT CHANGE UP (ref 21–28)
HCO3 BLDA-SCNC: 28 MMOL/L — SIGNIFICANT CHANGE UP (ref 21–28)
HCO3 BLDV-SCNC: 22 MMOL/L — SIGNIFICANT CHANGE UP (ref 22–29)
HCO3 BLDV-SCNC: 27 MMOL/L — SIGNIFICANT CHANGE UP (ref 22–29)
HCO3 BLDV-SCNC: 27 MMOL/L — SIGNIFICANT CHANGE UP (ref 22–29)
HCT VFR BLD CALC: 33.4 % — LOW (ref 39–50)
HCT VFR BLD CALC: 42.1 % — SIGNIFICANT CHANGE UP (ref 39–50)
HCT VFR BLDA CALC: 27 % — SIGNIFICANT CHANGE UP
HCT VFR BLDA CALC: 31 % — SIGNIFICANT CHANGE UP
HCT VFR BLDA CALC: 32 % — SIGNIFICANT CHANGE UP
HCT VFR BLDA CALC: 33 % — SIGNIFICANT CHANGE UP
HCT VFR BLDA CALC: 33 % — SIGNIFICANT CHANGE UP
HCT VFR BLDA CALC: 34 % — SIGNIFICANT CHANGE UP
HCT VFR BLDA CALC: 37 % — SIGNIFICANT CHANGE UP
HCT VFR BLDA CALC: 38 % — SIGNIFICANT CHANGE UP
HCT VFR BLDA CALC: 40 % — SIGNIFICANT CHANGE UP
HGB BLD CALC-MCNC: 11.1 G/DL — LOW (ref 12.6–17.4)
HGB BLD CALC-MCNC: 11.4 G/DL — LOW (ref 12.6–17.4)
HGB BLD CALC-MCNC: 9.1 G/DL — LOW (ref 12.6–17.4)
HGB BLD-MCNC: 11.4 G/DL — LOW (ref 13–17)
HGB BLD-MCNC: 13.6 G/DL — SIGNIFICANT CHANGE UP (ref 13–17)
HGB BLDA-MCNC: 10.3 G/DL — LOW (ref 12.6–17.4)
HGB BLDA-MCNC: 10.8 G/DL — LOW (ref 12.6–17.4)
HGB BLDA-MCNC: 11 G/DL — LOW (ref 12.6–17.4)
HGB BLDA-MCNC: 11.2 G/DL — LOW (ref 12.6–17.4)
HGB BLDA-MCNC: 11.2 G/DL — LOW (ref 12.6–17.4)
HGB BLDA-MCNC: 12.3 G/DL — LOW (ref 12.6–17.4)
HGB BLDA-MCNC: 12.7 G/DL — SIGNIFICANT CHANGE UP (ref 12.6–17.4)
HGB BLDA-MCNC: 13.2 G/DL — SIGNIFICANT CHANGE UP (ref 12.6–17.4)
INR BLD: 1.19 RATIO — HIGH (ref 0.85–1.18)
LACTATE BLDA-MCNC: 0.6 MMOL/L — SIGNIFICANT CHANGE UP (ref 0.5–2)
LACTATE BLDA-MCNC: 1 MMOL/L — SIGNIFICANT CHANGE UP (ref 0.5–2)
LACTATE BLDA-MCNC: 1.2 MMOL/L — SIGNIFICANT CHANGE UP (ref 0.5–2)
LACTATE BLDA-MCNC: 1.3 MMOL/L — SIGNIFICANT CHANGE UP (ref 0.5–2)
LACTATE BLDA-MCNC: 1.3 MMOL/L — SIGNIFICANT CHANGE UP (ref 0.5–2)
LACTATE BLDA-MCNC: 1.5 MMOL/L — SIGNIFICANT CHANGE UP (ref 0.5–2)
LACTATE BLDA-MCNC: 1.9 MMOL/L — SIGNIFICANT CHANGE UP (ref 0.5–2)
LACTATE BLDA-MCNC: 2.2 MMOL/L — HIGH (ref 0.5–2)
LACTATE BLDV-MCNC: 0.8 MMOL/L — SIGNIFICANT CHANGE UP (ref 0.5–2)
LACTATE BLDV-MCNC: 1 MMOL/L — SIGNIFICANT CHANGE UP (ref 0.5–2)
LACTATE BLDV-MCNC: 1.3 MMOL/L — SIGNIFICANT CHANGE UP (ref 0.5–2)
LYMPHOCYTES # BLD AUTO: 0.6 K/UL — LOW (ref 1–3.3)
LYMPHOCYTES # BLD AUTO: 2.7 % — LOW (ref 13–44)
MAGNESIUM SERPL-MCNC: 1.9 MG/DL — SIGNIFICANT CHANGE UP (ref 1.6–2.6)
MAGNESIUM SERPL-MCNC: 2.4 MG/DL — SIGNIFICANT CHANGE UP (ref 1.6–2.6)
MANUAL SMEAR VERIFICATION: SIGNIFICANT CHANGE UP
MCHC RBC-ENTMCNC: 28.9 PG — SIGNIFICANT CHANGE UP (ref 27–34)
MCHC RBC-ENTMCNC: 30.2 PG — SIGNIFICANT CHANGE UP (ref 27–34)
MCHC RBC-ENTMCNC: 32.3 GM/DL — SIGNIFICANT CHANGE UP (ref 32–36)
MCHC RBC-ENTMCNC: 34.1 GM/DL — SIGNIFICANT CHANGE UP (ref 32–36)
MCV RBC AUTO: 88.4 FL — SIGNIFICANT CHANGE UP (ref 80–100)
MCV RBC AUTO: 89.4 FL — SIGNIFICANT CHANGE UP (ref 80–100)
METAMYELOCYTES # FLD: 3.6 % — HIGH (ref 0–0)
METHGB MFR BLDA: 0.6 % — SIGNIFICANT CHANGE UP
METHGB MFR BLDA: 0.6 % — SIGNIFICANT CHANGE UP
METHGB MFR BLDA: 0.7 % — SIGNIFICANT CHANGE UP
METHGB MFR BLDA: 0.8 % — SIGNIFICANT CHANGE UP
METHGB MFR BLDA: 0.8 % — SIGNIFICANT CHANGE UP
METHGB MFR BLDA: 1 % — SIGNIFICANT CHANGE UP
METHGB MFR BLDA: 1.1 % — SIGNIFICANT CHANGE UP
METHGB MFR BLDA: 1.2 % — SIGNIFICANT CHANGE UP
METHGB MFR BLDV: 0.5 % — SIGNIFICANT CHANGE UP
METHGB MFR BLDV: 0.8 % — SIGNIFICANT CHANGE UP
METHGB MFR BLDV: 0.9 % — SIGNIFICANT CHANGE UP
MICROCYTES BLD QL: SLIGHT — SIGNIFICANT CHANGE UP
MONOCYTES # BLD AUTO: 1.57 K/UL — HIGH (ref 0–0.9)
MONOCYTES NFR BLD AUTO: 7.1 % — SIGNIFICANT CHANGE UP (ref 2–14)
NEUTROPHILS # BLD AUTO: 18.7 K/UL — HIGH (ref 1.8–7.4)
NEUTROPHILS NFR BLD AUTO: 75.9 % — SIGNIFICANT CHANGE UP (ref 43–77)
NEUTS BAND # BLD: 8.9 % — HIGH (ref 0–8)
OXYHGB MFR BLDA: 97 % — HIGH (ref 90–95)
OXYHGB MFR BLDA: 98 % — HIGH (ref 90–95)
PA ADP PRP-ACNC: 151 PRU — LOW (ref 180–376)
PCO2 BLDA: 40 MMHG — SIGNIFICANT CHANGE UP (ref 35–48)
PCO2 BLDA: 45 MMHG — SIGNIFICANT CHANGE UP (ref 35–48)
PCO2 BLDA: 46 MMHG — SIGNIFICANT CHANGE UP (ref 35–48)
PCO2 BLDA: 46 MMHG — SIGNIFICANT CHANGE UP (ref 35–48)
PCO2 BLDA: 48 MMHG — SIGNIFICANT CHANGE UP (ref 35–48)
PCO2 BLDA: 50 MMHG — HIGH (ref 35–48)
PCO2 BLDA: 53 MMHG — HIGH (ref 35–48)
PCO2 BLDA: 53 MMHG — HIGH (ref 35–48)
PCO2 BLDV: 47 MMHG — SIGNIFICANT CHANGE UP (ref 42–55)
PCO2 BLDV: 52 MMHG — SIGNIFICANT CHANGE UP (ref 42–55)
PCO2 BLDV: 54 MMHG — SIGNIFICANT CHANGE UP (ref 42–55)
PH BLDA: 7.29 — LOW (ref 7.35–7.45)
PH BLDA: 7.31 — LOW (ref 7.35–7.45)
PH BLDA: 7.32 — LOW (ref 7.35–7.45)
PH BLDA: 7.32 — LOW (ref 7.35–7.45)
PH BLDA: 7.36 — SIGNIFICANT CHANGE UP (ref 7.35–7.45)
PH BLDA: 7.36 — SIGNIFICANT CHANGE UP (ref 7.35–7.45)
PH BLDA: 7.39 — SIGNIFICANT CHANGE UP (ref 7.35–7.45)
PH BLDA: 7.39 — SIGNIFICANT CHANGE UP (ref 7.35–7.45)
PH BLDV: 7.28 — LOW (ref 7.32–7.43)
PH BLDV: 7.31 — LOW (ref 7.32–7.43)
PH BLDV: 7.33 — SIGNIFICANT CHANGE UP (ref 7.32–7.43)
PLAT MORPH BLD: NORMAL — SIGNIFICANT CHANGE UP
PLATELET # BLD AUTO: 326 K/UL — SIGNIFICANT CHANGE UP (ref 150–400)
PLATELET # BLD AUTO: 348 K/UL — SIGNIFICANT CHANGE UP (ref 150–400)
PO2 BLDA: 236 MMHG — HIGH (ref 83–108)
PO2 BLDA: 297 MMHG — HIGH (ref 83–108)
PO2 BLDA: 375 MMHG — HIGH (ref 83–108)
PO2 BLDA: 431 MMHG — HIGH (ref 83–108)
PO2 BLDA: 439 MMHG — HIGH (ref 83–108)
PO2 BLDA: 442 MMHG — HIGH (ref 83–108)
PO2 BLDA: 473 MMHG — HIGH (ref 83–108)
PO2 BLDA: >496 MMHG — HIGH (ref 83–108)
PO2 BLDV: 56 MMHG — HIGH (ref 25–45)
PO2 BLDV: 56 MMHG — HIGH (ref 25–45)
PO2 BLDV: 60 MMHG — HIGH (ref 25–45)
POLYCHROMASIA BLD QL SMEAR: SLIGHT — SIGNIFICANT CHANGE UP
POTASSIUM BLDA-SCNC: 4.7 MMOL/L — SIGNIFICANT CHANGE UP (ref 3.5–5.1)
POTASSIUM BLDA-SCNC: 4.8 MMOL/L — SIGNIFICANT CHANGE UP (ref 3.5–5.1)
POTASSIUM BLDA-SCNC: 4.8 MMOL/L — SIGNIFICANT CHANGE UP (ref 3.5–5.1)
POTASSIUM BLDA-SCNC: 5 MMOL/L — SIGNIFICANT CHANGE UP (ref 3.5–5.1)
POTASSIUM BLDA-SCNC: 5 MMOL/L — SIGNIFICANT CHANGE UP (ref 3.5–5.1)
POTASSIUM BLDA-SCNC: 5.7 MMOL/L — HIGH (ref 3.5–5.1)
POTASSIUM BLDA-SCNC: 5.8 MMOL/L — HIGH (ref 3.5–5.1)
POTASSIUM BLDA-SCNC: 6.1 MMOL/L — HIGH (ref 3.5–5.1)
POTASSIUM BLDV-SCNC: 4.9 MMOL/L — SIGNIFICANT CHANGE UP (ref 3.5–5.1)
POTASSIUM BLDV-SCNC: 5.6 MMOL/L — HIGH (ref 3.5–5.1)
POTASSIUM BLDV-SCNC: 6 MMOL/L — HIGH (ref 3.5–5.1)
POTASSIUM SERPL-MCNC: 4.4 MMOL/L — SIGNIFICANT CHANGE UP (ref 3.5–5.3)
POTASSIUM SERPL-MCNC: 4.8 MMOL/L — SIGNIFICANT CHANGE UP (ref 3.5–5.3)
POTASSIUM SERPL-SCNC: 4.4 MMOL/L — SIGNIFICANT CHANGE UP (ref 3.5–5.3)
POTASSIUM SERPL-SCNC: 4.8 MMOL/L — SIGNIFICANT CHANGE UP (ref 3.5–5.3)
PROT SERPL-MCNC: 4.9 G/DL — LOW (ref 6.6–8.7)
PROTHROM AB SERPL-ACNC: 13.1 SEC — HIGH (ref 9.5–13)
PTH-INTACT FLD-MCNC: 62 PG/ML — SIGNIFICANT CHANGE UP (ref 15–65)
RBC # BLD: 3.78 M/UL — LOW (ref 4.2–5.8)
RBC # BLD: 4.71 M/UL — SIGNIFICANT CHANGE UP (ref 4.2–5.8)
RBC # FLD: 13 % — SIGNIFICANT CHANGE UP (ref 10.3–14.5)
RBC # FLD: 13.2 % — SIGNIFICANT CHANGE UP (ref 10.3–14.5)
RBC BLD AUTO: ABNORMAL
SAO2 % BLDA: 100 % — HIGH (ref 94–98)
SAO2 % BLDA: 100 % — HIGH (ref 94–98)
SAO2 % BLDA: 99 % — HIGH (ref 94–98)
SAO2 % BLDA: 99.2 % — HIGH (ref 94–98)
SAO2 % BLDA: 99.5 % — HIGH (ref 94–98)
SAO2 % BLDA: 99.7 % — HIGH (ref 94–98)
SAO2 % BLDA: 99.8 % — HIGH (ref 94–98)
SAO2 % BLDA: 99.9 % — HIGH (ref 94–98)
SAO2 % BLDV: 86.3 % — SIGNIFICANT CHANGE UP (ref 67–88)
SAO2 % BLDV: 88.5 % — HIGH (ref 67–88)
SAO2 % BLDV: 91.1 % — HIGH (ref 67–88)
SMUDGE CELLS # BLD: PRESENT — SIGNIFICANT CHANGE UP
SODIUM BLDA-SCNC: 135 MMOL/L — LOW (ref 136–145)
SODIUM BLDA-SCNC: 136 MMOL/L — SIGNIFICANT CHANGE UP (ref 136–145)
SODIUM BLDA-SCNC: 137 MMOL/L — SIGNIFICANT CHANGE UP (ref 136–145)
SODIUM BLDA-SCNC: 137 MMOL/L — SIGNIFICANT CHANGE UP (ref 136–145)
SODIUM SERPL-SCNC: 139 MMOL/L — SIGNIFICANT CHANGE UP (ref 135–145)
SODIUM SERPL-SCNC: 140 MMOL/L — SIGNIFICANT CHANGE UP (ref 135–145)
TROPONIN T SERPL-MCNC: 0.14 NG/ML — HIGH (ref 0–0.06)
WBC # BLD: 22.05 K/UL — HIGH (ref 3.8–10.5)
WBC # BLD: 7.45 K/UL — SIGNIFICANT CHANGE UP (ref 3.8–10.5)
WBC # FLD AUTO: 22.05 K/UL — HIGH (ref 3.8–10.5)
WBC # FLD AUTO: 7.45 K/UL — SIGNIFICANT CHANGE UP (ref 3.8–10.5)

## 2023-10-13 PROCEDURE — 99291 CRITICAL CARE FIRST HOUR: CPT

## 2023-10-13 PROCEDURE — 93010 ELECTROCARDIOGRAM REPORT: CPT

## 2023-10-13 PROCEDURE — 71045 X-RAY EXAM CHEST 1 VIEW: CPT | Mod: 26

## 2023-10-13 PROCEDURE — 33405 REPLACEMENT AORTIC VALVE OPN: CPT | Mod: AS

## 2023-10-13 PROCEDURE — 33405 REPLACEMENT AORTIC VALVE OPN: CPT

## 2023-10-13 DEVICE — FLOSEAL WITH RECOTHROM THROMBIN 10ML: Type: IMPLANTABLE DEVICE | Status: FUNCTIONAL

## 2023-10-13 DEVICE — CATH VENT VENTRICULAR PVC 18FR X 4.25" TIP PERFORATION: Type: IMPLANTABLE DEVICE | Status: FUNCTIONAL

## 2023-10-13 DEVICE — COR-KNOT QUICK LOAD SINGLES: Type: IMPLANTABLE DEVICE | Status: FUNCTIONAL

## 2023-10-13 DEVICE — KIT A-LINE 1LUM 20G X 12CM SAFE KIT: Type: IMPLANTABLE DEVICE | Status: FUNCTIONAL

## 2023-10-13 DEVICE — MEDIASTINAL CATH DRAIN 9MM: Type: IMPLANTABLE DEVICE | Status: FUNCTIONAL

## 2023-10-13 DEVICE — CANNULA VENOUS 2 STAGE MC2 34/46FR X 1/2" NON-VENTED: Type: IMPLANTABLE DEVICE | Status: FUNCTIONAL

## 2023-10-13 DEVICE — CANNULA CORONARY SILICONE OSTIAL 17FR: Type: IMPLANTABLE DEVICE | Status: FUNCTIONAL

## 2023-10-13 DEVICE — CANNULA RETROGRADE CARDIOPLEGIA SELF-INFLATING 14FR PRE-SHAPED STYLET/HANDLE: Type: IMPLANTABLE DEVICE | Status: FUNCTIONAL

## 2023-10-13 DEVICE — CANNULA ARTERIAL SOFT-FLOW 24FR EXTENDED VENTED: Type: IMPLANTABLE DEVICE | Status: FUNCTIONAL

## 2023-10-13 DEVICE — COR-KNOT MINI DEVICE COMBO KIT: Type: IMPLANTABLE DEVICE | Status: FUNCTIONAL

## 2023-10-13 DEVICE — SURGIFLO MATRIX WITH THROMBIN KIT: Type: IMPLANTABLE DEVICE | Status: FUNCTIONAL

## 2023-10-13 DEVICE — PACING WIRE ORANGE M-25 WINGED WIRE 37MM X 89MM: Type: IMPLANTABLE DEVICE | Status: FUNCTIONAL

## 2023-10-13 DEVICE — CANNULA ANTEGRADE CARDIOPLEGIA 14 GA STRL: Type: IMPLANTABLE DEVICE | Status: FUNCTIONAL

## 2023-10-13 DEVICE — KIT CVC 2LUM MAC 9FR CHG: Type: IMPLANTABLE DEVICE | Status: FUNCTIONAL

## 2023-10-13 DEVICE — KIT THORECON DBL CBL PLT SHARP: Type: IMPLANTABLE DEVICE | Status: FUNCTIONAL

## 2023-10-13 DEVICE — KIT THORECON DBL CBL PLT: Type: IMPLANTABLE DEVICE | Status: FUNCTIONAL

## 2023-10-13 DEVICE — IMPLANTABLE DEVICE: Type: IMPLANTABLE DEVICE | Status: FUNCTIONAL

## 2023-10-13 DEVICE — PACING WIRE WHITE M-25 WINGED WIRE 37MM X 89MM: Type: IMPLANTABLE DEVICE | Status: FUNCTIONAL

## 2023-10-13 RX ORDER — CHLORHEXIDINE GLUCONATE 213 G/1000ML
1 SOLUTION TOPICAL
Refills: 0 | Status: DISCONTINUED | OUTPATIENT
Start: 2023-10-13 | End: 2023-10-18

## 2023-10-13 RX ORDER — HYDROMORPHONE HYDROCHLORIDE 2 MG/ML
0.5 INJECTION INTRAMUSCULAR; INTRAVENOUS; SUBCUTANEOUS EVERY 6 HOURS
Refills: 0 | Status: DISCONTINUED | OUTPATIENT
Start: 2023-10-13 | End: 2023-10-15

## 2023-10-13 RX ORDER — ASPIRIN/CALCIUM CARB/MAGNESIUM 324 MG
81 TABLET ORAL DAILY
Refills: 0 | Status: DISCONTINUED | OUTPATIENT
Start: 2023-10-14 | End: 2023-10-18

## 2023-10-13 RX ORDER — ALBUMIN HUMAN 25 %
250 VIAL (ML) INTRAVENOUS ONCE
Refills: 0 | Status: COMPLETED | OUTPATIENT
Start: 2023-10-13 | End: 2023-10-13

## 2023-10-13 RX ORDER — SODIUM CHLORIDE 9 MG/ML
1000 INJECTION INTRAMUSCULAR; INTRAVENOUS; SUBCUTANEOUS
Refills: 0 | Status: DISCONTINUED | OUTPATIENT
Start: 2023-10-13 | End: 2023-10-13

## 2023-10-13 RX ORDER — ACETAMINOPHEN 500 MG
975 TABLET ORAL EVERY 6 HOURS
Refills: 0 | Status: COMPLETED | OUTPATIENT
Start: 2023-10-13 | End: 2023-10-16

## 2023-10-13 RX ORDER — POTASSIUM CHLORIDE 20 MEQ
10 PACKET (EA) ORAL
Refills: 0 | Status: DISCONTINUED | OUTPATIENT
Start: 2023-10-13 | End: 2023-10-14

## 2023-10-13 RX ORDER — INSULIN HUMAN 100 [IU]/ML
2 INJECTION, SOLUTION SUBCUTANEOUS
Qty: 100 | Refills: 0 | Status: DISCONTINUED | OUTPATIENT
Start: 2023-10-13 | End: 2023-10-16

## 2023-10-13 RX ORDER — POTASSIUM CHLORIDE 20 MEQ
10 PACKET (EA) ORAL
Refills: 0 | Status: DISCONTINUED | OUTPATIENT
Start: 2023-10-13 | End: 2023-10-13

## 2023-10-13 RX ORDER — MEPERIDINE HYDROCHLORIDE 50 MG/ML
25 INJECTION INTRAMUSCULAR; INTRAVENOUS; SUBCUTANEOUS ONCE
Refills: 0 | Status: DISCONTINUED | OUTPATIENT
Start: 2023-10-13 | End: 2023-10-13

## 2023-10-13 RX ORDER — ASPIRIN/CALCIUM CARB/MAGNESIUM 324 MG
324 TABLET ORAL ONCE
Refills: 0 | Status: COMPLETED | OUTPATIENT
Start: 2023-10-13 | End: 2023-10-13

## 2023-10-13 RX ORDER — DEXTROSE 50 % IN WATER 50 %
50 SYRINGE (ML) INTRAVENOUS
Refills: 0 | Status: DISCONTINUED | OUTPATIENT
Start: 2023-10-13 | End: 2023-10-23

## 2023-10-13 RX ORDER — CHLORHEXIDINE GLUCONATE 213 G/1000ML
15 SOLUTION TOPICAL EVERY 12 HOURS
Refills: 0 | Status: DISCONTINUED | OUTPATIENT
Start: 2023-10-13 | End: 2023-10-13

## 2023-10-13 RX ORDER — NOREPINEPHRINE BITARTRATE/D5W 8 MG/250ML
0.07 PLASTIC BAG, INJECTION (ML) INTRAVENOUS
Qty: 8 | Refills: 0 | Status: DISCONTINUED | OUTPATIENT
Start: 2023-10-13 | End: 2023-10-14

## 2023-10-13 RX ORDER — POLYETHYLENE GLYCOL 3350 17 G/17G
17 POWDER, FOR SOLUTION ORAL DAILY
Refills: 0 | Status: DISCONTINUED | OUTPATIENT
Start: 2023-10-14 | End: 2023-10-23

## 2023-10-13 RX ORDER — POTASSIUM CHLORIDE 20 MEQ
10 PACKET (EA) ORAL
Refills: 0 | Status: COMPLETED | OUTPATIENT
Start: 2023-10-13 | End: 2023-10-13

## 2023-10-13 RX ORDER — OXYCODONE HYDROCHLORIDE 5 MG/1
10 TABLET ORAL EVERY 4 HOURS
Refills: 0 | Status: DISCONTINUED | OUTPATIENT
Start: 2023-10-13 | End: 2023-10-15

## 2023-10-13 RX ORDER — OXYCODONE HYDROCHLORIDE 5 MG/1
5 TABLET ORAL EVERY 4 HOURS
Refills: 0 | Status: DISCONTINUED | OUTPATIENT
Start: 2023-10-13 | End: 2023-10-18

## 2023-10-13 RX ORDER — PROPOFOL 10 MG/ML
50 INJECTION, EMULSION INTRAVENOUS
Qty: 1000 | Refills: 0 | Status: DISCONTINUED | OUTPATIENT
Start: 2023-10-13 | End: 2023-10-14

## 2023-10-13 RX ORDER — CHLORHEXIDINE GLUCONATE 213 G/1000ML
1 SOLUTION TOPICAL
Refills: 0 | Status: DISCONTINUED | OUTPATIENT
Start: 2023-10-13 | End: 2023-10-13

## 2023-10-13 RX ORDER — ASPIRIN/CALCIUM CARB/MAGNESIUM 324 MG
81 TABLET ORAL DAILY
Refills: 0 | Status: CANCELLED | OUTPATIENT
Start: 2023-10-14 | End: 2023-10-13

## 2023-10-13 RX ORDER — FENOFIBRATE,MICRONIZED 130 MG
145 CAPSULE ORAL DAILY
Refills: 0 | Status: DISCONTINUED | OUTPATIENT
Start: 2023-10-13 | End: 2023-10-13

## 2023-10-13 RX ORDER — GABAPENTIN 400 MG/1
100 CAPSULE ORAL EVERY 8 HOURS
Refills: 0 | Status: DISCONTINUED | OUTPATIENT
Start: 2023-10-13 | End: 2023-10-13

## 2023-10-13 RX ORDER — ACETAMINOPHEN 500 MG
975 TABLET ORAL EVERY 6 HOURS
Refills: 0 | Status: CANCELLED | OUTPATIENT
Start: 2023-10-16 | End: 2023-10-13

## 2023-10-13 RX ORDER — VANCOMYCIN HCL 1 G
1000 VIAL (EA) INTRAVENOUS
Refills: 0 | Status: COMPLETED | OUTPATIENT
Start: 2023-10-13 | End: 2023-10-15

## 2023-10-13 RX ORDER — VANCOMYCIN HCL 1 G
1750 VIAL (EA) INTRAVENOUS EVERY 12 HOURS
Refills: 0 | Status: DISCONTINUED | OUTPATIENT
Start: 2023-10-13 | End: 2023-10-13

## 2023-10-13 RX ORDER — ATORVASTATIN CALCIUM 80 MG/1
80 TABLET, FILM COATED ORAL AT BEDTIME
Refills: 0 | Status: DISCONTINUED | OUTPATIENT
Start: 2023-10-13 | End: 2023-10-23

## 2023-10-13 RX ORDER — CLOPIDOGREL BISULFATE 75 MG/1
75 TABLET, FILM COATED ORAL ONCE
Refills: 0 | Status: COMPLETED | OUTPATIENT
Start: 2023-10-13 | End: 2023-10-13

## 2023-10-13 RX ORDER — ACETAMINOPHEN 500 MG
1000 TABLET ORAL ONCE
Refills: 0 | Status: DISCONTINUED | OUTPATIENT
Start: 2023-10-13 | End: 2023-10-13

## 2023-10-13 RX ORDER — ASCORBIC ACID 60 MG
500 TABLET,CHEWABLE ORAL
Refills: 0 | Status: COMPLETED | OUTPATIENT
Start: 2023-10-13 | End: 2023-10-18

## 2023-10-13 RX ORDER — DEXTROSE 50 % IN WATER 50 %
25 SYRINGE (ML) INTRAVENOUS
Refills: 0 | Status: DISCONTINUED | OUTPATIENT
Start: 2023-10-13 | End: 2023-10-23

## 2023-10-13 RX ORDER — SODIUM CHLORIDE 9 MG/ML
1000 INJECTION INTRAMUSCULAR; INTRAVENOUS; SUBCUTANEOUS
Refills: 0 | Status: DISCONTINUED | OUTPATIENT
Start: 2023-10-13 | End: 2023-10-17

## 2023-10-13 RX ORDER — DEXTROSE 50 % IN WATER 50 %
50 SYRINGE (ML) INTRAVENOUS
Refills: 0 | Status: DISCONTINUED | OUTPATIENT
Start: 2023-10-13 | End: 2023-10-13

## 2023-10-13 RX ORDER — AMIODARONE HYDROCHLORIDE 400 MG/1
400 TABLET ORAL
Refills: 0 | Status: DISCONTINUED | OUTPATIENT
Start: 2023-10-13 | End: 2023-10-13

## 2023-10-13 RX ORDER — ACETAMINOPHEN 500 MG
975 TABLET ORAL EVERY 6 HOURS
Refills: 0 | Status: COMPLETED | OUTPATIENT
Start: 2023-10-16 | End: 2024-09-13

## 2023-10-13 RX ORDER — SENNA PLUS 8.6 MG/1
2 TABLET ORAL AT BEDTIME
Refills: 0 | Status: CANCELLED | OUTPATIENT
Start: 2023-10-14 | End: 2023-10-13

## 2023-10-13 RX ORDER — ASCORBIC ACID 60 MG
500 TABLET,CHEWABLE ORAL
Refills: 0 | Status: DISCONTINUED | OUTPATIENT
Start: 2023-10-13 | End: 2023-10-13

## 2023-10-13 RX ORDER — POLYETHYLENE GLYCOL 3350 17 G/17G
17 POWDER, FOR SOLUTION ORAL DAILY
Refills: 0 | Status: CANCELLED | OUTPATIENT
Start: 2023-10-14 | End: 2023-10-13

## 2023-10-13 RX ORDER — ACETAMINOPHEN 500 MG
1000 TABLET ORAL ONCE
Refills: 0 | Status: COMPLETED | OUTPATIENT
Start: 2023-10-13 | End: 2023-10-13

## 2023-10-13 RX ORDER — PANTOPRAZOLE SODIUM 20 MG/1
40 TABLET, DELAYED RELEASE ORAL DAILY
Refills: 0 | Status: CANCELLED | OUTPATIENT
Start: 2023-10-14 | End: 2023-10-13

## 2023-10-13 RX ORDER — CLOPIDOGREL BISULFATE 75 MG/1
75 TABLET, FILM COATED ORAL DAILY
Refills: 0 | Status: DISCONTINUED | OUTPATIENT
Start: 2023-10-13 | End: 2023-10-13

## 2023-10-13 RX ORDER — DEXTROSE 50 % IN WATER 50 %
25 SYRINGE (ML) INTRAVENOUS
Refills: 0 | Status: DISCONTINUED | OUTPATIENT
Start: 2023-10-13 | End: 2023-10-13

## 2023-10-13 RX ORDER — INSULIN HUMAN 100 [IU]/ML
2 INJECTION, SOLUTION SUBCUTANEOUS
Qty: 100 | Refills: 0 | Status: DISCONTINUED | OUTPATIENT
Start: 2023-10-13 | End: 2023-10-13

## 2023-10-13 RX ORDER — OXYCODONE HYDROCHLORIDE 5 MG/1
10 TABLET ORAL EVERY 4 HOURS
Refills: 0 | Status: DISCONTINUED | OUTPATIENT
Start: 2023-10-13 | End: 2023-10-13

## 2023-10-13 RX ORDER — SODIUM CHLORIDE 9 MG/ML
1000 INJECTION, SOLUTION INTRAVENOUS
Refills: 0 | Status: DISCONTINUED | OUTPATIENT
Start: 2023-10-13 | End: 2023-10-19

## 2023-10-13 RX ORDER — GABAPENTIN 400 MG/1
100 CAPSULE ORAL EVERY 8 HOURS
Refills: 0 | Status: COMPLETED | OUTPATIENT
Start: 2023-10-13 | End: 2023-10-18

## 2023-10-13 RX ORDER — ACETAMINOPHEN 500 MG
975 TABLET ORAL EVERY 6 HOURS
Refills: 0 | Status: DISCONTINUED | OUTPATIENT
Start: 2023-10-13 | End: 2023-10-13

## 2023-10-13 RX ORDER — CLOPIDOGREL BISULFATE 75 MG/1
75 TABLET, FILM COATED ORAL DAILY
Refills: 0 | Status: DISCONTINUED | OUTPATIENT
Start: 2023-10-14 | End: 2023-10-23

## 2023-10-13 RX ORDER — PANTOPRAZOLE SODIUM 20 MG/1
40 TABLET, DELAYED RELEASE ORAL ONCE
Refills: 0 | Status: DISCONTINUED | OUTPATIENT
Start: 2023-10-13 | End: 2023-10-13

## 2023-10-13 RX ORDER — ASPIRIN/CALCIUM CARB/MAGNESIUM 324 MG
324 TABLET ORAL ONCE
Refills: 0 | Status: DISCONTINUED | OUTPATIENT
Start: 2023-10-13 | End: 2023-10-13

## 2023-10-13 RX ORDER — AMIODARONE HYDROCHLORIDE 400 MG/1
400 TABLET ORAL
Refills: 0 | Status: COMPLETED | OUTPATIENT
Start: 2023-10-13 | End: 2023-10-16

## 2023-10-13 RX ORDER — PANTOPRAZOLE SODIUM 20 MG/1
40 TABLET, DELAYED RELEASE ORAL DAILY
Refills: 0 | Status: DISCONTINUED | OUTPATIENT
Start: 2023-10-14 | End: 2023-10-23

## 2023-10-13 RX ORDER — OXYCODONE HYDROCHLORIDE 5 MG/1
5 TABLET ORAL EVERY 4 HOURS
Refills: 0 | Status: DISCONTINUED | OUTPATIENT
Start: 2023-10-13 | End: 2023-10-13

## 2023-10-13 RX ORDER — PANTOPRAZOLE SODIUM 20 MG/1
40 TABLET, DELAYED RELEASE ORAL ONCE
Refills: 0 | Status: COMPLETED | OUTPATIENT
Start: 2023-10-13 | End: 2023-10-13

## 2023-10-13 RX ORDER — SENNA PLUS 8.6 MG/1
2 TABLET ORAL AT BEDTIME
Refills: 0 | Status: DISCONTINUED | OUTPATIENT
Start: 2023-10-14 | End: 2023-10-23

## 2023-10-13 RX ORDER — CEFUROXIME AXETIL 250 MG
1500 TABLET ORAL EVERY 8 HOURS
Refills: 0 | Status: COMPLETED | OUTPATIENT
Start: 2023-10-13 | End: 2023-10-15

## 2023-10-13 RX ORDER — RANOLAZINE 500 MG/1
500 TABLET, FILM COATED, EXTENDED RELEASE ORAL
Refills: 0 | Status: DISCONTINUED | OUTPATIENT
Start: 2023-10-13 | End: 2023-10-13

## 2023-10-13 RX ORDER — HYDROMORPHONE HYDROCHLORIDE 2 MG/ML
0.5 INJECTION INTRAMUSCULAR; INTRAVENOUS; SUBCUTANEOUS EVERY 6 HOURS
Refills: 0 | Status: DISCONTINUED | OUTPATIENT
Start: 2023-10-13 | End: 2023-10-13

## 2023-10-13 RX ORDER — ATORVASTATIN CALCIUM 80 MG/1
80 TABLET, FILM COATED ORAL AT BEDTIME
Refills: 0 | Status: DISCONTINUED | OUTPATIENT
Start: 2023-10-13 | End: 2023-10-13

## 2023-10-13 RX ORDER — CLOPIDOGREL BISULFATE 75 MG/1
75 TABLET, FILM COATED ORAL DAILY
Refills: 0 | Status: CANCELLED | OUTPATIENT
Start: 2023-10-14 | End: 2023-10-13

## 2023-10-13 RX ORDER — MEPERIDINE HYDROCHLORIDE 50 MG/ML
25 INJECTION INTRAMUSCULAR; INTRAVENOUS; SUBCUTANEOUS ONCE
Refills: 0 | Status: DISCONTINUED | OUTPATIENT
Start: 2023-10-13 | End: 2023-10-14

## 2023-10-13 RX ORDER — FENOFIBRATE,MICRONIZED 130 MG
145 CAPSULE ORAL DAILY
Refills: 0 | Status: DISCONTINUED | OUTPATIENT
Start: 2023-10-13 | End: 2023-10-23

## 2023-10-13 RX ORDER — CEFUROXIME AXETIL 250 MG
1500 TABLET ORAL EVERY 8 HOURS
Refills: 0 | Status: DISCONTINUED | OUTPATIENT
Start: 2023-10-13 | End: 2023-10-13

## 2023-10-13 RX ORDER — CLOPIDOGREL BISULFATE 75 MG/1
75 TABLET, FILM COATED ORAL ONCE
Refills: 0 | Status: DISCONTINUED | OUTPATIENT
Start: 2023-10-13 | End: 2023-10-13

## 2023-10-13 RX ADMIN — Medication 975 MILLIGRAM(S): at 19:28

## 2023-10-13 RX ADMIN — Medication 975 MILLIGRAM(S): at 19:58

## 2023-10-13 RX ADMIN — CLOPIDOGREL BISULFATE 75 MILLIGRAM(S): 75 TABLET, FILM COATED ORAL at 19:37

## 2023-10-13 RX ADMIN — Medication 100 MILLIEQUIVALENT(S): at 13:13

## 2023-10-13 RX ADMIN — SODIUM CHLORIDE 10 MILLILITER(S): 9 INJECTION INTRAMUSCULAR; INTRAVENOUS; SUBCUTANEOUS at 12:34

## 2023-10-13 RX ADMIN — ATORVASTATIN CALCIUM 80 MILLIGRAM(S): 80 TABLET, FILM COATED ORAL at 21:54

## 2023-10-13 RX ADMIN — Medication 324 MILLIGRAM(S): at 19:37

## 2023-10-13 RX ADMIN — SODIUM CHLORIDE 3 MILLILITER(S): 9 INJECTION INTRAMUSCULAR; INTRAVENOUS; SUBCUTANEOUS at 04:41

## 2023-10-13 RX ADMIN — Medication 250 MILLIGRAM(S): at 20:42

## 2023-10-13 RX ADMIN — Medication 400 MILLIGRAM(S): at 14:25

## 2023-10-13 RX ADMIN — Medication 200 MILLIGRAM(S): at 04:40

## 2023-10-13 RX ADMIN — Medication 125 MILLILITER(S): at 13:24

## 2023-10-13 RX ADMIN — PANTOPRAZOLE SODIUM 40 MILLIGRAM(S): 20 TABLET, DELAYED RELEASE ORAL at 17:43

## 2023-10-13 RX ADMIN — SODIUM CHLORIDE 5 MILLILITER(S): 9 INJECTION INTRAMUSCULAR; INTRAVENOUS; SUBCUTANEOUS at 12:34

## 2023-10-13 RX ADMIN — CHLORHEXIDINE GLUCONATE 1 APPLICATION(S): 213 SOLUTION TOPICAL at 04:39

## 2023-10-13 RX ADMIN — Medication 100 MILLIEQUIVALENT(S): at 12:34

## 2023-10-13 RX ADMIN — Medication 125 MILLILITER(S): at 14:47

## 2023-10-13 RX ADMIN — AMIODARONE HYDROCHLORIDE 400 MILLIGRAM(S): 400 TABLET ORAL at 19:27

## 2023-10-13 RX ADMIN — RANOLAZINE 500 MILLIGRAM(S): 500 TABLET, FILM COATED, EXTENDED RELEASE ORAL at 04:40

## 2023-10-13 RX ADMIN — PANTOPRAZOLE SODIUM 40 MILLIGRAM(S): 20 TABLET, DELAYED RELEASE ORAL at 04:40

## 2023-10-13 RX ADMIN — CHLORHEXIDINE GLUCONATE 15 MILLILITER(S): 213 SOLUTION TOPICAL at 04:40

## 2023-10-13 RX ADMIN — Medication 1000 MILLIGRAM(S): at 15:07

## 2023-10-13 RX ADMIN — CHLORHEXIDINE GLUCONATE 1 APPLICATION(S): 213 SOLUTION TOPICAL at 12:00

## 2023-10-13 RX ADMIN — Medication 145 MILLIGRAM(S): at 20:42

## 2023-10-13 RX ADMIN — HYDROMORPHONE HYDROCHLORIDE 0.5 MILLIGRAM(S): 2 INJECTION INTRAMUSCULAR; INTRAVENOUS; SUBCUTANEOUS at 20:50

## 2023-10-13 RX ADMIN — Medication 100 MILLIGRAM(S): at 16:12

## 2023-10-13 RX ADMIN — INSULIN HUMAN 2 UNIT(S)/HR: 100 INJECTION, SOLUTION SUBCUTANEOUS at 12:35

## 2023-10-13 RX ADMIN — SODIUM CHLORIDE 3 MILLILITER(S): 9 INJECTION INTRAMUSCULAR; INTRAVENOUS; SUBCUTANEOUS at 04:20

## 2023-10-13 RX ADMIN — Medication 15.6 MICROGRAM(S)/KG/MIN: at 13:24

## 2023-10-13 RX ADMIN — HYDROMORPHONE HYDROCHLORIDE 0.5 MILLIGRAM(S): 2 INJECTION INTRAMUSCULAR; INTRAVENOUS; SUBCUTANEOUS at 20:36

## 2023-10-13 RX ADMIN — Medication 500 MILLIGRAM(S): at 19:34

## 2023-10-13 NOTE — PATIENT PROFILE ADULT - DO YOU NEED ADDITIONAL SERVICES TO MANAGE ANY OF THESE MEDICAL CONDITIONS AT HOME?
Go for blood tests as directed. Your doctor will do lab tests at regular visits to monitor the effects of this medicine. Please follow up with your doctor and keep your health care provider appointments.
no

## 2023-10-13 NOTE — PATIENT PROFILE ADULT - FALL HARM RISK - HARM RISK INTERVENTIONS

## 2023-10-13 NOTE — BRIEF OPERATIVE NOTE - COMMENTS
No qualified resident was available to assist in this case. I have personally first assisted the Cardiothoracic Surgeon listed on this brief op note throughout the entirety of this case.  Unable to quantify blood loss amount due to intra-op cell saver use.

## 2023-10-13 NOTE — PROGRESS NOTE ADULT - SUBJECTIVE AND OBJECTIVE BOX
CRITICAL CARE ATTENDING - CTICU    MEDICATIONS  (STANDING):  acetaminophen     Tablet .. 975 milliGRAM(s) Oral every 6 hours  acetaminophen   IVPB .. 1000 milliGRAM(s) IV Intermittent once  aMIOdarone    Tablet 400 milliGRAM(s) Oral two times a day  ascorbic acid 500 milliGRAM(s) Oral two times a day  chlorhexidine 0.12% Liquid 15 milliLiter(s) Oral Mucosa every 12 hours  chlorhexidine 2% Cloths 1 Application(s) Topical two times a day  dextrose 50% Injectable 50 milliLiter(s) IV Push every 15 minutes  dextrose 50% Injectable 25 milliLiter(s) IV Push every 15 minutes  gabapentin 100 milliGRAM(s) Oral every 8 hours  insulin regular Infusion 2 Unit(s)/Hr (2 mL/Hr) IV Continuous <Continuous>  meperidine     Injectable 25 milliGRAM(s) IV Push once  pantoprazole  Injectable 40 milliGRAM(s) IV Push once  potassium chloride  10 mEq/50 mL IVPB 10 milliEquivalent(s) IV Intermittent every 1 hour  potassium chloride  10 mEq/50 mL IVPB 10 milliEquivalent(s) IV Intermittent every 1 hour  potassium chloride  10 mEq/50 mL IVPB 10 milliEquivalent(s) IV Intermittent every 1 hour  sodium chloride 0.9%. 1000 milliLiter(s) (5 mL/Hr) IV Continuous <Continuous>  sodium chloride 0.9%. 1000 milliLiter(s) (10 mL/Hr) IV Continuous <Continuous>                                    13.6   7.45  )-----------( 348      ( 13 Oct 2023 05:27 )             42.1       1013    140  |  103  |  21.0<H>  ----------------------------<  140<H>  4.8   |  26.0  |  1.53<H>    Ca    9.3      13 Oct 2023 05:27  Mg     1.9     10-13                Daily Height in cm: 182.88 (13 Oct 2023 06:29)    Daily Weight in k.9 (13 Oct 2023 04:35)      10-12 @ 07:01  -  10-13 @ 07:00  --------------------------------------------------------  IN: 250 mL / OUT: 1150 mL / NET: -900 mL        Critically Ill patient  : [ ] preoperative ,   [ x] post operative    Requires :  [x ] Arterial Line   [x ] Central Line  [ ] PA catheter  [ ] IABP  [ ] ECMO  [ ] LVAD  [ x] Ventilator  [ x] pacemaker - TPM  [ ] Impella.                      [ x] ABG's     [ x] Pulse Oxymetry Monitoring  Bedside evaluation , monitoring , treatment of hemodynamics , fluids , IVP/ IVCD meds.        Diagnosis:     Op Day - AVR     Hypotension     Hypovolemia     Hemodynamic lability,  instability. Requires IVCD [ x] vasopressors [ ] inotropes  [ ] vasodilator  [x ]IVSS fluid  to maintain MAP, perfusion, C.I.     CHF- acute x[ ]   chronic [x ]    systolic [ ]   diastolic [x ]  Valvular [x ]          - Echo- EF -60 % Bicuspid Aortic Valve - Requires Aortic valve Repacement             [ ] RV dysfunction          - Cxr-cardiomegally, edema          - Clinical-  [ ]inotropes   [x ]pressors   [ ]diuresis   [ ]IABP   [ ]ECMO   [ ]LVAD   [ x] Respiratory insuffiencey     Respiratory insuffiencey     Obesity OHS / RENETTA     Ventilator Management:  [x ]AC-rest  post op    [x ]CPAP-PS Wean   this PM   [ ]Trach Collar     [ ]Extubate    [ ] T-Piece  [ ]peep>5     Requires chest PT, pulmonary toilet, ambu bagging, suctioning to maintain SaO2,  patent airway and treat atelectasis.     Temporary pacemaker (TPM) interrogation and setting.     Chest Tube Drainage / Management     DM - IVCD Insulin     Chronic Renal Failure     Requires bedside physical therapy, mobilization and total [facility] care.                         -                     Discussed with CT surgeon, Physician's Assistant - Nurse Practitioner- Critical care medicine team.   Discussed at  AM / PM rounds.   Chart, labs , films reviewed.    Cumulative Critical Care Time Given Today : 30 min

## 2023-10-14 LAB
ALBUMIN SERPL ELPH-MCNC: 4 G/DL — SIGNIFICANT CHANGE UP (ref 3.3–5.2)
ALP SERPL-CCNC: 29 U/L — LOW (ref 40–120)
ALT FLD-CCNC: 49 U/L — HIGH
ANION GAP SERPL CALC-SCNC: 12 MMOL/L — SIGNIFICANT CHANGE UP (ref 5–17)
APTT BLD: 31.4 SEC — SIGNIFICANT CHANGE UP (ref 24.5–35.6)
AST SERPL-CCNC: 49 U/L — HIGH
BILIRUB SERPL-MCNC: 0.3 MG/DL — LOW (ref 0.4–2)
BUN SERPL-MCNC: 24.8 MG/DL — HIGH (ref 8–20)
CALCIUM SERPL-MCNC: 8.7 MG/DL — SIGNIFICANT CHANGE UP (ref 8.4–10.5)
CHLORIDE SERPL-SCNC: 105 MMOL/L — SIGNIFICANT CHANGE UP (ref 96–108)
CO2 SERPL-SCNC: 23 MMOL/L — SIGNIFICANT CHANGE UP (ref 22–29)
CREAT SERPL-MCNC: 1.95 MG/DL — HIGH (ref 0.5–1.3)
EGFR: 40 ML/MIN/1.73M2 — LOW
GLUCOSE BLDC GLUCOMTR-MCNC: 113 MG/DL — HIGH (ref 70–99)
GLUCOSE BLDC GLUCOMTR-MCNC: 118 MG/DL — HIGH (ref 70–99)
GLUCOSE BLDC GLUCOMTR-MCNC: 133 MG/DL — HIGH (ref 70–99)
GLUCOSE BLDC GLUCOMTR-MCNC: 159 MG/DL — HIGH (ref 70–99)
GLUCOSE BLDC GLUCOMTR-MCNC: 161 MG/DL — HIGH (ref 70–99)
GLUCOSE BLDC GLUCOMTR-MCNC: 165 MG/DL — HIGH (ref 70–99)
GLUCOSE BLDC GLUCOMTR-MCNC: 174 MG/DL — HIGH (ref 70–99)
GLUCOSE BLDC GLUCOMTR-MCNC: 191 MG/DL — HIGH (ref 70–99)
GLUCOSE BLDC GLUCOMTR-MCNC: 210 MG/DL — HIGH (ref 70–99)
GLUCOSE BLDC GLUCOMTR-MCNC: 212 MG/DL — HIGH (ref 70–99)
GLUCOSE BLDC GLUCOMTR-MCNC: 212 MG/DL — HIGH (ref 70–99)
GLUCOSE BLDC GLUCOMTR-MCNC: 232 MG/DL — HIGH (ref 70–99)
GLUCOSE BLDC GLUCOMTR-MCNC: 256 MG/DL — HIGH (ref 70–99)
GLUCOSE BLDC GLUCOMTR-MCNC: 256 MG/DL — HIGH (ref 70–99)
GLUCOSE BLDC GLUCOMTR-MCNC: 257 MG/DL — HIGH (ref 70–99)
GLUCOSE BLDC GLUCOMTR-MCNC: 267 MG/DL — HIGH (ref 70–99)
GLUCOSE BLDC GLUCOMTR-MCNC: 297 MG/DL — HIGH (ref 70–99)
GLUCOSE SERPL-MCNC: 95 MG/DL — SIGNIFICANT CHANGE UP (ref 70–99)
HCT VFR BLD CALC: 29.9 % — LOW (ref 39–50)
HGB BLD-MCNC: 10.2 G/DL — LOW (ref 13–17)
INR BLD: 1.12 RATIO — SIGNIFICANT CHANGE UP (ref 0.85–1.18)
MAGNESIUM SERPL-MCNC: 2.2 MG/DL — SIGNIFICANT CHANGE UP (ref 1.6–2.6)
MCHC RBC-ENTMCNC: 29.8 PG — SIGNIFICANT CHANGE UP (ref 27–34)
MCHC RBC-ENTMCNC: 34.1 GM/DL — SIGNIFICANT CHANGE UP (ref 32–36)
MCV RBC AUTO: 87.4 FL — SIGNIFICANT CHANGE UP (ref 80–100)
PLATELET # BLD AUTO: 225 K/UL — SIGNIFICANT CHANGE UP (ref 150–400)
POTASSIUM SERPL-MCNC: 4.8 MMOL/L — SIGNIFICANT CHANGE UP (ref 3.5–5.3)
POTASSIUM SERPL-SCNC: 4.8 MMOL/L — SIGNIFICANT CHANGE UP (ref 3.5–5.3)
PROT SERPL-MCNC: 5.8 G/DL — LOW (ref 6.6–8.7)
PROTHROM AB SERPL-ACNC: 12.4 SEC — SIGNIFICANT CHANGE UP (ref 9.5–13)
RBC # BLD: 3.42 M/UL — LOW (ref 4.2–5.8)
RBC # FLD: 13.4 % — SIGNIFICANT CHANGE UP (ref 10.3–14.5)
SODIUM SERPL-SCNC: 140 MMOL/L — SIGNIFICANT CHANGE UP (ref 135–145)
SODIUM UR-SCNC: <30 MMOL/L — SIGNIFICANT CHANGE UP
TROPONIN T SERPL-MCNC: 0.3 NG/ML — HIGH (ref 0–0.06)
WBC # BLD: 14.94 K/UL — HIGH (ref 3.8–10.5)
WBC # FLD AUTO: 14.94 K/UL — HIGH (ref 3.8–10.5)

## 2023-10-14 PROCEDURE — 99291 CRITICAL CARE FIRST HOUR: CPT | Mod: 24

## 2023-10-14 PROCEDURE — 71045 X-RAY EXAM CHEST 1 VIEW: CPT | Mod: 26

## 2023-10-14 PROCEDURE — 99233 SBSQ HOSP IP/OBS HIGH 50: CPT

## 2023-10-14 PROCEDURE — 99024 POSTOP FOLLOW-UP VISIT: CPT

## 2023-10-14 RX ORDER — DEXTROSE 50 % IN WATER 50 %
15 SYRINGE (ML) INTRAVENOUS ONCE
Refills: 0 | Status: DISCONTINUED | OUTPATIENT
Start: 2023-10-14 | End: 2023-10-15

## 2023-10-14 RX ORDER — DEXTROSE 50 % IN WATER 50 %
12.5 SYRINGE (ML) INTRAVENOUS ONCE
Refills: 0 | Status: DISCONTINUED | OUTPATIENT
Start: 2023-10-14 | End: 2023-10-23

## 2023-10-14 RX ORDER — FUROSEMIDE 40 MG
40 TABLET ORAL ONCE
Refills: 0 | Status: COMPLETED | OUTPATIENT
Start: 2023-10-14 | End: 2023-10-14

## 2023-10-14 RX ORDER — INSULIN LISPRO 100/ML
8 VIAL (ML) SUBCUTANEOUS
Refills: 0 | Status: DISCONTINUED | OUTPATIENT
Start: 2023-10-14 | End: 2023-10-14

## 2023-10-14 RX ORDER — SODIUM CHLORIDE 9 MG/ML
500 INJECTION, SOLUTION INTRAVENOUS ONCE
Refills: 0 | Status: COMPLETED | OUTPATIENT
Start: 2023-10-14 | End: 2023-10-14

## 2023-10-14 RX ORDER — INSULIN LISPRO 100/ML
6 VIAL (ML) SUBCUTANEOUS
Refills: 0 | Status: DISCONTINUED | OUTPATIENT
Start: 2023-10-14 | End: 2023-10-14

## 2023-10-14 RX ORDER — SODIUM CHLORIDE 9 MG/ML
1000 INJECTION, SOLUTION INTRAVENOUS
Refills: 0 | Status: DISCONTINUED | OUTPATIENT
Start: 2023-10-14 | End: 2023-10-23

## 2023-10-14 RX ORDER — METOPROLOL TARTRATE 50 MG
25 TABLET ORAL ONCE
Refills: 0 | Status: COMPLETED | OUTPATIENT
Start: 2023-10-14 | End: 2023-10-14

## 2023-10-14 RX ORDER — METOPROLOL TARTRATE 50 MG
50 TABLET ORAL
Refills: 0 | Status: DISCONTINUED | OUTPATIENT
Start: 2023-10-14 | End: 2023-10-17

## 2023-10-14 RX ORDER — DEXTROSE 50 % IN WATER 50 %
25 SYRINGE (ML) INTRAVENOUS ONCE
Refills: 0 | Status: DISCONTINUED | OUTPATIENT
Start: 2023-10-14 | End: 2023-10-23

## 2023-10-14 RX ORDER — ENOXAPARIN SODIUM 100 MG/ML
40 INJECTION SUBCUTANEOUS EVERY 12 HOURS
Refills: 0 | Status: DISCONTINUED | OUTPATIENT
Start: 2023-10-14 | End: 2023-10-18

## 2023-10-14 RX ORDER — WARFARIN SODIUM 2.5 MG/1
4 TABLET ORAL ONCE
Refills: 0 | Status: COMPLETED | OUTPATIENT
Start: 2023-10-14 | End: 2023-10-14

## 2023-10-14 RX ORDER — GLUCAGON INJECTION, SOLUTION 0.5 MG/.1ML
1 INJECTION, SOLUTION SUBCUTANEOUS ONCE
Refills: 0 | Status: DISCONTINUED | OUTPATIENT
Start: 2023-10-14 | End: 2023-10-23

## 2023-10-14 RX ORDER — METOPROLOL TARTRATE 50 MG
25 TABLET ORAL
Refills: 0 | Status: DISCONTINUED | OUTPATIENT
Start: 2023-10-14 | End: 2023-10-14

## 2023-10-14 RX ORDER — FUROSEMIDE 40 MG
40 TABLET ORAL DAILY
Refills: 0 | Status: DISCONTINUED | OUTPATIENT
Start: 2023-10-15 | End: 2023-10-16

## 2023-10-14 RX ORDER — CHOLECALCIFEROL (VITAMIN D3) 125 MCG
1000 CAPSULE ORAL DAILY
Refills: 0 | Status: DISCONTINUED | OUTPATIENT
Start: 2023-10-14 | End: 2023-10-23

## 2023-10-14 RX ORDER — INSULIN LISPRO 100/ML
10 VIAL (ML) SUBCUTANEOUS
Refills: 0 | Status: DISCONTINUED | OUTPATIENT
Start: 2023-10-14 | End: 2023-10-15

## 2023-10-14 RX ORDER — DEXTROSE 50 % IN WATER 50 %
25 SYRINGE (ML) INTRAVENOUS ONCE
Refills: 0 | Status: DISCONTINUED | OUTPATIENT
Start: 2023-10-14 | End: 2023-10-15

## 2023-10-14 RX ADMIN — HYDROMORPHONE HYDROCHLORIDE 0.5 MILLIGRAM(S): 2 INJECTION INTRAMUSCULAR; INTRAVENOUS; SUBCUTANEOUS at 04:54

## 2023-10-14 RX ADMIN — OXYCODONE HYDROCHLORIDE 10 MILLIGRAM(S): 5 TABLET ORAL at 00:48

## 2023-10-14 RX ADMIN — GABAPENTIN 100 MILLIGRAM(S): 400 CAPSULE ORAL at 06:44

## 2023-10-14 RX ADMIN — Medication 250 MILLIGRAM(S): at 20:26

## 2023-10-14 RX ADMIN — WARFARIN SODIUM 4 MILLIGRAM(S): 2.5 TABLET ORAL at 07:27

## 2023-10-14 RX ADMIN — Medication 100 MILLIGRAM(S): at 08:06

## 2023-10-14 RX ADMIN — Medication 975 MILLIGRAM(S): at 06:28

## 2023-10-14 RX ADMIN — Medication 975 MILLIGRAM(S): at 11:09

## 2023-10-14 RX ADMIN — CHLORHEXIDINE GLUCONATE 1 APPLICATION(S): 213 SOLUTION TOPICAL at 06:34

## 2023-10-14 RX ADMIN — Medication 250 MILLIGRAM(S): at 09:23

## 2023-10-14 RX ADMIN — CLOPIDOGREL BISULFATE 75 MILLIGRAM(S): 75 TABLET, FILM COATED ORAL at 11:08

## 2023-10-14 RX ADMIN — OXYCODONE HYDROCHLORIDE 10 MILLIGRAM(S): 5 TABLET ORAL at 12:40

## 2023-10-14 RX ADMIN — OXYCODONE HYDROCHLORIDE 10 MILLIGRAM(S): 5 TABLET ORAL at 13:40

## 2023-10-14 RX ADMIN — Medication 25 MILLIGRAM(S): at 11:08

## 2023-10-14 RX ADMIN — Medication 40 MILLIGRAM(S): at 16:28

## 2023-10-14 RX ADMIN — GABAPENTIN 100 MILLIGRAM(S): 400 CAPSULE ORAL at 14:06

## 2023-10-14 RX ADMIN — SODIUM CHLORIDE 500 MILLILITER(S): 9 INJECTION, SOLUTION INTRAVENOUS at 06:33

## 2023-10-14 RX ADMIN — Medication 975 MILLIGRAM(S): at 18:53

## 2023-10-14 RX ADMIN — Medication 975 MILLIGRAM(S): at 12:00

## 2023-10-14 RX ADMIN — PANTOPRAZOLE SODIUM 40 MILLIGRAM(S): 20 TABLET, DELAYED RELEASE ORAL at 11:08

## 2023-10-14 RX ADMIN — GABAPENTIN 100 MILLIGRAM(S): 400 CAPSULE ORAL at 00:48

## 2023-10-14 RX ADMIN — Medication 100 MILLIGRAM(S): at 16:29

## 2023-10-14 RX ADMIN — POLYETHYLENE GLYCOL 3350 17 GRAM(S): 17 POWDER, FOR SOLUTION ORAL at 11:08

## 2023-10-14 RX ADMIN — OXYCODONE HYDROCHLORIDE 10 MILLIGRAM(S): 5 TABLET ORAL at 04:53

## 2023-10-14 RX ADMIN — OXYCODONE HYDROCHLORIDE 10 MILLIGRAM(S): 5 TABLET ORAL at 19:20

## 2023-10-14 RX ADMIN — HYDROMORPHONE HYDROCHLORIDE 0.5 MILLIGRAM(S): 2 INJECTION INTRAMUSCULAR; INTRAVENOUS; SUBCUTANEOUS at 19:20

## 2023-10-14 RX ADMIN — Medication 100 MILLIGRAM(S): at 00:48

## 2023-10-14 RX ADMIN — ATORVASTATIN CALCIUM 80 MILLIGRAM(S): 80 TABLET, FILM COATED ORAL at 23:28

## 2023-10-14 RX ADMIN — OXYCODONE HYDROCHLORIDE 10 MILLIGRAM(S): 5 TABLET ORAL at 01:10

## 2023-10-14 RX ADMIN — Medication 8 UNIT(S): at 11:47

## 2023-10-14 RX ADMIN — SENNA PLUS 2 TABLET(S): 8.6 TABLET ORAL at 21:27

## 2023-10-14 RX ADMIN — Medication 975 MILLIGRAM(S): at 19:30

## 2023-10-14 RX ADMIN — OXYCODONE HYDROCHLORIDE 10 MILLIGRAM(S): 5 TABLET ORAL at 05:20

## 2023-10-14 RX ADMIN — ENOXAPARIN SODIUM 40 MILLIGRAM(S): 100 INJECTION SUBCUTANEOUS at 11:08

## 2023-10-14 RX ADMIN — Medication 25 MILLIGRAM(S): at 18:52

## 2023-10-14 RX ADMIN — AMIODARONE HYDROCHLORIDE 400 MILLIGRAM(S): 400 TABLET ORAL at 18:52

## 2023-10-14 RX ADMIN — HYDROMORPHONE HYDROCHLORIDE 0.5 MILLIGRAM(S): 2 INJECTION INTRAMUSCULAR; INTRAVENOUS; SUBCUTANEOUS at 19:55

## 2023-10-14 RX ADMIN — Medication 81 MILLIGRAM(S): at 11:09

## 2023-10-14 RX ADMIN — Medication 145 MILLIGRAM(S): at 11:08

## 2023-10-14 RX ADMIN — Medication 975 MILLIGRAM(S): at 23:27

## 2023-10-14 RX ADMIN — HYDROMORPHONE HYDROCHLORIDE 0.5 MILLIGRAM(S): 2 INJECTION INTRAMUSCULAR; INTRAVENOUS; SUBCUTANEOUS at 05:20

## 2023-10-14 RX ADMIN — OXYCODONE HYDROCHLORIDE 10 MILLIGRAM(S): 5 TABLET ORAL at 19:55

## 2023-10-14 RX ADMIN — ENOXAPARIN SODIUM 40 MILLIGRAM(S): 100 INJECTION SUBCUTANEOUS at 22:54

## 2023-10-14 RX ADMIN — Medication 500 MILLIGRAM(S): at 06:28

## 2023-10-14 RX ADMIN — GABAPENTIN 100 MILLIGRAM(S): 400 CAPSULE ORAL at 23:27

## 2023-10-14 RX ADMIN — Medication 975 MILLIGRAM(S): at 00:49

## 2023-10-14 RX ADMIN — CHLORHEXIDINE GLUCONATE 1 APPLICATION(S): 213 SOLUTION TOPICAL at 18:55

## 2023-10-14 RX ADMIN — Medication 6 UNIT(S): at 08:05

## 2023-10-14 RX ADMIN — Medication 975 MILLIGRAM(S): at 01:10

## 2023-10-14 RX ADMIN — Medication 8 UNIT(S): at 16:39

## 2023-10-14 RX ADMIN — Medication 975 MILLIGRAM(S): at 06:39

## 2023-10-14 RX ADMIN — AMIODARONE HYDROCHLORIDE 400 MILLIGRAM(S): 400 TABLET ORAL at 06:29

## 2023-10-14 RX ADMIN — Medication 25 MILLIGRAM(S): at 23:27

## 2023-10-14 RX ADMIN — Medication 500 MILLIGRAM(S): at 18:52

## 2023-10-14 NOTE — DIETITIAN INITIAL EVALUATION ADULT - PROBLEM SELECTOR PLAN 6
Nephrology consult to be called today.  Avoid Nephrotoxic agents.   Just received dye during re-cath 10/6.   Trend BUN/Cr.  Renal US without acute findings 10/4 at [facility].

## 2023-10-14 NOTE — PROGRESS NOTE ADULT - ASSESSMENT
58 M with PMHx HTN, HLD, T2DM, CKD 3, obesity, CAD s/p 8 stents, recent admission to [facility] 9/29-9/30 for chest pain s/p PCI of ISR of LADx2 and PTCA of diag on 9/29/23, since readmitted to [facility] 10/3 after presenting with recurrent chest pain and increased CANNON since recent PCI. Transferred to [facility] 10/6/23 for surgical evaluation under Dr. Cardozo. MARIBEL revealed moderate AS. Underwent AVR on 10/13/23    Consulted for diabetes management  Home diabetes meds: Toujeo 140 units daily, mealtime insulin 55 units (He developed pancreatitis following use of trulicity, and was unable to tolerate a sglt-2)  Current a1c: 9.6%  Outpatient Endocrinologist: Dr Ibarra    1. DM2 - on large doses of insulin at home  - While in the hospital requires less insulin, Lantus 60U daily and Admelog 45U TID  - Currently on insulin gtt, post op, with plan to transition to  Tonight.   - Tolerates diet.  - Endocrinology will follow      2. CAD/Aortic stenosis  - S/p AVR on 10/13  Needs tight glycemic control to promote healing.    3. HLD  - Continue statin

## 2023-10-14 NOTE — PROGRESS NOTE ADULT - SUBJECTIVE AND OBJECTIVE BOX
JAMES GONZALEZ  [id number]    HPI:  58 year old male with a medical history of HTN, HLD, type 2 DM (HA1c 9.6 on Insulin), CKD 3, Obesity Class II, CAD s/p 8 stents, recent admission to [facility] 9/29-9/30 for chest pain s/p PCI of ISR of LADx2 and PTCA of diag on 9/29/23, since readmitted to [facility] 10/3 after presenting with recurrent chest pain and increased CANNON since recent PCI. TTE revealed aortic stenosis (FIDEL 1, mG 27, pG 50), could not rule out bicuspid valve. Patient was re-cathed 10/6 to confirm patent stents. Patient now transferred to [facility] 10/6/23 for surgical evaluation under Dr. Cardozo.  (07 Oct 2023 02:39)      Surgery/Hospital Course:  ·  PRE-OP DIAGNOSIS:  Aortic stenosis   ·  POST-OP DIAGNOSIS:  Aortic stenosis   ·  PROCEDURES:  Aortic valve replacement, mechanical 13-Oct-2023   Today:  No acute events     ICU Vital Signs Last 24 Hrs  T(C): 37.1 (14 Oct 2023 12:30), Max: 37.1 (14 Oct 2023 12:30)  T(F): 98.7 (14 Oct 2023 12:30), Max: 98.7 (14 Oct 2023 12:30)  HR: 93 (14 Oct 2023 12:00) (61 - 93)  BP: 128/81 (14 Oct 2023 12:00) (111/61 - 157/80)  BP(mean): 100 (14 Oct 2023 12:00) (80 - 106)  ABP: 136/73 (14 Oct 2023 12:00) (70/38 - 159/79)  ABP(mean): 93 (14 Oct 2023 12:00) (43 - 107)  RR: 25 (14 Oct 2023 12:00) (10 - 25)  SpO2: 97% (14 Oct 2023 12:00) (94% - 100%)    O2 Parameters below as of 14 Oct 2023 12:00  Patient On (Oxygen Delivery Method): room air            Physical Exam:  Gen: A&O   CNS: non focal 	  Neck: no JVD  RES : clear , no wheezing              CVS: Regular  rhythm. Normal S1/S2  Abd: Soft, non-distended. Bowel sounds present.  Skin: No rash.  Ext:  no edema    ============================I/O===========================   I&O's Detail    13 Oct 2023 07:01  -  14 Oct 2023 07:00  --------------------------------------------------------  IN:    Albumin 5%  - 250 mL: 500 mL    Insulin: 10 mL    Insulin: 70.5 mL    IV PiggyBack: 100 mL    IV PiggyBack: 100 mL    IV PiggyBack: 50 mL    Norepinephrine: 44.6 mL    Oral Fluid: 650 mL    Propofol: 81.9 mL    sodium chloride 0.9%: 10 mL    sodium chloride 0.9%: 20 mL    sodium chloride 0.9%: 105 mL    sodium chloride 0.9%: 210 mL  Total IN: 1952 mL    OUT:    Chest Tube (mL): 430 mL    Indwelling Catheter - Urethral (mL): 1865 mL    Nasogastric/Oral tube (mL): 100 mL  Total OUT: 2395 mL    Total NET: -443 mL      14 Oct 2023 07:01  -  14 Oct 2023 13:54  --------------------------------------------------------  IN:    Insulin: 14.5 mL    IV PiggyBack: 50 mL    Oral Fluid: 300 mL    sodium chloride 0.9%: 50 mL    sodium chloride 0.9%: 25 mL  Total IN: 439.5 mL    OUT:    Chest Tube (mL): 105 mL    Indwelling Catheter - Urethral (mL): 360 mL    Norepinephrine: 0 mL    Propofol: 0 mL  Total OUT: 465 mL    Total NET: -25.5 mL        ============================ LABS =========================                        10.2   14.94 )-----------( 225      ( 14 Oct 2023 02:00 )             29.9     10-14    140  |  105  |  24.8<H>  ----------------------------<  95  4.8   |  23.0  |  1.95<H>    Ca    8.7      14 Oct 2023 02:00  Mg     2.2     10-14    TPro  5.8<L>  /  Alb  4.0  /  TBili  0.3<L>  /  DBili  x   /  AST  49<H>  /  ALT  49<H>  /  AlkPhos  29<L>  10-14    LIVER FUNCTIONS - ( 14 Oct 2023 02:00 )  Alb: 4.0 g/dL / Pro: 5.8 g/dL / ALK PHOS: 29 U/L / ALT: 49 U/L / AST: 49 U/L / GGT: x           PT/INR - ( 14 Oct 2023 02:00 )   PT: 12.4 sec;   INR: 1.12 ratio         PTT - ( 14 Oct 2023 02:00 )  PTT:31.4 sec  ABG - ( 13 Oct 2023 15:55 )  pH, Arterial: 7.370 pH, Blood: x     /  pCO2: 42    /  pO2: 212   / HCO3: 24    / Base Excess: -1.0  /  SaO2: 99.6              Urinalysis Basic - ( 14 Oct 2023 02:00 )    Color: x / Appearance: x / SG: x / pH: x  Gluc: 95 mg/dL / Ketone: x  / Bili: x / Urobili: x   Blood: x / Protein: x / Nitrite: x   Leuk Esterase: x / RBC: x / WBC x   Sq Epi: x / Non Sq Epi: x / Bacteria: x      ======================Micro/Rad/Cardio=================  Culture: Reviewed   CXR: Reviewed  Echo:Reviewed  ======================================================  PAST MEDICAL & SURGICAL HISTORY:  Essential hypertension      Obstructive sleep apnea      Diabetes mellitus      HLD (hyperlipidemia)      CAD (coronary artery disease)      Pancreatitis      History of coronary artery stent placement  Placed 9/12/18 by Dr. Kaye      S/P cholecystectomy        ====================ASSESSMENT ==============  58 year old male with a medical history of HTN, HLD, type 2 DM (HA1c 9.6 on Insulin), CKD 3, Obesity Class II, CAD s/p 8 stents, recent admission to [facility] 9/29-9/30 for  chest pain s/p PCI of ISR of LAD x 2 and PTCA of diag on 9/29/23, since readmitted to [facility] 10/3 after presenting with recurrent chest pain and increased CANNON since recent PCI. TTE revealed aortic stenosis (FIDEL 1, mG 27, pG 50), could not rule out bicuspid valve. Patient was re-cathed 10/6 to confirm patent stents. Patient now transferred to [facility] 10/6/23 for surgical evaluation under Dr. Cardozo.       --- Aortic stenosis.   ----TTE at [facility] revealed aortic stenosis (FIDEL 1, mG 27, pG 50), could not rule out bicuspid valve.  ---s/p AVR 10/13/23  ---CAD (coronary artery disease).   ---S/p recent PCI of ISR of LAD x 2 and PTCA of diag on 9/29/23 at [facility].   --- Essential hypertension.   --- HLD (hyperlipidemia).   --- Diabetes mellitus.   ---Stage 3 chronic kidney disease.   ---Post op Hypovolemia  ---Post op respiratory insufficiency       Plan:  -Beta blocker as tolerated by HR and SBP  -Lipitor for chronic valve prophylaxis  -Plavix in setting of recent PCI   -Aspirin for acute valve prophylaxis  -Chest PT and IS use with bedside nurse.  -Endocrine following.  -Avoid Nephrotoxic agents.   - Continue GI ppx with Protonix and Senna  -DVT ppx with Lovenox and SCD boots    ====================== NEUROLOGY=====================  acetaminophen     Tablet .. 975 milliGRAM(s) Oral every 6 hours  gabapentin 100 milliGRAM(s) Oral every 8 hours  HYDROmorphone  Injectable 0.5 milliGRAM(s) IV Push every 6 hours PRN Breakthrough Pain  oxyCODONE    IR 10 milliGRAM(s) Oral every 4 hours PRN Severe Pain (7 - 10)  oxyCODONE    IR 5 milliGRAM(s) Oral every 4 hours PRN Moderate Pain (4 - 6)    ==================== RESPIRATORY======================  Post op respiratory insufficiency  ====================CARDIOVASCULAR==================  Post op Hypovolemia  aMIOdarone    Tablet 400 milliGRAM(s) Oral two times a day  metoprolol tartrate 25 milliGRAM(s) Oral two times a day    ===================HEMATOLOGIC/ONC ===================  Monitor H&H/Plts    aspirin enteric coated 81 milliGRAM(s) Oral daily  clopidogrel Tablet 75 milliGRAM(s) Oral daily  enoxaparin Injectable 40 milliGRAM(s) SubCutaneous every 12 hours    ===================== RENAL =========================  Continue monitoring urine output, I&OS, BUN/Cr     ==================== GASTROINTESTINAL===================  ascorbic acid 500 milliGRAM(s) Oral two times a day  dextrose 5%. 1000 milliLiter(s) (50 mL/Hr) IV Continuous <Continuous>  dextrose 5%. 1000 milliLiter(s) (100 mL/Hr) IV Continuous <Continuous>  dextrose 5%. 1000 milliLiter(s) (100 mL/Hr) IV Continuous <Continuous>  pantoprazole    Tablet 40 milliGRAM(s) Oral daily  polyethylene glycol 3350 17 Gram(s) Oral daily  senna 2 Tablet(s) Oral at bedtime  sodium chloride 0.9%. 1000 milliLiter(s) (10 mL/Hr) IV Continuous <Continuous>  sodium chloride 0.9%. 1000 milliLiter(s) (5 mL/Hr) IV Continuous <Continuous>    =======================    ENDOCRINE  =====================  atorvastatin 80 milliGRAM(s) Oral at bedtime  dextrose 50% Injectable 25 milliLiter(s) IV Push every 15 minutes  dextrose 50% Injectable 25 Gram(s) IV Push once  dextrose 50% Injectable 12.5 Gram(s) IV Push once  dextrose 50% Injectable 25 Gram(s) IV Push once  dextrose 50% Injectable 50 milliLiter(s) IV Push every 15 minutes  dextrose Oral Gel 15 Gram(s) Oral once PRN Blood Glucose LESS THAN 70 milliGRAM(s)/deciliter  fenofibrate Tablet 145 milliGRAM(s) Oral daily  glucagon  Injectable 1 milliGRAM(s) IntraMuscular once  insulin lispro Injectable (ADMELOG) 8 Unit(s) SubCutaneous three times a day before meals  insulin regular Infusion 2 Unit(s)/Hr (2 mL/Hr) IV Continuous <Continuous>    ========================INFECTIOUS DISEASE================  cefuroxime  IVPB 1500 milliGRAM(s) IV Intermittent every 8 hours  vancomycin  IVPB 1000 milliGRAM(s) IV Intermittent <User Schedule>      -Monitor Neurologic status ,   -Head of the bed should remain elevated to 45 degrees,  -Monitor for arrhythmias and monitor parameters for organ perfusion,  -Glycemic control is satisfactory,  -Nutritional goals will be met using po eventually , insure adequate caloric intake and monitor the same ,  -Electrolytes have been repleted as necessary , pain control has been achieved  and wound care has been carried out ,  -Stress ulcer and VTE prophylaxis will be achieved,  -Agressive PT and early mobility and ambulation goals will be met,  -The family was updated about the course and plan .      I have spent 35 minutes providing acute care for this critically ill patient     Patient requires continuous monitoring with bedside rhythm monitoring, pulse ox monitoring, and intermittent blood gas analysis. Care plan discussed with ICU care team. Patient remained critical and at risk for life threatening decompensation.

## 2023-10-14 NOTE — PHYSICAL THERAPY INITIAL EVALUATION ADULT - PERTINENT HX OF CURRENT PROBLEM, REHAB EVAL
58 year old male with a medical history of HTN, HLD, type 2 DM (HA1c 9.6 on Insulin), CKD 3, Obesity Class II, CAD s/p 8 stents, recent admission to [facility] 9/29-9/30 for chest pain s/p PCI of ISR of LADx2 and PTCA of diag on 9/29/23, since readmitted to [facility] 10/3 after presenting with recurrent chest pain and increased ACNNON since recent PCI. TTE revealed aortic stenosis (FIDEL 1, mG 27, pG 50), could not rule out bicuspid valve. Patient was re-cathed 10/6 to confirm patent stents. Patient now transferred to [facility] 10/6/23 for surgical evaluation under Dr. Cardozo.

## 2023-10-14 NOTE — PHYSICAL THERAPY INITIAL EVALUATION ADULT - GENERAL OBSERVATIONS, REHAB EVAL
*Prefers Baudilio*. Pt received in chair with IV, chest tube, monitor, pacer box, pleasant and agreeable to PT, wife Melissa present.

## 2023-10-14 NOTE — PROGRESS NOTE ADULT - ASSESSMENT
58 year old male with a medical history of HTN, HLD, type 2 DM (HA1c 9.6 on Insulin), CKD 3, Obesity Class II, CAD s/p 8 stents, recent admission to [facility] 9/29-9/30 for  chest pain s/p PCI of ISR of LAD x 2 and PTCA of diag on 9/29/23, since readmitted to [facility] 10/3 after presenting with recurrent chest pain and increased CANNON since recent PCI. TTE revealed aortic stenosis (FIDEL 1, mG 27, pG 50), could not rule out bicuspid valve. Patient was re-cathed 10/6 to confirm patent stents. Patient now transferred to [facility] 10/6/23 for surgical evaluation under Dr. Cardozo.

## 2023-10-14 NOTE — PROGRESS NOTE ADULT - PROBLEM SELECTOR PLAN 1
TTE at [facility] revealed aortic stenosis (FIDEL 1, mG 27, pG 50), could not rule out bicuspid valve.  Transferred to [facility] 10/6/23 for surgical evaluation under Dr. Cardozo.  MARIBEL performed 10/9 with findings of moderate AS  s/p AVR 10/13/23  Start Beta blocker as tolerated by HR and SBP  Lipitor for chronic valve prophylaxis  Plavix in setting of recent PCI   Aspirin for acute valve prophylaxis  Encourage PO intake  Encourage OOB to chair and ambulation with PT  Encourage deep breathing exercised and coughing  Chest PT and IS use with bedside nurse

## 2023-10-14 NOTE — DIETITIAN INITIAL EVALUATION ADULT - PROBLEM SELECTOR PLAN 2
Long standing history of CAD s/p 8 stents.  S/p recent PCI of ISR of LAD x 2 and PTCA of diag on 9/29/23 at [facility].   Patient was re-cathed 10/6 to confirm patent stents.  Continue Imdur and Ranexa.  Continue Toprol 200XL QD as tolerated by HR and BP.   Continue Lipitor and Tricor.

## 2023-10-14 NOTE — DIETITIAN INITIAL EVALUATION ADULT - PERTINENT LABORATORY DATA
10-14    140  |  105  |  24.8<H>  ----------------------------<  95  4.8   |  23.0  |  1.95<H>    Ca    8.7      14 Oct 2023 02:00  Mg     2.2     10-14    TPro  5.8<L>  /  Alb  4.0  /  TBili  0.3<L>  /  DBili  x   /  AST  49<H>  /  ALT  49<H>  /  AlkPhos  29<L>  10-14  POCT Blood Glucose.: 256 mg/dL (10-14-23 @ 09:04)  A1C with Estimated Average Glucose Result: 9.6 % (10-07-23 @ 02:30)  A1C with Estimated Average Glucose Result: 9.8 % (09-30-23 @ 06:38)

## 2023-10-14 NOTE — DIETITIAN INITIAL EVALUATION ADULT - PERTINENT MEDS FT
MEDICATIONS  (STANDING):  acetaminophen     Tablet .. 975 milliGRAM(s) Oral every 6 hours  aMIOdarone    Tablet 400 milliGRAM(s) Oral two times a day  ascorbic acid 500 milliGRAM(s) Oral two times a day  aspirin enteric coated 81 milliGRAM(s) Oral daily  atorvastatin 80 milliGRAM(s) Oral at bedtime  cefuroxime  IVPB 1500 milliGRAM(s) IV Intermittent every 8 hours  chlorhexidine 2% Cloths 1 Application(s) Topical two times a day  clopidogrel Tablet 75 milliGRAM(s) Oral daily  dextrose 5%. 1000 milliLiter(s) (50 mL/Hr) IV Continuous <Continuous>  dextrose 5%. 1000 milliLiter(s) (100 mL/Hr) IV Continuous <Continuous>  dextrose 5%. 1000 milliLiter(s) (100 mL/Hr) IV Continuous <Continuous>  dextrose 50% Injectable 25 milliLiter(s) IV Push every 15 minutes  dextrose 50% Injectable 25 Gram(s) IV Push once  dextrose 50% Injectable 12.5 Gram(s) IV Push once  dextrose 50% Injectable 50 milliLiter(s) IV Push every 15 minutes  dextrose 50% Injectable 25 Gram(s) IV Push once  fenofibrate Tablet 145 milliGRAM(s) Oral daily  gabapentin 100 milliGRAM(s) Oral every 8 hours  glucagon  Injectable 1 milliGRAM(s) IntraMuscular once  insulin lispro Injectable (ADMELOG) 6 Unit(s) SubCutaneous three times a day before meals  insulin regular Infusion 2 Unit(s)/Hr (2 mL/Hr) IV Continuous <Continuous>  meperidine     Injectable 25 milliGRAM(s) IV Push once  norepinephrine Infusion 0.07 MICROgram(s)/kG/Min (15.6 mL/Hr) IV Continuous <Continuous>  pantoprazole    Tablet 40 milliGRAM(s) Oral daily  polyethylene glycol 3350 17 Gram(s) Oral daily  potassium chloride  10 mEq/50 mL IVPB 10 milliEquivalent(s) IV Intermittent every 1 hour  potassium chloride  10 mEq/50 mL IVPB 10 milliEquivalent(s) IV Intermittent every 1 hour  potassium chloride  10 mEq/50 mL IVPB 10 milliEquivalent(s) IV Intermittent every 1 hour  propofol Infusion 50 MICROgram(s)/kG/Min (35.6 mL/Hr) IV Continuous <Continuous>  senna 2 Tablet(s) Oral at bedtime  sodium chloride 0.9%. 1000 milliLiter(s) (5 mL/Hr) IV Continuous <Continuous>  sodium chloride 0.9%. 1000 milliLiter(s) (10 mL/Hr) IV Continuous <Continuous>  vancomycin  IVPB 1000 milliGRAM(s) IV Intermittent <User Schedule>    MEDICATIONS  (PRN):  dextrose Oral Gel 15 Gram(s) Oral once PRN Blood Glucose LESS THAN 70 milliGRAM(s)/deciliter  HYDROmorphone  Injectable 0.5 milliGRAM(s) IV Push every 6 hours PRN Breakthrough Pain  oxyCODONE    IR 5 milliGRAM(s) Oral every 4 hours PRN Moderate Pain (4 - 6)  oxyCODONE    IR 10 milliGRAM(s) Oral every 4 hours PRN Severe Pain (7 - 10)

## 2023-10-14 NOTE — PROGRESS NOTE ADULT - SUBJECTIVE AND OBJECTIVE BOX
58y Male seen and evaluated bedside. Endorsing no acute complaints. Denies any chest pain, palpitations, shortness of breath, wheezing, abd pain, nausea, vomiting, lightheadedness, headaches, fevers, or chills.     PAST MEDICAL & SURGICAL HISTORY:  Essential hypertension      Obstructive sleep apnea      Diabetes mellitus      HLD (hyperlipidemia)      CAD (coronary artery disease)      Pancreatitis      History of coronary artery stent placement  Placed 9/12/18 by Dr. Kaye      S/P cholecystectomy          Medications:  acetaminophen     Tablet .. 975 milliGRAM(s) Oral every 6 hours  aMIOdarone    Tablet 400 milliGRAM(s) Oral two times a day  ascorbic acid 500 milliGRAM(s) Oral two times a day  aspirin enteric coated 81 milliGRAM(s) Oral daily  atorvastatin 80 milliGRAM(s) Oral at bedtime  cefuroxime  IVPB 1500 milliGRAM(s) IV Intermittent every 8 hours  chlorhexidine 2% Cloths 1 Application(s) Topical two times a day  clopidogrel Tablet 75 milliGRAM(s) Oral daily  dextrose 5%. 1000 milliLiter(s) IV Continuous <Continuous>  dextrose 50% Injectable 25 milliLiter(s) IV Push every 15 minutes  dextrose 50% Injectable 50 milliLiter(s) IV Push every 15 minutes  fenofibrate Tablet 145 milliGRAM(s) Oral daily  gabapentin 100 milliGRAM(s) Oral every 8 hours  HYDROmorphone  Injectable 0.5 milliGRAM(s) IV Push every 6 hours PRN  insulin regular Infusion 2 Unit(s)/Hr IV Continuous <Continuous>  meperidine     Injectable 25 milliGRAM(s) IV Push once  norepinephrine Infusion 0.07 MICROgram(s)/kG/Min IV Continuous <Continuous>  oxyCODONE    IR 10 milliGRAM(s) Oral every 4 hours PRN  oxyCODONE    IR 5 milliGRAM(s) Oral every 4 hours PRN  pantoprazole    Tablet 40 milliGRAM(s) Oral daily  polyethylene glycol 3350 17 Gram(s) Oral daily  potassium chloride  10 mEq/50 mL IVPB 10 milliEquivalent(s) IV Intermittent every 1 hour  potassium chloride  10 mEq/50 mL IVPB 10 milliEquivalent(s) IV Intermittent every 1 hour  potassium chloride  10 mEq/50 mL IVPB 10 milliEquivalent(s) IV Intermittent every 1 hour  propofol Infusion 50 MICROgram(s)/kG/Min IV Continuous <Continuous>  senna 2 Tablet(s) Oral at bedtime  sodium chloride 0.9%. 1000 milliLiter(s) IV Continuous <Continuous>  sodium chloride 0.9%. 1000 milliLiter(s) IV Continuous <Continuous>  vancomycin  IVPB 1000 milliGRAM(s) IV Intermittent <User Schedule>      MEDICATIONS  (PRN):  HYDROmorphone  Injectable 0.5 milliGRAM(s) IV Push every 6 hours PRN Breakthrough Pain  oxyCODONE    IR 10 milliGRAM(s) Oral every 4 hours PRN Severe Pain (7 - 10)  oxyCODONE    IR 5 milliGRAM(s) Oral every 4 hours PRN Moderate Pain (4 - 6)      Daily Review:    Height (cm): 182.9 (10-13 @ 06:29)  Weight (kg): 118.8 (10-13 @ 06:29)  BMI (kg/m2): 35.5 (10-13 @ 06:29)  BSA (m2): 2.39 (10-13 @ 06:29)Mode: CPAP with PS, FiO2: 40, PEEP: 5, PS: 5, MAP: 8    ABG - ( 13 Oct 2023 15:55 )  pH, Arterial: 7.370 pH, Blood: x     /  pCO2: 42    /  pO2: 212   / HCO3: 24    / Base Excess: -1.0  /  SaO2: 99.6                                    10.2   14.94 )-----------( 225      ( 14 Oct 2023 02:00 )             29.9   10-14    140  |  105  |  24.8<H>  ----------------------------<  95  4.8   |  23.0  |  1.95<H>    Ca    8.7      14 Oct 2023 02:00  Mg     2.2     10-14    TPro  5.8<L>  /  Alb  4.0  /  TBili  0.3<L>  /  DBili  x   /  AST  49<H>  /  ALT  49<H>  /  AlkPhos  29<L>  10-14    CARDIAC MARKERS ( 14 Oct 2023 02:00 )  x     / 0.30 ng/mL / x     / x     / x      CARDIAC MARKERS ( 13 Oct 2023 12:24 )  x     / 0.14 ng/mL / 183 U/L / x     / 16.7 ng/mL    PT/INR - ( 14 Oct 2023 02:00 )   PT: 12.4 sec;   INR: 1.12 ratio         PTT - ( 14 Oct 2023 02:00 )  PTT:31.4 sec    T(C): 36.9 (10-14-23 @ 02:00), Max: 36.9 (10-14-23 @ 01:00)  HR: 64 (10-14-23 @ 03:00) (62 - 76)  BP: 131/76 (10-14-23 @ 03:00) (109/67 - 168/95)  RR: 12 (10-14-23 @ 03:00) (11 - 20)  SpO2: 98% (10-14-23 @ 03:00) (97% - 100%)  Wt(kg): --    CAPILLARY BLOOD GLUCOSE      POCT Blood Glucose.: 118 mg/dL (14 Oct 2023 00:07)  POCT Blood Glucose.: 129 mg/dL (13 Oct 2023 22:32)  POCT Blood Glucose.: 154 mg/dL (13 Oct 2023 20:48)  POCT Blood Glucose.: 161 mg/dL (13 Oct 2023 19:00)  POCT Blood Glucose.: 178 mg/dL (13 Oct 2023 18:21)  POCT Blood Glucose.: 179 mg/dL (13 Oct 2023 17:14)  POCT Blood Glucose.: 183 mg/dL (13 Oct 2023 16:00)  POCT Blood Glucose.: 173 mg/dL (13 Oct 2023 15:01)  POCT Blood Glucose.: 178 mg/dL (13 Oct 2023 13:59)  POCT Blood Glucose.: 184 mg/dL (13 Oct 2023 13:01)  POCT Blood Glucose.: 159 mg/dL (13 Oct 2023 06:27)      I&O's Summary    12 Oct 2023 07:01  -  13 Oct 2023 07:00  --------------------------------------------------------  IN: 250 mL / OUT: 1150 mL / NET: -900 mL    13 Oct 2023 07:01  -  14 Oct 2023 03:33  --------------------------------------------------------  IN: 1742 mL / OUT: 1795 mL / NET: -53 mL        Physical Exam  General: Well appearing, laying in bed on an incline, NAD  Neurological: AOx3, no focal neurological deficits  Cardiovascular: Regular Rate/Rhythm, S1/2 auscultated, No extra heart sounds  Respiratory: CTA b/l over all lung fields, No adventitious breath sounds  Gastrointestinal: Bowel sounds present in all 4 quadrants, Abdomen is soft, non-tender, non-distended  Extremities: No peripheral edema noted, 5/5 strength and full range of motion in all 4 extremities b/l  Vascular: 2+ peripheral pulses b/l in upper and lower extremities, Radial, DP  Incision Sites: MSI, CT sites c/d/i no erythema, no purulence, no sternal click

## 2023-10-14 NOTE — DIETITIAN INITIAL EVALUATION ADULT - PROBLEM SELECTOR PLAN 1
TTE at [facility] revealed aortic stenosis (FIDEL 1, mG 27, pG 50), could not rule out bicuspid valve.  Transferred to [facility] 10/6/23 for surgical evaluation under Dr. Cardozo.  Preoperative workup underway.  Follow up b/l carotid US, PFTs, CXR, labs, UA, etc.   Continue Toprol 200XL QD as tolerated by HR and BP.   Nephrology and Endocrine consults to be called today.  Plan to be discussed / reviewed with CT Surgery attending / team during AM rounds.

## 2023-10-14 NOTE — PROGRESS NOTE ADULT - ASSESSMENT
58 year old male with a medical history of excessive BMI, DM, HTN, HLD, CAD s/p 8 stents, CKD 3a with recent admission at [facility] 9/29-9/30 for chest pain s/p PCI of ISR of LADx2 and PTCA of diag on 9/29/23, then readmitted to [facility] 10/3 after presenting with recurrent chest pain and increased CANNON. TTE revealed aortic stenosis. Re-cathed 10/6 to confirm patent stents. Patient then transferred to [facility] 10/6/23 for surgical evaluation. Underwent  mechanical aortic valve replacement on 10/13. Nephrology is following for CKD 3a; now with VIKKI.     -VIKKI is likely hemodynamically mediated   -Baseline creatinine is ~1.3-1.5mg/dL (as reported by patient) with  protein, negative blood; UPCR 0.6mg/g while at baseline  -Creatinine today is 1.9mg/dL  -Will re-check urine studies  -Will also check vancomycin random level prior to PM dose of IV vancomycin     -BP acceptable  -Anemia - hemoglobin is at goal  -Mineral Bone Disease - will start Cholecalciferol 1000U daily    -Outpatient nephrologist is Dr Jovany Guerin, DO  Nephrology

## 2023-10-14 NOTE — DIETITIAN INITIAL EVALUATION ADULT - OTHER INFO
59 yo male with pmhx of HTN, HLD, uncontrolled DM, CKD3, obesity, CAD s/p 8 stents, recent admission to [facility] 9/29-9/30 for chest pain s/p PCI of ISR of LADx2 and PTCA of diag on 9/29/23, since readmitted to [facility] 10/3 presenting with recurrent chest pain and increased CANNON since recent PCI. TTE revealed aortic stenosis, pt was re-cathed 10/16 to confirm patent stents, transferred to [facility] for surgical evaluation. MARIBEL performed 10/9 with findings of moderate AS. S/p AVR 10/13/23.

## 2023-10-14 NOTE — DIETITIAN INITIAL EVALUATION ADULT - PROBLEM SELECTOR PLAN 7
SCDs for DVT prophylaxis. Chemical anticoagulation to start pending labs.  Protonix for GI prophylaxis.

## 2023-10-14 NOTE — DIETITIAN INITIAL EVALUATION ADULT - NSFNSGIIOFT_GEN_A_CORE
10-13-23 @ 07:01  -  10-14-23 @ 07:00  --------------------------------------------------------  OUT:    Chest Tube (mL): 430 mL    Nasogastric/Oral tube (mL): 100 mL  Total OUT: 530 mL    Total NET: -530 mL      10-14-23 @ 07:01  -  10-14-23 @ 09:19  --------------------------------------------------------  OUT:    Chest Tube (mL): 55 mL  Total OUT: 55 mL    Total NET: -55 mL

## 2023-10-14 NOTE — PHYSICAL THERAPY INITIAL EVALUATION ADULT - ADDITIONAL COMMENTS
Pt AxOx4 states he lives at home with wife with 3STE  2HR, 0 inside. Pt was independent PTA without use of DME, does not own. Pt will have wife to assist upon d/c.

## 2023-10-14 NOTE — PROGRESS NOTE ADULT - SUBJECTIVE AND OBJECTIVE BOX
Interval Events:  no overnight events  follow up on DM     Patient seen and examined at bedside. NAD, appears comfortable. Able to tolerate diet. Still on insulin gtt.      MEDICATIONS  (STANDING):  acetaminophen     Tablet .. 975 milliGRAM(s) Oral every 6 hours  aMIOdarone    Tablet 400 milliGRAM(s) Oral two times a day  ascorbic acid 500 milliGRAM(s) Oral two times a day  aspirin enteric coated 81 milliGRAM(s) Oral daily  atorvastatin 80 milliGRAM(s) Oral at bedtime  cefuroxime  IVPB 1500 milliGRAM(s) IV Intermittent every 8 hours  chlorhexidine 2% Cloths 1 Application(s) Topical two times a day  clopidogrel Tablet 75 milliGRAM(s) Oral daily  dextrose 5%. 1000 milliLiter(s) (100 mL/Hr) IV Continuous <Continuous>  dextrose 5%. 1000 milliLiter(s) (50 mL/Hr) IV Continuous <Continuous>  dextrose 5%. 1000 milliLiter(s) (100 mL/Hr) IV Continuous <Continuous>  dextrose 50% Injectable 25 Gram(s) IV Push once  dextrose 50% Injectable 12.5 Gram(s) IV Push once  dextrose 50% Injectable 50 milliLiter(s) IV Push every 15 minutes  dextrose 50% Injectable 25 milliLiter(s) IV Push every 15 minutes  dextrose 50% Injectable 25 Gram(s) IV Push once  enoxaparin Injectable 40 milliGRAM(s) SubCutaneous every 12 hours  fenofibrate Tablet 145 milliGRAM(s) Oral daily  gabapentin 100 milliGRAM(s) Oral every 8 hours  glucagon  Injectable 1 milliGRAM(s) IntraMuscular once  insulin lispro Injectable (ADMELOG) 8 Unit(s) SubCutaneous three times a day before meals  insulin regular Infusion 2 Unit(s)/Hr (2 mL/Hr) IV Continuous <Continuous>  metoprolol tartrate 25 milliGRAM(s) Oral two times a day  pantoprazole    Tablet 40 milliGRAM(s) Oral daily  polyethylene glycol 3350 17 Gram(s) Oral daily  senna 2 Tablet(s) Oral at bedtime  sodium chloride 0.9%. 1000 milliLiter(s) (10 mL/Hr) IV Continuous <Continuous>  sodium chloride 0.9%. 1000 milliLiter(s) (5 mL/Hr) IV Continuous <Continuous>  vancomycin  IVPB 1000 milliGRAM(s) IV Intermittent <User Schedule>    MEDICATIONS  (PRN):  dextrose Oral Gel 15 Gram(s) Oral once PRN Blood Glucose LESS THAN 70 milliGRAM(s)/deciliter  HYDROmorphone  Injectable 0.5 milliGRAM(s) IV Push every 6 hours PRN Breakthrough Pain  oxyCODONE    IR 5 milliGRAM(s) Oral every 4 hours PRN Moderate Pain (4 - 6)  oxyCODONE    IR 10 milliGRAM(s) Oral every 4 hours PRN Severe Pain (7 - 10)      Vital Signs Last 24 Hrs  T(C): 37.1 (14 Oct 2023 12:30), Max: 37.1 (14 Oct 2023 12:30)  T(F): 98.7 (14 Oct 2023 12:30), Max: 98.7 (14 Oct 2023 12:30)  HR: 93 (14 Oct 2023 12:00) (61 - 93)  BP: 128/81 (14 Oct 2023 12:00) (111/61 - 157/80)  BP(mean): 100 (14 Oct 2023 12:00) (80 - 106)  RR: 25 (14 Oct 2023 12:00) (10 - 25)  SpO2: 97% (14 Oct 2023 12:00) (94% - 100%)    Parameters below as of 14 Oct 2023 12:00  Patient On (Oxygen Delivery Method): room air      Weight (kg): 118.8 (10-13-23 @ 06:29)    Physical Exam:    Constitutional: NAD, comfortable  HEENT: EOMI, no exophalmos  Neck: trachea midline, no thyroid enlargement  Respiratory: CTAB, normal respirations  Cardiovascular: S1 and S2, RRR, chest tube in place.  Gastrointestinal: BS+, soft, ntnd  Extremities: No peripheral edema  Neurological: AOx3, no focal deficits  Psychiatric: Normal mood and normal affect  Skin: dry and warm.     LABS  10-14    140  |  105  |  24.8<H>  ----------------------------<  95  4.8   |  23.0  |  1.95<H>    Ca    8.7      14 Oct 2023 02:00  Mg     2.2     10-14    TPro  5.8<L>  /  Alb  4.0  /  TBili  0.3<L>  /  DBili  x   /  AST  49<H>  /  ALT  49<H>  /  AlkPhos  29<L>  10-14                          10.2   14.94 )-----------( 225      ( 14 Oct 2023 02:00 )             29.9     Thyroid Stimulating Hormone, Serum: 4.61 uIU/mL (10-07-23 @ 02:30)  Free Thyroxine, Serum: 1.1 ng/dL (10-07-23 @ 02:30)  A1C with Estimated Average Glucose Result: 9.6 % (10-07-23 @ 02:30)  Thyroid Stimulating Hormone, Serum: 2.97 uU/mL (10-05-23 @ 06:31)  A1C with Estimated Average Glucose Result: 9.8 % (09-30-23 @ 06:38)    Aspartate Aminotransferase (AST/SGOT): 49 U/L (10-14-23 @ 02:00)  Alanine Aminotransferase (ALT/SGPT): 49 U/L (10-14-23 @ 02:00)  Alkaline Phosphatase: 29 U/L (10-14-23 @ 02:00)  Albumin: 4.0 g/dL (10-14-23 @ 02:00)  Alanine Aminotransferase (ALT/SGPT): 45 U/L (10-13-23 @ 12:24)  Alkaline Phosphatase: 33 U/L (10-13-23 @ 12:24)  Albumin: 3.3 g/dL (10-13-23 @ 12:24)  Aspartate Aminotransferase (AST/SGOT): 66 U/L (10-13-23 @ 12:24)    CAPILLARY BLOOD GLUCOSE    POCT Blood Glucose.: 267 mg/dL (14 Oct 2023 12:43)  POCT Blood Glucose.: 232 mg/dL (14 Oct 2023 11:34)  POCT Blood Glucose.: 257 mg/dL (14 Oct 2023 09:59)  POCT Blood Glucose.: 256 mg/dL (14 Oct 2023 09:04)  POCT Blood Glucose.: 161 mg/dL (14 Oct 2023 07:46)  POCT Blood Glucose.: 159 mg/dL (14 Oct 2023 06:46)  POCT Blood Glucose.: 165 mg/dL (14 Oct 2023 05:16)  POCT Blood Glucose.: 118 mg/dL (14 Oct 2023 00:07)  POCT Blood Glucose.: 129 mg/dL (13 Oct 2023 22:32)  POCT Blood Glucose.: 154 mg/dL (13 Oct 2023 20:48)  POCT Blood Glucose.: 161 mg/dL (13 Oct 2023 19:00)  POCT Blood Glucose.: 178 mg/dL (13 Oct 2023 18:21)  POCT Blood Glucose.: 179 mg/dL (13 Oct 2023 17:14)  POCT Blood Glucose.: 183 mg/dL (13 Oct 2023 16:00)  POCT Blood Glucose.: 173 mg/dL (13 Oct 2023 15:01)  POCT Blood Glucose.: 178 mg/dL (13 Oct 2023 13:59)

## 2023-10-14 NOTE — PROGRESS NOTE ADULT - SUBJECTIVE AND OBJECTIVE BOX
Underwent mechanical aortic valve replacement yesterday. Complained of incisional pain. Otherwise, no other subjective complaints. Cr notable for rise from baseline 1.32 --> 1.95mg/dL on a.m. labs.    Vital Signs Last 24 Hrs  T(C): 37.1 (14 Oct 2023 12:30), Max: 37.1 (14 Oct 2023 12:30)  T(F): 98.7 (14 Oct 2023 12:30), Max: 98.7 (14 Oct 2023 12:30)  HR: 77 (14 Oct 2023 15:45) (61 - 93)  BP: 117/67 (14 Oct 2023 15:00) (117/67 - 157/80)  BP(mean): 84 (14 Oct 2023 15:00) (84 - 106)  RR: 19 (14 Oct 2023 15:45) (10 - 25)  SpO2: 95% (14 Oct 2023 15:45) (94% - 100%)    Parameters below as of 14 Oct 2023 15:00  Patient On (Oxygen Delivery Method): room air    I&O's Summary    13 Oct 2023 07:01  -  14 Oct 2023 07:00  --------------------------------------------------------  IN: 1952 mL / OUT: 2395 mL / NET: -443 mL    14 Oct 2023 07:01  -  14 Oct 2023 16:07  --------------------------------------------------------  IN: 1263 mL / OUT: 665 mL / NET: 598 mL    Physical Exam  General: Pleasant obese male in NAD  HEENT: Normocephalic  Cardiac: S1S2 RRR  Respiratory: CTAB  Abdomen: Obese, soft  Extremities: No appreciable edema  Neuro: AAOx3  Psych: Calm     10-14    140  |  105  |  24.8<H>  ----------------------------<  95  4.8   |  23.0  |  1.95<H>    Ca    8.7      14 Oct 2023 02:00  Mg     2.2     10-14    TPro  5.8<L>  /  Alb  4.0  /  TBili  0.3<L>  /  DBili  x   /  AST  49<H>  /  ALT  49<H>  /  AlkPhos  29<L>  10-14                        10.2   14.94 )-----------( 225      ( 14 Oct 2023 02:00 )             29.9     MEDICATIONS  (STANDING):  acetaminophen     Tablet .. 975 milliGRAM(s) Oral every 6 hours  aMIOdarone    Tablet 400 milliGRAM(s) Oral two times a day  ascorbic acid 500 milliGRAM(s) Oral two times a day  aspirin enteric coated 81 milliGRAM(s) Oral daily  atorvastatin 80 milliGRAM(s) Oral at bedtime  cefuroxime  IVPB 1500 milliGRAM(s) IV Intermittent every 8 hours  chlorhexidine 2% Cloths 1 Application(s) Topical two times a day  clopidogrel Tablet 75 milliGRAM(s) Oral daily  dextrose 5%. 1000 milliLiter(s) (50 mL/Hr) IV Continuous <Continuous>  dextrose 5%. 1000 milliLiter(s) (100 mL/Hr) IV Continuous <Continuous>  dextrose 5%. 1000 milliLiter(s) (100 mL/Hr) IV Continuous <Continuous>  dextrose 50% Injectable 50 milliLiter(s) IV Push every 15 minutes  dextrose 50% Injectable 25 Gram(s) IV Push once  dextrose 50% Injectable 25 milliLiter(s) IV Push every 15 minutes  dextrose 50% Injectable 25 Gram(s) IV Push once  dextrose 50% Injectable 12.5 Gram(s) IV Push once  enoxaparin Injectable 40 milliGRAM(s) SubCutaneous every 12 hours  fenofibrate Tablet 145 milliGRAM(s) Oral daily  gabapentin 100 milliGRAM(s) Oral every 8 hours  glucagon  Injectable 1 milliGRAM(s) IntraMuscular once  insulin lispro Injectable (ADMELOG) 8 Unit(s) SubCutaneous three times a day before meals  insulin regular Infusion 2 Unit(s)/Hr (2 mL/Hr) IV Continuous <Continuous>  metoprolol tartrate 25 milliGRAM(s) Oral two times a day  pantoprazole    Tablet 40 milliGRAM(s) Oral daily  polyethylene glycol 3350 17 Gram(s) Oral daily  senna 2 Tablet(s) Oral at bedtime  sodium chloride 0.9%. 1000 milliLiter(s) (10 mL/Hr) IV Continuous <Continuous>  sodium chloride 0.9%. 1000 milliLiter(s) (5 mL/Hr) IV Continuous <Continuous>  vancomycin  IVPB 1000 milliGRAM(s) IV Intermittent <User Schedule>    MEDICATIONS  (PRN):  dextrose Oral Gel 15 Gram(s) Oral once PRN Blood Glucose LESS THAN 70 milliGRAM(s)/deciliter  HYDROmorphone  Injectable 0.5 milliGRAM(s) IV Push every 6 hours PRN Breakthrough Pain  oxyCODONE    IR 5 milliGRAM(s) Oral every 4 hours PRN Moderate Pain (4 - 6)  oxyCODONE    IR 10 milliGRAM(s) Oral every 4 hours PRN Severe Pain (7 - 10)

## 2023-10-14 NOTE — DIETITIAN INITIAL EVALUATION ADULT - NS FNS DIET ORDER
Diet, Regular:   Consistent Carbohydrate {No Snacks} (CSTCHO)  DASH/TLC {Sodium & Cholesterol Restricted} (DASH) (10-14-23 @ 04:46)

## 2023-10-14 NOTE — DIETITIAN INITIAL EVALUATION ADULT - ORAL INTAKE PTA/DIET HISTORY
Met with pt this AM, he reports good appetite/po intake PTA and currently, states he ate 100% of pancakes for breakfast this morning. He is allergic to scallops and follows a "very low carb" and sodium restricted diet at home. Provided Heart Healthy/Consistent CHO diet education and left handout on bedside table, pt was receptive. His UBW is 265-270 lbs, pt states he feels he has had some weight loss in the past week, 10/13 weight 261 lbs with +2 edema, continue to monitor trends. Encouraged HBV protein food options for optimal nutrition. RD remains available.

## 2023-10-15 LAB
ANION GAP SERPL CALC-SCNC: 14 MMOL/L — SIGNIFICANT CHANGE UP (ref 5–17)
ANION GAP SERPL CALC-SCNC: 15 MMOL/L — SIGNIFICANT CHANGE UP (ref 5–17)
APTT BLD: 35.8 SEC — HIGH (ref 24.5–35.6)
BUN SERPL-MCNC: 36.1 MG/DL — HIGH (ref 8–20)
BUN SERPL-MCNC: 47.2 MG/DL — HIGH (ref 8–20)
CALCIUM SERPL-MCNC: 8 MG/DL — LOW (ref 8.4–10.5)
CALCIUM SERPL-MCNC: 8.2 MG/DL — LOW (ref 8.4–10.5)
CHLORIDE SERPL-SCNC: 100 MMOL/L — SIGNIFICANT CHANGE UP (ref 96–108)
CHLORIDE SERPL-SCNC: 99 MMOL/L — SIGNIFICANT CHANGE UP (ref 96–108)
CO2 SERPL-SCNC: 21 MMOL/L — LOW (ref 22–29)
CO2 SERPL-SCNC: 21 MMOL/L — LOW (ref 22–29)
CREAT SERPL-MCNC: 2.29 MG/DL — HIGH (ref 0.5–1.3)
CREAT SERPL-MCNC: 2.8 MG/DL — HIGH (ref 0.5–1.3)
EGFR: 26 ML/MIN/1.73M2 — LOW
EGFR: 33 ML/MIN/1.73M2 — LOW
GLUCOSE BLDC GLUCOMTR-MCNC: 124 MG/DL — HIGH (ref 70–99)
GLUCOSE BLDC GLUCOMTR-MCNC: 126 MG/DL — HIGH (ref 70–99)
GLUCOSE BLDC GLUCOMTR-MCNC: 133 MG/DL — HIGH (ref 70–99)
GLUCOSE BLDC GLUCOMTR-MCNC: 137 MG/DL — HIGH (ref 70–99)
GLUCOSE BLDC GLUCOMTR-MCNC: 138 MG/DL — HIGH (ref 70–99)
GLUCOSE BLDC GLUCOMTR-MCNC: 159 MG/DL — HIGH (ref 70–99)
GLUCOSE BLDC GLUCOMTR-MCNC: 165 MG/DL — HIGH (ref 70–99)
GLUCOSE BLDC GLUCOMTR-MCNC: 170 MG/DL — HIGH (ref 70–99)
GLUCOSE BLDC GLUCOMTR-MCNC: 188 MG/DL — HIGH (ref 70–99)
GLUCOSE BLDC GLUCOMTR-MCNC: 191 MG/DL — HIGH (ref 70–99)
GLUCOSE BLDC GLUCOMTR-MCNC: 198 MG/DL — HIGH (ref 70–99)
GLUCOSE BLDC GLUCOMTR-MCNC: 201 MG/DL — HIGH (ref 70–99)
GLUCOSE BLDC GLUCOMTR-MCNC: 202 MG/DL — HIGH (ref 70–99)
GLUCOSE BLDC GLUCOMTR-MCNC: 206 MG/DL — HIGH (ref 70–99)
GLUCOSE BLDC GLUCOMTR-MCNC: 215 MG/DL — HIGH (ref 70–99)
GLUCOSE BLDC GLUCOMTR-MCNC: 242 MG/DL — HIGH (ref 70–99)
GLUCOSE BLDC GLUCOMTR-MCNC: 75 MG/DL — SIGNIFICANT CHANGE UP (ref 70–99)
GLUCOSE SERPL-MCNC: 132 MG/DL — HIGH (ref 70–99)
GLUCOSE SERPL-MCNC: 134 MG/DL — HIGH (ref 70–99)
HCT VFR BLD CALC: 27.3 % — LOW (ref 39–50)
HCT VFR BLD CALC: 28.5 % — LOW (ref 39–50)
HGB BLD-MCNC: 9 G/DL — LOW (ref 13–17)
HGB BLD-MCNC: 9.6 G/DL — LOW (ref 13–17)
INR BLD: 1.21 RATIO — HIGH (ref 0.85–1.18)
MAGNESIUM SERPL-MCNC: 2 MG/DL — SIGNIFICANT CHANGE UP (ref 1.6–2.6)
MAGNESIUM SERPL-MCNC: 2.1 MG/DL — SIGNIFICANT CHANGE UP (ref 1.6–2.6)
MCHC RBC-ENTMCNC: 29.1 PG — SIGNIFICANT CHANGE UP (ref 27–34)
MCHC RBC-ENTMCNC: 29.9 PG — SIGNIFICANT CHANGE UP (ref 27–34)
MCHC RBC-ENTMCNC: 33 GM/DL — SIGNIFICANT CHANGE UP (ref 32–36)
MCHC RBC-ENTMCNC: 33.7 GM/DL — SIGNIFICANT CHANGE UP (ref 32–36)
MCV RBC AUTO: 88.3 FL — SIGNIFICANT CHANGE UP (ref 80–100)
MCV RBC AUTO: 88.8 FL — SIGNIFICANT CHANGE UP (ref 80–100)
PLATELET # BLD AUTO: 207 K/UL — SIGNIFICANT CHANGE UP (ref 150–400)
PLATELET # BLD AUTO: 226 K/UL — SIGNIFICANT CHANGE UP (ref 150–400)
POTASSIUM SERPL-MCNC: 4.6 MMOL/L — SIGNIFICANT CHANGE UP (ref 3.5–5.3)
POTASSIUM SERPL-MCNC: 4.7 MMOL/L — SIGNIFICANT CHANGE UP (ref 3.5–5.3)
POTASSIUM SERPL-SCNC: 4.6 MMOL/L — SIGNIFICANT CHANGE UP (ref 3.5–5.3)
POTASSIUM SERPL-SCNC: 4.7 MMOL/L — SIGNIFICANT CHANGE UP (ref 3.5–5.3)
PROTHROM AB SERPL-ACNC: 13.4 SEC — HIGH (ref 9.5–13)
RBC # BLD: 3.09 M/UL — LOW (ref 4.2–5.8)
RBC # BLD: 3.21 M/UL — LOW (ref 4.2–5.8)
RBC # FLD: 13.2 % — SIGNIFICANT CHANGE UP (ref 10.3–14.5)
RBC # FLD: 13.2 % — SIGNIFICANT CHANGE UP (ref 10.3–14.5)
SODIUM SERPL-SCNC: 135 MMOL/L — SIGNIFICANT CHANGE UP (ref 135–145)
SODIUM SERPL-SCNC: 135 MMOL/L — SIGNIFICANT CHANGE UP (ref 135–145)
WBC # BLD: 13.47 K/UL — HIGH (ref 3.8–10.5)
WBC # BLD: 17.55 K/UL — HIGH (ref 3.8–10.5)
WBC # FLD AUTO: 13.47 K/UL — HIGH (ref 3.8–10.5)
WBC # FLD AUTO: 17.55 K/UL — HIGH (ref 3.8–10.5)

## 2023-10-15 PROCEDURE — 99233 SBSQ HOSP IP/OBS HIGH 50: CPT

## 2023-10-15 PROCEDURE — 93010 ELECTROCARDIOGRAM REPORT: CPT

## 2023-10-15 PROCEDURE — 71045 X-RAY EXAM CHEST 1 VIEW: CPT | Mod: 26

## 2023-10-15 PROCEDURE — 99291 CRITICAL CARE FIRST HOUR: CPT | Mod: 57,24

## 2023-10-15 RX ORDER — INSULIN LISPRO 100/ML
12 VIAL (ML) SUBCUTANEOUS
Refills: 0 | Status: DISCONTINUED | OUTPATIENT
Start: 2023-10-15 | End: 2023-10-16

## 2023-10-15 RX ORDER — WARFARIN SODIUM 2.5 MG/1
4 TABLET ORAL ONCE
Refills: 0 | Status: COMPLETED | OUTPATIENT
Start: 2023-10-15 | End: 2023-10-15

## 2023-10-15 RX ORDER — INSULIN GLARGINE 100 [IU]/ML
30 INJECTION, SOLUTION SUBCUTANEOUS AT BEDTIME
Refills: 0 | Status: DISCONTINUED | OUTPATIENT
Start: 2023-10-15 | End: 2023-10-16

## 2023-10-15 RX ORDER — SODIUM CHLORIDE 9 MG/ML
1000 INJECTION INTRAMUSCULAR; INTRAVENOUS; SUBCUTANEOUS
Refills: 0 | Status: DISCONTINUED | OUTPATIENT
Start: 2023-10-15 | End: 2023-10-16

## 2023-10-15 RX ADMIN — Medication 975 MILLIGRAM(S): at 05:40

## 2023-10-15 RX ADMIN — Medication 250 MILLIGRAM(S): at 08:01

## 2023-10-15 RX ADMIN — Medication 10 UNIT(S): at 08:01

## 2023-10-15 RX ADMIN — Medication 975 MILLIGRAM(S): at 11:08

## 2023-10-15 RX ADMIN — Medication 975 MILLIGRAM(S): at 18:19

## 2023-10-15 RX ADMIN — GABAPENTIN 100 MILLIGRAM(S): 400 CAPSULE ORAL at 05:10

## 2023-10-15 RX ADMIN — ATORVASTATIN CALCIUM 80 MILLIGRAM(S): 80 TABLET, FILM COATED ORAL at 21:16

## 2023-10-15 RX ADMIN — OXYCODONE HYDROCHLORIDE 5 MILLIGRAM(S): 5 TABLET ORAL at 01:26

## 2023-10-15 RX ADMIN — SENNA PLUS 2 TABLET(S): 8.6 TABLET ORAL at 21:16

## 2023-10-15 RX ADMIN — INSULIN GLARGINE 30 UNIT(S): 100 INJECTION, SOLUTION SUBCUTANEOUS at 21:15

## 2023-10-15 RX ADMIN — OXYCODONE HYDROCHLORIDE 5 MILLIGRAM(S): 5 TABLET ORAL at 22:16

## 2023-10-15 RX ADMIN — CHLORHEXIDINE GLUCONATE 1 APPLICATION(S): 213 SOLUTION TOPICAL at 18:23

## 2023-10-15 RX ADMIN — Medication 1000 UNIT(S): at 11:09

## 2023-10-15 RX ADMIN — PANTOPRAZOLE SODIUM 40 MILLIGRAM(S): 20 TABLET, DELAYED RELEASE ORAL at 11:09

## 2023-10-15 RX ADMIN — Medication 500 MILLIGRAM(S): at 18:19

## 2023-10-15 RX ADMIN — Medication 975 MILLIGRAM(S): at 12:00

## 2023-10-15 RX ADMIN — OXYCODONE HYDROCHLORIDE 5 MILLIGRAM(S): 5 TABLET ORAL at 21:16

## 2023-10-15 RX ADMIN — GABAPENTIN 100 MILLIGRAM(S): 400 CAPSULE ORAL at 13:03

## 2023-10-15 RX ADMIN — Medication 975 MILLIGRAM(S): at 19:00

## 2023-10-15 RX ADMIN — WARFARIN SODIUM 4 MILLIGRAM(S): 2.5 TABLET ORAL at 05:09

## 2023-10-15 RX ADMIN — OXYCODONE HYDROCHLORIDE 5 MILLIGRAM(S): 5 TABLET ORAL at 00:54

## 2023-10-15 RX ADMIN — POLYETHYLENE GLYCOL 3350 17 GRAM(S): 17 POWDER, FOR SOLUTION ORAL at 11:09

## 2023-10-15 RX ADMIN — ENOXAPARIN SODIUM 40 MILLIGRAM(S): 100 INJECTION SUBCUTANEOUS at 05:09

## 2023-10-15 RX ADMIN — AMIODARONE HYDROCHLORIDE 400 MILLIGRAM(S): 400 TABLET ORAL at 05:10

## 2023-10-15 RX ADMIN — Medication 12 UNIT(S): at 16:40

## 2023-10-15 RX ADMIN — OXYCODONE HYDROCHLORIDE 10 MILLIGRAM(S): 5 TABLET ORAL at 06:40

## 2023-10-15 RX ADMIN — Medication 975 MILLIGRAM(S): at 00:00

## 2023-10-15 RX ADMIN — INSULIN HUMAN 2 UNIT(S)/HR: 100 INJECTION, SOLUTION SUBCUTANEOUS at 18:23

## 2023-10-15 RX ADMIN — Medication 50 MILLIGRAM(S): at 18:19

## 2023-10-15 RX ADMIN — Medication 12 UNIT(S): at 11:30

## 2023-10-15 RX ADMIN — ENOXAPARIN SODIUM 40 MILLIGRAM(S): 100 INJECTION SUBCUTANEOUS at 18:19

## 2023-10-15 RX ADMIN — AMIODARONE HYDROCHLORIDE 400 MILLIGRAM(S): 400 TABLET ORAL at 18:19

## 2023-10-15 RX ADMIN — GABAPENTIN 100 MILLIGRAM(S): 400 CAPSULE ORAL at 21:16

## 2023-10-15 RX ADMIN — CLOPIDOGREL BISULFATE 75 MILLIGRAM(S): 75 TABLET, FILM COATED ORAL at 11:08

## 2023-10-15 RX ADMIN — Medication 50 MILLIGRAM(S): at 05:11

## 2023-10-15 RX ADMIN — Medication 500 MILLIGRAM(S): at 05:10

## 2023-10-15 RX ADMIN — Medication 975 MILLIGRAM(S): at 05:11

## 2023-10-15 RX ADMIN — Medication 145 MILLIGRAM(S): at 11:09

## 2023-10-15 RX ADMIN — CHLORHEXIDINE GLUCONATE 1 APPLICATION(S): 213 SOLUTION TOPICAL at 05:12

## 2023-10-15 RX ADMIN — SODIUM CHLORIDE 75 MILLILITER(S): 9 INJECTION INTRAMUSCULAR; INTRAVENOUS; SUBCUTANEOUS at 17:00

## 2023-10-15 RX ADMIN — Medication 81 MILLIGRAM(S): at 11:08

## 2023-10-15 RX ADMIN — OXYCODONE HYDROCHLORIDE 10 MILLIGRAM(S): 5 TABLET ORAL at 07:15

## 2023-10-15 RX ADMIN — Medication 100 MILLIGRAM(S): at 01:08

## 2023-10-15 RX ADMIN — Medication 40 MILLIGRAM(S): at 05:11

## 2023-10-15 NOTE — PROGRESS NOTE ADULT - PROBLEM SELECTOR PLAN 1
TTE at [facility] revealed aortic stenosis (FIDEL 1, mG 27, pG 50), could not rule out bicuspid valve.  Transferred to [facility] 10/6/23 for surgical evaluation under Dr. Cardozo.  MARIBEL performed 10/9 with findings of moderate AS  s/p mechanical AVR 10/13/23  Continue Beta blocker as tolerated by HR and SBP  Plavix in setting of recent PCI   Aspirin for acute valve prophylaxis  Started coumadin for mechanical valve  Encourage PO intake  Encourage OOB to chair and ambulation with PT  Encourage deep breathing exercised and coughing  Chest PT and IS use with bedside nurse

## 2023-10-15 NOTE — PROGRESS NOTE ADULT - SUBJECTIVE AND OBJECTIVE BOX
Creatinine is up trending. Wife was at bedside.     Vital Signs Last 24 Hrs  T(C): 36.8 (15 Oct 2023 12:00), Max: 37.1 (15 Oct 2023 04:00)  T(F): 98.3 (15 Oct 2023 12:00), Max: 98.8 (15 Oct 2023 04:00)  HR: 80 (15 Oct 2023 14:00) (80 - 109)  BP: 93/62 (15 Oct 2023 14:00) (93/62 - 135/74)  BP(mean): 72 (15 Oct 2023 14:00) (72 - 97)  RR: 17 (15 Oct 2023 14:00) (16 - 24)  SpO2: 94% (15 Oct 2023 14:00) (91% - 100%)    Parameters below as of 15 Oct 2023 14:00  Patient On (Oxygen Delivery Method): room air    I&O's Summary    14 Oct 2023 07:01  -  15 Oct 2023 07:00  --------------------------------------------------------  IN: 2661 mL / OUT: 3145 mL / NET: -484 mL    15 Oct 2023 07:01  -  15 Oct 2023 16:34  --------------------------------------------------------  IN: 914.5 mL / OUT: 690 mL / NET: 224.5 mL    Physical Exam  General: Pleasant obese male in NAD  HEENT: Normocephalic  Cardiac: S1S2 RRR  Respiratory: CTAB  Abdomen: Obese, soft  Extremities: No appreciable edema  Neuro: AAOx3  Psych: Calm     10-15    135  |  100  |  36.1<H>  ----------------------------<  132<H>  4.6   |  21.0<L>  |  2.29<H>    Ca    8.2<L>      15 Oct 2023 02:00  Mg     2.0     10-15    TPro  5.8<L>  /  Alb  4.0  /  TBili  0.3<L>  /  DBili  x   /  AST  49<H>  /  ALT  49<H>  /  AlkPhos  29<L>  10-14                        9.6    17.55 )-----------( 226      ( 15 Oct 2023 02:00 )             28.5     MEDICATIONS  (STANDING):  acetaminophen     Tablet .. 975 milliGRAM(s) Oral every 6 hours  aMIOdarone    Tablet 400 milliGRAM(s) Oral two times a day  ascorbic acid 500 milliGRAM(s) Oral two times a day  aspirin enteric coated 81 milliGRAM(s) Oral daily  atorvastatin 80 milliGRAM(s) Oral at bedtime  bisacodyl Suppository 10 milliGRAM(s) Rectal once  chlorhexidine 2% Cloths 1 Application(s) Topical two times a day  cholecalciferol 1000 Unit(s) Oral daily  clopidogrel Tablet 75 milliGRAM(s) Oral daily  dextrose 5%. 1000 milliLiter(s) (50 mL/Hr) IV Continuous <Continuous>  dextrose 5%. 1000 milliLiter(s) (100 mL/Hr) IV Continuous <Continuous>  dextrose 5%. 1000 milliLiter(s) (100 mL/Hr) IV Continuous <Continuous>  dextrose 50% Injectable 25 milliLiter(s) IV Push every 15 minutes  dextrose 50% Injectable 50 milliLiter(s) IV Push every 15 minutes  dextrose 50% Injectable 25 Gram(s) IV Push once  dextrose 50% Injectable 12.5 Gram(s) IV Push once  enoxaparin Injectable 40 milliGRAM(s) SubCutaneous every 12 hours  fenofibrate Tablet 145 milliGRAM(s) Oral daily  furosemide    Tablet 40 milliGRAM(s) Oral daily  gabapentin 100 milliGRAM(s) Oral every 8 hours  glucagon  Injectable 1 milliGRAM(s) IntraMuscular once  insulin glargine Injectable (LANTUS) 30 Unit(s) SubCutaneous at bedtime  insulin lispro Injectable (ADMELOG) 12 Unit(s) SubCutaneous three times a day before meals  insulin regular Infusion 2 Unit(s)/Hr (2 mL/Hr) IV Continuous <Continuous>  metoprolol tartrate 50 milliGRAM(s) Oral two times a day  pantoprazole    Tablet 40 milliGRAM(s) Oral daily  polyethylene glycol 3350 17 Gram(s) Oral daily  senna 2 Tablet(s) Oral at bedtime  sodium chloride 0.9%. 1000 milliLiter(s) (10 mL/Hr) IV Continuous <Continuous>  sodium chloride 0.9%. 1000 milliLiter(s) (5 mL/Hr) IV Continuous <Continuous>    MEDICATIONS  (PRN):  oxyCODONE    IR 10 milliGRAM(s) Oral every 4 hours PRN Severe Pain (7 - 10)  oxyCODONE    IR 5 milliGRAM(s) Oral every 4 hours PRN Moderate Pain (4 - 6)   Creatinine is up trending. States that he has participated in physical therapy today. Wife was at bedside.     Vital Signs Last 24 Hrs  T(C): 36.8 (15 Oct 2023 12:00), Max: 37.1 (15 Oct 2023 04:00)  T(F): 98.3 (15 Oct 2023 12:00), Max: 98.8 (15 Oct 2023 04:00)  HR: 80 (15 Oct 2023 14:00) (80 - 109)  BP: 93/62 (15 Oct 2023 14:00) (93/62 - 135/74)  BP(mean): 72 (15 Oct 2023 14:00) (72 - 97)  RR: 17 (15 Oct 2023 14:00) (16 - 24)  SpO2: 94% (15 Oct 2023 14:00) (91% - 100%)    Parameters below as of 15 Oct 2023 14:00  Patient On (Oxygen Delivery Method): room air    I&O's Summary    14 Oct 2023 07:01  -  15 Oct 2023 07:00  --------------------------------------------------------  IN: 2661 mL / OUT: 3145 mL / NET: -484 mL    15 Oct 2023 07:01  -  15 Oct 2023 16:34  --------------------------------------------------------  IN: 914.5 mL / OUT: 690 mL / NET: 224.5 mL    Physical Exam  General: Pleasant obese male in NAD  HEENT: Normocephalic  Cardiac: S1S2 RRR  Respiratory: CTAB  Abdomen: Obese, soft  Extremities: No appreciable edema  Neuro: AAOx3  Psych: Calm     10-15    135  |  100  |  36.1<H>  ----------------------------<  132<H>  4.6   |  21.0<L>  |  2.29<H>    Ca    8.2<L>      15 Oct 2023 02:00  Mg     2.0     10-15    TPro  5.8<L>  /  Alb  4.0  /  TBili  0.3<L>  /  DBili  x   /  AST  49<H>  /  ALT  49<H>  /  AlkPhos  29<L>  10-14                        9.6    17.55 )-----------( 226      ( 15 Oct 2023 02:00 )             28.5     MEDICATIONS  (STANDING):  acetaminophen     Tablet .. 975 milliGRAM(s) Oral every 6 hours  aMIOdarone    Tablet 400 milliGRAM(s) Oral two times a day  ascorbic acid 500 milliGRAM(s) Oral two times a day  aspirin enteric coated 81 milliGRAM(s) Oral daily  atorvastatin 80 milliGRAM(s) Oral at bedtime  bisacodyl Suppository 10 milliGRAM(s) Rectal once  chlorhexidine 2% Cloths 1 Application(s) Topical two times a day  cholecalciferol 1000 Unit(s) Oral daily  clopidogrel Tablet 75 milliGRAM(s) Oral daily  dextrose 5%. 1000 milliLiter(s) (50 mL/Hr) IV Continuous <Continuous>  dextrose 5%. 1000 milliLiter(s) (100 mL/Hr) IV Continuous <Continuous>  dextrose 5%. 1000 milliLiter(s) (100 mL/Hr) IV Continuous <Continuous>  dextrose 50% Injectable 25 milliLiter(s) IV Push every 15 minutes  dextrose 50% Injectable 50 milliLiter(s) IV Push every 15 minutes  dextrose 50% Injectable 25 Gram(s) IV Push once  dextrose 50% Injectable 12.5 Gram(s) IV Push once  enoxaparin Injectable 40 milliGRAM(s) SubCutaneous every 12 hours  fenofibrate Tablet 145 milliGRAM(s) Oral daily  furosemide    Tablet 40 milliGRAM(s) Oral daily  gabapentin 100 milliGRAM(s) Oral every 8 hours  glucagon  Injectable 1 milliGRAM(s) IntraMuscular once  insulin glargine Injectable (LANTUS) 30 Unit(s) SubCutaneous at bedtime  insulin lispro Injectable (ADMELOG) 12 Unit(s) SubCutaneous three times a day before meals  insulin regular Infusion 2 Unit(s)/Hr (2 mL/Hr) IV Continuous <Continuous>  metoprolol tartrate 50 milliGRAM(s) Oral two times a day  pantoprazole    Tablet 40 milliGRAM(s) Oral daily  polyethylene glycol 3350 17 Gram(s) Oral daily  senna 2 Tablet(s) Oral at bedtime  sodium chloride 0.9%. 1000 milliLiter(s) (10 mL/Hr) IV Continuous <Continuous>  sodium chloride 0.9%. 1000 milliLiter(s) (5 mL/Hr) IV Continuous <Continuous>    MEDICATIONS  (PRN):  oxyCODONE    IR 10 milliGRAM(s) Oral every 4 hours PRN Severe Pain (7 - 10)  oxyCODONE    IR 5 milliGRAM(s) Oral every 4 hours PRN Moderate Pain (4 - 6)

## 2023-10-15 NOTE — PROGRESS NOTE ADULT - ASSESSMENT
58 year old male with a medical history of excessive BMI, DM, HTN, HLD, CAD s/p 8 stents and CKD 3a with recent admission at [facility] 9/29/23-9/30/23 for chest pain s/p PCI of ISR of LADx2 and PTCA of diag on 9/29/23, then readmitted to [facility] 10/03/23 after presenting with recurrent chest pain and increased CANNON. TTE revealed aortic stenosis. Re-cathed on 10/06/23 which confirmed patent stents. Patient then transferred to [facility] 10/06/23 for surgical evaluation. Underwent mechanical aortic valve replacement on 10/13/23. Nephrology is following for VIKKI on CKD 3a.     -VIKKI is likely hemodynamically mediated   -Baseline creatinine is ~1.3-1.5mg/dL (as reported by patient) with  protein, negative blood; UPCR 0.6mg/g while at baseline  -Creatinine today is 1.9mg/dL  -Will recheck UA; of note urine sodium < 30   -Recommend trial of isotonic IV fluids given low urine sodium - will order NS x 75cc/hr x 24 hours   -Systolic BP intermittently soft in the 90's mmHg   -Anemia - hemoglobin is at goal  -Mineral Bone Disease - will start Cholecalciferol 1000U daily    -Outpatient nephrologist is Dr Jovany Guerin,   Nephrology          58 year old male with a medical history of excessive BMI, DM, HTN, HLD, CAD s/p 8 stents and CKD 3a with recent admission at [facility] 9/29/23-9/30/23 for chest pain s/p PCI of ISR of LADx2 and PTCA of diag on 9/29/23, then readmitted to [facility] 10/03/23 after presenting with recurrent chest pain and increased CANNON. TTE revealed aortic stenosis. Re-cathed on 10/06/23 which confirmed patent stents. Patient then transferred to [facility] 10/06/23 for surgical evaluation. Underwent mechanical aortic valve replacement on 10/13/23. Nephrology is following for VIKKI on CKD 3a.     -VIKKI is likely hemodynamically mediated   -Baseline creatinine is ~1.3-1.5mg/dL (as reported by patient) with  protein, negative blood; UPCR 0.6mg/g while at baseline  -Creatinine today is 1.9mg/dL  -Will recheck UA; of note urine sodium < 30   -Recommend trial of isotonic IV fluids given low urine sodium - will order NS x 75cc/hr x 24 hours   -Systolic BP intermittently soft in the 90's mmHg   -Anemia - hemoglobin is at goal  -Mineral Bone Disease - continue Cholecalciferol 1000U daily    -Outpatient nephrologist is Dr Jovany Guerin,   Nephrology          58 year old male with a medical history of excessive BMI, DM, HTN, HLD, CAD s/p 8 stents and CKD 3a with recent admission at [facility] 9/29/23-9/30/23 for chest pain s/p PCI of ISR of LADx2 and PTCA of diag on 9/29/23, then readmitted to [facility] 10/03/23 after presenting with recurrent chest pain and increased CANNON. TTE revealed aortic stenosis. Re-cathed on 10/06/23 which confirmed patent stents. Patient then transferred to [facility] 10/06/23 for surgical evaluation. Underwent mechanical aortic valve replacement on 10/13/23. Nephrology is following for VIKKI on CKD 3a.     -VIKKI is likely hemodynamically mediated   -Baseline creatinine is ~1.3-1.5mg/dL (as reported by patient) with  protein, negative blood; UPCR 0.6mg/g while at baseline  -Creatinine today is 2.29mg/dL  -Will recheck UA; of note urine sodium < 30   -Recommend trial of isotonic IV fluids given low urine sodium - will order NS x 75cc/hr x 24 hours   -Systolic BP intermittently soft in the 90's mmHg   -Anemia - hemoglobin is at goal  -Mineral Bone Disease - continue Cholecalciferol 1000U daily    -Outpatient nephrologist is Dr Jovany Guerin,   Nephrology

## 2023-10-15 NOTE — PROGRESS NOTE ADULT - ASSESSMENT
58 M with PMHx HTN, HLD, T2DM, CKD 3, obesity, CAD s/p 8 stents, recent admission to [facility] 9/29-9/30 for chest pain s/p PCI of ISR of LADx2 and PTCA of diag on 9/29/23, since readmitted to [facility] 10/3 after presenting with recurrent chest pain and increased CANNON since recent PCI. Transferred to [facility] 10/6/23 for surgical evaluation under Dr. Cardozo. MARIBEL revealed moderate AS. Underwent AVR on 10/13/23    Consulted for diabetes management  Home diabetes meds: Toujeo 140 units daily, mealtime insulin 55 units (He developed pancreatitis following use of trulicity, and was unable to tolerate a sglt-2)  Current a1c: 9.6%, Outpatient Endocrinologist: Dr Ibarra  Required less insulin than home regimen prior to surgery (Lantus 60U daily and Admelog 45U TID)    1. Uncontrolled DM2 with post op hyperglycemia  Still requires IV insulin gtt.  Would recommend staring Lantus 30U tonight at HS to decrease requirements for IV insulin  Monitor POC glucose and taper down the insulin gtt.   If IV insulin is discontinued in the morning, he might require another dose of Lantus 30U tomorrow at 10 AM and continue BID which would be his pre op regimen of basal insulin.  Continue current dose of Admelog or consider increasing by 2-4 units.     2. CAD/Aortic stenosis  - S/p AVR on 10/13  Needs tight glycemic control to promote healing.    3. HLD  - Continue statin   Topical Clindamycin Counseling: Patient counseled that this medication may cause skin irritation or allergic reactions.  In the event of skin irritation, the patient was advised to reduce the amount of the drug applied or use it less frequently.   The patient verbalized understanding of the proper use and possible adverse effects of clindamycin.  All of the patient's questions and concerns were addressed.

## 2023-10-15 NOTE — PROGRESS NOTE ADULT - SUBJECTIVE AND OBJECTIVE BOX
Interval Events:  no overnight events  follow up on DM management.     patient seen and examined at bedside. NAD comfortable, able to ambulate in the hallway. Tolerates diet well. BG still requires IV inulin gtt.     MEDICATIONS  (STANDING):  acetaminophen     Tablet .. 975 milliGRAM(s) Oral every 6 hours  aMIOdarone    Tablet 400 milliGRAM(s) Oral two times a day  ascorbic acid 500 milliGRAM(s) Oral two times a day  aspirin enteric coated 81 milliGRAM(s) Oral daily  atorvastatin 80 milliGRAM(s) Oral at bedtime  bisacodyl Suppository 10 milliGRAM(s) Rectal once  chlorhexidine 2% Cloths 1 Application(s) Topical two times a day  cholecalciferol 1000 Unit(s) Oral daily  clopidogrel Tablet 75 milliGRAM(s) Oral daily  dextrose 5%. 1000 milliLiter(s) (50 mL/Hr) IV Continuous <Continuous>  dextrose 5%. 1000 milliLiter(s) (100 mL/Hr) IV Continuous <Continuous>  dextrose 5%. 1000 milliLiter(s) (100 mL/Hr) IV Continuous <Continuous>  dextrose 50% Injectable 25 milliLiter(s) IV Push every 15 minutes  dextrose 50% Injectable 25 Gram(s) IV Push once  dextrose 50% Injectable 12.5 Gram(s) IV Push once  dextrose 50% Injectable 50 milliLiter(s) IV Push every 15 minutes  enoxaparin Injectable 40 milliGRAM(s) SubCutaneous every 12 hours  fenofibrate Tablet 145 milliGRAM(s) Oral daily  furosemide    Tablet 40 milliGRAM(s) Oral daily  gabapentin 100 milliGRAM(s) Oral every 8 hours  glucagon  Injectable 1 milliGRAM(s) IntraMuscular once  insulin glargine Injectable (LANTUS) 30 Unit(s) SubCutaneous at bedtime  insulin lispro Injectable (ADMELOG) 12 Unit(s) SubCutaneous three times a day before meals  insulin regular Infusion 2 Unit(s)/Hr (2 mL/Hr) IV Continuous <Continuous>  metoprolol tartrate 50 milliGRAM(s) Oral two times a day  pantoprazole    Tablet 40 milliGRAM(s) Oral daily  polyethylene glycol 3350 17 Gram(s) Oral daily  senna 2 Tablet(s) Oral at bedtime  sodium chloride 0.9%. 1000 milliLiter(s) (10 mL/Hr) IV Continuous <Continuous>  sodium chloride 0.9%. 1000 milliLiter(s) (5 mL/Hr) IV Continuous <Continuous>    MEDICATIONS  (PRN):  oxyCODONE    IR 10 milliGRAM(s) Oral every 4 hours PRN Severe Pain (7 - 10)  oxyCODONE    IR 5 milliGRAM(s) Oral every 4 hours PRN Moderate Pain (4 - 6)      Vital Signs Last 24 Hrs  T(C): 36.8 (15 Oct 2023 12:00), Max: 37.1 (15 Oct 2023 04:00)  T(F): 98.3 (15 Oct 2023 12:00), Max: 98.8 (15 Oct 2023 04:00)  HR: 80 (15 Oct 2023 14:00) (77 - 109)  BP: 93/62 (15 Oct 2023 14:00) (93/62 - 135/74)  BP(mean): 72 (15 Oct 2023 14:00) (72 - 97)  RR: 17 (15 Oct 2023 14:00) (16 - 24)  SpO2: 94% (15 Oct 2023 14:00) (91% - 100%)    Parameters below as of 15 Oct 2023 14:00  Patient On (Oxygen Delivery Method): room air      Weight (kg): 118.8 (10-13-23 @ 06:29)    Physical Exam:  Constitutional: NAD, comfortable  HEENT: EOMI, no exophalmos  Neck: trachea midline, no thyroid enlargement  Respiratory: CTAB, normal respirations  Cardiovascular: S1 and S2, RRR, chest tube in place.  Gastrointestinal: BS+, soft, ntnd  Extremities: No peripheral edema  Neurological: AOx3, no focal deficits  Psychiatric: Normal mood and normal affect  Skin: dry and warm.     LABS  10-15    135  |  100  |  36.1<H>  ----------------------------<  132<H>  4.6   |  21.0<L>  |  2.29<H>    Ca    8.2<L>      15 Oct 2023 02:00  Mg     2.0     10-15    TPro  5.8<L>  /  Alb  4.0  /  TBili  0.3<L>  /  DBili  x   /  AST  49<H>  /  ALT  49<H>  /  AlkPhos  29<L>  10-14                          9.6    17.55 )-----------( 226      ( 15 Oct 2023 02:00 )             28.5       Thyroid Stimulating Hormone, Serum: 4.61 uIU/mL (10-07-23 @ 02:30)  Free Thyroxine, Serum: 1.1 ng/dL (10-07-23 @ 02:30)  A1C with Estimated Average Glucose Result: 9.6 % (10-07-23 @ 02:30)  Thyroid Stimulating Hormone, Serum: 2.97 uU/mL (10-05-23 @ 06:31)  A1C with Estimated Average Glucose Result: 9.8 % (09-30-23 @ 06:38)    Aspartate Aminotransferase (AST/SGOT): 49 U/L (10-14-23 @ 02:00)  Alanine Aminotransferase (ALT/SGPT): 49 U/L (10-14-23 @ 02:00)  Alkaline Phosphatase: 29 U/L (10-14-23 @ 02:00)  Albumin: 4.0 g/dL (10-14-23 @ 02:00)    CAPILLARY BLOOD GLUCOSE    POCT Blood Glucose.: 188 mg/dL (15 Oct 2023 14:11)  POCT Blood Glucose.: 215 mg/dL (15 Oct 2023 13:05)  POCT Blood Glucose.: 201 mg/dL (15 Oct 2023 12:12)  POCT Blood Glucose.: 202 mg/dL (15 Oct 2023 11:07)

## 2023-10-15 NOTE — PROGRESS NOTE ADULT - SUBJECTIVE AND OBJECTIVE BOX
Patient seen and examined.  Denies CP, SOB, N/V. NO acute events overnight    T(C): 36.7 (10-15-23 @ 00:00)  T(F): 98 (10-15-23 @ 00:00)  HR: 99 (10-15-23 @ 03:00)  BP: 102/75 (10-15-23 @ 03:00)  BP(mean): 84 (10-15-23 @ 03:00)  ABP: 98/69 (10-15-23 @ 03:00)  ABP(mean): 79 (10-15-23 @ 03:00)  RR: 18 (10-15-23 @ 03:00)  SpO2: 95% (10-15-23 @ 03:00)  Wt(kg): --  CVP(mm Hg): 16 (10-15-23 @ 03:00)        Physical Exam:  Gen: A&Ox3  Pulm:  CTA b/l, no r/r/w  CV:  S1S2, RRR, Mechanical valve click  Abd: +BS, soft, NT, ND  Ext:  +DP b/l, B/L lower extremity edema   Incision:  c/d/i no click, no exudate    I&O's Detail    13 Oct 2023 07:01  -  14 Oct 2023 07:00  --------------------------------------------------------  IN:    Albumin 5%  - 250 mL: 500 mL    Insulin: 10 mL    Insulin: 70.5 mL    IV PiggyBack: 100 mL    IV PiggyBack: 100 mL    IV PiggyBack: 50 mL    Norepinephrine: 44.6 mL    Oral Fluid: 650 mL    Propofol: 81.9 mL    sodium chloride 0.9%: 10 mL    sodium chloride 0.9%: 20 mL    sodium chloride 0.9%: 105 mL    sodium chloride 0.9%: 210 mL  Total IN: 1952 mL    OUT:    Chest Tube (mL): 430 mL    Indwelling Catheter - Urethral (mL): 1865 mL    Nasogastric/Oral tube (mL): 100 mL  Total OUT: 2395 mL    Total NET: -443 mL      14 Oct 2023 07:01  -  15 Oct 2023 03:36  --------------------------------------------------------  IN:    Insulin: 81 mL    IV PiggyBack: 100 mL    IV PiggyBack: 500 mL    Oral Fluid: 1530 mL    sodium chloride 0.9%: 200 mL    sodium chloride 0.9%: 100 mL  Total IN: 2511 mL    OUT:    Chest Tube (mL): 250 mL    Indwelling Catheter - Urethral (mL): 2425 mL    Norepinephrine: 0 mL    Propofol: 0 mL  Total OUT: 2675 mL    Total NET: -164 mL                              9.6    17.55 )-----------( 226      ( 15 Oct 2023 02:00 )             28.5   10-15    135  |  100  |  36.1<H>  ----------------------------<  132<H>  4.6   |  21.0<L>  |  2.29<H>    Ca    8.2<L>      15 Oct 2023 02:00  Mg     2.0     10-15    TPro  5.8<L>  /  Alb  4.0  /  TBili  0.3<L>  /  DBili  x   /  AST  49<H>  /  ALT  49<H>  /  AlkPhos  29<L>  10-14  aPTT: 35.8 sec; PT: 13.4 sec; INR: 1.21 ratio  10-15-23 @ 02:00       ABG - ( 13 Oct 2023 15:55 )  pH: 7.370 /  pCO2: 42    /  pO2: 212   / HCO3: 24    / Base Excess: -1.0  /  SaO2: 99.6 /  Lactate: x        CAPILLARY BLOOD GLUCOSE      POCT Blood Glucose.: 124 mg/dL (15 Oct 2023 01:23)        Medications:  acetaminophen     Tablet .. 975 milliGRAM(s) Oral every 6 hours  aMIOdarone    Tablet 400 milliGRAM(s) Oral two times a day  ascorbic acid 500 milliGRAM(s) Oral two times a day  aspirin enteric coated 81 milliGRAM(s) Oral daily  atorvastatin 80 milliGRAM(s) Oral at bedtime  bisacodyl Suppository 10 milliGRAM(s) Rectal once  cefuroxime  IVPB 1500 milliGRAM(s) IV Intermittent every 8 hours  chlorhexidine 2% Cloths 1 Application(s) Topical two times a day  cholecalciferol 1000 Unit(s) Oral daily  clopidogrel Tablet 75 milliGRAM(s) Oral daily  dextrose 5%. 1000 milliLiter(s) IV Continuous <Continuous>  dextrose 5%. 1000 milliLiter(s) IV Continuous <Continuous>  dextrose 5%. 1000 milliLiter(s) IV Continuous <Continuous>  dextrose 50% Injectable 50 milliLiter(s) IV Push every 15 minutes  dextrose 50% Injectable 25 milliLiter(s) IV Push every 15 minutes  dextrose 50% Injectable 25 Gram(s) IV Push once  dextrose 50% Injectable 12.5 Gram(s) IV Push once  dextrose 50% Injectable 25 Gram(s) IV Push once  dextrose Oral Gel 15 Gram(s) Oral once PRN  enoxaparin Injectable 40 milliGRAM(s) SubCutaneous every 12 hours  fenofibrate Tablet 145 milliGRAM(s) Oral daily  furosemide    Tablet 40 milliGRAM(s) Oral daily  gabapentin 100 milliGRAM(s) Oral every 8 hours  glucagon  Injectable 1 milliGRAM(s) IntraMuscular once  HYDROmorphone  Injectable 0.5 milliGRAM(s) IV Push every 6 hours PRN  insulin lispro Injectable (ADMELOG) 10 Unit(s) SubCutaneous three times a day before meals  insulin regular Infusion 2 Unit(s)/Hr IV Continuous <Continuous>  metoprolol tartrate 50 milliGRAM(s) Oral two times a day  oxyCODONE    IR 5 milliGRAM(s) Oral every 4 hours PRN  oxyCODONE    IR 10 milliGRAM(s) Oral every 4 hours PRN  pantoprazole    Tablet 40 milliGRAM(s) Oral daily  polyethylene glycol 3350 17 Gram(s) Oral daily  senna 2 Tablet(s) Oral at bedtime  sodium chloride 0.9%. 1000 milliLiter(s) IV Continuous <Continuous>  sodium chloride 0.9%. 1000 milliLiter(s) IV Continuous <Continuous>  vancomycin  IVPB 1000 milliGRAM(s) IV Intermittent <User Schedule>

## 2023-10-15 NOTE — PROGRESS NOTE ADULT - ASSESSMENT
58 year old male with a medical history of HTN, HLD, type 2 DM (HA1c 9.6 on Insulin), CKD 3, Obesity Class II, CAD s/p 8 stents, recent admission to [facility] 9/29-9/30 for  chest pain s/p PCI of ISR of LAD x 2 and PTCA of diag on 9/29/23, since readmitted to [facility] 10/3 after presenting with recurrent chest pain and increased CANNON since recent PCI. TTE revealed aortic stenosis (FIDEL 1, mG 27, pG 50), could not rule out bicuspid valve. Patient was re-cathed 10/6 to confirm patent stents. Patient now transferred to [facility] 10/6/23 for surgical evaluation under Dr. Cardozo. Underwent Mechanical AVR and sternal plating on 10/13.  POst op course uneventful thusfar

## 2023-10-16 LAB
ANION GAP SERPL CALC-SCNC: 13 MMOL/L — SIGNIFICANT CHANGE UP (ref 5–17)
ANION GAP SERPL CALC-SCNC: 14 MMOL/L — SIGNIFICANT CHANGE UP (ref 5–17)
ANION GAP SERPL CALC-SCNC: 14 MMOL/L — SIGNIFICANT CHANGE UP (ref 5–17)
APTT BLD: 37.7 SEC — HIGH (ref 24.5–35.6)
BUN SERPL-MCNC: 47.9 MG/DL — HIGH (ref 8–20)
BUN SERPL-MCNC: 48.8 MG/DL — HIGH (ref 8–20)
BUN SERPL-MCNC: 48.8 MG/DL — HIGH (ref 8–20)
CALCIUM SERPL-MCNC: 7.9 MG/DL — LOW (ref 8.4–10.5)
CHLORIDE SERPL-SCNC: 101 MMOL/L — SIGNIFICANT CHANGE UP (ref 96–108)
CHLORIDE SERPL-SCNC: 99 MMOL/L — SIGNIFICANT CHANGE UP (ref 96–108)
CHLORIDE SERPL-SCNC: 99 MMOL/L — SIGNIFICANT CHANGE UP (ref 96–108)
CO2 SERPL-SCNC: 20 MMOL/L — LOW (ref 22–29)
CO2 SERPL-SCNC: 20 MMOL/L — LOW (ref 22–29)
CO2 SERPL-SCNC: 22 MMOL/L — SIGNIFICANT CHANGE UP (ref 22–29)
CREAT SERPL-MCNC: 2.09 MG/DL — HIGH (ref 0.5–1.3)
CREAT SERPL-MCNC: 2.09 MG/DL — HIGH (ref 0.5–1.3)
CREAT SERPL-MCNC: 2.47 MG/DL — HIGH (ref 0.5–1.3)
EGFR: 30 ML/MIN/1.73M2 — LOW
EGFR: 36 ML/MIN/1.73M2 — LOW
EGFR: 36 ML/MIN/1.73M2 — LOW
GLUCOSE BLDC GLUCOMTR-MCNC: 114 MG/DL — HIGH (ref 70–99)
GLUCOSE BLDC GLUCOMTR-MCNC: 121 MG/DL — HIGH (ref 70–99)
GLUCOSE BLDC GLUCOMTR-MCNC: 128 MG/DL — HIGH (ref 70–99)
GLUCOSE BLDC GLUCOMTR-MCNC: 138 MG/DL — HIGH (ref 70–99)
GLUCOSE BLDC GLUCOMTR-MCNC: 166 MG/DL — HIGH (ref 70–99)
GLUCOSE BLDC GLUCOMTR-MCNC: 186 MG/DL — HIGH (ref 70–99)
GLUCOSE BLDC GLUCOMTR-MCNC: 223 MG/DL — HIGH (ref 70–99)
GLUCOSE BLDC GLUCOMTR-MCNC: 285 MG/DL — HIGH (ref 70–99)
GLUCOSE BLDC GLUCOMTR-MCNC: 285 MG/DL — HIGH (ref 70–99)
GLUCOSE BLDC GLUCOMTR-MCNC: 321 MG/DL — HIGH (ref 70–99)
GLUCOSE BLDC GLUCOMTR-MCNC: 321 MG/DL — HIGH (ref 70–99)
GLUCOSE BLDC GLUCOMTR-MCNC: 358 MG/DL — HIGH (ref 70–99)
GLUCOSE BLDC GLUCOMTR-MCNC: 358 MG/DL — HIGH (ref 70–99)
GLUCOSE BLDC GLUCOMTR-MCNC: 99 MG/DL — SIGNIFICANT CHANGE UP (ref 70–99)
GLUCOSE SERPL-MCNC: 331 MG/DL — HIGH (ref 70–99)
GLUCOSE SERPL-MCNC: 331 MG/DL — HIGH (ref 70–99)
GLUCOSE SERPL-MCNC: 96 MG/DL — SIGNIFICANT CHANGE UP (ref 70–99)
HCT VFR BLD CALC: 26.7 % — LOW (ref 39–50)
HGB BLD-MCNC: 8.9 G/DL — LOW (ref 13–17)
INR BLD: 1.37 RATIO — HIGH (ref 0.85–1.18)
MAGNESIUM SERPL-MCNC: 2.2 MG/DL — SIGNIFICANT CHANGE UP (ref 1.8–2.6)
MAGNESIUM SERPL-MCNC: 2.2 MG/DL — SIGNIFICANT CHANGE UP (ref 1.8–2.6)
MAGNESIUM SERPL-MCNC: 2.3 MG/DL — SIGNIFICANT CHANGE UP (ref 1.6–2.6)
MCHC RBC-ENTMCNC: 29.6 PG — SIGNIFICANT CHANGE UP (ref 27–34)
MCHC RBC-ENTMCNC: 33.3 GM/DL — SIGNIFICANT CHANGE UP (ref 32–36)
MCV RBC AUTO: 88.7 FL — SIGNIFICANT CHANGE UP (ref 80–100)
PLATELET # BLD AUTO: 213 K/UL — SIGNIFICANT CHANGE UP (ref 150–400)
POTASSIUM SERPL-MCNC: 4.1 MMOL/L — SIGNIFICANT CHANGE UP (ref 3.5–5.3)
POTASSIUM SERPL-MCNC: 4.8 MMOL/L — SIGNIFICANT CHANGE UP (ref 3.5–5.3)
POTASSIUM SERPL-MCNC: 4.8 MMOL/L — SIGNIFICANT CHANGE UP (ref 3.5–5.3)
POTASSIUM SERPL-SCNC: 4.1 MMOL/L — SIGNIFICANT CHANGE UP (ref 3.5–5.3)
POTASSIUM SERPL-SCNC: 4.8 MMOL/L — SIGNIFICANT CHANGE UP (ref 3.5–5.3)
POTASSIUM SERPL-SCNC: 4.8 MMOL/L — SIGNIFICANT CHANGE UP (ref 3.5–5.3)
PROTHROM AB SERPL-ACNC: 15.1 SEC — HIGH (ref 9.5–13)
RBC # BLD: 3.01 M/UL — LOW (ref 4.2–5.8)
RBC # FLD: 13.3 % — SIGNIFICANT CHANGE UP (ref 10.3–14.5)
SODIUM SERPL-SCNC: 132 MMOL/L — LOW (ref 135–145)
SODIUM SERPL-SCNC: 132 MMOL/L — LOW (ref 135–145)
SODIUM SERPL-SCNC: 136 MMOL/L — SIGNIFICANT CHANGE UP (ref 135–145)
WBC # BLD: 10.67 K/UL — HIGH (ref 3.8–10.5)
WBC # FLD AUTO: 10.67 K/UL — HIGH (ref 3.8–10.5)

## 2023-10-16 PROCEDURE — 99233 SBSQ HOSP IP/OBS HIGH 50: CPT

## 2023-10-16 PROCEDURE — 71045 X-RAY EXAM CHEST 1 VIEW: CPT | Mod: 26

## 2023-10-16 PROCEDURE — 99291 CRITICAL CARE FIRST HOUR: CPT | Mod: 24

## 2023-10-16 RX ORDER — FUROSEMIDE 40 MG
40 TABLET ORAL DAILY
Refills: 0 | Status: DISCONTINUED | OUTPATIENT
Start: 2023-10-16 | End: 2023-10-23

## 2023-10-16 RX ORDER — MAGNESIUM SULFATE 500 MG/ML
1 VIAL (ML) INJECTION ONCE
Refills: 0 | Status: COMPLETED | OUTPATIENT
Start: 2023-10-16 | End: 2023-10-16

## 2023-10-16 RX ORDER — DEXTROSE 50 % IN WATER 50 %
15 SYRINGE (ML) INTRAVENOUS ONCE
Refills: 0 | Status: DISCONTINUED | OUTPATIENT
Start: 2023-10-16 | End: 2023-10-23

## 2023-10-16 RX ORDER — WARFARIN SODIUM 2.5 MG/1
4 TABLET ORAL ONCE
Refills: 0 | Status: COMPLETED | OUTPATIENT
Start: 2023-10-16 | End: 2023-10-16

## 2023-10-16 RX ORDER — INSULIN LISPRO 100/ML
16 VIAL (ML) SUBCUTANEOUS
Refills: 0 | Status: DISCONTINUED | OUTPATIENT
Start: 2023-10-16 | End: 2023-10-19

## 2023-10-16 RX ORDER — METOPROLOL TARTRATE 50 MG
5 TABLET ORAL ONCE
Refills: 0 | Status: COMPLETED | OUTPATIENT
Start: 2023-10-16 | End: 2023-10-16

## 2023-10-16 RX ORDER — INSULIN GLARGINE 100 [IU]/ML
40 INJECTION, SOLUTION SUBCUTANEOUS
Refills: 0 | Status: DISCONTINUED | OUTPATIENT
Start: 2023-10-16 | End: 2023-10-17

## 2023-10-16 RX ORDER — INSULIN LISPRO 100/ML
VIAL (ML) SUBCUTANEOUS
Refills: 0 | Status: DISCONTINUED | OUTPATIENT
Start: 2023-10-16 | End: 2023-10-23

## 2023-10-16 RX ORDER — AMIODARONE HYDROCHLORIDE 400 MG/1
150 TABLET ORAL ONCE
Refills: 0 | Status: COMPLETED | OUTPATIENT
Start: 2023-10-16 | End: 2023-10-16

## 2023-10-16 RX ORDER — INSULIN GLARGINE 100 [IU]/ML
30 INJECTION, SOLUTION SUBCUTANEOUS
Refills: 0 | Status: DISCONTINUED | OUTPATIENT
Start: 2023-10-16 | End: 2023-10-16

## 2023-10-16 RX ADMIN — Medication 16 UNIT(S): at 16:43

## 2023-10-16 RX ADMIN — INSULIN GLARGINE 30 UNIT(S): 100 INJECTION, SOLUTION SUBCUTANEOUS at 10:10

## 2023-10-16 RX ADMIN — Medication 975 MILLIGRAM(S): at 05:24

## 2023-10-16 RX ADMIN — OXYCODONE HYDROCHLORIDE 5 MILLIGRAM(S): 5 TABLET ORAL at 22:04

## 2023-10-16 RX ADMIN — POLYETHYLENE GLYCOL 3350 17 GRAM(S): 17 POWDER, FOR SOLUTION ORAL at 11:49

## 2023-10-16 RX ADMIN — Medication 10: at 16:42

## 2023-10-16 RX ADMIN — Medication 8: at 22:01

## 2023-10-16 RX ADMIN — Medication 12 UNIT(S): at 11:45

## 2023-10-16 RX ADMIN — Medication 5 MILLIGRAM(S): at 00:05

## 2023-10-16 RX ADMIN — Medication 975 MILLIGRAM(S): at 13:00

## 2023-10-16 RX ADMIN — Medication 500 MILLIGRAM(S): at 05:24

## 2023-10-16 RX ADMIN — GABAPENTIN 100 MILLIGRAM(S): 400 CAPSULE ORAL at 15:24

## 2023-10-16 RX ADMIN — Medication 50 MILLIGRAM(S): at 05:23

## 2023-10-16 RX ADMIN — ENOXAPARIN SODIUM 40 MILLIGRAM(S): 100 INJECTION SUBCUTANEOUS at 05:24

## 2023-10-16 RX ADMIN — INSULIN GLARGINE 40 UNIT(S): 100 INJECTION, SOLUTION SUBCUTANEOUS at 22:00

## 2023-10-16 RX ADMIN — CHLORHEXIDINE GLUCONATE 1 APPLICATION(S): 213 SOLUTION TOPICAL at 17:53

## 2023-10-16 RX ADMIN — Medication 1000 UNIT(S): at 11:50

## 2023-10-16 RX ADMIN — SENNA PLUS 2 TABLET(S): 8.6 TABLET ORAL at 21:59

## 2023-10-16 RX ADMIN — Medication 975 MILLIGRAM(S): at 11:48

## 2023-10-16 RX ADMIN — Medication 975 MILLIGRAM(S): at 06:24

## 2023-10-16 RX ADMIN — Medication 500 MILLIGRAM(S): at 17:54

## 2023-10-16 RX ADMIN — ATORVASTATIN CALCIUM 80 MILLIGRAM(S): 80 TABLET, FILM COATED ORAL at 21:59

## 2023-10-16 RX ADMIN — AMIODARONE HYDROCHLORIDE 400 MILLIGRAM(S): 400 TABLET ORAL at 05:24

## 2023-10-16 RX ADMIN — Medication 2: at 11:46

## 2023-10-16 RX ADMIN — PANTOPRAZOLE SODIUM 40 MILLIGRAM(S): 20 TABLET, DELAYED RELEASE ORAL at 11:49

## 2023-10-16 RX ADMIN — Medication 40 MILLIGRAM(S): at 10:10

## 2023-10-16 RX ADMIN — Medication 145 MILLIGRAM(S): at 15:24

## 2023-10-16 RX ADMIN — ENOXAPARIN SODIUM 40 MILLIGRAM(S): 100 INJECTION SUBCUTANEOUS at 17:54

## 2023-10-16 RX ADMIN — Medication 12 UNIT(S): at 08:08

## 2023-10-16 RX ADMIN — WARFARIN SODIUM 4 MILLIGRAM(S): 2.5 TABLET ORAL at 08:54

## 2023-10-16 RX ADMIN — GABAPENTIN 100 MILLIGRAM(S): 400 CAPSULE ORAL at 21:59

## 2023-10-16 RX ADMIN — CHLORHEXIDINE GLUCONATE 1 APPLICATION(S): 213 SOLUTION TOPICAL at 05:24

## 2023-10-16 RX ADMIN — Medication 50 MILLIGRAM(S): at 17:54

## 2023-10-16 RX ADMIN — Medication 81 MILLIGRAM(S): at 11:49

## 2023-10-16 RX ADMIN — Medication 100 GRAM(S): at 00:20

## 2023-10-16 RX ADMIN — GABAPENTIN 100 MILLIGRAM(S): 400 CAPSULE ORAL at 05:24

## 2023-10-16 RX ADMIN — OXYCODONE HYDROCHLORIDE 5 MILLIGRAM(S): 5 TABLET ORAL at 23:04

## 2023-10-16 RX ADMIN — AMIODARONE HYDROCHLORIDE 618 MILLIGRAM(S): 400 TABLET ORAL at 20:52

## 2023-10-16 RX ADMIN — CLOPIDOGREL BISULFATE 75 MILLIGRAM(S): 75 TABLET, FILM COATED ORAL at 11:49

## 2023-10-16 NOTE — PROGRESS NOTE ADULT - SUBJECTIVE AND OBJECTIVE BOX
JAMES GONZALEZ  [id number]    HPI:  58 year old male with a medical history of HTN, HLD, type 2 DM (HA1c 9.6 on Insulin), CKD 3, Obesity Class II, CAD s/p 8 stents, recent admission to [facility] 9/29-9/30 for chest pain s/p PCI of ISR of LADx2 and PTCA of diag on 9/29/23, since readmitted to [facility] 10/3 after presenting with recurrent chest pain and increased CANNON since recent PCI. TTE revealed aortic stenosis (FIDEL 1, mG 27, pG 50), could not rule out bicuspid valve. Patient was re-cathed 10/6 to confirm patent stents. Patient now transferred to [facility] 10/6/23 for surgical evaluation under Dr. Cardozo.  (07 Oct 2023 02:39)    Surgery/Hospital Course:  ·  PRE-OP DIAGNOSIS:  Aortic stenosis   ·  POST-OP DIAGNOSIS:  Aortic stenosis   ·  PROCEDURES:  Aortic valve replacement, mechanical 13-Oct-2023   Today:  No acute events -    ICU Vital Signs Last 24 Hrs  T(C): 37.1 (16 Oct 2023 11:15), Max: 37.2 (16 Oct 2023 07:37)  T(F): 98.7 (16 Oct 2023 11:15), Max: 99 (16 Oct 2023 07:37)  HR: 74 (16 Oct 2023 13:00) (73 - 118)  BP: 111/73 (16 Oct 2023 13:00) (94/63 - 132/73)  BP(mean): 86 (16 Oct 2023 13:00) (74 - 97)  ABP: 104/59 (16 Oct 2023 13:00) (96/61 - 131/69)  ABP(mean): 74 (16 Oct 2023 13:00) (67 - 89)  RR: 19 (16 Oct 2023 13:00) (16 - 23)  SpO2: 96% (16 Oct 2023 13:00) (92% - 100%)    O2 Parameters below as of 16 Oct 2023 10:00  Patient On (Oxygen Delivery Method): room air            Physical Exam:  Gen: A&O   CNS: non focal 	  Neck: no JVD  RES : clear , no wheezing              CVS: Regular  rhythm. Normal S1/S2  Abd: Soft, non-distended. Bowel sounds present.  Skin: No rash.  Ext:  no edema    ============================I/O===========================   I&O's Detail    15 Oct 2023 07:01  -  16 Oct 2023 07:00  --------------------------------------------------------  IN:    Insulin: 167 mL    IV PiggyBack: 250 mL    IV PiggyBack: 100 mL    Oral Fluid: 1260 mL    sodium chloride 0.9%: 825 mL    sodium chloride 0.9%: 35 mL  Total IN: 2637 mL    OUT:    Chest Tube (mL): 130 mL    Indwelling Catheter - Urethral (mL): 2120 mL    sodium chloride 0.9%: 0 mL  Total OUT: 2250 mL    Total NET: 387 mL      16 Oct 2023 07:01  -  16 Oct 2023 14:52  --------------------------------------------------------  IN:    Insulin: 28 mL    Oral Fluid: 480 mL  Total IN: 508 mL    OUT:    Indwelling Catheter - Urethral (mL): 490 mL  Total OUT: 490 mL    Total NET: 18 mL        ============================ LABS =========================                        8.9    10.67 )-----------( 213      ( 16 Oct 2023 02:30 )             26.7     10-16    136  |  101  |  47.9<H>  ----------------------------<  96  4.1   |  22.0  |  2.47<H>    Ca    7.9<L>      16 Oct 2023 02:30  Mg     2.3     10-16        PT/INR - ( 16 Oct 2023 02:30 )   PT: 15.1 sec;   INR: 1.37 ratio         PTT - ( 16 Oct 2023 02:30 )  PTT:37.7 sec    Urinalysis Basic - ( 16 Oct 2023 02:30 )    Color: x / Appearance: x / SG: x / pH: x  Gluc: 96 mg/dL / Ketone: x  / Bili: x / Urobili: x   Blood: x / Protein: x / Nitrite: x   Leuk Esterase: x / RBC: x / WBC x   Sq Epi: x / Non Sq Epi: x / Bacteria: x      ======================Micro/Rad/Cardio=================  Culture: Reviewed   CXR: Reviewed  Echo:Reviewed  ======================================================  PAST MEDICAL & SURGICAL HISTORY:  Essential hypertension      Obstructive sleep apnea      Diabetes mellitus      HLD (hyperlipidemia)      CAD (coronary artery disease)      Pancreatitis      History of coronary artery stent placement  Placed 9/12/18 by Dr. Kaye      S/P cholecystectomy        ====================ASSESSMENT ==============  58 year old male with a medical history of HTN, HLD, type 2 DM (HA1c 9.6 on Insulin), CKD 3, Obesity Class II, CAD s/p 8 stents, recent admission to [facility] 9/29-9/30 for  chest pain s/p PCI of ISR of LAD x 2 and PTCA of diag on 9/29/23, since readmitted to [facility] 10/3 after presenting with recurrent chest pain and increased CANNON since recent PCI. TTE revealed aortic stenosis (FIDEL 1, mG 27, pG 50), could not rule out bicuspid valve. Patient was re-cathed 10/6 to confirm patent stents. Patient now transferred to [facility] 10/6/23 for surgical evaluation under Dr. Cardozo.       --- Aortic stenosis.   ----TTE at [facility] revealed aortic stenosis (FIDEL 1, mG 27, pG 50), could not rule out bicuspid valve.  ---s/p AVR 10/13/23  ---CAD (coronary artery disease).   ---S/p recent PCI of ISR of LAD x 2 and PTCA of diag on 9/29/23 at [facility].   --- Essential hypertension.   --- HLD (hyperlipidemia).   --- Diabetes mellitus.   ---Stage 3 chronic kidney disease.   ---Post op Hypovolemia  ---Post op respiratory insufficiency   ---Ronan on CKD stage III- Ronan is likely hemodynamically mediated -    Plan:  -Beta blocker as tolerated by HR and SBP  -Lipitor for chronic valve prophylaxis  -Plavix in setting of recent PCI   -Aspirin for acute valve prophylaxis  -Chest PT and IS use with bedside nurse.  -Endocrine following.  -Avoid Nephrotoxic agents.   - Continue GI ppx with Protonix and Senna  -DVT ppx with Lovenox and SCD boots-  - maintain euvolemia-  ====================== NEUROLOGY=====================  acetaminophen     Tablet .. 975 milliGRAM(s) Oral every 6 hours PRN Mild Pain (1 - 3)  gabapentin 100 milliGRAM(s) Oral every 8 hours  oxyCODONE    IR 5 milliGRAM(s) Oral every 4 hours PRN Moderate Pain (4 - 6)  oxyCODONE    IR 10 milliGRAM(s) Oral every 4 hours PRN Severe Pain (7 - 10)    ==================== RESPIRATORY======================  Post op respiratory insufficiency    ====================CARDIOVASCULAR==================  Post op Hypovolemia  furosemide    Tablet 40 milliGRAM(s) Oral daily  metoprolol tartrate 50 milliGRAM(s) Oral two times a day    ===================HEMATOLOGIC/ONC ===================  Monitor H&H/Plts    aspirin enteric coated 81 milliGRAM(s) Oral daily  clopidogrel Tablet 75 milliGRAM(s) Oral daily  enoxaparin Injectable 40 milliGRAM(s) SubCutaneous every 12 hours    ===================== RENAL =========================  Continue monitoring urine output, I&OS, BUN/Cr     ==================== GASTROINTESTINAL===================  ascorbic acid 500 milliGRAM(s) Oral two times a day  bisacodyl Suppository 10 milliGRAM(s) Rectal once  cholecalciferol 1000 Unit(s) Oral daily  dextrose 5%. 1000 milliLiter(s) (100 mL/Hr) IV Continuous <Continuous>  dextrose 5%. 1000 milliLiter(s) (100 mL/Hr) IV Continuous <Continuous>  dextrose 5%. 1000 milliLiter(s) (50 mL/Hr) IV Continuous <Continuous>  pantoprazole    Tablet 40 milliGRAM(s) Oral daily  polyethylene glycol 3350 17 Gram(s) Oral daily  senna 2 Tablet(s) Oral at bedtime  sodium chloride 0.9%. 1000 milliLiter(s) (10 mL/Hr) IV Continuous <Continuous>  sodium chloride 0.9%. 1000 milliLiter(s) (5 mL/Hr) IV Continuous <Continuous>    =======================    ENDOCRINE  =====================  atorvastatin 80 milliGRAM(s) Oral at bedtime  dextrose 50% Injectable 25 milliLiter(s) IV Push every 15 minutes  dextrose 50% Injectable 50 milliLiter(s) IV Push every 15 minutes  dextrose 50% Injectable 25 Gram(s) IV Push once  dextrose 50% Injectable 12.5 Gram(s) IV Push once  dextrose Oral Gel 15 Gram(s) Oral once PRN Blood Glucose LESS THAN 70 milliGRAM(s)/deciliter  fenofibrate Tablet 145 milliGRAM(s) Oral daily  glucagon  Injectable 1 milliGRAM(s) IntraMuscular once  insulin glargine Injectable (LANTUS) 30 Unit(s) SubCutaneous two times a day  insulin lispro (ADMELOG) corrective regimen sliding scale   SubCutaneous Before meals and at bedtime  insulin lispro Injectable (ADMELOG) 12 Unit(s) SubCutaneous three times a day before meals  insulin regular Infusion 2 Unit(s)/Hr (2 mL/Hr) IV Continuous <Continuous>    ========================INFECTIOUS DISEASE================      -Monitor Neurologic status ,   -Head of the bed should remain elevated to 45 degrees,  -Monitor for arrhythmias and monitor parameters for organ perfusion,  -Glycemic control is satisfactory,  -Nutritional goals will be met using po eventually , insure adequate caloric intake and monitor the same ,  -Electrolytes have been repleted as necessary , pain control has been achieved  and wound care has been carried out ,  -Stress ulcer and VTE prophylaxis will be achieved,  -Agressive PT and early mobility and ambulation goals will be met,      I have spent 35 minutes providing acute care for this critically ill patient     Patient requires continuous monitoring with bedside rhythm monitoring, pulse ox monitoring, and intermittent blood gas analysis. Care plan discussed with ICU care team. Patient remained critical and at risk for life threatening decompensation.

## 2023-10-16 NOTE — PROGRESS NOTE ADULT - ASSESSMENT
58 M with PMHx HTN, HLD, T2DM, CKD 3, obesity, CAD s/p 8 stents, recent admission to [facility] 9/29-9/30 for chest pain s/p PCI of ISR of LADx2 and PTCA of diag on 9/29/23, since readmitted to [facility] 10/3 after presenting with recurrent chest pain and increased CANNON since recent PCI. Transferred to [facility] 10/6/23 for surgical evaluation under Dr. Cardozo. MARIBEL revealed moderate AS. Underwent AVR on 10/13/23    Consulted for diabetes management  Home diabetes meds: Toujeo 140 units daily, mealtime insulin 55 units (He developed pancreatitis following use of trulicity, and was unable to tolerate a sglt-2)  Current a1c: 9.6%  Outpatient Endocrinologist: Dr Ibarra    1. Uncontrolled DM2, post op hyperglycemia  - Insulin gtt infusing this am, Lantus 30 units BID started today  - Continue premeal admelog 12 units tid and SSI  - Will follow and continue to adjust insulin as needed    2. CAD/Aortic stenosis  - S/p AVR on 10/13    3. HLD  - Continue statin

## 2023-10-16 NOTE — PROGRESS NOTE ADULT - SUBJECTIVE AND OBJECTIVE BOX
Patient seen and examined.  Denies CP, SOB, N/V. brief episode RENAY over night.  Converted back to NSR after 5 mg IV lopressor.     T(C): 36.8 (10-15-23 @ 16:16)  T(F): 98.2 (10-15-23 @ 16:16)  HR: 87 (10-16-23 @ 04:00)  BP: 120/71 (10-16-23 @ 04:00)  BP(mean): 90 (10-16-23 @ 04:00)  ABP: 123/64 (10-16-23 @ 04:00)  ABP(mean): 83 (10-16-23 @ 04:00)  RR: 16 (10-16-23 @ 04:00)  SpO2: 96% (10-16-23 @ 04:00)  Wt(kg): --  CVP(mm Hg): -1 (10-15-23 @ 07:00)        Physical Exam:  Gen: A&Ox3  Pulm:  CTA b/l, no r/r/w  CV:  S1S2, RRR, no m/r/g  Abd: +BS, soft, NT, ND  Ext:  +DP b/l,+ edema B/L lower extremities  Incision:  c/d/i no click, no exudate    I&O's Detail    14 Oct 2023 07:01  -  15 Oct 2023 07:00  --------------------------------------------------------  IN:    Insulin: 81 mL    IV PiggyBack: 100 mL    IV PiggyBack: 500 mL    Oral Fluid: 1650 mL    sodium chloride 0.9%: 110 mL    sodium chloride 0.9%: 220 mL  Total IN: 2661 mL    OUT:    Chest Tube (mL): 310 mL    Indwelling Catheter - Urethral (mL): 2835 mL    Norepinephrine: 0 mL    Propofol: 0 mL  Total OUT: 3145 mL    Total NET: -484 mL      15 Oct 2023 07:01  -  16 Oct 2023 04:10  --------------------------------------------------------  IN:    Insulin: 155 mL    IV PiggyBack: 250 mL    IV PiggyBack: 100 mL    Oral Fluid: 1020 mL    sodium chloride 0.9%: 825 mL    sodium chloride 0.9%: 35 mL  Total IN: 2385 mL    OUT:    Chest Tube (mL): 130 mL    Indwelling Catheter - Urethral (mL): 1720 mL    sodium chloride 0.9%: 0 mL  Total OUT: 1850 mL    Total NET: 535 mL                              8.9    10.67 )-----------( 213      ( 16 Oct 2023 02:30 )             26.7   10-16    136  |  101  |  47.9<H>  ----------------------------<  96  4.1   |  22.0  |  2.47<H>    Ca    7.9<L>      16 Oct 2023 02:30  Mg     2.3     10-16    aPTT: 37.7 sec; PT: 15.1 sec; INR: 1.37 ratio  10-16-23 @ 02:30         CAPILLARY BLOOD GLUCOSE      POCT Blood Glucose.: 128 mg/dL (16 Oct 2023 04:04)        Medications:  acetaminophen     Tablet .. 975 milliGRAM(s) Oral every 6 hours  acetaminophen     Tablet .. 975 milliGRAM(s) Oral every 6 hours PRN  aMIOdarone    Tablet 400 milliGRAM(s) Oral two times a day  ascorbic acid 500 milliGRAM(s) Oral two times a day  aspirin enteric coated 81 milliGRAM(s) Oral daily  atorvastatin 80 milliGRAM(s) Oral at bedtime  bisacodyl Suppository 10 milliGRAM(s) Rectal once  chlorhexidine 2% Cloths 1 Application(s) Topical two times a day  cholecalciferol 1000 Unit(s) Oral daily  clopidogrel Tablet 75 milliGRAM(s) Oral daily  dextrose 5%. 1000 milliLiter(s) IV Continuous <Continuous>  dextrose 5%. 1000 milliLiter(s) IV Continuous <Continuous>  dextrose 5%. 1000 milliLiter(s) IV Continuous <Continuous>  dextrose 50% Injectable 25 milliLiter(s) IV Push every 15 minutes  dextrose 50% Injectable 50 milliLiter(s) IV Push every 15 minutes  dextrose 50% Injectable 25 Gram(s) IV Push once  dextrose 50% Injectable 12.5 Gram(s) IV Push once  enoxaparin Injectable 40 milliGRAM(s) SubCutaneous every 12 hours  fenofibrate Tablet 145 milliGRAM(s) Oral daily  gabapentin 100 milliGRAM(s) Oral every 8 hours  glucagon  Injectable 1 milliGRAM(s) IntraMuscular once  insulin glargine Injectable (LANTUS) 30 Unit(s) SubCutaneous at bedtime  insulin lispro Injectable (ADMELOG) 12 Unit(s) SubCutaneous three times a day before meals  insulin regular Infusion 2 Unit(s)/Hr IV Continuous <Continuous>  metoprolol tartrate 50 milliGRAM(s) Oral two times a day  oxyCODONE    IR 10 milliGRAM(s) Oral every 4 hours PRN  oxyCODONE    IR 5 milliGRAM(s) Oral every 4 hours PRN  pantoprazole    Tablet 40 milliGRAM(s) Oral daily  polyethylene glycol 3350 17 Gram(s) Oral daily  senna 2 Tablet(s) Oral at bedtime  sodium chloride 0.9%. 1000 milliLiter(s) IV Continuous <Continuous>  sodium chloride 0.9%. 1000 milliLiter(s) IV Continuous <Continuous>

## 2023-10-16 NOTE — PROGRESS NOTE ADULT - PROBLEM SELECTOR PLAN 1
TTE at [facility] revealed aortic stenosis (FIDEL 1, mG 27, pG 50), could not rule out bicuspid valve.  Transferred to [facility] 10/6/23 for surgical evaluation under Dr. Cardozo.  MARIBEL performed 10/9 with findings of moderate AS  s/p mechanical AVR 10/13/23  Continue Beta blocker as tolerated by HR and SBP  Plavix in setting of recent PCI   Aspirin for acute valve prophylaxis  Started coumadin for mechanical valve  Encourage PO intake  Encourage OOB to chair and ambulation with PT  Encourage deep breathing exercised and coughing  Chest PT and IS use with bedside nurse

## 2023-10-16 NOTE — PROGRESS NOTE ADULT - ASSESSMENT
58 year old male with a medical history of HTN, HLD, type 2 DM (HA1c 9.6 on Insulin), CKD 3, Obesity Class II, CAD s/p 8 stents, recent admission to [facility] 9/29-9/30 for  chest pain s/p PCI of ISR of LAD x 2 and PTCA of diag on 9/29/23, since readmitted to [facility] 10/3 after presenting with recurrent chest pain and increased CANNON since recent PCI. TTE revealed aortic stenosis (FIDEL 1, mG 27, pG 50), could not rule out bicuspid valve. Patient was re-cathed 10/6 to confirm patent stents. Patient now transferred to [facility] 10/6/23 for surgical evaluation under Dr. Cardozo. Underwent Mechanical AVR and sternal plating on 10/13.  Post op course uneventful thusfar.  One brief episode of atrial fibrillation converted with 5 mg IV lopressor.

## 2023-10-16 NOTE — PROGRESS NOTE ADULT - SUBJECTIVE AND OBJECTIVE BOX
INTERVAL EVENTS:  Follow up diabetes management    ROS: Denies chest pain, sob, abd pain. Endorses good appetite.    MEDICATIONS  (STANDING):  ascorbic acid 500 milliGRAM(s) Oral two times a day  aspirin enteric coated 81 milliGRAM(s) Oral daily  atorvastatin 80 milliGRAM(s) Oral at bedtime  bisacodyl Suppository 10 milliGRAM(s) Rectal once  chlorhexidine 2% Cloths 1 Application(s) Topical two times a day  cholecalciferol 1000 Unit(s) Oral daily  clopidogrel Tablet 75 milliGRAM(s) Oral daily  dextrose 5%. 1000 milliLiter(s) (100 mL/Hr) IV Continuous <Continuous>  dextrose 5%. 1000 milliLiter(s) (100 mL/Hr) IV Continuous <Continuous>  dextrose 5%. 1000 milliLiter(s) (50 mL/Hr) IV Continuous <Continuous>  dextrose 50% Injectable 25 Gram(s) IV Push once  dextrose 50% Injectable 12.5 Gram(s) IV Push once  dextrose 50% Injectable 50 milliLiter(s) IV Push every 15 minutes  dextrose 50% Injectable 25 milliLiter(s) IV Push every 15 minutes  enoxaparin Injectable 40 milliGRAM(s) SubCutaneous every 12 hours  fenofibrate Tablet 145 milliGRAM(s) Oral daily  furosemide    Tablet 40 milliGRAM(s) Oral daily  gabapentin 100 milliGRAM(s) Oral every 8 hours  glucagon  Injectable 1 milliGRAM(s) IntraMuscular once  insulin glargine Injectable (LANTUS) 30 Unit(s) SubCutaneous two times a day  insulin lispro (ADMELOG) corrective regimen sliding scale   SubCutaneous Before meals and at bedtime  insulin lispro Injectable (ADMELOG) 12 Unit(s) SubCutaneous three times a day before meals  insulin regular Infusion 2 Unit(s)/Hr (2 mL/Hr) IV Continuous <Continuous>  metoprolol tartrate 50 milliGRAM(s) Oral two times a day  pantoprazole    Tablet 40 milliGRAM(s) Oral daily  polyethylene glycol 3350 17 Gram(s) Oral daily  senna 2 Tablet(s) Oral at bedtime  sodium chloride 0.9%. 1000 milliLiter(s) (10 mL/Hr) IV Continuous <Continuous>  sodium chloride 0.9%. 1000 milliLiter(s) (5 mL/Hr) IV Continuous <Continuous>    MEDICATIONS  (PRN):  acetaminophen     Tablet .. 975 milliGRAM(s) Oral every 6 hours PRN Mild Pain (1 - 3)  dextrose Oral Gel 15 Gram(s) Oral once PRN Blood Glucose LESS THAN 70 milliGRAM(s)/deciliter  oxyCODONE    IR 5 milliGRAM(s) Oral every 4 hours PRN Moderate Pain (4 - 6)  oxyCODONE    IR 10 milliGRAM(s) Oral every 4 hours PRN Severe Pain (7 - 10)    Allergies  scallops (Nausea)  penicillin (Hives)    Vital Signs Last 24 Hrs  T(C): 37.1 (16 Oct 2023 11:15), Max: 37.2 (16 Oct 2023 07:37)  T(F): 98.7 (16 Oct 2023 11:15), Max: 99 (16 Oct 2023 07:37)  HR: 79 (16 Oct 2023 10:00) (76 - 118)  BP: 113/72 (16 Oct 2023 10:00) (93/62 - 132/73)  BP(mean): 86 (16 Oct 2023 10:00) (72 - 97)  RR: 18 (16 Oct 2023 10:00) (16 - 23)  SpO2: 97% (16 Oct 2023 10:00) (92% - 100%)    Parameters below as of 16 Oct 2023 10:00  Patient On (Oxygen Delivery Method): room air    PHYSICAL EXAM:  General: No apparent distress  Neck: Supple, trachea midline, no thyromegaly  Respiratory: Lungs clear bilaterally, normal rate, effort  Cardiac: +S1, S2, no m/r/g  GI: +BS, soft, non tender, non distended  Extremities: No peripheral edema, no pedal lesions  Neuro: A+O X3, no tremor    LABS:                        8.9    10.67 )-----------( 213      ( 16 Oct 2023 02:30 )             26.7     10-16    136  |  101  |  47.9<H>  ----------------------------<  96  4.1   |  22.0  |  2.47<H>    Ca    7.9<L>      16 Oct 2023 02:30  Mg     2.3     10-16      Urinalysis Basic - ( 16 Oct 2023 02:30 )    Color: x / Appearance: x / SG: x / pH: x  Gluc: 96 mg/dL / Ketone: x  / Bili: x / Urobili: x   Blood: x / Protein: x / Nitrite: x   Leuk Esterase: x / RBC: x / WBC x   Sq Epi: x / Non Sq Epi: x / Bacteria: x    POCT Blood Glucose.: 186 mg/dL (10-16-23 @ 11:43)  POCT Blood Glucose.: 223 mg/dL (10-16-23 @ 10:01)  POCT Blood Glucose.: 166 mg/dL (10-16-23 @ 08:01)  POCT Blood Glucose.: 138 mg/dL (10-16-23 @ 05:21)  POCT Blood Glucose.: 128 mg/dL (10-16-23 @ 04:04)  POCT Blood Glucose.: 121 mg/dL (10-16-23 @ 03:11)  POCT Blood Glucose.: 99 mg/dL (10-16-23 @ 01:59)    Thyroid Stimulating Hormone, Serum: 4.61 uIU/mL (10-07-23 @ 02:30)  Free Thyroxine, Serum: 1.1 ng/dL (10-07-23 @ 02:30)  Thyroid Stimulating Hormone, Serum: 2.97 uU/mL (10-05-23 @ 06:31)

## 2023-10-16 NOTE — PROGRESS NOTE ADULT - SUBJECTIVE AND OBJECTIVE BOX
[facility] DIVISION OF KIDNEY DISEASES AND HYPERTENSION -- FOLLOW UP NOTE  --------------------------------------------------------------------------------  Chief Complaint:    24 hour events/subjective:        PAST HISTORY  --------------------------------------------------------------------------------  No significant changes to PMH, PSH, FHx, SHx, unless otherwise noted    ALLERGIES & MEDICATIONS  --------------------------------------------------------------------------------  Allergies    scallops (Nausea)  penicillin (Hives)    Intolerances      Standing Inpatient Medications  ascorbic acid 500 milliGRAM(s) Oral two times a day  aspirin enteric coated 81 milliGRAM(s) Oral daily  atorvastatin 80 milliGRAM(s) Oral at bedtime  bisacodyl Suppository 10 milliGRAM(s) Rectal once  chlorhexidine 2% Cloths 1 Application(s) Topical two times a day  cholecalciferol 1000 Unit(s) Oral daily  clopidogrel Tablet 75 milliGRAM(s) Oral daily  dextrose 5%. 1000 milliLiter(s) IV Continuous <Continuous>  dextrose 5%. 1000 milliLiter(s) IV Continuous <Continuous>  dextrose 5%. 1000 milliLiter(s) IV Continuous <Continuous>  dextrose 50% Injectable 25 milliLiter(s) IV Push every 15 minutes  dextrose 50% Injectable 25 Gram(s) IV Push once  dextrose 50% Injectable 12.5 Gram(s) IV Push once  dextrose 50% Injectable 50 milliLiter(s) IV Push every 15 minutes  enoxaparin Injectable 40 milliGRAM(s) SubCutaneous every 12 hours  fenofibrate Tablet 145 milliGRAM(s) Oral daily  furosemide    Tablet 40 milliGRAM(s) Oral daily  gabapentin 100 milliGRAM(s) Oral every 8 hours  glucagon  Injectable 1 milliGRAM(s) IntraMuscular once  insulin glargine Injectable (LANTUS) 30 Unit(s) SubCutaneous two times a day  insulin lispro (ADMELOG) corrective regimen sliding scale   SubCutaneous Before meals and at bedtime  insulin lispro Injectable (ADMELOG) 12 Unit(s) SubCutaneous three times a day before meals  insulin regular Infusion 2 Unit(s)/Hr IV Continuous <Continuous>  metoprolol tartrate 50 milliGRAM(s) Oral two times a day  pantoprazole    Tablet 40 milliGRAM(s) Oral daily  polyethylene glycol 3350 17 Gram(s) Oral daily  senna 2 Tablet(s) Oral at bedtime  sodium chloride 0.9%. 1000 milliLiter(s) IV Continuous <Continuous>  sodium chloride 0.9%. 1000 milliLiter(s) IV Continuous <Continuous>    PRN Inpatient Medications  acetaminophen     Tablet .. 975 milliGRAM(s) Oral every 6 hours PRN  dextrose Oral Gel 15 Gram(s) Oral once PRN  oxyCODONE    IR 5 milliGRAM(s) Oral every 4 hours PRN  oxyCODONE    IR 10 milliGRAM(s) Oral every 4 hours PRN      REVIEW OF SYSTEMS  --------------------------------------------------------------------------------  Gen: No weight changes, fatigue, fevers/chills, weakness  Skin: No rashes  Head/Eyes/Ears/Mouth: No headache; Normal hearing; Normal vision w/o blurriness; No sinus pain/discomfort, sore throat  Respiratory: No dyspnea, cough, wheezing, hemoptysis  CV: No chest pain, PND, orthopnea  GI: No abdominal pain, diarrhea, constipation, nausea, vomiting, melena, hematochezia  : No increased frequency, dysuria, hematuria, nocturia  MSK: No joint pain/swelling; no back pain; no edema  Neuro: No dizziness/lightheadedness, weakness, seizures, numbness, tingling  Heme: No easy bruising or bleeding  Endo: No heat/cold intolerance  Psych: No significant nervousness, anxiety, stress, depression    All other systems were reviewed and are negative, except as noted.    VITALS/PHYSICAL EXAM  --------------------------------------------------------------------------------  T(C): 37.1 (10-16-23 @ 11:15), Max: 37.2 (10-16-23 @ 07:37)  HR: 74 (10-16-23 @ 13:00) (73 - 118)  BP: 111/73 (10-16-23 @ 13:00) (94/63 - 132/73)  RR: 19 (10-16-23 @ 13:00) (16 - 23)  SpO2: 96% (10-16-23 @ 13:00) (92% - 100%)  Wt(kg): --        10-15-23 @ 07:01  -  10-16-23 @ 07:00  --------------------------------------------------------  IN: 2637 mL / OUT: 2250 mL / NET: 387 mL    10-16-23 @ 07:01  -  10-16-23 @ 14:28  --------------------------------------------------------  IN: 508 mL / OUT: 490 mL / NET: 18 mL      Physical Exam:  	Gen: NAD, well-appearing  	HEENT: PERRL, supple neck, clear oropharynx  	Pulm: CTA B/L  	CV: RRR, S1S2; no rub  	Back: No spinal or CVA tenderness; no sacral edema  	Abd: +BS, soft, nontender/nondistended  	: No suprapubic tenderness  	UE: Warm, FROM, no clubbing, intact strength; no edema; no asterixis  	LE: Warm, FROM, no clubbing, intact strength; no edema  	Neuro: No focal deficits, intact gait  	Psych: Normal affect and mood  	Skin: Warm, without rashes  	Vascular access:    LABS/STUDIES  --------------------------------------------------------------------------------              8.9    10.67 >-----------<  213      [10-16-23 @ 02:30]              26.7     136  |  101  |  47.9  ----------------------------<  96      [10-16-23 @ 02:30]  4.1   |  22.0  |  2.47        Ca     7.9     [10-16-23 @ 02:30]      Mg     2.3     [10-16-23 @ 02:30]      PT/INR: PT 15.1 , INR 1.37       [10-16-23 @ 02:30]  PTT: 37.7       [10-16-23 @ 02:30]      Creatinine Trend:  SCr 2.47 [10-16 @ 02:30]  SCr 2.80 [10-15 @ 20:15]  SCr 2.29 [10-15 @ 02:00]  SCr 1.95 [10-14 @ 02:00]  SCr 1.32 [10-13 @ 12:24]    Urinalysis - [10-16-23 @ 02:30]      Color  / Appearance  / SG  / pH       Gluc 96 / Ketone   / Bili  / Urobili        Blood  / Protein  / Leuk Est  / Nitrite       RBC  / WBC  / Hyaline  / Gran  / Sq Epi  / Non Sq Epi  / Bacteria     Urine Sodium <30      [10-14-23 @ 16:21]    PTH -- (Ca 9.3)      [10-13-23 @ 05:27]   62  Vitamin D (25OH) 28.2      [10-13-23 @ 05:27]  HbA1c 9.5      [02-14-20 @ 08:15]  TSH 4.61      [10-07-23 @ 02:30]  Lipid: chol 139, , HDL 21, LDL --      [09-30-23 @ 06:38]

## 2023-10-16 NOTE — PROGRESS NOTE ADULT - ASSESSMENT
58 year old male with a medical history of excessive BMI, DM, HTN, HLD, CAD s/p 8 stents and CKD 3a with recent admission at [facility] 9/29/23-9/30/23 for chest pain s/p PCI of ISR of LADx2 and PTCA of diag on 9/29/23, then readmitted to [facility] 10/03/23 after presenting with recurrent chest pain and increased CANNON. TTE revealed aortic stenosis. Re-cathed on 10/06/23 which confirmed patent stents. Patient then transferred to [facility] 10/06/23 for surgical evaluation. Underwent mechanical aortic valve replacement on 10/13/23. Nephrology is following for RONAN on CKD 3a.     -Ronan on CKD stage III- Ronan is likely hemodynamically mediated   Outpatient nephrologist is Dr Manzo -  -Baseline creatinine is ~1.3-1.5mg/dL (as reported by patient) with  protein, negative blood; UPCR 0.6mg/g while at baseline  -Recheck UA; of note urine sodium < 30   -SCr peaked at 2.8- now improved to 2.4 with gentle hydration  - Pt is non oliguric  - maintain euvolemia    -Anemia - hemoglobin below goal; obtain iron studies    -Mineral Bone Disease - continue Cholecalciferol 1000U daily    -

## 2023-10-17 DIAGNOSIS — G47.33 OBSTRUCTIVE SLEEP APNEA (ADULT) (PEDIATRIC): ICD-10-CM

## 2023-10-17 DIAGNOSIS — N18.30 CHRONIC KIDNEY DISEASE, STAGE 3 UNSPECIFIED: ICD-10-CM

## 2023-10-17 DIAGNOSIS — Z95.5 PRESENCE OF CORONARY ANGIOPLASTY IMPLANT AND GRAFT: ICD-10-CM

## 2023-10-17 DIAGNOSIS — E11.65 TYPE 2 DIABETES MELLITUS WITH HYPERGLYCEMIA: ICD-10-CM

## 2023-10-17 DIAGNOSIS — E11.22 TYPE 2 DIABETES MELLITUS WITH DIABETIC CHRONIC KIDNEY DISEASE: ICD-10-CM

## 2023-10-17 DIAGNOSIS — I12.9 HYPERTENSIVE CHRONIC KIDNEY DISEASE WITH STAGE 1 THROUGH STAGE 4 CHRONIC KIDNEY DISEASE, OR UNSPECIFIED CHRONIC KIDNEY DISEASE: ICD-10-CM

## 2023-10-17 DIAGNOSIS — I25.119 ATHEROSCLEROTIC HEART DISEASE OF NATIVE CORONARY ARTERY WITH UNSPECIFIED ANGINA PECTORIS: ICD-10-CM

## 2023-10-17 DIAGNOSIS — I35.0 NONRHEUMATIC AORTIC (VALVE) STENOSIS: ICD-10-CM

## 2023-10-17 DIAGNOSIS — E66.9 OBESITY, UNSPECIFIED: ICD-10-CM

## 2023-10-17 DIAGNOSIS — E78.5 HYPERLIPIDEMIA, UNSPECIFIED: ICD-10-CM

## 2023-10-17 DIAGNOSIS — Z82.49 FAMILY HISTORY OF ISCHEMIC HEART DISEASE AND OTHER DISEASES OF THE CIRCULATORY SYSTEM: ICD-10-CM

## 2023-10-17 DIAGNOSIS — Z90.49 ACQUIRED ABSENCE OF OTHER SPECIFIED PARTS OF DIGESTIVE TRACT: ICD-10-CM

## 2023-10-17 LAB
ANION GAP SERPL CALC-SCNC: 13 MMOL/L — SIGNIFICANT CHANGE UP (ref 5–17)
ANION GAP SERPL CALC-SCNC: 13 MMOL/L — SIGNIFICANT CHANGE UP (ref 5–17)
APTT BLD: 38.8 SEC — HIGH (ref 24.5–35.6)
APTT BLD: 38.8 SEC — HIGH (ref 24.5–35.6)
BUN SERPL-MCNC: 48.4 MG/DL — HIGH (ref 8–20)
BUN SERPL-MCNC: 48.4 MG/DL — HIGH (ref 8–20)
CALCIUM SERPL-MCNC: 8 MG/DL — LOW (ref 8.4–10.5)
CALCIUM SERPL-MCNC: 8 MG/DL — LOW (ref 8.4–10.5)
CHLORIDE SERPL-SCNC: 100 MMOL/L — SIGNIFICANT CHANGE UP (ref 96–108)
CHLORIDE SERPL-SCNC: 100 MMOL/L — SIGNIFICANT CHANGE UP (ref 96–108)
CO2 SERPL-SCNC: 22 MMOL/L — SIGNIFICANT CHANGE UP (ref 22–29)
CO2 SERPL-SCNC: 22 MMOL/L — SIGNIFICANT CHANGE UP (ref 22–29)
CREAT SERPL-MCNC: 1.95 MG/DL — HIGH (ref 0.5–1.3)
CREAT SERPL-MCNC: 1.95 MG/DL — HIGH (ref 0.5–1.3)
EGFR: 40 ML/MIN/1.73M2 — LOW
EGFR: 40 ML/MIN/1.73M2 — LOW
GLUCOSE BLDC GLUCOMTR-MCNC: 186 MG/DL — HIGH (ref 70–99)
GLUCOSE BLDC GLUCOMTR-MCNC: 186 MG/DL — HIGH (ref 70–99)
GLUCOSE BLDC GLUCOMTR-MCNC: 191 MG/DL — HIGH (ref 70–99)
GLUCOSE BLDC GLUCOMTR-MCNC: 191 MG/DL — HIGH (ref 70–99)
GLUCOSE BLDC GLUCOMTR-MCNC: 202 MG/DL — HIGH (ref 70–99)
GLUCOSE BLDC GLUCOMTR-MCNC: 202 MG/DL — HIGH (ref 70–99)
GLUCOSE BLDC GLUCOMTR-MCNC: 245 MG/DL — HIGH (ref 70–99)
GLUCOSE BLDC GLUCOMTR-MCNC: 245 MG/DL — HIGH (ref 70–99)
GLUCOSE BLDC GLUCOMTR-MCNC: 261 MG/DL — HIGH (ref 70–99)
GLUCOSE BLDC GLUCOMTR-MCNC: 261 MG/DL — HIGH (ref 70–99)
GLUCOSE BLDC GLUCOMTR-MCNC: 282 MG/DL — HIGH (ref 70–99)
GLUCOSE BLDC GLUCOMTR-MCNC: 282 MG/DL — HIGH (ref 70–99)
GLUCOSE SERPL-MCNC: 246 MG/DL — HIGH (ref 70–99)
GLUCOSE SERPL-MCNC: 246 MG/DL — HIGH (ref 70–99)
HCT VFR BLD CALC: 29.2 % — LOW (ref 39–50)
HCT VFR BLD CALC: 29.2 % — LOW (ref 39–50)
HGB BLD-MCNC: 9 G/DL — LOW (ref 13–17)
HGB BLD-MCNC: 9 G/DL — LOW (ref 13–17)
INR BLD: 1.46 RATIO — HIGH (ref 0.85–1.18)
INR BLD: 1.46 RATIO — HIGH (ref 0.85–1.18)
MAGNESIUM SERPL-MCNC: 2.2 MG/DL — SIGNIFICANT CHANGE UP (ref 1.6–2.6)
MAGNESIUM SERPL-MCNC: 2.2 MG/DL — SIGNIFICANT CHANGE UP (ref 1.6–2.6)
MCHC RBC-ENTMCNC: 29 PG — SIGNIFICANT CHANGE UP (ref 27–34)
MCHC RBC-ENTMCNC: 29 PG — SIGNIFICANT CHANGE UP (ref 27–34)
MCHC RBC-ENTMCNC: 30.8 GM/DL — LOW (ref 32–36)
MCHC RBC-ENTMCNC: 30.8 GM/DL — LOW (ref 32–36)
MCV RBC AUTO: 94.2 FL — SIGNIFICANT CHANGE UP (ref 80–100)
MCV RBC AUTO: 94.2 FL — SIGNIFICANT CHANGE UP (ref 80–100)
PLATELET # BLD AUTO: 232 K/UL — SIGNIFICANT CHANGE UP (ref 150–400)
PLATELET # BLD AUTO: 232 K/UL — SIGNIFICANT CHANGE UP (ref 150–400)
POTASSIUM SERPL-MCNC: 4.6 MMOL/L — SIGNIFICANT CHANGE UP (ref 3.5–5.3)
POTASSIUM SERPL-MCNC: 4.6 MMOL/L — SIGNIFICANT CHANGE UP (ref 3.5–5.3)
POTASSIUM SERPL-SCNC: 4.6 MMOL/L — SIGNIFICANT CHANGE UP (ref 3.5–5.3)
POTASSIUM SERPL-SCNC: 4.6 MMOL/L — SIGNIFICANT CHANGE UP (ref 3.5–5.3)
PROTHROM AB SERPL-ACNC: 16 SEC — HIGH (ref 9.5–13)
PROTHROM AB SERPL-ACNC: 16 SEC — HIGH (ref 9.5–13)
RBC # BLD: 3.1 M/UL — LOW (ref 4.2–5.8)
RBC # BLD: 3.1 M/UL — LOW (ref 4.2–5.8)
RBC # FLD: 13.3 % — SIGNIFICANT CHANGE UP (ref 10.3–14.5)
RBC # FLD: 13.3 % — SIGNIFICANT CHANGE UP (ref 10.3–14.5)
SODIUM SERPL-SCNC: 135 MMOL/L — SIGNIFICANT CHANGE UP (ref 135–145)
SODIUM SERPL-SCNC: 135 MMOL/L — SIGNIFICANT CHANGE UP (ref 135–145)
WBC # BLD: 10.04 K/UL — SIGNIFICANT CHANGE UP (ref 3.8–10.5)
WBC # BLD: 10.04 K/UL — SIGNIFICANT CHANGE UP (ref 3.8–10.5)
WBC # FLD AUTO: 10.04 K/UL — SIGNIFICANT CHANGE UP (ref 3.8–10.5)
WBC # FLD AUTO: 10.04 K/UL — SIGNIFICANT CHANGE UP (ref 3.8–10.5)

## 2023-10-17 PROCEDURE — 99233 SBSQ HOSP IP/OBS HIGH 50: CPT

## 2023-10-17 PROCEDURE — 93010 ELECTROCARDIOGRAM REPORT: CPT | Mod: 76

## 2023-10-17 PROCEDURE — 71045 X-RAY EXAM CHEST 1 VIEW: CPT | Mod: 26

## 2023-10-17 PROCEDURE — 99291 CRITICAL CARE FIRST HOUR: CPT

## 2023-10-17 PROCEDURE — 99223 1ST HOSP IP/OBS HIGH 75: CPT

## 2023-10-17 PROCEDURE — 99024 POSTOP FOLLOW-UP VISIT: CPT

## 2023-10-17 RX ORDER — AMIODARONE HYDROCHLORIDE 400 MG/1
150 TABLET ORAL ONCE
Refills: 0 | Status: COMPLETED | OUTPATIENT
Start: 2023-10-17 | End: 2023-10-17

## 2023-10-17 RX ORDER — WARFARIN SODIUM 2.5 MG/1
5 TABLET ORAL ONCE
Refills: 0 | Status: COMPLETED | OUTPATIENT
Start: 2023-10-17 | End: 2023-10-17

## 2023-10-17 RX ORDER — INSULIN GLARGINE 100 [IU]/ML
55 INJECTION, SOLUTION SUBCUTANEOUS
Refills: 0 | Status: DISCONTINUED | OUTPATIENT
Start: 2023-10-17 | End: 2023-10-18

## 2023-10-17 RX ORDER — SODIUM CHLORIDE 9 MG/ML
3 INJECTION INTRAMUSCULAR; INTRAVENOUS; SUBCUTANEOUS EVERY 8 HOURS
Refills: 0 | Status: DISCONTINUED | OUTPATIENT
Start: 2023-10-17 | End: 2023-10-23

## 2023-10-17 RX ORDER — AMIODARONE HYDROCHLORIDE 400 MG/1
400 TABLET ORAL EVERY 8 HOURS
Refills: 0 | Status: DISCONTINUED | OUTPATIENT
Start: 2023-10-17 | End: 2023-10-17

## 2023-10-17 RX ORDER — ACETAMINOPHEN 500 MG
975 TABLET ORAL EVERY 6 HOURS
Refills: 0 | Status: DISCONTINUED | OUTPATIENT
Start: 2023-10-17 | End: 2023-10-23

## 2023-10-17 RX ORDER — INSULIN LISPRO 100/ML
VIAL (ML) SUBCUTANEOUS ONCE
Refills: 0 | Status: COMPLETED | OUTPATIENT
Start: 2023-10-17 | End: 2023-10-17

## 2023-10-17 RX ORDER — AMIODARONE HYDROCHLORIDE 400 MG/1
TABLET ORAL
Refills: 0 | Status: DISCONTINUED | OUTPATIENT
Start: 2023-10-17 | End: 2023-10-17

## 2023-10-17 RX ADMIN — CLOPIDOGREL BISULFATE 75 MILLIGRAM(S): 75 TABLET, FILM COATED ORAL at 13:06

## 2023-10-17 RX ADMIN — ATORVASTATIN CALCIUM 80 MILLIGRAM(S): 80 TABLET, FILM COATED ORAL at 22:10

## 2023-10-17 RX ADMIN — ENOXAPARIN SODIUM 40 MILLIGRAM(S): 100 INJECTION SUBCUTANEOUS at 05:24

## 2023-10-17 RX ADMIN — INSULIN GLARGINE 55 UNIT(S): 100 INJECTION, SOLUTION SUBCUTANEOUS at 22:08

## 2023-10-17 RX ADMIN — SENNA PLUS 2 TABLET(S): 8.6 TABLET ORAL at 22:10

## 2023-10-17 RX ADMIN — AMIODARONE HYDROCHLORIDE 400 MILLIGRAM(S): 400 TABLET ORAL at 08:56

## 2023-10-17 RX ADMIN — Medication 145 MILLIGRAM(S): at 13:04

## 2023-10-17 RX ADMIN — Medication 16 UNIT(S): at 08:46

## 2023-10-17 RX ADMIN — Medication 50 MILLIGRAM(S): at 05:25

## 2023-10-17 RX ADMIN — CHLORHEXIDINE GLUCONATE 1 APPLICATION(S): 213 SOLUTION TOPICAL at 05:25

## 2023-10-17 RX ADMIN — SODIUM CHLORIDE 3 MILLILITER(S): 9 INJECTION INTRAMUSCULAR; INTRAVENOUS; SUBCUTANEOUS at 22:00

## 2023-10-17 RX ADMIN — CHLORHEXIDINE GLUCONATE 1 APPLICATION(S): 213 SOLUTION TOPICAL at 17:47

## 2023-10-17 RX ADMIN — AMIODARONE HYDROCHLORIDE 618 MILLIGRAM(S): 400 TABLET ORAL at 01:02

## 2023-10-17 RX ADMIN — Medication 500 MILLIGRAM(S): at 05:25

## 2023-10-17 RX ADMIN — WARFARIN SODIUM 5 MILLIGRAM(S): 2.5 TABLET ORAL at 08:56

## 2023-10-17 RX ADMIN — Medication 50 MILLIGRAM(S): at 17:37

## 2023-10-17 RX ADMIN — Medication 500 MILLIGRAM(S): at 17:38

## 2023-10-17 RX ADMIN — Medication 6: at 12:55

## 2023-10-17 RX ADMIN — GABAPENTIN 100 MILLIGRAM(S): 400 CAPSULE ORAL at 05:25

## 2023-10-17 RX ADMIN — AMIODARONE HYDROCHLORIDE 400 MILLIGRAM(S): 400 TABLET ORAL at 17:37

## 2023-10-17 RX ADMIN — GABAPENTIN 100 MILLIGRAM(S): 400 CAPSULE ORAL at 22:10

## 2023-10-17 RX ADMIN — Medication 1000 UNIT(S): at 13:04

## 2023-10-17 RX ADMIN — Medication 16 UNIT(S): at 13:08

## 2023-10-17 RX ADMIN — GABAPENTIN 100 MILLIGRAM(S): 400 CAPSULE ORAL at 17:37

## 2023-10-17 RX ADMIN — Medication 16 UNIT(S): at 17:38

## 2023-10-17 RX ADMIN — POLYETHYLENE GLYCOL 3350 17 GRAM(S): 17 POWDER, FOR SOLUTION ORAL at 13:05

## 2023-10-17 RX ADMIN — Medication 4: at 08:45

## 2023-10-17 RX ADMIN — ENOXAPARIN SODIUM 40 MILLIGRAM(S): 100 INJECTION SUBCUTANEOUS at 17:37

## 2023-10-17 RX ADMIN — Medication 6: at 01:02

## 2023-10-17 RX ADMIN — Medication 81 MILLIGRAM(S): at 13:05

## 2023-10-17 RX ADMIN — Medication 2: at 22:09

## 2023-10-17 RX ADMIN — Medication 4: at 17:38

## 2023-10-17 RX ADMIN — INSULIN GLARGINE 40 UNIT(S): 100 INJECTION, SOLUTION SUBCUTANEOUS at 08:47

## 2023-10-17 RX ADMIN — Medication 40 MILLIGRAM(S): at 05:25

## 2023-10-17 RX ADMIN — PANTOPRAZOLE SODIUM 40 MILLIGRAM(S): 20 TABLET, DELAYED RELEASE ORAL at 13:06

## 2023-10-17 RX ADMIN — SODIUM CHLORIDE 3 MILLILITER(S): 9 INJECTION INTRAMUSCULAR; INTRAVENOUS; SUBCUTANEOUS at 17:48

## 2023-10-17 NOTE — CONSULT NOTE ADULT - SUBJECTIVE AND OBJECTIVE BOX
HPI:  58 year old male with a medical history of HTN, HLD, type 2 DM (HA1c 9.6 on Insulin), CKD 3, Obesity Class II, CAD s/p 8 stents, recent admission to [facility] - for chest pain s/p PCI of ISR of LADx2 and PTCA of diag on 23, since readmitted to [facility] 10/3 after presenting with recurrent chest pain and increased CANNON since recent PCI, found to have severe AS s/p mechanical AVR and sternal plate on 10/13/23. Now with postoperative AF with RVR, and conversion pause with transient AV block.    After recent presentation TTE revealed aortic stenosis (FIDEL 1, mG 27, pG 50), Patient was re-cathed 10/6 to confirm patent stents. Patient transferred to [facility] 10/6/23 for surgical evaluation under Dr. Cardozo. He underwent mechanical AVR 10/13.   Baseline ECG with RBBB/LAFB, and first degree AVB with ME around 250 ms.   Postoperatively he was given amiodarone prophylactically. He then developed paroxysmal AF with RVR, and given IV amiodarone, and restarted on full amiodarone oral load 400mg TID since yesterday. He has also been on beta blocker and given IV lopressor during AF.     Today upon conversion from AF he had a 12 second pause with underlying p-waves consistent with high grade AV block, followed by resumption of sinus rhythm with intact conduction. Subsequent ECG with SR, RBBB, and  ms.     Pt noted dizziness and near syncope during pause. denies syncope. no prior history of syncope or known bradycardia.   Of note he is on ASA and plavix and warfarin (recent PCI and mechanical AV).     PAST MEDICAL & SURGICAL HISTORY:  Essential hypertension      Obstructive sleep apnea      Diabetes mellitus      HLD (hyperlipidemia)      CAD (coronary artery disease)      Pancreatitis      History of coronary artery stent placement  Placed 18 by Dr. Kaye      S/P cholecystectomy          PREVIOUS DIAGNOSTIC TESTING:      ECHO  FINDINGS:    STRESS  FINDINGS:    CATHETERIZATION  FINDINGS:      Allergies    scallops (Nausea)  penicillin (Hives)    Intolerances        MEDICATIONS  (STANDING):  ascorbic acid 500 milliGRAM(s) Oral two times a day  aspirin enteric coated 81 milliGRAM(s) Oral daily  atorvastatin 80 milliGRAM(s) Oral at bedtime  bisacodyl Suppository 10 milliGRAM(s) Rectal once  chlorhexidine 2% Cloths 1 Application(s) Topical two times a day  cholecalciferol 1000 Unit(s) Oral daily  clopidogrel Tablet 75 milliGRAM(s) Oral daily  dextrose 5%. 1000 milliLiter(s) (100 mL/Hr) IV Continuous <Continuous>  dextrose 5%. 1000 milliLiter(s) (50 mL/Hr) IV Continuous <Continuous>  dextrose 5%. 1000 milliLiter(s) (100 mL/Hr) IV Continuous <Continuous>  dextrose 50% Injectable 25 milliLiter(s) IV Push every 15 minutes  dextrose 50% Injectable 25 Gram(s) IV Push once  dextrose 50% Injectable 12.5 Gram(s) IV Push once  dextrose 50% Injectable 50 milliLiter(s) IV Push every 15 minutes  enoxaparin Injectable 40 milliGRAM(s) SubCutaneous every 12 hours  fenofibrate Tablet 145 milliGRAM(s) Oral daily  furosemide    Tablet 40 milliGRAM(s) Oral daily  gabapentin 100 milliGRAM(s) Oral every 8 hours  glucagon  Injectable 1 milliGRAM(s) IntraMuscular once  insulin glargine Injectable (LANTUS) 55 Unit(s) SubCutaneous two times a day  insulin lispro (ADMELOG) corrective regimen sliding scale   SubCutaneous Before meals and at bedtime  insulin lispro Injectable (ADMELOG) 16 Unit(s) SubCutaneous three times a day before meals  pantoprazole    Tablet 40 milliGRAM(s) Oral daily  polyethylene glycol 3350 17 Gram(s) Oral daily  senna 2 Tablet(s) Oral at bedtime  sodium chloride 0.9% lock flush 3 milliLiter(s) IV Push every 8 hours    MEDICATIONS  (PRN):  acetaminophen     Tablet .. 975 milliGRAM(s) Oral every 6 hours PRN Mild Pain (1 - 3)  dextrose Oral Gel 15 Gram(s) Oral once PRN Blood Glucose LESS THAN 70 milliGRAM(s)/deciliter  oxyCODONE    IR 5 milliGRAM(s) Oral every 4 hours PRN Moderate Pain (4 - 6)  oxyCODONE    IR 10 milliGRAM(s) Oral every 4 hours PRN Severe Pain (7 - 10)      FAMILY HISTORY:  Family history of diabetes mellitus (Sibling)    FH: heart disease (Sibling, Father, Mother)        SOCIAL HISTORY:    CIGARETTES:    ALCOHOL:    REVIEW OF SYSTEMS:  +sudden dizziness, otherwise neg other than as per HPI    Physical exam    Vital Signs Last 24 Hrs  T(C): 37 (17 Oct 2023 16:34), Max: 37.3 (17 Oct 2023 00:00)  T(F): 98.6 (17 Oct 2023 16:34), Max: 99.2 (17 Oct 2023 00:00)  HR: 115 (17 Oct 2023 16:34) (84 - 126)  BP: 136/70 (17 Oct 2023 16:34) (115/68 - 153/77)  BP(mean): 96 (17 Oct 2023 10:00) (84 - 101)  RR: 18 (17 Oct 2023 16:34) (15 - 23)  SpO2: 97% (17 Oct 2023 16:34) (93% - 100%)    Parameters below as of 17 Oct 2023 16:34  Patient On (Oxygen Delivery Method): room air    General: Well appearing, laying in bed on an incline, NAD  Neurological: AOx3, no focal neurological deficits  Cardiovascular: Regular Rate/Rhythm, S1/2 auscultated, No extra heart sounds  Respiratory: CTA b/l over all lung fields, No adventitious breath sounds  Gastrointestinal: Bowel sounds present in all 4 quadrants, Abdomen is soft, non-tender, non-distended  Extremities: No peripheral edema noted, 5/5 strength and full range of motion in all 4 extremities b/l  Vascular: 2+ peripheral pulses b/l in upper and lower extremities, Radial, DP  Incision Sites: MSI, CT sites, c/d/i, no erythema, no purulence, no sternal clicks.    Daily     Daily Weight in k.6 (17 Oct 2023 04:07)    I&O's Detail    16 Oct 2023 07:  -  17 Oct 2023 07:00  --------------------------------------------------------  IN:    Insulin: 28 mL    IV PiggyBack: 200 mL    Oral Fluid: 1180 mL  Total IN: 1408 mL    OUT:    Indwelling Catheter - Urethral (mL): 3430 mL  Total OUT: 3430 mL    Total NET: -2 mL      17 Oct 2023 07:01  -  17 Oct 2023 19:46  --------------------------------------------------------  IN:    Oral Fluid: 240 mL  Total IN: 240 mL    OUT:    Indwelling Catheter - Urethral (mL): 510 mL  Total OUT: 510 mL    Total NET: -270 mL        LABS:                        9.0    10.04 )-----------( 232      ( 17 Oct 2023 02:45 )             29.2     10    135  |  100  |  48.4<H>  ----------------------------<  246<H>  4.6   |  22.0  |  1.95<H>    Ca    8.0<L>      17 Oct 2023 02:45  Mg     2.2     10          PT/INR - ( 17 Oct 2023 02:45 )   PT: 16.0 sec;   INR: 1.46 ratio         PTT - ( 17 Oct 2023 02:45 )  PTT:38.8 sec  Urinalysis Basic - ( 17 Oct 2023 02:45 )    Color: x / Appearance: x / SG: x / pH: x  Gluc: 246 mg/dL / Ketone: x  / Bili: x / Urobili: x   Blood: x / Protein: x / Nitrite: x   Leuk Esterase: x / RBC: x / WBC x   Sq Epi: x / Non Sq Epi: x / Bacteria: x      I&O's Summary    16 Oct 2023 07:  -  17 Oct 2023 07:00  --------------------------------------------------------  IN: 1408 mL / OUT: 3430 mL / NET: -2022 mL    17 Oct 2023 07:  -  17 Oct 2023 19:46  --------------------------------------------------------  IN: 240 mL / OUT: 510 mL / NET: -270 mL      BNP  RADIOLOGY & ADDITIONAL STUDIES:    < from: TTE Echo Complete w/ Contrast w/ Doppler (10.07.23 @ 11:30) >  Summary:   1.Technically difficult study.   2. Endocardial visualization was enhanced with intravenous echo contrast.   3. Left ventricular ejection fraction, by visual estimation, is 65 to   70%.   4. Normal global left ventricular systolic function.   5. There is moderate concentric left ventricular hypertrophy.   6. Diastolic function indeterminate.   7. Mildly enlarged right ventricle with normal RV systolic function,   estimated PASP 27 mmHg.   8. Normal left atrial size.   9. Mild mitral annular calcification.  10. Mild mitral valve regurgitation.  11. Mild tricuspid regurgitation.  12. Aortic valve is bicuspid with calcifications and severe stenosis,   peak velocity 4.2 m/s and mean gradient 45 mmHg, FIDEL (by VTI) 0.75 cm2,   dimensionless index 0.17  13. Mild aortic regurgitation.  14. Aortic root at the sinus of Valsalva measures 4.0 cm, normal when   indexed to BSA 1.68 cm/m2.  15. There is no evidence of pericardial effusion.    < end of copied text >

## 2023-10-17 NOTE — CONSULT NOTE ADULT - ASSESSMENT
58 year old male with a medical history of HTN, HLD, type 2 DM (HA1c 9.6 on Insulin), CKD 3, Obesity Class II, CAD s/p 8 stents, recent admission to [facility] 9/29-9/30 for chest pain s/p PCI of ISR of LADx2 and PTCA of diag on 9/29/23, since readmitted to [facility] 10/3 after presenting with recurrent chest pain and increased CANNON since recent PCI, found to have severe AS s/p mechanical AVR and sternal plate on 10/13/23. Now with postoperative AF with RVR, and conversion pause with transient AV block.    Pt with baseline conduction disease with RBBB/LAFB, and first degree AVB.   Received high dose amiodarone over last few days, in addition to beta blocker.   Had conversion pause with transient AVB, now conducting normally.     Suspect AV block likely from AV natty blockade from multiple medication and concealed conduction in presence of rapid AF. However, given conduction disease, will need to watch carefully.   AF may also improve as gets out of postoperative period, and may require less medications.     Hold all amiodarone for now  hold metoprolol for now, if returns to AF with RVR can give low dose metoprolol  temporary ppm wires now reattached and working well  on triple anticoagulant therapy. now that on warfarin can likely stop ASA.   monitor on telemetry off meds.   EP to follow. If more AV block without meds may need ppm. in 
58-year-old male with hypertension, hyperlipidemia, diabetes mellitus, CKD 3, CAD s/p PCI who initially presented to OSH with chest pain and dyspnea on exertion.  He got LHC done on 10/6 which revealed aortic stenosis for which patient was transferred to [facility] for surgical evaluation.  Nephrology consulted for CKD 3 with history of contrast exposure.    #1.  Chronic kidney disease stage III  – Patient serum creatinine likely between 1.2-1.5 mg/deciliter  – His serum creatinine was elevated to 2.17 at OSH and has been improving down to 1.5 mg/deciliter today  – UA with 100 protein otherwise bland  – Renal ultrasound unremarkable  – Patient did receive contrast for LHC on 10/6 but serum creatinine appears to be at baseline.  We will trend.    #2.  Aortic stenosis: Here for surgical evaluation, management per primary team.    #3.  Hypertension: Controlled, continue current medication.
DM type 2, insulin treated, complicated by obesity and insulin resistance. Expect inpatient control to require less insulin as portion size of meals will be limited. Anticipate high insulin requirements while on IV insulin. Will follow.
58 year old male with a PMJH of HTN, HLD, Uncontrolled DM, CKD 3, Obesity Class II, CAD s/p 8 stents, recent admission to [facility] 9/29-9/30 for chest pain s/p PCI of ISR of LADx2 and PTCA of diag on 9/29/23, since readmitted to [facility] 10/3 after presenting with recurrent chest pain and increased CANNON since recent PCI. TTE revealed aortic stenosis (FIDEL 1, mG 27, pG 50), could not rule out bicuspid valve. Patient was re-cathed 10/6 to confirm patent stents. Patient now transferred to [facility] 10/6/23 for surgical evaluation under Dr. Cardozo.  We were asked to see patient for aortic stenosis for bicuspid valve.  FIDEL 1, mG 27, pG 50), could not rule out bicuspid valve.  echo could not confirm bicuspid valve  Patient reports a stable anginal pattern with walking .

## 2023-10-17 NOTE — PROGRESS NOTE ADULT - SUBJECTIVE AND OBJECTIVE BOX
58y Male seen and evaluated bedside. Endorsing no acute complaints. Admits to some constipation, Denies any chest pain, palpitations, orthopnea, shortness of breath, wheezing, abd pain, nausea, vomiting, lightheadedness, headaches, fevers, or chills.     PAST MEDICAL & SURGICAL HISTORY:  Essential hypertension      Obstructive sleep apnea      Diabetes mellitus      HLD (hyperlipidemia)      CAD (coronary artery disease)      Pancreatitis      History of coronary artery stent placement  Placed 9/12/18 by Dr. Kaye      S/P cholecystectomy          Medications:  acetaminophen     Tablet .. 975 milliGRAM(s) Oral every 6 hours PRN  ascorbic acid 500 milliGRAM(s) Oral two times a day  aspirin enteric coated 81 milliGRAM(s) Oral daily  atorvastatin 80 milliGRAM(s) Oral at bedtime  bisacodyl Suppository 10 milliGRAM(s) Rectal once  chlorhexidine 2% Cloths 1 Application(s) Topical two times a day  cholecalciferol 1000 Unit(s) Oral daily  clopidogrel Tablet 75 milliGRAM(s) Oral daily  dextrose 5%. 1000 milliLiter(s) IV Continuous <Continuous>  dextrose 5%. 1000 milliLiter(s) IV Continuous <Continuous>  dextrose 5%. 1000 milliLiter(s) IV Continuous <Continuous>  dextrose 50% Injectable 25 milliLiter(s) IV Push every 15 minutes  dextrose 50% Injectable 25 Gram(s) IV Push once  dextrose 50% Injectable 12.5 Gram(s) IV Push once  dextrose 50% Injectable 50 milliLiter(s) IV Push every 15 minutes  dextrose Oral Gel 15 Gram(s) Oral once PRN  enoxaparin Injectable 40 milliGRAM(s) SubCutaneous every 12 hours  fenofibrate Tablet 145 milliGRAM(s) Oral daily  furosemide    Tablet 40 milliGRAM(s) Oral daily  gabapentin 100 milliGRAM(s) Oral every 8 hours  glucagon  Injectable 1 milliGRAM(s) IntraMuscular once  insulin glargine Injectable (LANTUS) 40 Unit(s) SubCutaneous two times a day  insulin lispro (ADMELOG) corrective regimen sliding scale   SubCutaneous Before meals and at bedtime  insulin lispro Injectable (ADMELOG) 16 Unit(s) SubCutaneous three times a day before meals  metoprolol tartrate 50 milliGRAM(s) Oral two times a day  oxyCODONE    IR 5 milliGRAM(s) Oral every 4 hours PRN  oxyCODONE    IR 10 milliGRAM(s) Oral every 4 hours PRN  pantoprazole    Tablet 40 milliGRAM(s) Oral daily  polyethylene glycol 3350 17 Gram(s) Oral daily  senna 2 Tablet(s) Oral at bedtime  sodium chloride 0.9%. 1000 milliLiter(s) IV Continuous <Continuous>  sodium chloride 0.9%. 1000 milliLiter(s) IV Continuous <Continuous>      MEDICATIONS  (PRN):  acetaminophen     Tablet .. 975 milliGRAM(s) Oral every 6 hours PRN Mild Pain (1 - 3)  dextrose Oral Gel 15 Gram(s) Oral once PRN Blood Glucose LESS THAN 70 milliGRAM(s)/deciliter  oxyCODONE    IR 10 milliGRAM(s) Oral every 4 hours PRN Severe Pain (7 - 10)  oxyCODONE    IR 5 milliGRAM(s) Oral every 4 hours PRN Moderate Pain (4 - 6)      Daily Review:                                8.9    10.67 )-----------( 213      ( 16 Oct 2023 02:30 )             26.7   10-16    132<L>  |  99  |  48.8<H>  ----------------------------<  331<H>  4.8   |  20.0<L>  |  2.09<H>    Ca    7.9<L>      16 Oct 2023 16:55  Mg     2.2     10-16        PT/INR - ( 16 Oct 2023 02:30 )   PT: 15.1 sec;   INR: 1.37 ratio         PTT - ( 16 Oct 2023 02:30 )  PTT:37.7 sec    T(C): 37.3 (10-17-23 @ 00:00), Max: 37.3 (10-16-23 @ 16:08)  HR: 114 (10-17-23 @ 02:00) (73 - 126)  BP: 115/68 (10-16-23 @ 21:00) (109/62 - 153/77)  RR: 19 (10-17-23 @ 02:00) (16 - 23)  SpO2: 93% (10-17-23 @ 02:00) (93% - 100%)  Wt(kg): --    CAPILLARY BLOOD GLUCOSE      POCT Blood Glucose.: 282 mg/dL (17 Oct 2023 00:28)  POCT Blood Glucose.: 321 mg/dL (16 Oct 2023 21:59)  POCT Blood Glucose.: 285 mg/dL (16 Oct 2023 18:58)  POCT Blood Glucose.: 358 mg/dL (16 Oct 2023 16:22)  POCT Blood Glucose.: 186 mg/dL (16 Oct 2023 11:43)  POCT Blood Glucose.: 223 mg/dL (16 Oct 2023 10:01)  POCT Blood Glucose.: 166 mg/dL (16 Oct 2023 08:01)  POCT Blood Glucose.: 138 mg/dL (16 Oct 2023 05:21)  POCT Blood Glucose.: 128 mg/dL (16 Oct 2023 04:04)  POCT Blood Glucose.: 121 mg/dL (16 Oct 2023 03:11)      I&O's Summary    15 Oct 2023 07:01  -  16 Oct 2023 07:00  --------------------------------------------------------  IN: 2637 mL / OUT: 2250 mL / NET: 387 mL    16 Oct 2023 07:01  -  17 Oct 2023 02:52  --------------------------------------------------------  IN: 1308 mL / OUT: 2745 mL / NET: -1437 mL        Physical Exam  General: Well appearing, laying in bed on an incline, NAD  Neurological: AOx3, no focal neurological deficits  Cardiovascular: Regular Rate/Rhythm, S1/2 auscultated, No extra heart sounds  Respiratory: CTA b/l over all lung fields, No adventitious breath sounds  Gastrointestinal: Bowel sounds present in all 4 quadrants, Abdomen is soft, non-tender, non-distended  Extremities: No peripheral edema noted, 5/5 strength and full range of motion in all 4 extremities b/l  Vascular: 2+ peripheral pulses b/l in upper and lower extremities, Radial, DP  Incision Sites: MSI, CT sites, c/d/i, no erythema, no purulence, no sternal clicks.

## 2023-10-17 NOTE — PROGRESS NOTE ADULT - SUBJECTIVE AND OBJECTIVE BOX
JAMES GONZALEZ  MRN-[id number]    HPI:  58 year old male with a medical history of HTN, HLD, type 2 DM (HA1c 9.6 on Insulin), CKD 3, Obesity Class II, CAD s/p 8 stents, recent admission to [facility] 9/29-9/30 for chest pain s/p PCI of ISR of LADx2 and PTCA of diag on 9/29/23, since readmitted to [facility] 10/3 after presenting with recurrent chest pain and increased CANNON since recent PCI. TTE revealed aortic stenosis (FIDEL 1, mG 27, pG 50), could not rule out bicuspid valve. Patient was re-cathed 10/6 to confirm patent stents. Patient now transferred to [facility] 10/6/23 for surgical evaluation under Dr. Cardozo.  (07 Oct 2023 02:39)    Surgery/Hospital Course:  ·  PRE-OP DIAGNOSIS:  Aortic stenosis   ·  POST-OP DIAGNOSIS:  Aortic stenosis   ·  PROCEDURES:  Aortic valve replacement, mechanical 13-Oct-2023   Today:  No acute events -   10/16 Converting intermittently to AFib again, received 2 Amio boluses. -      ICU Vital Signs Last 24 Hrs  T(C): 37.2 (17 Oct 2023 08:00), Max: 37.3 (16 Oct 2023 16:08)  T(F): 99 (17 Oct 2023 08:00), Max: 99.2 (17 Oct 2023 00:00)  HR: 119 (17 Oct 2023 09:00) (73 - 126)  BP: 119/77 (17 Oct 2023 09:00) (109/62 - 153/77)  BP(mean): 90 (17 Oct 2023 09:00) (79 - 103)  ABP: 118/75 (17 Oct 2023 09:00) (96/62 - 154/94)  ABP(mean): 90 (17 Oct 2023 09:00) (71 - 112)  RR: 17 (17 Oct 2023 09:00) (15 - 23)  SpO2: 100% (17 Oct 2023 09:00) (93% - 100%)    O2 Parameters below as of 17 Oct 2023 08:00  Patient On (Oxygen Delivery Method): room air            Physical Exam:  Gen: A&O   CNS: non focal 	  Neck: no JVD  RES : clear , no wheezing              CVS: Regular  rhythm. Normal S1/S2  Abd: Soft, non-distended. Bowel sounds present.  Skin: No rash.  Ext:  no edema    ============================I/O===========================   I&O's Detail    16 Oct 2023 07:01  -  17 Oct 2023 07:00  --------------------------------------------------------  IN:    Insulin: 28 mL    IV PiggyBack: 200 mL    Oral Fluid: 1180 mL  Total IN: 1408 mL    OUT:    Indwelling Catheter - Urethral (mL): 3430 mL  Total OUT: 3430 mL    Total NET: -2022 mL      17 Oct 2023 07:01  -  17 Oct 2023 10:10  --------------------------------------------------------  IN:    Oral Fluid: 240 mL  Total IN: 240 mL    OUT:    Indwelling Catheter - Urethral (mL): 510 mL  Total OUT: 510 mL    Total NET: -270 mL        ============================ LABS =========================                        9.0    10.04 )-----------( 232      ( 17 Oct 2023 02:45 )             29.2     10-17    135  |  100  |  48.4<H>  ----------------------------<  246<H>  4.6   |  22.0  |  1.95<H>    Ca    8.0<L>      17 Oct 2023 02:45  Mg     2.2     10-17        PT/INR - ( 17 Oct 2023 02:45 )   PT: 16.0 sec;   INR: 1.46 ratio         PTT - ( 17 Oct 2023 02:45 )  PTT:38.8 sec    Urinalysis Basic - ( 17 Oct 2023 02:45 )    Color: x / Appearance: x / SG: x / pH: x  Gluc: 246 mg/dL / Ketone: x  / Bili: x / Urobili: x   Blood: x / Protein: x / Nitrite: x   Leuk Esterase: x / RBC: x / WBC x   Sq Epi: x / Non Sq Epi: x / Bacteria: x      ======================Micro/Rad/Cardio=================  Culture: Reviewed   CXR: Reviewed  Echo:Reviewed  ======================================================  PAST MEDICAL & SURGICAL HISTORY:  Essential hypertension      Obstructive sleep apnea      Diabetes mellitus      HLD (hyperlipidemia)      CAD (coronary artery disease)      Pancreatitis      History of coronary artery stent placement  Placed 9/12/18 by Dr. Kaye      S/P cholecystectomy        ====================ASSESSMENT ==============  58 year old male with a medical history of HTN, HLD, type 2 DM (HA1c 9.6 on Insulin), CKD 3, Obesity Class II, CAD s/p 8 stents, recent admission to [facility] 9/29-9/30 for  chest pain s/p PCI of ISR of LAD x 2 and PTCA of diag on 9/29/23, since readmitted to [facility] 10/3 after presenting with recurrent chest pain and increased CANNON since recent PCI. TTE revealed aortic stenosis (FIDEL 1, mG 27, pG 50), could not rule out bicuspid valve. Patient was re-cathed 10/6 to confirm patent stents. Patient now transferred to [facility] 10/6/23 for surgical evaluation under Dr. Cardozo.       --- Aortic stenosis.   ----TTE at [facility] revealed aortic stenosis (FIDEL 1, mG 27, pG 50), could not rule out bicuspid valve.  ---s/p AVR 10/13/23  ---CAD (coronary artery disease).   ---S/p recent PCI of ISR of LAD x 2 and PTCA of diag on 9/29/23 at [facility].   --- Essential hypertension.   --- HLD (hyperlipidemia).   --- Diabetes mellitus.   ---Stage 3 chronic kidney disease.   ---Post op Hypovolemia  ---Post op respiratory insufficiency    ---10/16 Converting intermittently to AFib again, received 2 Amio boluses. -    Plan:  -Beta blocker as tolerated by HR and SBP  -Amio po  -Coumadin  -Lipitor for chronic valve prophylaxis  -Plavix in setting of recent PCI   -Aspirin for acute valve prophylaxis  -Chest PT and IS use with bedside nurse.  -Endocrine following.  -Avoid Nephrotoxic agents.   - Continue GI ppx with Protonix and Senna  -DVT ppx with Lovenox and SCD boots-  ====================== NEUROLOGY=====================  acetaminophen     Tablet .. 975 milliGRAM(s) Oral every 6 hours PRN Mild Pain (1 - 3)  gabapentin 100 milliGRAM(s) Oral every 8 hours  oxyCODONE    IR 5 milliGRAM(s) Oral every 4 hours PRN Moderate Pain (4 - 6)  oxyCODONE    IR 10 milliGRAM(s) Oral every 4 hours PRN Severe Pain (7 - 10)    ==================== RESPIRATORY======================  Post op respiratory insufficiency  ====================CARDIOVASCULAR==================  Post op Hypovolemia  aMIOdarone    Tablet 400 milliGRAM(s) Oral every 8 hours  aMIOdarone    Tablet   Oral   furosemide    Tablet 40 milliGRAM(s) Oral daily  metoprolol tartrate 50 milliGRAM(s) Oral two times a day    ===================HEMATOLOGIC/ONC ===================  Monitor H&H/Plts    aspirin enteric coated 81 milliGRAM(s) Oral daily  clopidogrel Tablet 75 milliGRAM(s) Oral daily  enoxaparin Injectable 40 milliGRAM(s) SubCutaneous every 12 hours    ===================== RENAL =========================  Continue monitoring urine output, I&OS, BUN/Cr     ==================== GASTROINTESTINAL===================  ascorbic acid 500 milliGRAM(s) Oral two times a day  bisacodyl Suppository 10 milliGRAM(s) Rectal once  cholecalciferol 1000 Unit(s) Oral daily  dextrose 5%. 1000 milliLiter(s) (50 mL/Hr) IV Continuous <Continuous>  dextrose 5%. 1000 milliLiter(s) (100 mL/Hr) IV Continuous <Continuous>  dextrose 5%. 1000 milliLiter(s) (100 mL/Hr) IV Continuous <Continuous>  pantoprazole    Tablet 40 milliGRAM(s) Oral daily  polyethylene glycol 3350 17 Gram(s) Oral daily  senna 2 Tablet(s) Oral at bedtime  sodium chloride 0.9% lock flush 3 milliLiter(s) IV Push every 8 hours    =======================    ENDOCRINE  =====================  atorvastatin 80 milliGRAM(s) Oral at bedtime  dextrose 50% Injectable 50 milliLiter(s) IV Push every 15 minutes  dextrose 50% Injectable 25 milliLiter(s) IV Push every 15 minutes  dextrose 50% Injectable 25 Gram(s) IV Push once  dextrose 50% Injectable 12.5 Gram(s) IV Push once  dextrose Oral Gel 15 Gram(s) Oral once PRN Blood Glucose LESS THAN 70 milliGRAM(s)/deciliter  fenofibrate Tablet 145 milliGRAM(s) Oral daily  glucagon  Injectable 1 milliGRAM(s) IntraMuscular once  insulin glargine Injectable (LANTUS) 40 Unit(s) SubCutaneous two times a day  insulin lispro (ADMELOG) corrective regimen sliding scale   SubCutaneous Before meals and at bedtime  insulin lispro Injectable (ADMELOG) 16 Unit(s) SubCutaneous three times a day before meals    ========================INFECTIOUS DISEASE================      -Monitor Neurologic status ,   -Head of the bed should remain elevated to 45 degrees,  -Monitor for arrhythmias and monitor parameters for organ perfusion,  -Glycemic control is satisfactory,  -Nutritional goals will be met using po eventually , insure adequate caloric intake and monitor the same ,  -Electrolytes have been repleted as necessary , pain control has been achieved  and wound care has been carried out ,  -Stress ulcer and VTE prophylaxis will be achieved,  -Agressive PT and early mobility and ambulation goals will be met,    I have spent 35 minutes providing acute care for this critically ill patient     Patient requires continuous monitoring with bedside rhythm monitoring, pulse ox monitoring, and intermittent blood gas analysis. Care plan discussed with ICU care team. Patient remained critical and at risk for life threatening decompensation.

## 2023-10-17 NOTE — PROGRESS NOTE ADULT - SUBJECTIVE AND OBJECTIVE BOX
INTERVAL EVENTS:  Follow up diabetes management    ROS: Denies chest pain, sob, abd pain. Endorses good appetite.     MEDICATIONS  (STANDING):  aMIOdarone    Tablet 400 milliGRAM(s) Oral every 8 hours  aMIOdarone    Tablet   Oral   ascorbic acid 500 milliGRAM(s) Oral two times a day  aspirin enteric coated 81 milliGRAM(s) Oral daily  atorvastatin 80 milliGRAM(s) Oral at bedtime  bisacodyl Suppository 10 milliGRAM(s) Rectal once  chlorhexidine 2% Cloths 1 Application(s) Topical two times a day  cholecalciferol 1000 Unit(s) Oral daily  clopidogrel Tablet 75 milliGRAM(s) Oral daily  dextrose 5%. 1000 milliLiter(s) (50 mL/Hr) IV Continuous <Continuous>  dextrose 5%. 1000 milliLiter(s) (100 mL/Hr) IV Continuous <Continuous>  dextrose 5%. 1000 milliLiter(s) (100 mL/Hr) IV Continuous <Continuous>  dextrose 50% Injectable 25 milliLiter(s) IV Push every 15 minutes  dextrose 50% Injectable 25 Gram(s) IV Push once  dextrose 50% Injectable 12.5 Gram(s) IV Push once  dextrose 50% Injectable 50 milliLiter(s) IV Push every 15 minutes  enoxaparin Injectable 40 milliGRAM(s) SubCutaneous every 12 hours  fenofibrate Tablet 145 milliGRAM(s) Oral daily  furosemide    Tablet 40 milliGRAM(s) Oral daily  gabapentin 100 milliGRAM(s) Oral every 8 hours  glucagon  Injectable 1 milliGRAM(s) IntraMuscular once  insulin glargine Injectable (LANTUS) 55 Unit(s) SubCutaneous two times a day  insulin lispro (ADMELOG) corrective regimen sliding scale   SubCutaneous Before meals and at bedtime  insulin lispro Injectable (ADMELOG) 16 Unit(s) SubCutaneous three times a day before meals  metoprolol tartrate 50 milliGRAM(s) Oral two times a day  pantoprazole    Tablet 40 milliGRAM(s) Oral daily  polyethylene glycol 3350 17 Gram(s) Oral daily  senna 2 Tablet(s) Oral at bedtime  sodium chloride 0.9% lock flush 3 milliLiter(s) IV Push every 8 hours    MEDICATIONS  (PRN):  acetaminophen     Tablet .. 975 milliGRAM(s) Oral every 6 hours PRN Mild Pain (1 - 3)  dextrose Oral Gel 15 Gram(s) Oral once PRN Blood Glucose LESS THAN 70 milliGRAM(s)/deciliter  oxyCODONE    IR 5 milliGRAM(s) Oral every 4 hours PRN Moderate Pain (4 - 6)  oxyCODONE    IR 10 milliGRAM(s) Oral every 4 hours PRN Severe Pain (7 - 10)    Allergies  scallops (Nausea)  penicillin (Hives)    Vital Signs Last 24 Hrs  T(C): 37.2 (17 Oct 2023 08:00), Max: 37.3 (16 Oct 2023 16:08)  T(F): 99 (17 Oct 2023 08:00), Max: 99.2 (17 Oct 2023 00:00)  HR: 116 (17 Oct 2023 10:00) (74 - 126)  BP: 116/85 (17 Oct 2023 10:00) (109/62 - 153/77)  BP(mean): 96 (17 Oct 2023 10:00) (79 - 103)  RR: 16 (17 Oct 2023 10:00) (15 - 23)  SpO2: 96% (17 Oct 2023 10:00) (93% - 100%)    Parameters below as of 17 Oct 2023 08:00  Patient On (Oxygen Delivery Method): room air    PHYSICAL EXAM:  General: No apparent distress  Neck: Supple, trachea midline, no thyromegaly  Respiratory: Lungs clear bilaterally, normal rate, effort  Cardiac: +S1, S2, no m/r/g  GI: +BS, soft, non tender, non distended  Extremities: No peripheral edema, no pedal lesions  Neuro: A+O X3, no tremor    LABS:                        9.0    10.04 )-----------( 232      ( 17 Oct 2023 02:45 )             29.2     10-17    135  |  100  |  48.4<H>  ----------------------------<  246<H>  4.6   |  22.0  |  1.95<H>    Ca    8.0<L>      17 Oct 2023 02:45  Mg     2.2     10-17      Urinalysis Basic - ( 17 Oct 2023 02:45 )    Color: x / Appearance: x / SG: x / pH: x  Gluc: 246 mg/dL / Ketone: x  / Bili: x / Urobili: x   Blood: x / Protein: x / Nitrite: x   Leuk Esterase: x / RBC: x / WBC x   Sq Epi: x / Non Sq Epi: x / Bacteria: x    POCT Blood Glucose.: 261 mg/dL (10-17-23 @ 12:42)  POCT Blood Glucose.: 245 mg/dL (10-17-23 @ 08:44)  POCT Blood Glucose.: 282 mg/dL (10-17-23 @ 00:28)  POCT Blood Glucose.: 321 mg/dL (10-16-23 @ 21:59)  POCT Blood Glucose.: 285 mg/dL (10-16-23 @ 18:58)  POCT Blood Glucose.: 358 mg/dL (10-16-23 @ 16:22)        Thyroid Stimulating Hormone, Serum: 4.61 uIU/mL (10-07-23 @ 02:30)  Free Thyroxine, Serum: 1.1 ng/dL (10-07-23 @ 02:30)  Thyroid Stimulating Hormone, Serum: 2.97 uU/mL (10-05-23 @ 06:31)

## 2023-10-17 NOTE — PROGRESS NOTE ADULT - ASSESSMENT
58 M with PMHx HTN, HLD, T2DM, CKD 3, obesity, CAD s/p 8 stents, recent admission to [facility] 9/29-9/30 for chest pain s/p PCI of ISR of LADx2 and PTCA of diag on 9/29/23, since readmitted to [facility] 10/3 after presenting with recurrent chest pain and increased CANNON since recent PCI. Transferred to [facility] 10/6/23 for surgical evaluation under Dr. Cardozo. MARIBEL revealed moderate AS. Underwent AVR on 10/13/23    Consulted for diabetes management  Home diabetes meds: Toujeo 140 units daily, mealtime insulin 55 units (He developed pancreatitis following use of trulicity, and was unable to tolerate a sglt-2)  Current a1c: 9.6%  Outpatient Endocrinologist: Dr Ibarra    1. Uncontrolled DM2, post op hyperglycemia  - Remains hyperglycemic --> increase lantus to 55 units BID  - Continue premeal admelog 12 units tid and SSI    2. CAD/Aortic stenosis  - S/p AVR on 10/13    3. HLD  - Continue statin

## 2023-10-17 NOTE — PROGRESS NOTE ADULT - PROBLEM SELECTOR PLAN 1
TTE at [facility] revealed aortic stenosis (FIDEL 1, mG 27, pG 50), could not rule out bicuspid valve.  Transferred to [facility] 10/6/23 for surgical evaluation under Dr. Cardozo.  MARIBEL performed 10/9 with findings of moderate AS  s/p mechanical AVR 10/13/23  Continue Beta blocker as tolerated by HR and SBP  Plavix in setting of recent PCI   Aspirin for acute valve prophylaxis  Started coumadin for mechanical valve  Encourage PO intake  Encourage OOB to chair and ambulation with PT  Encourage deep breathing exercised and coughing  Chest PT and IS use with bedside nurse

## 2023-10-17 NOTE — PROGRESS NOTE ADULT - ASSESSMENT
58 year old male with a medical history of HTN, HLD, type 2 DM (HA1c 9.6 on Insulin), CKD 3, Obesity Class II, CAD s/p 8 stents, recent admission to [facility] 9/29-9/30 for  chest pain s/p PCI of ISR of LAD x 2 and PTCA of diag on 9/29/23, since readmitted to [facility] 10/3 after presenting with recurrent chest pain and increased CANNON since recent PCI. TTE revealed aortic stenosis (FIDEL 1, mG 27, pG 50), could not rule out bicuspid valve. Patient was re-cathed 10/6 to confirm patent stents. Patient now transferred to [facility] 10/6/23 for surgical evaluation under Dr. Cardozo. Underwent Mechanical AVR and sternal plating on 10/13.  Post op course uneventful thusfar.  One brief episode of atrial fibrillation converted with 5 mg IV lopressor. 10/16 Converting intermittently to AFib again, received 2 Amio boluses.

## 2023-10-18 DIAGNOSIS — I48.91 UNSPECIFIED ATRIAL FIBRILLATION: ICD-10-CM

## 2023-10-18 DIAGNOSIS — I45.5 OTHER SPECIFIED HEART BLOCK: ICD-10-CM

## 2023-10-18 LAB
ANION GAP SERPL CALC-SCNC: 11 MMOL/L — SIGNIFICANT CHANGE UP (ref 5–17)
ANION GAP SERPL CALC-SCNC: 11 MMOL/L — SIGNIFICANT CHANGE UP (ref 5–17)
APTT BLD: 41.7 SEC — HIGH (ref 24.5–35.6)
APTT BLD: 41.7 SEC — HIGH (ref 24.5–35.6)
BUN SERPL-MCNC: 46 MG/DL — HIGH (ref 8–20)
BUN SERPL-MCNC: 46 MG/DL — HIGH (ref 8–20)
CALCIUM SERPL-MCNC: 8.4 MG/DL — SIGNIFICANT CHANGE UP (ref 8.4–10.5)
CALCIUM SERPL-MCNC: 8.4 MG/DL — SIGNIFICANT CHANGE UP (ref 8.4–10.5)
CHLORIDE SERPL-SCNC: 102 MMOL/L — SIGNIFICANT CHANGE UP (ref 96–108)
CHLORIDE SERPL-SCNC: 102 MMOL/L — SIGNIFICANT CHANGE UP (ref 96–108)
CO2 SERPL-SCNC: 27 MMOL/L — SIGNIFICANT CHANGE UP (ref 22–29)
CO2 SERPL-SCNC: 27 MMOL/L — SIGNIFICANT CHANGE UP (ref 22–29)
CREAT SERPL-MCNC: 1.98 MG/DL — HIGH (ref 0.5–1.3)
CREAT SERPL-MCNC: 1.98 MG/DL — HIGH (ref 0.5–1.3)
EGFR: 39 ML/MIN/1.73M2 — LOW
EGFR: 39 ML/MIN/1.73M2 — LOW
GLUCOSE BLDC GLUCOMTR-MCNC: 142 MG/DL — HIGH (ref 70–99)
GLUCOSE BLDC GLUCOMTR-MCNC: 142 MG/DL — HIGH (ref 70–99)
GLUCOSE BLDC GLUCOMTR-MCNC: 146 MG/DL — HIGH (ref 70–99)
GLUCOSE BLDC GLUCOMTR-MCNC: 146 MG/DL — HIGH (ref 70–99)
GLUCOSE BLDC GLUCOMTR-MCNC: 157 MG/DL — HIGH (ref 70–99)
GLUCOSE BLDC GLUCOMTR-MCNC: 157 MG/DL — HIGH (ref 70–99)
GLUCOSE BLDC GLUCOMTR-MCNC: 172 MG/DL — HIGH (ref 70–99)
GLUCOSE BLDC GLUCOMTR-MCNC: 172 MG/DL — HIGH (ref 70–99)
GLUCOSE BLDC GLUCOMTR-MCNC: 178 MG/DL — HIGH (ref 70–99)
GLUCOSE BLDC GLUCOMTR-MCNC: 178 MG/DL — HIGH (ref 70–99)
GLUCOSE SERPL-MCNC: 124 MG/DL — HIGH (ref 70–99)
GLUCOSE SERPL-MCNC: 124 MG/DL — HIGH (ref 70–99)
HCT VFR BLD CALC: 27.8 % — LOW (ref 39–50)
HCT VFR BLD CALC: 27.8 % — LOW (ref 39–50)
HGB BLD-MCNC: 8.7 G/DL — LOW (ref 13–17)
HGB BLD-MCNC: 8.7 G/DL — LOW (ref 13–17)
INR BLD: 1.7 RATIO — HIGH (ref 0.85–1.18)
INR BLD: 1.7 RATIO — HIGH (ref 0.85–1.18)
MAGNESIUM SERPL-MCNC: 2.2 MG/DL — SIGNIFICANT CHANGE UP (ref 1.6–2.6)
MAGNESIUM SERPL-MCNC: 2.2 MG/DL — SIGNIFICANT CHANGE UP (ref 1.6–2.6)
MCHC RBC-ENTMCNC: 28.7 PG — SIGNIFICANT CHANGE UP (ref 27–34)
MCHC RBC-ENTMCNC: 28.7 PG — SIGNIFICANT CHANGE UP (ref 27–34)
MCHC RBC-ENTMCNC: 31.3 GM/DL — LOW (ref 32–36)
MCHC RBC-ENTMCNC: 31.3 GM/DL — LOW (ref 32–36)
MCV RBC AUTO: 91.7 FL — SIGNIFICANT CHANGE UP (ref 80–100)
MCV RBC AUTO: 91.7 FL — SIGNIFICANT CHANGE UP (ref 80–100)
PLATELET # BLD AUTO: 293 K/UL — SIGNIFICANT CHANGE UP (ref 150–400)
PLATELET # BLD AUTO: 293 K/UL — SIGNIFICANT CHANGE UP (ref 150–400)
POTASSIUM SERPL-MCNC: 4.7 MMOL/L — SIGNIFICANT CHANGE UP (ref 3.5–5.3)
POTASSIUM SERPL-MCNC: 4.7 MMOL/L — SIGNIFICANT CHANGE UP (ref 3.5–5.3)
POTASSIUM SERPL-SCNC: 4.7 MMOL/L — SIGNIFICANT CHANGE UP (ref 3.5–5.3)
POTASSIUM SERPL-SCNC: 4.7 MMOL/L — SIGNIFICANT CHANGE UP (ref 3.5–5.3)
PROTHROM AB SERPL-ACNC: 18.6 SEC — HIGH (ref 9.5–13)
PROTHROM AB SERPL-ACNC: 18.6 SEC — HIGH (ref 9.5–13)
RBC # BLD: 3.03 M/UL — LOW (ref 4.2–5.8)
RBC # BLD: 3.03 M/UL — LOW (ref 4.2–5.8)
RBC # FLD: 13.4 % — SIGNIFICANT CHANGE UP (ref 10.3–14.5)
RBC # FLD: 13.4 % — SIGNIFICANT CHANGE UP (ref 10.3–14.5)
SODIUM SERPL-SCNC: 140 MMOL/L — SIGNIFICANT CHANGE UP (ref 135–145)
SODIUM SERPL-SCNC: 140 MMOL/L — SIGNIFICANT CHANGE UP (ref 135–145)
WBC # BLD: 11.02 K/UL — HIGH (ref 3.8–10.5)
WBC # BLD: 11.02 K/UL — HIGH (ref 3.8–10.5)
WBC # FLD AUTO: 11.02 K/UL — HIGH (ref 3.8–10.5)
WBC # FLD AUTO: 11.02 K/UL — HIGH (ref 3.8–10.5)

## 2023-10-18 PROCEDURE — 71045 X-RAY EXAM CHEST 1 VIEW: CPT | Mod: 26

## 2023-10-18 PROCEDURE — 99233 SBSQ HOSP IP/OBS HIGH 50: CPT

## 2023-10-18 PROCEDURE — 93306 TTE W/DOPPLER COMPLETE: CPT | Mod: 26

## 2023-10-18 PROCEDURE — 99024 POSTOP FOLLOW-UP VISIT: CPT

## 2023-10-18 PROCEDURE — 93010 ELECTROCARDIOGRAM REPORT: CPT

## 2023-10-18 RX ORDER — WARFARIN SODIUM 2.5 MG/1
5 TABLET ORAL ONCE
Refills: 0 | Status: COMPLETED | OUTPATIENT
Start: 2023-10-18 | End: 2023-10-18

## 2023-10-18 RX ORDER — INSULIN GLARGINE 100 [IU]/ML
50 INJECTION, SOLUTION SUBCUTANEOUS
Refills: 0 | Status: DISCONTINUED | OUTPATIENT
Start: 2023-10-18 | End: 2023-10-19

## 2023-10-18 RX ADMIN — SODIUM CHLORIDE 3 MILLILITER(S): 9 INJECTION INTRAMUSCULAR; INTRAVENOUS; SUBCUTANEOUS at 14:28

## 2023-10-18 RX ADMIN — Medication 40 MILLIGRAM(S): at 05:32

## 2023-10-18 RX ADMIN — Medication 16 UNIT(S): at 08:56

## 2023-10-18 RX ADMIN — SODIUM CHLORIDE 3 MILLILITER(S): 9 INJECTION INTRAMUSCULAR; INTRAVENOUS; SUBCUTANEOUS at 05:41

## 2023-10-18 RX ADMIN — CLOPIDOGREL BISULFATE 75 MILLIGRAM(S): 75 TABLET, FILM COATED ORAL at 12:21

## 2023-10-18 RX ADMIN — WARFARIN SODIUM 5 MILLIGRAM(S): 2.5 TABLET ORAL at 22:23

## 2023-10-18 RX ADMIN — GABAPENTIN 100 MILLIGRAM(S): 400 CAPSULE ORAL at 05:32

## 2023-10-18 RX ADMIN — INSULIN GLARGINE 55 UNIT(S): 100 INJECTION, SOLUTION SUBCUTANEOUS at 08:57

## 2023-10-18 RX ADMIN — Medication 2: at 17:33

## 2023-10-18 RX ADMIN — Medication 2: at 12:19

## 2023-10-18 RX ADMIN — Medication 500 MILLIGRAM(S): at 05:32

## 2023-10-18 RX ADMIN — SODIUM CHLORIDE 3 MILLILITER(S): 9 INJECTION INTRAMUSCULAR; INTRAVENOUS; SUBCUTANEOUS at 22:22

## 2023-10-18 RX ADMIN — INSULIN GLARGINE 50 UNIT(S): 100 INJECTION, SOLUTION SUBCUTANEOUS at 22:33

## 2023-10-18 RX ADMIN — Medication 16 UNIT(S): at 17:33

## 2023-10-18 RX ADMIN — OXYCODONE HYDROCHLORIDE 5 MILLIGRAM(S): 5 TABLET ORAL at 22:31

## 2023-10-18 RX ADMIN — ENOXAPARIN SODIUM 40 MILLIGRAM(S): 100 INJECTION SUBCUTANEOUS at 05:32

## 2023-10-18 RX ADMIN — ATORVASTATIN CALCIUM 80 MILLIGRAM(S): 80 TABLET, FILM COATED ORAL at 22:23

## 2023-10-18 RX ADMIN — Medication 145 MILLIGRAM(S): at 12:21

## 2023-10-18 RX ADMIN — OXYCODONE HYDROCHLORIDE 5 MILLIGRAM(S): 5 TABLET ORAL at 23:31

## 2023-10-18 RX ADMIN — PANTOPRAZOLE SODIUM 40 MILLIGRAM(S): 20 TABLET, DELAYED RELEASE ORAL at 12:21

## 2023-10-18 RX ADMIN — Medication 16 UNIT(S): at 12:19

## 2023-10-18 RX ADMIN — Medication 1000 UNIT(S): at 12:22

## 2023-10-18 NOTE — PROGRESS NOTE ADULT - SUBJECTIVE AND OBJECTIVE BOX
Electrophysiology Attending Follow Up Note    Subjective: Feels well. Denies having any palpitations, chest pain/pressure, dizziness/lightheadedness or dyspnea.    TELE: NSR with sinus tachycardia.    MEDICATIONS  (STANDING):  atorvastatin 80 milliGRAM(s) Oral at bedtime  bisacodyl Suppository 10 milliGRAM(s) Rectal once  cholecalciferol 1000 Unit(s) Oral daily  clopidogrel Tablet 75 milliGRAM(s) Oral daily  dextrose 5%. 1000 milliLiter(s) (50 mL/Hr) IV Continuous <Continuous>  dextrose 5%. 1000 milliLiter(s) (100 mL/Hr) IV Continuous <Continuous>  dextrose 5%. 1000 milliLiter(s) (100 mL/Hr) IV Continuous <Continuous>  dextrose 50% Injectable 25 milliLiter(s) IV Push every 15 minutes  dextrose 50% Injectable 25 Gram(s) IV Push once  dextrose 50% Injectable 12.5 Gram(s) IV Push once  dextrose 50% Injectable 50 milliLiter(s) IV Push every 15 minutes  fenofibrate Tablet 145 milliGRAM(s) Oral daily  furosemide    Tablet 40 milliGRAM(s) Oral daily  glucagon  Injectable 1 milliGRAM(s) IntraMuscular once  insulin glargine Injectable (LANTUS) 55 Unit(s) SubCutaneous two times a day  insulin lispro (ADMELOG) corrective regimen sliding scale   SubCutaneous Before meals and at bedtime  insulin lispro Injectable (ADMELOG) 16 Unit(s) SubCutaneous three times a day before meals  pantoprazole    Tablet 40 milliGRAM(s) Oral daily  polyethylene glycol 3350 17 Gram(s) Oral daily  senna 2 Tablet(s) Oral at bedtime  sodium chloride 0.9% lock flush 3 milliLiter(s) IV Push every 8 hours  warfarin 5 milliGRAM(s) Oral once    MEDICATIONS  (PRN):  acetaminophen     Tablet .. 975 milliGRAM(s) Oral every 6 hours PRN Mild Pain (1 - 3)  dextrose Oral Gel 15 Gram(s) Oral once PRN Blood Glucose LESS THAN 70 milliGRAM(s)/deciliter  oxyCODONE    IR 5 milliGRAM(s) Oral every 4 hours PRN Moderate Pain (4 - 6)  oxyCODONE    IR 10 milliGRAM(s) Oral every 4 hours PRN Severe Pain (7 - 10)    Allergies  scallops (Nausea)  penicillin (Hives)    Vital Signs Last 24 Hrs  T(C): 36.7 (18 Oct 2023 12:00), Max: 37 (17 Oct 2023 16:34)  T(F): 98 (18 Oct 2023 12:00), Max: 98.6 (17 Oct 2023 16:34)  HR: 90 (18 Oct 2023 12:00) (74 - 115)  BP: 135/74 (18 Oct 2023 12:00) (121/71 - 160/85)  BP(mean): --  RR: 18 (18 Oct 2023 12:00) (18 - 18)  SpO2: 99% (18 Oct 2023 12:00) (95% - 99%)    Parameters below as of 18 Oct 2023 12:00  Patient On (Oxygen Delivery Method): room air    Physical Exam:  Constitutional: Well-developed, well nourished, in no acute distress  Head: normocephalic atraumatic   Eyes:  extraocular movements intact, sclera anicteric  ENT: mucous membranes moist, pharynx no erythema or swelling  Chest wall: normal in appearance, nontender to palpation  Resp: effort normal, unlabored  Cardiac: No edema.  Musculoskeletal: full range of motion all extremities with no pain, tenderness, swelling, or erythema    Neuro: Alert and oriented x 3, motor & sensation grossly in tact  Skin: color normal, no rashes or injury  Psych: mood calm    LABS:             8.7    11.02 )-----------( 293      ( 18 Oct 2023 05:05 )             27.8     10-18    140  |  102  |  46.0<H>  ----------------------------<  124<H>  4.7   |  27.0  |  1.98<H>    Ca    8.4      18 Oct 2023 05:05  Mg     2.2     10-18      PT/INR - ( 18 Oct 2023 05:05 )   PT: 18.6 sec;   INR: 1.70 ratio      PTT - ( 18 Oct 2023 05:05 )  PTT:41.7 sec  Urinalysis Basic - ( 18 Oct 2023 05:05 )    Color: x / Appearance: x / SG: x / pH: x  Gluc: 124 mg/dL / Ketone: x  / Bili: x / Urobili: x   Blood: x / Protein: x / Nitrite: x   Leuk Esterase: x / RBC: x / WBC x   Sq Epi: x / Non Sq Epi: x / Bacteria: x      RADIOLOGY & ADDITIONAL TESTS:  < from: Xray Chest 1 View- PORTABLE-Routine (Xray Chest 1 View- PORTABLE-Routine in AM.) (10.18.23 @ 06:11) >  Impression:    Trace residual left pleural effusion.

## 2023-10-18 NOTE — PROGRESS NOTE ADULT - SUBJECTIVE AND OBJECTIVE BOX
[facility] DIVISION OF KIDNEY DISEASES AND HYPERTENSION -- FOLLOW UP NOTE  --------------------------------------------------------------------------------  Chief Complaint:    24 hour events/subjective:        PAST HISTORY  --------------------------------------------------------------------------------  No significant changes to PMH, PSH, FHx, SHx, unless otherwise noted    ALLERGIES & MEDICATIONS  --------------------------------------------------------------------------------  Allergies    scallops (Nausea)  penicillin (Hives)    Intolerances      Standing Inpatient Medications  atorvastatin 80 milliGRAM(s) Oral at bedtime  bisacodyl Suppository 10 milliGRAM(s) Rectal once  cholecalciferol 1000 Unit(s) Oral daily  clopidogrel Tablet 75 milliGRAM(s) Oral daily  dextrose 5%. 1000 milliLiter(s) IV Continuous <Continuous>  dextrose 5%. 1000 milliLiter(s) IV Continuous <Continuous>  dextrose 5%. 1000 milliLiter(s) IV Continuous <Continuous>  dextrose 50% Injectable 25 milliLiter(s) IV Push every 15 minutes  dextrose 50% Injectable 50 milliLiter(s) IV Push every 15 minutes  dextrose 50% Injectable 25 Gram(s) IV Push once  dextrose 50% Injectable 12.5 Gram(s) IV Push once  fenofibrate Tablet 145 milliGRAM(s) Oral daily  furosemide    Tablet 40 milliGRAM(s) Oral daily  glucagon  Injectable 1 milliGRAM(s) IntraMuscular once  insulin glargine Injectable (LANTUS) 55 Unit(s) SubCutaneous two times a day  insulin lispro (ADMELOG) corrective regimen sliding scale   SubCutaneous Before meals and at bedtime  insulin lispro Injectable (ADMELOG) 16 Unit(s) SubCutaneous three times a day before meals  pantoprazole    Tablet 40 milliGRAM(s) Oral daily  polyethylene glycol 3350 17 Gram(s) Oral daily  senna 2 Tablet(s) Oral at bedtime  sodium chloride 0.9% lock flush 3 milliLiter(s) IV Push every 8 hours  warfarin 5 milliGRAM(s) Oral once    PRN Inpatient Medications  acetaminophen     Tablet .. 975 milliGRAM(s) Oral every 6 hours PRN  dextrose Oral Gel 15 Gram(s) Oral once PRN  oxyCODONE    IR 5 milliGRAM(s) Oral every 4 hours PRN  oxyCODONE    IR 10 milliGRAM(s) Oral every 4 hours PRN      REVIEW OF SYSTEMS  --------------------------------------------------------------------------------  Gen: No weight changes, fatigue, fevers/chills, weakness  Skin: No rashes  Head/Eyes/Ears/Mouth: No headache; Normal hearing; Normal vision w/o blurriness; No sinus pain/discomfort, sore throat  Respiratory: No dyspnea, cough, wheezing, hemoptysis  CV: No chest pain, PND, orthopnea  GI: No abdominal pain, diarrhea, constipation, nausea, vomiting, melena, hematochezia  : No increased frequency, dysuria, hematuria, nocturia  MSK: No joint pain/swelling; no back pain; no edema  Neuro: No dizziness/lightheadedness, weakness, seizures, numbness, tingling  Heme: No easy bruising or bleeding  Endo: No heat/cold intolerance  Psych: No significant nervousness, anxiety, stress, depression    All other systems were reviewed and are negative, except as noted.    VITALS/PHYSICAL EXAM  --------------------------------------------------------------------------------  T(C): 36.7 (10-18-23 @ 12:00), Max: 37 (10-17-23 @ 16:34)  HR: 90 (10-18-23 @ 12:00) (74 - 115)  BP: 135/74 (10-18-23 @ 12:00) (121/71 - 160/85)  RR: 18 (10-18-23 @ 12:00) (18 - 18)  SpO2: 99% (10-18-23 @ 12:00) (95% - 99%)  Wt(kg): --        10-17-23 @ 07:01  -  10-18-23 @ 07:00  --------------------------------------------------------  IN: 240 mL / OUT: 1910 mL / NET: -1670 mL      Physical Exam:  	Gen: NAD, well-appearing  	HEENT: PERRL, supple neck, clear oropharynx  	Pulm: CTA B/L  	CV: RRR, S1S2; no rub  	Back: No spinal or CVA tenderness; no sacral edema  	Abd: +BS, soft, nontender/nondistended  	: No suprapubic tenderness  	UE: Warm, FROM, no clubbing, intact strength; no edema; no asterixis  	LE: Warm, FROM, no clubbing, intact strength; no edema  	Neuro: No focal deficits, intact gait  	Psych: Normal affect and mood  	Skin: Warm, without rashes  	Vascular access:    LABS/STUDIES  --------------------------------------------------------------------------------              8.7    11.02 >-----------<  293      [10-18-23 @ 05:05]              27.8     140  |  102  |  46.0  ----------------------------<  124      [10-18-23 @ 05:05]  4.7   |  27.0  |  1.98        Ca     8.4     [10-18-23 @ 05:05]      Mg     2.2     [10-18-23 @ 05:05]      PT/INR: PT 18.6 , INR 1.70       [10-18-23 @ 05:05]  PTT: 41.7       [10-18-23 @ 05:05]      Creatinine Trend:  SCr 1.98 [10-18 @ 05:05]  SCr 1.95 [10-17 @ 02:45]  SCr 2.09 [10-16 @ 16:55]  SCr 2.47 [10-16 @ 02:30]  SCr 2.80 [10-15 @ 20:15]    Urinalysis - [10-18-23 @ 05:05]      Color  / Appearance  / SG  / pH       Gluc 124 / Ketone   / Bili  / Urobili        Blood  / Protein  / Leuk Est  / Nitrite       RBC  / WBC  / Hyaline  / Gran  / Sq Epi  / Non Sq Epi  / Bacteria     Urine Sodium <30      [10-14-23 @ 16:21]    PTH -- (Ca 9.3)      [10-13-23 @ 05:27]   62  Vitamin D (25OH) 28.2      [10-13-23 @ 05:27]  HbA1c 9.5      [02-14-20 @ 08:15]  TSH 4.61      [10-07-23 @ 02:30]  Lipid: chol 139, , HDL 21, LDL --      [09-30-23 @ 06:38]       [facility] DIVISION OF KIDNEY DISEASES AND HYPERTENSION -- FOLLOW UP NOTE  --------------------------------------------------------------------------------  Chief Complaint: Ronan on CKD    24 hour events/subjective:  No acute event noted  pt seen and examined; feels well      PAST HISTORY  --------------------------------------------------------------------------------  No significant changes to PMH, PSH, FHx, SHx, unless otherwise noted    ALLERGIES & MEDICATIONS  --------------------------------------------------------------------------------  Allergies    scallops (Nausea)  penicillin (Hives)    Intolerances      Standing Inpatient Medications  atorvastatin 80 milliGRAM(s) Oral at bedtime  bisacodyl Suppository 10 milliGRAM(s) Rectal once  cholecalciferol 1000 Unit(s) Oral daily  clopidogrel Tablet 75 milliGRAM(s) Oral daily  dextrose 5%. 1000 milliLiter(s) IV Continuous <Continuous>  dextrose 5%. 1000 milliLiter(s) IV Continuous <Continuous>  dextrose 5%. 1000 milliLiter(s) IV Continuous <Continuous>  dextrose 50% Injectable 25 milliLiter(s) IV Push every 15 minutes  dextrose 50% Injectable 50 milliLiter(s) IV Push every 15 minutes  dextrose 50% Injectable 25 Gram(s) IV Push once  dextrose 50% Injectable 12.5 Gram(s) IV Push once  fenofibrate Tablet 145 milliGRAM(s) Oral daily  furosemide    Tablet 40 milliGRAM(s) Oral daily  glucagon  Injectable 1 milliGRAM(s) IntraMuscular once  insulin glargine Injectable (LANTUS) 55 Unit(s) SubCutaneous two times a day  insulin lispro (ADMELOG) corrective regimen sliding scale   SubCutaneous Before meals and at bedtime  insulin lispro Injectable (ADMELOG) 16 Unit(s) SubCutaneous three times a day before meals  pantoprazole    Tablet 40 milliGRAM(s) Oral daily  polyethylene glycol 3350 17 Gram(s) Oral daily  senna 2 Tablet(s) Oral at bedtime  sodium chloride 0.9% lock flush 3 milliLiter(s) IV Push every 8 hours  warfarin 5 milliGRAM(s) Oral once    PRN Inpatient Medications  acetaminophen     Tablet .. 975 milliGRAM(s) Oral every 6 hours PRN  dextrose Oral Gel 15 Gram(s) Oral once PRN  oxyCODONE    IR 5 milliGRAM(s) Oral every 4 hours PRN  oxyCODONE    IR 10 milliGRAM(s) Oral every 4 hours PRN      REVIEW OF SYSTEMS  --------------------------------------------------------------------------------  Gen: No weight changes, fatigue, fevers/chills, weakness  Skin: No rashes  Head/Eyes/Ears/Mouth: No headache; Normal hearing; Normal vision w/o blurriness; No sinus pain/discomfort, sore throat  Respiratory: No dyspnea, cough, wheezing, hemoptysis  CV: No chest pain, PND, orthopnea  GI: No abdominal pain, diarrhea, constipation, nausea, vomiting, melena, hematochezia  : No increased frequency, dysuria, hematuria, nocturia  MSK: No joint pain/swelling; no back pain; no edema  Neuro: No dizziness/lightheadedness, weakness, seizures, numbness, tingling  Heme: No easy bruising or bleeding  Endo: No heat/cold intolerance  Psych: No significant nervousness, anxiety, stress, depression    All other systems were reviewed and are negative, except as noted.    VITALS/PHYSICAL EXAM  --------------------------------------------------------------------------------  T(C): 36.7 (10-18-23 @ 12:00), Max: 37 (10-17-23 @ 16:34)  HR: 90 (10-18-23 @ 12:00) (74 - 115)  BP: 135/74 (10-18-23 @ 12:00) (121/71 - 160/85)  RR: 18 (10-18-23 @ 12:00) (18 - 18)  SpO2: 99% (10-18-23 @ 12:00) (95% - 99%)  Wt(kg): --        10-17-23 @ 07:01  -  10-18-23 @ 07:00  --------------------------------------------------------  IN: 240 mL / OUT: 1910 mL / NET: -1670 mL      Physical Exam:  	Gen: NAD, well-appearing  	HEENT: PERRL, supple neck, clear oropharynx  	Pulm: CTA B/L  	CV: RRR, S1S2; no rub  	Back: No spinal or CVA tenderness; no sacral edema  	Abd: +BS, soft, nontender/nondistended  	: No suprapubic tenderness  	UE: Warm, FROM, no clubbing, intact strength; no edema; no asterixis  	LE: Warm, FROM, no clubbing, intact strength; no edema  	Neuro: No focal deficits, intact gait  	Psych: Normal affect and mood  	Skin: Warm, without rashes  	Vascular access:    LABS/STUDIES  --------------------------------------------------------------------------------              8.7    11.02 >-----------<  293      [10-18-23 @ 05:05]              27.8     140  |  102  |  46.0  ----------------------------<  124      [10-18-23 @ 05:05]  4.7   |  27.0  |  1.98        Ca     8.4     [10-18-23 @ 05:05]      Mg     2.2     [10-18-23 @ 05:05]      PT/INR: PT 18.6 , INR 1.70       [10-18-23 @ 05:05]  PTT: 41.7       [10-18-23 @ 05:05]      Creatinine Trend:  SCr 1.98 [10-18 @ 05:05]  SCr 1.95 [10-17 @ 02:45]  SCr 2.09 [10-16 @ 16:55]  SCr 2.47 [10-16 @ 02:30]  SCr 2.80 [10-15 @ 20:15]    Urinalysis - [10-18-23 @ 05:05]      Color  / Appearance  / SG  / pH       Gluc 124 / Ketone   / Bili  / Urobili        Blood  / Protein  / Leuk Est  / Nitrite       RBC  / WBC  / Hyaline  / Gran  / Sq Epi  / Non Sq Epi  / Bacteria     Urine Sodium <30      [10-14-23 @ 16:21]    PTH -- (Ca 9.3)      [10-13-23 @ 05:27]   62  Vitamin D (25OH) 28.2      [10-13-23 @ 05:27]  HbA1c 9.5      [02-14-20 @ 08:15]  TSH 4.61      [10-07-23 @ 02:30]  Lipid: chol 139, , HDL 21, LDL --      [09-30-23 @ 06:38]

## 2023-10-18 NOTE — PROGRESS NOTE ADULT - ASSESSMENT
58 year old male with a medical history of excessive BMI, DM, HTN, HLD, CAD s/p 8 stents and CKD 3a with recent admission at [facility] 9/29/23-9/30/23 for chest pain s/p PCI of ISR of LADx2 and PTCA of diag on 9/29/23, then readmitted to [facility] 10/03/23 after presenting with recurrent chest pain and increased CANNON. TTE revealed aortic stenosis. Re-cathed on 10/06/23 which confirmed patent stents. Patient then transferred to [facility] 10/06/23 for surgical evaluation. Underwent mechanical aortic valve replacement on 10/13/23. Nephrology is following for RONAN on CKD 3a.     Ronan on CKD stage III- Ronan is likely hemodynamically mediated   Outpatient nephrologist is Dr Manzo -  Baseline creatinine is ~1.3-1.5mg/dL (as reported by patient) with  protein, negative blood; UPCR 0.6mg/g while at baseline  SCr peaked at 2.8-improved and now stable/ elevated at 1.9    AS  s/p AVR    HTN  Bp stable  BB held due to AV block    Anemia - hemoglobin below goal; obtain iron studies    -Mineral Bone Disease - continue Cholecalciferol 1000U daily    -             58 year old male with a medical history of excessive BMI, DM, HTN, HLD, CAD s/p 8 stents and CKD 3a with recent admission at [facility] 9/29/23-9/30/23 for chest pain s/p PCI of ISR of LADx2 and PTCA of diag on 9/29/23, then readmitted to [facility] 10/03/23 after presenting with recurrent chest pain and increased CANNON. TTE revealed aortic stenosis. Re-cathed on 10/06/23 which confirmed patent stents. Patient then transferred to [facility] 10/06/23 for surgical evaluation. Underwent mechanical aortic valve replacement on 10/13/23. Nephrology is following for RONAN on CKD 3a.     Ronan on CKD stage III- Ronan is likely hemodynamically mediated   Outpatient nephrologist is Dr Manzo -  Baseline creatinine is ~1.5-1.8mg/dL (as reported by patient) with  protein, negative blood; UPCR 0.6mg/g while at baseline  SCr peaked at 2.8-improved and now stable/ elevated at 1.9 (close to baseline)    AS  s/p AVR    HTN  Bp stable  BB held due to AV block    Anemia - hemoglobin below goal; obtain iron studies    -Mineral Bone Disease - continue Cholecalciferol 1000U daily      nephrology follow up prn  -

## 2023-10-18 NOTE — PROGRESS NOTE ADULT - SUBJECTIVE AND OBJECTIVE BOX
Subjective: Patient seen and assessed s/p Mechanical AVR. Patient states "I feel better now, but I was really dizzy earlier when my heart went out." At time of exam, patient in NAD, Pt denies chest pain, palpitations, dizziness, headache, shortness of breath, abdominal pain or N/V/D/C.     Pertinent events of the past 24 hours: long conversion pause with intermittent AV block, PW connected to back up generator     Tele: NSR 1HB     VITAL SIGNS  Vital Signs Last 24 Hrs  T(C): 36.7 (10-17-23 @ 20:41), Max: 37.2 (10-17-23 @ 08:00)  T(F): 98 (10-17-23 @ 20:41), Max: 99 (10-17-23 @ 08:00)  HR: 74 (10-17-23 @ 20:41) (74 - 123)  BP: 121/71 (10-17-23 @ 20:41) (116/85 - 136/82)  RR: 18 (10-17-23 @ 20:41) (15 - 19)  SpO2: 95% (10-17-23 @ 20:41) (93% - 100%)  on (O2)              MEDICATIONS  acetaminophen     Tablet .. 975 milliGRAM(s) Oral every 6 hours PRN  ascorbic acid 500 milliGRAM(s) Oral two times a day  aspirin enteric coated 81 milliGRAM(s) Oral daily  atorvastatin 80 milliGRAM(s) Oral at bedtime  bisacodyl Suppository 10 milliGRAM(s) Rectal once  cholecalciferol 1000 Unit(s) Oral daily  clopidogrel Tablet 75 milliGRAM(s) Oral daily  dextrose 5%. 1000 milliLiter(s) IV Continuous <Continuous>  dextrose 5%. 1000 milliLiter(s) IV Continuous <Continuous>  dextrose 5%. 1000 milliLiter(s) IV Continuous <Continuous>  dextrose 50% Injectable 25 milliLiter(s) IV Push every 15 minutes  dextrose 50% Injectable 25 Gram(s) IV Push once  dextrose 50% Injectable 12.5 Gram(s) IV Push once  dextrose 50% Injectable 50 milliLiter(s) IV Push every 15 minutes  dextrose Oral Gel 15 Gram(s) Oral once PRN  enoxaparin Injectable 40 milliGRAM(s) SubCutaneous every 12 hours  fenofibrate Tablet 145 milliGRAM(s) Oral daily  furosemide    Tablet 40 milliGRAM(s) Oral daily  gabapentin 100 milliGRAM(s) Oral every 8 hours  glucagon  Injectable 1 milliGRAM(s) IntraMuscular once  insulin glargine Injectable (LANTUS) 55 Unit(s) SubCutaneous two times a day  insulin lispro (ADMELOG) corrective regimen sliding scale   SubCutaneous Before meals and at bedtime  insulin lispro Injectable (ADMELOG) 16 Unit(s) SubCutaneous three times a day before meals  oxyCODONE    IR 5 milliGRAM(s) Oral every 4 hours PRN  oxyCODONE    IR 10 milliGRAM(s) Oral every 4 hours PRN  pantoprazole    Tablet 40 milliGRAM(s) Oral daily  polyethylene glycol 3350 17 Gram(s) Oral daily  senna 2 Tablet(s) Oral at bedtime  sodium chloride 0.9% lock flush 3 milliLiter(s) IV Push every 8 hours    PHYSICAL EXAM  Constitutional: NAD  Neuro: A+O x 3, non-focal, speech clear and intact  CV: regular rate, regular rhythm, +S1S2, +AV click  Pulm/chest: lung sounds CTA and equal bilaterally, no accessory muscle use noted  Abd: +BS, soft, NT, ND  Ext: [place] x 4, mild edema to b/l hands, no LE edema   Skin: warm, well perfused  Psych: calm, appropriate affect  Incision: midline sternal incision c/d/i with mepilex ag, sternum stable, no audible sternal click,   Pacing wires: VVI 50, Threshold 3     Intake and Output  10-16 @ :  -  10-17 @ 07:00  --------------------------------------------------------  IN: 1408 mL / OUT: 3430 mL / NET: -2022 mL    10-17 @ 07:  -  10-18 @ 00:54  --------------------------------------------------------  IN: 240 mL / OUT: 510 mL / NET: -270 mL    Weights:  Daily     Daily Weight in k.6 (17 Oct 2023 04:07)  Admit Wt: Drug Dosing Weight  Height (cm): 182.9 (13 Oct 2023 06:29)  Weight (kg): 118.8 (13 Oct 2023 06:29)  BMI (kg/m2): 35.5 (13 Oct 2023 06:29)  BSA (m2): 2.39 (13 Oct 2023 06:29)    All laboratory results, radiology and medications reviewed.    LABS  10-17    135  |  100  |  48.4<H>  ----------------------------<  246<H>  4.6   |  22.0  |  1.95<H>    Ca    8.0<L>      17 Oct 2023 02:45  Mg     2.2     10-17                                   9.0    10.04 )-----------( 232      ( 17 Oct 2023 02:45 )             29.2          PT/INR - ( 17 Oct 2023 02:45 )   PT: 16.0 sec;   INR: 1.46 ratio         PTT - ( 17 Oct 2023 02:45 )  PTT:38.8 sec    CAPILLARY BLOOD GLUCOSE  POCT Blood Glucose.: 186 mg/dL (17 Oct 2023 21:18)  POCT Blood Glucose.: 191 mg/dL (17 Oct 2023 18:57)  POCT Blood Glucose.: 202 mg/dL (17 Oct 2023 17:10)  POCT Blood Glucose.: 261 mg/dL (17 Oct 2023 12:42)  POCT Blood Glucose.: 245 mg/dL (17 Oct 2023 08:44)           < from: Xray Chest 1 View- PORTABLE-Routine (10.15.23 @ 06:21) >    INTERPRETATION:  Clinical Information: Pleural effusion    Technique: 2 AP chest x-ray(s)    Comparison: None    Findings/  Impression: Right IJ dialysis catheter tip at the SVC. Status post open   heart surgery. Cardiomegaly. Left base subsegmental atelectasis.    < end of copied text >

## 2023-10-18 NOTE — PROGRESS NOTE ADULT - ASSESSMENT
58 M with PMHx HTN, HLD, T2DM, CKD 3, obesity, CAD s/p 8 stents, recent admission to [facility] 9/29-9/30 for chest pain s/p PCI of ISR of LADx2 and PTCA of diag on 9/29/23, since readmitted to [facility] 10/3 after presenting with recurrent chest pain and increased CANNON since recent PCI. Transferred to [facility] 10/6/23 for surgical evaluation under Dr. Cardozo. MARIBEL revealed moderate AS. Underwent AVR on 10/13/23    Consulted for diabetes management  Home diabetes meds: Toujeo 140 units daily, mealtime insulin 55 units (He developed pancreatitis following use of trulicity, and was unable to tolerate a sglt-2)  Current a1c: 9.6%  Outpatient Endocrinologist: Dr Ibarra    1. Uncontrolled DM2, post op hyperglycemia - glucoses improving  - Decrease lantus to 50 units BID  - Continue premeal admelog 16 units TID and SSI  - Recommend to discharge on long acting insulin 50 units BID (has Toujeo at home) and mealtime insulin 16 units TID  - He will follow up with Dr Ibarra for further management     2. CAD/Aortic stenosis  - S/p AVR on 10/13    3. HLD  - Continue statin

## 2023-10-18 NOTE — PROGRESS NOTE ADULT - PROBLEM SELECTOR PLAN 1
TTE at [facility] revealed aortic stenosis (FIDEL 1, mG 27, pG 50), could not rule out bicuspid valve.  Transferred to [facility] 10/6/23 for surgical evaluation under Dr. Cardozo.  MARIBEL performed 10/9 with findings of moderate AS  s/p mechanical AVR 10/13/23  BB held for ~13 second ventricular standstill event on 10/17  Plavix in setting of recent PCI   Aspirin for acute valve prophylaxis  Started coumadin for mechanical valve, remains sub therapeutic- receiving daily INR draws   Encourage PO intake  Encourage OOB to chair and ambulation with PT  Encourage deep breathing exercised and coughing  Chest PT and IS use with bedside nurse

## 2023-10-18 NOTE — PROGRESS NOTE ADULT - ASSESSMENT
58 year old male with a medical history of HTN, HLD, type 2 DM (HA1c 9.6 on Insulin), CKD 3, Obesity Class II, CAD s/p 8 stents, recent admission to [facility] 9/29-9/30 for  chest pain s/p PCI of ISR of LAD x 2 and PTCA of diag on 9/29/23, since readmitted to [facility] 10/3 after presenting with recurrent chest pain and increased CANNON since recent PCI. TTE revealed aortic stenosis (FIDEL 1, mG 27, pG 50), could not rule out bicuspid valve. Patient was re-cathed 10/6 to confirm patent stents. Patient now transferred to [facility] 10/6/23 for surgical evaluation under Dr. Cardozo. Underwent Mechanical AVR and sternal plating on 10/13.  Post op course uneventful thusfar.  One brief episode of atrial fibrillation converted with 5 mg IV lopressor. 10/16 Converting intermittently to AFib again, received 2 Amio boluses.

## 2023-10-18 NOTE — PROGRESS NOTE ADULT - ASSESSMENT
Harjit Thorpe is a 58 year old male with obesity, HTN, HL, type 2 DM, CKD 3, CAD s/p 8 stents (s/p PCI of ISR or LADx2 and PTCA of Diag on 9/29) who presented with recurrent chest pain found to have severe AS now s/p mechanical AVR and sternal plate on 10/13/23 whose post-operative course was complicated by atrial fibrillation for which he was given amiodarone and AVN blockers with an episode of long conversion pause followed by transient AV block yesterday with up to a 12 second pause for which EP is consulted.    #Paroxysmal AF:  - Continue warfarin  - Avoid AVN blockers, if patient has recurrent AT/AF with RVR can start low dose BB    #Complete AV Block:  - Possibly iatrogenic, hold amiodarone and AVN blockers  - If patient has recurrent AV block, consider pacemaker implant  - If no more AV block, may consider EP study  - Monitor on telemetry    #CAD:  - Consider changing to dual therapy since he is on warfarin (ie Warfarin + Plavix)    Jesus Gray MD  Clinical Cardiac Electrophysiology

## 2023-10-18 NOTE — PROGRESS NOTE ADULT - SUBJECTIVE AND OBJECTIVE BOX
INTERVAL EVENTS:  Follow up diabetes management    ROS: Denies chest pain/sob/abd pain    MEDICATIONS  (STANDING):  atorvastatin 80 milliGRAM(s) Oral at bedtime  bisacodyl Suppository 10 milliGRAM(s) Rectal once  cholecalciferol 1000 Unit(s) Oral daily  clopidogrel Tablet 75 milliGRAM(s) Oral daily  dextrose 5%. 1000 milliLiter(s) (50 mL/Hr) IV Continuous <Continuous>  dextrose 5%. 1000 milliLiter(s) (100 mL/Hr) IV Continuous <Continuous>  dextrose 5%. 1000 milliLiter(s) (100 mL/Hr) IV Continuous <Continuous>  dextrose 50% Injectable 25 milliLiter(s) IV Push every 15 minutes  dextrose 50% Injectable 50 milliLiter(s) IV Push every 15 minutes  dextrose 50% Injectable 25 Gram(s) IV Push once  dextrose 50% Injectable 12.5 Gram(s) IV Push once  fenofibrate Tablet 145 milliGRAM(s) Oral daily  furosemide    Tablet 40 milliGRAM(s) Oral daily  glucagon  Injectable 1 milliGRAM(s) IntraMuscular once  insulin glargine Injectable (LANTUS) 50 Unit(s) SubCutaneous two times a day  insulin lispro (ADMELOG) corrective regimen sliding scale   SubCutaneous Before meals and at bedtime  insulin lispro Injectable (ADMELOG) 16 Unit(s) SubCutaneous three times a day before meals  pantoprazole    Tablet 40 milliGRAM(s) Oral daily  polyethylene glycol 3350 17 Gram(s) Oral daily  senna 2 Tablet(s) Oral at bedtime  sodium chloride 0.9% lock flush 3 milliLiter(s) IV Push every 8 hours  warfarin 5 milliGRAM(s) Oral once    MEDICATIONS  (PRN):  acetaminophen     Tablet .. 975 milliGRAM(s) Oral every 6 hours PRN Mild Pain (1 - 3)  dextrose Oral Gel 15 Gram(s) Oral once PRN Blood Glucose LESS THAN 70 milliGRAM(s)/deciliter  oxyCODONE    IR 5 milliGRAM(s) Oral every 4 hours PRN Moderate Pain (4 - 6)  oxyCODONE    IR 10 milliGRAM(s) Oral every 4 hours PRN Severe Pain (7 - 10)    Allergies  scallops (Nausea)  penicillin (Hives)    Vital Signs Last 24 Hrs  T(C): 36.7 (18 Oct 2023 12:00), Max: 37 (17 Oct 2023 16:34)  T(F): 98 (18 Oct 2023 12:00), Max: 98.6 (17 Oct 2023 16:34)  HR: 90 (18 Oct 2023 12:00) (74 - 115)  BP: 135/74 (18 Oct 2023 12:00) (121/71 - 160/85)  BP(mean): --  RR: 18 (18 Oct 2023 12:00) (18 - 18)  SpO2: 99% (18 Oct 2023 12:00) (95% - 99%)    Parameters below as of 18 Oct 2023 12:00  Patient On (Oxygen Delivery Method): room air    PHYSICAL EXAM:  General: No apparent distress  Neck: Supple, trachea midline, no thyromegaly  Respiratory: Lungs clear bilaterally, normal rate, effort  Cardiac: +S1, S2, no m/r/g  GI: +BS, soft, non tender, non distended  Extremities: No peripheral edema, no pedal lesions  Neuro: A+O X3, no tremor      LABS:                        8.7    11.02 )-----------( 293      ( 18 Oct 2023 05:05 )             27.8     10-18    140  |  102  |  46.0<H>  ----------------------------<  124<H>  4.7   |  27.0  |  1.98<H>    Ca    8.4      18 Oct 2023 05:05  Mg     2.2     10-18      Urinalysis Basic - ( 18 Oct 2023 05:05 )    Color: x / Appearance: x / SG: x / pH: x  Gluc: 124 mg/dL / Ketone: x  / Bili: x / Urobili: x   Blood: x / Protein: x / Nitrite: x   Leuk Esterase: x / RBC: x / WBC x   Sq Epi: x / Non Sq Epi: x / Bacteria: x    POCT Blood Glucose.: 178 mg/dL (10-18-23 @ 12:00)  POCT Blood Glucose.: 146 mg/dL (10-18-23 @ 08:09)  POCT Blood Glucose.: 186 mg/dL (10-17-23 @ 21:18)  POCT Blood Glucose.: 191 mg/dL (10-17-23 @ 18:57)  POCT Blood Glucose.: 202 mg/dL (10-17-23 @ 17:10)    Thyroid Stimulating Hormone, Serum: 4.61 uIU/mL (10-07-23 @ 02:30)  Free Thyroxine, Serum: 1.1 ng/dL (10-07-23 @ 02:30)  Thyroid Stimulating Hormone, Serum: 2.97 uU/mL (10-05-23 @ 06:31)

## 2023-10-19 LAB
ANION GAP SERPL CALC-SCNC: 13 MMOL/L — SIGNIFICANT CHANGE UP (ref 5–17)
ANION GAP SERPL CALC-SCNC: 13 MMOL/L — SIGNIFICANT CHANGE UP (ref 5–17)
APTT BLD: 40.5 SEC — HIGH (ref 24.5–35.6)
APTT BLD: 40.5 SEC — HIGH (ref 24.5–35.6)
BUN SERPL-MCNC: 34.5 MG/DL — HIGH (ref 8–20)
BUN SERPL-MCNC: 34.5 MG/DL — HIGH (ref 8–20)
CALCIUM SERPL-MCNC: 8.3 MG/DL — LOW (ref 8.4–10.5)
CALCIUM SERPL-MCNC: 8.3 MG/DL — LOW (ref 8.4–10.5)
CHLORIDE SERPL-SCNC: 100 MMOL/L — SIGNIFICANT CHANGE UP (ref 96–108)
CHLORIDE SERPL-SCNC: 100 MMOL/L — SIGNIFICANT CHANGE UP (ref 96–108)
CO2 SERPL-SCNC: 29 MMOL/L — SIGNIFICANT CHANGE UP (ref 22–29)
CO2 SERPL-SCNC: 29 MMOL/L — SIGNIFICANT CHANGE UP (ref 22–29)
CREAT SERPL-MCNC: 1.8 MG/DL — HIGH (ref 0.5–1.3)
CREAT SERPL-MCNC: 1.8 MG/DL — HIGH (ref 0.5–1.3)
EGFR: 44 ML/MIN/1.73M2 — LOW
EGFR: 44 ML/MIN/1.73M2 — LOW
GLUCOSE BLDC GLUCOMTR-MCNC: 119 MG/DL — HIGH (ref 70–99)
GLUCOSE BLDC GLUCOMTR-MCNC: 119 MG/DL — HIGH (ref 70–99)
GLUCOSE BLDC GLUCOMTR-MCNC: 122 MG/DL — HIGH (ref 70–99)
GLUCOSE BLDC GLUCOMTR-MCNC: 122 MG/DL — HIGH (ref 70–99)
GLUCOSE BLDC GLUCOMTR-MCNC: 138 MG/DL — HIGH (ref 70–99)
GLUCOSE BLDC GLUCOMTR-MCNC: 138 MG/DL — HIGH (ref 70–99)
GLUCOSE BLDC GLUCOMTR-MCNC: 172 MG/DL — HIGH (ref 70–99)
GLUCOSE BLDC GLUCOMTR-MCNC: 172 MG/DL — HIGH (ref 70–99)
GLUCOSE BLDC GLUCOMTR-MCNC: 89 MG/DL — SIGNIFICANT CHANGE UP (ref 70–99)
GLUCOSE BLDC GLUCOMTR-MCNC: 89 MG/DL — SIGNIFICANT CHANGE UP (ref 70–99)
GLUCOSE BLDC GLUCOMTR-MCNC: 99 MG/DL — SIGNIFICANT CHANGE UP (ref 70–99)
GLUCOSE BLDC GLUCOMTR-MCNC: 99 MG/DL — SIGNIFICANT CHANGE UP (ref 70–99)
GLUCOSE SERPL-MCNC: 75 MG/DL — SIGNIFICANT CHANGE UP (ref 70–99)
GLUCOSE SERPL-MCNC: 75 MG/DL — SIGNIFICANT CHANGE UP (ref 70–99)
HCT VFR BLD CALC: 28.1 % — LOW (ref 39–50)
HCT VFR BLD CALC: 28.1 % — LOW (ref 39–50)
HGB BLD-MCNC: 8.8 G/DL — LOW (ref 13–17)
HGB BLD-MCNC: 8.8 G/DL — LOW (ref 13–17)
INR BLD: 1.7 RATIO — HIGH (ref 0.85–1.18)
INR BLD: 1.7 RATIO — HIGH (ref 0.85–1.18)
MAGNESIUM SERPL-MCNC: 2 MG/DL — SIGNIFICANT CHANGE UP (ref 1.6–2.6)
MAGNESIUM SERPL-MCNC: 2 MG/DL — SIGNIFICANT CHANGE UP (ref 1.6–2.6)
MCHC RBC-ENTMCNC: 28.3 PG — SIGNIFICANT CHANGE UP (ref 27–34)
MCHC RBC-ENTMCNC: 28.3 PG — SIGNIFICANT CHANGE UP (ref 27–34)
MCHC RBC-ENTMCNC: 31.3 GM/DL — LOW (ref 32–36)
MCHC RBC-ENTMCNC: 31.3 GM/DL — LOW (ref 32–36)
MCV RBC AUTO: 90.4 FL — SIGNIFICANT CHANGE UP (ref 80–100)
MCV RBC AUTO: 90.4 FL — SIGNIFICANT CHANGE UP (ref 80–100)
PLATELET # BLD AUTO: 322 K/UL — SIGNIFICANT CHANGE UP (ref 150–400)
PLATELET # BLD AUTO: 322 K/UL — SIGNIFICANT CHANGE UP (ref 150–400)
POTASSIUM SERPL-MCNC: 3.9 MMOL/L — SIGNIFICANT CHANGE UP (ref 3.5–5.3)
POTASSIUM SERPL-MCNC: 3.9 MMOL/L — SIGNIFICANT CHANGE UP (ref 3.5–5.3)
POTASSIUM SERPL-SCNC: 3.9 MMOL/L — SIGNIFICANT CHANGE UP (ref 3.5–5.3)
POTASSIUM SERPL-SCNC: 3.9 MMOL/L — SIGNIFICANT CHANGE UP (ref 3.5–5.3)
PROTHROM AB SERPL-ACNC: 18.6 SEC — HIGH (ref 9.5–13)
PROTHROM AB SERPL-ACNC: 18.6 SEC — HIGH (ref 9.5–13)
RBC # BLD: 3.11 M/UL — LOW (ref 4.2–5.8)
RBC # BLD: 3.11 M/UL — LOW (ref 4.2–5.8)
RBC # FLD: 13.6 % — SIGNIFICANT CHANGE UP (ref 10.3–14.5)
RBC # FLD: 13.6 % — SIGNIFICANT CHANGE UP (ref 10.3–14.5)
SODIUM SERPL-SCNC: 142 MMOL/L — SIGNIFICANT CHANGE UP (ref 135–145)
SODIUM SERPL-SCNC: 142 MMOL/L — SIGNIFICANT CHANGE UP (ref 135–145)
WBC # BLD: 9.54 K/UL — SIGNIFICANT CHANGE UP (ref 3.8–10.5)
WBC # BLD: 9.54 K/UL — SIGNIFICANT CHANGE UP (ref 3.8–10.5)
WBC # FLD AUTO: 9.54 K/UL — SIGNIFICANT CHANGE UP (ref 3.8–10.5)
WBC # FLD AUTO: 9.54 K/UL — SIGNIFICANT CHANGE UP (ref 3.8–10.5)

## 2023-10-19 PROCEDURE — 99233 SBSQ HOSP IP/OBS HIGH 50: CPT

## 2023-10-19 PROCEDURE — 71045 X-RAY EXAM CHEST 1 VIEW: CPT | Mod: 26

## 2023-10-19 PROCEDURE — 93010 ELECTROCARDIOGRAM REPORT: CPT

## 2023-10-19 PROCEDURE — 99024 POSTOP FOLLOW-UP VISIT: CPT

## 2023-10-19 RX ORDER — INSULIN GLARGINE 100 [IU]/ML
35 INJECTION, SOLUTION SUBCUTANEOUS EVERY MORNING
Refills: 0 | Status: DISCONTINUED | OUTPATIENT
Start: 2023-10-19 | End: 2023-10-23

## 2023-10-19 RX ORDER — INSULIN GLARGINE 100 [IU]/ML
20 INJECTION, SOLUTION SUBCUTANEOUS AT BEDTIME
Refills: 0 | Status: DISCONTINUED | OUTPATIENT
Start: 2023-10-19 | End: 2023-10-22

## 2023-10-19 RX ORDER — METOPROLOL TARTRATE 50 MG
5 TABLET ORAL ONCE
Refills: 0 | Status: COMPLETED | OUTPATIENT
Start: 2023-10-19 | End: 2023-10-19

## 2023-10-19 RX ORDER — INSULIN GLARGINE 100 [IU]/ML
35 INJECTION, SOLUTION SUBCUTANEOUS
Refills: 0 | Status: DISCONTINUED | OUTPATIENT
Start: 2023-10-19 | End: 2023-10-19

## 2023-10-19 RX ORDER — INSULIN LISPRO 100/ML
12 VIAL (ML) SUBCUTANEOUS
Refills: 0 | Status: DISCONTINUED | OUTPATIENT
Start: 2023-10-19 | End: 2023-10-20

## 2023-10-19 RX ORDER — INSULIN GLARGINE 100 [IU]/ML
45 INJECTION, SOLUTION SUBCUTANEOUS
Refills: 0 | Status: DISCONTINUED | OUTPATIENT
Start: 2023-10-19 | End: 2023-10-19

## 2023-10-19 RX ORDER — POTASSIUM CHLORIDE 20 MEQ
20 PACKET (EA) ORAL ONCE
Refills: 0 | Status: COMPLETED | OUTPATIENT
Start: 2023-10-19 | End: 2023-10-19

## 2023-10-19 RX ORDER — INSULIN LISPRO 100/ML
12 VIAL (ML) SUBCUTANEOUS
Refills: 0 | Status: DISCONTINUED | OUTPATIENT
Start: 2023-10-19 | End: 2023-10-19

## 2023-10-19 RX ORDER — METOPROLOL TARTRATE 50 MG
25 TABLET ORAL
Refills: 0 | Status: DISCONTINUED | OUTPATIENT
Start: 2023-10-19 | End: 2023-10-20

## 2023-10-19 RX ORDER — MAGNESIUM SULFATE 500 MG/ML
2 VIAL (ML) INJECTION ONCE
Refills: 0 | Status: COMPLETED | OUTPATIENT
Start: 2023-10-19 | End: 2023-10-19

## 2023-10-19 RX ORDER — WARFARIN SODIUM 2.5 MG/1
5 TABLET ORAL ONCE
Refills: 0 | Status: COMPLETED | OUTPATIENT
Start: 2023-10-19 | End: 2023-10-19

## 2023-10-19 RX ADMIN — ATORVASTATIN CALCIUM 80 MILLIGRAM(S): 80 TABLET, FILM COATED ORAL at 21:07

## 2023-10-19 RX ADMIN — SODIUM CHLORIDE 3 MILLILITER(S): 9 INJECTION INTRAMUSCULAR; INTRAVENOUS; SUBCUTANEOUS at 05:01

## 2023-10-19 RX ADMIN — Medication 5 MILLIGRAM(S): at 09:27

## 2023-10-19 RX ADMIN — Medication 2: at 12:51

## 2023-10-19 RX ADMIN — Medication 16 UNIT(S): at 08:35

## 2023-10-19 RX ADMIN — Medication 12 UNIT(S): at 12:55

## 2023-10-19 RX ADMIN — WARFARIN SODIUM 5 MILLIGRAM(S): 2.5 TABLET ORAL at 21:07

## 2023-10-19 RX ADMIN — Medication 5 MILLIGRAM(S): at 06:43

## 2023-10-19 RX ADMIN — PANTOPRAZOLE SODIUM 40 MILLIGRAM(S): 20 TABLET, DELAYED RELEASE ORAL at 12:49

## 2023-10-19 RX ADMIN — Medication 1000 UNIT(S): at 12:49

## 2023-10-19 RX ADMIN — Medication 25 MILLIGRAM(S): at 12:49

## 2023-10-19 RX ADMIN — INSULIN GLARGINE 50 UNIT(S): 100 INJECTION, SOLUTION SUBCUTANEOUS at 09:26

## 2023-10-19 RX ADMIN — Medication 25 MILLIGRAM(S): at 18:07

## 2023-10-19 RX ADMIN — CLOPIDOGREL BISULFATE 75 MILLIGRAM(S): 75 TABLET, FILM COATED ORAL at 12:49

## 2023-10-19 RX ADMIN — SODIUM CHLORIDE 3 MILLILITER(S): 9 INJECTION INTRAMUSCULAR; INTRAVENOUS; SUBCUTANEOUS at 21:08

## 2023-10-19 RX ADMIN — Medication 975 MILLIGRAM(S): at 20:14

## 2023-10-19 RX ADMIN — INSULIN GLARGINE 20 UNIT(S): 100 INJECTION, SOLUTION SUBCUTANEOUS at 23:40

## 2023-10-19 RX ADMIN — Medication 5 MILLIGRAM(S): at 08:05

## 2023-10-19 RX ADMIN — SODIUM CHLORIDE 3 MILLILITER(S): 9 INJECTION INTRAMUSCULAR; INTRAVENOUS; SUBCUTANEOUS at 14:03

## 2023-10-19 RX ADMIN — Medication 12 UNIT(S): at 18:09

## 2023-10-19 RX ADMIN — Medication 25 GRAM(S): at 06:52

## 2023-10-19 RX ADMIN — Medication 145 MILLIGRAM(S): at 12:51

## 2023-10-19 RX ADMIN — Medication 40 MILLIGRAM(S): at 05:03

## 2023-10-19 RX ADMIN — Medication 20 MILLIEQUIVALENT(S): at 18:06

## 2023-10-19 NOTE — PROGRESS NOTE ADULT - SUBJECTIVE AND OBJECTIVE BOX
INTERVAL EVENTS:  Follow up diabetes management  Afib with RVR this morning    ROS: Complains of mild palpitations (improved since this am). No SOB.    MEDICATIONS  (STANDING):  atorvastatin 80 milliGRAM(s) Oral at bedtime  bisacodyl Suppository 10 milliGRAM(s) Rectal once  cholecalciferol 1000 Unit(s) Oral daily  clopidogrel Tablet 75 milliGRAM(s) Oral daily  dextrose 5%. 1000 milliLiter(s) (50 mL/Hr) IV Continuous <Continuous>  dextrose 5%. 1000 milliLiter(s) (100 mL/Hr) IV Continuous <Continuous>  dextrose 50% Injectable 25 milliLiter(s) IV Push every 15 minutes  dextrose 50% Injectable 50 milliLiter(s) IV Push every 15 minutes  dextrose 50% Injectable 25 Gram(s) IV Push once  dextrose 50% Injectable 12.5 Gram(s) IV Push once  fenofibrate Tablet 145 milliGRAM(s) Oral daily  furosemide    Tablet 40 milliGRAM(s) Oral daily  glucagon  Injectable 1 milliGRAM(s) IntraMuscular once  insulin glargine Injectable (LANTUS) 45 Unit(s) SubCutaneous two times a day  insulin lispro (ADMELOG) corrective regimen sliding scale   SubCutaneous Before meals and at bedtime  insulin lispro Injectable (ADMELOG) 12 Unit(s) SubCutaneous three times a day before meals  metoprolol tartrate 25 milliGRAM(s) Oral two times a day  pantoprazole    Tablet 40 milliGRAM(s) Oral daily  polyethylene glycol 3350 17 Gram(s) Oral daily  senna 2 Tablet(s) Oral at bedtime  sodium chloride 0.9% lock flush 3 milliLiter(s) IV Push every 8 hours    MEDICATIONS  (PRN):  acetaminophen     Tablet .. 975 milliGRAM(s) Oral every 6 hours PRN Mild Pain (1 - 3)  dextrose Oral Gel 15 Gram(s) Oral once PRN Blood Glucose LESS THAN 70 milliGRAM(s)/deciliter  oxyCODONE    IR 10 milliGRAM(s) Oral every 4 hours PRN Severe Pain (7 - 10)  oxyCODONE    IR 5 milliGRAM(s) Oral every 4 hours PRN Moderate Pain (4 - 6)    Allergies  scallops (Nausea)  penicillin (Hives)    Vital Signs Last 24 Hrs  T(C): 36.7 (19 Oct 2023 08:22), Max: 37.3 (18 Oct 2023 22:24)  T(F): 98.1 (19 Oct 2023 08:22), Max: 99.1 (18 Oct 2023 22:24)  HR: 120 (19 Oct 2023 09:00) (92 - 126)  BP: 117/78 (19 Oct 2023 09:00) (117/78 - 150/90)  BP(mean): 1 (19 Oct 2023 06:49) (1 - 1)  RR: 18 (19 Oct 2023 08:22) (17 - 18)  SpO2: 98% (19 Oct 2023 08:22) (95% - 100%)    Parameters below as of 19 Oct 2023 08:22  Patient On (Oxygen Delivery Method): room air    PHYSICAL EXAM:  General: No apparent distress  Neck: Supple, trachea midline, no thyromegaly  Respiratory: Lungs clear bilaterally, normal rate, effort  Cardiac: +S1, S2, irregularly irregular  GI: +BS, soft, non tender, non distended  Extremities: No peripheral edema, no pedal lesions  Neuro: A+O X3, no tremor      LABS:                        8.8    9.54  )-----------( 322      ( 19 Oct 2023 05:51 )             28.1     10-19    142  |  100  |  34.5<H>  ----------------------------<  75  3.9   |  29.0  |  1.80<H>    Ca    8.3<L>      19 Oct 2023 05:51  Mg     2.0     10-19      Urinalysis Basic - ( 19 Oct 2023 05:51 )    Color: x / Appearance: x / SG: x / pH: x  Gluc: 75 mg/dL / Ketone: x  / Bili: x / Urobili: x   Blood: x / Protein: x / Nitrite: x   Leuk Esterase: x / RBC: x / WBC x   Sq Epi: x / Non Sq Epi: x / Bacteria: x    POCT Blood Glucose.: 172 mg/dL (10-19-23 @ 12:00)  POCT Blood Glucose.: 138 mg/dL (10-19-23 @ 09:19)  POCT Blood Glucose.: 122 mg/dL (10-19-23 @ 08:02)  POCT Blood Glucose.: 142 mg/dL (10-18-23 @ 22:26)  POCT Blood Glucose.: 157 mg/dL (10-18-23 @ 21:07)  POCT Blood Glucose.: 172 mg/dL (10-18-23 @ 17:23)    Thyroid Stimulating Hormone, Serum: 4.61 uIU/mL (10-07-23 @ 02:30)  Free Thyroxine, Serum: 1.1 ng/dL (10-07-23 @ 02:30)  Thyroid Stimulating Hormone, Serum: 2.97 uU/mL (10-05-23 @ 06:31)

## 2023-10-19 NOTE — PROGRESS NOTE ADULT - PROBLEM SELECTOR PLAN 1
TTE at [facility] revealed aortic stenosis (FIDEL 1, mG 27, pG 50), could not rule out bicuspid valve.  Transferred to [facility] 10/6/23 for surgical evaluation under Dr. Cardozo.  MARIBEL performed 10/9 with findings of moderate AS  s/p mechanical AVR 10/13/23  BB held for ~13 second ventricular standstill event on 10/17  Plavix in setting of recent PCI   Aspirin for acute valve prophylaxis  Started coumadin for mechanical valve, remains sub therapeutic- receiving daily INR draws  F/u TTE 10/18 with nml EF and no pericardial effusion  Coughing and deep breathing exercises/incentive spirometry encouraged/chest PT  Monitor I/O's, UO, and replete electrolytes PRN  Diuretic with daily lasix  Continue with PT, increase activity as tolerated, OOB to chair during the day  FS ACHS with coverage for glycemic control  PRNs and adjuvants for analgesia  Continue supportive care  Above plan of care to be discussed with Dr. Cardozo and CT surgical team on AM rounds

## 2023-10-19 NOTE — PROGRESS NOTE ADULT - SUBJECTIVE AND OBJECTIVE BOX
Subjective: Pt converted to AF at approximately 6am. Pt endorses intermittent palpitations and SOB since converting to AF. No other complaints.      TELE: Atrial fibrillation with rapid ventricular rates (120-150s). No high grade AVB or significant pauses appreciated.    MEDICATIONS  (STANDING):  atorvastatin 80 milliGRAM(s) Oral at bedtime  bisacodyl Suppository 10 milliGRAM(s) Rectal once  cholecalciferol 1000 Unit(s) Oral daily  clopidogrel Tablet 75 milliGRAM(s) Oral daily  dextrose 5%. 1000 milliLiter(s) (50 mL/Hr) IV Continuous <Continuous>  dextrose 5%. 1000 milliLiter(s) (100 mL/Hr) IV Continuous <Continuous>  dextrose 50% Injectable 50 milliLiter(s) IV Push every 15 minutes  dextrose 50% Injectable 25 milliLiter(s) IV Push every 15 minutes  dextrose 50% Injectable 25 Gram(s) IV Push once  dextrose 50% Injectable 12.5 Gram(s) IV Push once  fenofibrate Tablet 145 milliGRAM(s) Oral daily  furosemide    Tablet 40 milliGRAM(s) Oral daily  glucagon  Injectable 1 milliGRAM(s) IntraMuscular once  insulin glargine Injectable (LANTUS) 50 Unit(s) SubCutaneous two times a day  insulin lispro (ADMELOG) corrective regimen sliding scale   SubCutaneous Before meals and at bedtime  insulin lispro Injectable (ADMELOG) 16 Unit(s) SubCutaneous three times a day before meals  metoprolol tartrate 25 milliGRAM(s) Oral two times a day  pantoprazole    Tablet 40 milliGRAM(s) Oral daily  polyethylene glycol 3350 17 Gram(s) Oral daily  senna 2 Tablet(s) Oral at bedtime  sodium chloride 0.9% lock flush 3 milliLiter(s) IV Push every 8 hours    MEDICATIONS  (PRN):  acetaminophen     Tablet .. 975 milliGRAM(s) Oral every 6 hours PRN Mild Pain (1 - 3)  dextrose Oral Gel 15 Gram(s) Oral once PRN Blood Glucose LESS THAN 70 milliGRAM(s)/deciliter  oxyCODONE    IR 5 milliGRAM(s) Oral every 4 hours PRN Moderate Pain (4 - 6)  oxyCODONE    IR 10 milliGRAM(s) Oral every 4 hours PRN Severe Pain (7 - 10)      Allergies    scallops (Nausea)  penicillin (Hives)    Intolerances        Vital Signs Last 24 Hrs  T(C): 36.7 (19 Oct 2023 08:22), Max: 37.3 (18 Oct 2023 22:24)  T(F): 98.1 (19 Oct 2023 08:22), Max: 99.1 (18 Oct 2023 22:24)  HR: 120 (19 Oct 2023 09:00) (90 - 126)  BP: 117/78 (19 Oct 2023 09:00) (117/78 - 150/90)  BP(mean): 1 (19 Oct 2023 06:49) (1 - 1)  RR: 18 (19 Oct 2023 08:22) (17 - 18)  SpO2: 98% (19 Oct 2023 08:22) (95% - 100%)    Parameters below as of 19 Oct 2023 08:22  Patient On (Oxygen Delivery Method): room air        Physical Exam:  Constitutional: NAD  Head: normocephalic atraumatic  Chest wall: Mid sternal incision C/D/I  Resp: effort normal, breath sounds clear to auscultation bilaterally  Cardiac: irregular rate and rhythm. S1/S2  Abdomen: soft, nondistended, bowel sounds active, nontender to palpation in all four quadrants    Neuro: Alert and oriented x 3  Skin: color normal, no rashes or injury  Psych: mood calm      LABS:                        8.8    9.54  )-----------( 322      ( 19 Oct 2023 05:51 )             28.1     10-19    142  |  100  |  34.5<H>  ----------------------------<  75  3.9   |  29.0  |  1.80<H>    Ca    8.3<L>      19 Oct 2023 05:51  Mg     2.0     10-19      PT/INR - ( 19 Oct 2023 05:51 )   PT: 18.6 sec;   INR: 1.70 ratio         PTT - ( 19 Oct 2023 05:51 )  PTT:40.5 sec  Urinalysis Basic - ( 19 Oct 2023 05:51 )    Color: x / Appearance: x / SG: x / pH: x  Gluc: 75 mg/dL / Ketone: x  / Bili: x / Urobili: x   Blood: x / Protein: x / Nitrite: x   Leuk Esterase: x / RBC: x / WBC x   Sq Epi: x / Non Sq Epi: x / Bacteria: x        RADIOLOGY & ADDITIONAL TESTS:      < from: TTE Echo Complete w/ Contrast w/ Doppler (10.18.23 @ 15:39) >   1. Left ventricular ejection fraction, by visual estimation, is 60 to   65%.   2. Technically difficult study.   3. Normal global left ventricular systolic function.   4. There is moderate concentric left ventricular hypertrophy.   5. There is a significant pericardial fat pad present.   6. There is no evidence of pericardial effusion.   7. Mild Mitral valve prolapse.   8. Trace mitral valve regurgitation.   9. Aortic valve is normally functioning mechanical prosthetic valve.  10. Mechanical prosthesis in the aortic valve position.      Assessment:     Pt is a 57 y/o male, PMHx significant for obesity, HTN, HL, type 2 DM, CKD 3, CAD s/p 8 stents (s/p PCI of ISR or LADx2 and PTCA of Diag on 9/29) who presented with recurrent chest pain found to have severe AS now s/p mechanical AVR and sternal plate on 10/13/23 whose post-operative course was complicated by atrial fibrillation for which he was given amiodarone and AVN blockers with an episode of long conversion pause followed by transient AV block  with up to a 12 second pause for which EP service was initially consulted consulted.    Pt was taken of Metoprolol and Amiodarone and had maintained sinus rhythm with no further events until converting back to AF RVR at approximately 6:00am this morning. Pt endorses intermittent palpitations and SOB since converting back to AF. Ventricular rates currently uncontrolled (120-150s). Denies chest pain, dizziness, chills, abdominal pain.      1)Paroxysmal AF with RVR  - Continue warfarin and follow INR. (1.70 this AM)   - Start Metoprolol 25mg BID  - Avoid Amiodarone given recent AV block    2)Complete AV Block:  - No further episodes appreciated overnight  - If patient has recurrent AV block will consider pacemaker implant  - If no more AV block, may consider EP study prior to discharge  - Maintain telemetry monitoring    Full recommendations to follow, will discuss with EP attending       Subjective: Pt converted to AF at approximately 6am. Pt endorses intermittent palpitations and SOB since converting to AF. No other complaints.      TELE: Atrial fibrillation with rapid ventricular rates (120-150s). No high grade AVB or significant pauses appreciated.    MEDICATIONS  (STANDING):  atorvastatin 80 milliGRAM(s) Oral at bedtime  bisacodyl Suppository 10 milliGRAM(s) Rectal once  cholecalciferol 1000 Unit(s) Oral daily  clopidogrel Tablet 75 milliGRAM(s) Oral daily  dextrose 5%. 1000 milliLiter(s) (50 mL/Hr) IV Continuous <Continuous>  dextrose 5%. 1000 milliLiter(s) (100 mL/Hr) IV Continuous <Continuous>  dextrose 50% Injectable 50 milliLiter(s) IV Push every 15 minutes  dextrose 50% Injectable 25 milliLiter(s) IV Push every 15 minutes  dextrose 50% Injectable 25 Gram(s) IV Push once  dextrose 50% Injectable 12.5 Gram(s) IV Push once  fenofibrate Tablet 145 milliGRAM(s) Oral daily  furosemide    Tablet 40 milliGRAM(s) Oral daily  glucagon  Injectable 1 milliGRAM(s) IntraMuscular once  insulin glargine Injectable (LANTUS) 50 Unit(s) SubCutaneous two times a day  insulin lispro (ADMELOG) corrective regimen sliding scale   SubCutaneous Before meals and at bedtime  insulin lispro Injectable (ADMELOG) 16 Unit(s) SubCutaneous three times a day before meals  metoprolol tartrate 25 milliGRAM(s) Oral two times a day  pantoprazole    Tablet 40 milliGRAM(s) Oral daily  polyethylene glycol 3350 17 Gram(s) Oral daily  senna 2 Tablet(s) Oral at bedtime  sodium chloride 0.9% lock flush 3 milliLiter(s) IV Push every 8 hours    MEDICATIONS  (PRN):  acetaminophen     Tablet .. 975 milliGRAM(s) Oral every 6 hours PRN Mild Pain (1 - 3)  dextrose Oral Gel 15 Gram(s) Oral once PRN Blood Glucose LESS THAN 70 milliGRAM(s)/deciliter  oxyCODONE    IR 5 milliGRAM(s) Oral every 4 hours PRN Moderate Pain (4 - 6)  oxyCODONE    IR 10 milliGRAM(s) Oral every 4 hours PRN Severe Pain (7 - 10)      Allergies    scallops (Nausea)  penicillin (Hives)    Intolerances        Vital Signs Last 24 Hrs  T(C): 36.7 (19 Oct 2023 08:22), Max: 37.3 (18 Oct 2023 22:24)  T(F): 98.1 (19 Oct 2023 08:22), Max: 99.1 (18 Oct 2023 22:24)  HR: 120 (19 Oct 2023 09:00) (90 - 126)  BP: 117/78 (19 Oct 2023 09:00) (117/78 - 150/90)  BP(mean): 1 (19 Oct 2023 06:49) (1 - 1)  RR: 18 (19 Oct 2023 08:22) (17 - 18)  SpO2: 98% (19 Oct 2023 08:22) (95% - 100%)    Parameters below as of 19 Oct 2023 08:22  Patient On (Oxygen Delivery Method): room air        Physical Exam:  Constitutional: NAD  Head: normocephalic atraumatic  Chest wall: Mid sternal incision C/D/I  Resp: effort normal, breath sounds clear to auscultation bilaterally  Cardiac: irregular rate and rhythm. S1/S2  Abdomen: soft, nondistended, bowel sounds active, nontender to palpation in all four quadrants    Neuro: Alert and oriented x 3  Skin: color normal, no rashes or injury  Psych: mood calm      LABS:                        8.8    9.54  )-----------( 322      ( 19 Oct 2023 05:51 )             28.1     10-19    142  |  100  |  34.5<H>  ----------------------------<  75  3.9   |  29.0  |  1.80<H>    Ca    8.3<L>      19 Oct 2023 05:51  Mg     2.0     10-19      PT/INR - ( 19 Oct 2023 05:51 )   PT: 18.6 sec;   INR: 1.70 ratio         PTT - ( 19 Oct 2023 05:51 )  PTT:40.5 sec  Urinalysis Basic - ( 19 Oct 2023 05:51 )    Color: x / Appearance: x / SG: x / pH: x  Gluc: 75 mg/dL / Ketone: x  / Bili: x / Urobili: x   Blood: x / Protein: x / Nitrite: x   Leuk Esterase: x / RBC: x / WBC x   Sq Epi: x / Non Sq Epi: x / Bacteria: x        RADIOLOGY & ADDITIONAL TESTS:      < from: TTE Echo Complete w/ Contrast w/ Doppler (10.18.23 @ 15:39) >   1. Left ventricular ejection fraction, by visual estimation, is 60 to   65%.   2. Technically difficult study.   3. Normal global left ventricular systolic function.   4. There is moderate concentric left ventricular hypertrophy.   5. There is a significant pericardial fat pad present.   6. There is no evidence of pericardial effusion.   7. Mild Mitral valve prolapse.   8. Trace mitral valve regurgitation.   9. Aortic valve is normally functioning mechanical prosthetic valve.  10. Mechanical prosthesis in the aortic valve position.      Assessment:     Pt is a 59 y/o male, PMHx significant for obesity, HTN, HL, type 2 DM, CKD 3, CAD s/p 8 stents (s/p PCI of ISR or LADx2 and PTCA of Diag on 9/29) who presented with recurrent chest pain found to have severe AS now s/p mechanical AVR and sternal plate on 10/13/23 whose post-operative course was complicated by atrial fibrillation for which he was given amiodarone and AVN blockers with an episode of long conversion pause followed by transient AV block  with up to a 12 second pause for which EP service was initially consulted consulted.    Pt was taken of Metoprolol and Amiodarone and had maintained sinus rhythm with no further events until converting back to AF RVR at approximately 6:00am this morning. Pt endorses intermittent palpitations and SOB since converting back to AF. Ventricular rates currently uncontrolled (120-150s). Denies chest pain, dizziness, chills, abdominal pain.      1)Paroxysmal AF with RVR  - Continue warfarin and follow INR. (1.70 this AM)   - Start Metoprolol 25mg BID. Titrate as BP tolerates  - Avoid Amiodarone given recent AV block    2)Complete AV Block:  - No further episodes appreciated overnight  - If patient has recurrent AV block will consider pacemaker implant  - If no more AV block, may consider EP study prior to discharge  - Maintain telemetry monitoring    Discussed with Dr. Everett, further recommendations to follow

## 2023-10-19 NOTE — PROGRESS NOTE ADULT - SUBJECTIVE AND OBJECTIVE BOX
Significant recent/past 24 hr events:  No acute events reported overnight.     SUBJECTIVE:  Patient seen and examined on follow up now POD #6 for mechanical AVR. Pt currently lying in bed in NAD. Denies fevers, chills, HA, dizziness, CP, SOB, abd pain, N/V/D, numbness/tingling in extremities, or any other acute complaints. States pain well-controlled on current regimen.  +Tolerating diet  +Passing BMs since surgery     PAST MEDICAL & SURGICAL HISTORY:  Essential hypertension  Obstructive sleep apnea  Diabetes mellitus  HLD (hyperlipidemia)  CAD (coronary artery disease)  Pancreatitis  History of coronary artery stent placement  S/P cholecystectomy    DAILY REVIEW:  Telemetry: Sinus rhythm 1st degree  Vitals   T(C): 36.6 (19 Oct 2023 00:00), Max: 37.3 (18 Oct 2023 22:24)  T(F): 97.9 (19 Oct 2023 00:00), Max: 99.1 (18 Oct 2023 22:24)  HR: 102 (19 Oct 2023 00:00) (80 - 102)  BP: 144/73 (19 Oct 2023 00:00) (126/82 - 150/90)  RR: 18 (19 Oct 2023 00:00) (17 - 18)  SpO2: 95% (19 Oct 2023 00:00) (95% - 100%)    O2 Parameters below as of 19 Oct 2023 00:00  Patient On (Oxygen Delivery Method): room air    I&O's Detail    17 Oct 2023 07:01  -  18 Oct 2023 07:00  --------------------------------------------------------  IN:    Oral Fluid: 240 mL  Total IN: 240 mL    OUT:    Indwelling Catheter - Urethral (mL): 510 mL    Voided (mL): 1400 mL  Total OUT: 1910 mL    Total NET: -1670 mL    18 Oct 2023 07:01  -  19 Oct 2023 02:47  --------------------------------------------------------  IN:  Total IN: 0 mL    OUT:    Voided (mL): 1100 mL  Total OUT: 1100 mL    Total NET: -1100 mL     Daily Weight in k.8 (18 Oct 2023 06:00)  Admit Wt: Drug Dosing Weight  Height (cm): 182.9 (13 Oct 2023 06:29)  Weight (kg): 118.8 (13 Oct 2023 06:29)  BMI (kg/m2): 35.5 (13 Oct 2023 06:29)  BSA (m2): 2.39 (13 Oct 2023 06:29)    LABS:                        8.7    11.02 )-----------( 293      ( 18 Oct 2023 05:05 )             27.8     10-18    140  |  102  |  46.0<H>  ----------------------------<  124<H>  4.7   |  27.0  |  1.98<H>    Ca    8.4      18 Oct 2023 05:05  Mg     2.2     10-18    PT/INR - ( 18 Oct 2023 05:05 )   PT: 18.6 sec;   INR: 1.70 ratio       PTT - ( 18 Oct 2023 05:05 )  PTT:41.7 sec    MEDICATIONS  (STANDING):  atorvastatin 80 milliGRAM(s) Oral at bedtime  bisacodyl Suppository 10 milliGRAM(s) Rectal once  cholecalciferol 1000 Unit(s) Oral daily  clopidogrel Tablet 75 milliGRAM(s) Oral daily  fenofibrate Tablet 145 milliGRAM(s) Oral daily  furosemide    Tablet 40 milliGRAM(s) Oral daily  glucagon  Injectable 1 milliGRAM(s) IntraMuscular once  insulin glargine Injectable (LANTUS) 50 Unit(s) SubCutaneous two times a day  insulin lispro (ADMELOG) corrective regimen sliding scale   SubCutaneous Before meals and at bedtime  insulin lispro Injectable (ADMELOG) 16 Unit(s) SubCutaneous three times a day before meals  pantoprazole    Tablet 40 milliGRAM(s) Oral daily  polyethylene glycol 3350 17 Gram(s) Oral daily  senna 2 Tablet(s) Oral at bedtime  sodium chloride 0.9% lock flush 3 milliLiter(s) IV Push every 8 hours    MEDICATIONS  (PRN):  acetaminophen     Tablet .. 975 milliGRAM(s) Oral every 6 hours PRN Mild Pain (1 - 3)  oxyCODONE    IR 10 milliGRAM(s) Oral every 4 hours PRN Severe Pain (7 - 10)  oxyCODONE    IR 5 milliGRAM(s) Oral every 4 hours PRN Moderate Pain (4 - 6)    ALLERGIES:  scallops (Nausea)  penicillin (Hives)    DIAGNOSTICS:  Xray Chest 1 View- PORTABLE-Routine (Xray Chest 1 View- PORTABLE-Routine in AM.) (10.18.23 @ 06:11)  FINDINGS: Status post median sternotomy, aortic valve replacement and   coronary artery stent. Heart size and mediastinum stable. Trace residual   left pleural effusion. The lungs are otherwise grossly clear.    Impression:  Trace residual left pleural effusion.    12 Lead ECG (10.17.23 @ 19:06)  Ventricular Rate 78 BPM  Atrial Rate 78 BPM  P-R Interval 314 ms  QRS Duration 136 ms  Q-T Interval 434 ms  QTC Calculation(Bazett) 494 ms  P Axis 2 degrees  R Axis 36 degrees  T Axis -41 degrees  Diagnosis Line Sinus rhythm with 1st degree A-V block  Right bundle branch block  Abnormal ECG    TTE Echo Complete w/ Contrast w/ Doppler (10.18.23 @ 15:39)  Summary:   1. Left ventricular ejection fraction, by visual estimation, is 60 to   65%.   2. Technically difficult study.   3. Normal global left ventricular systolic function.   4. There is moderate concentric left ventricular hypertrophy.   5. There is a significant pericardial fat pad present.   6. There is no evidence of pericardial effusion.   7. Mild Mitral valve prolapse.   8. Trace mitral valve regurgitation.   9. Aortic valve is normally functioning mechanical prosthetic valve.  10. Mechanical prosthesis in the aortic valve position.    PHYSICAL EXAM:  Constitutional: NAD  Neck: supple, trachea midline. No JVD   Respiratory: Breath sounds diminished b/l lower fields to auscultation, no accessory muscle use noted. No wheezing, rales, or rhonchi noted b/l   Cardiovascular: Regular rate, regular rhythm, normal S1, S2; no murmurs or rub   Gastrointestinal: Soft, non-tender, non-distended, + bowel sounds   Extremities: [place] x 4, trace LE peripheral edema, no cyanosis, no clubbing    Vascular: Equal and normal pulses: 2+ peripheral pulses throughout  Neurological: A+O x 3; speech clear and intact; no gross sensory/motor deficits  Psychiatric: calm, normal mood, normal affect   Skin: warm, dry, well perfused, no rashes   Incision: Sternum open to air, well-approximated, healing well - No audible/palpable click  Epicardial Wires: Connected to generator, threshold 3.5, VVI 50/10/2.0

## 2023-10-19 NOTE — PROGRESS NOTE ADULT - ASSESSMENT
58 year old male with a medical history of HTN, HLD, type 2 DM (HA1c 9.6 on Insulin), CKD 3, Obesity Class II, CAD s/p 8 stents, recent admission to [facility] 9/29-9/30 for  chest pain s/p PCI of ISR of LAD x 2 and PTCA of diag on 9/29/23, since readmitted to [facility] 10/3 after presenting with recurrent chest pain and increased CANNON since recent PCI. TTE revealed aortic stenosis (FIDEL 1, mG 27, pG 50), could not rule out bicuspid valve. Patient was re-cathed 10/6 to confirm patent stents. Patient now transferred to [facility] 10/6/23 for surgical evaluation under Dr. Cardozo. Underwent Mechanical AVR and sternal plating on 10/13. Post op course with Afib RVR and episode of 13 second pause.

## 2023-10-19 NOTE — PROGRESS NOTE ADULT - ASSESSMENT
58 M with PMHx HTN, HLD, T2DM, CKD 3, obesity, CAD s/p 8 stents, recent admission to [facility] 9/29-9/30 for chest pain s/p PCI of ISR of LADx2 and PTCA of diag on 9/29/23, since readmitted to [facility] 10/3 after presenting with recurrent chest pain and increased CANNON since recent PCI. Transferred to [facility] 10/6/23 for surgical evaluation under Dr. Cardozo. MARIBEL revealed moderate AS. Underwent AVR on 10/13/23    Consulted for diabetes management  Home diabetes meds: Toujeo 140 units daily, mealtime insulin 55 units (He developed pancreatitis following use of trulicity, and was unable to tolerate a sglt-2)  Current a1c: 9.6%  Outpatient Endocrinologist: Dr Ibarra    1. Uncontrolled DM2 - glucoses controlled  - Glucose 75 on BMP this morning  - Decrease lantus to 45 units BID  - Decrease premeal admelog to 12 units tid  - Recommend to discharge on long acting insulin 45 units BID (has Toujeo at home) and mealtime insulin 12 units TID  - He will follow up with Dr Ibarra for further management     2. Afib with RVR  - EP following, started on metoprolol    3. CAD/Aortic stenosis  - S/p AVR on 10/13    4. HLD  - Continue statin

## 2023-10-20 LAB
ANION GAP SERPL CALC-SCNC: 11 MMOL/L — SIGNIFICANT CHANGE UP (ref 5–17)
ANION GAP SERPL CALC-SCNC: 11 MMOL/L — SIGNIFICANT CHANGE UP (ref 5–17)
BUN SERPL-MCNC: 35.3 MG/DL — HIGH (ref 8–20)
BUN SERPL-MCNC: 35.3 MG/DL — HIGH (ref 8–20)
CALCIUM SERPL-MCNC: 8.5 MG/DL — SIGNIFICANT CHANGE UP (ref 8.4–10.5)
CALCIUM SERPL-MCNC: 8.5 MG/DL — SIGNIFICANT CHANGE UP (ref 8.4–10.5)
CHLORIDE SERPL-SCNC: 99 MMOL/L — SIGNIFICANT CHANGE UP (ref 96–108)
CHLORIDE SERPL-SCNC: 99 MMOL/L — SIGNIFICANT CHANGE UP (ref 96–108)
CO2 SERPL-SCNC: 27 MMOL/L — SIGNIFICANT CHANGE UP (ref 22–29)
CO2 SERPL-SCNC: 27 MMOL/L — SIGNIFICANT CHANGE UP (ref 22–29)
CREAT SERPL-MCNC: 2.04 MG/DL — HIGH (ref 0.5–1.3)
CREAT SERPL-MCNC: 2.04 MG/DL — HIGH (ref 0.5–1.3)
EGFR: 38 ML/MIN/1.73M2 — LOW
EGFR: 38 ML/MIN/1.73M2 — LOW
GLUCOSE BLDC GLUCOMTR-MCNC: 150 MG/DL — HIGH (ref 70–99)
GLUCOSE BLDC GLUCOMTR-MCNC: 150 MG/DL — HIGH (ref 70–99)
GLUCOSE BLDC GLUCOMTR-MCNC: 165 MG/DL — HIGH (ref 70–99)
GLUCOSE BLDC GLUCOMTR-MCNC: 165 MG/DL — HIGH (ref 70–99)
GLUCOSE BLDC GLUCOMTR-MCNC: 169 MG/DL — HIGH (ref 70–99)
GLUCOSE BLDC GLUCOMTR-MCNC: 169 MG/DL — HIGH (ref 70–99)
GLUCOSE BLDC GLUCOMTR-MCNC: 210 MG/DL — HIGH (ref 70–99)
GLUCOSE BLDC GLUCOMTR-MCNC: 210 MG/DL — HIGH (ref 70–99)
GLUCOSE SERPL-MCNC: 136 MG/DL — HIGH (ref 70–99)
GLUCOSE SERPL-MCNC: 136 MG/DL — HIGH (ref 70–99)
HCT VFR BLD CALC: 28.3 % — LOW (ref 39–50)
HCT VFR BLD CALC: 28.3 % — LOW (ref 39–50)
HGB BLD-MCNC: 8.8 G/DL — LOW (ref 13–17)
HGB BLD-MCNC: 8.8 G/DL — LOW (ref 13–17)
INR BLD: 2.01 RATIO — HIGH (ref 0.85–1.18)
INR BLD: 2.01 RATIO — HIGH (ref 0.85–1.18)
MAGNESIUM SERPL-MCNC: 2.2 MG/DL — SIGNIFICANT CHANGE UP (ref 1.8–2.6)
MAGNESIUM SERPL-MCNC: 2.2 MG/DL — SIGNIFICANT CHANGE UP (ref 1.8–2.6)
MCHC RBC-ENTMCNC: 28.4 PG — SIGNIFICANT CHANGE UP (ref 27–34)
MCHC RBC-ENTMCNC: 28.4 PG — SIGNIFICANT CHANGE UP (ref 27–34)
MCHC RBC-ENTMCNC: 31.1 GM/DL — LOW (ref 32–36)
MCHC RBC-ENTMCNC: 31.1 GM/DL — LOW (ref 32–36)
MCV RBC AUTO: 91.3 FL — SIGNIFICANT CHANGE UP (ref 80–100)
MCV RBC AUTO: 91.3 FL — SIGNIFICANT CHANGE UP (ref 80–100)
PLATELET # BLD AUTO: 325 K/UL — SIGNIFICANT CHANGE UP (ref 150–400)
PLATELET # BLD AUTO: 325 K/UL — SIGNIFICANT CHANGE UP (ref 150–400)
POTASSIUM SERPL-MCNC: 4.6 MMOL/L — SIGNIFICANT CHANGE UP (ref 3.5–5.3)
POTASSIUM SERPL-MCNC: 4.6 MMOL/L — SIGNIFICANT CHANGE UP (ref 3.5–5.3)
POTASSIUM SERPL-SCNC: 4.6 MMOL/L — SIGNIFICANT CHANGE UP (ref 3.5–5.3)
POTASSIUM SERPL-SCNC: 4.6 MMOL/L — SIGNIFICANT CHANGE UP (ref 3.5–5.3)
PROTHROM AB SERPL-ACNC: 21.9 SEC — HIGH (ref 9.5–13)
PROTHROM AB SERPL-ACNC: 21.9 SEC — HIGH (ref 9.5–13)
RBC # BLD: 3.1 M/UL — LOW (ref 4.2–5.8)
RBC # BLD: 3.1 M/UL — LOW (ref 4.2–5.8)
RBC # FLD: 13.7 % — SIGNIFICANT CHANGE UP (ref 10.3–14.5)
RBC # FLD: 13.7 % — SIGNIFICANT CHANGE UP (ref 10.3–14.5)
SODIUM SERPL-SCNC: 137 MMOL/L — SIGNIFICANT CHANGE UP (ref 135–145)
SODIUM SERPL-SCNC: 137 MMOL/L — SIGNIFICANT CHANGE UP (ref 135–145)
SURGICAL PATHOLOGY STUDY: SIGNIFICANT CHANGE UP
SURGICAL PATHOLOGY STUDY: SIGNIFICANT CHANGE UP
WBC # BLD: 11.52 K/UL — HIGH (ref 3.8–10.5)
WBC # BLD: 11.52 K/UL — HIGH (ref 3.8–10.5)
WBC # FLD AUTO: 11.52 K/UL — HIGH (ref 3.8–10.5)
WBC # FLD AUTO: 11.52 K/UL — HIGH (ref 3.8–10.5)

## 2023-10-20 PROCEDURE — 99233 SBSQ HOSP IP/OBS HIGH 50: CPT

## 2023-10-20 PROCEDURE — 33208 INSRT HEART PM ATRIAL & VENT: CPT

## 2023-10-20 PROCEDURE — 93010 ELECTROCARDIOGRAM REPORT: CPT

## 2023-10-20 PROCEDURE — 71045 X-RAY EXAM CHEST 1 VIEW: CPT | Mod: 26

## 2023-10-20 PROCEDURE — 99232 SBSQ HOSP IP/OBS MODERATE 35: CPT | Mod: 24

## 2023-10-20 RX ORDER — SODIUM CHLORIDE 9 MG/ML
500 INJECTION, SOLUTION INTRAVENOUS ONCE
Refills: 0 | Status: COMPLETED | OUTPATIENT
Start: 2023-10-20 | End: 2023-10-20

## 2023-10-20 RX ORDER — SODIUM CHLORIDE 9 MG/ML
500 INJECTION, SOLUTION INTRAVENOUS
Refills: 0 | Status: DISCONTINUED | OUTPATIENT
Start: 2023-10-20 | End: 2023-10-20

## 2023-10-20 RX ORDER — INSULIN LISPRO 100/ML
10 VIAL (ML) SUBCUTANEOUS
Refills: 0 | Status: DISCONTINUED | OUTPATIENT
Start: 2023-10-20 | End: 2023-10-23

## 2023-10-20 RX ORDER — WARFARIN SODIUM 2.5 MG/1
5 TABLET ORAL ONCE
Refills: 0 | Status: COMPLETED | OUTPATIENT
Start: 2023-10-20 | End: 2023-10-20

## 2023-10-20 RX ORDER — METOPROLOL TARTRATE 50 MG
50 TABLET ORAL
Refills: 0 | Status: DISCONTINUED | OUTPATIENT
Start: 2023-10-20 | End: 2023-10-23

## 2023-10-20 RX ADMIN — SODIUM CHLORIDE 3 MILLILITER(S): 9 INJECTION INTRAMUSCULAR; INTRAVENOUS; SUBCUTANEOUS at 05:19

## 2023-10-20 RX ADMIN — Medication 1000 UNIT(S): at 08:44

## 2023-10-20 RX ADMIN — ATORVASTATIN CALCIUM 80 MILLIGRAM(S): 80 TABLET, FILM COATED ORAL at 21:15

## 2023-10-20 RX ADMIN — Medication 40 MILLIGRAM(S): at 05:20

## 2023-10-20 RX ADMIN — SENNA PLUS 2 TABLET(S): 8.6 TABLET ORAL at 23:27

## 2023-10-20 RX ADMIN — SODIUM CHLORIDE 1000 MILLILITER(S): 9 INJECTION, SOLUTION INTRAVENOUS at 19:39

## 2023-10-20 RX ADMIN — Medication 12 UNIT(S): at 08:40

## 2023-10-20 RX ADMIN — Medication 2: at 23:27

## 2023-10-20 RX ADMIN — Medication 2: at 08:40

## 2023-10-20 RX ADMIN — Medication 25 MILLIGRAM(S): at 05:20

## 2023-10-20 RX ADMIN — POLYETHYLENE GLYCOL 3350 17 GRAM(S): 17 POWDER, FOR SOLUTION ORAL at 08:44

## 2023-10-20 RX ADMIN — SODIUM CHLORIDE 3 MILLILITER(S): 9 INJECTION INTRAMUSCULAR; INTRAVENOUS; SUBCUTANEOUS at 14:46

## 2023-10-20 RX ADMIN — Medication 975 MILLIGRAM(S): at 10:21

## 2023-10-20 RX ADMIN — Medication 50 MILLIGRAM(S): at 23:26

## 2023-10-20 RX ADMIN — SODIUM CHLORIDE 500 MILLILITER(S): 9 INJECTION, SOLUTION INTRAVENOUS at 20:15

## 2023-10-20 RX ADMIN — PANTOPRAZOLE SODIUM 40 MILLIGRAM(S): 20 TABLET, DELAYED RELEASE ORAL at 08:44

## 2023-10-20 RX ADMIN — Medication 975 MILLIGRAM(S): at 11:21

## 2023-10-20 RX ADMIN — WARFARIN SODIUM 5 MILLIGRAM(S): 2.5 TABLET ORAL at 23:35

## 2023-10-20 RX ADMIN — Medication 145 MILLIGRAM(S): at 21:16

## 2023-10-20 RX ADMIN — Medication 25 MILLIGRAM(S): at 19:52

## 2023-10-20 RX ADMIN — INSULIN GLARGINE 35 UNIT(S): 100 INJECTION, SOLUTION SUBCUTANEOUS at 08:42

## 2023-10-20 RX ADMIN — CLOPIDOGREL BISULFATE 75 MILLIGRAM(S): 75 TABLET, FILM COATED ORAL at 08:43

## 2023-10-20 RX ADMIN — INSULIN GLARGINE 20 UNIT(S): 100 INJECTION, SOLUTION SUBCUTANEOUS at 23:28

## 2023-10-20 RX ADMIN — SODIUM CHLORIDE 3 MILLILITER(S): 9 INJECTION INTRAMUSCULAR; INTRAVENOUS; SUBCUTANEOUS at 23:09

## 2023-10-20 NOTE — PROGRESS NOTE ADULT - SUBJECTIVE AND OBJECTIVE BOX
Significant recent/past 24 hr events:  No acute events reported overnight.     SUBJECTIVE:  Patient seen and examined on follow up now POD #7 for mechanical AVR. Pt currently lying in bed in NAD. Denies fevers, chills, HA, dizziness, CP, SOB, abd pain, N/V/D, numbness/tingling in extremities, or any other acute complaints. States pain well-controlled on current regimen.  +Tolerating diet  +Passing BMs since surgery     PAST MEDICAL & SURGICAL HISTORY:  Essential hypertension  Obstructive sleep apnea  Diabetes mellitus  HLD (hyperlipidemia)  CAD (coronary artery disease)  Pancreatitis  History of coronary artery stent placement  S/P cholecystectomy    DAILY REVIEW:  Telemetry: Sinus rhythm 1st degree  Vital Signs Last 24 Hrs  T(C): 36.4 (20 Oct 2023 00:15), Max: 38.1 (19 Oct 2023 20:09)  T(F): 97.6 (20 Oct 2023 00:15), Max: 100.5 (19 Oct 2023 20:09)  HR: 91 (20 Oct 2023 00:15) (86 - 126)  BP: 135/79 (20 Oct 2023 00:15) (117/78 - 140/100)  BP(mean): 1 (19 Oct 2023 06:49) (1 - 1)  RR: 17 (20 Oct 2023 00:15) (17 - 18)  SpO2: 95% (20 Oct 2023 00:15) (95% - 99%)    Parameters below as of 20 Oct 2023 00:15  Patient On (Oxygen Delivery Method): room air    I&O's Detail    18 Oct 2023 07:01  -  19 Oct 2023 07:00  --------------------------------------------------------  IN:    Oral Fluid: 200 mL  Total IN: 200 mL    OUT:    Voided (mL): 2100 mL  Total OUT: 2100 mL    Total NET: -1900 mL    19 Oct 2023 07:01  -  20 Oct 2023 03:08  --------------------------------------------------------  IN:    Oral Fluid: 200 mL  Total IN: 200 mL    OUT:    Voided (mL): 1100 mL  Total OUT: 1100 mL    Total NET: -900 mL    LABS:             8.8    9.54  )-----------( 322      ( 19 Oct 2023 05:51 )             28.1   10-19    142  |  100  |  34.5<H>  ----------------------------<  75  3.9   |  29.0  |  1.80<H>    Ca    8.3<L>      19 Oct 2023 05:51  Mg     2.0     10-19    MEDICATIONS:  MEDICATIONS  (STANDING):  atorvastatin 80 milliGRAM(s) Oral at bedtime  bisacodyl Suppository 10 milliGRAM(s) Rectal once  cholecalciferol 1000 Unit(s) Oral daily  clopidogrel Tablet 75 milliGRAM(s) Oral daily  dextrose 5%. 1000 milliLiter(s) (50 mL/Hr) IV Continuous <Continuous>  dextrose 5%. 1000 milliLiter(s) (100 mL/Hr) IV Continuous <Continuous>  dextrose 50% Injectable 25 milliLiter(s) IV Push every 15 minutes  dextrose 50% Injectable 25 Gram(s) IV Push once  dextrose 50% Injectable 12.5 Gram(s) IV Push once  dextrose 50% Injectable 50 milliLiter(s) IV Push every 15 minutes  fenofibrate Tablet 145 milliGRAM(s) Oral daily  furosemide    Tablet 40 milliGRAM(s) Oral daily  glucagon  Injectable 1 milliGRAM(s) IntraMuscular once  insulin glargine Injectable (LANTUS) 35 Unit(s) SubCutaneous every morning  insulin glargine Injectable (LANTUS) 20 Unit(s) SubCutaneous at bedtime  insulin lispro (ADMELOG) corrective regimen sliding scale   SubCutaneous Before meals and at bedtime  insulin lispro Injectable (ADMELOG) 12 Unit(s) SubCutaneous three times a day before meals  metoprolol tartrate 25 milliGRAM(s) Oral two times a day  pantoprazole    Tablet 40 milliGRAM(s) Oral daily  polyethylene glycol 3350 17 Gram(s) Oral daily  senna 2 Tablet(s) Oral at bedtime  sodium chloride 0.9% lock flush 3 milliLiter(s) IV Push every 8 hours    MEDICATIONS  (PRN):  acetaminophen     Tablet .. 975 milliGRAM(s) Oral every 6 hours PRN Mild Pain (1 - 3)  dextrose Oral Gel 15 Gram(s) Oral once PRN Blood Glucose LESS THAN 70 milliGRAM(s)/deciliter  oxyCODONE    IR 10 milliGRAM(s) Oral every 4 hours PRN Severe Pain (7 - 10)  oxyCODONE    IR 5 milliGRAM(s) Oral every 4 hours PRN Moderate Pain (4 - 6)    ALLERGIES:  scallops (Nausea)  penicillin (Hives)    DIAGNOSTICS:  Xray Chest 1 View- PORTABLE-Routine (Xray Chest 1 View- PORTABLE-Routine in AM.) (10.18.23 @ 06:11)  FINDINGS: Status post median sternotomy, aortic valve replacement and   coronary artery stent. Heart size and mediastinum stable. Trace residual   left pleural effusion. The lungs are otherwise grossly clear.    Impression:  Trace residual left pleural effusion.    12 Lead ECG (10.17.23 @ 19:06)  Ventricular Rate 78 BPM  Atrial Rate 78 BPM  P-R Interval 314 ms  QRS Duration 136 ms  Q-T Interval 434 ms  QTC Calculation(Bazett) 494 ms  P Axis 2 degrees  R Axis 36 degrees  T Axis -41 degrees  Diagnosis Line Sinus rhythm with 1st degree A-V block  Right bundle branch block  Abnormal ECG    TTE Echo Complete w/ Contrast w/ Doppler (10.18.23 @ 15:39)  Summary:   1. Left ventricular ejection fraction, by visual estimation, is 60 to   65%.   2. Technically difficult study.   3. Normal global left ventricular systolic function.   4. There is moderate concentric left ventricular hypertrophy.   5. There is a significant pericardial fat pad present.   6. There is no evidence of pericardial effusion.   7. Mild Mitral valve prolapse.   8. Trace mitral valve regurgitation.   9. Aortic valve is normally functioning mechanical prosthetic valve.  10. Mechanical prosthesis in the aortic valve position.    PHYSICAL EXAM:  Constitutional: NAD  Neck: supple, trachea midline. No JVD   Respiratory: Breath sounds diminished b/l lower fields to auscultation, no accessory muscle use noted. No wheezing, rales, or rhonchi noted b/l   Cardiovascular: Regular rate, regular rhythm, normal S1, S2; no murmurs or rub   Gastrointestinal: Soft, non-tender, non-distended, + bowel sounds   Extremities: [place] x 4, trace LE peripheral edema, no cyanosis, no clubbing    Vascular: Equal and normal pulses: 2+ peripheral pulses throughout  Neurological: A+O x 3; speech clear and intact; no gross sensory/motor deficits  Psychiatric: calm, normal mood, normal affect   Skin: warm, dry, well perfused, no rashes   Incision: Sternum with steri strips, well-approximated, healing well - No audible/palpable click  Epicardial Wires: Connected to generator, threshold 3.5, VVI 40/10/2.0

## 2023-10-20 NOTE — PROGRESS NOTE ADULT - SUBJECTIVE AND OBJECTIVE BOX
INTERVAL EVENTS:  Follow up diabetes management    ROS: Denies chest pain, palpitations, shortness of breath.    MEDICATIONS  (STANDING):  atorvastatin 80 milliGRAM(s) Oral at bedtime  bisacodyl Suppository 10 milliGRAM(s) Rectal once  cholecalciferol 1000 Unit(s) Oral daily  clopidogrel Tablet 75 milliGRAM(s) Oral daily  dextrose 5%. 1000 milliLiter(s) (50 mL/Hr) IV Continuous <Continuous>  dextrose 5%. 1000 milliLiter(s) (100 mL/Hr) IV Continuous <Continuous>  dextrose 50% Injectable 25 Gram(s) IV Push once  dextrose 50% Injectable 12.5 Gram(s) IV Push once  dextrose 50% Injectable 50 milliLiter(s) IV Push every 15 minutes  dextrose 50% Injectable 25 milliLiter(s) IV Push every 15 minutes  fenofibrate Tablet 145 milliGRAM(s) Oral daily  furosemide    Tablet 40 milliGRAM(s) Oral daily  glucagon  Injectable 1 milliGRAM(s) IntraMuscular once  insulin glargine Injectable (LANTUS) 35 Unit(s) SubCutaneous every morning  insulin glargine Injectable (LANTUS) 20 Unit(s) SubCutaneous at bedtime  insulin lispro (ADMELOG) corrective regimen sliding scale   SubCutaneous Before meals and at bedtime  insulin lispro Injectable (ADMELOG) 12 Unit(s) SubCutaneous three times a day before meals  metoprolol tartrate 25 milliGRAM(s) Oral two times a day  pantoprazole    Tablet 40 milliGRAM(s) Oral daily  polyethylene glycol 3350 17 Gram(s) Oral daily  senna 2 Tablet(s) Oral at bedtime  sodium chloride 0.9% lock flush 3 milliLiter(s) IV Push every 8 hours  warfarin 5 milliGRAM(s) Oral once    MEDICATIONS  (PRN):  acetaminophen     Tablet .. 975 milliGRAM(s) Oral every 6 hours PRN Mild Pain (1 - 3)  dextrose Oral Gel 15 Gram(s) Oral once PRN Blood Glucose LESS THAN 70 milliGRAM(s)/deciliter  oxyCODONE    IR 10 milliGRAM(s) Oral every 4 hours PRN Severe Pain (7 - 10)  oxyCODONE    IR 5 milliGRAM(s) Oral every 4 hours PRN Moderate Pain (4 - 6)    Allergies  scallops (Nausea)  penicillin (Hives)    Vital Signs Last 24 Hrs  T(C): 37.6 (20 Oct 2023 08:38), Max: 38.1 (19 Oct 2023 20:09)  T(F): 99.7 (20 Oct 2023 08:38), Max: 100.5 (19 Oct 2023 20:09)  HR: 88 (20 Oct 2023 08:38) (86 - 104)  BP: 137/88 (20 Oct 2023 08:38) (120/79 - 143/90)  BP(mean): --  RR: 18 (20 Oct 2023 08:38) (17 - 18)  SpO2: 92% (20 Oct 2023 08:38) (92% - 98%)    Parameters below as of 20 Oct 2023 08:38  Patient On (Oxygen Delivery Method): room air    PHYSICAL EXAM:  General: No apparent distress  Neck: Supple, trachea midline, no thyromegaly  Respiratory: Lungs clear bilaterally, normal rate, effort  Cardiac: +S1, S2, no m/r/g  GI: +BS, soft, non tender, non distended  Extremities: No peripheral edema, no pedal lesions  Neuro: A+O X3, no tremor    LABS:                        8.8    11.52 )-----------( 325      ( 20 Oct 2023 05:08 )             28.3     10-20    137  |  99  |  35.3<H>  ----------------------------<  136<H>  4.6   |  27.0  |  2.04<H>    Ca    8.5      20 Oct 2023 05:08  Mg     2.2     10-20      Urinalysis Basic - ( 20 Oct 2023 05:08 )    Color: x / Appearance: x / SG: x / pH: x  Gluc: 136 mg/dL / Ketone: x  / Bili: x / Urobili: x   Blood: x / Protein: x / Nitrite: x   Leuk Esterase: x / RBC: x / WBC x   Sq Epi: x / Non Sq Epi: x / Bacteria: x    POCT Blood Glucose.: 165 mg/dL (10-20-23 @ 07:57)  POCT Blood Glucose.: 89 mg/dL (10-19-23 @ 23:13)  POCT Blood Glucose.: 99 mg/dL (10-19-23 @ 21:23)  POCT Blood Glucose.: 119 mg/dL (10-19-23 @ 17:23)  POCT Blood Glucose.: 172 mg/dL (10-19-23 @ 12:00)    Thyroid Stimulating Hormone, Serum: 4.61 uIU/mL (10-07-23 @ 02:30)  Free Thyroxine, Serum: 1.1 ng/dL (10-07-23 @ 02:30)  Thyroid Stimulating Hormone, Serum: 2.97 uU/mL (10-05-23 @ 06:31)

## 2023-10-20 NOTE — PROGRESS NOTE ADULT - SUBJECTIVE AND OBJECTIVE BOX
Subjective: Pt febrile overnight (100.5) w/o complaints. Pt converted to SR yesterday afternoon with a significant conversion pause requiring pacing @ VVI 50 for 3 beats before returning to SR. Pt with several episodes of high grade AVB requiring pacing @ VVI 50 overnight. Pt reports no complaints at time of assessment this morning. Denies chest pain, palpitations, SOB, dizziness.     TELE: Sinus rhythm with intermittent AVB requiring pacing @ VVI 50.     MEDICATIONS  (STANDING):  atorvastatin 80 milliGRAM(s) Oral at bedtime  bisacodyl Suppository 10 milliGRAM(s) Rectal once  cholecalciferol 1000 Unit(s) Oral daily  clopidogrel Tablet 75 milliGRAM(s) Oral daily  dextrose 5%. 1000 milliLiter(s) (50 mL/Hr) IV Continuous <Continuous>  dextrose 5%. 1000 milliLiter(s) (100 mL/Hr) IV Continuous <Continuous>  dextrose 50% Injectable 25 milliLiter(s) IV Push every 15 minutes  dextrose 50% Injectable 25 Gram(s) IV Push once  dextrose 50% Injectable 12.5 Gram(s) IV Push once  dextrose 50% Injectable 50 milliLiter(s) IV Push every 15 minutes  fenofibrate Tablet 145 milliGRAM(s) Oral daily  furosemide    Tablet 40 milliGRAM(s) Oral daily  glucagon  Injectable 1 milliGRAM(s) IntraMuscular once  insulin glargine Injectable (LANTUS) 35 Unit(s) SubCutaneous every morning  insulin glargine Injectable (LANTUS) 20 Unit(s) SubCutaneous at bedtime  insulin lispro (ADMELOG) corrective regimen sliding scale   SubCutaneous Before meals and at bedtime  insulin lispro Injectable (ADMELOG) 12 Unit(s) SubCutaneous three times a day before meals  metoprolol tartrate 25 milliGRAM(s) Oral two times a day  pantoprazole    Tablet 40 milliGRAM(s) Oral daily  polyethylene glycol 3350 17 Gram(s) Oral daily  senna 2 Tablet(s) Oral at bedtime  sodium chloride 0.9% lock flush 3 milliLiter(s) IV Push every 8 hours  warfarin 5 milliGRAM(s) Oral once    MEDICATIONS  (PRN):  acetaminophen     Tablet .. 975 milliGRAM(s) Oral every 6 hours PRN Mild Pain (1 - 3)  dextrose Oral Gel 15 Gram(s) Oral once PRN Blood Glucose LESS THAN 70 milliGRAM(s)/deciliter  oxyCODONE    IR 10 milliGRAM(s) Oral every 4 hours PRN Severe Pain (7 - 10)  oxyCODONE    IR 5 milliGRAM(s) Oral every 4 hours PRN Moderate Pain (4 - 6)      Allergies    scallops (Nausea)  penicillin (Hives)    Intolerances        Vital Signs Last 24 Hrs  T(C): 37.6 (20 Oct 2023 08:38), Max: 38.1 (19 Oct 2023 20:09)  T(F): 99.7 (20 Oct 2023 08:38), Max: 100.5 (19 Oct 2023 20:09)  HR: 88 (20 Oct 2023 08:38) (86 - 104)  BP: 137/88 (20 Oct 2023 08:38) (120/79 - 143/90)  BP(mean): --  RR: 18 (20 Oct 2023 08:38) (17 - 18)  SpO2: 92% (20 Oct 2023 08:38) (92% - 98%)    Parameters below as of 20 Oct 2023 08:38  Patient On (Oxygen Delivery Method): room air        Physical Exam:  Constitutional: NAD  Head: normocephalic atraumatic  Chest wall: Mid sternal incision C/D/I  Resp: effort normal, breath sounds clear to auscultation bilaterally  Cardiac: regular rate / rhythm. S1 / S2.  Abdomen: soft, nondistended, bowel sounds active, nontender to palpation in all four quadrants    Neuro: Alert and oriented x 3  Skin: color normal, no rashes or injury  Psych: mood calm    LABS:                        8.8    11.52 )-----------( 325      ( 20 Oct 2023 05:08 )             28.3     10-20    137  |  99  |  35.3<H>  ----------------------------<  136<H>  4.6   |  27.0  |  2.04<H>    Ca    8.5      20 Oct 2023 05:08  Mg     2.2     10-20      PT/INR - ( 20 Oct 2023 05:08 )   PT: 21.9 sec;   INR: 2.01 ratio         PTT - ( 19 Oct 2023 05:51 )  PTT:40.5 sec  Urinalysis Basic - ( 20 Oct 2023 05:08 )    Color: x / Appearance: x / SG: x / pH: x  Gluc: 136 mg/dL / Ketone: x  / Bili: x / Urobili: x   Blood: x / Protein: x / Nitrite: x   Leuk Esterase: x / RBC: x / WBC x   Sq Epi: x / Non Sq Epi: x / Bacteria: x        RADIOLOGY & ADDITIONAL TESTS:    < from: TTE Echo Complete w/ Contrast w/ Doppler (10.18.23 @ 15:39) >   1. Left ventricular ejection fraction, by visual estimation, is 60 to   65%.   2. Technically difficult study.   3. Normal global left ventricular systolic function.   4. There is moderate concentric left ventricular hypertrophy.   5. There is a significant pericardial fat pad present.   6. There is no evidence of pericardial effusion.   7. Mild Mitral valve prolapse.   8. Trace mitral valve regurgitation.   9. Aortic valve is normally functioning mechanical prosthetic valve.  10. Mechanical prosthesis in the aortic valve position.        Assessment:      Pt is a 57 y/o male, PMHx significant for obesity, HTN, HL, type 2 DM, CKD 3, CAD s/p 8 stents (s/p PCI of ISR or LADx2 and PTCA of Diag on 9/29) who presented with recurrent chest pain found to have severe AS now s/p mechanical AVR and sternal plate on 10/13/23 whose post-operative course was complicated by atrial fibrillation for which he was given amiodarone and AVN blockers with an episode of long conversion pause followed by transient AV block  with up to a 12 second pause for which EP service was initially consulted consulted.    Pt was taken of Metoprolol and Amiodarone and had maintained sinus rhythm with no further events until converting back to AF RVR at approximately 6:00am yesterday morning. Metoprolol was resumed (25mg BID) and pt subsequently converted back to sinus rhythm and once again with a significant off set pause requiring pacing @ VVI 50. Pt has since maintained sinus rhythm with intermittent high grade AVB requiring pacing @ VVI 50. Of note pt was febrile last night to 100.5. Pt reports no complaints at time of assessment.      Incomplete note     Subjective: Pt febrile overnight (100.5) w/o complaints. Pt converted to SR yesterday afternoon with a significant conversion pause requiring pacing @ VVI 50 for 3 beats before returning to SR. Pt with several episodes of high grade AVB requiring pacing @ VVI 40 overnight. Pt reports no complaints at time of assessment this morning. Denies chest pain, palpitations, SOB, dizziness.     TELE: Sinus rhythm with intermittent AVB requiring pacing @ VVI 40.     MEDICATIONS  (STANDING):  atorvastatin 80 milliGRAM(s) Oral at bedtime  bisacodyl Suppository 10 milliGRAM(s) Rectal once  cholecalciferol 1000 Unit(s) Oral daily  clopidogrel Tablet 75 milliGRAM(s) Oral daily  dextrose 5%. 1000 milliLiter(s) (50 mL/Hr) IV Continuous <Continuous>  dextrose 5%. 1000 milliLiter(s) (100 mL/Hr) IV Continuous <Continuous>  dextrose 50% Injectable 25 milliLiter(s) IV Push every 15 minutes  dextrose 50% Injectable 25 Gram(s) IV Push once  dextrose 50% Injectable 12.5 Gram(s) IV Push once  dextrose 50% Injectable 50 milliLiter(s) IV Push every 15 minutes  fenofibrate Tablet 145 milliGRAM(s) Oral daily  furosemide    Tablet 40 milliGRAM(s) Oral daily  glucagon  Injectable 1 milliGRAM(s) IntraMuscular once  insulin glargine Injectable (LANTUS) 35 Unit(s) SubCutaneous every morning  insulin glargine Injectable (LANTUS) 20 Unit(s) SubCutaneous at bedtime  insulin lispro (ADMELOG) corrective regimen sliding scale   SubCutaneous Before meals and at bedtime  insulin lispro Injectable (ADMELOG) 12 Unit(s) SubCutaneous three times a day before meals  metoprolol tartrate 25 milliGRAM(s) Oral two times a day  pantoprazole    Tablet 40 milliGRAM(s) Oral daily  polyethylene glycol 3350 17 Gram(s) Oral daily  senna 2 Tablet(s) Oral at bedtime  sodium chloride 0.9% lock flush 3 milliLiter(s) IV Push every 8 hours  warfarin 5 milliGRAM(s) Oral once    MEDICATIONS  (PRN):  acetaminophen     Tablet .. 975 milliGRAM(s) Oral every 6 hours PRN Mild Pain (1 - 3)  dextrose Oral Gel 15 Gram(s) Oral once PRN Blood Glucose LESS THAN 70 milliGRAM(s)/deciliter  oxyCODONE    IR 10 milliGRAM(s) Oral every 4 hours PRN Severe Pain (7 - 10)  oxyCODONE    IR 5 milliGRAM(s) Oral every 4 hours PRN Moderate Pain (4 - 6)      Allergies    scallops (Nausea)  penicillin (Hives)    Intolerances        Vital Signs Last 24 Hrs  T(C): 37.6 (20 Oct 2023 08:38), Max: 38.1 (19 Oct 2023 20:09)  T(F): 99.7 (20 Oct 2023 08:38), Max: 100.5 (19 Oct 2023 20:09)  HR: 88 (20 Oct 2023 08:38) (86 - 104)  BP: 137/88 (20 Oct 2023 08:38) (120/79 - 143/90)  BP(mean): --  RR: 18 (20 Oct 2023 08:38) (17 - 18)  SpO2: 92% (20 Oct 2023 08:38) (92% - 98%)    Parameters below as of 20 Oct 2023 08:38  Patient On (Oxygen Delivery Method): room air        Physical Exam:  Constitutional: NAD  Head: normocephalic atraumatic  Chest wall: Mid sternal incision C/D/I  Resp: effort normal, breath sounds clear to auscultation bilaterally  Cardiac: regular rate / rhythm. S1 / S2.  Abdomen: soft, nondistended, bowel sounds active, nontender to palpation in all four quadrants    Neuro: Alert and oriented x 3  Skin: color normal, no rashes or injury  Psych: mood calm    LABS:                        8.8    11.52 )-----------( 325      ( 20 Oct 2023 05:08 )             28.3     10-20    137  |  99  |  35.3<H>  ----------------------------<  136<H>  4.6   |  27.0  |  2.04<H>    Ca    8.5      20 Oct 2023 05:08  Mg     2.2     10-20      PT/INR - ( 20 Oct 2023 05:08 )   PT: 21.9 sec;   INR: 2.01 ratio         PTT - ( 19 Oct 2023 05:51 )  PTT:40.5 sec  Urinalysis Basic - ( 20 Oct 2023 05:08 )    Color: x / Appearance: x / SG: x / pH: x  Gluc: 136 mg/dL / Ketone: x  / Bili: x / Urobili: x   Blood: x / Protein: x / Nitrite: x   Leuk Esterase: x / RBC: x / WBC x   Sq Epi: x / Non Sq Epi: x / Bacteria: x        RADIOLOGY & ADDITIONAL TESTS:    < from: TTE Echo Complete w/ Contrast w/ Doppler (10.18.23 @ 15:39) >   1. Left ventricular ejection fraction, by visual estimation, is 60 to   65%.   2. Technically difficult study.   3. Normal global left ventricular systolic function.   4. There is moderate concentric left ventricular hypertrophy.   5. There is a significant pericardial fat pad present.   6. There is no evidence of pericardial effusion.   7. Mild Mitral valve prolapse.   8. Trace mitral valve regurgitation.   9. Aortic valve is normally functioning mechanical prosthetic valve.  10. Mechanical prosthesis in the aortic valve position.        Assessment:      Pt is a 57 y/o male, PMHx significant for obesity, HTN, HL, type 2 DM, CKD 3, CAD s/p 8 stents (s/p PCI of ISR or LADx2 and PTCA of Diag on 9/29) who presented with recurrent chest pain found to have severe AS now s/p mechanical AVR and sternal plate on 10/13/23 whose post-operative course was complicated by atrial fibrillation for which he was given amiodarone and AVN blockers with an episode of long conversion pause followed by transient AV block  with up to a 12 second pause for which EP service was initially consulted consulted.    Pt was taken of Metoprolol and Amiodarone and had maintained sinus rhythm with no further events until converting back to AF RVR at approximately 6:00am yesterday morning. Metoprolol was resumed (25mg BID) and pt subsequently converted back to sinus rhythm and once again with a significant off set pause requiring pacing @ VVI 50. Pt has since maintained sinus rhythm with intermittent high grade AVB requiring pacing @ VVI 40. Of note pt was febrile last night to 100.5. Pt reports no complaints at time of assessment.      Incomplete note     Subjective: Pt febrile overnight (100.5) w/o complaints. Pt converted to SR yesterday afternoon with a significant conversion pause requiring pacing @ VVI 50 for 3 beats before returning to SR. Pt with several episodes of high grade AVB requiring pacing @ VVI 40 overnight. Pt reports no complaints at time of assessment this morning. Denies chest pain, palpitations, SOB, dizziness.     TELE: Sinus rhythm with intermittent AVB requiring pacing @ VVI 40.     MEDICATIONS  (STANDING):  atorvastatin 80 milliGRAM(s) Oral at bedtime  bisacodyl Suppository 10 milliGRAM(s) Rectal once  cholecalciferol 1000 Unit(s) Oral daily  clopidogrel Tablet 75 milliGRAM(s) Oral daily  dextrose 5%. 1000 milliLiter(s) (50 mL/Hr) IV Continuous <Continuous>  dextrose 5%. 1000 milliLiter(s) (100 mL/Hr) IV Continuous <Continuous>  dextrose 50% Injectable 25 milliLiter(s) IV Push every 15 minutes  dextrose 50% Injectable 25 Gram(s) IV Push once  dextrose 50% Injectable 12.5 Gram(s) IV Push once  dextrose 50% Injectable 50 milliLiter(s) IV Push every 15 minutes  fenofibrate Tablet 145 milliGRAM(s) Oral daily  furosemide    Tablet 40 milliGRAM(s) Oral daily  glucagon  Injectable 1 milliGRAM(s) IntraMuscular once  insulin glargine Injectable (LANTUS) 35 Unit(s) SubCutaneous every morning  insulin glargine Injectable (LANTUS) 20 Unit(s) SubCutaneous at bedtime  insulin lispro (ADMELOG) corrective regimen sliding scale   SubCutaneous Before meals and at bedtime  insulin lispro Injectable (ADMELOG) 12 Unit(s) SubCutaneous three times a day before meals  metoprolol tartrate 25 milliGRAM(s) Oral two times a day  pantoprazole    Tablet 40 milliGRAM(s) Oral daily  polyethylene glycol 3350 17 Gram(s) Oral daily  senna 2 Tablet(s) Oral at bedtime  sodium chloride 0.9% lock flush 3 milliLiter(s) IV Push every 8 hours  warfarin 5 milliGRAM(s) Oral once    MEDICATIONS  (PRN):  acetaminophen     Tablet .. 975 milliGRAM(s) Oral every 6 hours PRN Mild Pain (1 - 3)  dextrose Oral Gel 15 Gram(s) Oral once PRN Blood Glucose LESS THAN 70 milliGRAM(s)/deciliter  oxyCODONE    IR 10 milliGRAM(s) Oral every 4 hours PRN Severe Pain (7 - 10)  oxyCODONE    IR 5 milliGRAM(s) Oral every 4 hours PRN Moderate Pain (4 - 6)      Allergies    scallops (Nausea)  penicillin (Hives)    Intolerances        Vital Signs Last 24 Hrs  T(C): 37.6 (20 Oct 2023 08:38), Max: 38.1 (19 Oct 2023 20:09)  T(F): 99.7 (20 Oct 2023 08:38), Max: 100.5 (19 Oct 2023 20:09)  HR: 88 (20 Oct 2023 08:38) (86 - 104)  BP: 137/88 (20 Oct 2023 08:38) (120/79 - 143/90)  BP(mean): --  RR: 18 (20 Oct 2023 08:38) (17 - 18)  SpO2: 92% (20 Oct 2023 08:38) (92% - 98%)    Parameters below as of 20 Oct 2023 08:38  Patient On (Oxygen Delivery Method): room air        Physical Exam:  Constitutional: NAD  Head: normocephalic atraumatic  Chest wall: Mid sternal incision C/D/I  Resp: effort normal, breath sounds clear to auscultation bilaterally  Cardiac: regular rate / rhythm. S1 / S2.  Abdomen: soft, nondistended, bowel sounds active, nontender to palpation in all four quadrants    Neuro: Alert and oriented x 3  Skin: color normal, no rashes or injury  Psych: mood calm    LABS:                        8.8    11.52 )-----------( 325      ( 20 Oct 2023 05:08 )             28.3     10-20    137  |  99  |  35.3<H>  ----------------------------<  136<H>  4.6   |  27.0  |  2.04<H>    Ca    8.5      20 Oct 2023 05:08  Mg     2.2     10-20      PT/INR - ( 20 Oct 2023 05:08 )   PT: 21.9 sec;   INR: 2.01 ratio         PTT - ( 19 Oct 2023 05:51 )  PTT:40.5 sec  Urinalysis Basic - ( 20 Oct 2023 05:08 )    Color: x / Appearance: x / SG: x / pH: x  Gluc: 136 mg/dL / Ketone: x  / Bili: x / Urobili: x   Blood: x / Protein: x / Nitrite: x   Leuk Esterase: x / RBC: x / WBC x   Sq Epi: x / Non Sq Epi: x / Bacteria: x        RADIOLOGY & ADDITIONAL TESTS:    < from: TTE Echo Complete w/ Contrast w/ Doppler (10.18.23 @ 15:39) >   1. Left ventricular ejection fraction, by visual estimation, is 60 to   65%.   2. Technically difficult study.   3. Normal global left ventricular systolic function.   4. There is moderate concentric left ventricular hypertrophy.   5. There is a significant pericardial fat pad present.   6. There is no evidence of pericardial effusion.   7. Mild Mitral valve prolapse.   8. Trace mitral valve regurgitation.   9. Aortic valve is normally functioning mechanical prosthetic valve.  10. Mechanical prosthesis in the aortic valve position.        Assessment:      Pt is a 57 y/o male, PMHx significant for obesity, HTN, HL, type 2 DM, CKD 3, CAD s/p 8 stents (s/p PCI of ISR or LADx2 and PTCA of Diag on 9/29) who presented with recurrent chest pain found to have severe AS now s/p mechanical AVR and sternal plate on 10/13/23 whose post-operative course was complicated by atrial fibrillation for which he was given amiodarone and AVN blockers with an episode of long conversion pause followed by transient AV block  with up to a 12 second pause for which EP service was initially consulted consulted.    Pt was taken of Metoprolol and Amiodarone and had maintained sinus rhythm with no further events until converting back to AF RVR at approximately 6:00am yesterday morning. Metoprolol was resumed (25mg BID) and pt subsequently converted back to sinus rhythm and once again with a significant off set pause requiring pacing @ VVI 50. Pt has since maintained sinus rhythm with intermittent high grade AVB requiring pacing @ VVI 40. Of note pt was febrile last night to 100.5. Pt reports no complaints at time of assessment.    Plan:  1)Paroxysmal AF with RVR  - Continue warfarin and follow INR. (2.01 this AM)   - Hold AVN blocking agents at pt with recurrent episode of high grade AVB overnight  - Remain off amiodarone    2)Complete AV Block:  - Recurrent high grade AVB requiring pacing @VVI 40 overnight  - NPO for dual chamber PPM implant today  - Maintain telemetry monitoring    Discussed with Dr. Everett and CTsx team

## 2023-10-20 NOTE — PROGRESS NOTE ADULT - ASSESSMENT
58 year old male with a medical history of HTN, HLD, type 2 DM (HA1c 9.6 on Insulin), CKD 3, Obesity Class II, CAD s/p 8 stents, recent admission to [facility] 9/29-9/30 for  chest pain s/p PCI of ISR of LAD x 2 and PTCA of diag on 9/29/23, since readmitted to [facility] 10/3 after presenting with recurrent chest pain and increased CANNON since recent PCI. TTE revealed aortic stenosis (FIDEL 1, mG 27, pG 50), could not rule out bicuspid valve. Patient was re-cathed 10/6 to confirm patent stents. Patient now transferred to [facility] 10/6/23 for surgical evaluation under Dr. Cardozo. Underwent Mechanical AVR and sternal plating on 10/13. Post op course with Afib RVR and episode of 13 second pause.

## 2023-10-20 NOTE — PROGRESS NOTE ADULT - NS ATTEND OPT1A GEN_ALL_CORE
History/Exam/Medical decision making
Medical decision making
History/Exam/Medical decision making

## 2023-10-20 NOTE — PROGRESS NOTE ADULT - PROBLEM SELECTOR PLAN 1
TTE at [facility] revealed aortic stenosis (FIDEL 1, mG 27, pG 50), could not rule out bicuspid valve.  Transferred to [facility] 10/6/23 for surgical evaluation under Dr. Cardozo.  MARIBEL performed 10/9 with findings of moderate AS  s/p mechanical AVR 10/13/23  BB and Amio held for ~13 second ventricular standstill event on 10/17  Lopressor resumed 10/19  Monitor telemetry  Plavix in setting of recent PCI   Aspirin for acute valve prophylaxis  Started coumadin for mechanical valve, remains sub therapeutic- receiving daily INR draws  F/u TTE 10/18 with nml EF and no pericardial effusion  Coughing and deep breathing exercises/incentive spirometry encouraged/chest PT  Monitor I/O's, UO, and replete electrolytes PRN  Diuretic with daily lasix  Continue with PT, increase activity as tolerated, OOB to chair during the day  FS ACHS with coverage for glycemic control  PRNs and adjuvants for analgesia  Continue supportive care  Above plan of care to be discussed with Dr. Cardozo and CT surgical team on AM rounds

## 2023-10-20 NOTE — PROGRESS NOTE ADULT - SUBJECTIVE AND OBJECTIVE BOX
PROCEDURE(S): DC PPM (MDT) Implant    ELECTRPHYSIOLOGIST(S): MD Marguerite    COMPLICATIONS:  none          DISPOSITION:  Observation Unit           CONDITION: Stable    EBL: <15mL    Pt doing well s/p DC PPM (MDT) implant. Denies complaint    Exam:   T(C): 36.9 (10-20-23 @ 13:36), Max: 38.1 (10-19-23 @ 20:09)  HR: 76 (10-20-23 @ 13:36) (76 - 104)  BP: 144/81 (10-20-23 @ 13:36) (126/79 - 144/81)  RR: 16 (10-20-23 @ 13:36) (16 - 18)  SpO2: 97% (10-20-23 @ 13:36) (92% - 98%)    Neuro: A&O x 3, DALLAS, FC  Incision: Dressing C/D/I; no bleeding, hematoma, erythema or edema  Card: S1/S2, RRR, no m/g/r  Resp: lungs CTA b/l  Abd: S/NT/ND  Ext: no edema, distal pulses intact    Post-op VS:    /75  SpO2 94 % on RA    EKG: A.fib @ 108 bpm, QRSD 138ms    I/O: 800/ DTV    Assessment:     Pt is a 57 y/o male, PMHx significant for obesity, HTN, HL, type 2 DM, CKD 3, CAD s/p 8 stents (s/p PCI of ISR or LADx2 and PTCA of Diag on 9/29) who presented with recurrent chest pain found to have severe AS now s/p mechanical AVR and sternal plate on 10/13/23 whose post-operative course was complicated by atrial fibrillation for which he was given amiodarone and AVN blockers with an episode of long conversion pause followed by transient AV block  with up to a 12 second pause for which EP service was initially consulted consulted.    Pt was taken of Metoprolol and Amiodarone and had maintained sinus rhythm with no further events until converting back to AF RVR at approximately 6:00am yesterday morning. Metoprolol was resumed (25mg BID) and pt subsequently converted back to sinus rhythm and once again with a significant off set pause requiring pacing @ VVI 50. Pt has since maintained sinus rhythm with intermittent high grade AVB requiring pacing @ VVI 40 (with temporary epicardial wires). Pt now status post uncomplicated DC PPM (MDT) implant..    Plan:   Port CXR performed - no PTX or effusion, lead locations verified.   Bedrest x 4 hours post implant, then OOB w/ assist & progress as tolerated.    Continued observation on telemetry overnight.   Cont Clindamycin 600mg IV q 8 hours x 2 additional doses to complete 24 hour course.   Pain control with PO analgesia PRN.   c/w coumdain  NO HEPARIN OR LOVENOX, INCLUDING PROPHYLACTIC/SUBCUT DOSING, UNTIL OTHERWISE ADVISED BY EP.   Resume home medications.   Consider increasing metoprolol as BP tolerates  PA/Lat CXR and device check in AM.   Plan and management as per CTS          Please include in Discharge instructions  Follow up with Dr Everett in 4-6 weeks.  Cardiac Device Implant Post Operative Instructions  - Do not touch the incision until it is completely healed.   - There are Steristrips (white strips of tape) on your incision, which will start to peel off on their own over the next 2-3 weeks. Do not pick at or peel off the Steristrips.   - Bruising around the implant site or over the chest, side or arm near the incision is normal, and will take a few weeks to resolve.  -Do not lift the affected arm higher than 90 degrees (shoulder height) in any direction for 6 weeks.   - Do not push, pull or lift anything heavier than 10 lbs (about a gallon of milk) with the affected arm for 6 weeks.     - Do not apply soaps, creams, lotions, ointments or powders to the incision until it is completely healed.  - You may take a shower in 24 hours, and allow the water to run over the incision. However, do not submerge the incision in water: do not swim or soak in bath tubs, hot tubs, swimming pools, etc.   You should call the doctor if:   - You notice redness, drainage, swelling, increased tenderness, hot sensation around the incision, bleeding or incision edges pulling apart.  - Your temperature is greater than 100 degrees F for more than 24 hours.  - You notice swelling or bulging at the incision or around the device that was not there when you left the hospital or is increasing in size.  - You experience increased difficulty breathing.  - You notice new/worsening swelling in your legs and ankles.  - You faint or have dizzy spells.  - You have any questions or concerns regarding your device or the procedure.         PROCEDURE(S): DC PPM (MDT) Implant    ELECTRPHYSIOLOGIST(S): MD Marguerite    COMPLICATIONS:  none          DISPOSITION:  Observation Unit           CONDITION: Stable    EBL: <15mL    Pt doing well s/p DC PPM (MDT) implant. Denies complaint    Exam:   T(C): 36.9 (10-20-23 @ 13:36), Max: 38.1 (10-19-23 @ 20:09)  HR: 76 (10-20-23 @ 13:36) (76 - 104)  BP: 144/81 (10-20-23 @ 13:36) (126/79 - 144/81)  RR: 16 (10-20-23 @ 13:36) (16 - 18)  SpO2: 97% (10-20-23 @ 13:36) (92% - 98%)    Neuro: A&O x 3, DALLAS, FC  Incision: Dressing C/D/I; no bleeding, hematoma, erythema or edema  Card: S1/S2, RRR, no m/g/r  Resp: lungs CTA b/l  Abd: S/NT/ND  Ext: no edema, distal pulses intact    Post-op VS:    /75  SpO2 94 % on RA    EKG: A.fib @ 108 bpm, QRSD 138ms    PPM: DDD     I/O: 800/ DTV    Assessment:     Pt is a 59 y/o male, PMHx significant for obesity, HTN, HL, type 2 DM, CKD 3, CAD s/p 8 stents (s/p PCI of ISR or LADx2 and PTCA of Diag on 9/29) who presented with recurrent chest pain found to have severe AS now s/p mechanical AVR and sternal plate on 10/13/23 whose post-operative course was complicated by atrial fibrillation for which he was given amiodarone and AVN blockers with an episode of long conversion pause followed by transient AV block  with up to a 12 second pause for which EP service was initially consulted consulted.    Pt was taken of Metoprolol and Amiodarone and had maintained sinus rhythm with no further events until converting back to AF RVR at approximately 6:00am yesterday morning. Metoprolol was resumed (25mg BID) and pt subsequently converted back to sinus rhythm and once again with a significant off set pause requiring pacing @ VVI 50. Pt has since maintained sinus rhythm with intermittent high grade AVB requiring pacing @ VVI 40 (with temporary epicardial wires). Pt now status post uncomplicated DC PPM (MDT) implant..    Plan:   Port CXR performed - no PTX or effusion, lead locations verified.   Bedrest x 4 hours post implant, then OOB w/ assist & progress as tolerated.    Continued observation on telemetry overnight.   Cont Clindamycin 600mg IV q 8 hours x 2 additional doses to complete 24 hour course.   Pain control with PO analgesia PRN.   c/w coumdain  NO HEPARIN OR LOVENOX, INCLUDING PROPHYLACTIC/SUBCUT DOSING, UNTIL OTHERWISE ADVISED BY EP.   Resume home medications.   Consider increasing metoprolol as BP tolerates  PA/Lat CXR and device check in AM.   Plan and management as per CTS          Please include in Discharge instructions  Follow up with Dr Everett in 4-6 weeks.  Cardiac Device Implant Post Operative Instructions  - Do not touch the incision until it is completely healed.   - There are Steristrips (white strips of tape) on your incision, which will start to peel off on their own over the next 2-3 weeks. Do not pick at or peel off the Steristrips.   - Bruising around the implant site or over the chest, side or arm near the incision is normal, and will take a few weeks to resolve.  -Do not lift the affected arm higher than 90 degrees (shoulder height) in any direction for 6 weeks.   - Do not push, pull or lift anything heavier than 10 lbs (about a gallon of milk) with the affected arm for 6 weeks.     - Do not apply soaps, creams, lotions, ointments or powders to the incision until it is completely healed.  - You may take a shower in 24 hours, and allow the water to run over the incision. However, do not submerge the incision in water: do not swim or soak in bath tubs, hot tubs, swimming pools, etc.   You should call the doctor if:   - You notice redness, drainage, swelling, increased tenderness, hot sensation around the incision, bleeding or incision edges pulling apart.  - Your temperature is greater than 100 degrees F for more than 24 hours.  - You notice swelling or bulging at the incision or around the device that was not there when you left the hospital or is increasing in size.  - You experience increased difficulty breathing.  - You notice new/worsening swelling in your legs and ankles.  - You faint or have dizzy spells.  - You have any questions or concerns regarding your device or the procedure.

## 2023-10-20 NOTE — PROGRESS NOTE ADULT - ASSESSMENT
58 M with PMHx HTN, HLD, T2DM, CKD 3, obesity, CAD s/p 8 stents, recent admission to [facility] 9/29-9/30 for chest pain s/p PCI of ISR of LADx2 and PTCA of diag on 9/29/23, since readmitted to [facility] 10/3 after presenting with recurrent chest pain and increased CANNON since recent PCI. Transferred to [facility] 10/6/23 for surgical evaluation under Dr. Cardozo. MARIBEL revealed moderate AS. Underwent AVR on 10/13/23    Consulted for diabetes management  Home diabetes meds: Toujeo 140 units daily, mealtime insulin 55 units (He developed pancreatitis following use of trulicity, and was unable to tolerate a sglt-2)  Current a1c: 9.6%  Outpatient Endocrinologist: Dr Ibarra    1. Uncontrolled DM2  - Insulin doses decreased last night  - Continue lantus 35 units in AM and 20 units in PM  - Continue premeal admelog 12 units tid with meals and SSI  - Recommend to discharge on current doses (he has Toujeo at home for long acting insulin)  - He will follow up with Dr Ibarra for further management     2. Afib with RVR  - EP following, started on metoprolol    3. CAD/Aortic stenosis  - S/p AVR on 10/13    4. HLD  - Continue statin 58 M with PMHx HTN, HLD, T2DM, CKD 3, obesity, CAD s/p 8 stents, recent admission to [facility] 9/29-9/30 for chest pain s/p PCI of ISR of LADx2 and PTCA of diag on 9/29/23, since readmitted to [facility] 10/3 after presenting with recurrent chest pain and increased CANNON since recent PCI. Transferred to [facility] 10/6/23 for surgical evaluation under Dr. Cardozo. MARIBEL revealed moderate AS. Underwent AVR on 10/13/23    Consulted for diabetes management  Home diabetes meds: Toujeo 140 units daily, mealtime insulin 55 units (He developed pancreatitis following use of trulicity, and was unable to tolerate a sglt-2)  Current a1c: 9.6%  Outpatient Endocrinologist: Dr Ibarra    1. Uncontrolled DM2  - Insulin doses decreased last night, now NPO   - Decrease premeal admelog to 10 units tid with meals (hold if NPO)  - Continue lantus 35 units in AM and 20 units in PM  - He will follow up with Dr Ibarra for further management     2. Afib with RVR  - EP following, started on metoprolol    3. CAD/Aortic stenosis  - S/p AVR on 10/13    4. HLD  - Continue statin

## 2023-10-21 ENCOUNTER — TRANSCRIPTION ENCOUNTER (OUTPATIENT)
Age: 58
End: 2023-10-21

## 2023-10-21 LAB
ANION GAP SERPL CALC-SCNC: 14 MMOL/L — SIGNIFICANT CHANGE UP (ref 5–17)
ANION GAP SERPL CALC-SCNC: 14 MMOL/L — SIGNIFICANT CHANGE UP (ref 5–17)
BUN SERPL-MCNC: 34.1 MG/DL — HIGH (ref 8–20)
BUN SERPL-MCNC: 34.1 MG/DL — HIGH (ref 8–20)
CALCIUM SERPL-MCNC: 8.5 MG/DL — SIGNIFICANT CHANGE UP (ref 8.4–10.5)
CALCIUM SERPL-MCNC: 8.5 MG/DL — SIGNIFICANT CHANGE UP (ref 8.4–10.5)
CHLORIDE SERPL-SCNC: 97 MMOL/L — SIGNIFICANT CHANGE UP (ref 96–108)
CHLORIDE SERPL-SCNC: 97 MMOL/L — SIGNIFICANT CHANGE UP (ref 96–108)
CO2 SERPL-SCNC: 25 MMOL/L — SIGNIFICANT CHANGE UP (ref 22–29)
CO2 SERPL-SCNC: 25 MMOL/L — SIGNIFICANT CHANGE UP (ref 22–29)
CREAT SERPL-MCNC: 1.89 MG/DL — HIGH (ref 0.5–1.3)
CREAT SERPL-MCNC: 1.89 MG/DL — HIGH (ref 0.5–1.3)
EGFR: 41 ML/MIN/1.73M2 — LOW
EGFR: 41 ML/MIN/1.73M2 — LOW
GLUCOSE BLDC GLUCOMTR-MCNC: 164 MG/DL — HIGH (ref 70–99)
GLUCOSE BLDC GLUCOMTR-MCNC: 230 MG/DL — HIGH (ref 70–99)
GLUCOSE BLDC GLUCOMTR-MCNC: 230 MG/DL — HIGH (ref 70–99)
GLUCOSE BLDC GLUCOMTR-MCNC: 271 MG/DL — HIGH (ref 70–99)
GLUCOSE BLDC GLUCOMTR-MCNC: 271 MG/DL — HIGH (ref 70–99)
GLUCOSE SERPL-MCNC: 152 MG/DL — HIGH (ref 70–99)
GLUCOSE SERPL-MCNC: 152 MG/DL — HIGH (ref 70–99)
HCT VFR BLD CALC: 26.5 % — LOW (ref 39–50)
HCT VFR BLD CALC: 26.5 % — LOW (ref 39–50)
HGB BLD-MCNC: 8.1 G/DL — LOW (ref 13–17)
HGB BLD-MCNC: 8.1 G/DL — LOW (ref 13–17)
INR BLD: 2.63 RATIO — HIGH (ref 0.85–1.18)
INR BLD: 2.63 RATIO — HIGH (ref 0.85–1.18)
INR BLD: 3.18 RATIO — HIGH (ref 0.85–1.18)
INR BLD: 3.18 RATIO — HIGH (ref 0.85–1.18)
MAGNESIUM SERPL-MCNC: 2.3 MG/DL — SIGNIFICANT CHANGE UP (ref 1.6–2.6)
MAGNESIUM SERPL-MCNC: 2.3 MG/DL — SIGNIFICANT CHANGE UP (ref 1.6–2.6)
MCHC RBC-ENTMCNC: 28 PG — SIGNIFICANT CHANGE UP (ref 27–34)
MCHC RBC-ENTMCNC: 28 PG — SIGNIFICANT CHANGE UP (ref 27–34)
MCHC RBC-ENTMCNC: 30.6 GM/DL — LOW (ref 32–36)
MCHC RBC-ENTMCNC: 30.6 GM/DL — LOW (ref 32–36)
MCV RBC AUTO: 91.7 FL — SIGNIFICANT CHANGE UP (ref 80–100)
MCV RBC AUTO: 91.7 FL — SIGNIFICANT CHANGE UP (ref 80–100)
PLATELET # BLD AUTO: 361 K/UL — SIGNIFICANT CHANGE UP (ref 150–400)
PLATELET # BLD AUTO: 361 K/UL — SIGNIFICANT CHANGE UP (ref 150–400)
POTASSIUM SERPL-MCNC: 4.5 MMOL/L — SIGNIFICANT CHANGE UP (ref 3.5–5.3)
POTASSIUM SERPL-MCNC: 4.5 MMOL/L — SIGNIFICANT CHANGE UP (ref 3.5–5.3)
POTASSIUM SERPL-SCNC: 4.5 MMOL/L — SIGNIFICANT CHANGE UP (ref 3.5–5.3)
POTASSIUM SERPL-SCNC: 4.5 MMOL/L — SIGNIFICANT CHANGE UP (ref 3.5–5.3)
PROTHROM AB SERPL-ACNC: 28.4 SEC — HIGH (ref 9.5–13)
PROTHROM AB SERPL-ACNC: 28.4 SEC — HIGH (ref 9.5–13)
PROTHROM AB SERPL-ACNC: 34.1 SEC — HIGH (ref 9.5–13)
PROTHROM AB SERPL-ACNC: 34.1 SEC — HIGH (ref 9.5–13)
RBC # BLD: 2.89 M/UL — LOW (ref 4.2–5.8)
RBC # BLD: 2.89 M/UL — LOW (ref 4.2–5.8)
RBC # FLD: 13.6 % — SIGNIFICANT CHANGE UP (ref 10.3–14.5)
RBC # FLD: 13.6 % — SIGNIFICANT CHANGE UP (ref 10.3–14.5)
SODIUM SERPL-SCNC: 136 MMOL/L — SIGNIFICANT CHANGE UP (ref 135–145)
SODIUM SERPL-SCNC: 136 MMOL/L — SIGNIFICANT CHANGE UP (ref 135–145)
WBC # BLD: 10.09 K/UL — SIGNIFICANT CHANGE UP (ref 3.8–10.5)
WBC # BLD: 10.09 K/UL — SIGNIFICANT CHANGE UP (ref 3.8–10.5)
WBC # FLD AUTO: 10.09 K/UL — SIGNIFICANT CHANGE UP (ref 3.8–10.5)
WBC # FLD AUTO: 10.09 K/UL — SIGNIFICANT CHANGE UP (ref 3.8–10.5)

## 2023-10-21 PROCEDURE — 99231 SBSQ HOSP IP/OBS SF/LOW 25: CPT | Mod: 24

## 2023-10-21 PROCEDURE — 71045 X-RAY EXAM CHEST 1 VIEW: CPT | Mod: 26,59

## 2023-10-21 PROCEDURE — 71046 X-RAY EXAM CHEST 2 VIEWS: CPT | Mod: 26

## 2023-10-21 PROCEDURE — 99233 SBSQ HOSP IP/OBS HIGH 50: CPT

## 2023-10-21 PROCEDURE — 93010 ELECTROCARDIOGRAM REPORT: CPT

## 2023-10-21 RX ORDER — AMIODARONE HYDROCHLORIDE 400 MG/1
200 TABLET ORAL DAILY
Refills: 0 | Status: DISCONTINUED | OUTPATIENT
Start: 2023-10-21 | End: 2023-10-22

## 2023-10-21 RX ADMIN — POLYETHYLENE GLYCOL 3350 17 GRAM(S): 17 POWDER, FOR SOLUTION ORAL at 08:48

## 2023-10-21 RX ADMIN — Medication 2: at 23:29

## 2023-10-21 RX ADMIN — SODIUM CHLORIDE 3 MILLILITER(S): 9 INJECTION INTRAMUSCULAR; INTRAVENOUS; SUBCUTANEOUS at 23:05

## 2023-10-21 RX ADMIN — SENNA PLUS 2 TABLET(S): 8.6 TABLET ORAL at 23:30

## 2023-10-21 RX ADMIN — Medication 50 MILLIGRAM(S): at 08:48

## 2023-10-21 RX ADMIN — INSULIN GLARGINE 20 UNIT(S): 100 INJECTION, SOLUTION SUBCUTANEOUS at 23:27

## 2023-10-21 RX ADMIN — Medication 10 UNIT(S): at 08:47

## 2023-10-21 RX ADMIN — Medication 1000 UNIT(S): at 08:48

## 2023-10-21 RX ADMIN — Medication 100 MILLIGRAM(S): at 00:53

## 2023-10-21 RX ADMIN — Medication 100 MILLIGRAM(S): at 09:00

## 2023-10-21 RX ADMIN — Medication 10 UNIT(S): at 17:17

## 2023-10-21 RX ADMIN — Medication 40 MILLIGRAM(S): at 08:48

## 2023-10-21 RX ADMIN — Medication 10 UNIT(S): at 12:17

## 2023-10-21 RX ADMIN — INSULIN GLARGINE 35 UNIT(S): 100 INJECTION, SOLUTION SUBCUTANEOUS at 08:47

## 2023-10-21 RX ADMIN — Medication 4: at 12:17

## 2023-10-21 RX ADMIN — Medication 975 MILLIGRAM(S): at 02:03

## 2023-10-21 RX ADMIN — Medication 2: at 08:46

## 2023-10-21 RX ADMIN — CLOPIDOGREL BISULFATE 75 MILLIGRAM(S): 75 TABLET, FILM COATED ORAL at 08:54

## 2023-10-21 RX ADMIN — SODIUM CHLORIDE 3 MILLILITER(S): 9 INJECTION INTRAMUSCULAR; INTRAVENOUS; SUBCUTANEOUS at 05:14

## 2023-10-21 RX ADMIN — ATORVASTATIN CALCIUM 80 MILLIGRAM(S): 80 TABLET, FILM COATED ORAL at 23:31

## 2023-10-21 RX ADMIN — PANTOPRAZOLE SODIUM 40 MILLIGRAM(S): 20 TABLET, DELAYED RELEASE ORAL at 08:48

## 2023-10-21 RX ADMIN — Medication 145 MILLIGRAM(S): at 08:49

## 2023-10-21 RX ADMIN — Medication 6: at 17:17

## 2023-10-21 RX ADMIN — Medication 50 MILLIGRAM(S): at 17:18

## 2023-10-21 RX ADMIN — AMIODARONE HYDROCHLORIDE 200 MILLIGRAM(S): 400 TABLET ORAL at 17:18

## 2023-10-21 RX ADMIN — Medication 975 MILLIGRAM(S): at 01:33

## 2023-10-21 RX ADMIN — SODIUM CHLORIDE 3 MILLILITER(S): 9 INJECTION INTRAMUSCULAR; INTRAVENOUS; SUBCUTANEOUS at 14:02

## 2023-10-21 NOTE — DISCHARGE NOTE NURSING/CASE MANAGEMENT/SOCIAL WORK - NSDCVIVACCINE_GEN_ALL_CORE_FT
influenza, injectable, quadrivalent, preservative free; 12-Oct-2017 15:08; Kim Samano (RN); [employer]; [id number]; IntraMuscular; Deltoid Left.; 0.5 milliLiter(s); VIS (VIS Published: 07-Aug-2015, VIS Presented: 12-Oct-2017);   influenza, injectable, quadrivalent, preservative free; 13-Sep-2018 12:04; Eliane Otero (RN); [employer]; [id number] (Exp. Date: 30-Jun-2019); IntraMuscular; Deltoid Left.; 0.5 milliLiter(s); VIS (VIS Published: 07-Aug-2015, VIS Presented: 13-Sep-2018);   influenza, injectable, quadrivalent, preservative free; 03-Dec-2019 13:24; Benita Agosto (RN); [employer]; [id number] (Exp. Date: 30-Jun-2020); IntraMuscular; Deltoid Left.; 0.5 milliLiter(s); VIS (VIS Published: 15-Aug-2019, VIS Presented: 03-Dec-2019);

## 2023-10-21 NOTE — PROGRESS NOTE ADULT - PROBLEM SELECTOR PLAN 1
TTE at [facility] revealed aortic stenosis (FIDEL 1, mG 27, pG 50), could not rule out bicuspid valve.  Transferred to [facility] 10/6/23 for surgical evaluation under Dr. Cardozo.  MARIBEL performed 10/9 with findings of moderate AS  s/p mechanical AVR 10/13/23  BB and Amio held for ~13 second ventricular standstill event on 10/17  s/p DC PPM on 10/20  C/w lopressor  Monitor telemetry  Plavix in setting of recent PCI   Aspirin for acute valve prophylaxis  Started coumadin for mechanical valve, remains sub therapeutic- receiving daily INR draws  F/u TTE 10/18 with nml EF and no pericardial effusion  Coughing and deep breathing exercises/incentive spirometry encouraged/chest PT  Monitor I/O's, UO, and replete electrolytes PRN  Diuretic with daily lasix  Continue with PT, increase activity as tolerated, OOB to chair during the day  FS ACHS with coverage for glycemic control  PRNs and adjuvants for analgesia  Continue supportive care  Above plan of care to be discussed with Dr. Cardozo and CT surgical team on AM rounds

## 2023-10-21 NOTE — PROGRESS NOTE ADULT - SUBJECTIVE AND OBJECTIVE BOX
INTERVAL HPI/OVERNIGHT EVENTS:  follow up T2DM   pt reports to be feeling ok   appetite is good   pt s/p PPM    on AMiodarone    some   MEDICATIONS  (STANDING):  aMIOdarone    Tablet 200 milliGRAM(s) Oral daily  atorvastatin 80 milliGRAM(s) Oral at bedtime  bisacodyl Suppository 10 milliGRAM(s) Rectal once  cholecalciferol 1000 Unit(s) Oral daily  clopidogrel Tablet 75 milliGRAM(s) Oral daily  dextrose 5%. 1000 milliLiter(s) (50 mL/Hr) IV Continuous <Continuous>  dextrose 5%. 1000 milliLiter(s) (100 mL/Hr) IV Continuous <Continuous>  dextrose 50% Injectable 25 Gram(s) IV Push once  dextrose 50% Injectable 12.5 Gram(s) IV Push once  dextrose 50% Injectable 25 milliLiter(s) IV Push every 15 minutes  dextrose 50% Injectable 50 milliLiter(s) IV Push every 15 minutes  fenofibrate Tablet 145 milliGRAM(s) Oral daily  furosemide    Tablet 40 milliGRAM(s) Oral daily  glucagon  Injectable 1 milliGRAM(s) IntraMuscular once  insulin glargine Injectable (LANTUS) 35 Unit(s) SubCutaneous every morning  insulin glargine Injectable (LANTUS) 20 Unit(s) SubCutaneous at bedtime  insulin lispro (ADMELOG) corrective regimen sliding scale   SubCutaneous Before meals and at bedtime  insulin lispro Injectable (ADMELOG) 10 Unit(s) SubCutaneous three times a day before meals  metoprolol tartrate 50 milliGRAM(s) Oral two times a day  pantoprazole    Tablet 40 milliGRAM(s) Oral daily  polyethylene glycol 3350 17 Gram(s) Oral daily  senna 2 Tablet(s) Oral at bedtime  sodium chloride 0.9% lock flush 3 milliLiter(s) IV Push every 8 hours    MEDICATIONS  (PRN):  acetaminophen     Tablet .. 975 milliGRAM(s) Oral every 6 hours PRN Mild Pain (1 - 3)  dextrose Oral Gel 15 Gram(s) Oral once PRN Blood Glucose LESS THAN 70 milliGRAM(s)/deciliter      Allergies    scallops (Nausea)  penicillin (Hives)    Intolerances        Review of systems:  some trouble at tiems taking deep breath    no unusual CP sicne surgery -- incisional pain -  Vital Signs Last 24 Hrs  T(C): 37.7 (21 Oct 2023 23:21), Max: 37.7 (21 Oct 2023 23:21)  T(F): 99.9 (21 Oct 2023 23:21), Max: 99.9 (21 Oct 2023 23:21)  HR: 88 (21 Oct 2023 23:21) (73 - 117)  BP: 138/85 (21 Oct 2023 23:21) (95/60 - 138/85)  BP(mean): --  RR: 18 (21 Oct 2023 23:21) (16 - 18)  SpO2: 97% (21 Oct 2023 23:21) (94% - 99%)    Parameters below as of 21 Oct 2023 23:21  Patient On (Oxygen Delivery Method): room air        PHYSICAL EXAM:      Constitutional: NAD, well-groomed, well-developed  Neck: No LAD, No JVD, trachea midline, no thyroid enlargement  Back: Normal spine flexure, No CVA tenderness  Respiratory: CTAB  Cardiovascular: S1 and S2, RRR, no M/G/R    Neurological: A/O x 3, no focal deficits  Psychiatric: Normal mood, normal affect  Musculoskeletal: 5/5 strength b/l upper and lower extremities  Skin: No rashes, no acanthosis        LABS:                        8.1    10.09 )-----------( 361      ( 21 Oct 2023 05:47 )             26.5     10-21    136  |  97  |  34.1<H>  ----------------------------<  152<H>  4.5   |  25.0  |  1.89<H>    Ca    8.5      21 Oct 2023 05:47  Mg     2.3     10-21      Urinalysis Basic - ( 21 Oct 2023 05:47 )    Color: x / Appearance: x / SG: x / pH: x  Gluc: 152 mg/dL / Ketone: x  / Bili: x / Urobili: x   Blood: x / Protein: x / Nitrite: x   Leuk Esterase: x / RBC: x / WBC x   Sq Epi: x / Non Sq Epi: x / Bacteria: x          CAPILLARY BLOOD GLUCOSE  CAPILLARY BLOOD GLUCOSE      POCT Blood Glucose.: 164 mg/dL (21 Oct 2023 23:25)  POCT Blood Glucose.: 271 mg/dL (21 Oct 2023 17:07)  POCT Blood Glucose.: 230 mg/dL (21 Oct 2023 12:03)  POCT Blood Glucose.: 164 mg/dL (21 Oct 2023 08:20)      RADIOLOGY & ADDITIONAL TESTS:

## 2023-10-21 NOTE — PROGRESS NOTE ADULT - ASSESSMENT
58 year old male with a medical history of HTN, HLD, type 2 DM (HA1c 9.6 on Insulin), CKD 3, Obesity Class II, CAD s/p 8 stents, recent admission to [facility] 9/29-9/30 for  chest pain s/p PCI of ISR of LAD x 2 and PTCA of diag on 9/29/23, since readmitted to [facility] 10/3 after presenting with recurrent chest pain and increased CANNON since recent PCI. TTE revealed aortic stenosis (FIDEL 1, mG 27, pG 50), could not rule out bicuspid valve. Patient was re-cathed 10/6 to confirm patent stents. Patient now transferred to [facility] 10/6/23 for surgical evaluation under Dr. Cardozo. Underwent Mechanical AVR and sternal plating on 10/13. Post op course with Afib RVR and episode of 13 second pause.  10/20 DC PPM placement

## 2023-10-21 NOTE — DISCHARGE NOTE NURSING/CASE MANAGEMENT/SOCIAL WORK - NSDCPEFALRISK_GEN_ALL_CORE
For information on Fall & Injury Prevention, visit: [email or web address] OR  [email or web address] OR  https://www.cdc.gov/steadi/patient.html

## 2023-10-21 NOTE — DISCHARGE NOTE NURSING/CASE MANAGEMENT/SOCIAL WORK - PATIENT PORTAL LINK FT
You can access the FollowMyHealth Patient Portal offered by [facility] by registering at the following website: [email or web address]. By joining [facility]’s FollowMyHealth portal, you will also be able to view your health information using other applications (apps) compatible with our system.

## 2023-10-21 NOTE — PROGRESS NOTE ADULT - ASSESSMENT
T2DM   BS better this evening   cont current RX       on Amiodarone epsidoe of afib -- will need to follow TFT as outpt

## 2023-10-21 NOTE — DISCHARGE NOTE NURSING/CASE MANAGEMENT/SOCIAL WORK - NSDCFUADDAPPT_GEN_ALL_CORE_FT
***********************************  [facility] LABS  TWICE WEEKLY PT/INR HOME LAB DRAWS ON MONDAYS & THURSDAYS  1ST HOME DRAW SCHEDULED FOR THURSDAY, 10/26/2023  PHONE: [phone]  ***********************************

## 2023-10-21 NOTE — PROGRESS NOTE ADULT - SUBJECTIVE AND OBJECTIVE BOX
Significant recent/past 24 hr events:  No acute events reported overnight. s/p DC PPM placement 10/20 with EP    SUBJECTIVE:  Patient seen and examined on follow up now POD #8 for mechanical AVR. Pt currently lying in bed in NAD. Denies fevers, chills, HA, dizziness, CP, SOB, abd pain, N/V/D, numbness/tingling in extremities, or any other acute complaints. States pain well-controlled on current regimen.  +Tolerating diet  +Passing BMs since surgery     PAST MEDICAL & SURGICAL HISTORY:  Essential hypertension  Obstructive sleep apnea  Diabetes mellitus  HLD (hyperlipidemia)  CAD (coronary artery disease)  Pancreatitis  History of coronary artery stent placement  S/P cholecystectomy    DAILY REVIEW:  Telemetry: V-paced 70s  Vital Signs Last 24 Hrs  T(C): 37.4 (21 Oct 2023 01:01), Max: 37.6 (20 Oct 2023 08:38)  T(F): 99.4 (21 Oct 2023 01:01), Max: 99.7 (20 Oct 2023 08:38)  HR: 117 (21 Oct 2023 01:01) (76 - 124)  BP: 95/60 (21 Oct 2023 01:01) (91/52 - 144/81)  BP(mean): --  RR: 16 (21 Oct 2023 01:01) (16 - 20)  SpO2: 94% (21 Oct 2023 01:01) (92% - 100%)    Parameters below as of 21 Oct 2023 01:01  Patient On (Oxygen Delivery Method): room air    I&O's Summary    19 Oct 2023 07:01  -  20 Oct 2023 07:00  --------------------------------------------------------  IN: 200 mL / OUT: 1100 mL / NET: -900 mL    LABS:                        8.8    11.52 )-----------( 325      ( 20 Oct 2023 05:08 )             28.3   10-20    137  |  99  |  35.3<H>  ----------------------------<  136<H>  4.6   |  27.0  |  2.04<H>    Ca    8.5      20 Oct 2023 05:08  Mg     2.2     10-20    PT/INR - ( 20 Oct 2023 05:08 )   PT: 21.9 sec;   INR: 2.01 ratio       PTT - ( 19 Oct 2023 05:51 )  PTT:40.5 sec    MEDICATIONS:  MEDICATIONS  (STANDING):  atorvastatin 80 milliGRAM(s) Oral at bedtime  bisacodyl Suppository 10 milliGRAM(s) Rectal once  cholecalciferol 1000 Unit(s) Oral daily  clindamycin IVPB 600 milliGRAM(s) IV Intermittent every 8 hours  clopidogrel Tablet 75 milliGRAM(s) Oral daily  fenofibrate Tablet 145 milliGRAM(s) Oral daily  furosemide    Tablet 40 milliGRAM(s) Oral daily  glucagon  Injectable 1 milliGRAM(s) IntraMuscular once  insulin glargine Injectable (LANTUS) 35 Unit(s) SubCutaneous every morning  insulin glargine Injectable (LANTUS) 20 Unit(s) SubCutaneous at bedtime  insulin lispro (ADMELOG) corrective regimen sliding scale   SubCutaneous Before meals and at bedtime  insulin lispro Injectable (ADMELOG) 10 Unit(s) SubCutaneous three times a day before meals  metoprolol tartrate 50 milliGRAM(s) Oral two times a day  pantoprazole    Tablet 40 milliGRAM(s) Oral daily  polyethylene glycol 3350 17 Gram(s) Oral daily  senna 2 Tablet(s) Oral at bedtime  sodium chloride 0.9% lock flush 3 milliLiter(s) IV Push every 8 hours    MEDICATIONS  (PRN):  acetaminophen     Tablet .. 975 milliGRAM(s) Oral every 6 hours PRN Mild Pain (1 - 3)    ALLERGIES:  scallops (Nausea)  penicillin (Hives)    DIAGNOSTICS:  Xray Chest 1 View- PORTABLE-Routine (Xray Chest 1 View- PORTABLE-Routine in AM.) (10.18.23 @ 06:11)  FINDINGS: Status post median sternotomy, aortic valve replacement and   coronary artery stent. Heart size and mediastinum stable. Trace residual   left pleural effusion. The lungs are otherwise grossly clear.    Impression:  Trace residual left pleural effusion.    12 Lead ECG (10.17.23 @ 19:06)  Ventricular Rate 78 BPM  Atrial Rate 78 BPM  P-R Interval 314 ms  QRS Duration 136 ms  Q-T Interval 434 ms  QTC Calculation(Bazett) 494 ms  P Axis 2 degrees  R Axis 36 degrees  T Axis -41 degrees  Diagnosis Line Sinus rhythm with 1st degree A-V block  Right bundle branch block  Abnormal ECG    TTE Echo Complete w/ Contrast w/ Doppler (10.18.23 @ 15:39)  Summary:   1. Left ventricular ejection fraction, by visual estimation, is 60 to   65%.   2. Technically difficult study.   3. Normal global left ventricular systolic function.   4. There is moderate concentric left ventricular hypertrophy.   5. There is a significant pericardial fat pad present.   6. There is no evidence of pericardial effusion.   7. Mild Mitral valve prolapse.   8. Trace mitral valve regurgitation.   9. Aortic valve is normally functioning mechanical prosthetic valve.  10. Mechanical prosthesis in the aortic valve position.    PHYSICAL EXAM:  Constitutional: NAD  Neck: supple, trachea midline. No JVD   Respiratory: Breath sounds diminished b/l lower fields to auscultation, no accessory muscle use noted. No wheezing, rales, or rhonchi noted b/l   Cardiovascular: Regular rate, regular rhythm, normal S1, S2; no murmurs or rub   Gastrointestinal: Soft, non-tender, non-distended, + bowel sounds   Extremities: DALLAS x 4, trace LE peripheral edema, no cyanosis, no clubbing    Vascular: Equal and normal pulses: 2+ peripheral pulses throughout  Neurological: A+O x 3; speech clear and intact; no gross sensory/motor deficits  Psychiatric: calm, normal mood, normal affect   Skin: warm, dry, well perfused, no rashes   Incision: Sternum with steri strips, well-approximated, healing well - No audible/palpable click; left chest pressure dressing Epicardial Wires: Isolated

## 2023-10-21 NOTE — PROGRESS NOTE ADULT - SUBJECTIVE AND OBJECTIVE BOX
Pt doing well POD #1 s/p dual chamber PPM (MDT) implant, access obtained via L sided axillary stick. Stable overnight w/o event. Denies any complaints at time of assessment this morning.       Exam:   VSS, NAD, A&O x 3  Incision: Dermabond C/D/I; no bleeding, hematoma, erythema, exudate or edema; distal pulses intact  Card: S1/S2, RRR, no m/g/r  Resp: lungs CTA b/l  Abd: S/NT/ND  Ext: no edema, distal pulses intact      Device interrogation: Pending      Tele: Atrial fibrillation with ventricular rates 90-100s. Conversion to Sinus rhythm at approximately 3:00am. Currently A-sensed, V-Paced rhythm.      CXR: Pending      EKG: A-sensed, V-paced rhythm    Labs:                         8.1    10.09 )-----------( 361      ( 21 Oct 2023 05:47 )             26.5     10-21    136  |  97  |  34.1<H>  ----------------------------<  152<H>  4.5   |  25.0  |  1.89<H>    Ca    8.5      21 Oct 2023 05:47  Mg     2.3     10-21          Assessment:   Pt is a 59 y/o male, PMHx significant for obesity, HTN, HL, type 2 DM, CKD 3, CAD s/p 8 stents (s/p PCI of ISR or LADx2 and PTCA of Diag on 9/29) who presented with recurrent chest pain found to have severe AS now s/p mechanical AVR and sternal plate on 10/13/23 whose post-operative course was complicated by atrial fibrillation for which he was given amiodarone and AVN blockers with an episode of long conversion pause followed by transient AV block  with up to a 12 second pause for which EP service was initially consulted consulted.    Pt noted with persistent significant conversion pauses as well as transient high grade AVB requiring pacing with epicardial wires @ VV40. Pt is now POD #1 s/p  dual chamber PPM (MDT) implant, access obtained via L sided axillary stick. Stable overnight w/o event. Denies any complaints at time of assessment this morning.         Plan:    1) High grade AVB s/p DC PPM implant  - CXR PA / LAT PENDING  - Pt instructed as to ROM of affected arm - no lifting/pushing/pulling >10 lbs & shoulder ROM limited to 90deg & below x 4 weeks.   - Pt instructed as to incision care and f/up - written instructions included in d/c documents.  - NO HEPARIN OR LOVENOX, INCLUDING PROPHYLACTIC/SUBCUT DOSING, UNTIL OTHERWISE ADVISED BY EP  - Outpt f/up scheduled.    - Pending final read of CXR, no barriers to d/c from EP service perspective.       2)Paroxysmal AF with RVR  - Continue warfarin and follow INR.   - c/w Metoprolol 50mg BID. Titrate as BP tolerates  - Out patient follow up with Dr. Everett     Pt doing well POD #1 s/p dual chamber PPM (MDT) implant, access obtained via L sided axillary stick. Stable overnight w/o event. Denies any complaints at time of assessment this morning.       Exam:   VSS, NAD, A&O x 3  Incision: Dermabond C/D/I; no bleeding, hematoma, erythema, exudate or edema; distal pulses intact  Card: S1/S2, RRR, no m/g/r  Resp: lungs CTA b/l  Abd: S/NT/ND  Ext: no edema, distal pulses intact      Device interrogation: measurements appropriate & stable. Parameters confirmed. No diaphragmatic stim.       Tele: Atrial fibrillation with ventricular rates 90-100s. Conversion to Sinus rhythm at approximately 3:00am. Currently A-sensed, V-Paced rhythm.      CXR: Leads in appropriate position. No gross pneumothorax      EKG: A-sensed, V-paced rhythm    Labs:                         8.1    10.09 )-----------( 361      ( 21 Oct 2023 05:47 )             26.5     10-21    136  |  97  |  34.1<H>  ----------------------------<  152<H>  4.5   |  25.0  |  1.89<H>    Ca    8.5      21 Oct 2023 05:47  Mg     2.3     10-21          Assessment:   Pt is a 59 y/o male, PMHx significant for obesity, HTN, HL, type 2 DM, CKD 3, CAD s/p 8 stents (s/p PCI of ISR or LADx2 and PTCA of Diag on 9/29) who presented with recurrent chest pain found to have severe AS now s/p mechanical AVR and sternal plate on 10/13/23 whose post-operative course was complicated by atrial fibrillation for which he was given amiodarone and AVN blockers with an episode of long conversion pause followed by transient AV block  with up to a 12 second pause for which EP service was initially consulted consulted.    Pt noted with persistent significant conversion pauses as well as transient high grade AVB requiring pacing with epicardial wires @ VV40. Pt is now POD #1 s/p  dual chamber PPM (MDT) implant, access obtained via L sided axillary stick. Stable overnight w/o event. Denies any complaints at time of assessment this morning.         Plan:    1) High grade AVB s/p DC PPM implant  - CXR PA / LAT PENDING  - Pt instructed as to ROM of affected arm - no lifting/pushing/pulling >10 lbs & shoulder ROM limited to 90deg & below x 4 weeks.   - Pt instructed as to incision care and f/up - written instructions included in d/c documents.  - NO HEPARIN OR LOVENOX, INCLUDING PROPHYLACTIC/SUBCUT DOSING, UNTIL OTHERWISE ADVISED BY EP  - Outpt f/up scheduled.    - Pending final read of CXR, no barriers to d/c from EP service perspective.       2)Paroxysmal AF with RVR  - Continue warfarin and follow INR.   - c/w Metoprolol 50mg BID. Titrate as BP tolerates  - Out patient follow up with Dr. Everett      Addendum:  -Device interrogation revealed appropriately functioning device. CXR with appropriate lead position and no gross pneumothorax (preliminary read).     -Ok to start Amiodarone 200mg QD. TFTs & LFTs should be monitored q 3-6 months while on amiodarone therapy. Anticipate <1 year of amiodarone therapy for this patient; however if > 1 year, patient will need annual fundoscopic exam and PFTs. Patient is advised of associated risks and need for monitoring; all questions answered to pt's expressed understanding.     - Pending final read of CXR, no barriers to d/c from EP service perspective          Please include in discharge instructions   Cardiac Device Implant Post Operative Instructions  - Do not touch the incision until it is completely healed.   - There are Steristrips (white strips of tape) on your incision, which will start to peel off on their own over the next 2-3 weeks. Do not pick at or peel off the Steristrips.   - Bruising around the implant site or over the chest, side or arm near the incision is normal, and will take a few weeks to resolve.  -Do not lift the affected arm higher than 90 degrees (shoulder height) in any direction for 6 weeks.   - Do not push, pull or lift anything heavier than 10 lbs (about a gallon of milk) with the affected arm for 6 weeks.     - Do not apply soaps, creams, lotions, ointments or powders to the incision until it is completely healed.  - You may take a shower in 24 hours, and allow the water to run over the incision. However, do not submerge the incision in water: do not swim or soak in bath tubs, hot tubs, swimming pools, etc.   You should call the doctor if:   - You notice redness, drainage, swelling, increased tenderness, hot sensation around the incision, bleeding or incision edges pulling apart.  - Your temperature is greater than 100 degrees F for more than 24 hours.  - You notice swelling or bulging at the incision or around the device that was not there when you left the hospital or is increasing in size.  - You experience increased difficulty breathing.  - You notice new/worsening swelling in your legs and ankles.  - You faint or have dizzy spells.  - You have any questions or concerns regarding your device or the procedure.

## 2023-10-22 ENCOUNTER — TRANSCRIPTION ENCOUNTER (OUTPATIENT)
Age: 58
End: 2023-10-22

## 2023-10-22 LAB
ANION GAP SERPL CALC-SCNC: 13 MMOL/L — SIGNIFICANT CHANGE UP (ref 5–17)
ANION GAP SERPL CALC-SCNC: 13 MMOL/L — SIGNIFICANT CHANGE UP (ref 5–17)
BUN SERPL-MCNC: 32.7 MG/DL — HIGH (ref 8–20)
BUN SERPL-MCNC: 32.7 MG/DL — HIGH (ref 8–20)
CALCIUM SERPL-MCNC: 8.4 MG/DL — SIGNIFICANT CHANGE UP (ref 8.4–10.5)
CALCIUM SERPL-MCNC: 8.4 MG/DL — SIGNIFICANT CHANGE UP (ref 8.4–10.5)
CHLORIDE SERPL-SCNC: 102 MMOL/L — SIGNIFICANT CHANGE UP (ref 96–108)
CHLORIDE SERPL-SCNC: 102 MMOL/L — SIGNIFICANT CHANGE UP (ref 96–108)
CO2 SERPL-SCNC: 24 MMOL/L — SIGNIFICANT CHANGE UP (ref 22–29)
CO2 SERPL-SCNC: 24 MMOL/L — SIGNIFICANT CHANGE UP (ref 22–29)
CREAT SERPL-MCNC: 1.92 MG/DL — HIGH (ref 0.5–1.3)
CREAT SERPL-MCNC: 1.92 MG/DL — HIGH (ref 0.5–1.3)
EGFR: 40 ML/MIN/1.73M2 — LOW
EGFR: 40 ML/MIN/1.73M2 — LOW
GLUCOSE BLDC GLUCOMTR-MCNC: 110 MG/DL — HIGH (ref 70–99)
GLUCOSE BLDC GLUCOMTR-MCNC: 110 MG/DL — HIGH (ref 70–99)
GLUCOSE BLDC GLUCOMTR-MCNC: 148 MG/DL — HIGH (ref 70–99)
GLUCOSE BLDC GLUCOMTR-MCNC: 148 MG/DL — HIGH (ref 70–99)
GLUCOSE BLDC GLUCOMTR-MCNC: 178 MG/DL — HIGH (ref 70–99)
GLUCOSE BLDC GLUCOMTR-MCNC: 178 MG/DL — HIGH (ref 70–99)
GLUCOSE BLDC GLUCOMTR-MCNC: 182 MG/DL — HIGH (ref 70–99)
GLUCOSE BLDC GLUCOMTR-MCNC: 182 MG/DL — HIGH (ref 70–99)
GLUCOSE SERPL-MCNC: 96 MG/DL — SIGNIFICANT CHANGE UP (ref 70–99)
GLUCOSE SERPL-MCNC: 96 MG/DL — SIGNIFICANT CHANGE UP (ref 70–99)
HCT VFR BLD CALC: 26.5 % — LOW (ref 39–50)
HCT VFR BLD CALC: 26.5 % — LOW (ref 39–50)
HGB BLD-MCNC: 8.3 G/DL — LOW (ref 13–17)
HGB BLD-MCNC: 8.3 G/DL — LOW (ref 13–17)
INR BLD: 2.35 RATIO — HIGH (ref 0.85–1.18)
INR BLD: 2.35 RATIO — HIGH (ref 0.85–1.18)
MAGNESIUM SERPL-MCNC: 2.2 MG/DL — SIGNIFICANT CHANGE UP (ref 1.6–2.6)
MAGNESIUM SERPL-MCNC: 2.2 MG/DL — SIGNIFICANT CHANGE UP (ref 1.6–2.6)
MCHC RBC-ENTMCNC: 28.1 PG — SIGNIFICANT CHANGE UP (ref 27–34)
MCHC RBC-ENTMCNC: 28.1 PG — SIGNIFICANT CHANGE UP (ref 27–34)
MCHC RBC-ENTMCNC: 31.3 GM/DL — LOW (ref 32–36)
MCHC RBC-ENTMCNC: 31.3 GM/DL — LOW (ref 32–36)
MCV RBC AUTO: 89.8 FL — SIGNIFICANT CHANGE UP (ref 80–100)
MCV RBC AUTO: 89.8 FL — SIGNIFICANT CHANGE UP (ref 80–100)
PLATELET # BLD AUTO: 369 K/UL — SIGNIFICANT CHANGE UP (ref 150–400)
PLATELET # BLD AUTO: 369 K/UL — SIGNIFICANT CHANGE UP (ref 150–400)
POTASSIUM SERPL-MCNC: 4.1 MMOL/L — SIGNIFICANT CHANGE UP (ref 3.5–5.3)
POTASSIUM SERPL-MCNC: 4.1 MMOL/L — SIGNIFICANT CHANGE UP (ref 3.5–5.3)
POTASSIUM SERPL-SCNC: 4.1 MMOL/L — SIGNIFICANT CHANGE UP (ref 3.5–5.3)
POTASSIUM SERPL-SCNC: 4.1 MMOL/L — SIGNIFICANT CHANGE UP (ref 3.5–5.3)
PROTHROM AB SERPL-ACNC: 25.4 SEC — HIGH (ref 9.5–13)
PROTHROM AB SERPL-ACNC: 25.4 SEC — HIGH (ref 9.5–13)
RBC # BLD: 2.95 M/UL — LOW (ref 4.2–5.8)
RBC # BLD: 2.95 M/UL — LOW (ref 4.2–5.8)
RBC # FLD: 13.8 % — SIGNIFICANT CHANGE UP (ref 10.3–14.5)
RBC # FLD: 13.8 % — SIGNIFICANT CHANGE UP (ref 10.3–14.5)
SODIUM SERPL-SCNC: 139 MMOL/L — SIGNIFICANT CHANGE UP (ref 135–145)
SODIUM SERPL-SCNC: 139 MMOL/L — SIGNIFICANT CHANGE UP (ref 135–145)
WBC # BLD: 12.12 K/UL — HIGH (ref 3.8–10.5)
WBC # BLD: 12.12 K/UL — HIGH (ref 3.8–10.5)
WBC # FLD AUTO: 12.12 K/UL — HIGH (ref 3.8–10.5)
WBC # FLD AUTO: 12.12 K/UL — HIGH (ref 3.8–10.5)

## 2023-10-22 PROCEDURE — 71045 X-RAY EXAM CHEST 1 VIEW: CPT | Mod: 26

## 2023-10-22 PROCEDURE — 99232 SBSQ HOSP IP/OBS MODERATE 35: CPT

## 2023-10-22 RX ORDER — MAGNESIUM SULFATE 500 MG/ML
2 VIAL (ML) INJECTION ONCE
Refills: 0 | Status: COMPLETED | OUTPATIENT
Start: 2023-10-22 | End: 2023-10-22

## 2023-10-22 RX ORDER — AMIODARONE HYDROCHLORIDE 400 MG/1
400 TABLET ORAL EVERY 12 HOURS
Refills: 0 | Status: DISCONTINUED | OUTPATIENT
Start: 2023-10-22 | End: 2023-10-23

## 2023-10-22 RX ORDER — WARFARIN SODIUM 2.5 MG/1
4 TABLET ORAL ONCE
Refills: 0 | Status: COMPLETED | OUTPATIENT
Start: 2023-10-22 | End: 2023-10-22

## 2023-10-22 RX ORDER — AMIODARONE HYDROCHLORIDE 400 MG/1
150 TABLET ORAL ONCE
Refills: 0 | Status: COMPLETED | OUTPATIENT
Start: 2023-10-22 | End: 2023-10-22

## 2023-10-22 RX ORDER — INSULIN GLARGINE 100 [IU]/ML
18 INJECTION, SOLUTION SUBCUTANEOUS AT BEDTIME
Refills: 0 | Status: DISCONTINUED | OUTPATIENT
Start: 2023-10-22 | End: 2023-10-23

## 2023-10-22 RX ORDER — METOPROLOL TARTRATE 50 MG
5 TABLET ORAL ONCE
Refills: 0 | Status: COMPLETED | OUTPATIENT
Start: 2023-10-22 | End: 2023-10-22

## 2023-10-22 RX ORDER — AMIODARONE HYDROCHLORIDE 400 MG/1
400 TABLET ORAL EVERY 12 HOURS
Refills: 0 | Status: DISCONTINUED | OUTPATIENT
Start: 2023-10-22 | End: 2023-10-22

## 2023-10-22 RX ORDER — WARFARIN SODIUM 2.5 MG/1
3 TABLET ORAL ONCE
Refills: 0 | Status: DISCONTINUED | OUTPATIENT
Start: 2023-10-22 | End: 2023-10-22

## 2023-10-22 RX ADMIN — WARFARIN SODIUM 4 MILLIGRAM(S): 2.5 TABLET ORAL at 12:45

## 2023-10-22 RX ADMIN — Medication 5 MILLIGRAM(S): at 17:44

## 2023-10-22 RX ADMIN — AMIODARONE HYDROCHLORIDE 400 MILLIGRAM(S): 400 TABLET ORAL at 21:49

## 2023-10-22 RX ADMIN — SODIUM CHLORIDE 3 MILLILITER(S): 9 INJECTION INTRAMUSCULAR; INTRAVENOUS; SUBCUTANEOUS at 14:13

## 2023-10-22 RX ADMIN — Medication 50 MILLIGRAM(S): at 17:21

## 2023-10-22 RX ADMIN — INSULIN GLARGINE 35 UNIT(S): 100 INJECTION, SOLUTION SUBCUTANEOUS at 08:44

## 2023-10-22 RX ADMIN — CLOPIDOGREL BISULFATE 75 MILLIGRAM(S): 75 TABLET, FILM COATED ORAL at 14:37

## 2023-10-22 RX ADMIN — Medication 2: at 17:26

## 2023-10-22 RX ADMIN — Medication 10 UNIT(S): at 12:27

## 2023-10-22 RX ADMIN — PANTOPRAZOLE SODIUM 40 MILLIGRAM(S): 20 TABLET, DELAYED RELEASE ORAL at 14:37

## 2023-10-22 RX ADMIN — Medication 40 MILLIGRAM(S): at 05:23

## 2023-10-22 RX ADMIN — AMIODARONE HYDROCHLORIDE 400 MILLIGRAM(S): 400 TABLET ORAL at 18:46

## 2023-10-22 RX ADMIN — Medication 25 GRAM(S): at 17:29

## 2023-10-22 RX ADMIN — AMIODARONE HYDROCHLORIDE 618 MILLIGRAM(S): 400 TABLET ORAL at 16:09

## 2023-10-22 RX ADMIN — AMIODARONE HYDROCHLORIDE 200 MILLIGRAM(S): 400 TABLET ORAL at 05:23

## 2023-10-22 RX ADMIN — Medication 50 MILLIGRAM(S): at 05:23

## 2023-10-22 RX ADMIN — SODIUM CHLORIDE 3 MILLILITER(S): 9 INJECTION INTRAMUSCULAR; INTRAVENOUS; SUBCUTANEOUS at 05:24

## 2023-10-22 RX ADMIN — Medication 1000 UNIT(S): at 14:37

## 2023-10-22 RX ADMIN — SODIUM CHLORIDE 3 MILLILITER(S): 9 INJECTION INTRAMUSCULAR; INTRAVENOUS; SUBCUTANEOUS at 21:59

## 2023-10-22 RX ADMIN — Medication 10 UNIT(S): at 08:44

## 2023-10-22 RX ADMIN — Medication 145 MILLIGRAM(S): at 14:38

## 2023-10-22 RX ADMIN — INSULIN GLARGINE 18 UNIT(S): 100 INJECTION, SOLUTION SUBCUTANEOUS at 22:30

## 2023-10-22 RX ADMIN — ATORVASTATIN CALCIUM 80 MILLIGRAM(S): 80 TABLET, FILM COATED ORAL at 21:43

## 2023-10-22 RX ADMIN — Medication 10 UNIT(S): at 17:26

## 2023-10-22 RX ADMIN — Medication 2: at 21:46

## 2023-10-22 NOTE — PROGRESS NOTE ADULT - SUBJECTIVE AND OBJECTIVE BOX
Significant recent/past 24 hr events:  No acute events reported overnight. Epicardial wires dc'd    SUBJECTIVE:  Patient seen and examined on follow up now POD #9 for mechanical AVR. Pt currently lying in bed in NAD. Denies fevers, chills, HA, dizziness, CP, SOB, abd pain, N/V/D, numbness/tingling in extremities, or any other acute complaints. States pain well-controlled on current regimen.  +Tolerating diet  +Passing BMs since surgery     PAST MEDICAL & SURGICAL HISTORY:  Essential hypertension  Obstructive sleep apnea  Diabetes mellitus  HLD (hyperlipidemia)  CAD (coronary artery disease)  Pancreatitis  History of coronary artery stent placement  S/P cholecystectomy    DAILY REVIEW:  Telemetry: V-paced 80  Vital Signs Last 24 Hrs  T(C): 37.7 (21 Oct 2023 23:21), Max: 37.7 (21 Oct 2023 23:21)  T(F): 99.9 (21 Oct 2023 23:21), Max: 99.9 (21 Oct 2023 23:21)  HR: 88 (21 Oct 2023 23:21) (73 - 88)  BP: 138/85 (21 Oct 2023 23:21) (96/62 - 138/85)  BP(mean): --  RR: 18 (21 Oct 2023 23:21) (16 - 18)  SpO2: 97% (21 Oct 2023 23:21) (94% - 99%)    Parameters below as of 21 Oct 2023 23:21  Patient On (Oxygen Delivery Method): room air    I&O's Summary    20 Oct 2023 07:01  -  21 Oct 2023 07:00  --------------------------------------------------------  IN: 400 mL / OUT: 0 mL / NET: 400 mL    21 Oct 2023 07:01  -  22 Oct 2023 02:40  --------------------------------------------------------  IN: 960 mL / OUT: 0 mL / NET: 960 mL    LABS:                        8.1    10.09 )-----------( 361      ( 21 Oct 2023 05:47 )             26.5   10-21    136  |  97  |  34.1<H>  ----------------------------<  152<H>  4.5   |  25.0  |  1.89<H>    Ca    8.5      21 Oct 2023 05:47  Mg     2.3     10-21    PT/INR - ( 21 Oct 2023 11:35 )   PT: 34.1 sec;   INR: 3.18 ratio      MEDICATIONS:  MEDICATIONS  (STANDING):  aMIOdarone    Tablet 200 milliGRAM(s) Oral daily  atorvastatin 80 milliGRAM(s) Oral at bedtime  bisacodyl Suppository 10 milliGRAM(s) Rectal once  cholecalciferol 1000 Unit(s) Oral daily  clopidogrel Tablet 75 milliGRAM(s) Oral daily  dextrose 5%. 1000 milliLiter(s) (50 mL/Hr) IV Continuous <Continuous>  dextrose 5%. 1000 milliLiter(s) (100 mL/Hr) IV Continuous <Continuous>  dextrose 50% Injectable 25 milliLiter(s) IV Push every 15 minutes  dextrose 50% Injectable 25 Gram(s) IV Push once  dextrose 50% Injectable 12.5 Gram(s) IV Push once  dextrose 50% Injectable 50 milliLiter(s) IV Push every 15 minutes  fenofibrate Tablet 145 milliGRAM(s) Oral daily  furosemide    Tablet 40 milliGRAM(s) Oral daily  glucagon  Injectable 1 milliGRAM(s) IntraMuscular once  insulin glargine Injectable (LANTUS) 35 Unit(s) SubCutaneous every morning  insulin glargine Injectable (LANTUS) 20 Unit(s) SubCutaneous at bedtime  insulin lispro (ADMELOG) corrective regimen sliding scale   SubCutaneous Before meals and at bedtime  insulin lispro Injectable (ADMELOG) 10 Unit(s) SubCutaneous three times a day before meals  metoprolol tartrate 50 milliGRAM(s) Oral two times a day  pantoprazole    Tablet 40 milliGRAM(s) Oral daily  polyethylene glycol 3350 17 Gram(s) Oral daily  senna 2 Tablet(s) Oral at bedtime  sodium chloride 0.9% lock flush 3 milliLiter(s) IV Push every 8 hours    MEDICATIONS  (PRN):  acetaminophen     Tablet .. 975 milliGRAM(s) Oral every 6 hours PRN Mild Pain (1 - 3)  dextrose Oral Gel 15 Gram(s) Oral once PRN Blood Glucose LESS THAN 70 milliGRAM(s)/deciliter    ALLERGIES:  scallops (Nausea)  penicillin (Hives)    DIAGNOSTICS:  Xray Chest 2 Views PA/Lat (10.21.23 @ 11:29)  Pacemaker overlies left chest. Heart size is stable. No pneumothorax.   Small bilateral pleural effusions    IMPRESSION: Small bilateral pleural effusions    Xray Chest 1 View- PORTABLE-Routine (Xray Chest 1 View- PORTABLE-Routine in AM.) (10.18.23 @ 06:11)  FINDINGS: Status post median sternotomy, aortic valve replacement and   coronary artery stent. Heart size and mediastinum stable. Trace residual   left pleural effusion. The lungs are otherwise grossly clear.    Impression:  Trace residual left pleural effusion.    12 Lead ECG (10.17.23 @ 19:06)  Ventricular Rate 78 BPM  Atrial Rate 78 BPM  P-R Interval 314 ms  QRS Duration 136 ms  Q-T Interval 434 ms  QTC Calculation(Bazett) 494 ms  P Axis 2 degrees  R Axis 36 degrees  T Axis -41 degrees  Diagnosis Line Sinus rhythm with 1st degree A-V block  Right bundle branch block  Abnormal ECG    TTE Echo Complete w/ Contrast w/ Doppler (10.18.23 @ 15:39)  Summary:   1. Left ventricular ejection fraction, by visual estimation, is 60 to   65%.   2. Technically difficult study.   3. Normal global left ventricular systolic function.   4. There is moderate concentric left ventricular hypertrophy.   5. There is a significant pericardial fat pad present.   6. There is no evidence of pericardial effusion.   7. Mild Mitral valve prolapse.   8. Trace mitral valve regurgitation.   9. Aortic valve is normally functioning mechanical prosthetic valve.  10. Mechanical prosthesis in the aortic valve position.    PHYSICAL EXAM:  Constitutional: NAD  Neck: supple, trachea midline. No JVD   Respiratory: Breath sounds diminished b/l lower fields to auscultation, no accessory muscle use noted. No wheezing, rales, or rhonchi noted b/l   Cardiovascular: Regular rate, regular rhythm, normal S1, S2; no murmurs or rub   Gastrointestinal: Soft, rounded, non-tender, non-distended, + bowel sounds   Extremities: [place] x 4, trace LE peripheral edema, no cyanosis, no clubbing    Vascular: Equal and normal pulses: 2+ peripheral pulses throughout  Neurological: A+O x 3; speech clear and intact; no gross sensory/motor deficits  Psychiatric: calm, normal mood, normal affect   Skin: warm, dry, well perfused, no rashes   Incision: Sternum with steri strips, well-approximated, healing well - No audible/palpable click; left chest with steri strips, well-approximated

## 2023-10-22 NOTE — PROGRESS NOTE ADULT - PROBLEM SELECTOR PLAN 1
TTE at [facility] revealed aortic stenosis (FIDEL 1, mG 27, pG 50), could not rule out bicuspid valve.  Transferred to [facility] 10/6/23 for surgical evaluation under Dr. Cardozo.  MARIBEL performed 10/9 with findings of moderate AS  s/p mechanical AVR 10/13/23  BB and Amio held for ~13 second ventricular standstill event on 10/17  s/p DC PPM on 10/20  C/w lopressor and amio  Monitor telemetry  Plavix in setting of recent PCI   Aspirin for acute valve prophylaxis  Started coumadin for mechanical valve, remains sub therapeutic- receiving daily INR draws  F/u TTE 10/18 with nml EF and no pericardial effusion  Coughing and deep breathing exercises/incentive spirometry encouraged/chest PT  Monitor I/O's, UO, and replete electrolytes PRN  Diuretic with daily lasix  Continue with PT, increase activity as tolerated, OOB to chair during the day  FS ACHS with coverage for glycemic control  PRNs and adjuvants for analgesia  Continue supportive care  Above plan of care to be discussed with Dr. Cardozo and CT surgical team on AM rounds

## 2023-10-22 NOTE — DISCHARGE NOTE PROVIDER - NSDCCPCAREPLAN_GEN_ALL_CORE_FT
PRINCIPAL DISCHARGE DIAGNOSIS  Diagnosis: Aortic stenosis  Assessment and Plan of Treatment: status post mechanical Aortic valve replacement 10/13/23 with Dr. Cardozo  shower daily, soap and water to incisions, no lotions/creams to incisions  no swimming/pools/hot tubs/baths until incisions healed  no driving or heavy lifting >10lbs until cleared by surgeon  refer to discharge booklet for additional instructions  You INR (coumadin level) will be check on mondays and thursday by home care nurse. Results will be faxed to Dr. Rogel who will adjust your coumadin dose if needed. If you do not here from his office within 24 hours of the blood work being drawn, call to follow up.  Your INR goal is 2-3 per Dr. Cardozo.      SECONDARY DISCHARGE DIAGNOSES  Diagnosis: Heart block  Assessment and Plan of Treatment: Cardiac Device Implant Post Operative Instructions  - Do not touch the incision until it is completely healed.   - There are Steristrips (white strips of tape) on your incision, which will start to peel off on their own over the next 2-3 weeks. Do not pick at or peel off the Steristrips.   - Bruising around the implant site or over the chest, side or arm near the incision is normal, and will take a few weeks to resolve.  -Do not lift the affected arm higher than 90 degrees (shoulder height) in any direction for 6 weeks.   - Do not push, pull or lift anything heavier than 10 lbs (about a gallon of milk) with the affected arm for 6 weeks.     - Do not apply soaps, creams, lotions, ointments or powders to the incision until it is completely healed.  - You may take a shower in 24 hours, and allow the water to run over the incision. However, do not submerge the incision in water: do not swim or soak in bath tubs, hot tubs, swimming pools, etc.   You should call the doctor if:   - You notice redness, drainage, swelling, increased tenderness, hot sensation around the incision, bleeding or incision edges pulling apart.  - Your temperature is greater than 100 degrees F for more than 24 hours.  - You notice swelling or bulging at the incision or around the device that was not there when you left the hospital or is increasing in size.  - You experience increased difficulty breathing.  - You notice new/worsening swelling in your legs and ankles.  - You faint or have dizzy spells.  - You have any questions or concerns regarding your device or the procedure.    Diagnosis: CAD (coronary artery disease)  Assessment and Plan of Treatment:     Diagnosis: Diabetes mellitus  Assessment and Plan of Treatment:

## 2023-10-22 NOTE — DISCHARGE NOTE PROVIDER - NSDCFUADDAPPT_GEN_ALL_CORE_FT
1. Follow up with Dr. Cardozo on ____  The cardiac surgery office is located at [facility], first floor. Take a left at the end of the lobby until the end of that flowers (past the elevator bank). Make a left and the office is on your right across from the elevators.    Your Care Navigator Nurse Practitioner will be in touch to see you in your home within a few days from discharge. The Follow Your Heart program can help ensure you understand your medications, discharge instructions and answer any questions you may have at that time. They are also a great source to address concerns during the day and may be reached at [phone].    2. Follow up with electrophysiology, cardiology and primary care in 1-2 weeks.  1. Follow up with Dr. Cardozo on October 31, 2023 @ 9:45AM  The cardiac surgery office is located at [facility], first floor. Take a left at the end of the lobby until the end of that flowers (past the elevator bank). Make a left and the office is on your right across from the elevators.    Your Care Navigator Nurse Practitioner will be in touch to see you in your home within a few days from discharge. The Follow Your Heart program can help ensure you understand your medications, discharge instructions and answer any questions you may have at that time. They are also a great source to address concerns during the day and may be reached at [phone].    2. Follow up with electrophysiology, cardiology and primary care in 1-2 weeks.

## 2023-10-22 NOTE — PROGRESS NOTE ADULT - ASSESSMENT
58 year old male with a medical history of HTN, HLD, type 2 DM (HA1c 9.6 on Insulin), CKD 3, Obesity Class II, CAD s/p 8 stents, recent admission to [facility] 9/29-9/30 for  chest pain s/p PCI of ISR of LAD x 2 and PTCA of diag on 9/29/23, since readmitted to [facility] 10/3 after presenting with recurrent chest pain and increased [place] since recent PCI. TTE revealed aortic stenosis (FIDEL 1, mG 27, pG 50), could not rule out bicuspid valve. Patient was re-cathed 10/6 to confirm patent stents. Patient now transferred to [facility] 10/6/23 for surgical evaluation under Dr. Cardozo. Underwent Mechanical AVR and sternal plating on 10/13. Post op course with Afib RVR and episode of 13 second pause.  10/20 DC PPM placement

## 2023-10-22 NOTE — DISCHARGE NOTE PROVIDER - NSDCFUADDINST_GEN_ALL_CORE_FT
Please call the Cardiothoracic Surgery office at [phone] if you are experiencing any shortness of breath, chest pain, fevers or chills, drainage from the incisions, persistent nausea, vomiting or if you have any questions about your medications. If the symptoms are severe, call 911 and go to the nearest hospital. You can also call [phone] for an emergency [facility] ambulance, which will take you to the closest [facility].    If you need any assistance for making any appointments for a new consult or referral in any specialty, please call our [facility] Clinical Coordination Center at [phone].

## 2023-10-22 NOTE — DISCHARGE NOTE PROVIDER - NSDCMRMEDTOKEN_GEN_ALL_CORE_FT
aspirin 81 mg oral delayed release tablet: 1 tab(s) orally once a day  atorvastatin 40 mg oral tablet: 1 tab(s) orally once a day (at bedtime)  clopidogrel 75 mg oral tablet: 1 tab(s) orally once a day  fenofibrate 160 mg oral tablet: 1 tab(s) orally once a day  insulin glargine 100 units/mL subcutaneous solution: 60 unit(s) subcutaneous once a day (at bedtime)  insulin lispro 100 units/mL injectable solution: 45 unit(s) injectable 3 times a day (before meals)  isosorbide mononitrate 30 mg oral tablet, extended release: 1 tab(s) orally once a day  metoprolol succinate 200 mg oral tablet, extended release: 1 tab(s) orally once a day  ranolazine 500 mg oral tablet, extended release: 1 tab(s) orally 2 times a day   acetaminophen 325 mg oral tablet: 2 tab(s) orally every 6 hours as needed for Mild Pain (1 - 3)  amiodarone 200 mg oral tablet: 2 tab(s) orally 2 times a day Please take 2 tablets twice a day through 10/29 then decrease to 1 tablet daily  aspirin 81 mg oral delayed release tablet: 1 tab(s) orally once a day  atorvastatin 80 mg oral tablet: 1 tab(s) orally once a day (at bedtime)  clopidogrel 75 mg oral tablet: 1 tab(s) orally once a day  fenofibrate 160 mg oral tablet: 1 tab(s) orally once a day  furosemide 40 mg oral tablet: 1 tab(s) orally once a day  metoprolol tartrate 50 mg oral tablet: 1 tab(s) orally 2 times a day  senna leaf extract oral tablet: 2 tab(s) orally once a day (at bedtime) as needed for  constipation  Toujeo SoloStar 300 units/mL subcutaneous solution: 54 unit(s) subcutaneous once a day (at bedtime)  warfarin 5 mg oral tablet: 1 tab(s) orally once a day

## 2023-10-22 NOTE — DISCHARGE NOTE PROVIDER - PROVIDER TOKENS
PROVIDER:[TOKEN:[[id number]]],PROVIDER:[TOKEN:[[id number]]],PROVIDER:[TOKEN:[[id number]]],PROVIDER:[TOKEN:[[id number]]] PROVIDER:[TOKEN:[[id number]],SCHEDULEDAPPT:[10/31/2023],SCHEDULEDAPPTTIME:[09:45 AM]],PROVIDER:[TOKEN:[[id number]]],PROVIDER:[TOKEN:[[id number]]],PROVIDER:[TOKEN:[[id number]]]

## 2023-10-22 NOTE — DISCHARGE NOTE PROVIDER - HOSPITAL COURSE
58 year old male with a medical history of HTN, HLD, type 2 DM (HA1c 9.6 on Insulin), CKD 3, Obesity Class II, CAD s/p 8 stents, recent admission to [facility] 9/29-9/30 for  chest pain s/p PCI of ISR of LAD x 2 and PTCA of diag on 9/29/23, since readmitted to [facility] 10/3 after presenting with recurrent chest pain and increased CANNON since recent PCI. TTE revealed aortic stenosis (FIDEL 1, mG 27, pG 50), could not rule out bicuspid valve. Patient was re-cathed 10/6 to confirm patent stents. Patient now transferred to [facility] 10/6/23 for surgical evaluation under Dr. Cardozo. Underwent Mechanical AVR and sternal plating on 10/13. Post op course with Afib RVR and episodes of high degree AVB now s/p Medtronic DC PPM 10/20. Coumadin started and adjusted postop to maintain INR 2-3 per Dr. Cardozo. Pt hemodynamically stable and ambulating well. Pt stable for discharge home as per Dr. Cardozo.   INR check arranged for Mon/Thurs to be followed by PMD Dr. Ron Rogel ([place]).   58 year old male with a medical history of HTN, HLD, type 2 DM (HA1c 9.6 on Insulin), CKD 3, Obesity Class II, CAD s/p 8 stents, recent admission to [facility] 9/29-9/30 for  chest pain s/p PCI of ISR of LAD x 2 and PTCA of diag on 9/29/23, since readmitted to [facility] 10/3 after presenting with recurrent chest pain and increased CANNON since recent PCI. TTE revealed aortic stenosis (FIDEL 1, mG 27, pG 50), could not rule out bicuspid valve. Patient was re-cathed 10/6 to confirm patent stents. Patient now transferred to [facility] 10/6/23 for surgical evaluation under Dr. Cardozo. Underwent Mechanical AVR and sternal plating on 10/13. Post op course with Afib RVR and episodes of high degree AVB now s/p Medtronic DC PPM 10/20. Coumadin started and adjusted postop to maintain INR 2-3 per Dr. Cardozo. Pt hemodynamically stable and ambulating well. Pt stable for discharge home as per Dr. Cardozo.   INR check arranged for Mon/Thurs to be followed by PMD Dr. Ron Rogel ([place]). Patient aware.

## 2023-10-22 NOTE — DISCHARGE NOTE PROVIDER - CARE PROVIDER_API CALL
Andrzej Cardozo  Thoracic and Cardiac Surgery  [address] [place] [address]  Phone: [phone]  Fax: [phone]  Follow Up Time:     Scotty Kaye  Interventional Cardiology  [address] [place] [address]  Phone: [phone]  Fax: [phone]  Follow Up Time:     Huy Everett  Cardiac Electrophysiology  [address], [address]  [place] [address]  Phone: [phone]  Fax: [phone]  Follow Up Time:     Ron Rogel [facility]  [address], [address]  [place] [address]  Phone: [phone]  Fax: [phone]  Follow Up Time:    Andrzej Cardozo  Thoracic and Cardiac Surgery  [address] [place] [address]  Phone: [phone]  Fax: [phone]  Scheduled Appointment: 10/31/2023 09:45 AM    Scotty Kaye  Interventional Cardiology  [address] [place] [address]  Phone: [phone]  Fax: [phone]  Follow Up Time:     Huy Everett  Cardiac Electrophysiology  [address], [address]  [place] [address]  Phone: [phone]  Fax: [phone]  Follow Up Time:     Ron Rogel  [facility]  [address], [address]  [place] [address]  Phone: [phone]  Fax: [phone]  Follow Up Time:

## 2023-10-23 ENCOUNTER — TRANSCRIPTION ENCOUNTER (OUTPATIENT)
Age: 58
End: 2023-10-23

## 2023-10-23 VITALS
DIASTOLIC BLOOD PRESSURE: 77 MMHG | RESPIRATION RATE: 18 BRPM | TEMPERATURE: 98 F | SYSTOLIC BLOOD PRESSURE: 130 MMHG | OXYGEN SATURATION: 95 % | HEART RATE: 78 BPM

## 2023-10-23 LAB
ANION GAP SERPL CALC-SCNC: 15 MMOL/L — SIGNIFICANT CHANGE UP (ref 5–17)
ANION GAP SERPL CALC-SCNC: 15 MMOL/L — SIGNIFICANT CHANGE UP (ref 5–17)
BUN SERPL-MCNC: 31.7 MG/DL — HIGH (ref 8–20)
BUN SERPL-MCNC: 31.7 MG/DL — HIGH (ref 8–20)
CALCIUM SERPL-MCNC: 8.5 MG/DL — SIGNIFICANT CHANGE UP (ref 8.4–10.5)
CALCIUM SERPL-MCNC: 8.5 MG/DL — SIGNIFICANT CHANGE UP (ref 8.4–10.5)
CHLORIDE SERPL-SCNC: 104 MMOL/L — SIGNIFICANT CHANGE UP (ref 96–108)
CHLORIDE SERPL-SCNC: 104 MMOL/L — SIGNIFICANT CHANGE UP (ref 96–108)
CO2 SERPL-SCNC: 25 MMOL/L — SIGNIFICANT CHANGE UP (ref 22–29)
CO2 SERPL-SCNC: 25 MMOL/L — SIGNIFICANT CHANGE UP (ref 22–29)
CREAT SERPL-MCNC: 1.95 MG/DL — HIGH (ref 0.5–1.3)
CREAT SERPL-MCNC: 1.95 MG/DL — HIGH (ref 0.5–1.3)
EGFR: 40 ML/MIN/1.73M2 — LOW
EGFR: 40 ML/MIN/1.73M2 — LOW
GLUCOSE BLDC GLUCOMTR-MCNC: 142 MG/DL — HIGH (ref 70–99)
GLUCOSE BLDC GLUCOMTR-MCNC: 142 MG/DL — HIGH (ref 70–99)
GLUCOSE BLDC GLUCOMTR-MCNC: 222 MG/DL — HIGH (ref 70–99)
GLUCOSE BLDC GLUCOMTR-MCNC: 222 MG/DL — HIGH (ref 70–99)
GLUCOSE SERPL-MCNC: 113 MG/DL — HIGH (ref 70–99)
GLUCOSE SERPL-MCNC: 113 MG/DL — HIGH (ref 70–99)
HCT VFR BLD CALC: 28.2 % — LOW (ref 39–50)
HCT VFR BLD CALC: 28.2 % — LOW (ref 39–50)
HGB BLD-MCNC: 8.5 G/DL — LOW (ref 13–17)
HGB BLD-MCNC: 8.5 G/DL — LOW (ref 13–17)
INR BLD: 1.93 RATIO — HIGH (ref 0.85–1.18)
INR BLD: 1.93 RATIO — HIGH (ref 0.85–1.18)
MAGNESIUM SERPL-MCNC: 2.3 MG/DL — SIGNIFICANT CHANGE UP (ref 1.8–2.6)
MAGNESIUM SERPL-MCNC: 2.3 MG/DL — SIGNIFICANT CHANGE UP (ref 1.8–2.6)
MCHC RBC-ENTMCNC: 27.6 PG — SIGNIFICANT CHANGE UP (ref 27–34)
MCHC RBC-ENTMCNC: 27.6 PG — SIGNIFICANT CHANGE UP (ref 27–34)
MCHC RBC-ENTMCNC: 30.1 GM/DL — LOW (ref 32–36)
MCHC RBC-ENTMCNC: 30.1 GM/DL — LOW (ref 32–36)
MCV RBC AUTO: 91.6 FL — SIGNIFICANT CHANGE UP (ref 80–100)
MCV RBC AUTO: 91.6 FL — SIGNIFICANT CHANGE UP (ref 80–100)
PLATELET # BLD AUTO: 409 K/UL — HIGH (ref 150–400)
PLATELET # BLD AUTO: 409 K/UL — HIGH (ref 150–400)
POTASSIUM SERPL-MCNC: 4.5 MMOL/L — SIGNIFICANT CHANGE UP (ref 3.5–5.3)
POTASSIUM SERPL-MCNC: 4.5 MMOL/L — SIGNIFICANT CHANGE UP (ref 3.5–5.3)
POTASSIUM SERPL-SCNC: 4.5 MMOL/L — SIGNIFICANT CHANGE UP (ref 3.5–5.3)
POTASSIUM SERPL-SCNC: 4.5 MMOL/L — SIGNIFICANT CHANGE UP (ref 3.5–5.3)
PROTHROM AB SERPL-ACNC: 21 SEC — HIGH (ref 9.5–13)
PROTHROM AB SERPL-ACNC: 21 SEC — HIGH (ref 9.5–13)
RBC # BLD: 3.08 M/UL — LOW (ref 4.2–5.8)
RBC # BLD: 3.08 M/UL — LOW (ref 4.2–5.8)
RBC # FLD: 14 % — SIGNIFICANT CHANGE UP (ref 10.3–14.5)
RBC # FLD: 14 % — SIGNIFICANT CHANGE UP (ref 10.3–14.5)
SODIUM SERPL-SCNC: 144 MMOL/L — SIGNIFICANT CHANGE UP (ref 135–145)
SODIUM SERPL-SCNC: 144 MMOL/L — SIGNIFICANT CHANGE UP (ref 135–145)
WBC # BLD: 12.4 K/UL — HIGH (ref 3.8–10.5)
WBC # BLD: 12.4 K/UL — HIGH (ref 3.8–10.5)
WBC # FLD AUTO: 12.4 K/UL — HIGH (ref 3.8–10.5)
WBC # FLD AUTO: 12.4 K/UL — HIGH (ref 3.8–10.5)

## 2023-10-23 PROCEDURE — 85610 PROTHROMBIN TIME: CPT

## 2023-10-23 PROCEDURE — 82962 GLUCOSE BLOOD TEST: CPT

## 2023-10-23 PROCEDURE — 93005 ELECTROCARDIOGRAM TRACING: CPT

## 2023-10-23 PROCEDURE — 93325 DOPPLER ECHO COLOR FLOW MAPG: CPT

## 2023-10-23 PROCEDURE — 83970 ASSAY OF PARATHORMONE: CPT

## 2023-10-23 PROCEDURE — 84300 ASSAY OF URINE SODIUM: CPT

## 2023-10-23 PROCEDURE — 71046 X-RAY EXAM CHEST 2 VIEWS: CPT

## 2023-10-23 PROCEDURE — 83036 HEMOGLOBIN GLYCOSYLATED A1C: CPT

## 2023-10-23 PROCEDURE — 86923 COMPATIBILITY TEST ELECTRIC: CPT

## 2023-10-23 PROCEDURE — 85576 BLOOD PLATELET AGGREGATION: CPT

## 2023-10-23 PROCEDURE — 85027 COMPLETE CBC AUTOMATED: CPT

## 2023-10-23 PROCEDURE — 83880 ASSAY OF NATRIURETIC PEPTIDE: CPT

## 2023-10-23 PROCEDURE — 80053 COMPREHEN METABOLIC PANEL: CPT

## 2023-10-23 PROCEDURE — 99233 SBSQ HOSP IP/OBS HIGH 50: CPT

## 2023-10-23 PROCEDURE — 84443 ASSAY THYROID STIM HORMONE: CPT

## 2023-10-23 PROCEDURE — 99024 POSTOP FOLLOW-UP VISIT: CPT

## 2023-10-23 PROCEDURE — 84134 ASSAY OF PREALBUMIN: CPT

## 2023-10-23 PROCEDURE — 94010 BREATHING CAPACITY TEST: CPT

## 2023-10-23 PROCEDURE — 33208 INSRT HEART PM ATRIAL & VENT: CPT | Mod: KX

## 2023-10-23 PROCEDURE — 88305 TISSUE EXAM BY PATHOLOGIST: CPT

## 2023-10-23 PROCEDURE — 88311 DECALCIFY TISSUE: CPT

## 2023-10-23 PROCEDURE — 36415 COLL VENOUS BLD VENIPUNCTURE: CPT

## 2023-10-23 PROCEDURE — 84484 ASSAY OF TROPONIN QUANT: CPT

## 2023-10-23 PROCEDURE — 87641 MR-STAPH DNA AMP PROBE: CPT

## 2023-10-23 PROCEDURE — 93320 DOPPLER ECHO COMPLETE: CPT

## 2023-10-23 PROCEDURE — 93880 EXTRACRANIAL BILAT STUDY: CPT

## 2023-10-23 PROCEDURE — 84295 ASSAY OF SERUM SODIUM: CPT

## 2023-10-23 PROCEDURE — 84132 ASSAY OF SERUM POTASSIUM: CPT

## 2023-10-23 PROCEDURE — 87640 STAPH A DNA AMP PROBE: CPT

## 2023-10-23 PROCEDURE — 82947 ASSAY GLUCOSE BLOOD QUANT: CPT

## 2023-10-23 PROCEDURE — 82435 ASSAY OF BLOOD CHLORIDE: CPT

## 2023-10-23 PROCEDURE — C1769: CPT

## 2023-10-23 PROCEDURE — 83605 ASSAY OF LACTIC ACID: CPT

## 2023-10-23 PROCEDURE — C1898: CPT

## 2023-10-23 PROCEDURE — 82310 ASSAY OF CALCIUM: CPT

## 2023-10-23 PROCEDURE — 85025 COMPLETE CBC W/AUTO DIFF WBC: CPT

## 2023-10-23 PROCEDURE — 82803 BLOOD GASES ANY COMBINATION: CPT

## 2023-10-23 PROCEDURE — C1892: CPT

## 2023-10-23 PROCEDURE — 86901 BLOOD TYPING SEROLOGIC RH(D): CPT

## 2023-10-23 PROCEDURE — 71045 X-RAY EXAM CHEST 1 VIEW: CPT | Mod: 26

## 2023-10-23 PROCEDURE — 71045 X-RAY EXAM CHEST 1 VIEW: CPT

## 2023-10-23 PROCEDURE — 82550 ASSAY OF CK (CPK): CPT

## 2023-10-23 PROCEDURE — 86900 BLOOD TYPING SEROLOGIC ABO: CPT

## 2023-10-23 PROCEDURE — 80048 BASIC METABOLIC PNL TOTAL CA: CPT

## 2023-10-23 PROCEDURE — 84439 ASSAY OF FREE THYROXINE: CPT

## 2023-10-23 PROCEDURE — 85730 THROMBOPLASTIN TIME PARTIAL: CPT

## 2023-10-23 PROCEDURE — C8929: CPT

## 2023-10-23 PROCEDURE — 94002 VENT MGMT INPAT INIT DAY: CPT

## 2023-10-23 PROCEDURE — 81001 URINALYSIS AUTO W/SCOPE: CPT

## 2023-10-23 PROCEDURE — C1889: CPT

## 2023-10-23 PROCEDURE — 85018 HEMOGLOBIN: CPT

## 2023-10-23 PROCEDURE — 82330 ASSAY OF CALCIUM: CPT

## 2023-10-23 PROCEDURE — 97530 THERAPEUTIC ACTIVITIES: CPT

## 2023-10-23 PROCEDURE — C1751: CPT

## 2023-10-23 PROCEDURE — 83735 ASSAY OF MAGNESIUM: CPT

## 2023-10-23 PROCEDURE — 82553 CREATINE MB FRACTION: CPT

## 2023-10-23 PROCEDURE — 93970 EXTREMITY STUDY: CPT

## 2023-10-23 PROCEDURE — C1894: CPT

## 2023-10-23 PROCEDURE — 86891 AUTOLOGOUS BLOOD OP SALVAGE: CPT

## 2023-10-23 PROCEDURE — 97116 GAIT TRAINING THERAPY: CPT

## 2023-10-23 PROCEDURE — P9045: CPT

## 2023-10-23 PROCEDURE — 86850 RBC ANTIBODY SCREEN: CPT

## 2023-10-23 PROCEDURE — 82306 VITAMIN D 25 HYDROXY: CPT

## 2023-10-23 PROCEDURE — C1785: CPT

## 2023-10-23 PROCEDURE — 36600 WITHDRAWAL OF ARTERIAL BLOOD: CPT

## 2023-10-23 PROCEDURE — 93312 ECHO TRANSESOPHAGEAL: CPT

## 2023-10-23 PROCEDURE — 85014 HEMATOCRIT: CPT

## 2023-10-23 RX ORDER — FUROSEMIDE 40 MG
1 TABLET ORAL
Qty: 14 | Refills: 0
Start: 2023-10-23 | End: 2023-11-05

## 2023-10-23 RX ORDER — AMIODARONE HYDROCHLORIDE 400 MG/1
2 TABLET ORAL
Qty: 120 | Refills: 1
Start: 2023-10-23 | End: 2023-12-21

## 2023-10-23 RX ORDER — ATORVASTATIN CALCIUM 80 MG/1
1 TABLET, FILM COATED ORAL
Qty: 0 | Refills: 0 | DISCHARGE

## 2023-10-23 RX ORDER — RANOLAZINE 500 MG/1
1 TABLET, FILM COATED, EXTENDED RELEASE ORAL
Qty: 0 | Refills: 0 | DISCHARGE

## 2023-10-23 RX ORDER — WARFARIN SODIUM 2.5 MG/1
1 TABLET ORAL
Qty: 30 | Refills: 1
Start: 2023-10-23 | End: 2023-12-21

## 2023-10-23 RX ORDER — ACETAMINOPHEN 500 MG
2 TABLET ORAL
Qty: 0 | Refills: 0 | DISCHARGE
Start: 2023-10-23

## 2023-10-23 RX ORDER — SENNA PLUS 8.6 MG/1
2 TABLET ORAL
Qty: 14 | Refills: 0
Start: 2023-10-23 | End: 2023-10-29

## 2023-10-23 RX ORDER — CLOPIDOGREL BISULFATE 75 MG/1
1 TABLET, FILM COATED ORAL
Qty: 30 | Refills: 1
Start: 2023-10-23 | End: 2023-12-21

## 2023-10-23 RX ORDER — ASPIRIN/CALCIUM CARB/MAGNESIUM 324 MG
1 TABLET ORAL
Qty: 30 | Refills: 1
Start: 2023-10-23 | End: 2023-12-21

## 2023-10-23 RX ORDER — WARFARIN SODIUM 2.5 MG/1
5 TABLET ORAL ONCE
Refills: 0 | Status: COMPLETED | OUTPATIENT
Start: 2023-10-23 | End: 2023-10-23

## 2023-10-23 RX ORDER — ATORVASTATIN CALCIUM 80 MG/1
1 TABLET, FILM COATED ORAL
Qty: 30 | Refills: 1
Start: 2023-10-23 | End: 2023-12-21

## 2023-10-23 RX ORDER — METOPROLOL TARTRATE 50 MG
1 TABLET ORAL
Qty: 60 | Refills: 1
Start: 2023-10-23 | End: 2023-12-21

## 2023-10-23 RX ORDER — CLOPIDOGREL BISULFATE 75 MG/1
1 TABLET, FILM COATED ORAL
Qty: 30 | Refills: 1 | DISCHARGE
Start: 2023-10-23

## 2023-10-23 RX ADMIN — INSULIN GLARGINE 35 UNIT(S): 100 INJECTION, SOLUTION SUBCUTANEOUS at 08:13

## 2023-10-23 RX ADMIN — PANTOPRAZOLE SODIUM 40 MILLIGRAM(S): 20 TABLET, DELAYED RELEASE ORAL at 12:21

## 2023-10-23 RX ADMIN — AMIODARONE HYDROCHLORIDE 400 MILLIGRAM(S): 400 TABLET ORAL at 05:23

## 2023-10-23 RX ADMIN — SODIUM CHLORIDE 3 MILLILITER(S): 9 INJECTION INTRAMUSCULAR; INTRAVENOUS; SUBCUTANEOUS at 13:12

## 2023-10-23 RX ADMIN — Medication 4: at 12:22

## 2023-10-23 RX ADMIN — Medication 10 UNIT(S): at 08:11

## 2023-10-23 RX ADMIN — Medication 10 UNIT(S): at 12:22

## 2023-10-23 RX ADMIN — SODIUM CHLORIDE 3 MILLILITER(S): 9 INJECTION INTRAMUSCULAR; INTRAVENOUS; SUBCUTANEOUS at 06:06

## 2023-10-23 RX ADMIN — Medication 145 MILLIGRAM(S): at 12:37

## 2023-10-23 RX ADMIN — Medication 50 MILLIGRAM(S): at 05:23

## 2023-10-23 RX ADMIN — POLYETHYLENE GLYCOL 3350 17 GRAM(S): 17 POWDER, FOR SOLUTION ORAL at 12:21

## 2023-10-23 RX ADMIN — WARFARIN SODIUM 5 MILLIGRAM(S): 2.5 TABLET ORAL at 12:37

## 2023-10-23 RX ADMIN — CLOPIDOGREL BISULFATE 75 MILLIGRAM(S): 75 TABLET, FILM COATED ORAL at 12:37

## 2023-10-23 RX ADMIN — Medication 40 MILLIGRAM(S): at 05:23

## 2023-10-23 RX ADMIN — Medication 1000 UNIT(S): at 12:37

## 2023-10-23 NOTE — CHART NOTE - NSCHARTNOTEFT_GEN_A_CORE
pt sugars reviewd    AM reading 96    CAPILLARY BLOOD GLUCOSE      POCT Blood Glucose.: 182 mg/dL (22 Oct 2023 17:24)  POCT Blood Glucose.: 148 mg/dL (22 Oct 2023 12:02)  POCT Blood Glucose.: 110 mg/dL (22 Oct 2023 08:22)  POCT Blood Glucose.: 164 mg/dL (21 Oct 2023 23:25)    Will lower PM LAntus 20 >>18    Keep Lantus 35 units  in AM   Keep Admelog  same
Pt anticipating discharge today ~1pm. Pt eating lunch at time of follow up, states he has a good appetite and eating well. Provided pt with DASH diet education and encouraged pt to continue with diet upon discharge home. RD to remain available

## 2023-10-23 NOTE — PROGRESS NOTE ADULT - SUBJECTIVE AND OBJECTIVE BOX
POD 10 s/p mechanical AVR    Subjective: c/o soreness at PPM site denies CP, palpitations, SOB, cough, fever, chills, itchiness/rash, diaphoresis, vision changes, HA, dizziness/lightheadedness, numbness/tingling, abd pain, N/V     T(C): 36.2 (10-23-23 @ 00:34), Max: 37.9 (10-22-23 @ 20:59)  HR: 81 (10-23-23 @ 00:34) (66 - 145)  BP: 145/83 (10-23-23 @ 00:34) (109/63 - 154/83)  RR: 18 (10-23-23 @ 00:34) (17 - 18)  SpO2: 98% (10-23-23 @ 00:34) (94% - 98%)    10-22    139  |  102  |  32.7<H>  ----------------------------<  96  4.1   |  24.0  |  1.92<H>    Ca    8.4      22 Oct 2023 05:28  Mg     2.2     10-22                     8.3    12.12 )-----------( 369      ( 22 Oct 2023 05:28 )             26.5        PT/INR - ( 22 Oct 2023 05:28 )   PT: 25.4 sec;   INR: 2.35 ratio                CAPILLARY BLOOD GLUCOSE  POCT Blood Glucose.: 178 mg/dL (22 Oct 2023 20:41)  POCT Blood Glucose.: 182 mg/dL (22 Oct 2023 17:24)  POCT Blood Glucose.: 148 mg/dL (22 Oct 2023 12:02)  POCT Blood Glucose.: 110 mg/dL (22 Oct 2023 08:22    I&O's Detail    21 Oct 2023 07:01  -  22 Oct 2023 07:00  --------------------------------------------------------  IN:    Oral Fluid: 960 mL  Total IN: 960 mL    OUT:    Voided (mL): 900 mL  Total OUT: 900 mL  Total NET: 60 mL    22 Oct 2023 07:01  -  23 Oct 2023 00:40  --------------------------------------------------------  IN:    Oral Fluid: 480 mL  Total IN: 480 mL    OUT:  Total OUT: 0 mL  Total NET: 480 mL    Drug Dosing Weight  Height (cm): 182.9 (13 Oct 2023 06:29)  Weight (kg): 124.1 (21 Oct 2023 09:19)  BMI (kg/m2): 37.1 (21 Oct 2023 09:19)  BSA (m2): 2.43 (21 Oct 2023 09:19)    MEDICATIONS  (STANDING):  aMIOdarone    Tablet 400 milliGRAM(s) Oral every 12 hours  atorvastatin 80 milliGRAM(s) Oral at bedtime  bisacodyl Suppository 10 milliGRAM(s) Rectal once  cholecalciferol 1000 Unit(s) Oral daily  clopidogrel Tablet 75 milliGRAM(s) Oral daily  dextrose 5%. 1000 milliLiter(s) (50 mL/Hr) IV Continuous <Continuous>  dextrose 5%. 1000 milliLiter(s) (100 mL/Hr) IV Continuous <Continuous>  dextrose 50% Injectable 25 milliLiter(s) IV Push every 15 minutes  dextrose 50% Injectable 25 Gram(s) IV Push once  dextrose 50% Injectable 12.5 Gram(s) IV Push once  dextrose 50% Injectable 50 milliLiter(s) IV Push every 15 minutes  fenofibrate Tablet 145 milliGRAM(s) Oral daily  furosemide    Tablet 40 milliGRAM(s) Oral daily  glucagon  Injectable 1 milliGRAM(s) IntraMuscular once  insulin glargine Injectable (LANTUS) 35 Unit(s) SubCutaneous every morning  insulin glargine Injectable (LANTUS) 18 Unit(s) SubCutaneous at bedtime  insulin lispro (ADMELOG) corrective regimen sliding scale   SubCutaneous Before meals and at bedtime  insulin lispro Injectable (ADMELOG) 10 Unit(s) SubCutaneous three times a day before meals  metoprolol tartrate 50 milliGRAM(s) Oral two times a day  pantoprazole    Tablet 40 milliGRAM(s) Oral daily  polyethylene glycol 3350 17 Gram(s) Oral daily  senna 2 Tablet(s) Oral at bedtime  sodium chloride 0.9% lock flush 3 milliLiter(s) IV Push every 8 hours    MEDICATIONS  (PRN):  acetaminophen     Tablet .. 975 milliGRAM(s) Oral every 6 hours PRN Mild Pain (1 - 3)  dextrose Oral Gel 15 Gram(s) Oral once PRN Blood Glucose LESS THAN 70 milliGRAM(s)/deciliter    Physical Exam  Gen: NAD  Neuro: A&Ox3 non focal speech clear and itnact   Pulm: decreased BS b/l no wheezing   CV: S1S2 RRR no murmurs   Abd: +BS soft NT ND  Extrem/MS: 1-2+ b/l LE edema, no cyanosis DALLAS  skin: no rashes, WWP   Incision(s): L chest PPM site C/D/I sterisrips, MSI C/D/I steristrips, stable, no click

## 2023-10-23 NOTE — PROGRESS NOTE ADULT - PROBLEM SELECTOR PROBLEM 7
Need for prophylactic measure
Diabetes mellitus
Need for prophylactic measure
Diabetes mellitus
Need for prophylactic measure
Diabetes mellitus
Need for prophylactic measure
Diabetes mellitus
Need for prophylactic measure
Diabetes mellitus
Diabetes mellitus
Need for prophylactic measure

## 2023-10-23 NOTE — PROGRESS NOTE ADULT - PROBLEM SELECTOR PLAN 8
sCr at baseline (~1.8)  Nephrology consult appreciated  Avoid Nephrotoxic agents  received dye during re-cath 10/6  Trend BUN/Cr  Renal US without acute findings 10/4 at [facility]
sCr at baseline (~1.8)  Nephrology consult appreciated  Avoid Nephrotoxic agents  received dye during re-cath 10/6  Trend BUN/Cr  Renal US without acute findings 10/4 at [facility]
sCr at baseline (~1.8)  Nephrology consult appreciated  Avoid Nephrotoxic agents  Trend BUN/Cr  Renal US without acute findings 10/4 at [facility]
Scr at baseline  Nephrology consult appreciated  Avoid Nephrotoxic agents.   received dye during re-cath 10/6.   Trend BUN/Cr.  Renal US without acute findings 10/4 at [facility].
sCr at baseline (~1.8)  Nephrology consult appreciated  Avoid Nephrotoxic agents  received dye during re-cath 10/6  Trend BUN/Cr  Renal US without acute findings 10/4 at [facility]
sCr at baseline (~1.8)  Nephrology consult appreciated  Avoid Nephrotoxic agents  received dye during re-cath 10/6  Trend BUN/Cr  Renal US without acute findings 10/4 at [facility]

## 2023-10-23 NOTE — PROGRESS NOTE ADULT - REASON FOR ADMISSION
Aortic Stenosis
As per HPI
Aortic Stenosis

## 2023-10-23 NOTE — PROGRESS NOTE ADULT - PROBLEM SELECTOR PLAN 2
Long standing history of CAD s/p 8 stents  S/p recent PCI of ISR of LAD x 2 and PTCA of diag on 9/29/23 at [facility]  Patient was re-cathed 10/6 to confirm patent stents  Continue Lipitor, plavix, lopresosr
Long standing history of CAD s/p 8 stents.  S/p recent PCI of ISR of LAD x 2 and [facility]   Patient was re-cathed 10/6 to confirm patent stents.  Continue Lipitor   Continue ASA  Continue Plavix  Continue Beta blocker as tolerated by HR and SBP
Long standing history of CAD s/p 8 stents.  S/p recent PCI of ISR of LAD x 2 and PTCA of diag on 9/29/23 at [facility].   Patient was re-cathed 10/6 to confirm patent stents.  Continue Lipitor   Continue ASA  Continue Plavix  Continue Beta blocker as tolerated by HR and SBP
Long standing history of CAD s/p 8 stents  S/p recent PCI of ISR of LAD x 2 and PTCA of diag on 9/29/23 at [facility]  Patient was re-cathed 10/6 to confirm patent stents  Continue Lipitor   Continue Plavix  On beta blocker
Long standing history of CAD s/p 8 stents.  S/p recent PCI of ISR of LAD x 2 and PTCA of diag on 9/29/23 at [facility].   Patient was re-cathed 10/6 to confirm patent stents.  Continue Imdur and Ranexa.  Continue Toprol 200XL QD as tolerated by HR and BP.   Continue Lipitor and Tricor.  Continue ASA/plavix
Long standing history of CAD s/p 8 stents.  S/p recent PCI of ISR of LAD x 2 and PTCA of diag on 9/29/23 at [facility].   Patient was re-cathed 10/6 to confirm patent stents.  Continue Lipitor   Continue ASA  Continue Plavix  Continue Beta blocker as tolerated by HR and SBP
.  - MARIBEL 10/9- bicuspid valve, moderate AS   - no surgical intervention at this time.
Long standing history of CAD s/p 8 stents.  S/p recent PCI of ISR of LAD x 2 and PTCA of diag on 9/29/23 at [facility].   Patient was re-cathed 10/6 to confirm patent stents.  Continue Imdur and Ranexa.  Continue Toprol 200XL QD as tolerated by HR and BP.   Continue Lipitor and Tricor.  Continue ASA  Hold Plavix in pre operative setting
Long standing history of CAD s/p 8 stents.  S/p recent PCI of ISR of LAD x 2 and PTCA of diag on 9/29/23 at [facility].   Patient was re-cathed 10/6 to confirm patent stents.  Continue Imdur and Ranexa.  Continue Toprol 200XL QD as tolerated by HR and BP.   Continue Lipitor and Tricor.
Long standing history of CAD s/p 8 stents.  S/p recent PCI of ISR of LAD x 2 and PTCA of diag on 9/29/23 at [facility].   Patient was re-cathed 10/6 to confirm patent stents.  Continue Imdur and Ranexa.  Continue Toprol 200XL QD as tolerated by HR and BP.   Continue Lipitor and Tricor.  Continue ASA/plavix
Long standing history of CAD s/p 8 stents.  S/p recent PCI of ISR of LAD x 2 and PTCA of diag on 9/29/23 at [facility].   Patient was re-cathed 10/6 to confirm patent stents.  Continue Lipitor   Continue ASA  Continue Plavix  Start Beta blocker as tolerated by HR and SBP
Long standing history of CAD s/p 8 stents.  S/p recent PCI of ISR of LAD x 2 and PTCA of diag on 9/29/23 at [facility].   Patient was re-cathed 10/6 to confirm patent stents.  Continue Lipitor   Continue Plavix  BB held for ~13 second ventricular standstill event on 10/17
Long standing history of CAD s/p 8 stents  S/p recent PCI of ISR of LAD x 2 and PTCA of diag on 9/29/23 at [facility]  Patient was re-cathed 10/6 to confirm patent stents  Continue Lipitor   Continue Plavix  On beta blocker
Long standing history of CAD s/p 8 stents  S/p recent PCI of ISR of LAD x 2 and PTCA of diag on 9/29/23 at [facility]  Patient was re-cathed 10/6 to confirm patent stents  Continue Lipitor   Continue Plavix  On beta blocker
Long standing history of CAD s/p 8 stents.  S/p recent PCI of ISR of LAD x 2 and PTCA of diag on 9/29/23 at [facility].   Patient was re-cathed 10/6 to confirm patent stents.  Continue Imdur and Ranexa.  Continue Toprol 200XL QD as tolerated by HR and BP.   Continue Lipitor and Tricor.
Long standing history of CAD s/p 8 stents.  S/p recent PCI of ISR of LAD x 2 and PTCA of diag on 9/29/23 at [facility].   Patient was re-cathed 10/6 to confirm patent stents.  Continue Lipitor   Continue ASA  Continue Plavix  BB held for ~13 second ventricular standstill event on 10/17

## 2023-10-23 NOTE — PROGRESS NOTE ADULT - PROBLEM SELECTOR PLAN 5
Preop HA1c 9.6 on Lantus 60u QHS and premeal insulin 35u TID.   Currently on Insulin gtt, unable to transition off due to unstable drip rate   Endocrine following.
C/w lopressor as tolerated by HR/BP  Consider adding Norvasc if patient hypertensive
BB held for ~13 second ventricular standstill event on 10/17  Consider adding Norvasc if patient hypertensive
C/w lopressor as tolerated by HR/BP  Consider adding Norvasc if patient hypertensive
Preop HA1c 9.6 on Lantus 60u QHS and premeal insulin 35u TID.   Currently on Insulin gtt,   Given 30 U lantus overnight weaning insulin drip off.  may need BID lantus  Endocrine following.
HA1c 9.6 on Lantus 60u QHS and premeal insulin 45u TID.   Fingersticks AC/HS while inpatient with Lantus, premeal, and sliding scale insulin ordered for BG coverage.   Endocrine following.
Preop HA1c 9.6 on Lantus 60u QHS and premeal insulin 35u TID.   Currently on Insulin gtt, transition off night of POD1  Endocrine following.
BB held for ~13 second ventricular standstill event on 10/17  Consider adding norvasc if patient hypertensive
Preop HA1c 9.6 on Lantus 60u QHS and premeal insulin 35u TID.   Currently on Insulin gtt,   Cont. 40 lantus BID, and Premeal 16  Endocrine following.
HA1c 9.6 on Lantus 60u QHS and premeal insulin 35u TID.   Fingersticks AC/HS while inpatient with Lantus, premeal, and sliding scale insulin ordered for BG coverage.   Endocrine following.
HA1c 9.6 on Lantus 60u QHS and premeal insulin 45u TID.   Fingersticks AC/HS while inpatient with Lantus, premeal, and sliding scale insulin ordered for BG coverage.   Endocrine following.
C/w lopressor as tolerated by HR/BP  Consider adding Norvasc if patient hypertensive
C/w lopressor as tolerated by HR/BP and conduction delays  Consider adding Norvasc if patient hypertensive

## 2023-10-23 NOTE — PROGRESS NOTE ADULT - PROBLEM SELECTOR PROBLEM 5
Essential hypertension
Diabetes mellitus
Essential hypertension
Diabetes mellitus
Essential hypertension
Essential hypertension
Diabetes mellitus
Diabetes mellitus
Essential hypertension
Diabetes mellitus
Essential hypertension

## 2023-10-23 NOTE — PROGRESS NOTE ADULT - PROBLEM SELECTOR PLAN 6
Scr at baseline  Nephrology consult appreciated  Avoid Nephrotoxic agents.   Just received dye during re-cath 10/6.   Trend BUN/Cr.  Renal US without acute findings 10/4 at [facility].
Scr at baseline  Nephrology consult appreciated  Avoid Nephrotoxic agents.   received dye during re-cath 10/6.   Trend BUN/Cr.  Renal US without acute findings 10/4 at [facility].  Given gentle hydration overnight creatinine trending down today
Continue Lipitor
Nephrology consult to be called today.  Avoid Nephrotoxic agents.   Just received dye during re-cath 10/6.   Trend BUN/Cr.  Renal US without acute findings 10/4 at [facility].
Continue Lipitor
Nephrology consult to be called today.  Avoid Nephrotoxic agents.   Just received dye during re-cath 10/6.   Trend BUN/Cr.  Renal US without acute findings 10/4 at [facility].
Scr at baseline  Nephrology consult appreciated  Avoid Nephrotoxic agents.   received dye during re-cath 10/6.   Trend BUN/Cr.  Renal US without acute findings 10/4 at [facility].
Scr at baseline  Nephrology consult appreciated  Avoid Nephrotoxic agents.   Just received dye during re-cath 10/6.   Trend BUN/Cr.  Renal US without acute findings 10/4 at [facility].
Scr at baseline  Nephrology consult appreciated  Avoid Nephrotoxic agents.   Just received dye during re-cath 10/6.   Trend BUN/Cr.  Renal US without acute findings 10/4 at [facility].
Scr at baseline  Nephrology consult appreciated  Avoid Nephrotoxic agents.   Just received dye during re-cath 10/6.   Trend BUN/Cr.  Renal US without acute findings 10/4 at [facility].
Continue Lipitor
Scr at baseline  Nephrology consult appreciated  Avoid Nephrotoxic agents.   received dye during re-cath 10/6.   Trend BUN/Cr.  Renal US without acute findings 10/4 at [facility].
Continue Lipitor

## 2023-10-23 NOTE — PROGRESS NOTE ADULT - PROBLEM SELECTOR PROBLEM 3
Essential hypertension
Atrial fibrillation
CAD (coronary artery disease)
Atrial fibrillation
Atrial fibrillation
Essential hypertension
Essential hypertension
Atrial fibrillation
Essential hypertension
Essential hypertension
Atrial fibrillation
Atrial fibrillation
Essential hypertension

## 2023-10-23 NOTE — PROGRESS NOTE ADULT - PROBLEM SELECTOR PROBLEM 6
Stage 3 chronic kidney disease
HLD (hyperlipidemia)
Stage 3 chronic kidney disease
HLD (hyperlipidemia)
Stage 3 chronic kidney disease
Stage 3 chronic kidney disease
HLD (hyperlipidemia)
Stage 3 chronic kidney disease
HLD (hyperlipidemia)
Stage 3 chronic kidney disease
HLD (hyperlipidemia)
HLD (hyperlipidemia)
Stage 3 chronic kidney disease

## 2023-10-23 NOTE — PROGRESS NOTE ADULT - NS ATTEND OPT1 GEN_ALL_CORE
I attest my time as attending is greater than 50% of the total combined time spent on qualifying patient care activities by the PA/NP and attending.
I independently performed the documented:
I attest my time as attending is greater than 50% of the total combined time spent on qualifying patient care activities by the PA/NP and attending.
I independently performed the documented:
I attest my time as attending is greater than 50% of the total combined time spent on qualifying patient care activities by the PA/NP and attending.
I independently performed the documented:

## 2023-10-23 NOTE — PROGRESS NOTE ADULT - ASSESSMENT
58 M with PMHx HTN, HLD, T2DM, CKD 3, obesity, CAD s/p 8 stents, recent admission to [facility] 9/29-9/30 for chest pain s/p PCI of ISR of LADx2 and PTCA of diag on 9/29/23, since readmitted to [facility] 10/3 after presenting with recurrent chest pain and increased CANNON since recent PCI. Transferred to [facility] 10/6/23 for surgical evaluation under Dr. Cardozo. MARIBEL revealed moderate AS. Underwent AVR on 10/13/23    Consulted for diabetes management  Home diabetes meds: Toujeo 140 units daily, mealtime insulin 55 units (He developed pancreatitis following use of trulicity, and was unable to tolerate a sglt-2)  Current a1c: 9.6%  Outpatient Endocrinologist: Dr Ibarra    1. Uncontrolled DM2  - Continue lantus 35 units in AM/18 units in PM  - Continue premeal admelog 10 units tid and SSI  - Recomend to discharge on current doses. He has toujeo brand at home and can do basal injection daily instead of BID ---> Toujeo 54 units daily and premeal insulin 10 units. He will follow up with Dr Ibarra for further management     2. Afib with RVR  - S/p PPM    3. CAD/Aortic stenosis  - S/p AVR on 10/13    4. HLD  - Continue statin

## 2023-10-23 NOTE — PROGRESS NOTE ADULT - PROBLEM SELECTOR PLAN 1
s/p mechanical AVR 10/13/23  oob, ambulate as tolerated  spo2 stable on RA, cont Incentive Spirometry, chest PT, deep breathing and coughing  cont plavix, statin lopressor for CAD  coumadin dosed daily for INR goal 2-3 per Dr. Cardozo  diuresis with lasix qd  tolerating diet  dispo: home pending stable INR/stable rhythm

## 2023-10-23 NOTE — PROGRESS NOTE ADULT - PROBLEM SELECTOR PLAN 3
Continue Toprol 200XL QD as tolerated by HR and BP.
Post operative AF noted 10/16  Started on PO amio load with BB  10/17 BB and amio held after ~13 second ventricular standstill/long conversion pause with AV Block  EP following  Epicardial wires to generator with backup rate - threshold 3.5
cont amio and lopressor  still with episodes of PAF (last 10/22)
Start Beta blocker as tolerated by HR and SBP
Start Beta blocker as tolerated by HR and SBP
Post operative AF noted 10/16  Started on PO amio load with BB  10/17 BB and amio held after ~13 second ventricular standstill/long conversion pause with AV Block  EP following: s/p PPM  Lopressor uptitrated  Monitor telemetry
Start Beta blocker as tolerated by HR and SBP
Continue Toprol 200XL QD as tolerated by HR and BP.
Beta blocker as tolerated by HR and SBP
Continue Toprol 200XL QD as tolerated by HR and BP.
Post operative AF noted 10/16  Started on PO amio load with BB  10/17 BB and amio held after ~13 second ventricular standstill/long conversion pause with AV Block  EP following: s/p PPM  Lopressor uptitrated  C/w Amio daily  On Coumadin therapy  Monitor telemetry
Post operative AF noted 10/16  Started on PO amio load with BB  10/17 BB and amio held after ~13 second ventricular standstill/long conversion pause with AV Block
Post operative AF noted 10/16  Started on PO amio load with BB  10/17 BB and amio held after ~13 second ventricular standstill/long conversion pause with AV Block  EP following  Lopressor resumed 10/19  Monitor telemetry  Epicardial wires to generator with backup rate - threshold 3.5

## 2023-10-23 NOTE — PROGRESS NOTE ADULT - SUBJECTIVE AND OBJECTIVE BOX
INTERVAL EVENTS:  Follow up diabetes management    ROS: Denies chest pain, sob, abd pain.    MEDICATIONS  (STANDING):  aMIOdarone    Tablet 400 milliGRAM(s) Oral every 12 hours  atorvastatin 80 milliGRAM(s) Oral at bedtime  bisacodyl Suppository 10 milliGRAM(s) Rectal once  cholecalciferol 1000 Unit(s) Oral daily  clopidogrel Tablet 75 milliGRAM(s) Oral daily  dextrose 5%. 1000 milliLiter(s) (50 mL/Hr) IV Continuous <Continuous>  dextrose 5%. 1000 milliLiter(s) (100 mL/Hr) IV Continuous <Continuous>  dextrose 50% Injectable 25 Gram(s) IV Push once  dextrose 50% Injectable 12.5 Gram(s) IV Push once  dextrose 50% Injectable 25 milliLiter(s) IV Push every 15 minutes  dextrose 50% Injectable 50 milliLiter(s) IV Push every 15 minutes  fenofibrate Tablet 145 milliGRAM(s) Oral daily  furosemide    Tablet 40 milliGRAM(s) Oral daily  glucagon  Injectable 1 milliGRAM(s) IntraMuscular once  insulin glargine Injectable (LANTUS) 35 Unit(s) SubCutaneous every morning  insulin glargine Injectable (LANTUS) 18 Unit(s) SubCutaneous at bedtime  insulin lispro (ADMELOG) corrective regimen sliding scale   SubCutaneous Before meals and at bedtime  insulin lispro Injectable (ADMELOG) 10 Unit(s) SubCutaneous three times a day before meals  metoprolol tartrate 50 milliGRAM(s) Oral two times a day  pantoprazole    Tablet 40 milliGRAM(s) Oral daily  polyethylene glycol 3350 17 Gram(s) Oral daily  senna 2 Tablet(s) Oral at bedtime  sodium chloride 0.9% lock flush 3 milliLiter(s) IV Push every 8 hours    MEDICATIONS  (PRN):  acetaminophen     Tablet .. 975 milliGRAM(s) Oral every 6 hours PRN Mild Pain (1 - 3)  dextrose Oral Gel 15 Gram(s) Oral once PRN Blood Glucose LESS THAN 70 milliGRAM(s)/deciliter    Allergies  scallops (Nausea)  penicillin (Hives)    Vital Signs Last 24 Hrs  T(C): 36.4 (23 Oct 2023 12:30), Max: 37.9 (22 Oct 2023 20:59)  T(F): 97.6 (23 Oct 2023 12:30), Max: 100.2 (22 Oct 2023 20:59)  HR: 70 (23 Oct 2023 12:30) (70 - 145)  BP: 125/81 (23 Oct 2023 12:30) (109/63 - 154/83)  BP(mean): --  RR: 18 (23 Oct 2023 12:30) (18 - 18)  SpO2: 95% (23 Oct 2023 12:30) (94% - 98%)    Parameters below as of 23 Oct 2023 12:30  Patient On (Oxygen Delivery Method): room air    PHYSICAL EXAM:  General: No apparent distress  Neck: Supple, trachea midline, no thyromegaly  Respiratory: Lungs clear bilaterally, normal rate, effort  Cardiac: +S1, S2, no m/r/g  GI: +BS, soft, non tender, non distended  Extremities: No peripheral edema, no pedal lesions  Neuro: A+O X3, no tremor      LABS:                        8.5    12.40 )-----------( 409      ( 23 Oct 2023 05:47 )             28.2     10-23    144  |  104  |  31.7<H>  ----------------------------<  113<H>  4.5   |  25.0  |  1.95<H>    Ca    8.5      23 Oct 2023 05:47  Mg     2.3     10-23      Urinalysis Basic - ( 23 Oct 2023 05:47 )    Color: x / Appearance: x / SG: x / pH: x  Gluc: 113 mg/dL / Ketone: x  / Bili: x / Urobili: x   Blood: x / Protein: x / Nitrite: x   Leuk Esterase: x / RBC: x / WBC x   Sq Epi: x / Non Sq Epi: x / Bacteria: x    POCT Blood Glucose.: 222 mg/dL (10-23-23 @ 11:57)  POCT Blood Glucose.: 142 mg/dL (10-23-23 @ 08:07)  POCT Blood Glucose.: 178 mg/dL (10-22-23 @ 20:41)  POCT Blood Glucose.: 182 mg/dL (10-22-23 @ 17:24)    Thyroid Stimulating Hormone, Serum: 4.61 uIU/mL (10-07-23 @ 02:30)  Free Thyroxine, Serum: 1.1 ng/dL (10-07-23 @ 02:30)  Thyroid Stimulating Hormone, Serum: 2.97 uU/mL (10-05-23 @ 06:31)

## 2023-10-23 NOTE — PROGRESS NOTE ADULT - PROBLEM SELECTOR PLAN 4
Baseline RBB, LAFB, 1st degree AV Block   10/17 BB and amio held after ~13 second ventricular standstill/long conversion pause with transient AV block  EP following- s/p PPM  Currently v-pacing  c/w Lopressor
Continue Lipitor and Tricor.
Baseline RBB, LAFB, 1st degree AV Block   10/17 BB and amio held after ~13 second ventricular standstill/long conversion pause with transient AV block  EP following- s/p medtronic PPM 10/20
Continue Lipitor
Continue Lipitor and Tricor.
Continue Lipitor
Continue Lipitor
Baseline RBB, LAFB, 1st degree AV Block   10/17 BB and amio held after ~13 second ventricular standstill/long conversion pause with transient AV block  EP following- will eval for PPM if recurrence  Remains NSR with 1st block on tele  EPW connected to back up generator   Threshold 3.5  Daily EKG
Continue Lipitor
Continue Lipitor and Tricor.
Baselien RBB, LAFB, 1st degree AV Block   10/17 BB and amio held after ~13 second ventricular standstill/long conversion pause with transient AV block.  EP following- will eval for PPM   EPW connected to back up generator   Threshold 3  Obtain AM EKG
Continue Lipitor and Tricor.
Continue Lipitor and Tricor.
Baseline RBB, LAFB, 1st degree AV Block   10/17 BB and amio held after ~13 second ventricular standstill/long conversion pause with transient AV block  EP following- will eval for PPM if recurrence  Remains NSR with 1st block on tele - no further events recorded  EPW connected to back up generator   Threshold 3.5  Daily EKG
Baseline RBB, LAFB, 1st degree AV Block   10/17 BB and amio held after ~13 second ventricular standstill/long conversion pause with transient AV block  EP following- s/p PPM  Currently v-pacing  c/w Lopressor  plan as above

## 2023-10-23 NOTE — PROGRESS NOTE ADULT - PROBLEM SELECTOR PROBLEM 1
Aortic stenosis
CAD (coronary artery disease)
Aortic stenosis
Other heart block
Aortic stenosis

## 2023-10-23 NOTE — PROGRESS NOTE ADULT - ASSESSMENT
58M, pmhx HTN, HLD, type 2 DM (HA1c 9.6 on Insulin), CKD 3, Obesity Class II, CAD s/p 8 stents, recent admission to [facility] 9/29-9/30 for chest pain s/p PCI of ISR of LAD x 2 and PTCA of diag on 9/29/23, since readmitted to [facility] 10/3 after presenting with recurrent chest pain and increased CANNON since recent PCI. TTE revealed aortic stenosis (FIDEL 1, mG 27, pG 50), could not rule out bicuspid valve. Patient was re-cathed 10/6 to confirm patent stents. Patient now transferred to [facility] 10/6/23 for surgical evaluation under Dr. Cardozo. Underwent Mechanical AVR and sternal plating on 10/13. Post op course with Afib RVR and episodes of high degree AV block, now s/p Medronic dual chamber PPM placement 10/20.

## 2023-10-23 NOTE — PROGRESS NOTE ADULT - PROBLEM SELECTOR PLAN 9
Continue GI ppx with Protonix and Senna  DVT ppx with SCD boots - on Warfarin  Strict glucose management for SWI ppx
Continue GI ppx with Protonix and Senna  DVT ppx with Lovenox and SCD boots coumadin   Strict glucose management for SWI ppx
Continue GI ppx with Protonix and Senna  DVT ppx with SCD boots - on Warfarin  Strict glucose management for SWI ppx
Continue GI ppx with Protonix and Senna  DVT ppx with SCD boots - on Warfarin  Strict glucose management for SWI ppx

## 2023-10-23 NOTE — PROGRESS NOTE ADULT - PROVIDER SPECIALTY LIST ADULT
Critical Care
Critical Care
Electrophysiology
Endocrinology
Endocrinology
Nephrology
Nephrology
Critical Care
Critical Care
Electrophysiology
Electrophysiology
Endocrinology
Intervent Cardiology
Nephrology
Electrophysiology
Endocrinology
Nephrology
Nephrology
Endocrinology
CT Surgery
Electrophysiology
Cardiology
CT Surgery

## 2023-10-23 NOTE — PROGRESS NOTE ADULT - PROBLEM SELECTOR PROBLEM 4
HLD (hyperlipidemia)
Other heart block
HLD (hyperlipidemia)
Other heart block
Other heart block
HLD (hyperlipidemia)
Other heart block
Other heart block
HLD (hyperlipidemia)
HLD (hyperlipidemia)
Other heart block

## 2023-10-23 NOTE — PROGRESS NOTE ADULT - PROBLEM SELECTOR PROBLEM 2
CAD (coronary artery disease)
Aortic stenosis
Atrial fibrillation
CAD (coronary artery disease)

## 2023-10-23 NOTE — PROGRESS NOTE ADULT - NS ATTEND AMEND GEN_ALL_CORE FT
Patient seen and examined at the bedside with NP present. Agree with above plan and recommendations.
Uncomplicated dual chamber pacemaker implant yesterday. Ok to start amiodarone for atrial fibrillation. No heparin/lovenox.
Patient seen and examined at bedside with NP present. Agree with the above plan and recommendations.
Patient seen and examined at bedside with NP presents. Agree with the above plan and recommendations.
Pt reports to be feelign better    Bs under good control    DM     reduce Lantus to 50 bid    Keep same Admelog
Pt seen and examined at bedside   has been feelign ok today    Heart RR   Lung CTA anterior exam     DM - improvign glycemic control    cont accucheck q4 hr      Inc Lantus to 55 bid   keep premeal admelog
pt seen and examined at bed side   pt reports to be feeling well   insulin drip dc early in afternoon -    received Lantus around noon  30 units       Heart RRR   Lung CTA but decreased at bases    DM   call From CT ICU PA - Pt BS predinner 358-   Lantus inc to 40 bid   admelog inc to 16 tid ac   and change to Q4 hr accucheck   may need to go back on inuslin drip but will monitor
will resume metoprolol for AF RVR. Avoid amiodarone for now. may need PPM prior to discharge.
I have seen and examined patient with NP, agree with above assessment and plan.
Patient seen and examined at bedside with NP present. Agree with above plan and recommendations.
Pt BS reveiwd in evening - BS trending lower   and Lantuis reduced to 35 bid    pt given bedtime snack   spoke with RN to do 3 AM check to make sure BS do not drop too far low
Pt off floor at cath lab for PPM   pt BS drifted lwoer last night and  Insulin adjusted    today NPO after breakfast for procedure       dw CT team    will monitor sugars and adjsut insulin  once out o procedure
multiple episodes of AV block overnight. likely to need additional medical therapy for afib - av natty blockers/antiarrhythmics which will likely worsen AV conduction further. Has baseline conduction system disease (RBBB+Ist degree AV block). Plan for dual chamber PPM today.

## 2023-10-24 ENCOUNTER — NON-APPOINTMENT (OUTPATIENT)
Age: 58
End: 2023-10-24

## 2023-10-25 ENCOUNTER — APPOINTMENT (OUTPATIENT)
Dept: CARE COORDINATION | Facility: HOME HEALTH | Age: 58
End: 2023-10-25
Payer: COMMERCIAL

## 2023-10-25 VITALS
SYSTOLIC BLOOD PRESSURE: 118 MMHG | WEIGHT: 262 LBS | OXYGEN SATURATION: 99 % | HEART RATE: 69 BPM | BODY MASS INDEX: 35.49 KG/M2 | RESPIRATION RATE: 16 BRPM | HEIGHT: 72 IN | DIASTOLIC BLOOD PRESSURE: 80 MMHG

## 2023-10-25 PROCEDURE — 99024 POSTOP FOLLOW-UP VISIT: CPT

## 2023-10-25 RX ORDER — METOPROLOL TARTRATE 50 MG/1
50 TABLET, FILM COATED ORAL
Qty: 60 | Refills: 0 | Status: ACTIVE | COMMUNITY

## 2023-10-25 RX ORDER — EMPAGLIFLOZIN 25 MG/1
25 TABLET, FILM COATED ORAL DAILY
Refills: 0 | Status: DISCONTINUED | COMMUNITY
Start: 2021-06-03 | End: 2023-10-25

## 2023-10-25 RX ORDER — HYDROCHLOROTHIAZIDE 25 MG/1
25 TABLET ORAL DAILY
Qty: 90 | Refills: 3 | Status: DISCONTINUED | COMMUNITY
End: 2023-10-25

## 2023-10-25 RX ORDER — METOPROLOL SUCCINATE 200 MG/1
200 TABLET, EXTENDED RELEASE ORAL
Refills: 0 | Status: DISCONTINUED | COMMUNITY
End: 2023-10-25

## 2023-10-25 RX ORDER — INSULIN GLARGINE 300 U/ML
300 INJECTION, SOLUTION SUBCUTANEOUS DAILY
Refills: 0 | Status: ACTIVE | COMMUNITY
Start: 2021-06-03

## 2023-10-25 RX ORDER — RANOLAZINE 500 MG/1
500 TABLET, EXTENDED RELEASE ORAL
Qty: 60 | Refills: 0 | Status: DISCONTINUED | COMMUNITY
Start: 2022-02-18 | End: 2023-10-25

## 2023-10-26 ENCOUNTER — LABORATORY RESULT (OUTPATIENT)
Age: 58
End: 2023-10-26

## 2023-10-27 PROBLEM — Z87.74 HISTORY OF BICUSPID AORTIC VALVE: Status: RESOLVED | Noted: 2023-10-27 | Resolved: 2023-10-27

## 2023-10-29 ENCOUNTER — LABORATORY RESULT (OUTPATIENT)
Age: 58
End: 2023-10-29

## 2023-10-30 ENCOUNTER — RX RENEWAL (OUTPATIENT)
Age: 58
End: 2023-10-30

## 2023-10-30 RX ORDER — PANTOPRAZOLE 40 MG/1
40 TABLET, DELAYED RELEASE ORAL
Qty: 90 | Refills: 0 | Status: ACTIVE | COMMUNITY
Start: 2023-10-25 | End: 1900-01-01

## 2023-10-31 ENCOUNTER — APPOINTMENT (OUTPATIENT)
Dept: CARDIOTHORACIC SURGERY | Facility: CLINIC | Age: 58
End: 2023-10-31
Payer: COMMERCIAL

## 2023-10-31 VITALS
BODY MASS INDEX: 34.81 KG/M2 | HEART RATE: 50 BPM | RESPIRATION RATE: 16 BRPM | DIASTOLIC BLOOD PRESSURE: 83 MMHG | TEMPERATURE: 98.1 F | OXYGEN SATURATION: 98 % | SYSTOLIC BLOOD PRESSURE: 136 MMHG | HEIGHT: 72 IN | WEIGHT: 257 LBS

## 2023-10-31 DIAGNOSIS — Z87.74 PERSONAL HISTORY OF (CORRECTED) CONGENITAL MALFORMATIONS OF HEART AND CIRCULATORY SYSTEM: ICD-10-CM

## 2023-10-31 PROCEDURE — 99024 POSTOP FOLLOW-UP VISIT: CPT

## 2023-10-31 NOTE — POST DISCHARGE NOTE - DETAILS:
30 day post procedure phone call completed;  No questions regarding medications or pain management. Continues to follow up with MD. Patient will continue to recommend [facility], no complaints of hospital stay, satisfied with care. Instructed patient to contact provider with any further questions or concerns.
Pt c/o CP - pt is in the ED waiting room waiting to be seen. Passed information along to NP/MD.
Drysol Counseling:  I discussed with the patient the risks of drysol/aluminum chloride including but not limited to skin rash, itching, irritation, burning.

## 2023-11-02 ENCOUNTER — LABORATORY RESULT (OUTPATIENT)
Age: 58
End: 2023-11-02

## 2023-11-03 ENCOUNTER — APPOINTMENT (OUTPATIENT)
Dept: ELECTROPHYSIOLOGY | Facility: CLINIC | Age: 58
End: 2023-11-03
Payer: COMMERCIAL

## 2023-11-03 ENCOUNTER — NON-APPOINTMENT (OUTPATIENT)
Age: 58
End: 2023-11-03

## 2023-11-03 VITALS
SYSTOLIC BLOOD PRESSURE: 100 MMHG | WEIGHT: 258 LBS | DIASTOLIC BLOOD PRESSURE: 62 MMHG | HEIGHT: 72 IN | HEART RATE: 79 BPM | BODY MASS INDEX: 34.95 KG/M2 | OXYGEN SATURATION: 97 %

## 2023-11-03 PROCEDURE — 93000 ELECTROCARDIOGRAM COMPLETE: CPT | Mod: 59

## 2023-11-03 PROCEDURE — 99024 POSTOP FOLLOW-UP VISIT: CPT

## 2023-11-03 PROCEDURE — 93280 PM DEVICE PROGR EVAL DUAL: CPT

## 2023-11-06 ENCOUNTER — APPOINTMENT (OUTPATIENT)
Dept: FAMILY MEDICINE | Facility: CLINIC | Age: 58
End: 2023-11-06
Payer: COMMERCIAL

## 2023-11-06 ENCOUNTER — LABORATORY RESULT (OUTPATIENT)
Age: 58
End: 2023-11-06

## 2023-11-06 VITALS
WEIGHT: 261 LBS | HEIGHT: 72 IN | TEMPERATURE: 97 F | SYSTOLIC BLOOD PRESSURE: 128 MMHG | HEART RATE: 89 BPM | OXYGEN SATURATION: 95 % | BODY MASS INDEX: 35.35 KG/M2 | DIASTOLIC BLOOD PRESSURE: 72 MMHG

## 2023-11-06 PROCEDURE — 99214 OFFICE O/P EST MOD 30 MIN: CPT

## 2023-11-08 ENCOUNTER — LABORATORY RESULT (OUTPATIENT)
Age: 58
End: 2023-11-08

## 2023-11-13 ENCOUNTER — TRANSCRIPTION ENCOUNTER (OUTPATIENT)
Age: 58
End: 2023-11-13

## 2023-11-13 ENCOUNTER — LABORATORY RESULT (OUTPATIENT)
Age: 58
End: 2023-11-13

## 2023-11-15 ENCOUNTER — NON-APPOINTMENT (OUTPATIENT)
Age: 58
End: 2023-11-15

## 2023-11-15 RX ORDER — CLOPIDOGREL BISULFATE 75 MG/1
75 TABLET, FILM COATED ORAL DAILY
Qty: 30 | Refills: 0 | Status: ACTIVE | COMMUNITY
Start: 2021-06-03

## 2023-11-16 ENCOUNTER — TRANSCRIPTION ENCOUNTER (OUTPATIENT)
Age: 58
End: 2023-11-16

## 2023-11-16 ENCOUNTER — NON-APPOINTMENT (OUTPATIENT)
Age: 58
End: 2023-11-16

## 2023-11-16 ENCOUNTER — LABORATORY RESULT (OUTPATIENT)
Age: 58
End: 2023-11-16

## 2023-11-17 ENCOUNTER — TRANSCRIPTION ENCOUNTER (OUTPATIENT)
Age: 58
End: 2023-11-17

## 2023-11-20 ENCOUNTER — APPOINTMENT (OUTPATIENT)
Dept: FAMILY MEDICINE | Facility: CLINIC | Age: 58
End: 2023-11-20
Payer: COMMERCIAL

## 2023-11-20 LAB
INR PPP: 1.8 RATIO
POCT-PROTHROMBIN TIME: 21.6 SECS
QUALITY CONTROL: YES

## 2023-11-20 PROCEDURE — 85610 PROTHROMBIN TIME: CPT | Mod: QW

## 2023-11-22 ENCOUNTER — TRANSCRIPTION ENCOUNTER (OUTPATIENT)
Age: 58
End: 2023-11-22

## 2023-11-27 ENCOUNTER — LABORATORY RESULT (OUTPATIENT)
Age: 58
End: 2023-11-27

## 2023-11-27 ENCOUNTER — APPOINTMENT (OUTPATIENT)
Dept: FAMILY MEDICINE | Facility: CLINIC | Age: 58
End: 2023-11-27
Payer: COMMERCIAL

## 2023-11-27 LAB
INR PPP: 2.7 RATIO
POCT-PROTHROMBIN TIME: 32.1 SECS
QUALITY CONTROL: YES

## 2023-11-27 PROCEDURE — 85610 PROTHROMBIN TIME: CPT | Mod: QW

## 2023-12-05 LAB — HBA1C MFR BLD HPLC: 8.7

## 2023-12-11 ENCOUNTER — APPOINTMENT (OUTPATIENT)
Dept: FAMILY MEDICINE | Facility: CLINIC | Age: 58
End: 2023-12-11
Payer: COMMERCIAL

## 2023-12-11 LAB
INR PPP: 2.9 RATIO
POCT-PROTHROMBIN TIME: 35.3 SECS
QUALITY CONTROL: YES

## 2023-12-11 PROCEDURE — 85610 PROTHROMBIN TIME: CPT | Mod: QW

## 2023-12-13 RX ORDER — AMIODARONE HYDROCHLORIDE 200 MG/1
200 TABLET ORAL TWICE DAILY
Refills: 0 | Status: DISCONTINUED | COMMUNITY
End: 2023-12-13

## 2024-01-10 NOTE — HISTORY OF PRESENT ILLNESS
[FreeTextEntry1] : The patient is a 57 y/o male, with history of HTN, DM, CKD 3, CAD s/p PCI and aortic stenosis s/p mechanical AVR and sternal plate on 10/13/23 whose post-operative course was complicated by atrial fibrillation for which he was given amiodarone and AVN blockers followed by an episode of long conversion pause followed by transient AV block with up to a 12 second pause. Pt was taken of metoprolol and amiodarone and had maintained sinus rhythm with no further events until converting back to AF RVR. Metoprolol was resumed (25mg BID) and pt subsequently converted back to sinus rhythm and once again with a significant off set pause requiring pacing. In addition while in sinus rhythm was noted to have intermittent high grade AVB.   He is now s/p Medtronic dual chamber PPM implant by Dr. Nicholson on 10/20/23.  Today, Mr. Thorpe reports he has been feeling well since discharge. Denies palpitations, but can hear elevated HR at times. Dual chamber PPM interrogation reveals normal function. A, RV with adequate sense and pace thresholds. AT/AF burden 8%. AP 1.7%,  91.9%.   Recovered conduction noted in office.   Left infraclavicular incision well approximated without erythema, edema, or exudate. Pocket without hematoma.

## 2024-01-10 NOTE — END OF VISIT
[FreeTextEntry3] :   I, Dr. Everett, personally performed the evaluation and management (E/M) services for this new patient. That E/M includes conducting the clinically appropriate initial history &/or exam, assessing all conditions, and establishing the plan of care. Today, my assistant, Arleen Rojo NP, was here to observe my evaluation and management service for this patient & follow plan of care established by me going forward.

## 2024-01-10 NOTE — DISCUSSION/SUMMARY
[FreeTextEntry1] : The patient is a 59 y/o male, with history of HTN, DM, CKD 3, CAD s/p PCI and aortic stenosis s/p mechanical AVR and sternal plate on 10/13/23 whose post-operative course was complicated by atrial fibrillation for which he was given amiodarone and AVN blockers followed by an episode of long conversion pause followed by transient AV block with up to a 12 second pause. Pt was taken of metoprolol and amiodarone and had maintained sinus rhythm with no further events until converting back to AF RVR. Metoprolol was resumed (25mg BID) and pt subsequently converted back to sinus rhythm and once again with a significant off set pause requiring pacing. In addition while in sinus rhythm was noted to have intermittent high grade AVB.   He is now s/p Medtronic dual chamber PPM implant by Dr. Nicholson on 10/20/23.  Today, Mr. Thorpe reports he has been feeling well since discharge. Denies palpitations, but can hear elevated HR at times. Dual chamber PPM interrogation reveals normal function. A, RV with adequate sense and pace thresholds. AT/AF burden 8%. AP 1.7%,  91.9%.   Recovered conduction noted in office.   Left infraclavicular incision well approximated without erythema, edema, or exudate. Pocket without hematoma.   Recommendation:   -Dual chamber PPM with normal function. Recovered conduction noted in office. Extended AVD to allow for intrinsic conduction when appropriate.  -PAFL, burden 8% first diagnosed in post operative period s/p AVR. To continue amiodarone 200mg daily at this time. If arrhythmia burden continues to decline will stop amiodarone at 2 months post open heart surgery. We discussed that arrhythmia may be solely postoperative but ongoing monitoring warranted. If arrhythmia reoccurs when > 3 months post surgery, briefly discussed that alternative AAT versus catheter ablation to be considered. -Will f/u via phone 2 months post OHS with remote PPM check for AT/AF burden. To likely stop amiodarone at that time. In person EP follow up in 3months or sooner pRN. -Maintained on warfarin for stroke prophylaxis.  Camila Rojo ANP-C

## 2024-01-11 ENCOUNTER — APPOINTMENT (OUTPATIENT)
Dept: FAMILY MEDICINE | Facility: CLINIC | Age: 59
End: 2024-01-11
Payer: COMMERCIAL

## 2024-01-11 LAB
INR PPP: 2.5 RATIO
POCT-PROTHROMBIN TIME: 30.4 SECS
QUALITY CONTROL: YES

## 2024-01-11 PROCEDURE — 85610 PROTHROMBIN TIME: CPT | Mod: QW

## 2024-01-26 ENCOUNTER — RX RENEWAL (OUTPATIENT)
Age: 59
End: 2024-01-26

## 2024-02-02 ENCOUNTER — INPATIENT (INPATIENT)
Facility: HOSPITAL | Age: 59
LOS: 3 days | Discharge: HOME | DRG: 199 | End: 2024-02-06
Attending: HOSPITALIST | Admitting: INTERNAL MEDICINE
Payer: COMMERCIAL

## 2024-02-02 VITALS — HEIGHT: 72 IN | WEIGHT: 250 LBS

## 2024-02-02 DIAGNOSIS — Z95.5 PRESENCE OF CORONARY ANGIOPLASTY IMPLANT AND GRAFT: Chronic | ICD-10-CM

## 2024-02-02 DIAGNOSIS — Z90.49 ACQUIRED ABSENCE OF OTHER SPECIFIED PARTS OF DIGESTIVE TRACT: Chronic | ICD-10-CM

## 2024-02-02 DIAGNOSIS — R07.9 CHEST PAIN, UNSPECIFIED: ICD-10-CM

## 2024-02-02 LAB
ADD ON TEST-SPECIMEN IN LAB: SIGNIFICANT CHANGE UP
ALBUMIN SERPL ELPH-MCNC: 2.8 G/DL — LOW (ref 3.3–5)
ALP SERPL-CCNC: 76 U/L — SIGNIFICANT CHANGE UP (ref 40–120)
ALT FLD-CCNC: 45 U/L — SIGNIFICANT CHANGE UP (ref 12–78)
ANION GAP SERPL CALC-SCNC: 3 MMOL/L — LOW (ref 5–17)
APTT BLD: 39.9 SEC — HIGH (ref 24.5–35.6)
AST SERPL-CCNC: 35 U/L — SIGNIFICANT CHANGE UP (ref 15–37)
BASOPHILS # BLD AUTO: 0.1 K/UL — SIGNIFICANT CHANGE UP (ref 0–0.2)
BASOPHILS NFR BLD AUTO: 1.4 % — SIGNIFICANT CHANGE UP (ref 0–2)
BILIRUB SERPL-MCNC: 0.3 MG/DL — SIGNIFICANT CHANGE UP (ref 0.2–1.2)
BUN SERPL-MCNC: 24 MG/DL — HIGH (ref 7–23)
CALCIUM SERPL-MCNC: 9 MG/DL — SIGNIFICANT CHANGE UP (ref 8.5–10.1)
CHLORIDE SERPL-SCNC: 105 MMOL/L — SIGNIFICANT CHANGE UP (ref 96–108)
CO2 SERPL-SCNC: 29 MMOL/L — SIGNIFICANT CHANGE UP (ref 22–31)
CREAT SERPL-MCNC: 1.56 MG/DL — HIGH (ref 0.5–1.3)
D DIMER BLD IA.RAPID-MCNC: <150 NG/ML DDU — SIGNIFICANT CHANGE UP
EGFR: 52 ML/MIN/1.73M2 — LOW
EOSINOPHIL # BLD AUTO: 0.21 K/UL — SIGNIFICANT CHANGE UP (ref 0–0.5)
EOSINOPHIL NFR BLD AUTO: 3 % — SIGNIFICANT CHANGE UP (ref 0–6)
GLUCOSE SERPL-MCNC: 388 MG/DL — HIGH (ref 70–99)
HCT VFR BLD CALC: 46.4 % — SIGNIFICANT CHANGE UP (ref 39–50)
HGB BLD-MCNC: 15.8 G/DL — SIGNIFICANT CHANGE UP (ref 13–17)
IMM GRANULOCYTES NFR BLD AUTO: 0.9 % — SIGNIFICANT CHANGE UP (ref 0–0.9)
INR BLD: 1.28 RATIO — HIGH (ref 0.85–1.18)
LIDOCAIN IGE QN: 93 U/L — HIGH (ref 13–75)
LYMPHOCYTES # BLD AUTO: 1.41 K/UL — SIGNIFICANT CHANGE UP (ref 1–3.3)
LYMPHOCYTES # BLD AUTO: 20.2 % — SIGNIFICANT CHANGE UP (ref 13–44)
MAGNESIUM SERPL-MCNC: 1.7 MG/DL — SIGNIFICANT CHANGE UP (ref 1.6–2.6)
MCHC RBC-ENTMCNC: 27.8 PG — SIGNIFICANT CHANGE UP (ref 27–34)
MCHC RBC-ENTMCNC: 34.1 GM/DL — SIGNIFICANT CHANGE UP (ref 32–36)
MCV RBC AUTO: 81.7 FL — SIGNIFICANT CHANGE UP (ref 80–100)
MONOCYTES # BLD AUTO: 0.63 K/UL — SIGNIFICANT CHANGE UP (ref 0–0.9)
MONOCYTES NFR BLD AUTO: 9 % — SIGNIFICANT CHANGE UP (ref 2–14)
NEUTROPHILS # BLD AUTO: 4.56 K/UL — SIGNIFICANT CHANGE UP (ref 1.8–7.4)
NEUTROPHILS NFR BLD AUTO: 65.5 % — SIGNIFICANT CHANGE UP (ref 43–77)
NT-PROBNP SERPL-SCNC: 256 PG/ML — HIGH (ref 0–125)
PLATELET # BLD AUTO: 266 K/UL — SIGNIFICANT CHANGE UP (ref 150–400)
POTASSIUM SERPL-MCNC: 3.6 MMOL/L — SIGNIFICANT CHANGE UP (ref 3.5–5.3)
POTASSIUM SERPL-SCNC: 3.6 MMOL/L — SIGNIFICANT CHANGE UP (ref 3.5–5.3)
PROT SERPL-MCNC: 6.5 GM/DL — SIGNIFICANT CHANGE UP (ref 6–8.3)
PROTHROM AB SERPL-ACNC: 14.4 SEC — HIGH (ref 9.5–13)
RBC # BLD: 5.68 M/UL — SIGNIFICANT CHANGE UP (ref 4.2–5.8)
RBC # FLD: 16 % — HIGH (ref 10.3–14.5)
SODIUM SERPL-SCNC: 137 MMOL/L — SIGNIFICANT CHANGE UP (ref 135–145)
TROPONIN I, HIGH SENSITIVITY RESULT: 23.22 NG/L — SIGNIFICANT CHANGE UP
WBC # BLD: 6.97 K/UL — SIGNIFICANT CHANGE UP (ref 3.8–10.5)
WBC # FLD AUTO: 6.97 K/UL — SIGNIFICANT CHANGE UP (ref 3.8–10.5)

## 2024-02-02 PROCEDURE — 84484 ASSAY OF TROPONIN QUANT: CPT

## 2024-02-02 PROCEDURE — 83036 HEMOGLOBIN GLYCOSYLATED A1C: CPT

## 2024-02-02 PROCEDURE — 93306 TTE W/DOPPLER COMPLETE: CPT

## 2024-02-02 PROCEDURE — 36415 COLL VENOUS BLD VENIPUNCTURE: CPT

## 2024-02-02 PROCEDURE — 99285 EMERGENCY DEPT VISIT HI MDM: CPT

## 2024-02-02 PROCEDURE — 93971 EXTREMITY STUDY: CPT | Mod: 26,59,LT

## 2024-02-02 PROCEDURE — 85027 COMPLETE CBC AUTOMATED: CPT

## 2024-02-02 PROCEDURE — 99223 1ST HOSP IP/OBS HIGH 75: CPT

## 2024-02-02 PROCEDURE — 85730 THROMBOPLASTIN TIME PARTIAL: CPT

## 2024-02-02 PROCEDURE — 80048 BASIC METABOLIC PNL TOTAL CA: CPT

## 2024-02-02 PROCEDURE — 71045 X-RAY EXAM CHEST 1 VIEW: CPT | Mod: 26

## 2024-02-02 PROCEDURE — 93005 ELECTROCARDIOGRAM TRACING: CPT

## 2024-02-02 PROCEDURE — 82962 GLUCOSE BLOOD TEST: CPT

## 2024-02-02 PROCEDURE — 93971 EXTREMITY STUDY: CPT | Mod: LT

## 2024-02-02 PROCEDURE — 93010 ELECTROCARDIOGRAM REPORT: CPT

## 2024-02-02 PROCEDURE — 85610 PROTHROMBIN TIME: CPT

## 2024-02-02 RX ORDER — HEPARIN SODIUM 5000 [USP'U]/ML
INJECTION INTRAVENOUS; SUBCUTANEOUS
Qty: 25000 | Refills: 0 | Status: DISCONTINUED | OUTPATIENT
Start: 2024-02-02 | End: 2024-02-03

## 2024-02-02 RX ORDER — ACETAMINOPHEN 500 MG
650 TABLET ORAL EVERY 6 HOURS
Refills: 0 | Status: DISCONTINUED | OUTPATIENT
Start: 2024-02-02 | End: 2024-02-06

## 2024-02-02 RX ORDER — HEPARIN SODIUM 5000 [USP'U]/ML
5000 INJECTION INTRAVENOUS; SUBCUTANEOUS EVERY 6 HOURS
Refills: 0 | Status: DISCONTINUED | OUTPATIENT
Start: 2024-02-02 | End: 2024-02-03

## 2024-02-02 RX ORDER — ONDANSETRON 8 MG/1
4 TABLET, FILM COATED ORAL EVERY 6 HOURS
Refills: 0 | Status: DISCONTINUED | OUTPATIENT
Start: 2024-02-02 | End: 2024-02-06

## 2024-02-02 RX ORDER — HEPARIN SODIUM 5000 [USP'U]/ML
10000 INJECTION INTRAVENOUS; SUBCUTANEOUS EVERY 6 HOURS
Refills: 0 | Status: DISCONTINUED | OUTPATIENT
Start: 2024-02-02 | End: 2024-02-03

## 2024-02-02 RX ORDER — HEPARIN SODIUM 5000 [USP'U]/ML
10000 INJECTION INTRAVENOUS; SUBCUTANEOUS ONCE
Refills: 0 | Status: COMPLETED | OUTPATIENT
Start: 2024-02-02 | End: 2024-02-02

## 2024-02-02 RX ADMIN — HEPARIN SODIUM 10000 UNIT(S): 5000 INJECTION INTRAVENOUS; SUBCUTANEOUS at 22:01

## 2024-02-02 RX ADMIN — HEPARIN SODIUM 2200 UNIT(S)/HR: 5000 INJECTION INTRAVENOUS; SUBCUTANEOUS at 22:09

## 2024-02-02 NOTE — ED ADULT NURSE NOTE - OBJECTIVE STATEMENT
Patient ambulatory to the ER from home c/o intermittent chest pain, palpitations, and SOB for 2 days PTA with the symptoms worsening today. Patient reports that the palpitations are worse with activity. Chest pain is on and off and does not radiates. Patient had a valve replacement Oct 2023 and a pacemaker placed a week after the valve placement. Pt stated that he has been having b/l LE weakness, pounding headache and restless at night. A&O 4 ambulates w/out assist. Takes Plavix and warfarin.

## 2024-02-02 NOTE — ED PROVIDER NOTE - OBJECTIVE STATEMENT
59 y/o male with PMHx of pancreatitis,  HTN, HLD, DM2, CKD 3, obesity, CAD s/p x8 stents, s/p cholecystectomy, pacemaker, AVR; presents to the ED c/o intermittent stabbing mid-sternal chest pain, palpitations and SOB x2 days, with worsening symptoms today. Reports palpitations as fluttering, worsens with exertion. Notes chest pain occasionally radiates down the arm and to the sides of the chest. Also endorsing nausea, fatigue, LLE and LUE swelling. Recent stent placements in Sep 2023, pacemaker and AVR in October. +AC, PLAVIX and WARFARIN (6 mg), no longer taking ASA. Denies fever/chills. Non-smoker. Cardiologist Dr. Kaye.

## 2024-02-02 NOTE — ED PROVIDER NOTE - CLINICAL SUMMARY MEDICAL DECISION MAKING FREE TEXT BOX
Patient with multiple cardiac risk factors with concerning story of chest pain. Plan EKG, labs, cardiac monitoring and admit to tele. Patient with multiple cardiac risk factors with concerning story of chest pain. Plan EKG, labs, cardiac monitoring and admit to tele.    pt with low INR. will eval left arm and leg for dvt. start heparin. will also need ct angio chest to eval for PE. awaiting renal function. MD TRUDY Patient with multiple cardiac risk factors with concerning story of chest pain. Plan EKG, labs, cardiac monitoring and admit to tele.    pt with low INR. will eval left arm and leg for dvt. start heparin. will also need ct angio chest to eval for PE. awaiting renal function. MD TRUDY    ddimer neg, will not need CTA chest, MD TRUDY

## 2024-02-02 NOTE — ED PROVIDER NOTE - PHYSICAL EXAMINATION
Constitutional: NAD AAOx3, obese  Eyes: EOMI, pupils equal  Head: Normocephalic atraumatic  Mouth: no airway obstruction  Cardiac: regular rate   Resp: Lungs CTAB  GI: Abd s/nt/nd  Extremities: L hand trace edema, and LLE trace edema  Neuro: CN2-12 intact  Skin: No rashes

## 2024-02-02 NOTE — ED ADULT NURSE NOTE - NSFALLUNIVINTERV_ED_ALL_ED
Bed/Stretcher in lowest position, wheels locked, appropriate side rails in place/Call bell, personal items and telephone in reach/Instruct patient to call for assistance before getting out of bed/chair/stretcher/Non-slip footwear applied when patient is off stretcher/[place] to call system/Physically safe environment - no spills, clutter or unnecessary equipment/Purposeful proactive rounding/Room/bathroom lighting operational, light cord in reach

## 2024-02-02 NOTE — ED PROVIDER NOTE - DIFFERENTIAL DIAGNOSIS
Differential Diagnosis includes but is not limited to acs, chf, lower suspicion PE if INR therapeutic, viral syndrome, pneumonia, ptx

## 2024-02-02 NOTE — H&P ADULT - HISTORY OF PRESENT ILLNESS
57 y/o Male with PMHx of pancreatitis,  HTN, HLD, DM2, CKD 3, obesity, CAD s/p x8 stents, s/p cholecystectomy, pacemaker, AVR; presented to the ED with complain of intermittent stabbing mid-sternal chest pain, palpitations and SOB x2 days, with worsening symptoms today. Reports palpitations as fluttering, worsens with exertion. Notes chest pain occasionally radiates down left the arm and to the left side of the chest. Also endorsing nausea, fatigue, LLE and LUE swelling. Recent stent placements in Sep 2023, pacemaker placement and AVR in October, 2023.

## 2024-02-02 NOTE — H&P ADULT - NSHPPHYSICALEXAM_GEN_ALL_CORE
T(C): 36.8 (02-02-24 @ 23:46), Max: 37.1 (02-02-24 @ 19:19)  HR: 90 (02-02-24 @ 23:46) (88 - 91)  BP: 170/95 (02-02-24 @ 23:46) (164/95 - 176/94)  RR: 17 (02-02-24 @ 23:46) (17 - 19)  SpO2: 96% (02-02-24 @ 23:46) (96% - 99%)    CONSTITUTIONAL: Well groomed, no apparent distress  EYES: PERRLA and symmetric, EOMI, No conjunctival or scleral injection, non-icteric  ENMT: Oral mucosa with moist membranes. Normal dentition; no pharyngeal injection or exudates             NECK: Supple, symmetric and without tracheal deviation   RESP: No respiratory distress, no use of accessory muscles; CTA b/l, no WRR  CV: RRR, +S1S2, no MRG; no JVD; no peripheral edema  GI: Soft, NT, ND, no rebound, no guarding; no palpable masses;   LYMPH: No cervical LAD or tenderness;  MSK: Normal ROM without pain, normal muscle strength/tone  SKIN: No rashes or ulcers noted;   NEURO: CN II-XII intact; normal reflexes in upper and lower extremities, sensation intact in upper and lower extremities b/l to light touch   PSYCH: Appropriate insight/judgment; A+O x 3, mood and affect appropriate, recent/remote memory intact

## 2024-02-02 NOTE — ED ADULT NURSE NOTE - NS ED NURSE LEVEL OF CONSCIOUSNESS ORIENTATION
Problem: Oxygenation/Respiratory Function  Goal: Optimized respiratory function and gas exchange  Outcome: Outcome Met, Continue evaluating goal progress toward completion  Optimized respiratory function during feeds by using Dr. Brown's bottle with ultra premie nipple and pacing per speech recommendation.       Oriented - self; Oriented - place; Oriented - time

## 2024-02-02 NOTE — ED ADULT TRIAGE NOTE - CHIEF COMPLAINT QUOTE
Patient ambulatory to the ER c/o intermittent chest pain, palpitations, and SOB for 2 days PTA with the symptoms worsening today. Patient reports that the palpitations are worse with activity. Patient had a valve replacement and a pacemaker placed a few months ago. Takes plavix and warfarin

## 2024-02-02 NOTE — H&P ADULT - PATIENT'S GENDER IDENTITY
Detail Level: Detailed
Biotin Recommendations: Recommended biotin supplement 5,000mcg daily
Male
Detail Level: Simple

## 2024-02-02 NOTE — H&P ADULT - ASSESSMENT
A/P:    1.  Chest pain  r/o ACS  -troponin x1-neg  -follow Repeat troponin  -follow Echo  -follow cardiology consult    2.  h/o AVR- mechanical valve  -INR not therapeutic with Coumadin  -started on IV heparin drip until the INR is therapeutic    3.  Uncontrolled DM  -large dose of insulin  -on lantus and ISS  -follow Dxt level    4.  Heparin for DVT ppx    5.  Superficial Cephalic vein thrombophlebitis  -follow clinically  -use hot/ cold compress    6.  Code status  -full code         A/P:    1.  Chest pain  r/o ACS  -troponin x1-neg  -follow Repeat troponin  -follow Echo  -follow cardiology consult    2.  h/o AVR- mechanical valve  -INR not therapeutic with Coumadin  -started on IV heparin drip until the INR is therapeutic    3.  Uncontrolled DM  -large dose of insulin  -on lantus and ISS  -follow Dxt level    4.  Heparin for DVT ppx    5.  Superficial Cephalic vein thrombophlebitis  -follow clinically  -use hot/ cold compress    6.  Code status  -full code    7.  CKD stage 3  -Cr near baseline

## 2024-02-02 NOTE — H&P ADULT - NSHPREVIEWOFSYSTEMS_GEN_ALL_CORE
Gen: No fever, chills, weakness  ENT: No visual changes or throat pain  Neck: No pain or stiffness  Respiratory: No cough or wheezing  Cardiovascular: ++ chest pain, palpitations  Gastrointestinal: No abdominal pain, nausea, vomiting, constipation, or diarrhea  Hematologic: No easy bleeding or bruising  Neurologic: No numbness or focal weakness  Psych: No depression or insomnia  Skin: No rash or itching

## 2024-02-03 LAB
A1C WITH ESTIMATED AVERAGE GLUCOSE RESULT: 10.1 % — HIGH (ref 4–5.6)
ADD ON TEST-SPECIMEN IN LAB: SIGNIFICANT CHANGE UP
ANION GAP SERPL CALC-SCNC: 4 MMOL/L — LOW (ref 5–17)
APTT BLD: 196.6 SEC — CRITICAL HIGH (ref 24.5–35.6)
BUN SERPL-MCNC: 21 MG/DL — SIGNIFICANT CHANGE UP (ref 7–23)
CALCIUM SERPL-MCNC: 7.9 MG/DL — LOW (ref 8.5–10.1)
CHLORIDE SERPL-SCNC: 108 MMOL/L — SIGNIFICANT CHANGE UP (ref 96–108)
CO2 SERPL-SCNC: 25 MMOL/L — SIGNIFICANT CHANGE UP (ref 22–31)
CREAT SERPL-MCNC: 1.47 MG/DL — HIGH (ref 0.5–1.3)
EGFR: 56 ML/MIN/1.73M2 — LOW
ESTIMATED AVERAGE GLUCOSE: 243 MG/DL — HIGH (ref 68–114)
GLUCOSE BLDC GLUCOMTR-MCNC: 207 MG/DL — HIGH (ref 70–99)
GLUCOSE BLDC GLUCOMTR-MCNC: 215 MG/DL — HIGH (ref 70–99)
GLUCOSE BLDC GLUCOMTR-MCNC: 215 MG/DL — HIGH (ref 70–99)
GLUCOSE BLDC GLUCOMTR-MCNC: 226 MG/DL — HIGH (ref 70–99)
GLUCOSE SERPL-MCNC: 252 MG/DL — HIGH (ref 70–99)
HCT VFR BLD CALC: 49.7 % — SIGNIFICANT CHANGE UP (ref 39–50)
HGB BLD-MCNC: 17.1 G/DL — HIGH (ref 13–17)
INR BLD: 1.31 RATIO — HIGH (ref 0.85–1.18)
MCHC RBC-ENTMCNC: 28.3 PG — SIGNIFICANT CHANGE UP (ref 27–34)
MCHC RBC-ENTMCNC: 34.4 GM/DL — SIGNIFICANT CHANGE UP (ref 32–36)
MCV RBC AUTO: 82.3 FL — SIGNIFICANT CHANGE UP (ref 80–100)
PLATELET # BLD AUTO: 262 K/UL — SIGNIFICANT CHANGE UP (ref 150–400)
POTASSIUM SERPL-MCNC: 3.5 MMOL/L — SIGNIFICANT CHANGE UP (ref 3.5–5.3)
POTASSIUM SERPL-SCNC: 3.5 MMOL/L — SIGNIFICANT CHANGE UP (ref 3.5–5.3)
PROTHROM AB SERPL-ACNC: 14.7 SEC — HIGH (ref 9.5–13)
RBC # BLD: 6.04 M/UL — HIGH (ref 4.2–5.8)
RBC # FLD: 16.3 % — HIGH (ref 10.3–14.5)
SODIUM SERPL-SCNC: 137 MMOL/L — SIGNIFICANT CHANGE UP (ref 135–145)
TROPONIN I, HIGH SENSITIVITY RESULT: 26.49 NG/L — SIGNIFICANT CHANGE UP
WBC # BLD: 9.44 K/UL — SIGNIFICANT CHANGE UP (ref 3.8–10.5)
WBC # FLD AUTO: 9.44 K/UL — SIGNIFICANT CHANGE UP (ref 3.8–10.5)

## 2024-02-03 PROCEDURE — 99233 SBSQ HOSP IP/OBS HIGH 50: CPT

## 2024-02-03 PROCEDURE — 93306 TTE W/DOPPLER COMPLETE: CPT | Mod: 26

## 2024-02-03 RX ORDER — INSULIN GLARGINE 100 [IU]/ML
100 INJECTION, SOLUTION SUBCUTANEOUS AT BEDTIME
Refills: 0 | Status: DISCONTINUED | OUTPATIENT
Start: 2024-02-03 | End: 2024-02-06

## 2024-02-03 RX ORDER — GLUCAGON INJECTION, SOLUTION 0.5 MG/.1ML
1 INJECTION, SOLUTION SUBCUTANEOUS ONCE
Refills: 0 | Status: DISCONTINUED | OUTPATIENT
Start: 2024-02-03 | End: 2024-02-06

## 2024-02-03 RX ORDER — CLOPIDOGREL BISULFATE 75 MG/1
75 TABLET, FILM COATED ORAL DAILY
Refills: 0 | Status: DISCONTINUED | OUTPATIENT
Start: 2024-02-03 | End: 2024-02-06

## 2024-02-03 RX ORDER — INSULIN LISPRO 100/ML
VIAL (ML) SUBCUTANEOUS
Refills: 0 | Status: DISCONTINUED | OUTPATIENT
Start: 2024-02-03 | End: 2024-02-06

## 2024-02-03 RX ORDER — ATORVASTATIN CALCIUM 80 MG/1
40 TABLET, FILM COATED ORAL AT BEDTIME
Refills: 0 | Status: DISCONTINUED | OUTPATIENT
Start: 2024-02-03 | End: 2024-02-06

## 2024-02-03 RX ORDER — WARFARIN SODIUM 2.5 MG/1
5 TABLET ORAL DAILY
Refills: 0 | Status: DISCONTINUED | OUTPATIENT
Start: 2024-02-03 | End: 2024-02-04

## 2024-02-03 RX ORDER — METOPROLOL TARTRATE 50 MG
50 TABLET ORAL
Refills: 0 | Status: DISCONTINUED | OUTPATIENT
Start: 2024-02-03 | End: 2024-02-04

## 2024-02-03 RX ORDER — DEXTROSE 50 % IN WATER 50 %
15 SYRINGE (ML) INTRAVENOUS ONCE
Refills: 0 | Status: DISCONTINUED | OUTPATIENT
Start: 2024-02-03 | End: 2024-02-06

## 2024-02-03 RX ORDER — DEXTROSE 50 % IN WATER 50 %
25 SYRINGE (ML) INTRAVENOUS ONCE
Refills: 0 | Status: DISCONTINUED | OUTPATIENT
Start: 2024-02-03 | End: 2024-02-06

## 2024-02-03 RX ORDER — SODIUM CHLORIDE 9 MG/ML
1000 INJECTION, SOLUTION INTRAVENOUS
Refills: 0 | Status: DISCONTINUED | OUTPATIENT
Start: 2024-02-03 | End: 2024-02-06

## 2024-02-03 RX ORDER — ENOXAPARIN SODIUM 100 MG/ML
120 INJECTION SUBCUTANEOUS EVERY 12 HOURS
Refills: 0 | Status: DISCONTINUED | OUTPATIENT
Start: 2024-02-03 | End: 2024-02-06

## 2024-02-03 RX ORDER — LANOLIN ALCOHOL/MO/W.PET/CERES
3 CREAM (GRAM) TOPICAL AT BEDTIME
Refills: 0 | Status: DISCONTINUED | OUTPATIENT
Start: 2024-02-03 | End: 2024-02-06

## 2024-02-03 RX ORDER — WARFARIN SODIUM 2.5 MG/1
1 TABLET ORAL DAILY
Refills: 0 | Status: DISCONTINUED | OUTPATIENT
Start: 2024-02-03 | End: 2024-02-04

## 2024-02-03 RX ORDER — METOPROLOL TARTRATE 50 MG
50 TABLET ORAL ONCE
Refills: 0 | Status: COMPLETED | OUTPATIENT
Start: 2024-02-03 | End: 2024-02-03

## 2024-02-03 RX ORDER — FENOFIBRATE,MICRONIZED 130 MG
145 CAPSULE ORAL DAILY
Refills: 0 | Status: DISCONTINUED | OUTPATIENT
Start: 2024-02-03 | End: 2024-02-06

## 2024-02-03 RX ORDER — DEXTROSE 50 % IN WATER 50 %
12.5 SYRINGE (ML) INTRAVENOUS ONCE
Refills: 0 | Status: DISCONTINUED | OUTPATIENT
Start: 2024-02-03 | End: 2024-02-06

## 2024-02-03 RX ADMIN — ENOXAPARIN SODIUM 120 MILLIGRAM(S): 100 INJECTION SUBCUTANEOUS at 12:30

## 2024-02-03 RX ADMIN — Medication 4: at 12:30

## 2024-02-03 RX ADMIN — ENOXAPARIN SODIUM 120 MILLIGRAM(S): 100 INJECTION SUBCUTANEOUS at 22:14

## 2024-02-03 RX ADMIN — WARFARIN SODIUM 1 MILLIGRAM(S): 2.5 TABLET ORAL at 22:13

## 2024-02-03 RX ADMIN — CLOPIDOGREL BISULFATE 75 MILLIGRAM(S): 75 TABLET, FILM COATED ORAL at 10:21

## 2024-02-03 RX ADMIN — HEPARIN SODIUM 0 UNIT(S)/HR: 5000 INJECTION INTRAVENOUS; SUBCUTANEOUS at 05:24

## 2024-02-03 RX ADMIN — Medication 50 MILLIGRAM(S): at 22:11

## 2024-02-03 RX ADMIN — INSULIN GLARGINE 100 UNIT(S): 100 INJECTION, SOLUTION SUBCUTANEOUS at 22:13

## 2024-02-03 RX ADMIN — WARFARIN SODIUM 5 MILLIGRAM(S): 2.5 TABLET ORAL at 22:11

## 2024-02-03 RX ADMIN — Medication 50 MILLIGRAM(S): at 02:23

## 2024-02-03 RX ADMIN — ATORVASTATIN CALCIUM 40 MILLIGRAM(S): 80 TABLET, FILM COATED ORAL at 22:11

## 2024-02-03 RX ADMIN — HEPARIN SODIUM 2200 UNIT(S)/HR: 5000 INJECTION INTRAVENOUS; SUBCUTANEOUS at 04:47

## 2024-02-03 RX ADMIN — Medication 145 MILLIGRAM(S): at 12:29

## 2024-02-03 RX ADMIN — HEPARIN SODIUM 1800 UNIT(S)/HR: 5000 INJECTION INTRAVENOUS; SUBCUTANEOUS at 07:18

## 2024-02-03 RX ADMIN — HEPARIN SODIUM 1800 UNIT(S)/HR: 5000 INJECTION INTRAVENOUS; SUBCUTANEOUS at 06:30

## 2024-02-03 RX ADMIN — Medication 4: at 08:32

## 2024-02-03 RX ADMIN — Medication 50 MILLIGRAM(S): at 10:21

## 2024-02-03 RX ADMIN — Medication 4: at 18:12

## 2024-02-03 NOTE — PATIENT PROFILE ADULT - FALL HARM RISK - HARM RISK INTERVENTIONS

## 2024-02-03 NOTE — CONSULT NOTE ADULT - ASSESSMENT
59 y/o Male with PMHx of pancreatitis,  HTN, HLD, DM2, CKD 3, obesity, CAD s/p x8 stents, s/p cholecystectomy, pacemaker, AVR; presented to the ED with complain of intermittent stabbing mid-sternal chest pain, palpitations and SOB x2 days, with worsening symptoms today. Reports palpitations as fluttering, worsens with exertion. Notes chest pain occasionally radiates down left the arm and to the left side of the chest. Also endorsing nausea, fatigue, LLE and LUE swelling. Recent stent placements in Sep 2023, pacemaker placement and AVR in October, 2023.    #Chest Pain. Unlikely ACS.  #Aortic stenosis s/p mechanical AVR 10/13/23  #CAD s/p 8 stents  #Hx of PAF  #HB s/p PPM on 10/20  #Uncontrolled IDDMT2  #Hypertension  #Hyperlipidemia  #CKD Stage 3 BL appears around 1.5-2      Monitor on tele.   Awaiting Interrogation of PPM  Troponin negative x2  DC heparin gtt, c/w Lovenox for bridging pending therapeutic INR. Daily for INR goal 2-3   S/p recent PCI of ISR of LAD x 2 and PTCA of diag on 9/29/23   Continue Lipitor, Plavix, Fenofibrate and Lopressor  Awaiting echo results         59 y/o Male with PMHx of pancreatitis,  HTN, HLD, DM2, CKD 3, obesity, CAD s/p x8 stents, s/p cholecystectomy, pacemaker, AVR; presented to the ED with complain of intermittent stabbing mid-sternal chest pain, palpitations and SOB x2 days, with worsening symptoms today. Reports palpitations as fluttering, worsens with exertion. Notes chest pain occasionally radiates down left the arm and to the left side of the chest. Also endorsing nausea, fatigue, LLE and LUE swelling. Recent stent placements in Sep 2023, pacemaker placement and AVR in October, 2023.    #Chest Pain. Unlikely ACS.  #Aortic stenosis s/p mechanical AVR 10/13/23  #CAD s/p 8 stents  #Hx of PAF  #HB s/p PPM on 10/20  #Uncontrolled IDDMT2  #Hypertension  #Hyperlipidemia  #CKD Stage 3 BL appears around 1.5-2      Monitor on tele.   Awaiting Interrogation of PPM  Troponin negative x2  DC heparin gtt, c/w Lovenox for bridging pending therapeutic INR. Daily for INR goal 2-3   S/p recent PCI of ISR of LAD x 2 and PTCA of diag on 9/29/23   Continue Lipitor, Plavix, Fenofibrate and Lopressor  Awaiting echo results    Will follow  Case d/w Dr. Kaye

## 2024-02-03 NOTE — PATIENT PROFILE ADULT - FUNCTIONAL ASSESSMENT - BASIC MOBILITY 6.
3-calculated by average/Not able to assess (calculate score using [facility] averaging method)

## 2024-02-03 NOTE — PATIENT PROFILE ADULT - NSPROIMPLANTSMEDDEV_GEN_A_NUR
chest plate, cardiac stents, mechanical valve replacement per patient    freestyle shravan chest plate, cardiac stents, mechanical valve replacement per patient    freestyle shravan/Pacemaker

## 2024-02-03 NOTE — CONSULT NOTE ADULT - SUBJECTIVE AND OBJECTIVE BOX
CHIEF COMPLAINT: Patient is a 58y old  Male who presents with a chief complaint of Chest pain (02 Feb 2024 23:53)    FROM H&P: 57 y/o Male with PMHx of pancreatitis,  HTN, HLD, DM2, CKD 3, obesity, CAD s/p x8 stents, s/p cholecystectomy, pacemaker, AVR; presented to the ED with complain of intermittent stabbing mid-sternal chest pain, palpitations and SOB x2 days, with worsening symptoms today. Reports palpitations as fluttering, worsens with exertion. Notes chest pain occasionally radiates down left the arm and to the left side of the chest. Also endorsing nausea, fatigue, LLE and LUE swelling. Recent stent placements in Sep 2023, pacemaker placement and AVR in October, 2023.   (02 Feb 2024 23:53)    Cardiology consulted for chest pain  Patient reports sharp like central stabbing, descriptions seems to be atypical - suspect palps  Non radiating and intermittent  No SOB or weight gain  He has overall been feeling well, good exercise tolerance, past few days taking naps  Reports his INR has been therapeutic recent, however sub therapeutic on admission    MEDICATIONS  (STANDING):  atorvastatin 40 milliGRAM(s) Oral at bedtime  clopidogrel Tablet 75 milliGRAM(s) Oral daily  dextrose 5%. 1000 milliLiter(s) (100 mL/Hr) IV Continuous <Continuous>  dextrose 5%. 1000 milliLiter(s) (50 mL/Hr) IV Continuous <Continuous>  dextrose 50% Injectable 12.5 Gram(s) IV Push once  dextrose 50% Injectable 25 Gram(s) IV Push once  dextrose 50% Injectable 25 Gram(s) IV Push once  enoxaparin Injectable 120 milliGRAM(s) SubCutaneous every 12 hours  fenofibrate Tablet 145 milliGRAM(s) Oral daily  glucagon  Injectable 1 milliGRAM(s) IntraMuscular once  insulin glargine Injectable (LANTUS) 100 Unit(s) SubCutaneous at bedtime  insulin lispro (ADMELOG) corrective regimen sliding scale   SubCutaneous three times a day before meals  metoprolol tartrate 50 milliGRAM(s) Oral two times a day  warfarin 5 milliGRAM(s) Oral daily  warfarin 1 milliGRAM(s) Oral daily    MEDICATIONS  (PRN):  acetaminophen     Tablet .. 650 milliGRAM(s) Oral every 6 hours PRN Mild Pain (1 - 3)  aluminum hydroxide/magnesium hydroxide/simethicone Suspension 30 milliLiter(s) Oral every 4 hours PRN Dyspepsia  dextrose Oral Gel 15 Gram(s) Oral once PRN Blood Glucose LESS THAN 70 milliGRAM(s)/deciliter  melatonin 3 milliGRAM(s) Oral at bedtime PRN Insomnia  ondansetron Injectable 4 milliGRAM(s) IV Push every 6 hours PRN Nausea and/or Vomiting    HOME MEDICATIONS:  acetaminophen 325 mg oral tablet: 2 tab(s) orally every 6 hours as needed for Mild Pain (1 - 3) (02 Feb 2024 23:39)  atorvastatin 40 mg oral tablet: 1 tab(s) orally once a day (02 Feb 2024 23:51)  clopidogrel 75 mg oral tablet: 1 tab(s) orally once a day (02 Feb 2024 23:35)  fenofibrate 160 mg oral tablet: 1 tab(s) orally once a day (02 Feb 2024 23:39)  HumaLOG Pen 100 units/mL subcutaneous solution: 35 unit(s) subcutaneous once a day (with meals) (02 Feb 2024 23:42)  Toujeo SoloStar 300 units/mL subcutaneous solution: 150 unit(s) subcutaneous once a day (at bedtime) (02 Feb 2024 23:36)  warfarin 1 mg oral tablet: 1 tab(s) orally once a day *** take with 5 mg total 6 mg*** (02 Feb 2024 23:37)  warfarin 5 mg oral tablet: 1 tab(s) orally once a day *** take with 1 mg total 6 mg*** (02 Feb 2024 23:38)    PHYSICAL EXAM:  T(C): 36.6 (03 Feb 2024 07:53), Max: 37.1 (02 Feb 2024 19:19)  T(F): 97.9 (03 Feb 2024 07:53), Max: 98.7 (02 Feb 2024 19:19)  HR: 96 (03 Feb 2024 07:53) (85 - 96)  BP: 171/94 (03 Feb 2024 07:53) (145/88 - 176/94)  BP(mean): 105 (03 Feb 2024 04:05) (105 - 119)  RR: 17 (03 Feb 2024 07:53) (15 - 19)  SpO2: 96% (03 Feb 2024 07:53) (94% - 99%)    Parameters below as of 03 Feb 2024 07:53  Patient On (Oxygen Delivery Method): room air    Constitutional: NAD, awake and alert  HEENT: PERR, EOMI, Normal Hearing, MMM  Neck: Soft and supple, No LAD, No JVD  Respiratory: Breath sounds are clear bilaterally, No wheezing, rales or rhonchi  Cardiovascular: S1 and S2, regular rate and rhythm, +click  Gastrointestinal: Bowel Sounds present, soft, nontender, nondistended, no guarding, no rebound  Extremities: No peripheral edema  Vascular: 2+ peripheral pulses  Neurological: A/O x 3, no focal deficits  Musculoskeletal: 5/5 strength b/l upper and lower extremities  Skin: No rashes    =======================================    INTERPRETATION OF TELEMETRY: Paced    ECG:     ========================================    LABS:                        17.1   9.44  )-----------( 262      ( 03 Feb 2024 04:21 )             49.7     02-03    137  |  108  |  21  ----------------------------<  252<H>  3.5   |  25  |  1.47<H>    Ca    7.9<L>      03 Feb 2024 04:21  Mg     1.7     02-02    TPro  6.5  /  Alb  2.8<L>  /  TBili  0.3  /  DBili  x   /  AST  35  /  ALT  45  /  AlkPhos  76  02-02    PT/INR - ( 03 Feb 2024 09:27 )   PT: 14.7 sec;   INR: 1.31 ratio       PTT - ( 03 Feb 2024 04:21 )  PTT:196.6 sec    TroponinI hsT: <-26.49, <-23.22        CARDIAC TESTING:    < from: TTE Echo Complete w/ Contrast w/ Doppler (10.18.23 @ 15:39) >   1. Left ventricular ejection fraction, by visual estimation, is 60 to   65%.   2. Technically difficult study.   3. Normal global left ventricular systolic function.   4. There is moderate concentric left ventricular hypertrophy.   5. There is a significant pericardial fat pad present.   6. There is no evidence of pericardial effusion.   7. Mild Mitral valve prolapse.   8. Trace mitral valve regurgitation.   9. Aortic valve is normally functioning mechanical prosthetic valve.  10. Mechanical prosthesis in the aortic valve position.    < from: Cardiac Catheterization (10.06.23 @ 11:08) >  Non-obstructive LASD and LLCx disease   Unable to cross aortic valve (known moderate to severe bicuspid)   Recommendations:   Aggressive risk factor modification with diet, exercise, and a goal  LDL of less than 70.  Transfer to tertiary center for AVR     RADIOLOGY & ADDITIONAL STUDIES:    < from: US Duplex Venous Lower Ext Ltd, Left (02.02.24 @ 22:37) >  No evidence of left lower extremity deep venous thrombosis.    < from: US Duplex Venous Upper Ext Ltd, Left (02.02.24 @ 22:34) >  No evidence of left upper extremity deep venous thrombosis.  Superficial thrombophlebitis of the left cephalic vein.   CHIEF COMPLAINT: Patient is a 58y old  Male who presents with a chief complaint of Chest pain (02 Feb 2024 23:53)    FROM H&P: 57 y/o Male with PMHx of pancreatitis,  HTN, HLD, DM2, CKD 3, obesity, CAD s/p x8 stents, s/p cholecystectomy, pacemaker, AVR; presented to the ED with complain of intermittent stabbing mid-sternal chest pain, palpitations and SOB x2 days, with worsening symptoms today. Reports palpitations as fluttering, worsens with exertion. Notes chest pain occasionally radiates down left the arm and to the left side of the chest. Also endorsing nausea, fatigue, LLE and LUE swelling. Recent stent placements in Sep 2023, pacemaker placement and AVR in October, 2023.   (02 Feb 2024 23:53)    Cardiology consulted for chest pain  Patient reports sharp like central stabbing, descriptions seems to be atypical - suspect palps  Non radiating and intermittent  No SOB or weight gain  He has overall been feeling well, good exercise tolerance, past few days taking naps  Reports his INR has been therapeutic recent, however sub therapeutic on admission    MEDICATIONS  (STANDING):  atorvastatin 40 milliGRAM(s) Oral at bedtime  clopidogrel Tablet 75 milliGRAM(s) Oral daily  dextrose 5%. 1000 milliLiter(s) (100 mL/Hr) IV Continuous <Continuous>  dextrose 5%. 1000 milliLiter(s) (50 mL/Hr) IV Continuous <Continuous>  dextrose 50% Injectable 12.5 Gram(s) IV Push once  dextrose 50% Injectable 25 Gram(s) IV Push once  dextrose 50% Injectable 25 Gram(s) IV Push once  enoxaparin Injectable 120 milliGRAM(s) SubCutaneous every 12 hours  fenofibrate Tablet 145 milliGRAM(s) Oral daily  glucagon  Injectable 1 milliGRAM(s) IntraMuscular once  insulin glargine Injectable (LANTUS) 100 Unit(s) SubCutaneous at bedtime  insulin lispro (ADMELOG) corrective regimen sliding scale   SubCutaneous three times a day before meals  metoprolol tartrate 50 milliGRAM(s) Oral two times a day  warfarin 5 milliGRAM(s) Oral daily  warfarin 1 milliGRAM(s) Oral daily    MEDICATIONS  (PRN):  acetaminophen     Tablet .. 650 milliGRAM(s) Oral every 6 hours PRN Mild Pain (1 - 3)  aluminum hydroxide/magnesium hydroxide/simethicone Suspension 30 milliLiter(s) Oral every 4 hours PRN Dyspepsia  dextrose Oral Gel 15 Gram(s) Oral once PRN Blood Glucose LESS THAN 70 milliGRAM(s)/deciliter  melatonin 3 milliGRAM(s) Oral at bedtime PRN Insomnia  ondansetron Injectable 4 milliGRAM(s) IV Push every 6 hours PRN Nausea and/or Vomiting    HOME MEDICATIONS:  acetaminophen 325 mg oral tablet: 2 tab(s) orally every 6 hours as needed for Mild Pain (1 - 3) (02 Feb 2024 23:39)  atorvastatin 40 mg oral tablet: 1 tab(s) orally once a day (02 Feb 2024 23:51)  clopidogrel 75 mg oral tablet: 1 tab(s) orally once a day (02 Feb 2024 23:35)  fenofibrate 160 mg oral tablet: 1 tab(s) orally once a day (02 Feb 2024 23:39)  HumaLOG Pen 100 units/mL subcutaneous solution: 35 unit(s) subcutaneous once a day (with meals) (02 Feb 2024 23:42)  Toujeo SoloStar 300 units/mL subcutaneous solution: 150 unit(s) subcutaneous once a day (at bedtime) (02 Feb 2024 23:36)  warfarin 1 mg oral tablet: 1 tab(s) orally once a day *** take with 5 mg total 6 mg*** (02 Feb 2024 23:37)  warfarin 5 mg oral tablet: 1 tab(s) orally once a day *** take with 1 mg total 6 mg*** (02 Feb 2024 23:38)    PHYSICAL EXAM:  T(C): 36.6 (03 Feb 2024 07:53), Max: 37.1 (02 Feb 2024 19:19)  T(F): 97.9 (03 Feb 2024 07:53), Max: 98.7 (02 Feb 2024 19:19)  HR: 96 (03 Feb 2024 07:53) (85 - 96)  BP: 171/94 (03 Feb 2024 07:53) (145/88 - 176/94)  BP(mean): 105 (03 Feb 2024 04:05) (105 - 119)  RR: 17 (03 Feb 2024 07:53) (15 - 19)  SpO2: 96% (03 Feb 2024 07:53) (94% - 99%)    Parameters below as of 03 Feb 2024 07:53  Patient On (Oxygen Delivery Method): room air    Constitutional: NAD, awake and alert  HEENT: PERR, EOMI, Normal Hearing, MMM  Neck: Soft and supple, No LAD, No JVD  Respiratory: Breath sounds are clear bilaterally, No wheezing, rales or rhonchi  Cardiovascular: S1 and S2, regular rate and rhythm, +click  Gastrointestinal: Bowel Sounds present, soft, nontender, nondistended, no guarding, no rebound  Extremities: No peripheral edema  Vascular: 2+ peripheral pulses  Neurological: A/O x 3, no focal deficits  Musculoskeletal: 5/5 strength b/l upper and lower extremities  Skin: No rashes    =======================================    INTERPRETATION OF TELEMETRY: SR    ECG:     ========================================    LABS:                        17.1   9.44  )-----------( 262      ( 03 Feb 2024 04:21 )             49.7     02-03    137  |  108  |  21  ----------------------------<  252<H>  3.5   |  25  |  1.47<H>    Ca    7.9<L>      03 Feb 2024 04:21  Mg     1.7     02-02    TPro  6.5  /  Alb  2.8<L>  /  TBili  0.3  /  DBili  x   /  AST  35  /  ALT  45  /  AlkPhos  76  02-02    PT/INR - ( 03 Feb 2024 09:27 )   PT: 14.7 sec;   INR: 1.31 ratio       PTT - ( 03 Feb 2024 04:21 )  PTT:196.6 sec    TroponinI hsT: <-26.49, <-23.22        CARDIAC TESTING:    < from: TTE Echo Complete w/ Contrast w/ Doppler (10.18.23 @ 15:39) >   1. Left ventricular ejection fraction, by visual estimation, is 60 to   65%.   2. Technically difficult study.   3. Normal global left ventricular systolic function.   4. There is moderate concentric left ventricular hypertrophy.   5. There is a significant pericardial fat pad present.   6. There is no evidence of pericardial effusion.   7. Mild Mitral valve prolapse.   8. Trace mitral valve regurgitation.   9. Aortic valve is normally functioning mechanical prosthetic valve.  10. Mechanical prosthesis in the aortic valve position.    < from: Cardiac Catheterization (10.06.23 @ 11:08) >  Non-obstructive LASD and LLCx disease   Unable to cross aortic valve (known moderate to severe bicuspid)   Recommendations:   Aggressive risk factor modification with diet, exercise, and a goal  LDL of less than 70.  Transfer to tertiary center for AVR     RADIOLOGY & ADDITIONAL STUDIES:    < from: US Duplex Venous Lower Ext Ltd, Left (02.02.24 @ 22:37) >  No evidence of left lower extremity deep venous thrombosis.    < from: US Duplex Venous Upper Ext Ltd, Left (02.02.24 @ 22:34) >  No evidence of left upper extremity deep venous thrombosis.  Superficial thrombophlebitis of the left cephalic vein.

## 2024-02-04 LAB
ANION GAP SERPL CALC-SCNC: 4 MMOL/L — LOW (ref 5–17)
BUN SERPL-MCNC: 20 MG/DL — SIGNIFICANT CHANGE UP (ref 7–23)
CALCIUM SERPL-MCNC: 8.9 MG/DL — SIGNIFICANT CHANGE UP (ref 8.5–10.1)
CHLORIDE SERPL-SCNC: 109 MMOL/L — HIGH (ref 96–108)
CO2 SERPL-SCNC: 27 MMOL/L — SIGNIFICANT CHANGE UP (ref 22–31)
CREAT SERPL-MCNC: 1.37 MG/DL — HIGH (ref 0.5–1.3)
EGFR: 61 ML/MIN/1.73M2 — SIGNIFICANT CHANGE UP
GLUCOSE BLDC GLUCOMTR-MCNC: 162 MG/DL — HIGH (ref 70–99)
GLUCOSE BLDC GLUCOMTR-MCNC: 227 MG/DL — HIGH (ref 70–99)
GLUCOSE BLDC GLUCOMTR-MCNC: 239 MG/DL — HIGH (ref 70–99)
GLUCOSE BLDC GLUCOMTR-MCNC: 283 MG/DL — HIGH (ref 70–99)
GLUCOSE SERPL-MCNC: 173 MG/DL — HIGH (ref 70–99)
HCT VFR BLD CALC: 47.6 % — SIGNIFICANT CHANGE UP (ref 39–50)
HGB BLD-MCNC: 16 G/DL — SIGNIFICANT CHANGE UP (ref 13–17)
INR BLD: 1.24 RATIO — HIGH (ref 0.85–1.18)
MCHC RBC-ENTMCNC: 27.7 PG — SIGNIFICANT CHANGE UP (ref 27–34)
MCHC RBC-ENTMCNC: 33.6 GM/DL — SIGNIFICANT CHANGE UP (ref 32–36)
MCV RBC AUTO: 82.4 FL — SIGNIFICANT CHANGE UP (ref 80–100)
PLATELET # BLD AUTO: 250 K/UL — SIGNIFICANT CHANGE UP (ref 150–400)
POTASSIUM SERPL-MCNC: 3.3 MMOL/L — LOW (ref 3.5–5.3)
POTASSIUM SERPL-SCNC: 3.3 MMOL/L — LOW (ref 3.5–5.3)
PROTHROM AB SERPL-ACNC: 13.9 SEC — HIGH (ref 9.5–13)
RBC # BLD: 5.78 M/UL — SIGNIFICANT CHANGE UP (ref 4.2–5.8)
RBC # FLD: 16 % — HIGH (ref 10.3–14.5)
SODIUM SERPL-SCNC: 140 MMOL/L — SIGNIFICANT CHANGE UP (ref 135–145)
WBC # BLD: 6.9 K/UL — SIGNIFICANT CHANGE UP (ref 3.8–10.5)
WBC # FLD AUTO: 6.9 K/UL — SIGNIFICANT CHANGE UP (ref 3.8–10.5)

## 2024-02-04 PROCEDURE — 99232 SBSQ HOSP IP/OBS MODERATE 35: CPT

## 2024-02-04 PROCEDURE — 93010 ELECTROCARDIOGRAM REPORT: CPT

## 2024-02-04 PROCEDURE — 99232 SBSQ HOSP IP/OBS MODERATE 35: CPT | Mod: 25

## 2024-02-04 RX ORDER — METOPROLOL TARTRATE 50 MG
50 TABLET ORAL
Refills: 0 | Status: DISCONTINUED | OUTPATIENT
Start: 2024-02-04 | End: 2024-02-06

## 2024-02-04 RX ORDER — WARFARIN SODIUM 2.5 MG/1
8 TABLET ORAL ONCE
Refills: 0 | Status: COMPLETED | OUTPATIENT
Start: 2024-02-04 | End: 2024-02-04

## 2024-02-04 RX ORDER — POTASSIUM CHLORIDE 20 MEQ
40 PACKET (EA) ORAL ONCE
Refills: 0 | Status: COMPLETED | OUTPATIENT
Start: 2024-02-04 | End: 2024-02-04

## 2024-02-04 RX ORDER — HYDRALAZINE HCL 50 MG
5 TABLET ORAL EVERY 6 HOURS
Refills: 0 | Status: DISCONTINUED | OUTPATIENT
Start: 2024-02-04 | End: 2024-02-06

## 2024-02-04 RX ORDER — LOSARTAN POTASSIUM 100 MG/1
50 TABLET, FILM COATED ORAL DAILY
Refills: 0 | Status: DISCONTINUED | OUTPATIENT
Start: 2024-02-04 | End: 2024-02-06

## 2024-02-04 RX ORDER — LOSARTAN POTASSIUM 100 MG/1
25 TABLET, FILM COATED ORAL DAILY
Refills: 0 | Status: DISCONTINUED | OUTPATIENT
Start: 2024-02-04 | End: 2024-02-04

## 2024-02-04 RX ADMIN — Medication 40 MILLIEQUIVALENT(S): at 10:06

## 2024-02-04 RX ADMIN — Medication 6: at 17:03

## 2024-02-04 RX ADMIN — Medication 2: at 08:28

## 2024-02-04 RX ADMIN — WARFARIN SODIUM 8 MILLIGRAM(S): 2.5 TABLET ORAL at 22:33

## 2024-02-04 RX ADMIN — ENOXAPARIN SODIUM 120 MILLIGRAM(S): 100 INJECTION SUBCUTANEOUS at 10:07

## 2024-02-04 RX ADMIN — Medication 4: at 12:00

## 2024-02-04 RX ADMIN — Medication 50 MILLIGRAM(S): at 22:31

## 2024-02-04 RX ADMIN — LOSARTAN POTASSIUM 50 MILLIGRAM(S): 100 TABLET, FILM COATED ORAL at 12:56

## 2024-02-04 RX ADMIN — Medication 50 MILLIGRAM(S): at 10:06

## 2024-02-04 RX ADMIN — Medication 145 MILLIGRAM(S): at 10:06

## 2024-02-04 RX ADMIN — INSULIN GLARGINE 100 UNIT(S): 100 INJECTION, SOLUTION SUBCUTANEOUS at 22:31

## 2024-02-04 RX ADMIN — Medication 650 MILLIGRAM(S): at 22:38

## 2024-02-04 RX ADMIN — ENOXAPARIN SODIUM 120 MILLIGRAM(S): 100 INJECTION SUBCUTANEOUS at 22:32

## 2024-02-04 RX ADMIN — ATORVASTATIN CALCIUM 40 MILLIGRAM(S): 80 TABLET, FILM COATED ORAL at 22:31

## 2024-02-04 RX ADMIN — CLOPIDOGREL BISULFATE 75 MILLIGRAM(S): 75 TABLET, FILM COATED ORAL at 10:06

## 2024-02-04 NOTE — PROVIDER CONTACT NOTE (OTHER) - BACKGROUND
Pt admitted for chest pain, with history of stent placement and aortic valve replacement. Pt also has left arm thrombophlebitis.

## 2024-02-04 NOTE — CHART NOTE - NSCHARTNOTEFT_GEN_A_CORE
59 y/o Male with PMHx of pancreatitis,  HTN, HLD, DM2, CKD 3, obesity, CAD s/p x8 stents, s/p cholecystectomy, pacemaker, AVR; presented to the ED with complain of intermittent stabbing mid-sternal chest pain, palpitations and SOB x2 days, with worsening symptoms today. Reports palpitations as fluttering, worsens with exertion. Notes chest pain occasionally radiates down left the arm and to the left side of the chest. Also endorsing nausea, fatigue, LLE and LUE swelling. Recent stent placements in Sep 2023, pacemaker placement and AVR in October, 2023.                Vital Signs Last 24 Hrs  T(C): 36.8 (04 Feb 2024 20:34), Max: 36.8 (04 Feb 2024 20:34)  T(F): 98.3 (04 Feb 2024 20:34), Max: 98.3 (04 Feb 2024 20:34)  HR: 86 (04 Feb 2024 20:34) (79 - 92)  BP: 153/84 (04 Feb 2024 20:34) (152/78 - 173/99)  BP(mean): 117 (04 Feb 2024 08:28) (117 - 117)  RR: 18 (04 Feb 2024 20:34) (18 - 18)  SpO2: 97% (04 Feb 2024 20:34) (97% - 100%)    Parameters below as of 04 Feb 2024 20:34  Patient On (Oxygen Delivery Method): room air              #Chest Pain. Unlikely ACS.  #Aortic stenosis s/p mechanical AVR 10/13/23  #CAD s/p 8 stents  #Hx of PAF  #HB s/p PPM on 10/20  #Uncontrolled IDDMT2  #Hypertension  #Hyperlipidemia  #CKD Stage 3 BL appears around 1.5-2 57 y/o Male with PMHx of pancreatitis,  HTN, HLD, DM2, CKD 3, obesity, CAD s/p x8 stents, s/p cholecystectomy, pacemaker, AVR; presented to the ED with complain of intermittent stabbing mid-sternal chest pain, palpitations and SOB x2 days, with worsening symptoms today. Reports palpitations as fluttering, worsens with exertion. Notes chest pain occasionally radiates down left the arm and to the left side of the chest. Also endorsing nausea, fatigue, LLE and LUE swelling. Recent stent placements in Sep 2023, pacemaker placement and AVR in October, 2023.    MUNA Cade asked to see pt for R hand swelling and tingling to fingers  pt stated intermittent, not sure what increases or decreases symptom  also c/o R upper chest discomofrt, mild intermittent , unsure what increases or decreases this pulsation to base of neck intermittently w radiation to R upper chest          Vital Signs Last 24 Hrs  T(C): 36.8 (04 Feb 2024 20:34), Max: 36.8 (04 Feb 2024 20:34)  T(F): 98.3 (04 Feb 2024 20:34), Max: 98.3 (04 Feb 2024 20:34)  HR: 86 (04 Feb 2024 20:34) (79 - 92)  BP: 153/84 (04 Feb 2024 20:34) (152/78 - 173/99)  BP(mean): 117 (04 Feb 2024 08:28) (117 - 117)  RR: 18 (04 Feb 2024 20:34) (18 - 18)  SpO2: 97% (04 Feb 2024 20:34) (97% - 100%)    Parameters below as of 04 Feb 2024 20:34  Patient On (Oxygen Delivery Method): room air    Gen appears in NAD  HEENT normal sclera, reactive pupils  RESP clear BS bilaterally, unlabored  COR RR S1 + S2 + Murmuur in aortic area w radiation to base of neck, no leg edema  MSK no CW tenderness to palpation  Mild R dorsal hand swelling w nl ROM, , nl pulses  DERM warm and dry  NEURO alert and oriented x 3, [place]  PSYCH nl affect      STAT EKG NSR no acute ST-T changes    ECHO   Summary     Estimated left ventricular ejection fraction is 55-60 %.   The left ventricle is normal in size, wall motion and contractility as   seen in limited views.   Moderate concentric left ventricular hypertrophy is present.   Well seated Mechanical valve in the aortic position. Normal   transprosthetic gradients.   Mild (1+) mitral regurgitation.   The aortic root is mildly dilated.     Signature     ----------------------------------------------------------------   Electronically signed by Francisco Zazueta MD(Interpreting   physician) on 02/03/2024 02:02 PM   ----------------------------------------------------------------    #R upper extremity swelling to dorsal hand  #Slight tingling  appears local  1. elevate hand above elbow      # R chest discomfort  no CW component  pulsation due to trnasmission of murmur from aortic valve replacement  EKG no acute changes  1. reassured        Other issues as per managing hospitalist team  #Chest Pain. Unlikely ACS.  #Aortic stenosis s/p mechanical AVR 10/13/23  #CAD s/p 8 stents  #Hx of PAF  #HB s/p PPM on 10/20  #Uncontrolled IDDMT2  #Hypertension  #Hyperlipidemia  #CKD Stage 3 BL appears around 1.5-2        25 minutes 59 y/o Male with PMHx of pancreatitis,  HTN, HLD, DM2, CKD 3, obesity, CAD s/p x8 stents, s/p cholecystectomy, pacemaker, AVR; presented to the ED with complain of intermittent stabbing mid-sternal chest pain, palpitations and SOB x2 days, with worsening symptoms today. Reports palpitations as fluttering, worsens with exertion. Notes chest pain occasionally radiates down left the arm and to the left side of the chest. Also endorsing nausea, fatigue, LLE and LUE swelling. Recent stent placements in Sep 2023, pacemaker placement and AVR in October, 2023.    MUNA Cade asked to see pt for R hand swelling and tingling to fingers  pt stated intermittent, not sure what increases or decreases symptom  also c/o R upper chest discomfort, mild intermittent , unsure what increases or decreases this pulsation to base of neck intermittently w radiation to R upper chest          Vital Signs Last 24 Hrs  T(C): 36.8 (04 Feb 2024 20:34), Max: 36.8 (04 Feb 2024 20:34)  T(F): 98.3 (04 Feb 2024 20:34), Max: 98.3 (04 Feb 2024 20:34)  HR: 86 (04 Feb 2024 20:34) (79 - 92)  BP: 153/84 (04 Feb 2024 20:34) (152/78 - 173/99)  BP(mean): 117 (04 Feb 2024 08:28) (117 - 117)  RR: 18 (04 Feb 2024 20:34) (18 - 18)  SpO2: 97% (04 Feb 2024 20:34) (97% - 100%)    Parameters below as of 04 Feb 2024 20:34  Patient On (Oxygen Delivery Method): room air    Gen appears in NAD  HEENT normal sclera, reactive pupils  RESP clear BS bilaterally, unlabored  COR RR S1 + S2 + Murmuur in aortic area w radiation to base of neck, no leg edema  MSK no CW tenderness to palpation  Mild R dorsal hand swelling w nl ROM, , nl pulses  DERM warm and dry  NEURO alert and oriented x 3, [place]  PSYCH nl affect      STAT EKG NSR no acute ST-T changes    ECHO   Summary     Estimated left ventricular ejection fraction is 55-60 %.   The left ventricle is normal in size, wall motion and contractility as   seen in limited views.   Moderate concentric left ventricular hypertrophy is present.   Well seated Mechanical valve in the aortic position. Normal   transprosthetic gradients.   Mild (1+) mitral regurgitation.   The aortic root is mildly dilated.     Signature     ----------------------------------------------------------------   Electronically signed by Francisco Zazueta MD(Interpreting   physician) on 02/03/2024 02:02 PM   ----------------------------------------------------------------    #R upper extremity swelling to dorsal hand  #Slight tingling  appears local  1. elevate hand above elbow      # R chest discomfort  no CW component  pulsation due to trnasmission of murmur from aortic valve replacement  EKG no acute changes  1. reassured      #Aware of superficial thrombophlebitis LUE    Other issues as per managing hospitalist team  #Chest Pain. Unlikely ACS.  #Aortic stenosis s/p mechanical AVR 10/13/23  #CAD s/p 8 stents  #Hx of PAF  #HB s/p PPM on 10/20  #Uncontrolled IDDMT2  #Hypertension  #Hyperlipidemia  #CKD Stage 3 BL appears around 1.5-2        25 minutes 57 y/o Male with PMHx of pancreatitis,  HTN, HLD, DM2, CKD 3, obesity, CAD s/p x8 stents, s/p cholecystectomy, pacemaker, AVR; presented to the ED with complain of intermittent stabbing mid-sternal chest pain, palpitations and SOB x2 days, with worsening symptoms today. Reports palpitations as fluttering, worsens with exertion. Notes chest pain occasionally radiates down left the arm and to the left side of the chest. Also endorsing nausea, fatigue, LLE and LUE swelling. Recent stent placements in Sep 2023, pacemaker placement and AVR in October, 2023.    MUNA Cade asked to see pt for R hand swelling and tingling to fingers  pt stated intermittent, not sure what increases or decreases symptom  also c/o R upper chest discomfort, mild intermittent , stabbing unsure what increases or decreases this w + associated pulsation to base of neck intermittently w radiation to R upper chest          Vital Signs Last 24 Hrs  T(C): 36.8 (04 Feb 2024 20:34), Max: 36.8 (04 Feb 2024 20:34)  T(F): 98.3 (04 Feb 2024 20:34), Max: 98.3 (04 Feb 2024 20:34)  HR: 86 (04 Feb 2024 20:34) (79 - 92)  BP: 153/84 (04 Feb 2024 20:34) (152/78 - 173/99)  BP(mean): 117 (04 Feb 2024 08:28) (117 - 117)  RR: 18 (04 Feb 2024 20:34) (18 - 18)  SpO2: 97% (04 Feb 2024 20:34) (97% - 100%)    Parameters below as of 04 Feb 2024 20:34  Patient On (Oxygen Delivery Method): room air    Gen appears in NAD  HEENT normal sclera, reactive pupils  RESP clear BS bilaterally, unlabored  COR RR S1 + S2 + Murmuur in aortic area w radiation to base of neck, no leg edema  MSK no CW tenderness to palpation  Mild R dorsal hand swelling w nl ROM, , nl pulses  DERM warm and dry  NEURO alert and oriented x 3, [place]  PSYCH nl affect      STAT EKG NSR no acute ST-T changes    ECHO   Summary     Estimated left ventricular ejection fraction is 55-60 %.   The left ventricle is normal in size, wall motion and contractility as   seen in limited views.   Moderate concentric left ventricular hypertrophy is present.   Well seated Mechanical valve in the aortic position. Normal   transprosthetic gradients.   Mild (1+) mitral regurgitation.   The aortic root is mildly dilated.     Signature     ----------------------------------------------------------------   Electronically signed by Francisco Zazueta MD(Interpreting   physician) on 02/03/2024 02:02 PM   ----------------------------------------------------------------    #R upper extremity swelling to dorsal hand  #Slight tingling  appears local  1. elevate hand above elbow      # R chest discomfort  no CW component  pulsation due to trnasmission of murmur from aortic valve replacement  EKG no acute changes  1. reassured      #Aware of superficial thrombophlebitis LUE    Other issues as per managing hospitalist team  #Chest Pain. Unlikely ACS.  #Aortic stenosis s/p mechanical AVR 10/13/23  #CAD s/p 8 stents  #Hx of PAF  #HB s/p PPM on 10/20  #Uncontrolled IDDMT2  #Hypertension  #Hyperlipidemia  #CKD Stage 3 BL appears around 1.5-2        25 minutes

## 2024-02-04 NOTE — PROVIDER CONTACT NOTE (OTHER) - ASSESSMENT
Pt c/o R hand tingling as well as swelling. pt states he has intermittent chest pain as well. MD Castaneda notified and requested bedside assessment.

## 2024-02-05 DIAGNOSIS — Z95.0 PRESENCE OF CARDIAC PACEMAKER: ICD-10-CM

## 2024-02-05 LAB
ANION GAP SERPL CALC-SCNC: 5 MMOL/L — SIGNIFICANT CHANGE UP (ref 5–17)
BUN SERPL-MCNC: 19 MG/DL — SIGNIFICANT CHANGE UP (ref 7–23)
CALCIUM SERPL-MCNC: 9 MG/DL — SIGNIFICANT CHANGE UP (ref 8.5–10.1)
CHLORIDE SERPL-SCNC: 109 MMOL/L — HIGH (ref 96–108)
CO2 SERPL-SCNC: 25 MMOL/L — SIGNIFICANT CHANGE UP (ref 22–31)
CREAT SERPL-MCNC: 1.29 MG/DL — SIGNIFICANT CHANGE UP (ref 0.5–1.3)
EGFR: 65 ML/MIN/1.73M2 — SIGNIFICANT CHANGE UP
GLUCOSE BLDC GLUCOMTR-MCNC: 190 MG/DL — HIGH (ref 70–99)
GLUCOSE BLDC GLUCOMTR-MCNC: 220 MG/DL — HIGH (ref 70–99)
GLUCOSE BLDC GLUCOMTR-MCNC: 278 MG/DL — HIGH (ref 70–99)
GLUCOSE BLDC GLUCOMTR-MCNC: 345 MG/DL — HIGH (ref 70–99)
GLUCOSE SERPL-MCNC: 207 MG/DL — HIGH (ref 70–99)
INR BLD: 1.3 RATIO — HIGH (ref 0.85–1.18)
POTASSIUM SERPL-MCNC: 3.6 MMOL/L — SIGNIFICANT CHANGE UP (ref 3.5–5.3)
POTASSIUM SERPL-SCNC: 3.6 MMOL/L — SIGNIFICANT CHANGE UP (ref 3.5–5.3)
PROTHROM AB SERPL-ACNC: 14.6 SEC — HIGH (ref 9.5–13)
SODIUM SERPL-SCNC: 139 MMOL/L — SIGNIFICANT CHANGE UP (ref 135–145)

## 2024-02-05 PROCEDURE — 93971 EXTREMITY STUDY: CPT | Mod: 26,RT

## 2024-02-05 PROCEDURE — 93280 PM DEVICE PROGR EVAL DUAL: CPT | Mod: 26

## 2024-02-05 PROCEDURE — 99233 SBSQ HOSP IP/OBS HIGH 50: CPT

## 2024-02-05 PROCEDURE — 93288 INTERROG EVL PM/LDLS PM IP: CPT | Mod: 26

## 2024-02-05 RX ORDER — AMLODIPINE BESYLATE 2.5 MG/1
10 TABLET ORAL DAILY
Refills: 0 | Status: DISCONTINUED | OUTPATIENT
Start: 2024-02-05 | End: 2024-02-06

## 2024-02-05 RX ORDER — POTASSIUM CHLORIDE 20 MEQ
40 PACKET (EA) ORAL ONCE
Refills: 0 | Status: COMPLETED | OUTPATIENT
Start: 2024-02-05 | End: 2024-02-05

## 2024-02-05 RX ORDER — LIDOCAINE 4 G/100G
1 CREAM TOPICAL EVERY 24 HOURS
Refills: 0 | Status: DISCONTINUED | OUTPATIENT
Start: 2024-02-05 | End: 2024-02-06

## 2024-02-05 RX ADMIN — Medication 50 MILLIGRAM(S): at 08:11

## 2024-02-05 RX ADMIN — Medication 40 MILLIEQUIVALENT(S): at 11:38

## 2024-02-05 RX ADMIN — Medication 4: at 12:27

## 2024-02-05 RX ADMIN — LIDOCAINE 1 PATCH: 4 CREAM TOPICAL at 12:28

## 2024-02-05 RX ADMIN — CLOPIDOGREL BISULFATE 75 MILLIGRAM(S): 75 TABLET, FILM COATED ORAL at 08:11

## 2024-02-05 RX ADMIN — Medication 2: at 08:12

## 2024-02-05 RX ADMIN — Medication 8: at 17:42

## 2024-02-05 RX ADMIN — ENOXAPARIN SODIUM 120 MILLIGRAM(S): 100 INJECTION SUBCUTANEOUS at 21:24

## 2024-02-05 RX ADMIN — LIDOCAINE 1 PATCH: 4 CREAM TOPICAL at 19:00

## 2024-02-05 RX ADMIN — AMLODIPINE BESYLATE 10 MILLIGRAM(S): 2.5 TABLET ORAL at 10:57

## 2024-02-05 RX ADMIN — INSULIN GLARGINE 100 UNIT(S): 100 INJECTION, SOLUTION SUBCUTANEOUS at 21:24

## 2024-02-05 RX ADMIN — Medication 650 MILLIGRAM(S): at 21:25

## 2024-02-05 RX ADMIN — Medication 145 MILLIGRAM(S): at 08:11

## 2024-02-05 RX ADMIN — ATORVASTATIN CALCIUM 40 MILLIGRAM(S): 80 TABLET, FILM COATED ORAL at 21:24

## 2024-02-05 RX ADMIN — Medication 3 MILLIGRAM(S): at 21:24

## 2024-02-05 RX ADMIN — ENOXAPARIN SODIUM 120 MILLIGRAM(S): 100 INJECTION SUBCUTANEOUS at 10:57

## 2024-02-05 RX ADMIN — Medication 650 MILLIGRAM(S): at 11:42

## 2024-02-05 RX ADMIN — Medication 50 MILLIGRAM(S): at 21:24

## 2024-02-05 RX ADMIN — LOSARTAN POTASSIUM 50 MILLIGRAM(S): 100 TABLET, FILM COATED ORAL at 08:12

## 2024-02-05 NOTE — PROCEDURE NOTE - INTERROGATION NOTE: RATE
Your current Orthopaedic problem we are working together to treat is:  Pain.      PROCEDURE:  It is recommended that you be scheduled for the following procedure: Epidural Steriod injection.  Please follow the specific instructions provided by the surgery scheduler for the testing, physician evaluation, pre-operative education, and other preparatory requirements of the procedure to help assure a safe and successful result.  You can contact [facility] at [phone].     It is recommended you schedule a follow-up appointment with TK Chisholm in 6 months.      Office hours are 8:00 am to 5:00 pm Monday through Friday. If it is urgent that you speak with someone outside of these hours, our [facility] Call Center will be able to assist you. You can reach the office by calling the [facility] central scheduling line at [phone].     We do highly recommend [facility], if you do not already have this. You can request access via the internet or by simply talking with a  at any of the clinics.   [email or web address].      Thank you for choosing [facility] as your Pain Management provider!             60/130

## 2024-02-05 NOTE — PROGRESS NOTE ADULT - TIME BILLING
Time spent  coordinating the patient's care. This includes reviewing documentation pertinent to this admission, results and imaging. Further tests, medications, and procedures have been ordered as indicated. Laboratory results and the plan of care were communicated to the patient. Discussed care plan with consultants including cards. Supporting documentation was completed and added to the patient's chart.
Time spent  coordinating the patient's care. This includes reviewing documentation pertinent to this admission, results and imaging. Further tests, medications, and procedures have been ordered as indicated. Laboratory results and the plan of care were communicated to the patient. Discussed care plan with consultants including cards. Supporting documentation was completed and added to the patient's chart.

## 2024-02-05 NOTE — PROCEDURE NOTE - INTERROGATION NOTE: COMMENTS
Normally functioning dual chamber pacemaker, with battery longevity 14.8 years. Sinus rhythm, AP 0.1%,  0.5%. Reviewed stored data, Afib burden 3.1%, with occasional RVR. Most recent episode Dec 1, duration 13 minutes. Recommend uptitrating beta-blocker for better HR control during Afib episodes.

## 2024-02-06 ENCOUNTER — TRANSCRIPTION ENCOUNTER (OUTPATIENT)
Age: 59
End: 2024-02-06

## 2024-02-06 VITALS — DIASTOLIC BLOOD PRESSURE: 91 MMHG | HEART RATE: 77 BPM | SYSTOLIC BLOOD PRESSURE: 169 MMHG

## 2024-02-06 LAB
GLUCOSE BLDC GLUCOMTR-MCNC: 165 MG/DL — HIGH (ref 70–99)
GLUCOSE BLDC GLUCOMTR-MCNC: 218 MG/DL — HIGH (ref 70–99)
GLUCOSE BLDC GLUCOMTR-MCNC: 310 MG/DL — HIGH (ref 70–99)
INR BLD: 1.39 RATIO — HIGH (ref 0.85–1.18)
PROTHROM AB SERPL-ACNC: 15.6 SEC — HIGH (ref 9.5–13)

## 2024-02-06 PROCEDURE — 99239 HOSP IP/OBS DSCHRG MGMT >30: CPT

## 2024-02-06 RX ORDER — ENOXAPARIN SODIUM 100 MG/ML
120 INJECTION SUBCUTANEOUS
Qty: 11.2 | Refills: 0
Start: 2024-02-06 | End: 2024-02-12

## 2024-02-06 RX ORDER — METOPROLOL TARTRATE 50 MG
1 TABLET ORAL
Qty: 60 | Refills: 0
Start: 2024-02-06 | End: 2024-03-06

## 2024-02-06 RX ORDER — AMLODIPINE BESYLATE 2.5 MG/1
1 TABLET ORAL
Qty: 30 | Refills: 0
Start: 2024-02-06 | End: 2024-03-06

## 2024-02-06 RX ORDER — WARFARIN SODIUM 2.5 MG/1
1 TABLET ORAL
Refills: 0 | DISCHARGE

## 2024-02-06 RX ORDER — WARFARIN SODIUM 2.5 MG/1
1 TABLET ORAL
Qty: 60 | Refills: 0 | DISCHARGE
Start: 2024-02-06 | End: 2024-03-06

## 2024-02-06 RX ORDER — HYDROCHLOROTHIAZIDE 25 MG
1 TABLET ORAL
Qty: 30 | Refills: 0
Start: 2024-02-06 | End: 2024-03-06

## 2024-02-06 RX ORDER — LOSARTAN POTASSIUM 100 MG/1
1 TABLET, FILM COATED ORAL
Qty: 30 | Refills: 0
Start: 2024-02-06 | End: 2024-03-06

## 2024-02-06 RX ORDER — WARFARIN SODIUM 2.5 MG/1
2 TABLET ORAL
Qty: 60 | Refills: 0
Start: 2024-02-06 | End: 2024-03-06

## 2024-02-06 RX ORDER — INSULIN LISPRO 100/ML
VIAL (ML) SUBCUTANEOUS AT BEDTIME
Refills: 0 | Status: DISCONTINUED | OUTPATIENT
Start: 2024-02-06 | End: 2024-02-06

## 2024-02-06 RX ADMIN — CLOPIDOGREL BISULFATE 75 MILLIGRAM(S): 75 TABLET, FILM COATED ORAL at 06:29

## 2024-02-06 RX ADMIN — Medication 8: at 16:56

## 2024-02-06 RX ADMIN — ENOXAPARIN SODIUM 120 MILLIGRAM(S): 100 INJECTION SUBCUTANEOUS at 11:15

## 2024-02-06 RX ADMIN — Medication 4: at 12:01

## 2024-02-06 RX ADMIN — LOSARTAN POTASSIUM 50 MILLIGRAM(S): 100 TABLET, FILM COATED ORAL at 08:51

## 2024-02-06 RX ADMIN — Medication 50 MILLIGRAM(S): at 08:51

## 2024-02-06 RX ADMIN — Medication 145 MILLIGRAM(S): at 08:51

## 2024-02-06 RX ADMIN — AMLODIPINE BESYLATE 10 MILLIGRAM(S): 2.5 TABLET ORAL at 08:50

## 2024-02-06 RX ADMIN — LIDOCAINE 1 PATCH: 4 CREAM TOPICAL at 01:30

## 2024-02-06 RX ADMIN — LIDOCAINE 1 PATCH: 4 CREAM TOPICAL at 12:01

## 2024-02-06 NOTE — PROGRESS NOTE ADULT - ASSESSMENT
57 y/o Male with PMHx of pancreatitis,  HTN, HLD, DM2, CKD 3, obesity, CAD s/p x8 stents, s/p cholecystectomy, pacemaker, AVR; presented to the ED with complain of intermittent stabbing mid-sternal chest pain, palpitations and SOB x2 days, with worsening symptoms today. Reports palpitations as fluttering, worsens with exertion. Notes chest pain occasionally radiates down left the arm and to the left side of the chest. Also endorsing nausea, fatigue, LLE and LUE swelling. Recent stent placements in Sep 2023, pacemaker placement and AVR in October, 2023.    #Chest Pain. Unlikely ACS.  Suspect related to uncontrolled HTN  #Aortic stenosis s/p mechanical AVR 10/13/23  #CAD s/p 8 stents  #Hx of PAF  #HB s/p PPM on 10/20  #Uncontrolled IDDMT2  #Hypertension  #Hyperlipidemia  #CKD Stage 3 BL appears around 1.5-2      Monitor on tele.   Interrogation of PPM > Noted, no significant arrhythmias  Troponin negative x2  S/p recent PCI of ISR of LAD x 2 and PTCA of diag on 9/29/23   Echo results reviewed from 2/3. Well seated valve, no effusion  Continue Lipitor, Plavix, Fenofibrate  C/w Lovenox for bridging pending therapeutic INR. Daily for INR goal 2-3, patient ok with injections  Continue Losartan, Norvasc, Toprol and added HCTZ 12.5mg    DC planning  Case d/w Dr. Kaye    
59 y/o Male with PMHx of pancreatitis,  HTN, HLD, DM2, CKD 3, obesity, CAD s/p x8 stents, s/p cholecystectomy, pacemaker, AVR; presented to the ED with complain of intermittent stabbing mid-sternal chest pain, palpitations and SOB x2 days, with worsening symptoms today. Reports palpitations as fluttering, worsens with exertion. Notes chest pain occasionally radiates down left the arm and to the left side of the chest. Also endorsing nausea, fatigue, LLE and LUE swelling. Recent stent placements in Sep 2023, pacemaker placement and AVR in October, 2023.      #Chest pain, palpitations  -tele  -trops neg  -echo unremarkable  -ppm interrogation ordered  -f/u cards recs    #hx mechanical AVR  -INR subtherapeutic (INR goal 2-3 per outpt recs per cards?)  -bridge w lovenox  -monitor INR, continue coumadin    #DM2  -a1c 10.1  -lantus, SSI, monitor BG    #DVT ppx- full a/c w Lovenox to coumadin bridge    Dispo pending device interrogation, cards recs, possible d/c 2/4
59 y/o Male with PMHx of pancreatitis,  HTN, HLD, DM2, CKD 3, obesity, CAD s/p x8 stents, s/p cholecystectomy, pacemaker, AVR; presented to the ED with complain of intermittent stabbing mid-sternal chest pain, palpitations and SOB x2 days, with worsening symptoms today. Reports palpitations as fluttering, worsens with exertion. Notes chest pain occasionally radiates down left the arm and to the left side of the chest. Also endorsing nausea, fatigue, LLE and LUE swelling. Recent stent placements in Sep 2023, pacemaker placement and AVR in October, 2023.    #Chest Pain. Unlikely ACS.  Suspect related to uncontrolled HTN  #Aortic stenosis s/p mechanical AVR 10/13/23  #CAD s/p 8 stents  #Hx of PAF  #HB s/p PPM on 10/20  #Uncontrolled IDDMT2  #Hypertension  #Hyperlipidemia  #CKD Stage 3 BL appears around 1.5-2      Monitor on tele.   Awaiting Interrogation of PPM, d/w EP > remote interrogation on Friday revealing brief NSVT/SVT. No significant arrythmias.  Troponin negative x2  S/p recent PCI of ISR of LAD x 2 and PTCA of diag on 9/29/23   Echo results reviewed from 2/3. Well seated valve, no effusion  Continue Lipitor, Plavix, Fenofibrate  C/w Lovenox for bridging pending therapeutic INR. Daily for INR goal 2-3  2/4. BP elevated, started Losartan 50mg, added PRN hydralazine if SBP >150 after PO meds. Switched Lopressor to Metoprolol Succinate 50mg BID    Will follow  Case d/w Dr. Kaye    
57 y/o Male with PMHx of pancreatitis,  HTN, HLD, DM2, CKD 3, obesity, CAD s/p x8 stents, s/p cholecystectomy, pacemaker, AVR; presented to the ED with complain of intermittent stabbing mid-sternal chest pain, palpitations and SOB x2 days, with worsening symptoms today. Reports palpitations as fluttering, worsens with exertion. Notes chest pain occasionally radiates down left the arm and to the left side of the chest. Also endorsing nausea, fatigue, LLE and LUE swelling. Recent stent placements in Sep 2023, pacemaker placement and AVR in October, 2023.      #Chest pain, palpitations  -tele  -trops neg  -echo unremarkable  -ppm interrogation negative  -if continued CP despite BP control, will pursue LHC, possibly 2/6  -f/u cards recs    #HTN, uncontrolled  -Losartan added, BB changed to long acting, norvasc added  -f/u further cards recs    #hx mechanical AVR  -INR subtherapeutic (INR goal 2-3 per outpt recs per cards?)  -bridge w lovenox  -monitor INR, continue coumadin (held tonight given likely LHC tmrw)    #DM2  -a1c 10.1  -lantus, SSI, monitor BG    #DVT ppx- full a/c w Lovenox to coumadin bridge    Dispo pending BP improvement, possible C    
57 y/o Male with PMHx of pancreatitis,  HTN, HLD, DM2, CKD 3, obesity, CAD s/p x8 stents, s/p cholecystectomy, pacemaker, AVR; presented to the ED with complain of intermittent stabbing mid-sternal chest pain, palpitations and SOB x2 days, with worsening symptoms today. Reports palpitations as fluttering, worsens with exertion. Notes chest pain occasionally radiates down left the arm and to the left side of the chest. Also endorsing nausea, fatigue, LLE and LUE swelling. Recent stent placements in Sep 2023, pacemaker placement and AVR in October, 2023.    #Chest Pain. Unlikely ACS.  Troponins negative  BP still not optimized   Add Norvasc  Will follow      
59 y/o Male with PMHx of pancreatitis,  HTN, HLD, DM2, CKD 3, obesity, CAD s/p x8 stents, s/p cholecystectomy, pacemaker, AVR; presented to the ED with complain of intermittent stabbing mid-sternal chest pain, palpitations and SOB x2 days, with worsening symptoms today. Reports palpitations as fluttering, worsens with exertion. Notes chest pain occasionally radiates down left the arm and to the left side of the chest. Also endorsing nausea, fatigue, LLE and LUE swelling. Recent stent placements in Sep 2023, pacemaker placement and AVR in October, 2023.      #Chest pain, palpitations  -tele  -trops neg  -echo unremarkable  -ppm interrogation ordered however per EP convo w cards, had remote transmission on 2/2 which was unremarkable  -f/u cards recs    #HTN, uncontrolled  -Losartan added, BB changed to long acting  -f/u further cards recs    #hx mechanical AVR  -INR subtherapeutic (INR goal 2-3 per outpt recs per cards?)  -bridge w lovenox  -monitor INR, continue coumadin    #DM2  -a1c 10.1  -lantus, SSI, monitor BG    #DVT ppx- full a/c w Lovenox to coumadin bridge    Dispo pending BP improvement, cards recs, possible d/c 2/5

## 2024-02-06 NOTE — DISCHARGE NOTE PROVIDER - CARE PROVIDER_API CALL
Ron Rogel  Family Medicine  [address], [address]  [place] [address]  Phone: [phone]  Fax: [phone]  Follow Up Time: 1-3 days    Scotty Kaye  Interventional Cardiology  [address] [place] [address]  Phone: [phone]  Fax: [phone]  Follow Up Time: 1 week

## 2024-02-06 NOTE — DISCHARGE NOTE NURSING/CASE MANAGEMENT/SOCIAL WORK - PATIENT PORTAL LINK FT
You can access the FollowMyHealth Patient Portal offered by [facility] by registering at the following website: [email or web address]. By joining [employer]’s FollowMyHealth portal, you will also be able to view your health information using other applications (apps) compatible with our system.

## 2024-02-06 NOTE — DISCHARGE NOTE NURSING/CASE MANAGEMENT/SOCIAL WORK - NSDCPEFALRISK_GEN_ALL_CORE
For information on Fall & Injury Prevention, visit: [email or web address] OR  [email or web address] OR  https://www.cdc.gov/steadi/patient.html

## 2024-02-06 NOTE — DISCHARGE NOTE PROVIDER - NSDCPNSUBOBJ_GEN_ALL_CORE
VITALS:  T(F): 97.5 (02-06-24 @ 07:35), Max: 98.1 (02-05-24 @ 20:42)  HR: 77 (02-06-24 @ 11:20) (76 - 81)  BP: 169/91 (02-06-24 @ 11:20) (152/87 - 169/91)  RR: 18 (02-06-24 @ 07:35) (18 - 19)  SpO2: 97% (02-06-24 @ 07:35) (97% - 98%)    PHYSICAL EXAM:  Gen: NAD  HEENT:  pupils equal and reactive, EOMI, no oropharyngeal lesions, erythema, exudates, oral thrush  NECK:   supple, no carotid bruits, no palpable lymph nodes, no thyromegaly  CV:  +S1, +S2, regular, no murmurs or rubs  RESP:   lungs clear to auscultation bilaterally, no wheezing, rales, rhonchi, good air entry bilaterally  BREAST:  not examined  GI:  abdomen soft, non-tender, non-distended, normal BS, no bruits, no abdominal masses, no palpable masses  RECTAL:  not examined  :  not examined  MSK:   normal muscle tone, no atrophy, no rigidity, no contractions  EXT:  no clubbing, no cyanosis, no edema, no calf pain, swelling or erythema  VASCULAR:  pulses equal and symmetric in the upper and lower extremities  NEURO:  AAOX3, no focal neurological deficits, follows all commands, able to move extremities spontaneously  SKIN:  no ulcers, lesions or rashes    #Chest pain, palpitations  -tele  -trops neg  -echo unremarkable  -ppm interrogation negative  -improved w BP control  -f/u cards recs    #Hypertensive urgency  -Losartan added, BB changed to long acting, norvasc added, HCTZ added  -f/u further cards recs    #hx mechanical AVR  -INR subtherapeutic (INR goal 2-3 per outpt recs per cards?)  -bridge w lovenox  -monitor INR, continue coumadin, increased to 7mg  -should get INR check in 2-3 days    #DM2  -a1c 10.1  -lantus, SSI, monitor BG    #DVT ppx- full a/c w Lovenox to coumadin bridge    D/c home.

## 2024-02-06 NOTE — DISCHARGE NOTE PROVIDER - NSDCCPCAREPLAN_GEN_ALL_CORE_FT
Report given to Seferino PEDRAZA at [facility]. Patient transported via [facility]. Valuables and belongings sent with patient. IVs removed.      PRINCIPAL DISCHARGE DIAGNOSIS  Diagnosis: Chest pain  Assessment and Plan of Treatment: Improved with blood pressure control.      SECONDARY DISCHARGE DIAGNOSES  Diagnosis: Status post aortic valve replacement  Assessment and Plan of Treatment: Bridge with lovenox to coumadin, coumadin dose increased to 7mg. Get an INR check in 2-3 days with Dr. Rogel (Thurs or Fri this week).    Diagnosis: Hypertensive urgency  Assessment and Plan of Treatment: Take losartan, metoprolol, norvasc and HCTZ.

## 2024-02-06 NOTE — PROGRESS NOTE ADULT - SUBJECTIVE AND OBJECTIVE BOX
CHIEF COMPLAINT: Patient is a 58y old  Male who presents with a chief complaint of Chest pain (02 Feb 2024 23:53)    FROM H&P: 57 y/o Male with PMHx of pancreatitis,  HTN, HLD, DM2, CKD 3, obesity, CAD s/p x8 stents, s/p cholecystectomy, pacemaker, AVR; presented to the ED with complain of intermittent stabbing mid-sternal chest pain, palpitations and SOB x2 days, with worsening symptoms today. Reports palpitations as fluttering, worsens with exertion. Notes chest pain occasionally radiates down left the arm and to the left side of the chest. Also endorsing nausea, fatigue, LLE and LUE swelling. Recent stent placements in Sep 2023, pacemaker placement and AVR in October, 2023.   (02 Feb 2024 23:53)    Cardiology consulted for chest pain  Patient reports sharp like central stabbing, descriptions seems to be atypical - suspect palps  Non radiating and intermittent  No SOB or weight gain  He has overall been feeling well, good exercise tolerance, past few days taking naps  Reports his INR has been therapeutic recent, however sub therapeutic on admission    2/4. D/w EP, NSVT runs on monitor check on Friday remotely.  Bp elevated, INR still low.    2/5 Examined at bedsite, atypical chest pain, troponins negative, BP still high    MEDICATIONS  (STANDING):  atorvastatin 40 milliGRAM(s) Oral at bedtime  clopidogrel Tablet 75 milliGRAM(s) Oral daily  dextrose 5%. 1000 milliLiter(s) (100 mL/Hr) IV Continuous <Continuous>  dextrose 5%. 1000 milliLiter(s) (50 mL/Hr) IV Continuous <Continuous>  dextrose 50% Injectable 12.5 Gram(s) IV Push once  dextrose 50% Injectable 25 Gram(s) IV Push once  dextrose 50% Injectable 25 Gram(s) IV Push once  enoxaparin Injectable 120 milliGRAM(s) SubCutaneous every 12 hours  fenofibrate Tablet 145 milliGRAM(s) Oral daily  glucagon  Injectable 1 milliGRAM(s) IntraMuscular once  insulin glargine Injectable (LANTUS) 100 Unit(s) SubCutaneous at bedtime  insulin lispro (ADMELOG) corrective regimen sliding scale   SubCutaneous three times a day before meals  losartan 50 milliGRAM(s) Oral daily  metoprolol succinate ER 50 milliGRAM(s) Oral two times a day  warfarin 5 milliGRAM(s) Oral daily  warfarin 1 milliGRAM(s) Oral daily    MEDICATIONS  (PRN):  acetaminophen     Tablet .. 650 milliGRAM(s) Oral every 6 hours PRN Mild Pain (1 - 3)  aluminum hydroxide/magnesium hydroxide/simethicone Suspension 30 milliLiter(s) Oral every 4 hours PRN Dyspepsia  dextrose Oral Gel 15 Gram(s) Oral once PRN Blood Glucose LESS THAN 70 milliGRAM(s)/deciliter  hydrALAZINE Injectable 5 milliGRAM(s) IV Push every 6 hours PRN If SBP >150 after PO meds  melatonin 3 milliGRAM(s) Oral at bedtime PRN Insomnia  ondansetron Injectable 4 milliGRAM(s) IV Push every 6 hours PRN Nausea and/or Vomiting    HOME MEDICATIONS:  acetaminophen 325 mg oral tablet: 2 tab(s) orally every 6 hours as needed for Mild Pain (1 - 3) (02 Feb 2024 23:39)  atorvastatin 40 mg oral tablet: 1 tab(s) orally once a day (02 Feb 2024 23:51)  clopidogrel 75 mg oral tablet: 1 tab(s) orally once a day (02 Feb 2024 23:35)  fenofibrate 160 mg oral tablet: 1 tab(s) orally once a day (02 Feb 2024 23:39)  HumaLOG Pen 100 units/mL subcutaneous solution: 35 unit(s) subcutaneous once a day (with meals) (02 Feb 2024 23:42)  Toujeo SoloStar 300 units/mL subcutaneous solution: 150 unit(s) subcutaneous once a day (at bedtime) (02 Feb 2024 23:36)  warfarin 1 mg oral tablet: 1 tab(s) orally once a day *** take with 5 mg total 6 mg*** (02 Feb 2024 23:37)  warfarin 5 mg oral tablet: 1 tab(s) orally once a day *** take with 1 mg total 6 mg*** (02 Feb 2024 23:38)    PHYSICAL EXAM:  T(C): 36.7 (04 Feb 2024 08:28), Max: 36.7 (03 Feb 2024 20:50)  T(F): 98.1 (04 Feb 2024 08:28), Max: 98.1 (03 Feb 2024 20:50)  HR: 79 (04 Feb 2024 08:28) (79 - 92)  BP: 173/99 (04 Feb 2024 08:28) (152/78 - 174/94)  BP(mean): 117 (04 Feb 2024 08:28) (117 - 117)  RR: 18 (04 Feb 2024 08:28) (18 - 18)  SpO2: 100% (04 Feb 2024 08:28) (98% - 100%)    Parameters below as of 04 Feb 2024 08:28  Patient On (Oxygen Delivery Method): room air    Constitutional: NAD, awake and alert  HEENT: PERR, EOMI, Normal Hearing, MMM  Neck: Soft and supple, No LAD, No JVD  Respiratory: Breath sounds are clear bilaterally, No wheezing, rales or rhonchi  Cardiovascular: S1 and S2, regular rate and rhythm, +click  Gastrointestinal: Bowel Sounds present, soft, nontender, nondistended, no guarding, no rebound  Extremities: No peripheral edema  Vascular: 2+ peripheral pulses  Neurological: A/O x 3, no focal deficits  Musculoskeletal: 5/5 strength b/l upper and lower extremities  Skin: No rashes    =======================================    INTERPRETATION OF TELEMETRY: SR    ECG: < from: 12 Lead ECG (02.02.24 @ 19:24) >  Diagnosis Line Normal sinus rhythm  Right bundle branch block  Abnormal ECG  When compared with ECG of 03-OCT-2023 12:25,  Questionable change in QRS axis  Nonspecific T wave abnormality has replaced inverted T waves in Inferior leads    ========================================    LABS: All Labs Reviewed:                        16.0   6.90  )-----------( 250      ( 04 Feb 2024 06:22 )             47.6     02-04    140  |  109<H>  |  20  ----------------------------<  173<H>  3.3<L>   |  27  |  1.37<H>    Ca    8.9      04 Feb 2024 06:22  Mg     1.7     02-02    TPro  6.5  /  Alb  2.8<L>  /  TBili  0.3  /  DBili  x   /  AST  35  /  ALT  45  /  AlkPhos  76  02-02    PT/INR - ( 04 Feb 2024 06:22 )   PT: 13.9 sec;   INR: 1.24 ratio      PTT - ( 03 Feb 2024 04:21 )  PTT:196.6 sec    Troponin: 23.22 ng/L, Troponin: 26.49 ng/L        CARDIAC TESTING:      < from: TTE Echo Complete w/o Contrast w/ Doppler (02.03.24 @ 08:52) >   Estimated left ventricular ejection fraction is 55-60 %.   The left ventricle is normal in size, wall motion and contractility as   seen in limited views.   Moderate concentric left ventricular hypertrophy is present.   Well seated Mechanical valve in the aortic position. Normal   transprosthetic gradients.   Mild (1+) mitral regurgitation.   The aortic root is mildly dilated.    < from: TTE Echo Complete w/ Contrast w/ Doppler (10.18.23 @ 15:39) >   1. Left ventricular ejection fraction, by visual estimation, is 60 to   65%.   2. Technically difficult study.   3. Normal global left ventricular systolic function.   4. There is moderate concentric left ventricular hypertrophy.   5. There is a significant pericardial fat pad present.   6. There is no evidence of pericardial effusion.   7. Mild Mitral valve prolapse.   8. Trace mitral valve regurgitation.   9. Aortic valve is normally functioning mechanical prosthetic valve.  10. Mechanical prosthesis in the aortic valve position.    < from: Cardiac Catheterization (10.06.23 @ 11:08) >  Non-obstructive LASD and LLCx disease   Unable to cross aortic valve (known moderate to severe bicuspid)   Recommendations:   Aggressive risk factor modification with diet, exercise, and a goal  LDL of less than 70.  Transfer to tertiary center for AVR     RADIOLOGY & ADDITIONAL STUDIES:    < from: US Duplex Venous Lower Ext Ltd, Left (02.02.24 @ 22:37) >  No evidence of left lower extremity deep venous thrombosis.    < from:  Duplex Venous Upper Ext Ltd, Left (02.02.24 @ 22:34) >  No evidence of left upper extremity deep venous thrombosis.  Superficial thrombophlebitis of the left cephalic vein.  
CHIEF COMPLAINT: Patient is a 58y old  Male who presents with a chief complaint of Chest pain (02 Feb 2024 23:53)    FROM H&P: 59 y/o Male with PMHx of pancreatitis,  HTN, HLD, DM2, CKD 3, obesity, CAD s/p x8 stents, s/p cholecystectomy, pacemaker, AVR; presented to the ED with complain of intermittent stabbing mid-sternal chest pain, palpitations and SOB x2 days, with worsening symptoms today. Reports palpitations as fluttering, worsens with exertion. Notes chest pain occasionally radiates down left the arm and to the left side of the chest. Also endorsing nausea, fatigue, LLE and LUE swelling. Recent stent placements in Sep 2023, pacemaker placement and AVR in October, 2023.   (02 Feb 2024 23:53)    Cardiology consulted for chest pain  Patient reports sharp like central stabbing, descriptions seems to be atypical - suspect palps  Non radiating and intermittent  No SOB or weight gain  He has overall been feeling well, good exercise tolerance, past few days taking naps  Reports his INR has been therapeutic recent, however sub therapeutic on admission    2/4. D/w EP, NSVT runs on monitor check on Friday remotely. Bp elevated, INR still low.  2/5. Examined at bedside, atypical chest pain, troponins negative, BP still high  2/6. BP better, symptoms improved    MEDICATIONS  (STANDING):  amLODIPine   Tablet 10 milliGRAM(s) Oral daily  atorvastatin 40 milliGRAM(s) Oral at bedtime  clopidogrel Tablet 75 milliGRAM(s) Oral daily  dextrose 5%. 1000 milliLiter(s) (100 mL/Hr) IV Continuous <Continuous>  dextrose 5%. 1000 milliLiter(s) (50 mL/Hr) IV Continuous <Continuous>  dextrose 50% Injectable 12.5 Gram(s) IV Push once  dextrose 50% Injectable 25 Gram(s) IV Push once  dextrose 50% Injectable 25 Gram(s) IV Push once  enoxaparin Injectable 120 milliGRAM(s) SubCutaneous every 12 hours  fenofibrate Tablet 145 milliGRAM(s) Oral daily  glucagon  Injectable 1 milliGRAM(s) IntraMuscular once  hydrochlorothiazide 12.5 milliGRAM(s) Oral daily  insulin glargine Injectable (LANTUS) 100 Unit(s) SubCutaneous at bedtime  insulin lispro (ADMELOG) corrective regimen sliding scale   SubCutaneous three times a day before meals  insulin lispro (ADMELOG) corrective regimen sliding scale.   SubCutaneous at bedtime  lidocaine   4% Patch 1 Patch Transdermal every 24 hours  losartan 50 milliGRAM(s) Oral daily  metoprolol succinate ER 50 milliGRAM(s) Oral two times a day    MEDICATIONS  (PRN):  acetaminophen     Tablet .. 650 milliGRAM(s) Oral every 6 hours PRN Mild Pain (1 - 3)  dextrose Oral Gel 15 Gram(s) Oral once PRN Blood Glucose LESS THAN 70 milliGRAM(s)/deciliter  melatonin 3 milliGRAM(s) Oral at bedtime PRN Insomnia  ondansetron Injectable 4 milliGRAM(s) IV Push every 6 hours PRN Nausea and/or Vomiting      HOME MEDICATIONS:  acetaminophen 325 mg oral tablet: 2 tab(s) orally every 6 hours as needed for Mild Pain (1 - 3) (02 Feb 2024 23:39)  atorvastatin 40 mg oral tablet: 1 tab(s) orally once a day (02 Feb 2024 23:51)  clopidogrel 75 mg oral tablet: 1 tab(s) orally once a day (02 Feb 2024 23:35)  fenofibrate 160 mg oral tablet: 1 tab(s) orally once a day (02 Feb 2024 23:39)  HumaLOG Pen 100 units/mL subcutaneous solution: 35 unit(s) subcutaneous once a day (with meals) (02 Feb 2024 23:42)  Toujeo SoloStar 300 units/mL subcutaneous solution: 150 unit(s) subcutaneous once a day (at bedtime) (02 Feb 2024 23:36)  warfarin 1 mg oral tablet: 1 tab(s) orally once a day *** take with 5 mg total 6 mg*** (02 Feb 2024 23:37)  warfarin 5 mg oral tablet: 1 tab(s) orally once a day *** take with 1 mg total 6 mg*** (02 Feb 2024 23:38)    PHYSICAL EXAM:  T(C): 36.4 (06 Feb 2024 07:35), Max: 36.7 (05 Feb 2024 20:42)  T(F): 97.5 (06 Feb 2024 07:35), Max: 98.1 (05 Feb 2024 20:42)  HR: 76 (06 Feb 2024 07:35) (76 - 81)  BP: 165/88 (06 Feb 2024 07:35) (152/87 - 165/88)  RR: 18 (06 Feb 2024 07:35) (16 - 19)  SpO2: 97% (06 Feb 2024 07:35) (97% - 98%)    Parameters below as of 06 Feb 2024 07:35  Patient On (Oxygen Delivery Method): room air    Constitutional: NAD, awake and alert  HEENT: PERR, EOMI, Normal Hearing, MMM  Neck: Soft and supple, No LAD, No JVD  Respiratory: Breath sounds are clear bilaterally, No wheezing, rales or rhonchi  Cardiovascular: S1 and S2, regular rate and rhythm, +click  Gastrointestinal: Bowel Sounds present, soft, nontender, nondistended, no guarding, no rebound  Extremities: No peripheral edema  Vascular: 2+ peripheral pulses  Neurological: A/O x 3, no focal deficits  Musculoskeletal: 5/5 strength b/l upper and lower extremities  Skin: No rashes    =======================================    INTERPRETATION OF TELEMETRY: SR    ECG: < from: 12 Lead ECG (02.02.24 @ 19:24) >  Diagnosis Line Normal sinus rhythm  Right bundle branch block  Abnormal ECG  When compared with ECG of 03-OCT-2023 12:25,  Questionable change in QRS axis  Nonspecific T wave abnormality has replaced inverted T waves in Inferior leads    ========================================    LABS: All Labs Reviewed:    02-05    139  |  109<H>  |  19  ----------------------------<  207<H>  3.6   |  25  |  1.29    Ca    9.0      05 Feb 2024 06:27    PT/INR - ( 06 Feb 2024 07:03 )   PT: 15.6 sec;   INR: 1.39 ratio      Troponin: 23.22 ng/L, Troponin: 26.49 ng/L        CARDIAC TESTING:      < from: TTE Echo Complete w/o Contrast w/ Doppler (02.03.24 @ 08:52) >   Estimated left ventricular ejection fraction is 55-60 %.   The left ventricle is normal in size, wall motion and contractility as   seen in limited views.   Moderate concentric left ventricular hypertrophy is present.   Well seated Mechanical valve in the aortic position. Normal   transprosthetic gradients.   Mild (1+) mitral regurgitation.   The aortic root is mildly dilated.    < from: TTE Echo Complete w/ Contrast w/ Doppler (10.18.23 @ 15:39) >   1. Left ventricular ejection fraction, by visual estimation, is 60 to   65%.   2. Technically difficult study.   3. Normal global left ventricular systolic function.   4. There is moderate concentric left ventricular hypertrophy.   5. There is a significant pericardial fat pad present.   6. There is no evidence of pericardial effusion.   7. Mild Mitral valve prolapse.   8. Trace mitral valve regurgitation.   9. Aortic valve is normally functioning mechanical prosthetic valve.  10. Mechanical prosthesis in the aortic valve position.    < from: Cardiac Catheterization (10.06.23 @ 11:08) >  Non-obstructive LASD and LLCx disease   Unable to cross aortic valve (known moderate to severe bicuspid)   Recommendations:   Aggressive risk factor modification with diet, exercise, and a goal  LDL of less than 70.  Transfer to tertiary center for AVR     RADIOLOGY & ADDITIONAL STUDIES:    < from: US Duplex Venous Lower Ext Ltd, Left (02.02.24 @ 22:37) >  No evidence of left lower extremity deep venous thrombosis.    < from: US Duplex Venous Upper Ext Ltd, Left (02.02.24 @ 22:34) >  No evidence of left upper extremity deep venous thrombosis.  Superficial thrombophlebitis of the left cephalic vein.  
HOSPITALIST ATTENDING PROGRESS NOTE    Chart and meds reviewed.  Patient seen and examined.    CC: CP    Subjective: Reports had palpitations, now feeling well.    All other systems reviewed and found to be negative with the exception of what has been described above.    MEDICATIONS  (STANDING):  atorvastatin 40 milliGRAM(s) Oral at bedtime  clopidogrel Tablet 75 milliGRAM(s) Oral daily  dextrose 5%. 1000 milliLiter(s) (100 mL/Hr) IV Continuous <Continuous>  dextrose 5%. 1000 milliLiter(s) (50 mL/Hr) IV Continuous <Continuous>  dextrose 50% Injectable 12.5 Gram(s) IV Push once  dextrose 50% Injectable 25 Gram(s) IV Push once  dextrose 50% Injectable 25 Gram(s) IV Push once  enoxaparin Injectable 120 milliGRAM(s) SubCutaneous every 12 hours  fenofibrate Tablet 145 milliGRAM(s) Oral daily  glucagon  Injectable 1 milliGRAM(s) IntraMuscular once  insulin glargine Injectable (LANTUS) 100 Unit(s) SubCutaneous at bedtime  insulin lispro (ADMELOG) corrective regimen sliding scale   SubCutaneous three times a day before meals  metoprolol tartrate 50 milliGRAM(s) Oral two times a day  warfarin 5 milliGRAM(s) Oral daily  warfarin 1 milliGRAM(s) Oral daily    MEDICATIONS  (PRN):  acetaminophen     Tablet .. 650 milliGRAM(s) Oral every 6 hours PRN Mild Pain (1 - 3)  aluminum hydroxide/magnesium hydroxide/simethicone Suspension 30 milliLiter(s) Oral every 4 hours PRN Dyspepsia  dextrose Oral Gel 15 Gram(s) Oral once PRN Blood Glucose LESS THAN 70 milliGRAM(s)/deciliter  melatonin 3 milliGRAM(s) Oral at bedtime PRN Insomnia  ondansetron Injectable 4 milliGRAM(s) IV Push every 6 hours PRN Nausea and/or Vomiting      VITALS:  T(F): 97.9 (02-03-24 @ 07:53), Max: 98.7 (02-02-24 @ 19:19)  HR: 96 (02-03-24 @ 07:53) (85 - 96)  BP: 171/94 (02-03-24 @ 07:53) (145/88 - 176/94)  RR: 17 (02-03-24 @ 07:53) (15 - 19)  SpO2: 96% (02-03-24 @ 07:53) (94% - 99%)  Wt(kg): --    I&O's Summary      CAPILLARY BLOOD GLUCOSE      POCT Blood Glucose.: 215 mg/dL (03 Feb 2024 12:20)  POCT Blood Glucose.: 207 mg/dL (03 Feb 2024 07:52)      PHYSICAL EXAM:  Gen: NAD  HEENT:  pupils equal and reactive, EOMI, no oropharyngeal lesions, erythema, exudates, oral thrush  NECK:   supple, no carotid bruits, no palpable lymph nodes, no thyromegaly  CV:  +S1, +S2, regular, no murmurs or rubs  RESP:   lungs clear to auscultation bilaterally, no wheezing, rales, rhonchi, good air entry bilaterally  BREAST:  not examined  GI:  abdomen soft, non-tender, non-distended, normal BS, no bruits, no abdominal masses, no palpable masses  RECTAL:  not examined  :  not examined  MSK:   normal muscle tone, no atrophy, no rigidity, no contractions  EXT:  no clubbing, no cyanosis, no edema, no calf pain, swelling or erythema  VASCULAR:  pulses equal and symmetric in the upper and lower extremities  NEURO:  AAOX3, no focal neurological deficits, follows all commands, able to move extremities spontaneously  SKIN:  no ulcers, lesions or rashes    LABS:                            17.1   9.44  )-----------( 262      ( 03 Feb 2024 04:21 )             49.7     02-03    137  |  108  |  21  ----------------------------<  252<H>  3.5   |  25  |  1.47<H>    Ca    7.9<L>      03 Feb 2024 04:21  Mg     1.7     02-02    TPro  6.5  /  Alb  2.8<L>  /  TBili  0.3  /  DBili  x   /  AST  35  /  ALT  45  /  AlkPhos  76  02-02        LIVER FUNCTIONS - ( 02 Feb 2024 19:46 )  Alb: 2.8 g/dL / Pro: 6.5 gm/dL / ALK PHOS: 76 U/L / ALT: 45 U/L / AST: 35 U/L / GGT: x           PT/INR - ( 03 Feb 2024 09:27 )   PT: 14.7 sec;   INR: 1.31 ratio         PTT - ( 03 Feb 2024 04:21 )  PTT:196.6 sec  Urinalysis Basic - ( 03 Feb 2024 04:21 )    Color: x / Appearance: x / SG: x / pH: x  Gluc: 252 mg/dL / Ketone: x  / Bili: x / Urobili: x   Blood: x / Protein: x / Nitrite: x   Leuk Esterase: x / RBC: x / WBC x   Sq Epi: x / Non Sq Epi: x / Bacteria: x    CULTURES:  no new    Additional results/Imaging, I have personally reviewed:  CXR 2/2/24: No evidence of active chest disease.   Status post sternotomy. Pacemaker present.    LUE doppler 2/2/24: No evidence of left upper extremity deep venous thrombosis. Superficial thrombophlebitis of the left cephalic vein.    LLE doppler 2/2/24: No evidence of left lower extremity deep venous thrombosis.    Echo 2/3/24: Estimated left ventricular ejection fraction is 55-60 %. The left ventricle is normal in size, wall motion and contractility as seen in limited views. Moderate concentric left ventricular hypertrophy is present. Well seated Mechanical valve in the aortic position. Normal transprosthetic gradients. Mild (1+) mitral regurgitation. The aortic root is mildly dilated.    Telemetry, personally reviewed:  2/3/24: sinus 60-90
HOSPITALIST ATTENDING PROGRESS NOTE    Chart and meds reviewed.  Patient seen and examined.    CC: CP    Subjective: Feeling improved. With L hand swelling. BP remains elevated    All other systems reviewed and found to be negative with the exception of what has been described above.    MEDICATIONS  (STANDING):  atorvastatin 40 milliGRAM(s) Oral at bedtime  clopidogrel Tablet 75 milliGRAM(s) Oral daily  dextrose 5%. 1000 milliLiter(s) (100 mL/Hr) IV Continuous <Continuous>  dextrose 5%. 1000 milliLiter(s) (50 mL/Hr) IV Continuous <Continuous>  dextrose 50% Injectable 12.5 Gram(s) IV Push once  dextrose 50% Injectable 25 Gram(s) IV Push once  dextrose 50% Injectable 25 Gram(s) IV Push once  enoxaparin Injectable 120 milliGRAM(s) SubCutaneous every 12 hours  fenofibrate Tablet 145 milliGRAM(s) Oral daily  glucagon  Injectable 1 milliGRAM(s) IntraMuscular once  insulin glargine Injectable (LANTUS) 100 Unit(s) SubCutaneous at bedtime  insulin lispro (ADMELOG) corrective regimen sliding scale   SubCutaneous three times a day before meals  losartan 50 milliGRAM(s) Oral daily  metoprolol succinate ER 50 milliGRAM(s) Oral two times a day  warfarin 8 milliGRAM(s) Oral once    MEDICATIONS  (PRN):  acetaminophen     Tablet .. 650 milliGRAM(s) Oral every 6 hours PRN Mild Pain (1 - 3)  aluminum hydroxide/magnesium hydroxide/simethicone Suspension 30 milliLiter(s) Oral every 4 hours PRN Dyspepsia  dextrose Oral Gel 15 Gram(s) Oral once PRN Blood Glucose LESS THAN 70 milliGRAM(s)/deciliter  hydrALAZINE Injectable 5 milliGRAM(s) IV Push every 6 hours PRN If SBP >150 after PO meds  melatonin 3 milliGRAM(s) Oral at bedtime PRN Insomnia  ondansetron Injectable 4 milliGRAM(s) IV Push every 6 hours PRN Nausea and/or Vomiting      VITALS:  T(F): 98.1 (02-04-24 @ 08:28), Max: 98.1 (02-03-24 @ 20:50)  HR: 79 (02-04-24 @ 08:28) (79 - 92)  BP: 173/99 (02-04-24 @ 08:28) (152/78 - 174/94)  RR: 18 (02-04-24 @ 08:28) (18 - 18)  SpO2: 100% (02-04-24 @ 08:28) (98% - 100%)  Wt(kg): --    I&O's Summary      CAPILLARY BLOOD GLUCOSE      POCT Blood Glucose.: 227 mg/dL (04 Feb 2024 11:33)  POCT Blood Glucose.: 162 mg/dL (04 Feb 2024 08:07)  POCT Blood Glucose.: 215 mg/dL (03 Feb 2024 22:09)  POCT Blood Glucose.: 226 mg/dL (03 Feb 2024 17:28)      PHYSICAL EXAM:  Gen: NAD  HEENT:  pupils equal and reactive, EOMI, no oropharyngeal lesions, erythema, exudates, oral thrush  NECK:   supple, no carotid bruits, no palpable lymph nodes, no thyromegaly  CV:  +S1, +S2, regular, no murmurs or rubs  RESP:   lungs clear to auscultation bilaterally, no wheezing, rales, rhonchi, good air entry bilaterally  BREAST:  not examined  GI:  abdomen soft, non-tender, non-distended, normal BS, no bruits, no abdominal masses, no palpable masses  RECTAL:  not examined  :  not examined  MSK:   normal muscle tone, no atrophy, no rigidity, no contractions  EXT:  no clubbing, no cyanosis, + L hand edema, no calf pain, swelling or erythema  VASCULAR:  pulses equal and symmetric in the upper and lower extremities  NEURO:  AAOX3, no focal neurological deficits, follows all commands, able to move extremities spontaneously  SKIN:  no ulcers, lesions or rashes    LABS:                            16.0   6.90  )-----------( 250      ( 04 Feb 2024 06:22 )             47.6     02-04    140  |  109<H>  |  20  ----------------------------<  173<H>  3.3<L>   |  27  |  1.37<H>    Ca    8.9      04 Feb 2024 06:22  Mg     1.7     02-02    TPro  6.5  /  Alb  2.8<L>  /  TBili  0.3  /  DBili  x   /  AST  35  /  ALT  45  /  AlkPhos  76  02-02        LIVER FUNCTIONS - ( 02 Feb 2024 19:46 )  Alb: 2.8 g/dL / Pro: 6.5 gm/dL / ALK PHOS: 76 U/L / ALT: 45 U/L / AST: 35 U/L / GGT: x           PT/INR - ( 04 Feb 2024 06:22 )   PT: 13.9 sec;   INR: 1.24 ratio         PTT - ( 03 Feb 2024 04:21 )  PTT:196.6 sec  Urinalysis Basic - ( 04 Feb 2024 06:22 )    Color: x / Appearance: x / SG: x / pH: x  Gluc: 173 mg/dL / Ketone: x  / Bili: x / Urobili: x   Blood: x / Protein: x / Nitrite: x   Leuk Esterase: x / RBC: x / WBC x   Sq Epi: x / Non Sq Epi: x / Bacteria: x    CULTURES:  no new    Additional results/Imaging, I have personally reviewed:  CXR 2/2/24: No evidence of active chest disease.   Status post sternotomy. Pacemaker present.    LUE doppler 2/2/24: No evidence of left upper extremity deep venous thrombosis. Superficial thrombophlebitis of the left cephalic vein.    LLE doppler 2/2/24: No evidence of left lower extremity deep venous thrombosis.    Echo 2/3/24: Estimated left ventricular ejection fraction is 55-60 %. The left ventricle is normal in size, wall motion and contractility as seen in limited views. Moderate concentric left ventricular hypertrophy is present. Well seated Mechanical valve in the aortic position. Normal transprosthetic gradients. Mild (1+) mitral regurgitation. The aortic root is mildly dilated.    Telemetry, personally reviewed:  2/3/24: sinus 60-90  2/4/24: sinus 80-90, RBBB
CHIEF COMPLAINT: Patient is a 58y old  Male who presents with a chief complaint of Chest pain (02 Feb 2024 23:53)    FROM H&P: 57 y/o Male with PMHx of pancreatitis,  HTN, HLD, DM2, CKD 3, obesity, CAD s/p x8 stents, s/p cholecystectomy, pacemaker, AVR; presented to the ED with complain of intermittent stabbing mid-sternal chest pain, palpitations and SOB x2 days, with worsening symptoms today. Reports palpitations as fluttering, worsens with exertion. Notes chest pain occasionally radiates down left the arm and to the left side of the chest. Also endorsing nausea, fatigue, LLE and LUE swelling. Recent stent placements in Sep 2023, pacemaker placement and AVR in October, 2023.   (02 Feb 2024 23:53)    Cardiology consulted for chest pain  Patient reports sharp like central stabbing, descriptions seems to be atypical - suspect palps  Non radiating and intermittent  No SOB or weight gain  He has overall been feeling well, good exercise tolerance, past few days taking naps  Reports his INR has been therapeutic recent, however sub therapeutic on admission    2/4. D/w EP, NSVT runs on monitor check on Friday remotely.  Bp elevated, INR still low.    MEDICATIONS  (STANDING):  atorvastatin 40 milliGRAM(s) Oral at bedtime  clopidogrel Tablet 75 milliGRAM(s) Oral daily  dextrose 5%. 1000 milliLiter(s) (100 mL/Hr) IV Continuous <Continuous>  dextrose 5%. 1000 milliLiter(s) (50 mL/Hr) IV Continuous <Continuous>  dextrose 50% Injectable 12.5 Gram(s) IV Push once  dextrose 50% Injectable 25 Gram(s) IV Push once  dextrose 50% Injectable 25 Gram(s) IV Push once  enoxaparin Injectable 120 milliGRAM(s) SubCutaneous every 12 hours  fenofibrate Tablet 145 milliGRAM(s) Oral daily  glucagon  Injectable 1 milliGRAM(s) IntraMuscular once  insulin glargine Injectable (LANTUS) 100 Unit(s) SubCutaneous at bedtime  insulin lispro (ADMELOG) corrective regimen sliding scale   SubCutaneous three times a day before meals  losartan 50 milliGRAM(s) Oral daily  metoprolol succinate ER 50 milliGRAM(s) Oral two times a day  warfarin 5 milliGRAM(s) Oral daily  warfarin 1 milliGRAM(s) Oral daily    MEDICATIONS  (PRN):  acetaminophen     Tablet .. 650 milliGRAM(s) Oral every 6 hours PRN Mild Pain (1 - 3)  aluminum hydroxide/magnesium hydroxide/simethicone Suspension 30 milliLiter(s) Oral every 4 hours PRN Dyspepsia  dextrose Oral Gel 15 Gram(s) Oral once PRN Blood Glucose LESS THAN 70 milliGRAM(s)/deciliter  hydrALAZINE Injectable 5 milliGRAM(s) IV Push every 6 hours PRN If SBP >150 after PO meds  melatonin 3 milliGRAM(s) Oral at bedtime PRN Insomnia  ondansetron Injectable 4 milliGRAM(s) IV Push every 6 hours PRN Nausea and/or Vomiting    HOME MEDICATIONS:  acetaminophen 325 mg oral tablet: 2 tab(s) orally every 6 hours as needed for Mild Pain (1 - 3) (02 Feb 2024 23:39)  atorvastatin 40 mg oral tablet: 1 tab(s) orally once a day (02 Feb 2024 23:51)  clopidogrel 75 mg oral tablet: 1 tab(s) orally once a day (02 Feb 2024 23:35)  fenofibrate 160 mg oral tablet: 1 tab(s) orally once a day (02 Feb 2024 23:39)  HumaLOG Pen 100 units/mL subcutaneous solution: 35 unit(s) subcutaneous once a day (with meals) (02 Feb 2024 23:42)  Toujeo SoloStar 300 units/mL subcutaneous solution: 150 unit(s) subcutaneous once a day (at bedtime) (02 Feb 2024 23:36)  warfarin 1 mg oral tablet: 1 tab(s) orally once a day *** take with 5 mg total 6 mg*** (02 Feb 2024 23:37)  warfarin 5 mg oral tablet: 1 tab(s) orally once a day *** take with 1 mg total 6 mg*** (02 Feb 2024 23:38)    PHYSICAL EXAM:  T(C): 36.7 (04 Feb 2024 08:28), Max: 36.7 (03 Feb 2024 20:50)  T(F): 98.1 (04 Feb 2024 08:28), Max: 98.1 (03 Feb 2024 20:50)  HR: 79 (04 Feb 2024 08:28) (79 - 92)  BP: 173/99 (04 Feb 2024 08:28) (152/78 - 174/94)  BP(mean): 117 (04 Feb 2024 08:28) (117 - 117)  RR: 18 (04 Feb 2024 08:28) (18 - 18)  SpO2: 100% (04 Feb 2024 08:28) (98% - 100%)    Parameters below as of 04 Feb 2024 08:28  Patient On (Oxygen Delivery Method): room air    Constitutional: NAD, awake and alert  HEENT: PERR, EOMI, Normal Hearing, MMM  Neck: Soft and supple, No LAD, No JVD  Respiratory: Breath sounds are clear bilaterally, No wheezing, rales or rhonchi  Cardiovascular: S1 and S2, regular rate and rhythm, +click  Gastrointestinal: Bowel Sounds present, soft, nontender, nondistended, no guarding, no rebound  Extremities: No peripheral edema  Vascular: 2+ peripheral pulses  Neurological: A/O x 3, no focal deficits  Musculoskeletal: 5/5 strength b/l upper and lower extremities  Skin: No rashes    =======================================    INTERPRETATION OF TELEMETRY: SR    ECG: < from: 12 Lead ECG (02.02.24 @ 19:24) >  Diagnosis Line Normal sinus rhythm  Right bundle branch block  Abnormal ECG  When compared with ECG of 03-OCT-2023 12:25,  Questionable change in QRS axis  Nonspecific T wave abnormality has replaced inverted T waves in Inferior leads    ========================================    LABS: All Labs Reviewed:                        16.0   6.90  )-----------( 250      ( 04 Feb 2024 06:22 )             47.6     02-04    140  |  109<H>  |  20  ----------------------------<  173<H>  3.3<L>   |  27  |  1.37<H>    Ca    8.9      04 Feb 2024 06:22  Mg     1.7     02-02    TPro  6.5  /  Alb  2.8<L>  /  TBili  0.3  /  DBili  x   /  AST  35  /  ALT  45  /  AlkPhos  76  02-02    PT/INR - ( 04 Feb 2024 06:22 )   PT: 13.9 sec;   INR: 1.24 ratio      PTT - ( 03 Feb 2024 04:21 )  PTT:196.6 sec    Troponin: 23.22 ng/L, Troponin: 26.49 ng/L        CARDIAC TESTING:      < from: TTE Echo Complete w/o Contrast w/ Doppler (02.03.24 @ 08:52) >   Estimated left ventricular ejection fraction is 55-60 %.   The left ventricle is normal in size, wall motion and contractility as   seen in limited views.   Moderate concentric left ventricular hypertrophy is present.   Well seated Mechanical valve in the aortic position. Normal   transprosthetic gradients.   Mild (1+) mitral regurgitation.   The aortic root is mildly dilated.    < from: TTE Echo Complete w/ Contrast w/ Doppler (10.18.23 @ 15:39) >   1. Left ventricular ejection fraction, by visual estimation, is 60 to   65%.   2. Technically difficult study.   3. Normal global left ventricular systolic function.   4. There is moderate concentric left ventricular hypertrophy.   5. There is a significant pericardial fat pad present.   6. There is no evidence of pericardial effusion.   7. Mild Mitral valve prolapse.   8. Trace mitral valve regurgitation.   9. Aortic valve is normally functioning mechanical prosthetic valve.  10. Mechanical prosthesis in the aortic valve position.    < from: Cardiac Catheterization (10.06.23 @ 11:08) >  Non-obstructive LASD and LLCx disease   Unable to cross aortic valve (known moderate to severe bicuspid)   Recommendations:   Aggressive risk factor modification with diet, exercise, and a goal  LDL of less than 70.  Transfer to tertiary center for AVR     RADIOLOGY & ADDITIONAL STUDIES:    < from: US Duplex Venous Lower Ext Ltd, Left (02.02.24 @ 22:37) >  No evidence of left lower extremity deep venous thrombosis.    < from: US Duplex Venous Upper Ext Ltd, Left (02.02.24 @ 22:34) >  No evidence of left upper extremity deep venous thrombosis.  Superficial thrombophlebitis of the left cephalic vein.  
HOSPITALIST ATTENDING PROGRESS NOTE    Chart and meds reviewed.  Patient seen and examined.    CC: CP    Subjective: Continued uncontrolled BP and some CP. Reports some R hand swelling and some L lateral shin pain.    All other systems reviewed and found to be negative with the exception of what has been described above.    MEDICATIONS  (STANDING):  amLODIPine   Tablet 10 milliGRAM(s) Oral daily  atorvastatin 40 milliGRAM(s) Oral at bedtime  clopidogrel Tablet 75 milliGRAM(s) Oral daily  dextrose 5%. 1000 milliLiter(s) (100 mL/Hr) IV Continuous <Continuous>  dextrose 5%. 1000 milliLiter(s) (50 mL/Hr) IV Continuous <Continuous>  dextrose 50% Injectable 12.5 Gram(s) IV Push once  dextrose 50% Injectable 25 Gram(s) IV Push once  dextrose 50% Injectable 25 Gram(s) IV Push once  enoxaparin Injectable 120 milliGRAM(s) SubCutaneous every 12 hours  fenofibrate Tablet 145 milliGRAM(s) Oral daily  glucagon  Injectable 1 milliGRAM(s) IntraMuscular once  insulin glargine Injectable (LANTUS) 100 Unit(s) SubCutaneous at bedtime  insulin lispro (ADMELOG) corrective regimen sliding scale   SubCutaneous three times a day before meals  lidocaine   4% Patch 1 Patch Transdermal every 24 hours  losartan 50 milliGRAM(s) Oral daily  metoprolol succinate ER 50 milliGRAM(s) Oral two times a day    MEDICATIONS  (PRN):  acetaminophen     Tablet .. 650 milliGRAM(s) Oral every 6 hours PRN Mild Pain (1 - 3)  aluminum hydroxide/magnesium hydroxide/simethicone Suspension 30 milliLiter(s) Oral every 4 hours PRN Dyspepsia  dextrose Oral Gel 15 Gram(s) Oral once PRN Blood Glucose LESS THAN 70 milliGRAM(s)/deciliter  hydrALAZINE Injectable 5 milliGRAM(s) IV Push every 6 hours PRN If SBP >150 after PO meds  melatonin 3 milliGRAM(s) Oral at bedtime PRN Insomnia  ondansetron Injectable 4 milliGRAM(s) IV Push every 6 hours PRN Nausea and/or Vomiting      VITALS:  T(F): 98.1 (02-05-24 @ 07:50), Max: 98.3 (02-04-24 @ 20:34)  HR: 77 (02-05-24 @ 10:39) (77 - 86)  BP: 156/84 (02-05-24 @ 10:39) (153/84 - 173/100)  RR: 16 (02-05-24 @ 10:39) (16 - 18)  SpO2: 98% (02-05-24 @ 10:39) (97% - 98%)  Wt(kg): --    I&O's Summary      CAPILLARY BLOOD GLUCOSE      POCT Blood Glucose.: 220 mg/dL (05 Feb 2024 12:13)  POCT Blood Glucose.: 190 mg/dL (05 Feb 2024 08:06)  POCT Blood Glucose.: 239 mg/dL (04 Feb 2024 22:17)  POCT Blood Glucose.: 283 mg/dL (04 Feb 2024 16:29)      PHYSICAL EXAM:  Gen: NAD  HEENT:  pupils equal and reactive, EOMI, no oropharyngeal lesions, erythema, exudates, oral thrush  NECK:   supple, no carotid bruits, no palpable lymph nodes, no thyromegaly  CV:  +S1, +S2, regular, no murmurs or rubs  RESP:   lungs clear to auscultation bilaterally, no wheezing, rales, rhonchi, good air entry bilaterally  BREAST:  not examined  GI:  abdomen soft, non-tender, non-distended, normal BS, no bruits, no abdominal masses, no palpable masses  RECTAL:  not examined  :  not examined  MSK:   normal muscle tone, no atrophy, no rigidity, no contractions  EXT:  no clubbing, no cyanosis, mild b/l hand edema, no calf pain, swelling or erythema  VASCULAR:  pulses equal and symmetric in the upper and lower extremities  NEURO:  AAOX3, no focal neurological deficits, follows all commands, able to move extremities spontaneously  SKIN:  no ulcers, lesions or rashes    LABS:                            16.0   6.90  )-----------( 250      ( 04 Feb 2024 06:22 )             47.6     02-05    139  |  109<H>  |  19  ----------------------------<  207<H>  3.6   |  25  |  1.29    Ca    9.0      05 Feb 2024 06:27            PT/INR - ( 05 Feb 2024 06:27 )   PT: 14.6 sec;   INR: 1.30 ratio           Urinalysis Basic - ( 05 Feb 2024 06:27 )    Color: x / Appearance: x / SG: x / pH: x  Gluc: 207 mg/dL / Ketone: x  / Bili: x / Urobili: x   Blood: x / Protein: x / Nitrite: x   Leuk Esterase: x / RBC: x / WBC x   Sq Epi: x / Non Sq Epi: x / Bacteria: x    CULTURES:  no new    Additional results/Imaging, I have personally reviewed:  CXR 2/2/24: No evidence of active chest disease.   Status post sternotomy. Pacemaker present.    LUE doppler 2/2/24: No evidence of left upper extremity deep venous thrombosis. Superficial thrombophlebitis of the left cephalic vein.    LLE doppler 2/2/24: No evidence of left lower extremity deep venous thrombosis.    Echo 2/3/24: Estimated left ventricular ejection fraction is 55-60 %. The left ventricle is normal in size, wall motion and contractility as seen in limited views. Moderate concentric left ventricular hypertrophy is present. Well seated Mechanical valve in the aortic position. Normal transprosthetic gradients. Mild (1+) mitral regurgitation. The aortic root is mildly dilated.    RUE doppler 2/5/24: No evidence of right upper extremity deep venous thrombosis.    PPM interrogation 2/5/24: · Left Ventricular Lead  No  · Battery  Good  · Underlying Rhythm  SR, 80bpm  · Comments  Normally functioning dual chamber pacemaker, with battery longevity 14.8 years. Sinus rhythm, AP 0.1%,  0.5%. Reviewed stored data, Afib burden 3.1%, with occasional RVR. Most recent episode Dec 1, duration 13 minutes. Recommend uptitrating beta-blocker for better HR control during Afib episodes.    Telemetry, personally reviewed:  2/3/24: sinus 60-90  2/4/24: sinus 80-90, RBBB  2/5/24: sinus 60-80

## 2024-02-06 NOTE — DISCHARGE NOTE PROVIDER - HOSPITAL COURSE
CC: CP  HPI and Hospital Course: 59 y/o Male with PMHx of pancreatitis,  HTN, HLD, DM2, CKD 3, obesity, CAD s/p x8 stents, s/p cholecystectomy, pacemaker, AVR; presented to the ED with complain of intermittent stabbing mid-sternal chest pain, palpitations and SOB x2 days, with worsening symptoms today. Reports palpitations as fluttering, worsens with exertion. Notes chest pain occasionally radiates down left the arm and to the left side of the chest. Also endorsing nausea, fatigue, LLE and LUE swelling. Recent stent placements in Sep 2023, pacemaker placement and AVR in October, 2023.      CP improved with control of HTN. Managed for hypertensive urgency.     Also w subtherapeutic INR, bridging w Lovenox.    D/c to home.   I have spent 34 min on day of d/c coordinating care.  See progress note for problem based plan.

## 2024-02-06 NOTE — DISCHARGE NOTE PROVIDER - CARE PROVIDERS DIRECT ADDRESSES
[email or web address]

## 2024-02-06 NOTE — DISCHARGE NOTE PROVIDER - NSDCMRMEDTOKEN_GEN_ALL_CORE_FT
acetaminophen 325 mg oral tablet: 2 tab(s) orally every 6 hours as needed for Mild Pain (1 - 3)  amLODIPine 10 mg oral tablet: 1 tab(s) orally once a day  atorvastatin 40 mg oral tablet: 1 tab(s) orally once a day  clopidogrel 75 mg oral tablet: 1 tab(s) orally once a day  fenofibrate 160 mg oral tablet: 1 tab(s) orally once a day  HumaLOG Pen 100 units/mL subcutaneous solution: 35 unit(s) subcutaneous once a day (with meals)  hydroCHLOROthiazide 12.5 mg oral capsule: 1 cap(s) orally once a day  losartan 50 mg oral tablet: 1 tab(s) orally once a day  Lovenox 120 mg/0.8 mL injectable solution: 120 milligram(s) subcutaneously 2 times a day  metoprolol succinate 50 mg oral tablet, extended release: 1 tab(s) orally 2 times a day  Toujeo SoloStar 300 units/mL subcutaneous solution: 150 unit(s) subcutaneous once a day (at bedtime)  warfarin 1 mg oral tablet: 2 tab(s) orally once a day Take 2 tabs of 1mg with a 5mg tab to equal 7mg  warfarin 5 mg oral tablet: 1 tab(s) orally once a day *** take with 2 mg total 7 mg***

## 2024-02-06 NOTE — DISCHARGE NOTE PROVIDER - PROVIDER TOKENS
PROVIDER:[TOKEN:[[id number]],FOLLOWUP:[1-3 days]],PROVIDER:[TOKEN:[[id number]],FOLLOWUP:[1 week]]

## 2024-02-06 NOTE — DISCHARGE NOTE NURSING/CASE MANAGEMENT/SOCIAL WORK - NSDCVIVACCINE_GEN_ALL_CORE_FT
influenza, injectable, quadrivalent, preservative free; 12-Oct-2017 15:08; Kim Samano (RN); [employer]; [id number]; IntraMuscular; Deltoid Left.; 0.5 milliLiter(s); VIS (VIS Published: 07-Aug-2015, VIS Presented: 12-Oct-2017);   influenza, injectable, quadrivalent, preservative free; 13-Sep-2018 12:04; Eliane Otero (RN); [employer]; [id number] (Exp. Date: 30-Jun-2019); IntraMuscular; Deltoid Left.; 0.5 milliLiter(s); VIS (VIS Published: 07-Aug-2015, VIS Presented: 13-Sep-2018);   influenza, injectable, quadrivalent, preservative free; 03-Dec-2019 13:24; Benita Agosto (RN); [employer]; [id number] (Exp. Date: 30-Jun-2020); IntraMuscular; Deltoid Left.; 0.5 milliLiter(s); VIS (VIS Published: 15-Aug-2019, VIS Presented: 03-Dec-2019);

## 2024-02-06 NOTE — DISCHARGE NOTE PROVIDER - NSDCFUSCHEDAPPT_GEN_ALL_CORE_FT
[facility] [address]  Scheduled Appointment: 02/07/2024    [facility] [address]  Scheduled Appointment: 02/12/2024    Ron Rogel  [facility] [address]  Scheduled Appointment: 02/13/2024

## 2024-02-07 ENCOUNTER — NON-APPOINTMENT (OUTPATIENT)
Age: 59
End: 2024-02-07

## 2024-02-07 ENCOUNTER — TRANSCRIPTION ENCOUNTER (OUTPATIENT)
Age: 59
End: 2024-02-07

## 2024-02-07 ENCOUNTER — APPOINTMENT (OUTPATIENT)
Dept: ELECTROPHYSIOLOGY | Facility: CLINIC | Age: 59
End: 2024-02-07
Payer: COMMERCIAL

## 2024-02-07 VITALS
BODY MASS INDEX: 34.81 KG/M2 | DIASTOLIC BLOOD PRESSURE: 72 MMHG | WEIGHT: 257 LBS | SYSTOLIC BLOOD PRESSURE: 136 MMHG | HEART RATE: 71 BPM | HEIGHT: 72 IN | OXYGEN SATURATION: 99 %

## 2024-02-07 DIAGNOSIS — I48.0 PAROXYSMAL ATRIAL FIBRILLATION: ICD-10-CM

## 2024-02-07 DIAGNOSIS — Z95.0 PRESENCE OF CARDIAC PACEMAKER: ICD-10-CM

## 2024-02-07 DIAGNOSIS — I45.9 CONDUCTION DISORDER, UNSPECIFIED: ICD-10-CM

## 2024-02-07 PROCEDURE — 93280 PM DEVICE PROGR EVAL DUAL: CPT

## 2024-02-07 PROCEDURE — 93000 ELECTROCARDIOGRAM COMPLETE: CPT | Mod: 59

## 2024-02-07 PROCEDURE — 99214 OFFICE O/P EST MOD 30 MIN: CPT | Mod: 25

## 2024-02-07 RX ORDER — FUROSEMIDE 40 MG/1
40 TABLET ORAL
Qty: 14 | Refills: 0 | Status: DISCONTINUED | COMMUNITY
Start: 2023-11-15 | End: 2024-02-07

## 2024-02-07 RX ORDER — ATORVASTATIN CALCIUM 80 MG/1
80 TABLET, FILM COATED ORAL DAILY
Qty: 90 | Refills: 0 | Status: DISCONTINUED | COMMUNITY
End: 2024-02-07

## 2024-02-07 NOTE — HISTORY OF PRESENT ILLNESS
[FreeTextEntry1] : The patient is a 59 y/o male, with history of HTN, DM, CKD 3, CAD s/p PCI and aortic stenosis s/p mechanical AVR and sternal plate on 10/13/23 whose post-operative course was complicated by atrial fibrillation for which he was given amiodarone and AVN blockers followed by an episode of long conversion pause followed by transient AV block with up to a 12 second pause. Pt was taken of metoprolol and amiodarone and had maintained sinus rhythm with no further events until converting back to AF RVR. Metoprolol was resumed (25mg BID) and pt subsequently converted back to sinus rhythm and once again with a significant off set pause requiring pacing. In addition while in sinus rhythm was noted to have intermittent high grade AVB. He was discharged on amiodarone therapy.   He is now s/p Medtronic dual chamber PPM implant by Dr. Nicholson on 10/20/23.  Recovered conduction noted in office. AFL burden 8% during post hospital f/u. AF/AFL burden was decreasing and amiodarone discontinued approximately 2 months after surgery.   Feeling well until recently was admitted to  with uncontrolled HTN, chest discomfort (ACS ruled out with echo, troponin measurements) , subtherapeutic INR and LUE edema. U/S negative for DVT, but positive for  Superficial thrombophlebitis of the left cephalic vein.  He is being bridged with lovenox until INR therapeutic. Edema started a few weeks prior to admission.  Dual chamber PPM interrogation reveals normal function. Brief NSVT in arrhythmia log. AT/AF burden 0.1 % with most recent episodes 2 second PAT. Minimal  0.6%.

## 2024-02-07 NOTE — REVIEW OF SYSTEMS
[Dyspnea on exertion] : not dyspnea during exertion [Chest Discomfort] : no chest discomfort [Palpitations] : no palpitations [Syncope] : no syncope [Dizziness] : no dizziness [Easy Bleeding] : no tendency for easy bleeding [FreeTextEntry5] : SInce discharge

## 2024-02-07 NOTE — END OF VISIT
[Time Spent: ___ minutes] : I have spent [unfilled] minutes of time on the encounter. [FreeTextEntry3] : I, Dr. Everett, personally performed the evaluation and management (E/M) services for this new patient. That E/M includes conducting the clinically appropriate initial history &/or exam, assessing all conditions, and establishing the plan of care. Today, my assistant, Arleen Rojo NP, was here to observe my evaluation and management service for this patient & follow plan of care established by me going forward.

## 2024-02-07 NOTE — DISCUSSION/SUMMARY
[EKG obtained to assist in diagnosis and management of assessed problem(s)] : EKG obtained to assist in diagnosis and management of assessed problem(s) [FreeTextEntry1] : e patient is a 57 y/o male, with history of HTN, DM, CKD 3, CAD s/p PCI and aortic stenosis s/p mechanical AVR and sternal plate on 10/13/23 whose post-operative course was complicated by atrial fibrillation for which he was given amiodarone and AVN blockers followed by an episode of long conversion pause followed by transient AV block with up to a 12 second pause. Pt was taken of metoprolol and amiodarone and had maintained sinus rhythm with no further events until converting back to AF RVR. Metoprolol was resumed (25mg BID) and pt subsequently converted back to sinus rhythm and once again with a significant off set pause requiring pacing. In addition while in sinus rhythm was noted to have intermittent high grade AVB. He was discharged on amiodarone therapy.   He is now s/p Medtronic dual chamber PPM implant by Dr. Nicholson on 10/20/23.  Recovered conduction noted in office. AFL burden 8% during post hospital f/u. AF/AFL burden was decreasing and amiodarone discontinued approximately 2 months after surgery.   Feeling well until recently was admitted to  with uncontrolled HTN, chest discomfort (ACS ruled out with echo, troponin measurements) , subtherapeutic INR and LUE edema. U/S negative for DVT, but positive for superficial vein thrombus. He is being bridged with lovenox until INR therapeutic. Edema started a few weeks prior to admission.  Dual chamber PPM interrogation reveals normal function. Brief NSVT in arrhythmia log. AT/AF burden 0.1 % with most recent episodes 2 second PAT. Minimal  0.6%.   Recommendation:   -Dual chamber PPM with normal function. Minimal  burden. Remote monitoring in place. -PAFL, with burden significantly reduced with no sustained events since amiodarone discontinued now with brief PAT and brief NSVT. EF 60-65%. Continue BB therapy. On lifelong warfarin for mechanical valve.  -Continue cardiology f/u with [facility]. -Remote PPM monitoring in place, EP follow up in 6 months or sooner PRN.   Camila Rojo ANP-C

## 2024-02-08 ENCOUNTER — LABORATORY RESULT (OUTPATIENT)
Age: 59
End: 2024-02-08

## 2024-02-08 ENCOUNTER — APPOINTMENT (OUTPATIENT)
Dept: FAMILY MEDICINE | Facility: CLINIC | Age: 59
End: 2024-02-08
Payer: COMMERCIAL

## 2024-02-08 LAB
INR PPP: 1.5 RATIO
POCT-PROTHROMBIN TIME: 17.5 SECS
QUALITY CONTROL: YES

## 2024-02-08 PROCEDURE — 85610 PROTHROMBIN TIME: CPT | Mod: QW

## 2024-02-09 ENCOUNTER — NON-APPOINTMENT (OUTPATIENT)
Age: 59
End: 2024-02-09

## 2024-02-09 LAB
ALBUMIN SERPL ELPH-MCNC: 4.2 G/DL
ALP BLD-CCNC: 78 U/L
ALT SERPL-CCNC: 144 U/L
ANION GAP SERPL CALC-SCNC: 14 MMOL/L
AST SERPL-CCNC: 85 U/L
BASOPHILS # BLD AUTO: 0.14 K/UL
BASOPHILS NFR BLD AUTO: 1.8 %
BILIRUB SERPL-MCNC: 0.4 MG/DL
BUN SERPL-MCNC: 20 MG/DL
CALCIUM SERPL-MCNC: 9.2 MG/DL
CHLORIDE SERPL-SCNC: 100 MMOL/L
CHOLEST SERPL-MCNC: 219 MG/DL
CO2 SERPL-SCNC: 23 MMOL/L
CREAT SERPL-MCNC: 1.64 MG/DL
EGFR: 49 ML/MIN/1.73M2
EOSINOPHIL # BLD AUTO: 0.25 K/UL
EOSINOPHIL NFR BLD AUTO: 3.2 %
ESTIMATED AVERAGE GLUCOSE: 237 MG/DL
GLUCOSE SERPL-MCNC: 177 MG/DL
HBA1C MFR BLD HPLC: 9.9 %
HCT VFR BLD CALC: 51.5 %
HDLC SERPL-MCNC: 24 MG/DL
HGB BLD-MCNC: 17 G/DL
IMM GRANULOCYTES NFR BLD AUTO: 1.8 %
LDLC SERPL CALC-MCNC: NORMAL MG/DL
LYMPHOCYTES # BLD AUTO: 1.81 K/UL
LYMPHOCYTES NFR BLD AUTO: 23.4 %
MAN DIFF?: NORMAL
MCHC RBC-ENTMCNC: 27.6 PG
MCHC RBC-ENTMCNC: 33 GM/DL
MCV RBC AUTO: 83.6 FL
MONOCYTES # BLD AUTO: 0.81 K/UL
MONOCYTES NFR BLD AUTO: 10.5 %
NEUTROPHILS # BLD AUTO: 4.59 K/UL
NEUTROPHILS NFR BLD AUTO: 59.3 %
NONHDLC SERPL-MCNC: 195 MG/DL
PLATELET # BLD AUTO: 263 K/UL
POTASSIUM SERPL-SCNC: 4.2 MMOL/L
PROT SERPL-MCNC: 6.6 G/DL
PSA SERPL-MCNC: 0.57 NG/ML
RBC # BLD: 6.16 M/UL
RBC # FLD: 17.7 %
SODIUM SERPL-SCNC: 136 MMOL/L
TRIGL SERPL-MCNC: 997 MG/DL
WBC # FLD AUTO: 7.74 K/UL

## 2024-02-12 ENCOUNTER — APPOINTMENT (OUTPATIENT)
Dept: FAMILY MEDICINE | Facility: CLINIC | Age: 59
End: 2024-02-12

## 2024-02-13 ENCOUNTER — APPOINTMENT (OUTPATIENT)
Dept: FAMILY MEDICINE | Facility: CLINIC | Age: 59
End: 2024-02-13

## 2024-02-13 DIAGNOSIS — N18.30 CHRONIC KIDNEY DISEASE, STAGE 3 UNSPECIFIED: ICD-10-CM

## 2024-02-13 DIAGNOSIS — I47.29 OTHER VENTRICULAR TACHYCARDIA: ICD-10-CM

## 2024-02-13 DIAGNOSIS — Z95.5 PRESENCE OF CORONARY ANGIOPLASTY IMPLANT AND GRAFT: ICD-10-CM

## 2024-02-13 DIAGNOSIS — N18.32 CHRONIC KIDNEY DISEASE, STAGE 3B: ICD-10-CM

## 2024-02-13 DIAGNOSIS — E78.5 HYPERLIPIDEMIA, UNSPECIFIED: ICD-10-CM

## 2024-02-13 DIAGNOSIS — E11.22 TYPE 2 DIABETES MELLITUS WITH DIABETIC CHRONIC KIDNEY DISEASE: ICD-10-CM

## 2024-02-13 DIAGNOSIS — Z88.0 ALLERGY STATUS TO PENICILLIN: ICD-10-CM

## 2024-02-13 DIAGNOSIS — Z79.01 LONG TERM (CURRENT) USE OF ANTICOAGULANTS: ICD-10-CM

## 2024-02-13 DIAGNOSIS — I34.1 NONRHEUMATIC MITRAL (VALVE) PROLAPSE: ICD-10-CM

## 2024-02-13 DIAGNOSIS — E66.9 OBESITY, UNSPECIFIED: ICD-10-CM

## 2024-02-13 DIAGNOSIS — I48.0 PAROXYSMAL ATRIAL FIBRILLATION: ICD-10-CM

## 2024-02-13 DIAGNOSIS — Z79.4 LONG TERM (CURRENT) USE OF INSULIN: ICD-10-CM

## 2024-02-13 DIAGNOSIS — I12.9 HYPERTENSIVE CHRONIC KIDNEY DISEASE WITH STAGE 1 THROUGH STAGE 4 CHRONIC KIDNEY DISEASE, OR UNSPECIFIED CHRONIC KIDNEY DISEASE: ICD-10-CM

## 2024-02-13 DIAGNOSIS — Z79.899 OTHER LONG TERM (CURRENT) DRUG THERAPY: ICD-10-CM

## 2024-02-13 DIAGNOSIS — I16.0 HYPERTENSIVE URGENCY: ICD-10-CM

## 2024-02-13 DIAGNOSIS — Z79.02 LONG TERM (CURRENT) USE OF ANTITHROMBOTICS/ANTIPLATELETS: ICD-10-CM

## 2024-02-13 DIAGNOSIS — Z95.0 PRESENCE OF CARDIAC PACEMAKER: ICD-10-CM

## 2024-02-13 DIAGNOSIS — Z91.013 ALLERGY TO SEAFOOD: ICD-10-CM

## 2024-02-13 DIAGNOSIS — R00.2 PALPITATIONS: ICD-10-CM

## 2024-02-13 DIAGNOSIS — Z95.2 PRESENCE OF PROSTHETIC HEART VALVE: ICD-10-CM

## 2024-02-13 DIAGNOSIS — E11.65 TYPE 2 DIABETES MELLITUS WITH HYPERGLYCEMIA: ICD-10-CM

## 2024-02-13 DIAGNOSIS — Z90.49 ACQUIRED ABSENCE OF OTHER SPECIFIED PARTS OF DIGESTIVE TRACT: ICD-10-CM

## 2024-02-13 DIAGNOSIS — I34.0 NONRHEUMATIC MITRAL (VALVE) INSUFFICIENCY: ICD-10-CM

## 2024-02-13 DIAGNOSIS — I80.8 PHLEBITIS AND THROMBOPHLEBITIS OF OTHER SITES: ICD-10-CM

## 2024-02-13 DIAGNOSIS — I25.10 ATHEROSCLEROTIC HEART DISEASE OF NATIVE CORONARY ARTERY WITHOUT ANGINA PECTORIS: ICD-10-CM

## 2024-02-14 ENCOUNTER — APPOINTMENT (OUTPATIENT)
Dept: FAMILY MEDICINE | Facility: CLINIC | Age: 59
End: 2024-02-14
Payer: COMMERCIAL

## 2024-02-14 ENCOUNTER — APPOINTMENT (OUTPATIENT)
Dept: FAMILY MEDICINE | Facility: CLINIC | Age: 59
End: 2024-02-14

## 2024-02-14 VITALS
HEART RATE: 89 BPM | TEMPERATURE: 97 F | OXYGEN SATURATION: 97 % | BODY MASS INDEX: 35.49 KG/M2 | WEIGHT: 262 LBS | DIASTOLIC BLOOD PRESSURE: 82 MMHG | SYSTOLIC BLOOD PRESSURE: 128 MMHG | HEIGHT: 72 IN

## 2024-02-14 DIAGNOSIS — Z00.00 ENCOUNTER FOR GENERAL ADULT MEDICAL EXAMINATION W/OUT ABNORMAL FINDINGS: ICD-10-CM

## 2024-02-14 DIAGNOSIS — Z95.2 PRESENCE OF PROSTHETIC HEART VALVE: ICD-10-CM

## 2024-02-14 LAB
INR PPP: 2.5 RATIO
POCT-PROTHROMBIN TIME: 30 SECS
QUALITY CONTROL: YES

## 2024-02-14 PROCEDURE — 99213 OFFICE O/P EST LOW 20 MIN: CPT | Mod: 25

## 2024-02-14 PROCEDURE — 99396 PREV VISIT EST AGE 40-64: CPT | Mod: 25

## 2024-02-14 PROCEDURE — 85610 PROTHROMBIN TIME: CPT | Mod: QW

## 2024-02-14 RX ORDER — LOSARTAN POTASSIUM 50 MG/1
50 TABLET, FILM COATED ORAL DAILY
Refills: 0 | Status: ACTIVE | COMMUNITY

## 2024-02-14 RX ORDER — ATORVASTATIN CALCIUM 40 MG/1
40 TABLET, FILM COATED ORAL DAILY
Refills: 0 | Status: ACTIVE | COMMUNITY

## 2024-02-14 RX ORDER — HYDROCHLOROTHIAZIDE 12.5 MG/1
12.5 CAPSULE ORAL DAILY
Refills: 0 | Status: ACTIVE | COMMUNITY

## 2024-02-14 RX ORDER — AMLODIPINE BESYLATE 10 MG/1
10 TABLET ORAL DAILY
Refills: 0 | Status: ACTIVE | COMMUNITY

## 2024-02-14 RX ORDER — ENOXAPARIN SODIUM 120 MG/.8ML
120 INJECTION SUBCUTANEOUS
Refills: 0 | Status: DISCONTINUED | COMMUNITY
End: 2024-02-14

## 2024-02-14 RX ORDER — ACETAMINOPHEN 325 MG/1
325 TABLET ORAL EVERY 6 HOURS
Refills: 0 | Status: ACTIVE | COMMUNITY

## 2024-02-14 RX ORDER — INSULIN LISPRO 100 [IU]/ML
100 INJECTION, SOLUTION INTRAVENOUS; SUBCUTANEOUS
Refills: 0 | Status: ACTIVE | COMMUNITY

## 2024-02-14 NOTE — HISTORY OF PRESENT ILLNESS
[FreeTextEntry1] : JAMES GONZALEZ is a 58 year old male here for a physical exam. [de-identified] : His last physical exam was 4/2022  Vaccines: Tetanus is up to date, 5/31/18 Pneumococcal vaccination is up to date Shingrix is up to date  His last dentist visit was less than one year ago His last eye doctor appointment was less than one year ago His last dermatologist visit was a few years ago  Colon cancer screening is up to date, colonoscopy 2/25/20, repeat 5 years  His diet is healthy overall Exercise: rarely

## 2024-02-14 NOTE — PLAN
[FreeTextEntry1] : Continue all medications as prescribed. Follow up with Endocrinology to discuss additional treatment for diabetes.  He may stop Lovenox since his INR is therapeutic.  Reviewed age-appropriate preventive screening tests with patient.  Discussed clean eating (eg Mediterranean style eating plan) and regular exercise/staying as physically active as possible.  Include balance exercises and strength training and core strengthening exercises for bone health and to decrease risk for falls.  Reviewed importance of good self care (e.g. meditation, yoga, adequate rest, regular exercise, magnesium, clean eating, etc.).  Follow up for next physical in one year. Follow up sooner to repeat labs for diabetes pending endocrinology follow up. Once INR is therapeutic, check monthly.  Additional time spent addressing new or existing problems, requiring additional work outside of the normal scope of a routine annual exam: 20 minutes.

## 2024-02-14 NOTE — HEALTH RISK ASSESSMENT
[0] : 2) Feeling down, depressed, or hopeless: Not at all (0) [PHQ-2 Negative - No further assessment needed] : PHQ-2 Negative - No further assessment needed [Never] : Never [[id number]] : 0

## 2024-02-14 NOTE — PHYSICAL EXAM
[Soft] : abdomen soft [Non Tender] : non-tender [No Rash] : no rash [No Focal Deficits] : no focal deficits [Normal] : affect was normal and insight and judgment were intact

## 2024-02-14 NOTE — ASSESSMENT
[FreeTextEntry1] : JAMES GONZALEZ is a 58 year old male here for a physical exam.  He has a history of coronary artery disease, chronic kidney disease, diabetes, hypertension, and hyperlipidemia. He had an aortic valve replacement in 10/2023. Since this is a mechanical valve he needs lifelong anticoagulation with warfarin.  His INR was stable on 5 mg of warfarin daily. He was discharged from the hospital last week on 7 mg and his INR was low at 1.5. His dose was increased to 8.5 mg daily and his INR is back to 2.5 mg. Will decrease his dose back to 7.5 mg and recheck the INR again in 1-2 weeks.  Labs were done prior. He is not anemic and in fact his Hgb and Hct are borderline high. His PSA is normal at 0.57. His cholesterol is higher than last time and his triglycerides are still very high. His glucose is lower than last time but his HgbA1c is very high at 9.9.   His diabetes is managed by Endocrinology (Dr. Ibarra). His diabetes has not been well controlled. He has not seen him recently due to insurance issues but is reportedly seeing him again next month. He reports his fasting glucose is around 200 and this spikes up to the 300s after meals.

## 2024-02-15 ENCOUNTER — RX RENEWAL (OUTPATIENT)
Age: 59
End: 2024-02-15

## 2024-02-15 RX ORDER — WARFARIN 1 MG/1
1 TABLET ORAL DAILY
Qty: 30 | Refills: 0 | Status: ACTIVE | COMMUNITY
Start: 2023-11-20 | End: 1900-01-01

## 2024-02-28 ENCOUNTER — APPOINTMENT (OUTPATIENT)
Dept: FAMILY MEDICINE | Facility: CLINIC | Age: 59
End: 2024-02-28
Payer: COMMERCIAL

## 2024-02-28 PROCEDURE — 85610 PROTHROMBIN TIME: CPT | Mod: QW

## 2024-02-29 LAB
INR PPP: 4.1 RATIO
POCT-PROTHROMBIN TIME: 49.2 SECS
QUALITY CONTROL: YES

## 2024-03-13 ENCOUNTER — APPOINTMENT (OUTPATIENT)
Dept: FAMILY MEDICINE | Facility: CLINIC | Age: 59
End: 2024-03-13
Payer: COMMERCIAL

## 2024-03-13 LAB
INR PPP: 2.8 RATIO
POCT-PROTHROMBIN TIME: 33.9 SECS
QUALITY CONTROL: YES

## 2024-03-13 PROCEDURE — 85610 PROTHROMBIN TIME: CPT | Mod: QW

## 2024-04-09 ENCOUNTER — APPOINTMENT (OUTPATIENT)
Dept: FAMILY MEDICINE | Facility: CLINIC | Age: 59
End: 2024-04-09
Payer: COMMERCIAL

## 2024-04-09 LAB
INR PPP: 2 RATIO
POCT-PROTHROMBIN TIME: 23.6 SECS
QUALITY CONTROL: YES

## 2024-04-09 PROCEDURE — 85610 PROTHROMBIN TIME: CPT | Mod: QW

## 2024-04-17 RX ORDER — WARFARIN 5 MG/1
5 TABLET ORAL
Qty: 90 | Refills: 0 | Status: ACTIVE | COMMUNITY
Start: 1900-01-01 | End: 1900-01-01

## 2024-04-30 ENCOUNTER — APPOINTMENT (OUTPATIENT)
Dept: FAMILY MEDICINE | Facility: CLINIC | Age: 59
End: 2024-04-30
Payer: COMMERCIAL

## 2024-04-30 VITALS
HEIGHT: 72 IN | TEMPERATURE: 97.9 F | DIASTOLIC BLOOD PRESSURE: 78 MMHG | HEART RATE: 96 BPM | OXYGEN SATURATION: 99 % | WEIGHT: 262 LBS | BODY MASS INDEX: 35.49 KG/M2 | SYSTOLIC BLOOD PRESSURE: 130 MMHG

## 2024-04-30 DIAGNOSIS — L08.9 LOCAL INFECTION OF THE SKIN AND SUBCUTANEOUS TISSUE, UNSPECIFIED: ICD-10-CM

## 2024-04-30 PROCEDURE — 99213 OFFICE O/P EST LOW 20 MIN: CPT

## 2024-04-30 RX ORDER — CLINDAMYCIN HYDROCHLORIDE 300 MG/1
300 CAPSULE ORAL
Qty: 10 | Refills: 1 | Status: COMPLETED | COMMUNITY
Start: 2023-12-19 | End: 2024-04-30

## 2024-04-30 NOTE — HISTORY OF PRESENT ILLNESS
[FreeTextEntry8] : - got a blister on the left second toe after doing work in uncomfortable shoes  - wife has been taking care of it - putting on an antibiotic ointment from before, unsure of the name  - denies soaking  - last 3 days noticed a more redness and foul smelling discharge - tenderness with pressure on the toes - denies fever  - had a similar situation in the past after kicking his toe  - sees podiatry but unsure if new insurance covers

## 2024-04-30 NOTE — PHYSICAL EXAM
[Normal] : affect was normal and insight and judgment were intact [de-identified] : second left toe erythematous, mild swelling and tenderness with palpation, purulent discharge

## 2024-04-30 NOTE — REVIEW OF SYSTEMS
[Negative] : Musculoskeletal [de-identified] : second left toe redness, swelling, tenderness and discharge

## 2024-05-07 ENCOUNTER — APPOINTMENT (OUTPATIENT)
Dept: FAMILY MEDICINE | Facility: CLINIC | Age: 59
End: 2024-05-07

## 2024-05-08 ENCOUNTER — NON-APPOINTMENT (OUTPATIENT)
Age: 59
End: 2024-05-08

## 2024-05-08 ENCOUNTER — APPOINTMENT (OUTPATIENT)
Dept: ELECTROPHYSIOLOGY | Facility: CLINIC | Age: 59
End: 2024-05-08
Payer: COMMERCIAL

## 2024-05-08 PROCEDURE — 93294 REM INTERROG EVL PM/LDLS PM: CPT

## 2024-05-08 PROCEDURE — 93296 REM INTERROG EVL PM/IDS: CPT

## 2024-05-16 ENCOUNTER — INPATIENT (INPATIENT)
Facility: HOSPITAL | Age: 59
LOS: 4 days | Discharge: HOME HEALTH | DRG: 317 | End: 2024-05-21
Attending: STUDENT IN AN ORGANIZED HEALTH CARE EDUCATION/TRAINING PROGRAM | Admitting: HOSPITALIST
Payer: COMMERCIAL

## 2024-05-16 VITALS — HEIGHT: 73 IN

## 2024-05-16 DIAGNOSIS — M86.9 OSTEOMYELITIS, UNSPECIFIED: ICD-10-CM

## 2024-05-16 DIAGNOSIS — Z90.49 ACQUIRED ABSENCE OF OTHER SPECIFIED PARTS OF DIGESTIVE TRACT: Chronic | ICD-10-CM

## 2024-05-16 DIAGNOSIS — Z95.5 PRESENCE OF CORONARY ANGIOPLASTY IMPLANT AND GRAFT: Chronic | ICD-10-CM

## 2024-05-16 LAB
ALBUMIN SERPL ELPH-MCNC: 3.7 G/DL — SIGNIFICANT CHANGE UP (ref 3.3–5)
ALP SERPL-CCNC: 75 U/L — SIGNIFICANT CHANGE UP (ref 40–120)
ALT FLD-CCNC: 48 U/L — SIGNIFICANT CHANGE UP (ref 12–78)
ANION GAP SERPL CALC-SCNC: 10 MMOL/L — SIGNIFICANT CHANGE UP (ref 5–17)
APTT BLD: 46.6 SEC — HIGH (ref 24.5–35.6)
AST SERPL-CCNC: 33 U/L — SIGNIFICANT CHANGE UP (ref 15–37)
BASOPHILS # BLD AUTO: 0.12 K/UL — SIGNIFICANT CHANGE UP (ref 0–0.2)
BASOPHILS NFR BLD AUTO: 1.1 % — SIGNIFICANT CHANGE UP (ref 0–2)
BILIRUB SERPL-MCNC: 0.5 MG/DL — SIGNIFICANT CHANGE UP (ref 0.2–1.2)
BLD GP AB SCN SERPL QL: SIGNIFICANT CHANGE UP
BUN SERPL-MCNC: 42 MG/DL — HIGH (ref 7–23)
CALCIUM SERPL-MCNC: 9.7 MG/DL — SIGNIFICANT CHANGE UP (ref 8.5–10.1)
CHLORIDE SERPL-SCNC: 102 MMOL/L — SIGNIFICANT CHANGE UP (ref 96–108)
CO2 SERPL-SCNC: 26 MMOL/L — SIGNIFICANT CHANGE UP (ref 22–31)
CREAT SERPL-MCNC: 1.83 MG/DL — HIGH (ref 0.5–1.3)
EGFR: 43 ML/MIN/1.73M2 — LOW
EOSINOPHIL # BLD AUTO: 0.27 K/UL — SIGNIFICANT CHANGE UP (ref 0–0.5)
EOSINOPHIL NFR BLD AUTO: 2.6 % — SIGNIFICANT CHANGE UP (ref 0–6)
ERYTHROCYTE [SEDIMENTATION RATE] IN BLOOD: 28 MM/HR — HIGH (ref 0–20)
ERYTHROCYTE [SEDIMENTATION RATE] IN BLOOD: 28 MM/HR — HIGH (ref 0–20)
GLUCOSE BLDC GLUCOMTR-MCNC: 185 MG/DL — HIGH (ref 70–99)
GLUCOSE SERPL-MCNC: 228 MG/DL — HIGH (ref 70–99)
HCT VFR BLD CALC: 42.3 % — SIGNIFICANT CHANGE UP (ref 39–50)
HGB BLD-MCNC: 15.1 G/DL — SIGNIFICANT CHANGE UP (ref 13–17)
HIV 1 & 2 AB SERPL IA.RAPID: SIGNIFICANT CHANGE UP
IMM GRANULOCYTES NFR BLD AUTO: 1.6 % — HIGH (ref 0–0.9)
INR BLD: 1.57 RATIO — HIGH (ref 0.85–1.18)
LACTATE SERPL-SCNC: 0.8 MMOL/L — SIGNIFICANT CHANGE UP (ref 0.7–2)
LYMPHOCYTES # BLD AUTO: 19.4 % — SIGNIFICANT CHANGE UP (ref 13–44)
LYMPHOCYTES # BLD AUTO: 2.03 K/UL — SIGNIFICANT CHANGE UP (ref 1–3.3)
MCHC RBC-ENTMCNC: 30.8 PG — SIGNIFICANT CHANGE UP (ref 27–34)
MCHC RBC-ENTMCNC: 35.7 GM/DL — SIGNIFICANT CHANGE UP (ref 32–36)
MCV RBC AUTO: 86.2 FL — SIGNIFICANT CHANGE UP (ref 80–100)
MONOCYTES # BLD AUTO: 0.87 K/UL — SIGNIFICANT CHANGE UP (ref 0–0.9)
MONOCYTES NFR BLD AUTO: 8.3 % — SIGNIFICANT CHANGE UP (ref 2–14)
NEUTROPHILS # BLD AUTO: 7 K/UL — SIGNIFICANT CHANGE UP (ref 1.8–7.4)
NEUTROPHILS NFR BLD AUTO: 67 % — SIGNIFICANT CHANGE UP (ref 43–77)
PLATELET # BLD AUTO: 322 K/UL — SIGNIFICANT CHANGE UP (ref 150–400)
POTASSIUM SERPL-MCNC: 3.7 MMOL/L — SIGNIFICANT CHANGE UP (ref 3.5–5.3)
POTASSIUM SERPL-SCNC: 3.7 MMOL/L — SIGNIFICANT CHANGE UP (ref 3.5–5.3)
PROT SERPL-MCNC: 7.7 GM/DL — SIGNIFICANT CHANGE UP (ref 6–8.3)
PROTHROM AB SERPL-ACNC: 17.5 SEC — HIGH (ref 9.5–13)
RBC # BLD: 4.91 M/UL — SIGNIFICANT CHANGE UP (ref 4.2–5.8)
RBC # FLD: 13.4 % — SIGNIFICANT CHANGE UP (ref 10.3–14.5)
SODIUM SERPL-SCNC: 138 MMOL/L — SIGNIFICANT CHANGE UP (ref 135–145)
WBC # BLD: 10.46 K/UL — SIGNIFICANT CHANGE UP (ref 3.8–10.5)
WBC # FLD AUTO: 10.46 K/UL — SIGNIFICANT CHANGE UP (ref 3.8–10.5)

## 2024-05-16 PROCEDURE — 80053 COMPREHEN METABOLIC PANEL: CPT

## 2024-05-16 PROCEDURE — 73718 MRI LOWER EXTREMITY W/O DYE: CPT | Mod: MC,LT

## 2024-05-16 PROCEDURE — 86901 BLOOD TYPING SEROLOGIC RH(D): CPT

## 2024-05-16 PROCEDURE — 86703 HIV-1/HIV-2 1 RESULT ANTBDY: CPT

## 2024-05-16 PROCEDURE — 83605 ASSAY OF LACTIC ACID: CPT

## 2024-05-16 PROCEDURE — C1751: CPT

## 2024-05-16 PROCEDURE — 82962 GLUCOSE BLOOD TEST: CPT

## 2024-05-16 PROCEDURE — 86850 RBC ANTIBODY SCREEN: CPT

## 2024-05-16 PROCEDURE — 86900 BLOOD TYPING SEROLOGIC ABO: CPT

## 2024-05-16 PROCEDURE — 87040 BLOOD CULTURE FOR BACTERIA: CPT

## 2024-05-16 PROCEDURE — 36415 COLL VENOUS BLD VENIPUNCTURE: CPT

## 2024-05-16 PROCEDURE — 85014 HEMATOCRIT: CPT

## 2024-05-16 PROCEDURE — 85730 THROMBOPLASTIN TIME PARTIAL: CPT

## 2024-05-16 PROCEDURE — 99285 EMERGENCY DEPT VISIT HI MDM: CPT

## 2024-05-16 PROCEDURE — 36569 INSJ PICC 5 YR+ W/O IMAGING: CPT

## 2024-05-16 PROCEDURE — 85018 HEMOGLOBIN: CPT

## 2024-05-16 PROCEDURE — 83036 HEMOGLOBIN GLYCOSYLATED A1C: CPT

## 2024-05-16 PROCEDURE — 85610 PROTHROMBIN TIME: CPT

## 2024-05-16 PROCEDURE — 73630 X-RAY EXAM OF FOOT: CPT | Mod: LT

## 2024-05-16 PROCEDURE — 94640 AIRWAY INHALATION TREATMENT: CPT

## 2024-05-16 PROCEDURE — 85652 RBC SED RATE AUTOMATED: CPT

## 2024-05-16 PROCEDURE — 86803 HEPATITIS C AB TEST: CPT

## 2024-05-16 PROCEDURE — 71045 X-RAY EXAM CHEST 1 VIEW: CPT

## 2024-05-16 PROCEDURE — 93010 ELECTROCARDIOGRAM REPORT: CPT

## 2024-05-16 PROCEDURE — 85027 COMPLETE CBC AUTOMATED: CPT

## 2024-05-16 PROCEDURE — 80202 ASSAY OF VANCOMYCIN: CPT

## 2024-05-16 PROCEDURE — 85025 COMPLETE CBC W/AUTO DIFF WBC: CPT

## 2024-05-16 PROCEDURE — 80048 BASIC METABOLIC PNL TOTAL CA: CPT

## 2024-05-16 PROCEDURE — 99497 ADVNCD CARE PLAN 30 MIN: CPT | Mod: 25

## 2024-05-16 PROCEDURE — 93005 ELECTROCARDIOGRAM TRACING: CPT

## 2024-05-16 PROCEDURE — 99223 1ST HOSP IP/OBS HIGH 75: CPT | Mod: 25

## 2024-05-16 PROCEDURE — 73630 X-RAY EXAM OF FOOT: CPT | Mod: 26,LT

## 2024-05-16 PROCEDURE — 86140 C-REACTIVE PROTEIN: CPT

## 2024-05-16 RX ORDER — SODIUM CHLORIDE 9 MG/ML
1000 INJECTION, SOLUTION INTRAVENOUS
Refills: 0 | Status: DISCONTINUED | OUTPATIENT
Start: 2024-05-16 | End: 2024-05-21

## 2024-05-16 RX ORDER — WARFARIN SODIUM 2.5 MG/1
6 TABLET ORAL ONCE
Refills: 0 | Status: COMPLETED | OUTPATIENT
Start: 2024-05-16 | End: 2024-05-16

## 2024-05-16 RX ORDER — INSULIN ASPART 100 [IU]/ML
25 INJECTION, SOLUTION SUBCUTANEOUS
Refills: 0 | DISCHARGE

## 2024-05-16 RX ORDER — MAGNESIUM OXIDE 400 MG ORAL TABLET 241.3 MG
400 TABLET ORAL DAILY
Refills: 0 | Status: DISCONTINUED | OUTPATIENT
Start: 2024-05-16 | End: 2024-05-21

## 2024-05-16 RX ORDER — ATORVASTATIN CALCIUM 80 MG/1
1 TABLET, FILM COATED ORAL
Refills: 0 | DISCHARGE

## 2024-05-16 RX ORDER — CEFTRIAXONE 500 MG/1
2000 INJECTION, POWDER, FOR SOLUTION INTRAMUSCULAR; INTRAVENOUS ONCE
Refills: 0 | Status: DISCONTINUED | OUTPATIENT
Start: 2024-05-16 | End: 2024-05-16

## 2024-05-16 RX ORDER — CEFTRIAXONE 500 MG/1
1000 INJECTION, POWDER, FOR SOLUTION INTRAMUSCULAR; INTRAVENOUS ONCE
Refills: 0 | Status: DISCONTINUED | OUTPATIENT
Start: 2024-05-16 | End: 2024-05-16

## 2024-05-16 RX ORDER — GLUCAGON INJECTION, SOLUTION 0.5 MG/.1ML
1 INJECTION, SOLUTION SUBCUTANEOUS ONCE
Refills: 0 | Status: DISCONTINUED | OUTPATIENT
Start: 2024-05-16 | End: 2024-05-21

## 2024-05-16 RX ORDER — DEXTROSE 50 % IN WATER 50 %
25 SYRINGE (ML) INTRAVENOUS ONCE
Refills: 0 | Status: DISCONTINUED | OUTPATIENT
Start: 2024-05-16 | End: 2024-05-21

## 2024-05-16 RX ORDER — FERROUS SULFATE 325(65) MG
325 TABLET ORAL DAILY
Refills: 0 | Status: DISCONTINUED | OUTPATIENT
Start: 2024-05-16 | End: 2024-05-21

## 2024-05-16 RX ORDER — DEXTROSE 50 % IN WATER 50 %
15 SYRINGE (ML) INTRAVENOUS ONCE
Refills: 0 | Status: DISCONTINUED | OUTPATIENT
Start: 2024-05-16 | End: 2024-05-21

## 2024-05-16 RX ORDER — FERROUS SULFATE 325(65) MG
1 TABLET ORAL
Refills: 0 | DISCHARGE

## 2024-05-16 RX ORDER — ONDANSETRON 8 MG/1
4 TABLET, FILM COATED ORAL EVERY 6 HOURS
Refills: 0 | Status: DISCONTINUED | OUTPATIENT
Start: 2024-05-16 | End: 2024-05-21

## 2024-05-16 RX ORDER — ATORVASTATIN CALCIUM 80 MG/1
80 TABLET, FILM COATED ORAL AT BEDTIME
Refills: 0 | Status: DISCONTINUED | OUTPATIENT
Start: 2024-05-16 | End: 2024-05-21

## 2024-05-16 RX ORDER — AMLODIPINE BESYLATE 2.5 MG/1
10 TABLET ORAL DAILY
Refills: 0 | Status: DISCONTINUED | OUTPATIENT
Start: 2024-05-16 | End: 2024-05-21

## 2024-05-16 RX ORDER — CLOPIDOGREL BISULFATE 75 MG/1
75 TABLET, FILM COATED ORAL DAILY
Refills: 0 | Status: DISCONTINUED | OUTPATIENT
Start: 2024-05-16 | End: 2024-05-21

## 2024-05-16 RX ORDER — FERROUS FUMARATE 350(115)MG
1 TABLET ORAL
Refills: 0 | DISCHARGE

## 2024-05-16 RX ORDER — DEXTROSE 10 % IN WATER 10 %
125 INTRAVENOUS SOLUTION INTRAVENOUS ONCE
Refills: 0 | Status: DISCONTINUED | OUTPATIENT
Start: 2024-05-16 | End: 2024-05-21

## 2024-05-16 RX ORDER — INSULIN DEGLUDEC 100 U/ML
56 INJECTION, SOLUTION SUBCUTANEOUS
Refills: 0 | DISCHARGE

## 2024-05-16 RX ORDER — VANCOMYCIN HCL 1 G
1000 VIAL (EA) INTRAVENOUS EVERY 12 HOURS
Refills: 0 | Status: DISCONTINUED | OUTPATIENT
Start: 2024-05-16 | End: 2024-05-20

## 2024-05-16 RX ORDER — LOSARTAN POTASSIUM 100 MG/1
50 TABLET, FILM COATED ORAL DAILY
Refills: 0 | Status: DISCONTINUED | OUTPATIENT
Start: 2024-05-16 | End: 2024-05-21

## 2024-05-16 RX ORDER — INSULIN LISPRO 100/ML
35 VIAL (ML) SUBCUTANEOUS
Refills: 0 | DISCHARGE

## 2024-05-16 RX ORDER — SENNA PLUS 8.6 MG/1
2 TABLET ORAL AT BEDTIME
Refills: 0 | Status: DISCONTINUED | OUTPATIENT
Start: 2024-05-16 | End: 2024-05-21

## 2024-05-16 RX ORDER — DEXTROSE 50 % IN WATER 50 %
12.5 SYRINGE (ML) INTRAVENOUS ONCE
Refills: 0 | Status: DISCONTINUED | OUTPATIENT
Start: 2024-05-16 | End: 2024-05-21

## 2024-05-16 RX ORDER — INSULIN GLARGINE 100 [IU]/ML
150 INJECTION, SOLUTION SUBCUTANEOUS
Refills: 0 | DISCHARGE

## 2024-05-16 RX ORDER — CHOLECALCIFEROL (VITAMIN D3) 125 MCG
1000 CAPSULE ORAL DAILY
Refills: 0 | Status: DISCONTINUED | OUTPATIENT
Start: 2024-05-16 | End: 2024-05-21

## 2024-05-16 RX ORDER — SODIUM CHLORIDE 9 MG/ML
1000 INJECTION INTRAMUSCULAR; INTRAVENOUS; SUBCUTANEOUS ONCE
Refills: 0 | Status: COMPLETED | OUTPATIENT
Start: 2024-05-16 | End: 2024-05-16

## 2024-05-16 RX ORDER — INSULIN GLARGINE 100 [IU]/ML
47 INJECTION, SOLUTION SUBCUTANEOUS AT BEDTIME
Refills: 0 | Status: DISCONTINUED | OUTPATIENT
Start: 2024-05-16 | End: 2024-05-21

## 2024-05-16 RX ORDER — PIPERACILLIN AND TAZOBACTAM 4; .5 G/20ML; G/20ML
3.38 INJECTION, POWDER, LYOPHILIZED, FOR SOLUTION INTRAVENOUS ONCE
Refills: 0 | Status: COMPLETED | OUTPATIENT
Start: 2024-05-16 | End: 2024-05-16

## 2024-05-16 RX ORDER — CEFTRIAXONE 500 MG/1
2000 INJECTION, POWDER, FOR SOLUTION INTRAMUSCULAR; INTRAVENOUS ONCE
Refills: 0 | Status: COMPLETED | OUTPATIENT
Start: 2024-05-16 | End: 2024-05-16

## 2024-05-16 RX ORDER — FENOFIBRATE,MICRONIZED 130 MG
145 CAPSULE ORAL AT BEDTIME
Refills: 0 | Status: DISCONTINUED | OUTPATIENT
Start: 2024-05-16 | End: 2024-05-21

## 2024-05-16 RX ORDER — INSULIN LISPRO 100/ML
22 VIAL (ML) SUBCUTANEOUS
Refills: 0 | Status: DISCONTINUED | OUTPATIENT
Start: 2024-05-16 | End: 2024-05-21

## 2024-05-16 RX ORDER — CHOLECALCIFEROL (VITAMIN D3) 125 MCG
1 CAPSULE ORAL
Refills: 0 | DISCHARGE

## 2024-05-16 RX ORDER — INSULIN GLARGINE 100 [IU]/ML
45 INJECTION, SOLUTION SUBCUTANEOUS ONCE
Refills: 0 | Status: COMPLETED | OUTPATIENT
Start: 2024-05-16 | End: 2024-05-16

## 2024-05-16 RX ORDER — WARFARIN SODIUM 2.5 MG/1
1 TABLET ORAL
Refills: 0 | DISCHARGE

## 2024-05-16 RX ORDER — METOPROLOL TARTRATE 50 MG
50 TABLET ORAL DAILY
Refills: 0 | Status: DISCONTINUED | OUTPATIENT
Start: 2024-05-16 | End: 2024-05-21

## 2024-05-16 RX ORDER — ACETAMINOPHEN 500 MG
650 TABLET ORAL EVERY 6 HOURS
Refills: 0 | Status: DISCONTINUED | OUTPATIENT
Start: 2024-05-16 | End: 2024-05-21

## 2024-05-16 RX ORDER — MAGNESIUM OXIDE 400 MG ORAL TABLET 241.3 MG
1 TABLET ORAL
Refills: 0 | DISCHARGE

## 2024-05-16 RX ORDER — FENOFIBRATE,MICRONIZED 130 MG
1 CAPSULE ORAL
Refills: 0 | DISCHARGE

## 2024-05-16 RX ORDER — FLUTICASONE PROPIONATE 50 MCG
1 SPRAY, SUSPENSION NASAL
Refills: 0 | Status: DISCONTINUED | OUTPATIENT
Start: 2024-05-16 | End: 2024-05-21

## 2024-05-16 RX ORDER — INSULIN LISPRO 100/ML
VIAL (ML) SUBCUTANEOUS
Refills: 0 | Status: DISCONTINUED | OUTPATIENT
Start: 2024-05-16 | End: 2024-05-21

## 2024-05-16 RX ORDER — PIPERACILLIN AND TAZOBACTAM 4; .5 G/20ML; G/20ML
3.38 INJECTION, POWDER, LYOPHILIZED, FOR SOLUTION INTRAVENOUS ONCE
Refills: 0 | Status: COMPLETED | OUTPATIENT
Start: 2024-05-17 | End: 2024-05-17

## 2024-05-16 RX ORDER — PIPERACILLIN AND TAZOBACTAM 4; .5 G/20ML; G/20ML
3.38 INJECTION, POWDER, LYOPHILIZED, FOR SOLUTION INTRAVENOUS EVERY 8 HOURS
Refills: 0 | Status: DISCONTINUED | OUTPATIENT
Start: 2024-05-17 | End: 2024-05-20

## 2024-05-16 RX ADMIN — SODIUM CHLORIDE 1000 MILLILITER(S): 9 INJECTION INTRAMUSCULAR; INTRAVENOUS; SUBCUTANEOUS at 19:44

## 2024-05-16 RX ADMIN — WARFARIN SODIUM 6 MILLIGRAM(S): 2.5 TABLET ORAL at 23:40

## 2024-05-16 RX ADMIN — ATORVASTATIN CALCIUM 80 MILLIGRAM(S): 80 TABLET, FILM COATED ORAL at 23:40

## 2024-05-16 RX ADMIN — PIPERACILLIN AND TAZOBACTAM 200 GRAM(S): 4; .5 INJECTION, POWDER, LYOPHILIZED, FOR SOLUTION INTRAVENOUS at 21:37

## 2024-05-16 RX ADMIN — Medication 250 MILLIGRAM(S): at 22:38

## 2024-05-16 NOTE — ED STATDOCS - NS_ ATTENDINGSCRIBEDETAILS _ED_A_ED_FT
I, Oli Bauer MD,  performed the initial face to face bedside interview with this patient regarding history of present illness, review of symptoms and relevant past medical, social and family history.  I completed an independent physical examination.  I was the initial provider who evaluated this patient.   I personally saw the patient and performed a substantive portion of the visit including all aspects of the medical decision making.  The history, relevant review of systems, past medical and surgical history, medical decision making, and physical examination was documented by the scribe in my presence and I attest to the accuracy of the documentation.

## 2024-05-16 NOTE — ED STATDOCS - CLINICAL SUMMARY MEDICAL DECISION MAKING FREE TEXT BOX
58-year-old male history of hypertension high cholesterol diabetes aortic valve replacement on Coumadin presents to the emergency department with wound to the left second toe.  Patient states it started off as a blister which then started to discharge pus a week and a half ago has been on antibiotics however has not had improvement in his wound.  Patient followed up with podiatry who sent him in for admission for MRI rule out osteomyelitis.  No fevers.  Exam with callus and blister to the left second toe with some surrounding erythema no fluctuance no crepitus normal pulses.  Given symptoms will consult podiatry check labs and admit for MRI.

## 2024-05-16 NOTE — ED ADULT NURSE NOTE - CHIEF COMPLAINT QUOTE
Pt presents to the ED c/o wound to the left 2nd toe. Pt states "I noticed a blister 3-4 weeks ago but now there is discharge." Denies fevers. Pt saw podiatrist Dr. Garcia today and was told to come to the ED to r/o osteomyelitis. PMH of DM.

## 2024-05-16 NOTE — H&P ADULT - TIME BILLING
A minimum of 75 min was spent completing this admission not including time spent discussing advanced care planning or discussing goals of care with a minimum of 36 min spent face to face with patient while in ED unit on admission, counseling patient on current acute on chronic conditions and discussing plan and coordination of care including need for MRI in AM if compatible, need for ID consult due to failure of outpatient treatment, diagnostic lab tests which were ordered for AM, and need for tight glucose control

## 2024-05-16 NOTE — ED STATDOCS - ATTENDING APP SHARED VISIT CONTRIBUTION OF CARE
I, Oli Bauer MD, personally saw the patient with ACP.  I have personally performed a face to face diagnostic evaluation on this patient.  I have reviewed the ACP note and agree with the history, exam, and plan of care, except as noted. I personally made/approved the management plan and take responsibility for the patient management   The initial assessment was performed by myself and then the patient was handed off to the ACP. The patient was followed and re-evaluated by the ACP. All labs, imaging and procedures were evaluated and performed by the ACP and I was available for consultation if any questions in the patients care came up.   I personally made/approved the management plan and take responsibility for the patient management.

## 2024-05-16 NOTE — ED STATDOCS - PHYSICAL EXAMINATION
Constitutional: NAD AAOx3  Eyes: PERRLA EOMI  Head: Normocephalic atraumatic  Mouth: MMM  Cardiac: regular rate   Resp: unlabored breathing  GI: Abd s/nt/nd  Neuro: grossly normal and intact  Skin: No visible rashes   Musculoskeletal: callous and blister to L second toe with surrounding erythema Constitutional: mild distress AAOx3  Eyes: PERRLA EOMI  Head: Normocephalic atraumatic  Mouth: MMM  Cardiac: regular rate   Resp: unlabored breathing clear b/l   GI: Abd s/nt/nd  Neuro: grossly normal and intact  Skin: No visible rashes   Musculoskeletal: callous and blister to L second toe with surrounding erythema

## 2024-05-16 NOTE — ED STATDOCS - PROGRESS NOTE DETAILS
58-year-old male with a past medical history of of high blood pressure, high cholesterol, insulin-dependent diabetes, CAD, 8 stents on Coumadin and Plavix, status post PPM was sent in here by Dr. BEE Garcia for possible left toe infection.  Patient states that he had a blister on the left second toe that that opened up and had some greenish/yellow/white discharge.  Was seen in the office and had x-rays and was told that he had some erosion to the left second toe and sent in here to rule out osteomyelitis. Associated chills and toe pain.  Denies fevers, sweating. Spoke with podiatry resident. Will come to see pt. Likely admission. -Mazin Hoyt PA-C Podiatry recommends to start rocephin and consult ID. Pt to be admitted.   Labs with small VIKKI/CKD and hyperglycemia. NO lactate or leukocytosis. IVF ordered. -Mazin Hoyt PA-C

## 2024-05-16 NOTE — ED ADULT NURSE NOTE - PAIN: PRESENCE, MLM
Progress Note (short form)





- Note


Progress Note: 





Arrived 25 minutes after patient presented; delivered precipitously by laborist





WADE Cohen MD





Problem List





- Problems


(1) Precipitous delivery


Code(s): O62.3 - PRECIPITATE LABOR complains of pain/discomfort

## 2024-05-16 NOTE — H&P ADULT - REASON FOR ADMISSION
Osteomyelitis and cellulitis of diabetic foot Osteomyelitis and cellulitis of diabetic foot, failure of outpatient treatment

## 2024-05-16 NOTE — H&P ADULT - HISTORY OF PRESENT ILLNESS
Pt is a 57 yo male with a pmh/o pancreatitis, HTN, HLD, IDDM, CKD3B with baseline cr 1.5-2, obesity, CAD s/p PCI and 8 stents, PPM [employer] [place] XT DR LOMAS, AVR, HTN urgency, PAF on coumadin, who presents to ED upon recommendation of podiatrist Dr. Garcia due to 2 weeks of progressively worsening foot wound to toes that began as a blister and later required oral abx as it began to form purulent drainage. Pt states despite oral abx, wound has worsened and he saw  Pt is a 57 yo male with a pmh/o pancreatitis, HTN, HLD, IDDM, CKD3B with baseline cr 1.5-2, obesity, CAD s/p PCI and 8 stents, PPM [employer] LOMAS, AVR, HTN urgency, PAF on coumadin, who presents to ED upon recommendation of podiatrist Dr. Garcia due to 2 weeks of progressively worsening foot wound to toes that began as a blister and later required oral abx as it began to form purulent drainage. Pt states despite oral abx, wound has worsened and he saw podiatry today who sent him to ED to r/o OM.     Denies fever, chills, nausea, vomiting, chest pain, abdominal pain, calf tenderness/swelling, sob, palpitations, HA, paresthesias, rash, dysuria, diarrhea, trauma to area.     Pt seen and examined with podiatry team in ED. Vanco/zosyn started per their recs. MRI not ordered as unclear compatibility with PPM (per [employer] website Radha LOMAS can tolerate 1.5T and 3T MRI) please verify in AM with radiology.

## 2024-05-16 NOTE — H&P ADULT - ASSESSMENT
Pt is a 59 yo IDDM male who presents to Ed due to worsening foot wound despite treatment with oral antibiotics as outpatient. Admitted due to:    #Left second toe osteomyelitis  #Failure of outpatient treatment  Admit to medicine  tight glucose control  elevate affected extremity  Vanco/zosyn, ID consult  podiatry consult appreciated  wound care per podiatry  WBAT with surgical shoe  XR performed, f/u official read  ESR/CRP ordered  Pain control with tylenol/percocet prn  MRI- Pt ppm is a Sticky Radha [address]TE DR MRI, per website can tolerate MRI from 1.5 to 3T, f/u with radiology in AM to ensure compatibility and order if able  On coumadin for DVT ppx    #PAF  #Subtherapeutic INR  c/w coumadin, dose ordered for tonight  one dose 1mg/kg lovenox ordered for bridge  f/u coags in AM, coumadin/lovenox pending result  c/w toprol XL    #IDDM2  AISS  takes novolog 25 TID with meals, Tresiba 56 qhs  admelog 20 TID with meals and Lovenox 45 ordered as pt on restricted diet while inpatient   carb restriction  A1c ordered  Abx in NS not D5 due to hyperglycemia    #CKD 3B  Renally dose meds  avoid nephrotoxic meds  monitor on serum chemistry  baseline cr 1.5-2, today 1.8    #CAD/HTN  c/w amlodipine  c/w lipitor  c/w tricor  c/w HCTZ  c/w losartan  c/w BB  DASH/TLC diet

## 2024-05-16 NOTE — ED ADULT NURSE NOTE - NSFALLHARMRISKINTERV_ED_ALL_ED

## 2024-05-16 NOTE — ED ADULT NURSE NOTE - NSFALLOOBATTEMPT_ED_ALL_ED
[facility] called and stated the prescription for the medication sodium Oxybate was sent to the wrong pharmacy, and they are requesting a new prescription refill to be sent to the [facility] specialty pharmacy. Fax has been sent to [facility].
No

## 2024-05-16 NOTE — ED ADULT NURSE REASSESSMENT NOTE - NS ED NURSE REASSESS COMMENT FT1
Spoke with MD Castillo regarding IV ABX for pt, MD aware and will place orders in for ABX, Pt safe and stable to go up without Abx to floor as per MD Castillo,

## 2024-05-16 NOTE — PATIENT PROFILE ADULT - FALL HARM RISK - HARM RISK INTERVENTIONS

## 2024-05-16 NOTE — PATIENT PROFILE ADULT - DEAF OR HARD OF HEARING?
From: Brian Abbott  To: Dr. Hiral Barnes: 11/27/2023 7:10 AM EST  Subject: tamsulosin 0.4 mg capsule    Good morning, Dr. Darshan Mason,    I cannot order my tamsulosin online at this time. However, I only have about a week left of medication. Can you please renew the 90 day prescription for me.     Thanks,  Felicia Rojas
no

## 2024-05-16 NOTE — H&P ADULT - SKIN COMMENTS
skin warm, dry. Full thickness wound to left second tow with erythema and cellulitis and probes to bone

## 2024-05-16 NOTE — ED ADULT NURSE NOTE - NSSEPSISSUSPECTED_ED_A_ED
No improvement with Zithromax. Will start on Amoxicillin and tapering Prednisone. Ninjacof as needed.     Reviewed pathology of current symptoms, medication side effects/risk/benefits/directions on taking medications, and worsening or persistent symptoms that require follow up in next 2-3 days. Saline/steroid nasal sprays, antihistamine use, increase fluid intake, and multivitamin/elderberry/Zinc use were recommended. May take Tylenol or Motrin as needed for pain and/or fever. Patient was instructed to take antibiotic as directed, complete entire course to avoid antibiotic resistance, and take OTC probiotic with antibiotic to prevent GI upset. Patient verbalized understanding of treatment plan and denies any questions.     Yes

## 2024-05-16 NOTE — CONSULT NOTE ADULT - ASSESSMENT
A: 58yr old male seen for the following:   - Full thickness wound to the left second toe in the setting of OM  - DM    P:   Chart reviewed and Patient evaluated;  Discussed diagnosis and treatment with patient. Discussed importance of daily foot examinations, proper shoe gear, importance of tight glycemic control.   X-rays reviewed : on wet read showing no soft tissue gas, no gross cortical erosions appreciated  Full thickness wound to the let second toe distal aspect, no periwound edema, mild periwound erythema, no purulence, no fluctuance, no malodor, does not track or tunnel, + probes to bone, sanguinous discharge only  Wound flush with normal saline  Recc MRI left foot  Applied betadine with dry sterile dressing  WBAT using surgical shoe  Continue with antibiotics as per ID  All additional care per Med appreciated  Patient demonstrated verbal understanding of all interventions and tolerated interventions well without any complications.   Podiatry will follow while in house      Case D/W attending Dr. Swenson       A: 58yr old male seen for the following:   - Full thickness wound to the left second toe in the setting of OM  - DM    P:   Chart reviewed and Patient evaluated;  Discussed diagnosis and treatment with patient. Discussed importance of daily foot examinations, proper shoe gear, importance of tight glycemic control.   X-rays reviewed : on wet read showing no soft tissue gas, no gross cortical erosions appreciated  Full thickness wound to the let second toe distal aspect, no periwound edema, mild periwound erythema, no purulence, no fluctuance, no malodor, does not track or tunnel, + probes to bone, sanguinous discharge only  Wound flush with normal saline  Recc MRI left foot  Applied betadine with dry sterile dressing  WBAT using surgical shoe  Continue with antibiotics as per ID  All additional care per Med appreciated  Patient demonstrated verbal understanding of all interventions and tolerated interventions well without any complications.   Podiatry will follow while in house      Case seen with attending Dr. Swenson

## 2024-05-16 NOTE — ED ADULT NURSE NOTE - OBJECTIVE STATEMENT
Left foot 2nd toe infection for 4 weeks treated by MD Douglas. Increasing intermittent pain in left foot 2nd toe seen by MD Douglas today referred to ED for IV antibiotics   hx of antibiotics for 2 weeks.

## 2024-05-17 LAB
A1C WITH ESTIMATED AVERAGE GLUCOSE RESULT: 11.4 % — HIGH (ref 4–5.6)
A1C WITH ESTIMATED AVERAGE GLUCOSE RESULT: 11.6 % — HIGH (ref 4–5.6)
ALBUMIN SERPL ELPH-MCNC: 3 G/DL — LOW (ref 3.3–5)
ALP SERPL-CCNC: 50 U/L — SIGNIFICANT CHANGE UP (ref 40–120)
ALT FLD-CCNC: 37 U/L — SIGNIFICANT CHANGE UP (ref 12–78)
ANION GAP SERPL CALC-SCNC: 5 MMOL/L — SIGNIFICANT CHANGE UP (ref 5–17)
APTT BLD: 45.7 SEC — HIGH (ref 24.5–35.6)
AST SERPL-CCNC: 27 U/L — SIGNIFICANT CHANGE UP (ref 15–37)
BASOPHILS # BLD AUTO: 0.11 K/UL — SIGNIFICANT CHANGE UP (ref 0–0.2)
BASOPHILS NFR BLD AUTO: 1.3 % — SIGNIFICANT CHANGE UP (ref 0–2)
BILIRUB SERPL-MCNC: 0.7 MG/DL — SIGNIFICANT CHANGE UP (ref 0.2–1.2)
BUN SERPL-MCNC: 33 MG/DL — HIGH (ref 7–23)
CALCIUM SERPL-MCNC: 9 MG/DL — SIGNIFICANT CHANGE UP (ref 8.5–10.1)
CHLORIDE SERPL-SCNC: 111 MMOL/L — HIGH (ref 96–108)
CO2 SERPL-SCNC: 26 MMOL/L — SIGNIFICANT CHANGE UP (ref 22–31)
CREAT SERPL-MCNC: 1.66 MG/DL — HIGH (ref 0.5–1.3)
CRP SERPL-MCNC: 5 MG/L — HIGH
EGFR: 48 ML/MIN/1.73M2 — LOW
EOSINOPHIL # BLD AUTO: 0.25 K/UL — SIGNIFICANT CHANGE UP (ref 0–0.5)
EOSINOPHIL NFR BLD AUTO: 2.9 % — SIGNIFICANT CHANGE UP (ref 0–6)
ESTIMATED AVERAGE GLUCOSE: 280 MG/DL — HIGH (ref 68–114)
ESTIMATED AVERAGE GLUCOSE: 286 MG/DL — HIGH (ref 68–114)
GLUCOSE BLDC GLUCOMTR-MCNC: 200 MG/DL — HIGH (ref 70–99)
GLUCOSE BLDC GLUCOMTR-MCNC: 206 MG/DL — HIGH (ref 70–99)
GLUCOSE BLDC GLUCOMTR-MCNC: 213 MG/DL — HIGH (ref 70–99)
GLUCOSE BLDC GLUCOMTR-MCNC: 222 MG/DL — HIGH (ref 70–99)
GLUCOSE SERPL-MCNC: 200 MG/DL — HIGH (ref 70–99)
HCT VFR BLD CALC: 38.4 % — LOW (ref 39–50)
HCV AB S/CO SERPL IA: 0.29 S/CO — SIGNIFICANT CHANGE UP (ref 0–0.99)
HCV AB SERPL-IMP: SIGNIFICANT CHANGE UP
HGB BLD-MCNC: 13.2 G/DL — SIGNIFICANT CHANGE UP (ref 13–17)
IMM GRANULOCYTES NFR BLD AUTO: 1.9 % — HIGH (ref 0–0.9)
INR BLD: 1.7 RATIO — HIGH (ref 0.85–1.18)
LYMPHOCYTES # BLD AUTO: 1.96 K/UL — SIGNIFICANT CHANGE UP (ref 1–3.3)
LYMPHOCYTES # BLD AUTO: 22.7 % — SIGNIFICANT CHANGE UP (ref 13–44)
MCHC RBC-ENTMCNC: 29.9 PG — SIGNIFICANT CHANGE UP (ref 27–34)
MCHC RBC-ENTMCNC: 34.4 GM/DL — SIGNIFICANT CHANGE UP (ref 32–36)
MCV RBC AUTO: 87.1 FL — SIGNIFICANT CHANGE UP (ref 80–100)
MONOCYTES # BLD AUTO: 0.73 K/UL — SIGNIFICANT CHANGE UP (ref 0–0.9)
MONOCYTES NFR BLD AUTO: 8.5 % — SIGNIFICANT CHANGE UP (ref 2–14)
NEUTROPHILS # BLD AUTO: 5.41 K/UL — SIGNIFICANT CHANGE UP (ref 1.8–7.4)
NEUTROPHILS NFR BLD AUTO: 62.7 % — SIGNIFICANT CHANGE UP (ref 43–77)
PLATELET # BLD AUTO: 260 K/UL — SIGNIFICANT CHANGE UP (ref 150–400)
POTASSIUM SERPL-MCNC: 3.9 MMOL/L — SIGNIFICANT CHANGE UP (ref 3.5–5.3)
POTASSIUM SERPL-SCNC: 3.9 MMOL/L — SIGNIFICANT CHANGE UP (ref 3.5–5.3)
PROT SERPL-MCNC: 6.2 GM/DL — SIGNIFICANT CHANGE UP (ref 6–8.3)
PROTHROM AB SERPL-ACNC: 18.9 SEC — HIGH (ref 9.5–13)
RBC # BLD: 4.41 M/UL — SIGNIFICANT CHANGE UP (ref 4.2–5.8)
RBC # FLD: 13.6 % — SIGNIFICANT CHANGE UP (ref 10.3–14.5)
SODIUM SERPL-SCNC: 142 MMOL/L — SIGNIFICANT CHANGE UP (ref 135–145)
WBC # BLD: 8.62 K/UL — SIGNIFICANT CHANGE UP (ref 3.8–10.5)
WBC # FLD AUTO: 8.62 K/UL — SIGNIFICANT CHANGE UP (ref 3.8–10.5)

## 2024-05-17 PROCEDURE — 73718 MRI LOWER EXTREMITY W/O DYE: CPT | Mod: 26,LT

## 2024-05-17 PROCEDURE — 93280 PM DEVICE PROGR EVAL DUAL: CPT | Mod: 26

## 2024-05-17 PROCEDURE — 99233 SBSQ HOSP IP/OBS HIGH 50: CPT

## 2024-05-17 PROCEDURE — 71045 X-RAY EXAM CHEST 1 VIEW: CPT | Mod: 26

## 2024-05-17 PROCEDURE — 93286 PERI-PX EVAL PM/LDLS PM IP: CPT | Mod: 26,59

## 2024-05-17 RX ORDER — INSULIN LISPRO 100/ML
VIAL (ML) SUBCUTANEOUS AT BEDTIME
Refills: 0 | Status: DISCONTINUED | OUTPATIENT
Start: 2024-05-17 | End: 2024-05-21

## 2024-05-17 RX ORDER — WARFARIN SODIUM 2.5 MG/1
6 TABLET ORAL ONCE
Refills: 0 | Status: COMPLETED | OUTPATIENT
Start: 2024-05-17 | End: 2024-05-17

## 2024-05-17 RX ORDER — ENOXAPARIN SODIUM 100 MG/ML
120 INJECTION SUBCUTANEOUS ONCE
Refills: 0 | Status: COMPLETED | OUTPATIENT
Start: 2024-05-17 | End: 2024-05-17

## 2024-05-17 RX ORDER — ENOXAPARIN SODIUM 100 MG/ML
120 INJECTION SUBCUTANEOUS EVERY 12 HOURS
Refills: 0 | Status: DISCONTINUED | OUTPATIENT
Start: 2024-05-17 | End: 2024-05-21

## 2024-05-17 RX ADMIN — INSULIN GLARGINE 45 UNIT(S): 100 INJECTION, SOLUTION SUBCUTANEOUS at 00:00

## 2024-05-17 RX ADMIN — LOSARTAN POTASSIUM 50 MILLIGRAM(S): 100 TABLET, FILM COATED ORAL at 10:23

## 2024-05-17 RX ADMIN — PIPERACILLIN AND TAZOBACTAM 25 GRAM(S): 4; .5 INJECTION, POWDER, LYOPHILIZED, FOR SOLUTION INTRAVENOUS at 13:25

## 2024-05-17 RX ADMIN — Medication 20 UNIT(S): at 17:35

## 2024-05-17 RX ADMIN — ATORVASTATIN CALCIUM 80 MILLIGRAM(S): 80 TABLET, FILM COATED ORAL at 22:54

## 2024-05-17 RX ADMIN — AMLODIPINE BESYLATE 10 MILLIGRAM(S): 2.5 TABLET ORAL at 10:22

## 2024-05-17 RX ADMIN — Medication 145 MILLIGRAM(S): at 22:54

## 2024-05-17 RX ADMIN — PIPERACILLIN AND TAZOBACTAM 25 GRAM(S): 4; .5 INJECTION, POWDER, LYOPHILIZED, FOR SOLUTION INTRAVENOUS at 22:55

## 2024-05-17 RX ADMIN — MAGNESIUM OXIDE 400 MG ORAL TABLET 400 MILLIGRAM(S): 241.3 TABLET ORAL at 10:22

## 2024-05-17 RX ADMIN — ENOXAPARIN SODIUM 120 MILLIGRAM(S): 100 INJECTION SUBCUTANEOUS at 04:01

## 2024-05-17 RX ADMIN — WARFARIN SODIUM 6 MILLIGRAM(S): 2.5 TABLET ORAL at 22:54

## 2024-05-17 RX ADMIN — Medication 2: at 08:24

## 2024-05-17 RX ADMIN — Medication 1 TABLET(S): at 10:23

## 2024-05-17 RX ADMIN — INSULIN GLARGINE 45 UNIT(S): 100 INJECTION, SOLUTION SUBCUTANEOUS at 22:54

## 2024-05-17 RX ADMIN — Medication 1000 UNIT(S): at 10:23

## 2024-05-17 RX ADMIN — PIPERACILLIN AND TAZOBACTAM 25 GRAM(S): 4; .5 INJECTION, POWDER, LYOPHILIZED, FOR SOLUTION INTRAVENOUS at 00:58

## 2024-05-17 RX ADMIN — Medication 250 MILLIGRAM(S): at 10:21

## 2024-05-17 RX ADMIN — Medication 20 UNIT(S): at 11:40

## 2024-05-17 RX ADMIN — Medication 50 MILLIGRAM(S): at 10:23

## 2024-05-17 RX ADMIN — Medication 325 MILLIGRAM(S): at 10:22

## 2024-05-17 RX ADMIN — PIPERACILLIN AND TAZOBACTAM 25 GRAM(S): 4; .5 INJECTION, POWDER, LYOPHILIZED, FOR SOLUTION INTRAVENOUS at 06:51

## 2024-05-17 RX ADMIN — Medication 1 SPRAY(S): at 10:23

## 2024-05-17 RX ADMIN — Medication 4: at 17:35

## 2024-05-17 RX ADMIN — ENOXAPARIN SODIUM 120 MILLIGRAM(S): 100 INJECTION SUBCUTANEOUS at 22:54

## 2024-05-17 RX ADMIN — Medication 20 UNIT(S): at 08:24

## 2024-05-17 RX ADMIN — CLOPIDOGREL BISULFATE 75 MILLIGRAM(S): 75 TABLET, FILM COATED ORAL at 10:22

## 2024-05-17 RX ADMIN — Medication 250 MILLIGRAM(S): at 21:17

## 2024-05-17 RX ADMIN — Medication 4: at 11:39

## 2024-05-17 NOTE — CONSULT NOTE ADULT - ASSESSMENT
59 y/o male with h/o pancreatitis, HTN, HLD, IDDM, CKD3B with baseline cr 1.5-2, obesity, CAD s/p PCI and 8 stents, PPM [employer] [place] XT DR LOMAS, AVR, HTN urgency, PAF on coumadin was admitted on 5/16 after he was sent by his podiatrist Dr. Garcia due to 2 weeks of progressively worsening foot wound to toes that began as a blister and later required oral abx as it began to form purulent drainage. Pt states despite oral abx, wound has worsened and he saw podiatry today who sent him to ED to r/o OM. Denies fever, chills at home. In ER he received vancomycin IV and zosyn.     1. Left 2nd toe ulcer with surrounding cellulitis and probable underlying acute on chronic OM. CRF stage 3. Allergy to PCN.   -no clinical signs of sepsis  -start daptomycin 500 mg IV qd and cefepime 2 gm IV qd  -reason for abx use and side effects reviewed with patient; monitor BMP   -monitor closely in [place] of PCN allergy history  -podiatry evaluation  -local wound care  -plan for MRI foot  -old chart reviewed to assess prior cultures  -monitor temps  -f/u CBC  -supportive care  2. Other issues:   -care per medicine    Clinical team may change from intravenous to oral antibiotics when the following criteria are met:   1. Patient is clinically improving/stable       a)	Improved signs and symptoms of infection from initial presentation       b)	Afebrile for 24 hours       c)	Leukocytosis trending towards normal range   2. Patient is tolerating oral intake   3. Initial/repeat blood cultures are negative     When above criteria met may change iv antibiotics to an oral, but he may need loger term IV abx therapy  Cannot advise changing to oral antibiotic therapy until culture sensitivity is available.

## 2024-05-17 NOTE — PROGRESS NOTE ADULT - SUBJECTIVE AND OBJECTIVE BOX
Date of service: 5/17/2024    HPI:  58-year-old male history pancreatitis,  HTN, HLD, DM2, CKD 3, obesity, CAD s/p x8 stents, s/p cholecystectomy, pacemaker, AVR, presented to ED today with wound to the left second toe.  Patient states it started off as a blister about 2 weeks back which then started to discharge pus a week and a half ago has been on PO antibiotics sulfamethoxazole by his PCP; Pt states the pus, drainage and pain has improved after the PO antibiotics but wound has not completely resolved. He saw Dr boyce today for wound evaluation and was adviced to go to ER for admission for MRi for further work up of suspected OM. Pt states he has history of toe infection to the other foot which had heeled previously. Pt denies any fever, chills, nausea, vomiting sob.     5/17: Pt seen by podiatry today. NAD, resting bedside. discussed mri findings and treatments options    PMH: Essential hypertension    Obstructive sleep apnea    Diabetes mellitus    HLD (hyperlipidemia)    CAD (coronary artery disease)    Pancreatitis      PSH:No significant past surgical history    History of coronary artery stent placement    S/P cholecystectomy        Allergies:penicillin (Hives)  scallops (Nausea)      Labs:                          13.2   8.62  )-----------( 260      ( 17 May 2024 06:21 )             38.4   05-17    142  |  111<H>  |  33<H>  ----------------------------<  200<H>  3.9   |  26  |  1.66<H>    Ca    9.0      17 May 2024 06:21    TPro  6.2  /  Alb  3.0<L>  /  TBili  0.7  /  DBili  x   /  AST  27  /  ALT  37  /  AlkPhos  50  05-17      Vital Signs Last 24 Hrs  T(C): 36.8 (17 May 2024 15:19), Max: 37.1 (16 May 2024 22:22)  T(F): 98.2 (17 May 2024 15:19), Max: 98.8 (16 May 2024 22:22)  HR: 80 (17 May 2024 15:19) (80 - 86)  BP: 139/77 (17 May 2024 15:19) (121/76 - 155/92)  BP(mean): --  RR: 18 (17 May 2024 15:19) (18 - 18)  SpO2: 96% (17 May 2024 15:19) (96% - 100%)    Parameters below as of 17 May 2024 15:19  Patient On (Oxygen Delivery Method): room air      REVIEW OF SYSTEMS:    CONSTITUTIONAL: No weakness, fevers or chills  EYES: No visual changes  RESPIRATORY: No cough, wheezing; No shortness of breath  CARDIOVASCULAR: No chest pain or palpitations  GASTROINTESTINAL: No abdominal or epigastric pain. No nausea, vomiting; No diarrhea or constipation.   GENITOURINARY: No dysuria, frequency or hematuria  NEUROLOGICAL: No numbness or weakness  SKIN: See physical examination.  All other review of systems is negative unless indicated above    Physical Exam:   Constitutional: NAD, alert;  Lower Extremity Focus  Derm:  Skin warm, dry and supple bilateral.    Full thickness wound to the let second toe distal aspect, Wound base is granular, wound size aprox 1cm x 1cm x 1cm, no periwound edema, mild periwound erythema, no purulence, no fluctuance, no malodor, does not track or tunnel, + probes to bone, sanguinous discharge only  Vascular: Dorsalis Pedis and Posterior Tibial pulses 1/4 left foot.  Capillary re-fill time less then 3 seconds digits 1-5 bilateral.    Neuro: Protective sensation intact to the level of the digits bilateral.  MSK: Muscle strength 5/5 all major muscle groups bilateral.       < from: MR Foot No Cont, Left (05.17.24 @ 15:05) >    ACC: [id number] EXAM:  MR FOOT LT   ORDERED BY: KASHIF MEDRANO     PROCEDURE DATE:  05/17/2024          INTERPRETATION:  Exam Type: MR FOOT LEFT  Exam Date and Time: 5/17/2024 3:05 PM  Indication: Concern for osteomyelitis  Comparison: Left foot radiograph from 5/16/2024    TECHNIQUE:  Multiplanar multisequence MRI of the left foot was performed using a   standard non-contrast protocol.    FINDINGS:  BONES/JOINTS:  Osseous edema with cortical irregularity and loss of normal T1 fatty   marrow of the distal phalanx of the second digit corresponding to   osteomyelitis. Alignment is anatomic. 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 Mild   osteoarthritis of the midfoot. No joint effusions.    PLANTAR PLATES:  Intact plantar plates without tear.    INTERMETATARSAL SPACES:  No intermetatarsal neuroma. Small first through third intermetatarsal   bursitis.    TENDONS:  Visualized portions of the flexor, extensor, and peroneal tendons are  intact without tendinosis or tear. No tenosynovitis.    LISFRANC LIGAMENT:  Plantar (pC1-M2M3), interosseous (C1-M2), and dorsal bands intact.    MUSCLES:  Edema throughout the muscles of the forefoot.    SOFT TISSUES:  Soft tissue edema throughoutthe foot most noted at the dorsal aspect.   Soft tissue ulcer distal to the tuft of the second digit.    IMPRESSION:  1.  Osteomyelitis of the distal phalanx of the second digit with adjacent   soft tissue ulcer.  2.  Small first through third intermetatarsal bursitis.    --- End of Report ---            ASHLEY GOLDEN DO; Attending Radiologist  This document has been electronically signed. May 17 2024  3:40PM    < end of copied text >

## 2024-05-17 NOTE — CONSULT NOTE ADULT - SUBJECTIVE AND OBJECTIVE BOX
Patient is a 58y old  Male who presents with a chief complaint of Osteomyelitis and cellulitis of diabetic foot, failure of outpatient treatment     HPI:  57 y/o male with h/o pancreatitis, HTN, HLD, IDDM, CKD3B with baseline cr 1.5-2, obesity, CAD s/p PCI and 8 stents, PPM [employer] [place] XT DR LOMAS, AVR, HTN urgency, PAF on coumadin was admitted on 5/16 after he was sent by his podiatrist Dr. Garcia due to 2 weeks of progressively worsening foot wound to toes that began as a blister and later required oral abx as it began to form purulent drainage. Pt states despite oral abx, wound has worsened and he saw podiatry today who sent him to ED to r/o OM. Denies fever, chills at home. In ER he received vancomycin IV and zosyn.     PMH: as above  PSH: as above  Meds: per reconciliation sheet, noted below  MEDICATIONS  (STANDING):  amLODIPine   Tablet 10 milliGRAM(s) Oral daily  atorvastatin 80 milliGRAM(s) Oral at bedtime  cholecalciferol 1000 Unit(s) Oral daily  clopidogrel Tablet 75 milliGRAM(s) Oral daily  dextrose 10% Bolus 125 milliLiter(s) IV Bolus once  dextrose 5%. 1000 milliLiter(s) (100 mL/Hr) IV Continuous <Continuous>  dextrose 5%. 1000 milliLiter(s) (50 mL/Hr) IV Continuous <Continuous>  dextrose 50% Injectable 25 Gram(s) IV Push once  dextrose 50% Injectable 12.5 Gram(s) IV Push once  fenofibrate Tablet 145 milliGRAM(s) Oral at bedtime  ferrous    sulfate 325 milliGRAM(s) Oral daily  glucagon  Injectable 1 milliGRAM(s) IntraMuscular once  hydrochlorothiazide 12.5 milliGRAM(s) Oral daily  insulin glargine Injectable (LANTUS) 45 Unit(s) SubCutaneous at bedtime  insulin lispro (ADMELOG) corrective regimen sliding scale   SubCutaneous three times a day before meals  insulin lispro Injectable (ADMELOG) 20 Unit(s) SubCutaneous three times a day before meals  losartan 50 milliGRAM(s) Oral daily  magnesium oxide 400 milliGRAM(s) Oral daily  metoprolol succinate ER 50 milliGRAM(s) Oral daily  multivitamin 1 Tablet(s) Oral daily  piperacillin/tazobactam IVPB.. 3.375 Gram(s) IV Intermittent every 8 hours  vancomycin  IVPB 1000 milliGRAM(s) IV Intermittent every 12 hours    MEDICATIONS  (PRN):  acetaminophen     Tablet .. 650 milliGRAM(s) Oral every 6 hours PRN Temp greater or equal to 38C (100.4F), Mild Pain (1 - 3), Moderate Pain (4 - 6)  dextrose Oral Gel 15 Gram(s) Oral once PRN Blood Glucose LESS THAN 70 milliGRAM(s)/deciliter  fluticasone propionate 50 MICROgram(s)/spray Nasal Spray 1 Spray(s) Both Nostrils two times a day PRN congestion  ondansetron Injectable 4 milliGRAM(s) IV Push every 6 hours PRN Nausea  oxycodone    5 mG/acetaminophen 325 mG 1 Tablet(s) Oral every 4 hours PRN Severe Pain (7 - 10)  senna 2 Tablet(s) Oral at bedtime PRN Constipation    Allergies    penicillin (Hives)  scallops (Nausea)    Intolerances      Social: no smoking, no alcohol, no illegal drugs; no recent travel, no exposure to TB  FAMILY HISTORY:  Family history of diabetes mellitus (Sibling)  FH: heart disease (Sibling, Father, Mother)    ROS: the patient denies fever, no chills, no HA, no seizures, no dizziness, no sore throat, no nasal congestion, no blurry vision, no CP, no palpitations, no SOB, no cough, no abdominal pain, no diarrhea, no N/V, no dysuria, no leg pain, no claudication, has left foot ulcer, no joint aches, no rectal pain or bleeding, no night sweats  All other systems reviewed and are negative    Vital Signs Last 24 Hrs  T(C): 36.9 (17 May 2024 07:43), Max: 37.1 (16 May 2024 22:22)  T(F): 98.4 (17 May 2024 07:43), Max: 98.8 (16 May 2024 22:22)  HR: 86 (17 May 2024 07:43) (81 - 90)  BP: 121/76 (17 May 2024 07:43) (121/76 - 155/92)  BP(mean): 97 (16 May 2024 16:11) (97 - 97)  RR: 18 (17 May 2024 07:43) (18 - 18)  SpO2: 97% (17 May 2024 07:43) (97% - 100%)    Parameters below as of 17 May 2024 07:43  Patient On (Oxygen Delivery Method): room air      Daily Height in cm: 185.42 (16 May 2024 15:45)    Daily     PE:    Constitutional:  No acute distress  HEENT: NC/AT, EOMI, PERRLA, conjunctivae clear; ears and nose atraumatic; pharynx benign  Neck: supple; thyroid not palpable  Back: no tenderness  Respiratory: respiratory effort normal; clear to auscultation  Cardiovascular: S1S2 regular, no murmurs  Abdomen: soft, not tender, not distended, positive BS; no liver or spleen organomegaly  Genitourinary: no suprapubic tenderness  Lymphatic: no LN palpable  Musculoskeletal: no muscle tenderness, no joint swelling or tenderness  Extremities: no pedal edema  Left second toe ulcer, wound base is granular, wound size aprox 1cm x 1cm x 1cm, periwound erythema, no purulence, no fluctuance, no malodor, probes to bone, sanguinous discharge  Vascular: Dorsalis Pedis and Posterior Tibial pulses 1/4 left foot.  Capillary re-fill time less then 3 seconds digits 1-5 bilateral.    Neurological/ Psychiatric: AxOx3, judgement and insight normal; moving all extremities  Skin: no rashes; no palpable lesions    Labs: all available labs reviewed                        13.2   8.62  )-----------( 260      ( 17 May 2024 06:21 )             38.4     05-17    142  |  111<H>  |  33<H>  ----------------------------<  200<H>  3.9   |  26  |  1.66<H>    Ca    9.0      17 May 2024 06:21    TPro  6.2  /  Alb  3.0<L>  /  TBili  0.7  /  DBili  x   /  AST  27  /  ALT  37  /  AlkPhos  50  05-17     LIVER FUNCTIONS - ( 17 May 2024 06:21 )  Alb: 3.0 g/dL / Pro: 6.2 gm/dL / ALK PHOS: 50 U/L / ALT: 37 U/L / AST: 27 U/L / GGT: x           Radiology: all available radiological tests reviewed    < from: US Duplex Venous Upper Ext Ltd, Right (02.05.24 @ 13:33) >  No evidence of right upper extremity deep venous thrombosis.  < end of copied text >    Advanced directives addressed: full resuscitation
Date of consult: 5/16/2024    HPI:  58-year-old male history pancreatitis,  HTN, HLD, DM2, CKD 3, obesity, CAD s/p x8 stents, s/p cholecystectomy, pacemaker, AVR, presented to ED today with wound to the left second toe.  Patient states it started off as a blister about 2 weeks back which then started to discharge pus a week and a half ago has been on PO antibiotics sulfamethoxazole by his PCP; Pt states the pus, drainage and pain has improved after the PO antibiotics but wound has not completely resolved. He saw Dr boyce today for wound evaluation and was adviced to go to ER for admission for MRi for further work up of suspected OM. Pt states he has history of toe infection to the other foot which had heeled previously. Pt denies any fever, chills, nausea, vomiting sob.     PMH: Essential hypertension    Obstructive sleep apnea    Diabetes mellitus    HLD (hyperlipidemia)    CAD (coronary artery disease)    Pancreatitis      PSH:No significant past surgical history    History of coronary artery stent placement    S/P cholecystectomy        Allergies:penicillin (Hives)  scallops (Nausea)      Labs:      WBC Trend      Chem              T(F): 98.2 (05-16-24 @ 16:11), Max: 98.2 (05-16-24 @ 16:11)  HR: 90 (05-16-24 @ 16:11) (90 - 90)  BP: 130/81 (05-16-24 @ 16:11) (130/81 - 130/81)  RR: 18 (05-16-24 @ 16:11) (18 - 18)  SpO2: 98% (05-16-24 @ 16:11) (98% - 98%)  Wt(kg): --    REVIEW OF SYSTEMS:    CONSTITUTIONAL: No weakness, fevers or chills  EYES: No visual changes  RESPIRATORY: No cough, wheezing; No shortness of breath  CARDIOVASCULAR: No chest pain or palpitations  GASTROINTESTINAL: No abdominal or epigastric pain. No nausea, vomiting; No diarrhea or constipation.   GENITOURINARY: No dysuria, frequency or hematuria  NEUROLOGICAL: No numbness or weakness  SKIN: See physical examination.  All other review of systems is negative unless indicated above    Physical Exam:   Constitutional: NAD, alert;  Lower Extremity Focus  Derm:  Skin warm, dry and supple bilateral.    Full thickness wound to the let second toe distal aspect, Wound base is granular, wound size aprox 1cm x 1cm x 1cm, no periwound edema, mild periwound erythema, no purulence, no fluctuance, no malodor, does not track or tunnel, + probes to bone, sanguinous discharge only  Vascular: Dorsalis Pedis and Posterior Tibial pulses 1/4 left foot.  Capillary re-fill time less then 3 seconds digits 1-5 bilateral.    Neuro: Protective sensation intact to the level of the digits bilateral.  MSK: Muscle strength 5/5 all major muscle groups bilateral.

## 2024-05-17 NOTE — PROGRESS NOTE ADULT - ASSESSMENT
A: 58yr old male seen for the following:   - Full thickness wound to the left second toe in the setting of OM  - DM    P:   Chart reviewed and Patient evaluated;  Discussed diagnosis and treatment with patient. Discussed importance of daily foot examinations, proper shoe gear, importance of tight glycemic control.   X-rays reviewed : on wet read showing no soft tissue gas, no gross cortical erosions appreciated  Full thickness wound to the let second toe distal aspect, no periwound edema, mild periwound erythema, no purulence, no fluctuance, no malodor, does not track or tunnel, + probes to bone, sanguinous discharge only  Wound flush with normal saline  MRI left foot reviewed; official read noted above    Discussed with pt and wife present at bedside in detail surgical and conservative management given MRI findings of OM to the left second toe distal phalanx. Pt only amicable to conservative management which would include local wound care and antibiotics as well as a flexor tenotomy of the second toe to relieve the contracture of the second toe which contributed to the wound formation. Pt aware of all the risks and benefit.   Plan to do bedside left foot second digit flexor tenotomy tomorrow  Applied betadine with dry sterile dressing  WBAT using surgical shoe  Continue with antibiotics as per ID  All additional care per Med appreciated  Patient demonstrated verbal understanding of all interventions and tolerated interventions well without any complications.   Podiatry will follow while in house      Case D/W attending Dr. Swenson       A: 58yr old male seen for the following:   - Full thickness wound to the left second toe in the setting of OM  - DM    P:   Chart reviewed and Patient evaluated;  Discussed diagnosis and treatment with patient. Discussed importance of daily foot examinations, proper shoe gear, importance of tight glycemic control.   X-rays reviewed : on wet read showing no soft tissue gas, no gross cortical erosions appreciated  Full thickness wound to the let second toe distal aspect, no periwound edema, mild periwound erythema, no purulence, no fluctuance, no malodor, does not track or tunnel, + probes to bone, sanguinous discharge only  Wound flush with normal saline  MRI left foot reviewed; official read noted above    Discussed with pt and wife present at bedside in detail surgical and conservative management given MRI findings of OM to the left second toe distal phalanx. Pt only amicable to conservative management which would include local wound care and antibiotics as well as a flexor tenotomy of the second toe to relieve the contracture of the second toe which contributed to the wound formation. Pt aware of all the risks and benefit.   No acute podiatric surgical intervention warranted at this time  Plan to do bedside left foot second digit flexor tenotomy tomorrow 5/18/24  Applied betadine with dry sterile dressing  WBAT using surgical shoe  Continue with antibiotics as per ID  All additional care per Med appreciated  Patient demonstrated verbal understanding of all interventions and tolerated interventions well without any complications.   Podiatry will follow while in house      Case D/W attending Dr. Swenson

## 2024-05-17 NOTE — PROCEDURE NOTE - INTERROGATION NOTE: COMMENTS
no atrial or ventricular tachyarrhythmias recorded since last device interrogation in february 2024.  prior episodes of PAF recorded dated to november/december 2023.

## 2024-05-17 NOTE — PROGRESS NOTE ADULT - SUBJECTIVE AND OBJECTIVE BOX
Hospital Medicine, [facility]     Chief Complaint:  Foot infection     SUBJECTIVE / OVERNIGHT EVENTS:  No acute overnight events.      Patient denies chest pain, SOB, abd pain, N/V, fever, chills, dysuria or any other complaints. All remainder ROS negative.     MEDICATIONS  (STANDING):  amLODIPine   Tablet 10 milliGRAM(s) Oral daily  atorvastatin 80 milliGRAM(s) Oral at bedtime  cholecalciferol 1000 Unit(s) Oral daily  clopidogrel Tablet 75 milliGRAM(s) Oral daily  dextrose 10% Bolus 125 milliLiter(s) IV Bolus once  dextrose 5%. 1000 milliLiter(s) (100 mL/Hr) IV Continuous <Continuous>  dextrose 5%. 1000 milliLiter(s) (50 mL/Hr) IV Continuous <Continuous>  dextrose 50% Injectable 25 Gram(s) IV Push once  dextrose 50% Injectable 12.5 Gram(s) IV Push once  fenofibrate Tablet 145 milliGRAM(s) Oral at bedtime  ferrous    sulfate 325 milliGRAM(s) Oral daily  glucagon  Injectable 1 milliGRAM(s) IntraMuscular once  hydrochlorothiazide 12.5 milliGRAM(s) Oral daily  insulin glargine Injectable (LANTUS) 45 Unit(s) SubCutaneous at bedtime  insulin lispro (ADMELOG) corrective regimen sliding scale   SubCutaneous three times a day before meals  insulin lispro Injectable (ADMELOG) 20 Unit(s) SubCutaneous three times a day before meals  losartan 50 milliGRAM(s) Oral daily  magnesium oxide 400 milliGRAM(s) Oral daily  metoprolol succinate ER 50 milliGRAM(s) Oral daily  multivitamin 1 Tablet(s) Oral daily  piperacillin/tazobactam IVPB.. 3.375 Gram(s) IV Intermittent every 8 hours  vancomycin  IVPB 1000 milliGRAM(s) IV Intermittent every 12 hours    MEDICATIONS  (PRN):  acetaminophen     Tablet .. 650 milliGRAM(s) Oral every 6 hours PRN Temp greater or equal to 38C (100.4F), Mild Pain (1 - 3), Moderate Pain (4 - 6)  dextrose Oral Gel 15 Gram(s) Oral once PRN Blood Glucose LESS THAN 70 milliGRAM(s)/deciliter  fluticasone propionate 50 MICROgram(s)/spray Nasal Spray 1 Spray(s) Both Nostrils two times a day PRN congestion  ondansetron Injectable 4 milliGRAM(s) IV Push every 6 hours PRN Nausea  oxycodone    5 mG/acetaminophen 325 mG 1 Tablet(s) Oral every 4 hours PRN Severe Pain (7 - 10)  senna 2 Tablet(s) Oral at bedtime PRN Constipation        I&O's Summary    16 May 2024 07:01  -  17 May 2024 07:00  --------------------------------------------------------  IN: 450 mL / OUT: 600 mL / NET: -150 mL        PHYSICAL EXAM:  Vital Signs Last 24 Hrs  T(C): 36.9 (17 May 2024 07:43), Max: 37.1 (16 May 2024 22:22)  T(F): 98.4 (17 May 2024 07:43), Max: 98.8 (16 May 2024 22:22)  HR: 86 (17 May 2024 07:43) (81 - 90)  BP: 121/76 (17 May 2024 07:43) (121/76 - 155/92)  BP(mean): 97 (16 May 2024 16:11) (97 - 97)  RR: 18 (17 May 2024 07:43) (18 - 18)  SpO2: 97% (17 May 2024 07:43) (97% - 100%)    Parameters below as of 17 May 2024 07:43  Patient On (Oxygen Delivery Method): room air            CONSTITUTIONAL: NAD, Obese   ENMT: Moist oral mucosa, no pharyngeal injection   RESPIRATORY: Normal respiratory effort; lungs are clear to auscultation bilaterally  CARDIOVASCULAR: Regular rate and rhythm, normal S1 and S2, + systolic murmur   ABDOMEN: Nontender to palpation, normoactive bowel sounds, no rebound   PSYCH: A+O to person, place, and time; affect appropriate  NEUROLOGY: CN 2-12 are intact and symmetric; no gross sensory deficits;   SKIN: No rashes; no palpable lesions    LABS:                        13.2   8.62  )-----------( 260      ( 17 May 2024 06:21 )             38.4     05-17    142  |  111<H>  |  33<H>  ----------------------------<  200<H>  3.9   |  26  |  1.66<H>    Ca    9.0      17 May 2024 06:21    TPro  6.2  /  Alb  3.0<L>  /  TBili  0.7  /  DBili  x   /  AST  27  /  ALT  37  /  AlkPhos  50  05-17    PT/INR - ( 17 May 2024 06:21 )   PT: 18.9 sec;   INR: 1.70 ratio         PTT - ( 17 May 2024 06:21 )  PTT:45.7 sec      Urinalysis Basic - ( 17 May 2024 06:21 )    Color: x / Appearance: x / SG: x / pH: x  Gluc: 200 mg/dL / Ketone: x  / Bili: x / Urobili: x   Blood: x / Protein: x / Nitrite: x   Leuk Esterase: x / RBC: x / WBC x   Sq Epi: x / Non Sq Epi: x / Bacteria: x        CAPILLARY BLOOD GLUCOSE      POCT Blood Glucose.: 222 mg/dL (17 May 2024 11:39)  POCT Blood Glucose.: 200 mg/dL (17 May 2024 08:20)  POCT Blood Glucose.: 185 mg/dL (16 May 2024 22:17)        RADIOLOGY & ADDITIONAL TESTS:  Results Reviewed:   Imaging Personally Reviewed:  Electrocardiogram Personally Reviewed:

## 2024-05-17 NOTE — PROGRESS NOTE ADULT - ASSESSMENT
59 yo IDDM, HTN, HLD, CKD stage 3, CAD s/p 8 stents, mechanical aortic valve and pacemaker presents to Ed due to worsening foot wound despite treatment with oral antibiotics as outpatient. Admitted due to:    #Left second toe osteomyelitis.  Failure of outpatient treatment  MRI- ppm is a [facility] [employer] [place] XT  MRI,  EP consult to see if MRI is compatible   Vanco/zosyn, ID consult  podiatry consult appreciated  wound care per podiatry  WBAT with surgical shoe  XR performed, f/u official read  ESR/CRP Elevated       #Mechanical aortic valve   #Subtherapeutic INR  c/w coumadin, dose ordered for tonight  Continue Lovenox bride until therapeutic on coumadin       #Uncontrolled IDDM2  AISS  admelog 20 TID with meals and Lovenox 45 ordered as pt on restricted diet while inpatient   carb restriction  A1c is >11   Abx in NS not D5 due to hyperglycemia    #CKD 3B  Renally dose meds  avoid nephrotoxic meds  monitor on serum chemistry  baseline cr 1.5-2    #CAD/HTN  c/w amlodipine  c/w lipitor  c/w tricor  c/w losartan  c/w BB

## 2024-05-18 LAB
ANION GAP SERPL CALC-SCNC: 8 MMOL/L — SIGNIFICANT CHANGE UP (ref 5–17)
APTT BLD: 55.2 SEC — HIGH (ref 24.5–35.6)
BUN SERPL-MCNC: 24 MG/DL — HIGH (ref 7–23)
CALCIUM SERPL-MCNC: 9 MG/DL — SIGNIFICANT CHANGE UP (ref 8.5–10.1)
CHLORIDE SERPL-SCNC: 109 MMOL/L — HIGH (ref 96–108)
CO2 SERPL-SCNC: 23 MMOL/L — SIGNIFICANT CHANGE UP (ref 22–31)
CREAT SERPL-MCNC: 1.82 MG/DL — HIGH (ref 0.5–1.3)
EGFR: 43 ML/MIN/1.73M2 — LOW
GLUCOSE BLDC GLUCOMTR-MCNC: 149 MG/DL — HIGH (ref 70–99)
GLUCOSE BLDC GLUCOMTR-MCNC: 160 MG/DL — HIGH (ref 70–99)
GLUCOSE BLDC GLUCOMTR-MCNC: 201 MG/DL — HIGH (ref 70–99)
GLUCOSE BLDC GLUCOMTR-MCNC: 230 MG/DL — HIGH (ref 70–99)
GLUCOSE SERPL-MCNC: 177 MG/DL — HIGH (ref 70–99)
HCT VFR BLD CALC: 39.2 % — SIGNIFICANT CHANGE UP (ref 39–50)
HGB BLD-MCNC: 13.7 G/DL — SIGNIFICANT CHANGE UP (ref 13–17)
INR BLD: 1.88 RATIO — HIGH (ref 0.85–1.18)
MCHC RBC-ENTMCNC: 30.7 PG — SIGNIFICANT CHANGE UP (ref 27–34)
MCHC RBC-ENTMCNC: 34.9 GM/DL — SIGNIFICANT CHANGE UP (ref 32–36)
MCV RBC AUTO: 87.9 FL — SIGNIFICANT CHANGE UP (ref 80–100)
PLATELET # BLD AUTO: 277 K/UL — SIGNIFICANT CHANGE UP (ref 150–400)
POTASSIUM SERPL-MCNC: 3.8 MMOL/L — SIGNIFICANT CHANGE UP (ref 3.5–5.3)
POTASSIUM SERPL-SCNC: 3.8 MMOL/L — SIGNIFICANT CHANGE UP (ref 3.5–5.3)
PROTHROM AB SERPL-ACNC: 20.9 SEC — HIGH (ref 9.5–13)
RBC # BLD: 4.46 M/UL — SIGNIFICANT CHANGE UP (ref 4.2–5.8)
RBC # FLD: 13.5 % — SIGNIFICANT CHANGE UP (ref 10.3–14.5)
SODIUM SERPL-SCNC: 140 MMOL/L — SIGNIFICANT CHANGE UP (ref 135–145)
WBC # BLD: 8.41 K/UL — SIGNIFICANT CHANGE UP (ref 3.8–10.5)
WBC # FLD AUTO: 8.41 K/UL — SIGNIFICANT CHANGE UP (ref 3.8–10.5)

## 2024-05-18 PROCEDURE — 99232 SBSQ HOSP IP/OBS MODERATE 35: CPT

## 2024-05-18 RX ORDER — WARFARIN SODIUM 2.5 MG/1
6 TABLET ORAL ONCE
Refills: 0 | Status: COMPLETED | OUTPATIENT
Start: 2024-05-18 | End: 2024-05-18

## 2024-05-18 RX ADMIN — Medication 20 UNIT(S): at 13:48

## 2024-05-18 RX ADMIN — Medication 250 MILLIGRAM(S): at 22:17

## 2024-05-18 RX ADMIN — Medication 50 MILLIGRAM(S): at 08:26

## 2024-05-18 RX ADMIN — ATORVASTATIN CALCIUM 80 MILLIGRAM(S): 80 TABLET, FILM COATED ORAL at 22:18

## 2024-05-18 RX ADMIN — Medication 20 UNIT(S): at 17:17

## 2024-05-18 RX ADMIN — PIPERACILLIN AND TAZOBACTAM 25 GRAM(S): 4; .5 INJECTION, POWDER, LYOPHILIZED, FOR SOLUTION INTRAVENOUS at 23:36

## 2024-05-18 RX ADMIN — WARFARIN SODIUM 6 MILLIGRAM(S): 2.5 TABLET ORAL at 22:19

## 2024-05-18 RX ADMIN — PIPERACILLIN AND TAZOBACTAM 25 GRAM(S): 4; .5 INJECTION, POWDER, LYOPHILIZED, FOR SOLUTION INTRAVENOUS at 06:21

## 2024-05-18 RX ADMIN — SENNA PLUS 2 TABLET(S): 8.6 TABLET ORAL at 22:17

## 2024-05-18 RX ADMIN — Medication 650 MILLIGRAM(S): at 22:48

## 2024-05-18 RX ADMIN — ENOXAPARIN SODIUM 120 MILLIGRAM(S): 100 INJECTION SUBCUTANEOUS at 13:47

## 2024-05-18 RX ADMIN — Medication 145 MILLIGRAM(S): at 22:19

## 2024-05-18 RX ADMIN — ENOXAPARIN SODIUM 120 MILLIGRAM(S): 100 INJECTION SUBCUTANEOUS at 22:19

## 2024-05-18 RX ADMIN — Medication 650 MILLIGRAM(S): at 22:18

## 2024-05-18 RX ADMIN — LOSARTAN POTASSIUM 50 MILLIGRAM(S): 100 TABLET, FILM COATED ORAL at 08:26

## 2024-05-18 RX ADMIN — PIPERACILLIN AND TAZOBACTAM 25 GRAM(S): 4; .5 INJECTION, POWDER, LYOPHILIZED, FOR SOLUTION INTRAVENOUS at 13:52

## 2024-05-18 RX ADMIN — Medication 650 MILLIGRAM(S): at 00:21

## 2024-05-18 RX ADMIN — Medication 1000 UNIT(S): at 08:27

## 2024-05-18 RX ADMIN — Medication 650 MILLIGRAM(S): at 01:15

## 2024-05-18 RX ADMIN — Medication 2: at 08:24

## 2024-05-18 RX ADMIN — Medication 325 MILLIGRAM(S): at 08:27

## 2024-05-18 RX ADMIN — CLOPIDOGREL BISULFATE 75 MILLIGRAM(S): 75 TABLET, FILM COATED ORAL at 08:27

## 2024-05-18 RX ADMIN — INSULIN GLARGINE 45 UNIT(S): 100 INJECTION, SOLUTION SUBCUTANEOUS at 22:18

## 2024-05-18 RX ADMIN — Medication 1 TABLET(S): at 08:27

## 2024-05-18 RX ADMIN — Medication 4: at 13:48

## 2024-05-18 RX ADMIN — Medication 4: at 17:17

## 2024-05-18 RX ADMIN — MAGNESIUM OXIDE 400 MG ORAL TABLET 400 MILLIGRAM(S): 241.3 TABLET ORAL at 08:26

## 2024-05-18 RX ADMIN — Medication 20 UNIT(S): at 08:28

## 2024-05-18 RX ADMIN — Medication 250 MILLIGRAM(S): at 08:26

## 2024-05-18 RX ADMIN — AMLODIPINE BESYLATE 10 MILLIGRAM(S): 2.5 TABLET ORAL at 08:27

## 2024-05-18 NOTE — PROGRESS NOTE ADULT - ASSESSMENT
57 yo IDDM, HTN, HLD, CKD stage 3, CAD s/p 8 stents, mechanical aortic valve and pacemaker presents to Ed due to worsening foot wound despite treatment with oral antibiotics as outpatient. Admitted due to:    #Left second toe osteomyelitis.  Failure of outpatient treatment  MRI- ppm is a [employer] [place] XT DR LOMAS,  EP consult to see if MRI is compatible   Vanco/zosyn, ID consult  podiatry consult appreciated, discussed with resident - pt for bedside flexor tenotomy   wound care per podiatry  WBAT with surgical shoe  XR performed, f/u official read  ESR/CRP Elevated     #Mechanical aortic valve   #Subtherapeutic INR  c/w coumadin, dose ordered for tonight  Continue Lovenox bridge until therapeutic on coumadin   pt follow up with Dr Kaye       #Uncontrolled IDDM2  RISS  admelog 20 TID with meals and Lovenox 45 ordered as pt on restricted diet while inpatient   carb restriction  A1c is >11   Abx in NS not D5 due to hyperglycemia    #CKD 3B  Renally dose meds  avoid nephrotoxic meds  monitor on serum chemistry  baseline cr 1.5-2    #CAD/HTN  c/w amlodipine  c/w lipitor  c/w tricor  c/w losartan  c/w BB

## 2024-05-18 NOTE — PROGRESS NOTE ADULT - SUBJECTIVE AND OBJECTIVE BOX
CC: Pt is a 57 yo male with a pmh/o pancreatitis, HTN, HLD, IDDM, CKD3B with baseline cr 1.5-2, obesity, CAD s/p PCI and 8 stents, PPM [employer] XT DR LOMAS, AVR, HTN urgency, PAF on coumadin, who presents to ED upon recommendation of podiatrist Dr. Garcia due to 2 weeks of progressively worsening foot wound to toes that began as a blister and later required oral abx as it began to form purulent drainage. Pt states despite oral abx, wound has worsened and he saw podiatry today who sent him to ED to r/o OM.   Denies fever, chills, nausea, vomiting, chest pain, abdominal pain, calf tenderness/swelling, sob, palpitations, HA, paresthesias, rash, dysuria, diarrhea, trauma to area.   Pt seen and examined with podiatry team in ED. Vanco/zosyn started per their recs. MRI not ordered as unclear compatibility with PPM (per [employer] [place] XT DR LOMAS can tolerate 1.5T and 3T MRI) please verify in AM with radiology.      5/18 - sitting up in chair, pt awake, alert, cooperative, denies pain or discomfort. no cp palps sob abdo pain     Vital Signs Last 24 Hrs  T(C): 36.7 (18 May 2024 16:14), Max: 37.9 (17 May 2024 21:20)  T(F): 98.1 (18 May 2024 16:14), Max: 100.2 (17 May 2024 21:20)  HR: 80 (18 May 2024 16:14) (79 - 81)  BP: 121/73 (18 May 2024 16:14) (121/73 - 131/75)  RR: 16 (18 May 2024 16:14) (16 - 16)  SpO2: 96% (18 May 2024 16:14) (96% - 98%), RA   Constitutional: NAD, awake and alert  HEENT: PERR, EOMI  Neck: Soft and supple,  No JVD  Respiratory: Breath sounds are clear bilaterally, No wheezing, rales or rhonchi  Cardiovascular: S1 and S2, regular rate and rhythm, no Murmurs, click of mech valve   Gastrointestinal: Bowel Sounds present, soft, nontender, nondistended  Extremities: No peripheral edema  Vascular: 2+ peripheral pulses  Neurological: A/O x 3, no focal deficits  Musculoskeletal: left leg elevated toe in dressing  Skin: No rashes    MEDICATIONS:  MEDICATIONS  (STANDING):  amLODIPine   Tablet 10 milliGRAM(s) Oral daily  atorvastatin 80 milliGRAM(s) Oral at bedtime  cholecalciferol 1000 Unit(s) Oral daily  clopidogrel Tablet 75 milliGRAM(s) Oral daily  dextrose 5%. 1000 milliLiter(s) (100 mL/Hr) IV Continuous <Continuous>  dextrose 5%. 1000 milliLiter(s) (50 mL/Hr) IV Continuous <Continuous>  enoxaparin Injectable 120 milliGRAM(s) SubCutaneous every 12 hours  fenofibrate Tablet 145 milliGRAM(s) Oral at bedtime  ferrous    sulfate 325 milliGRAM(s) Oral daily  glucagon  Injectable 1 milliGRAM(s) IntraMuscular once  insulin glargine Injectable (LANTUS) 45 Unit(s) SubCutaneous at bedtime  insulin lispro (ADMELOG) corrective regimen sliding scale   SubCutaneous three times a day before meals  insulin lispro (ADMELOG) corrective regimen sliding scale.   SubCutaneous at bedtime  insulin lispro Injectable (ADMELOG) 20 Unit(s) SubCutaneous three times a day before meals  losartan 50 milliGRAM(s) Oral daily  magnesium oxide 400 milliGRAM(s) Oral daily  metoprolol succinate ER 50 milliGRAM(s) Oral daily  multivitamin 1 Tablet(s) Oral daily  piperacillin/tazobactam IVPB.. 3.375 Gram(s) IV Intermittent every 8 hours  vancomycin  IVPB 1000 milliGRAM(s) IV Intermittent every 12 hours      LABS: All Labs Reviewed:                     13.7   8.41  )-----------( 277      ( 18 May 2024 05:45 )             39.2   140  |  109<H>  |  24<H>  ----------------------------<  177<H>  3.8   |  23  |  1.82<H>  Ca    9.0      18 May 2024 05:45  TPro  6.2  /  Alb  3.0<L>  /  TBili  0.7  /  DBili  x   /  AST  27  /  ALT  37  /  AlkPhos  50  05-17  PT/INR - ( 18 May 2024 05:45 )   PT: 20.9 sec;   INR: 1.88 ratio      RADIOLOGY/EKG:  < from: MR Foot No Cont, Left (05.17.24 @ 15:05) >  IMPRESSION:  1.  Osteomyelitis of the distal phalanx of the second digit with adjacent   soft tissue ulcer.  2.  Small first through third intermetatarsal bursitis.

## 2024-05-18 NOTE — PROCEDURE NOTE - ADDITIONAL PROCEDURE DETAILS
Left foot second toe tenotomy performed bedside in aseptic fashion after giving 1% plain lidocaine local anesthesia in digital block fashion. Procedure performed utilizing a 18 gauge needle. The 18-gauge needle was inserted at the plantar PIP joint level Left second toe. Using the sharp beveled edge of the needle, a sweeping motion back and forth was carried out to transect the longitudinal fibers of the flexor tendons of left second toe allowing contracture release. The small incision was then closed using 4-0 prolene in simple suture fashion.  Xeroform and sterile gauze was wrapped around the toe to keep it in a rectus position.
normal function dual chamber PPM.  pacing system is conditional for MRI - will recommended ODO programming.

## 2024-05-18 NOTE — PROGRESS NOTE ADULT - ASSESSMENT
A: 58yr old male seen for the following:   - Full thickness wound to the left second toe in the setting of OM  - DM    P:   Chart reviewed and Patient evaluated;  Discussed diagnosis and treatment with patient. Discussed importance of daily foot examinations, proper shoe gear, importance of tight glycemic control.   X-rays reviewed : on wet read showing no soft tissue gas, no gross cortical erosions appreciated  Full thickness wound to the let second toe distal aspect, no periwound edema, mild periwound erythema, no purulence, no fluctuance, no malodor, does not track or tunnel, + probes to bone, sanguinous discharge only  Wound flush with normal saline  MRI left foot reviewed; official read noted above    5/17: Discussed with pt and wife present at bedside in detail surgical and conservative management given MRI findings of OM to the left second toe distal phalanx. Pt only amicable to conservative management which would include local wound care and antibiotics as well as a flexor tenotomy of the second toe to relieve the contracture of the second toe which contributed to the wound formation. Pt aware of all the risks and benefit.     5/18: Pt s/p Bedside flexor tenotomy of left second toe, Pt tolerated procedure well. Please see procedure note for details.   No acute podiatric surgical intervention warranted at this time; Pt to f/u with Dr Swenson within 1 week of discharge    Applied xeroform and betadine with dry sterile dressing  WBAT using surgical shoe  Continue with antibiotics as per ID  All additional care per Med appreciated  Patient demonstrated verbal understanding of all interventions and tolerated interventions well without any complications.   Podiatry will follow while in house      Case D/W attending Dr. Swenson    Wound care instructions to be change every other day to the left second toe:  - Please remove old dressings gently  - Clean the wound with saline ang gauze, Dry thoroughly with gauze.  - Apply betadine soaked gauze, and secure with majo and tape  Thank you.

## 2024-05-18 NOTE — PROGRESS NOTE ADULT - SUBJECTIVE AND OBJECTIVE BOX
Date of service: 5/18/2024    HPI:  58-year-old male history pancreatitis,  HTN, HLD, DM2, CKD 3, obesity, CAD s/p x8 stents, s/p cholecystectomy, pacemaker, AVR, presented to ED today with wound to the left second toe.  Patient states it started off as a blister about 2 weeks back which then started to discharge pus a week and a half ago has been on PO antibiotics sulfamethoxazole by his PCP; Pt states the pus, drainage and pain has improved after the PO antibiotics but wound has not completely resolved. He saw Dr boyce today for wound evaluation and was adviced to go to ER for admission for MRi for further work up of suspected OM. Pt states he has history of toe infection to the other foot which had heeled previously. Pt denies any fever, chills, nausea, vomiting sob.     5/18: Pt seen by podiatry today with attending present. NAD, resting bedside.     PMH: Essential hypertension    Obstructive sleep apnea    Diabetes mellitus    HLD (hyperlipidemia)    CAD (coronary artery disease)    Pancreatitis      PSH:No significant past surgical history    History of coronary artery stent placement    S/P cholecystectomy        Allergies:penicillin (Hives)  scallops (Nausea)      Labs:                        13.7   8.41  )-----------( 277      ( 18 May 2024 05:45 )             39.2   05-18    140  |  109<H>  |  24<H>  ----------------------------<  177<H>  3.8   |  23  |  1.82<H>    Ca    9.0      18 May 2024 05:45    TPro  6.2  /  Alb  3.0<L>  /  TBili  0.7  /  DBili  x   /  AST  27  /  ALT  37  /  AlkPhos  50  05-17      Vital Signs Last 24 Hrs  T(C): 36.8 (18 May 2024 08:54), Max: 37.9 (17 May 2024 21:20)  T(F): 98.2 (18 May 2024 08:54), Max: 100.2 (17 May 2024 21:20)  HR: 79 (18 May 2024 08:54) (79 - 81)  BP: 131/65 (18 May 2024 08:54) (131/65 - 131/75)  BP(mean): --  RR: 16 (18 May 2024 08:54) (16 - 16)  SpO2: 98% (18 May 2024 08:54) (97% - 98%)    Parameters below as of 18 May 2024 08:54  Patient On (Oxygen Delivery Method): room air      REVIEW OF SYSTEMS:    CONSTITUTIONAL: No weakness, fevers or chills  EYES: No visual changes  RESPIRATORY: No cough, wheezing; No shortness of breath  CARDIOVASCULAR: No chest pain or palpitations  GASTROINTESTINAL: No abdominal or epigastric pain. No nausea, vomiting; No diarrhea or constipation.   GENITOURINARY: No dysuria, frequency or hematuria  NEUROLOGICAL: No numbness or weakness  SKIN: See physical examination.  All other review of systems is negative unless indicated above    Physical Exam:   Constitutional: NAD, alert;  Lower Extremity Focus  Derm:  Skin warm, dry and supple bilateral.    Full thickness wound to the let second toe distal aspect, Wound base is granular, wound size aprox 1cm x 1cm x 1cm, no periwound edema, mild periwound erythema, no purulence, no fluctuance, no malodor, does not track or tunnel, + probes to bone, sanguinous discharge only  Vascular: Dorsalis Pedis and Posterior Tibial pulses 1/4 left foot.  Capillary re-fill time less then 3 seconds digits 1-5 bilateral.    Neuro: Protective sensation intact to the level of the digits bilateral.  MSK: Muscle strength 5/5 all major muscle groups bilateral.       < from: MR Foot No Cont, Left (05.17.24 @ 15:05) >    ACC: [id number] EXAM:  MR FOOT LT   ORDERED BY: KASHIF MEDRANO     PROCEDURE DATE:  05/17/2024          INTERPRETATION:  Exam Type: MR FOOT LEFT  Exam Date and Time: 5/17/2024 3:05 PM  Indication: Concern for osteomyelitis  Comparison: Left foot radiograph from 5/16/2024    TECHNIQUE:  Multiplanar multisequence MRI of the left foot was performed using a   standard non-contrast protocol.    FINDINGS:  BONES/JOINTS:  Osseous edema with cortical irregularity and loss of normal T1 fatty   marrow of the distal phalanx of the second digit corresponding to   osteomyelitis. Alignment is anatomic. 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 Mild   osteoarthritis of the midfoot. No joint effusions.    PLANTAR PLATES:  Intact plantar plates without tear.    INTERMETATARSAL SPACES:  No intermetatarsal neuroma. Small first through third intermetatarsal   bursitis.    TENDONS:  Visualized portions of the flexor, extensor, and peroneal tendons are  intact without tendinosis or tear. No tenosynovitis.    LISFRANC LIGAMENT:  Plantar (pC1-M2M3), interosseous (C1-M2), and dorsal bands intact.    MUSCLES:  Edema throughout the muscles of the forefoot.    SOFT TISSUES:  Soft tissue edema throughoutthe foot most noted at the dorsal aspect.   Soft tissue ulcer distal to the tuft of the second digit.    IMPRESSION:  1.  Osteomyelitis of the distal phalanx of the second digit with adjacent   soft tissue ulcer.  2.  Small first through third intermetatarsal bursitis.    --- End of Report ---            ASHLEY GOLDEN DO; Attending Radiologist  This document has been electronically signed. May 17 2024  3:40PM    < end of copied text >

## 2024-05-19 LAB
ANION GAP SERPL CALC-SCNC: 6 MMOL/L — SIGNIFICANT CHANGE UP (ref 5–17)
BUN SERPL-MCNC: 19 MG/DL — SIGNIFICANT CHANGE UP (ref 7–23)
CALCIUM SERPL-MCNC: 9 MG/DL — SIGNIFICANT CHANGE UP (ref 8.5–10.1)
CHLORIDE SERPL-SCNC: 111 MMOL/L — HIGH (ref 96–108)
CO2 SERPL-SCNC: 24 MMOL/L — SIGNIFICANT CHANGE UP (ref 22–31)
CREAT SERPL-MCNC: 1.55 MG/DL — HIGH (ref 0.5–1.3)
CRP SERPL-MCNC: 6 MG/L — HIGH
EGFR: 52 ML/MIN/1.73M2 — LOW
GLUCOSE BLDC GLUCOMTR-MCNC: 156 MG/DL — HIGH (ref 70–99)
GLUCOSE BLDC GLUCOMTR-MCNC: 158 MG/DL — HIGH (ref 70–99)
GLUCOSE BLDC GLUCOMTR-MCNC: 163 MG/DL — HIGH (ref 70–99)
GLUCOSE BLDC GLUCOMTR-MCNC: 177 MG/DL — HIGH (ref 70–99)
GLUCOSE SERPL-MCNC: 164 MG/DL — HIGH (ref 70–99)
HCT VFR BLD CALC: 38.8 % — LOW (ref 39–50)
HGB BLD-MCNC: 13.4 G/DL — SIGNIFICANT CHANGE UP (ref 13–17)
INR BLD: 1.92 RATIO — HIGH (ref 0.85–1.18)
POTASSIUM SERPL-MCNC: 3.8 MMOL/L — SIGNIFICANT CHANGE UP (ref 3.5–5.3)
POTASSIUM SERPL-SCNC: 3.8 MMOL/L — SIGNIFICANT CHANGE UP (ref 3.5–5.3)
PROTHROM AB SERPL-ACNC: 21.3 SEC — HIGH (ref 9.5–13)
SODIUM SERPL-SCNC: 141 MMOL/L — SIGNIFICANT CHANGE UP (ref 135–145)
VANCOMYCIN TROUGH SERPL-MCNC: 14 UG/ML — SIGNIFICANT CHANGE UP (ref 10–20)
VANCOMYCIN TROUGH SERPL-MCNC: 29 UG/ML — CRITICAL HIGH (ref 10–20)

## 2024-05-19 PROCEDURE — 99232 SBSQ HOSP IP/OBS MODERATE 35: CPT

## 2024-05-19 RX ORDER — LOPERAMIDE HCL 2 MG
2 TABLET ORAL DAILY
Refills: 0 | Status: DISCONTINUED | OUTPATIENT
Start: 2024-05-19 | End: 2024-05-21

## 2024-05-19 RX ORDER — ACETAMINOPHEN 500 MG
1000 TABLET ORAL ONCE
Refills: 0 | Status: DISCONTINUED | OUTPATIENT
Start: 2024-05-19 | End: 2024-05-21

## 2024-05-19 RX ORDER — LANOLIN ALCOHOL/MO/W.PET/CERES
5 CREAM (GRAM) TOPICAL AT BEDTIME
Refills: 0 | Status: DISCONTINUED | OUTPATIENT
Start: 2024-05-19 | End: 2024-05-21

## 2024-05-19 RX ORDER — WARFARIN SODIUM 2.5 MG/1
6 TABLET ORAL ONCE
Refills: 0 | Status: COMPLETED | OUTPATIENT
Start: 2024-05-19 | End: 2024-05-19

## 2024-05-19 RX ADMIN — Medication 2: at 08:07

## 2024-05-19 RX ADMIN — LOSARTAN POTASSIUM 50 MILLIGRAM(S): 100 TABLET, FILM COATED ORAL at 10:10

## 2024-05-19 RX ADMIN — Medication 250 MILLIGRAM(S): at 10:09

## 2024-05-19 RX ADMIN — ATORVASTATIN CALCIUM 80 MILLIGRAM(S): 80 TABLET, FILM COATED ORAL at 21:32

## 2024-05-19 RX ADMIN — Medication 325 MILLIGRAM(S): at 10:10

## 2024-05-19 RX ADMIN — Medication 5 MILLIGRAM(S): at 21:34

## 2024-05-19 RX ADMIN — CLOPIDOGREL BISULFATE 75 MILLIGRAM(S): 75 TABLET, FILM COATED ORAL at 10:10

## 2024-05-19 RX ADMIN — ENOXAPARIN SODIUM 120 MILLIGRAM(S): 100 INJECTION SUBCUTANEOUS at 10:10

## 2024-05-19 RX ADMIN — Medication 250 MILLIGRAM(S): at 23:08

## 2024-05-19 RX ADMIN — Medication 50 MILLIGRAM(S): at 10:11

## 2024-05-19 RX ADMIN — Medication 2: at 12:12

## 2024-05-19 RX ADMIN — AMLODIPINE BESYLATE 10 MILLIGRAM(S): 2.5 TABLET ORAL at 10:10

## 2024-05-19 RX ADMIN — PIPERACILLIN AND TAZOBACTAM 25 GRAM(S): 4; .5 INJECTION, POWDER, LYOPHILIZED, FOR SOLUTION INTRAVENOUS at 21:35

## 2024-05-19 RX ADMIN — Medication 20 UNIT(S): at 16:53

## 2024-05-19 RX ADMIN — Medication 1 TABLET(S): at 10:11

## 2024-05-19 RX ADMIN — Medication 20 UNIT(S): at 12:12

## 2024-05-19 RX ADMIN — Medication 145 MILLIGRAM(S): at 21:32

## 2024-05-19 RX ADMIN — ENOXAPARIN SODIUM 120 MILLIGRAM(S): 100 INJECTION SUBCUTANEOUS at 21:32

## 2024-05-19 RX ADMIN — PIPERACILLIN AND TAZOBACTAM 25 GRAM(S): 4; .5 INJECTION, POWDER, LYOPHILIZED, FOR SOLUTION INTRAVENOUS at 05:44

## 2024-05-19 RX ADMIN — WARFARIN SODIUM 6 MILLIGRAM(S): 2.5 TABLET ORAL at 21:34

## 2024-05-19 RX ADMIN — Medication 20 UNIT(S): at 08:06

## 2024-05-19 RX ADMIN — Medication 1000 UNIT(S): at 10:10

## 2024-05-19 RX ADMIN — INSULIN GLARGINE 45 UNIT(S): 100 INJECTION, SOLUTION SUBCUTANEOUS at 21:33

## 2024-05-19 RX ADMIN — MAGNESIUM OXIDE 400 MG ORAL TABLET 400 MILLIGRAM(S): 241.3 TABLET ORAL at 10:10

## 2024-05-19 RX ADMIN — PIPERACILLIN AND TAZOBACTAM 25 GRAM(S): 4; .5 INJECTION, POWDER, LYOPHILIZED, FOR SOLUTION INTRAVENOUS at 14:29

## 2024-05-19 RX ADMIN — Medication 2: at 16:54

## 2024-05-19 NOTE — PROGRESS NOTE ADULT - ASSESSMENT
59 yo IDDM, HTN, HLD, CKD stage 3, CAD s/p 8 stents, mechanical aortic valve and pacemaker presents to Ed due to worsening foot wound despite treatment with oral antibiotics as outpatient. Admitted due to:    #Left second toe osteomyelitis.  Failure of outpatient treatment  MRI- ppm is a [employer] [place] XT DR LOMAS,  EP consult to see if MRI is compatible   Vanco/zosyn, ID consult  podiatry consult appreciated, discussed with resident - pt s/p bedside flexor tenotomy 5/18/24  wound care per podiatry; WBAT with surgical shoe  possibly for PICC Line etc in 1-2 days  - for now will cont to order coumadin as it is unlikely to interfere with PICC insertion     #Mechanical aortic valve   #Subtherapeutic INR  c/w coumadin, dose ordered for tonight  Continue Lovenox bridge until therapeutic on coumadin   pt follow up with Dr Kaye       #Uncontrolled IDDM2  RISS  admelog 20 TID with meals and Lovenox 45 ordered as pt on restricted diet while inpatient   carb restriction  A1c is >11   Abx in NS not D5 due to hyperglycemia    #CKD 3B  Renally dose meds  avoid nephrotoxic meds  monitor on serum chemistry  baseline cr 1.5-2    #CAD/HTN  c/w amlodipine  c/w lipitor  c/w tricor  c/w losartan  c/w BB      POC discussed with RN, patient

## 2024-05-19 NOTE — PROGRESS NOTE ADULT - SUBJECTIVE AND OBJECTIVE BOX
CC: Pt is a 59 yo male with a pmh/o pancreatitis, HTN, HLD, IDDM, CKD3B with baseline cr 1.5-2, obesity, CAD s/p PCI and 8 stents, PPM [employer] XT DR LOMAS, AVR, HTN urgency, PAF on coumadin, who presents to ED upon recommendation of podiatrist Dr. Garcia due to 2 weeks of progressively worsening foot wound to toes that began as a blister and later required oral abx as it began to form purulent drainage. Pt states despite oral abx, wound has worsened and he saw podiatry today who sent him to ED to r/o OM.   Denies fever, chills, nausea, vomiting, chest pain, abdominal pain, calf tenderness/swelling, sob, palpitations, HA, paresthesias, rash, dysuria, diarrhea, trauma to area.   Pt seen and examined with podiatry team in ED. Vanco/zosyn started per their recs. MRI not ordered as unclear compatibility with PPM (per [employer] XT DR LOMAS can tolerate 1.5T and 3T MRI) please verify in AM with radiology.      5/18 - sitting up in chair, pt awake, alert, cooperative, denies pain or discomfort. no cp palps sob abdo pain   5/19 - no cp palps sob abdo pain    Vital Signs Last 24 Hrs  T(F): 98.2 (19 May 2024 16:24), Max: 98.2 (18 May 2024 21:00)  HR: 78 (19 May 2024 16:24) (74 - 84)  BP: 131/81 (19 May 2024 16:24) (109/75 - 142/87)  RR: 18 (19 May 2024 16:24) (16 - 18)  SpO2: 96% (19 May 2024 16:24) (96% - 99%)  Constitutional: NAD, awake and alert  HEENT: PERR, EOMI  Neck: Soft and supple,  No JVD  Respiratory: Breath sounds are clear bilaterally, No wheezing, rales or rhonchi  Cardiovascular: S1 and S2, regular rate and rhythm, no Murmurs, click of mech valve   Gastrointestinal: Bowel Sounds present, soft, nontender, nondistended  Extremities: No peripheral edema  Vascular: 2+ peripheral pulses  Neurological: A/O x 3, no focal deficits  Musculoskeletal: left leg elevated toe in dressing  Skin: No rashes    RADIOLOGY/EKG:  < from: MR Foot No Cont, Left (05.17.24 @ 15:05) >  IMPRESSION:  1.  Osteomyelitis of the distal phalanx of the second digit with adjacent   soft tissue ulcer.  2.  Small first through third intermetatarsal bursitis.    LABS: All Labs Reviewed:                    13.4   x     )-----------( x        ( 19 May 2024 06:29 )             38.8   141  |  111<H>  |  19  ----------------------------<  164<H>  3.8   |  24  |  1.55<H>  Ca    9.0      19 May 2024 06:29  PT/INR - ( 19 May 2024 06:29 )   PT: 21.3 sec;   INR: 1.92 ratio         MEDS   acetaminophen     Tablet .. 650 milliGRAM(s) Oral every 6 hours PRN  amLODIPine   Tablet 10 milliGRAM(s) Oral daily  atorvastatin 80 milliGRAM(s) Oral at bedtime  cholecalciferol 1000 Unit(s) Oral daily  clopidogrel Tablet 75 milliGRAM(s) Oral daily  dextrose 10% Bolus 125 milliLiter(s) IV Bolus once  dextrose 5%. 1000 milliLiter(s) IV Continuous <Continuous>  dextrose 5%. 1000 milliLiter(s) IV Continuous <Continuous>  dextrose 50% Injectable 25 Gram(s) IV Push once  dextrose 50% Injectable 12.5 Gram(s) IV Push once  dextrose Oral Gel 15 Gram(s) Oral once PRN  enoxaparin Injectable 120 milliGRAM(s) SubCutaneous every 12 hours  fenofibrate Tablet 145 milliGRAM(s) Oral at bedtime  ferrous    sulfate 325 milliGRAM(s) Oral daily  fluticasone propionate 50 MICROgram(s)/spray Nasal Spray 1 Spray(s) Both Nostrils two times a day PRN  glucagon  Injectable 1 milliGRAM(s) IntraMuscular once  insulin glargine Injectable (LANTUS) 45 Unit(s) SubCutaneous at bedtime  insulin lispro (ADMELOG) corrective regimen sliding scale   SubCutaneous three times a day before meals  insulin lispro (ADMELOG) corrective regimen sliding scale.   SubCutaneous at bedtime  insulin lispro Injectable (ADMELOG) 20 Unit(s) SubCutaneous three times a day before meals  losartan 50 milliGRAM(s) Oral daily  magnesium oxide 400 milliGRAM(s) Oral daily  metoprolol succinate ER 50 milliGRAM(s) Oral daily  multivitamin 1 Tablet(s) Oral daily  ondansetron Injectable 4 milliGRAM(s) IV Push every 6 hours PRN  oxycodone    5 mG/acetaminophen 325 mG 1 Tablet(s) Oral every 4 hours PRN  piperacillin/tazobactam IVPB.. 3.375 Gram(s) IV Intermittent every 8 hours  senna 2 Tablet(s) Oral at bedtime PRN  vancomycin  IVPB 1000 milliGRAM(s) IV Intermittent every 12 hours           electronic

## 2024-05-19 NOTE — PROVIDER CONTACT NOTE (CRITICAL VALUE NOTIFICATION) - ASSESSMENT
Vanco trough level was drawn previously and  got a call that it was insufficient amount.  Lab staff came &  draw again vanco trough level after the Vancomycin 1gm was infused.

## 2024-05-20 ENCOUNTER — TRANSCRIPTION ENCOUNTER (OUTPATIENT)
Age: 59
End: 2024-05-20

## 2024-05-20 LAB
ANION GAP SERPL CALC-SCNC: 5 MMOL/L — SIGNIFICANT CHANGE UP (ref 5–17)
BUN SERPL-MCNC: 17 MG/DL — SIGNIFICANT CHANGE UP (ref 7–23)
CALCIUM SERPL-MCNC: 9.2 MG/DL — SIGNIFICANT CHANGE UP (ref 8.5–10.1)
CHLORIDE SERPL-SCNC: 111 MMOL/L — HIGH (ref 96–108)
CO2 SERPL-SCNC: 25 MMOL/L — SIGNIFICANT CHANGE UP (ref 22–31)
CREAT SERPL-MCNC: 1.88 MG/DL — HIGH (ref 0.5–1.3)
EGFR: 41 ML/MIN/1.73M2 — LOW
GLUCOSE SERPL-MCNC: 144 MG/DL — HIGH (ref 70–99)
INR BLD: 1.78 RATIO — HIGH (ref 0.85–1.18)
POTASSIUM SERPL-MCNC: 4 MMOL/L — SIGNIFICANT CHANGE UP (ref 3.5–5.3)
POTASSIUM SERPL-SCNC: 4 MMOL/L — SIGNIFICANT CHANGE UP (ref 3.5–5.3)
PROTHROM AB SERPL-ACNC: 19.8 SEC — HIGH (ref 9.5–13)
SODIUM SERPL-SCNC: 141 MMOL/L — SIGNIFICANT CHANGE UP (ref 135–145)

## 2024-05-20 PROCEDURE — 99233 SBSQ HOSP IP/OBS HIGH 50: CPT

## 2024-05-20 PROCEDURE — 71045 X-RAY EXAM CHEST 1 VIEW: CPT | Mod: 26

## 2024-05-20 RX ORDER — DAPTOMYCIN 500 MG/10ML
500 INJECTION, POWDER, LYOPHILIZED, FOR SOLUTION INTRAVENOUS ONCE
Refills: 0 | Status: COMPLETED | OUTPATIENT
Start: 2024-05-20 | End: 2024-05-20

## 2024-05-20 RX ORDER — WARFARIN SODIUM 2.5 MG/1
7 TABLET ORAL AT BEDTIME
Refills: 0 | Status: COMPLETED | OUTPATIENT
Start: 2024-05-20 | End: 2024-05-20

## 2024-05-20 RX ORDER — ERTAPENEM SODIUM 1 G/1
1 INJECTION, POWDER, LYOPHILIZED, FOR SOLUTION INTRAMUSCULAR; INTRAVENOUS
Qty: 0 | Refills: 0 | DISCHARGE
Start: 2024-05-20

## 2024-05-20 RX ORDER — DAPTOMYCIN 500 MG/10ML
500 INJECTION, POWDER, LYOPHILIZED, FOR SOLUTION INTRAVENOUS
Qty: 0 | Refills: 0 | DISCHARGE
Start: 2024-05-20

## 2024-05-20 RX ORDER — ERTAPENEM SODIUM 1 G/1
1000 INJECTION, POWDER, LYOPHILIZED, FOR SOLUTION INTRAMUSCULAR; INTRAVENOUS DAILY
Refills: 0 | Status: DISCONTINUED | OUTPATIENT
Start: 2024-05-20 | End: 2024-05-21

## 2024-05-20 RX ORDER — VANCOMYCIN HCL 1 G
1 VIAL (EA) INTRAVENOUS
Qty: 0 | Refills: 0 | DISCHARGE
Start: 2024-05-20

## 2024-05-20 RX ORDER — FLUTICASONE PROPIONATE 50 MCG
1 SPRAY, SUSPENSION NASAL
Qty: 0 | Refills: 0 | DISCHARGE

## 2024-05-20 RX ADMIN — Medication 145 MILLIGRAM(S): at 21:51

## 2024-05-20 RX ADMIN — PIPERACILLIN AND TAZOBACTAM 25 GRAM(S): 4; .5 INJECTION, POWDER, LYOPHILIZED, FOR SOLUTION INTRAVENOUS at 05:44

## 2024-05-20 RX ADMIN — AMLODIPINE BESYLATE 10 MILLIGRAM(S): 2.5 TABLET ORAL at 09:23

## 2024-05-20 RX ADMIN — INSULIN GLARGINE 45 UNIT(S): 100 INJECTION, SOLUTION SUBCUTANEOUS at 21:51

## 2024-05-20 RX ADMIN — Medication 20 UNIT(S): at 08:03

## 2024-05-20 RX ADMIN — LOSARTAN POTASSIUM 50 MILLIGRAM(S): 100 TABLET, FILM COATED ORAL at 09:21

## 2024-05-20 RX ADMIN — Medication 1000 UNIT(S): at 09:23

## 2024-05-20 RX ADMIN — Medication 5 MILLIGRAM(S): at 21:51

## 2024-05-20 RX ADMIN — ATORVASTATIN CALCIUM 80 MILLIGRAM(S): 80 TABLET, FILM COATED ORAL at 21:51

## 2024-05-20 RX ADMIN — ENOXAPARIN SODIUM 120 MILLIGRAM(S): 100 INJECTION SUBCUTANEOUS at 09:21

## 2024-05-20 RX ADMIN — WARFARIN SODIUM 7 MILLIGRAM(S): 2.5 TABLET ORAL at 21:51

## 2024-05-20 RX ADMIN — ERTAPENEM SODIUM 1000 MILLIGRAM(S): 1 INJECTION, POWDER, LYOPHILIZED, FOR SOLUTION INTRAMUSCULAR; INTRAVENOUS at 11:50

## 2024-05-20 RX ADMIN — ENOXAPARIN SODIUM 120 MILLIGRAM(S): 100 INJECTION SUBCUTANEOUS at 21:50

## 2024-05-20 RX ADMIN — CLOPIDOGREL BISULFATE 75 MILLIGRAM(S): 75 TABLET, FILM COATED ORAL at 09:23

## 2024-05-20 RX ADMIN — Medication 20 UNIT(S): at 11:40

## 2024-05-20 RX ADMIN — Medication 1 SPRAY(S): at 09:21

## 2024-05-20 RX ADMIN — Medication 4: at 17:42

## 2024-05-20 RX ADMIN — Medication 20 UNIT(S): at 17:41

## 2024-05-20 RX ADMIN — MAGNESIUM OXIDE 400 MG ORAL TABLET 400 MILLIGRAM(S): 241.3 TABLET ORAL at 09:21

## 2024-05-20 RX ADMIN — Medication 50 MILLIGRAM(S): at 09:23

## 2024-05-20 RX ADMIN — Medication 250 MILLIGRAM(S): at 09:22

## 2024-05-20 RX ADMIN — DAPTOMYCIN 120 MILLIGRAM(S): 500 INJECTION, POWDER, LYOPHILIZED, FOR SOLUTION INTRAVENOUS at 21:50

## 2024-05-20 RX ADMIN — Medication 1 TABLET(S): at 09:23

## 2024-05-20 RX ADMIN — Medication 325 MILLIGRAM(S): at 09:23

## 2024-05-20 RX ADMIN — Medication 4: at 11:41

## 2024-05-20 NOTE — DISCHARGE NOTE NURSING/CASE MANAGEMENT/SOCIAL WORK - PATIENT PORTAL LINK FT
[facility] Internal Medicine   Progress Note              Subjective:  Patient reports dyspnea with activity. No sob when seen in bed. Able to walk from cart to bed this afternoon without sx    I/O's    Intake/Output Summary (Last 24 hours) at 5/26/2023 1500  Last data filed at 5/26/2023 0900  Gross per 24 hour   Intake 1691.89 ml   Output 1190 ml   Net 501.89 ml           ALLERGIES:  Diazepam and Hydrocodone-acetaminophen     Hospital Meds  Current Facility-Administered Medications   Medication Dose Route Frequency Provider Last Rate Last Admin   • sodium chloride 0.9 % flush bag 25 mL  25 mL Intravenous PRN Debbie Fields APNP       • Potassium Standard Replacement Protocol (Levels 3.5 and lower)   Does not apply See Admin Instructions Debbie Fields APNP       • Potassium Replacement (Levels 3.6 - 4)   Does not apply See Admin Instructions Debbie Fields APNP       • Magnesium Standard Replacement Protocol   Does not apply See Admin Instructions Debbie Fields APNP       • Phosphorus Standard Replacement Protocol   Does not apply See Admin Instructions Debbie Fields APNP       • vancomycin (VANCOCIN) capsule 125 mg  125 mg Oral BID Po Capps PA-C       • selexipag (UPTRAVI) tablet 200 mcg  200 mcg Oral 2 times per day Gloria Childress CNP   200 mcg at 05/26/23 0805   • torsemide (DEMADEX) tablet 40 mg  40 mg Oral BID Gloria Childress CNP   40 mg at 05/26/23 0805   • tadalafil (CIALIS) tablet 40 mg  40 mg Oral Daily Kip Blandon MD   40 mg at 05/26/23 0805   • sodium chloride 0.9 % flush bag 25 mL  25 mL Intravenous PRN Jacob Ospina MD       • sodium chloride (PF) 0.9 % injection 2 mL  2 mL Intracatheter 2 times per day Jacob Ospina MD   2 mL at 05/24/23 2035   • cefdinir (OMNICEF) capsule 300 mg  300 mg Oral BID Abbey Owen CNP   300 mg at 05/26/23 0805   • sodium chloride (NORMAL SALINE) 0.9 % bolus 500 mL  500 mL Intravenous PRN Henrietta Majano MD       •  guaiFENesin 100 MG/5ML solution 200 mg  200 mg Oral Q4H PRN Henrietta Majano MD       • bisacodyl (DULCOLAX) EC tablet 5 mg  5 mg Oral Daily PRN Henrietta Majano MD       • simethicone (MYLICON) tablet 125 mg  125 mg Oral 4x Daily PRN Henrietta Majano MD       • hydrALAZINE (APRESOLINE) tablet 25 mg  25 mg Oral TID PRN Henrietta Majano MD       • melatonin tablet 3 mg  3 mg Oral Nightly PRN Henrietta Majano MD       • ondansetron (ZOFRAN) injection 4 mg  4 mg Intravenous Q4H PRN Henrietta Majano MD       • acetaminophen (TYLENOL) tablet 650 mg  650 mg Oral Q4H PRN Henrietta Majano MD   650 mg at 05/24/23 1826   • potassium CHLORIDE (KLOR-CON M) leon ER tablet 40 mEq  40 mEq Oral Q4H PRN Henrietta Majano MD       • FLUoxetine (PROzac) capsule 40 mg  40 mg Oral Daily Henrietta Majano MD   40 mg at 05/26/23 0805   • levothyroxine (SYNTHROID, LEVOTHROID) tablet 75 mcg  75 mcg Oral QAM AC Henrietta Majano MD   75 mcg at 05/26/23 0634   • pantoprazole (PROTONIX) EC tablet 40 mg  40 mg Oral Daily Henrietta Majano MD   40 mg at 05/26/23 0805   • methotrexate (RHEUMATREX) tablet 12.5 mg  12.5 mg Oral Once per day on Mon Henrietta Majano MD   12.5 mg at 05/24/23 0913   • folic acid (FOLATE) tablet 1 mg  1 mg Oral Daily Henrietta Majano MD   1 mg at 05/26/23 0805   • heparin (porcine) injection 5,000 Units  5,000 Units Subcutaneous 3 times per day Henrietta Majano MD   5,000 Units at 05/26/23 0635   • cetirizine (ZyrTEC) tablet 10 mg  10 mg Oral Daily PRN Henrietta Majano MD       • leflunomide (ARAVA) tablet 10 mg  10 mg Oral Daily Henrietta Majano MD   10 mg at 05/26/23 0805   • magnesium oxide (MAG-OX) tablet 400 mg  400 mg Oral BID Henrietta Majano MD   400 mg at 05/26/23 0805   • dextrose 50 % injection 25 g  25 g Intravenous PRN Henrietta Majano MD       • dextrose 50 % injection 12.5 g  12.5 g Intravenous PRN Henrietta Majano MD       • glucagon (GLUCAGEN) injection 1 mg  1 mg Intramuscular PRN Henrietta Majano MD        • dextrose (GLUTOSE) 40 % gel 15 g  15 g Oral PRN Henrietta Majano MD       • dextrose (GLUTOSE) 40 % gel 30 g  30 g Oral PRN Henrietta Majano MD       • insulin lispro (ADMELOG,HumaLOG) - Correction Dose   Subcutaneous TID WC Henrietta Majano MD            Last Recorded Vitals      SpO2 Readings from Last 3 Encounters:   05/26/23 94%   05/23/23 95%   02/18/22 94%        VITAL SIGNS:     Vital Last Value 24 Hour Range   Temperature 98.8 °F (37.1 °C) (05/26/23 1200) Temp  Min: 98.4 °F (36.9 °C)  Max: 99.1 °F (37.3 °C)   Pulse 80 (05/26/23 1200) Pulse  Min: 69  Max: 90   Respiratory (!) 21 (05/26/23 1200) Resp  Min: 17  Max: 30   Non-Invasive  Blood Pressure 104/59 (05/26/23 1200) BP  Min: 87/42  Max: 126/64   Pulse Oximetry 94 % (05/26/23 1200) SpO2  Min: 83 %  Max: 95 %     Vital Today Admitted   Weight 84.7 kg (186 lb 11.7 oz) (05/26/23 0425) Weight: 84.5 kg (186 lb 4.6 oz) (05/23/23 1751)   Height N/A Height: 5' 4\" (162.6 cm) (05/23/23 1709)   BMI N/A BMI (Calculated): 31.98 (05/23/23 1751)       Physical Exam:  General: Alert, no acute distress, on 6 liter o2 nc.     Eyes:   no conjunctival erythema   Cardio: S1, S2, RRR, no  rub  Pulm: coarse bs at base. No wheezing  GI: Soft, non-tender, nondistended. + bs  :   no CVA tenderness bilaterally  Ext:  Decreased lower extremity edema   Skin:  No jaundice.   Psych: Appropriate mood and affect. Neuro: No focal motor or sensory deficits noted. Pt appropriately follows commands.    Labs     Recent Labs     05/24/23  0511 05/25/23  0329 05/26/23  0310   WBC 8.4 9.8 10.1   RBC 3.87* 4.16 3.86*   HGB 11.5* 12.2 11.6*   HCT 36.2 39.7 36.0    211 143   MCV 93.5 95.4 93.3   MCH 29.7 29.3 30.1   MCHC 31.8* 30.7* 32.2   NRBCRE 0 0 0         Recent Labs     05/24/23  0511 05/25/23  0329 05/25/23  1144 05/26/23  0310   SODIUM 143 141 139 139   POTASSIUM 4.0 3.5 4.8 3.8   MG  --  1.6* 2.5* 2.2   PHOS  --  4.1  --  3.6   CO2 25 24 25 26   ANIONGAP 11 12 12 11   GLUCOSE  109* 95 146* 102*   BUN 30* 24* 23* 22*   CREATININE 1.80* 1.71* 1.59* 1.57*   CALCIUM 9.1 9.1 9.1 9.0   BILIRUBIN 0.4 0.7  --  0.7   AST 28 17  --  14   GPT 19 13  --  14   ALKPT 69 75  --  72   GLOB 2.5 2.7  --  2.8   AGR 1.3 1.3  --  1.1        Recent Labs   Lab 05/26/23  0310   NTPROB 6,250*        Recent Labs     05/24/23  0921 05/25/23  0329   INR 1.1 1.1   PT 10.9 10.9       Recent Labs   Lab 05/25/23  1728 05/25/23  2054 05/26/23  0025 05/26/23  0759 05/26/23  1213   GLUCOSE BEDSIDE 82 128* 120* 87 97       Imaging    XR CHEST AP OR PA   Final Result      CT CHEST ILD HIGH RESOLUTION WO CONTRAST   Final Result      1.    Significant air trapping with bandlike parenchymal scarring/fibrotic   changes radiating from the hilum's.  Given the history of sarcoidosis,   findings be associated with stage IV disease.  Correlate with pulmonary   function tests   2.   Main pulmonary artery is dilated which could be seen with pulmonary   arterial hypertension.            Electronically Signed by: KIMBERLY HARPER MD    Signed on: 5/26/2023 7:07 AM    Workstation ID: [id number]      CT TIBIA-FIBULA WO CONTRAST RIGHT   Final Result   1.    No discrete soft tissue ulceration or significant evidence of   osteomyelitis.   2.    Redemonstration of the partially united lateral tibial plateau   fracture with depressed articular surface.  Cannot exclude intra-articular   extension of the proximal tibial screws.   3.   Ronna-Stieda lesion compatible with old trauma to the superficial   MCL.   4.   Mild osteoarthritis in the lateral patellofemoral compartments.   5.   1.2 cm lobule of fat posterior medially with thin rim of increased   density is likely a small area of fat necrosis, less likely other   lipomatous lesion.         Electronically Signed by: BENJAMIN SORIA MD    Signed on: 5/26/2023 7:15 AM    Workstation ID: [id number]      XR CHEST AP OR PA   Final Result      No acute change in cardiopulmonary appearance as  described. Devices as   above.       Electronically Signed by: DYLAN MCLAUGHLIN M.D.    Signed on: 5/25/2023 9:07 AM    Workstation ID: [id number]      Cath/PV Case   Final Result      XR CHEST AP OR PA   Final Result   Impression:    Cardiac mediastinal silhouette is enlarged and has increased slightly   since prior study. No lobar consolidation pleural effusion or pneumothorax.   Vascularity is normal. Interstitial disease relatively stable.      Remainder of the exam is otherwise stable.         Electronically Signed by: ROSA ELENA SANDOVAL MD    Signed on: 5/24/2023 12:44 AM    Workstation ID: [id number]          Cultures  Microbiology Results     None             Assessment/Plan:  Active Hospital Problems    Diagnosis    • Acute on chronic diastolic HF (heart failure) (CMD)    • Acute on chronic right-sided heart failure (CMD)    • Acute on chronic respiratory failure with hypoxia (CMD)    • Other specified hypothyroidism    • Primary pulmonary hypertension (CMD)    • Anxiety    • Other specified hypothyroidism    • Rheumatoid arthritis flare (CMD)    • GERD (gastroesophageal reflux disease)    • Pulmonary HTN (CMD)       Acute on chronic diastolic HF/right heart failure  Acute on chronic Resp failure  Pulm. Sarcoidosis/HTN  - Pulm. HTN team on consult  - IV lasix 40mg x 1 per HF team  - most recent TTE on 2/18/2022 EF59%, mild to moderate RV dilated, reduced RV systolic function. Dilated RA, dilated Pulm artery  -right heart cath shows severe pul hypertension ,  Echo this admit done shows EF 58%, decreased RV functiion, elevated pul pressure   high-resolution CT noted air trapping, fibrosis and scarring cw sarcoidosis and dilated pul RA cw pul htn  - continue supplemental oxygen  Per pul htn team, dc ambrisentan, start selexipag  On torsemide 40 mg bid    DM2:  - BG controlled 103  - accucheks, ISS     HTN:  - BP borderline this am    dc coreg and monitor     Anxiety:  - prozac 40mg  daily        Hypothyroidism:  - synthroid 75mcg daily     ckd 3  Baseline cr 1.51 on 7/2022  Cr 1.7 at admit to 1.8  To 1.71 today  Will trend    Anemia of chronic dz  hgb stable at 11.6 today     GERD:  - Protonix     Rheuamtod arthritis: stable  - cont home meds   on metotrexate and leflunamide    Moderate protein malnutrition    History of chronic hardware infection, right tibia  Seen by dr mckee ID, cont supression med omnicef  Has ct ribia fibula today that is neg for abscess  Per dr mckee, need omnicef lifelong. On po vanco for c diff prevention      Code Status: Full Resuscitation    Physician Notification: dw dr mckee ID yest and dr mak pul today  Communication: with patient and rn    time SPENT ON THIS PATIENTS CARE TODAY, This includes the following: Review of all vitals, medications, new orders, I's/O's, laboratory values, microbiology data, imaging, nursing notes, consultant notes which are reflected in assessment and plan.This does not include time spent on other items of care such as smoking cessation counseling, and/or advanced care planning if applicable.     Primary Care Physician  Arianna Miller MD Lucy H Ho, MD [facility]  5/26/2023 3:00 PM           You can access the FollowMyHealth Patient Portal offered by [facility] by registering at the following website: [email or web address]. By joining [employer]’s FollowMyHealth portal, you will also be able to view your health information using other applications (apps) compatible with our system.

## 2024-05-20 NOTE — DISCHARGE NOTE PROVIDER - NSDCCPCAREPLAN_GEN_ALL_CORE_FT
PRINCIPAL DISCHARGE DIAGNOSIS  Diagnosis: Osteomyelitis of toe of left foot  Assessment and Plan of Treatment: Admitted for osteomyelitis of left 2nd toe, continue with intravenous Invanz and vancomycin. Follow up with podiatry and infectious disease, close monitoring of kindey function.  Wound care instructions to be change every other day to the left second toe:  - Please remove old dressings gently  - Clean the wound with saline ang gauze, Dry thoroughly with gauze.  - Apply betadine soaked gauze, and secure with majo and tape     PRINCIPAL DISCHARGE DIAGNOSIS  Diagnosis: Osteomyelitis of toe of left foot  Assessment and Plan of Treatment: Admitted for osteomyelitis of left 2nd toe, continue with intravenous Invanz and daptomycin. Follow up with podiatry and infectious disease, close monitoring of kindey function.  Wound care instructions to be change every other day to the left second toe:  - Please remove old dressings gently  - Clean the wound with saline ang gauze, Dry thoroughly with gauze.  - Apply betadine soaked gauze, and secure with majo and tape      SECONDARY DISCHARGE DIAGNOSES  Diagnosis: Subtherapeutic international normalized ratio (INR)  Assessment and Plan of Treatment: Continue with warfarin 6mg daily, continue to follow up with your doctor to manage levels and coumadin.     PRINCIPAL DISCHARGE DIAGNOSIS  Diagnosis: Osteomyelitis of toe of left foot  Assessment and Plan of Treatment: Admitted for osteomyelitis of left 2nd toe, continue with intravenous Invanz and daptomycin. Follow up with podiatry and infectious disease, close monitoring of kindey function.  Wound care instructions to be change every other day to the left second toe:  - Please remove old dressings gently  - Clean the wound with saline ang gauze, Dry thoroughly with gauze.  - Apply betadine soaked gauze, and secure with majo and tape      SECONDARY DISCHARGE DIAGNOSES  Diagnosis: Subtherapeutic international normalized ratio (INR)  Assessment and Plan of Treatment: Continue with warfarin 6mg daily, continue lovenox every 12 hours until INR is 2 or above, check INR in 2 days, continue to follow up with your doctor to manage levels and coumadin.     PRINCIPAL DISCHARGE DIAGNOSIS  Diagnosis: Osteomyelitis of toe of left foot  Assessment and Plan of Treatment: Admitted for osteomyelitis of left 2nd toe, continue with intravenous Invanz and daptomycin. Follow up with podiatry and infectious disease, close monitoring of kindey function.  Wound care instructions to be change every other day to the left second toe:  - Please remove old dressings gently  - Clean the wound with saline ang gauze, Dry thoroughly with gauze.  - Apply betadine soaked gauze, and secure with majo and tape      SECONDARY DISCHARGE DIAGNOSES  Diagnosis: Subtherapeutic international normalized ratio (INR)  Assessment and Plan of Treatment: Continue with warfarin 6mg daily, continue lovenox every 12 hours until INR is 2 or above, check INR on Thursday, then can resume usual schedule depending on INR levels, continue to follow up with your doctor to manage coumadin.

## 2024-05-20 NOTE — DISCHARGE NOTE PROVIDER - HOSPITAL COURSE
59 yo male with a pmh/o pancreatitis, HTN, HLD, IDDM, CKD3B with baseline cr 1.5-2, obesity, CAD s/p PCI and 8 stents, PPM opvizor XT DR LOMAS, AVR, HTN urgency, PAF on coumadin, who presents to ED upon recommendation of podiatrist Dr. Garcia due to 2 weeks of progressively worsening foot wound to toes that began as a blister and later required oral abx as it began to form purulent drainage. Pt states despite oral abx, wound has worsened and he saw podiatry today who sent him to ED to r/o OM. Pt seen and examined with podiatry team in ED. Vanco/zosyn started per their recs. MRI not ordered as unclear compatibility with PPM (per "[employer] website Radha XT DR LOMAS can tolerate 1.5T and 3T MRI) please verify in AM with radiology.        #Left second toe osteomyelitis.   Failure of outpatient treatment  MRI- IMPRESSION:  1.  Osteomyelitis of the distal phalanx of the second digit with adjacent   soft tissue ulcer.  2.  Small first through third intermetatarsal bursitis.    Vanco/zosyn, ID consult appreciated   podiatry consult appreciated - pt s/p bedside flexor tenotomy 5/18/24  wound care per podiatry; WBAT with surgical shoe  plan for d/c today, PICC to be placed, continue with invanz and vancomycin, d/w Dr. Machado today   Will need t monitor renal function closely, pt has CKD 3B as per records     #Mechanical aortic valve   #Subtherapeutic INR  c/w coumadin  Continue Lovenox bridge until therapeutic on coumadin   pt follow up with Dr Kaye       #Uncontrolled IDDM2  RISS  admelog 20 TID with meals and Lovenox 45 ordered as pt on restricted diet while inpatient   carb restriction  A1c is >11     #CKD 3B  Renally dose meds  avoid nephrotoxic meds  monitor on serum chemistry  baseline cr 1.5-2    #CAD/HTN  c/w amlodipine  c/w lipitor  c/w tricor  c/w losartan  c/w BB       59 yo male with a pmh/o pancreatitis, HTN, HLD, IDDM, CKD3B with baseline cr 1.5-2, obesity, CAD s/p PCI and 8 stents, PPM [email or web address] XT DR LOMAS, AVR, HTN urgency, PAF on coumadin, who presents to ED upon recommendation of podiatrist Dr. Garcia due to 2 weeks of progressively worsening foot wound to toes that began as a blister and later required oral abx as it began to form purulent drainage. Pt states despite oral abx, wound has worsened and he saw podiatry today who sent him to ED to r/o OM. Pt seen and examined with podiatry team in ED. Vanco/zosyn started per their recs. MRI not ordered as unclear compatibility with PPM (per [employer] website Radha XT DR LOMAS can tolerate 1.5T and 3T MRI) please verify in AM with radiology.        #Left second toe osteomyelitis.   Failure of outpatient treatment  MRI- IMPRESSION:  1.  Osteomyelitis of the distal phalanx of the second digit with adjacent   soft tissue ulcer.  2.  Small first through third intermetatarsal bursitis.    Vanco/zosyn, ID consult appreciated   podiatry consult appreciated - pt s/p bedside flexor tenotomy 5/18/24  wound care per podiatry; WBAT with surgical shoe  plan for d/c today, PICC to be placed, continue with invanz and daptomycin, d/w Dr. Machado today   Will need t monitor renal function closely, pt has CKD 3B as per records     #Mechanical aortic valve   #Subtherapeutic INR  c/w coumadin  Continue Lovenox bridge until therapeutic on coumadin   pt follow up with Dr Kaye       #Uncontrolled IDDM2  RISS  carb restriction  A1c is >11   would continue home dose insulin given A1C, continue to monitor with his PCP, pt aware of need for strict glycemic control     #CKD 3B  Renally dose meds  avoid nephrotoxic meds  monitor on serum chemistry  baseline cr 1.5-2    #CAD/HTN  c/w amlodipine  c/w lipitor  c/w tricor  c/w losartan  c/w BB

## 2024-05-20 NOTE — DISCHARGE NOTE PROVIDER - NSDCCAREPROVSEEN_GEN_ALL_CORE_FT
Carter, Delbert Cheung, Charity Swenson, Tanner Montemayor, Jenny Martinez, Feliz Franco, Ammy Wolfe

## 2024-05-20 NOTE — DISCHARGE NOTE PROVIDER - NSDCFUSCHEDAPPT_GEN_ALL_CORE_FT
[facility]  [facility] Tammy Black  Scheduled Appointment: 08/07/2024

## 2024-05-20 NOTE — DISCHARGE NOTE PROVIDER - CARE PROVIDER_API CALL
Jose   Podiatrist  Phone: (   )    -  Fax: (   )    -  Follow Up Time: 2 weeks    Delbert Machado  Infectious Disease  [address] [facility], [address] [place] [address]  Phone: [phone]  Fax: [phone]  Follow Up Time:    Delbert Machado  Infectious Disease  [address] [facility], [address], [place] [address]  Phone: [phone]  Fax: [phone]  Follow Up Time:     Edith Garciaiatrist  Phone: (   )    -  Fax: (   )    -  Follow Up Time: 2 weeks    Scotty Kaye  Interventional Cardiology  [address] [place] [address]  Phone: [phone]  Fax: [phone]  Follow Up Time: 2 weeks

## 2024-05-20 NOTE — DISCHARGE NOTE PROVIDER - NSDCMRMEDTOKEN_GEN_ALL_CORE_FT
amLODIPine 10 mg oral tablet: 1 tab(s) orally once a day  atorvastatin 80 mg oral tablet: 1 tab(s) orally once a day (at bedtime)  cholecalciferol 25 mcg (1000 intl units) oral tablet: 1 tab(s) orally once a day  clopidogrel 75 mg oral tablet: 1 tab(s) orally once a day  ertapenem 1 g injection: 1 gram(s) injectable once a day  fenofibrate 160 mg oral tablet: 1 tab(s) orally once a day  ferrous sulfate 200 mg (65 mg elemental iron) oral tablet: 1 tab(s) orally once a day  Flonase 50 mcg/inh nasal spray: 1 spray(s) in each nostril 2 times a day  losartan 50 mg oral tablet: 1 tab(s) orally once a day  magnesium oxide 400 mg oral tablet: 1 tab(s) orally once a day  metoprolol succinate 50 mg oral tablet, extended release: 1 tab(s) orally 2 times a day  Multiple Vitamins oral tablet: 1 tab(s) orally once a day  NovoLOG FlexPen 100 units/mL injectable solution: 25 unit(s) injectable 3 times a day  Tresiba FlexTouch 200 units/mL subcutaneous solution: 56 unit(s) subcutaneous once a day  vancomycin 1 g intravenous injection: 1 gram(s) intravenous every 12 hours  warfarin 1 mg oral tablet: 1 tab(s) orally once a day Take 1 tabs of 1mg with a 5mg tab to equal 6mg  warfarin 1 mg oral tablet: 1 tab(s) orally once a day (at bedtime) Take 1 tabs of 1mg with a 5mg tab to equal 6mg  warfarin 5 mg oral tablet: 1 tab(s) orally once a day (at bedtime) *** take with 1 mg total 6 mg***   amLODIPine 10 mg oral tablet: 1 tab(s) orally once a day  atorvastatin 80 mg oral tablet: 1 tab(s) orally once a day (at bedtime)  cholecalciferol 25 mcg (1000 intl units) oral tablet: 1 tab(s) orally once a day  clopidogrel 75 mg oral tablet: 1 tab(s) orally once a day  DAPTOmycin 500 mg intravenous injection: 500 milligram(s) intravenous once a day  ertapenem 1 g injection: 1 gram(s) injectable once a day  fenofibrate 160 mg oral tablet: 1 tab(s) orally once a day  ferrous sulfate 200 mg (65 mg elemental iron) oral tablet: 1 tab(s) orally once a day  Flonase 50 mcg/inh nasal spray: 1 spray(s) in each nostril 2 times a day  losartan 50 mg oral tablet: 1 tab(s) orally once a day  magnesium oxide 400 mg oral tablet: 1 tab(s) orally once a day  metoprolol succinate 50 mg oral tablet, extended release: 1 tab(s) orally 2 times a day  Multiple Vitamins oral tablet: 1 tab(s) orally once a day  NovoLOG FlexPen 100 units/mL injectable solution: 25 unit(s) injectable 3 times a day  Tresiba FlexTouch 200 units/mL subcutaneous solution: 56 unit(s) subcutaneous once a day  warfarin 1 mg oral tablet: 1 tab(s) orally once a day Take 1 tabs of 1mg with a 5mg tab to equal 6mg  warfarin 1 mg oral tablet: 1 tab(s) orally once a day (at bedtime) Take 1 tabs of 1mg with a 5mg tab to equal 6mg  warfarin 5 mg oral tablet: 1 tab(s) orally once a day (at bedtime) *** take with 1 mg total 6 mg***   amLODIPine 10 mg oral tablet: 1 tab(s) orally once a day  atorvastatin 80 mg oral tablet: 1 tab(s) orally once a day (at bedtime)  cholecalciferol 25 mcg (1000 intl units) oral tablet: 1 tab(s) orally once a day  clopidogrel 75 mg oral tablet: 1 tab(s) orally once a day  DAPTOmycin 500 mg intravenous injection: 500 milligram(s) intravenous once a day  ertapenem 1 g injection: 1 gram(s) injectable once a day  fenofibrate 160 mg oral tablet: 1 tab(s) orally once a day  ferrous sulfate 200 mg (65 mg elemental iron) oral tablet: 1 tab(s) orally once a day  Flonase 50 mcg/inh nasal spray: 1 spray(s) in each nostril 2 times a day  losartan 50 mg oral tablet: 1 tab(s) orally once a day  Lovenox 120 mg/0.8 mL injectable solution: 120 milligram(s) subcutaneously every 12 hours  magnesium oxide 400 mg oral tablet: 1 tab(s) orally once a day  metoprolol succinate 50 mg oral tablet, extended release: 1 tab(s) orally 2 times a day  Multiple Vitamins oral tablet: 1 tab(s) orally once a day  NovoLOG FlexPen 100 units/mL injectable solution: 25 unit(s) injectable 3 times a day  Tresiba FlexTouch 200 units/mL subcutaneous solution: 56 unit(s) subcutaneous once a day  warfarin 1 mg oral tablet: 1 tab(s) orally once a day Take 1 tabs of 1mg with a 5mg tab to equal 6mg  warfarin 1 mg oral tablet: 1 tab(s) orally once a day (at bedtime) Take 1 tabs of 1mg with a 5mg tab to equal 6mg  warfarin 5 mg oral tablet: 1 tab(s) orally once a day (at bedtime) *** take with 1 mg total 6 mg***

## 2024-05-20 NOTE — DISCHARGE NOTE PROVIDER - ATTENDING DISCHARGE PHYSICAL EXAMINATION:
Pt seen today, feels well, no overnight issues reported, no complaints at this time    Vital Signs Last 24 Hrs  T(C): 36.9 (20 May 2024 08:26), Max: 36.9 (20 May 2024 08:26)  T(F): 98.5 (20 May 2024 08:26), Max: 98.5 (20 May 2024 08:26)  HR: 79 (20 May 2024 08:26) (78 - 79)  BP: 143/82 (20 May 2024 08:26) (131/81 - 143/82)  RR: 18 (20 May 2024 08:26) (18 - 18)  SpO2: 96% (20 May 2024 08:26) (96% - 96%)    Parameters below as of 20 May 2024 08:26  Patient On (Oxygen Delivery Method): room air    PHYSICAL EXAM:  GENERAL: NAD, lying in bed comfortably  HEAD:  Atraumatic, Normocephalic  EYES: conjunctiva and sclera clear  ENT: Moist mucous membranes  NECK: Supple, No JVD  CHEST/LUNG: Clear to auscultation bilaterally; No rales, rhonchi, wheezing. Unlabored respirations  HEART: Regular rate and rhythm; No murmurs  ABDOMEN: Bowel sounds present; Soft, Nontender, Nondistended.   EXTREMITIES:  2+ Peripheral Pulses, brisk capillary refill. No clubbing, cyanosis, or edema  NERVOUS SYSTEM:  Alert & Oriented X3, speech clear. No deficits   MSK: FROM all 4 extremities, full and equal strength  SKIN:    Pt seen today, feels well, no overnight issues reported, no complaints at this time    Vital Signs Last 24 Hrs  T(C): 36.9 (20 May 2024 08:26), Max: 36.9 (20 May 2024 08:26)  T(F): 98.5 (20 May 2024 08:26), Max: 98.5 (20 May 2024 08:26)  HR: 79 (20 May 2024 08:26) (78 - 79)  BP: 143/82 (20 May 2024 08:26) (131/81 - 143/82)  RR: 18 (20 May 2024 08:26) (18 - 18)  SpO2: 96% (20 May 2024 08:26) (96% - 96%)    Parameters below as of 20 May 2024 08:26  Patient On (Oxygen Delivery Method): room air    PHYSICAL EXAM:  GENERAL: NAD, lying in bed comfortably  HEAD:  Atraumatic, Normocephalic  EYES: conjunctiva and sclera clear  ENT: Moist mucous membranes  NECK: Supple, No JVD  CHEST/LUNG: Clear to auscultation bilaterally; No rales, rhonchi, wheezing. Unlabored respirations  HEART: Regular rate and rhythm; No murmurs  ABDOMEN: Bowel sounds present; Soft, Nontender, Nondistended.   EXTREMITIES:  2+ Peripheral Pulses, brisk capillary refill. No clubbing, cyanosis, or edema  NERVOUS SYSTEM:  Alert & Oriented X3, speech clear. No deficits   MSK: FROM all 4 extremities   Pt seen today, feels well, no overnight issues reported, no complaints at this time    Vital Signs Last 24 Hrs  T(C): 35.6 (21 May 2024 08:15), Max: 37.1 (20 May 2024 20:05)  T(F): 96 (21 May 2024 08:15), Max: 98.8 (20 May 2024 20:05)  HR: 78 (21 May 2024 08:15) (78 - 79)  BP: 129/72 (21 May 2024 08:15) (129/72 - 133/70)  RR: 17 (21 May 2024 08:15) (16 - 17)  SpO2: 97% (21 May 2024 08:15) (97% - 97%)    Parameters below as of 21 May 2024 08:15  Patient On (Oxygen Delivery Method): room air    PHYSICAL EXAM:  GENERAL: NAD, lying in bed comfortably  HEAD:  Atraumatic, Normocephalic  EYES: conjunctiva and sclera clear  ENT: Moist mucous membranes  NECK: Supple, No JVD  CHEST/LUNG: Clear to auscultation bilaterally; No rales, rhonchi, wheezing. Unlabored respirations  HEART: Regular rate and rhythm; No murmurs  ABDOMEN: Bowel sounds present; Soft, Nontender, Nondistended.   EXTREMITIES:  2+ Peripheral Pulses, brisk capillary refill. No clubbing, cyanosis, or edema  NERVOUS SYSTEM:  Alert & Oriented X3, speech clear. No deficits   MSK: FROM all 4 extremities

## 2024-05-20 NOTE — DISCHARGE NOTE PROVIDER - PROVIDER TOKENS
FREE:[LAST:[[place]mar],PHONE:[(   )    -],FAX:[(   )    -],ADDRESS:[Podiatrist],FOLLOWUP:[2 weeks]],PROVIDER:[TOKEN:[[id number]]] PROVIDER:[TOKEN:[[id number]]],FREE:[LAST:[[place]mar],PHONE:[(   )    -],FAX:[(   )    -],ADDRESS:[Podiatrist],FOLLOWUP:[2 weeks]],PROVIDER:[TOKEN:[[id number]],FOLLOWUP:[2 weeks]]

## 2024-05-20 NOTE — PROGRESS NOTE ADULT - SUBJECTIVE AND OBJECTIVE BOX
HPI:57 yo male with a pmh/o pancreatitis, HTN, HLD, IDDM, CKD3B with baseline cr 1.5-2, obesity, CAD s/p PCI and 8 stents, PPM "[employer]ure XT DR LOMAS, AVR, HTN urgency, PAF on coumadin, who presents to ED upon recommendation of podiatrist Dr. Garcia due to 2 weeks of progressively worsening foot wound to toes that began as a blister and later required oral abx as it began to form purulent drainage. Pt states despite oral abx, wound has worsened and he saw podiatry today who sent him to ED to r/o OM. Pt seen and examined with podiatry team in ED. Vanco/zosyn started per their recs. MRI not ordered as unclear compatibility with PPM (per [employer] website Radha XT DR LOMAS can tolerate 1.5T and 3T MRI) please verify in AM with radiology.   (16 May 2024 20:59)      MEDICATIONS  (STANDING):  acetaminophen   IVPB .. 1000 milliGRAM(s) IV Intermittent once  amLODIPine   Tablet 10 milliGRAM(s) Oral daily  atorvastatin 80 milliGRAM(s) Oral at bedtime  cholecalciferol 1000 Unit(s) Oral daily  clopidogrel Tablet 75 milliGRAM(s) Oral daily  DAPTOmycin IVPB 500 milliGRAM(s) IV Intermittent once  dextrose 10% Bolus 125 milliLiter(s) IV Bolus once  dextrose 5%. 1000 milliLiter(s) (50 mL/Hr) IV Continuous <Continuous>  dextrose 5%. 1000 milliLiter(s) (100 mL/Hr) IV Continuous <Continuous>  dextrose 50% Injectable 25 Gram(s) IV Push once  dextrose 50% Injectable 12.5 Gram(s) IV Push once  enoxaparin Injectable 120 milliGRAM(s) SubCutaneous every 12 hours  ertapenem Injectable 1000 milliGRAM(s) IntraMuscular daily  fenofibrate Tablet 145 milliGRAM(s) Oral at bedtime  ferrous    sulfate 325 milliGRAM(s) Oral daily  glucagon  Injectable 1 milliGRAM(s) IntraMuscular once  insulin glargine Injectable (LANTUS) 45 Unit(s) SubCutaneous at bedtime  insulin lispro (ADMELOG) corrective regimen sliding scale   SubCutaneous three times a day before meals  insulin lispro (ADMELOG) corrective regimen sliding scale.   SubCutaneous at bedtime  insulin lispro Injectable (ADMELOG) 20 Unit(s) SubCutaneous three times a day before meals  losartan 50 milliGRAM(s) Oral daily  magnesium oxide 400 milliGRAM(s) Oral daily  melatonin 5 milliGRAM(s) Oral at bedtime  metoprolol succinate ER 50 milliGRAM(s) Oral daily  multivitamin 1 Tablet(s) Oral daily  warfarin 7 milliGRAM(s) Oral at bedtime    MEDICATIONS  (PRN):  acetaminophen     Tablet .. 650 milliGRAM(s) Oral every 6 hours PRN Temp greater or equal to 38C (100.4F), Mild Pain (1 - 3), Moderate Pain (4 - 6)  dextrose Oral Gel 15 Gram(s) Oral once PRN Blood Glucose LESS THAN 70 milliGRAM(s)/deciliter  fluticasone propionate 50 MICROgram(s)/spray Nasal Spray 1 Spray(s) Both Nostrils two times a day PRN congestion  loperamide 2 milliGRAM(s) Oral daily PRN Diarrhea  ondansetron Injectable 4 milliGRAM(s) IV Push every 6 hours PRN Nausea  oxycodone    5 mG/acetaminophen 325 mG 1 Tablet(s) Oral every 4 hours PRN Severe Pain (7 - 10)  senna 2 Tablet(s) Oral at bedtime PRN Constipation      Vital Signs Last 24 Hrs  T(C): 36.9 (20 May 2024 08:26), Max: 36.9 (20 May 2024 08:26)  T(F): 98.5 (20 May 2024 08:26), Max: 98.5 (20 May 2024 08:26)  HR: 79 (20 May 2024 08:26) (79 - 79)  BP: 143/82 (20 May 2024 08:26) (143/82 - 143/82)  BP(mean): --  RR: 18 (20 May 2024 08:26) (18 - 18)  SpO2: 96% (20 May 2024 08:26) (96% - 96%)    Parameters below as of 20 May 2024 08:26  Patient On (Oxygen Delivery Method): room air        GEN: NAD, comfortable, resting in bed  HEENT: NC/AT, EOMI, PERRLA, MMM, clear conjunctiva and sclera, normal hearing, no nasal discharge, throat clear, no thrush, normal dentition.   NECK: supple, no JVD, no LAD, no thyromegaly  BACK:  ROM intact, no spinal/paraspinal tenderness  CV: S1S2, RRR, no mumur  RESP: good air movement, CTABL, no rales, rhonchi or wheezing, respirations unlabored  ABD: +BS, soft, ND, NT, no guarding, no rigidity, no HSM  EXT: +2 radial and pedal pulses, no edema, no calve tenderness  SKIN: No visible Rashes or Ulcers  MSK: ROM intact in all extremities  NEURO:  sensory and CN 2-12 Grossly intact, no motor deficits, no, saddle anesthesia, AOx3  PSYCH: normal behavior         LABS:                          13.4   x     )-----------( x        ( 19 May 2024 06:29 )             38.8     20 May 2024 06:52    141    |  111    |  17     ----------------------------<  144    4.0     |  25     |  1.88     Ca    9.2        20 May 2024 06:52        PT/INR - ( 20 May 2024 06:51 )   PT: 19.8 sec;   INR: 1.78 ratio           CAPILLARY BLOOD GLUCOSE      POCT Blood Glucose.: 221 mg/dL (20 May 2024 17:39)  POCT Blood Glucose.: 217 mg/dL (20 May 2024 11:36)  POCT Blood Glucose.: 138 mg/dL (20 May 2024 07:56)  POCT Blood Glucose.: 163 mg/dL (19 May 2024 20:55)        Urinalysis Basic - ( 20 May 2024 06:52 )    Color: x / Appearance: x / SG: x / pH: x  Gluc: 144 mg/dL / Ketone: x  / Bili: x / Urobili: x   Blood: x / Protein: x / Nitrite: x   Leuk Esterase: x / RBC: x / WBC x   Sq Epi: x / Non Sq Epi: x / Bacteria: x        RADIOLOGY:         HPI:59 yo male with a pmh/o pancreatitis, HTN, HLD, IDDM, CKD3B with baseline cr 1.5-2, obesity, CAD s/p PCI and 8 stents, PPM [employer] XT DR LOMAS, AVR, HTN urgency, PAF on coumadin, who presents to ED upon recommendation of podiatrist Dr. Garcia due to 2 weeks of progressively worsening foot wound to toes that began as a blister and later required oral abx as it began to form purulent drainage. Pt states despite oral abx, wound has worsened and he saw podiatry today who sent him to ED to r/o OM. Pt seen and examined with podiatry team in ED. Vanco/zosyn started per their recs. MRI not ordered as unclear compatibility with PPM (per [employer] website Radha XT DR LOMAS can tolerate 1.5T and 3T MRI) please verify in AM with radiology.   (16 May 2024 20:59)    5/20: pt seen today, no complaints, plan for PICC placement today, d/c planning     REVIEW OF SYSTEMS:    CONSTITUTIONAL: No weakness, fevers or chills  EYES/ENT: No visual changes;  No vertigo or throat pain   NECK: No pain or stiffness  RESPIRATORY: No cough, wheezing, hemoptysis; No shortness of breath  CARDIOVASCULAR: No chest pain or palpitations  GASTROINTESTINAL: No abdominal or epigastric pain. No nausea, vomiting, or hematemesis; No diarrhea or constipation. No melena or hematochezia.  GENITOURINARY: No dysuria, frequency or hematuria  NEUROLOGICAL: No numbness or weakness  SKIN: No itching, rashes      MEDICATIONS  (STANDING):  acetaminophen   IVPB .. 1000 milliGRAM(s) IV Intermittent once  amLODIPine   Tablet 10 milliGRAM(s) Oral daily  atorvastatin 80 milliGRAM(s) Oral at bedtime  cholecalciferol 1000 Unit(s) Oral daily  clopidogrel Tablet 75 milliGRAM(s) Oral daily  DAPTOmycin IVPB 500 milliGRAM(s) IV Intermittent once  dextrose 10% Bolus 125 milliLiter(s) IV Bolus once  dextrose 5%. 1000 milliLiter(s) (50 mL/Hr) IV Continuous <Continuous>  dextrose 5%. 1000 milliLiter(s) (100 mL/Hr) IV Continuous <Continuous>  dextrose 50% Injectable 25 Gram(s) IV Push once  dextrose 50% Injectable 12.5 Gram(s) IV Push once  enoxaparin Injectable 120 milliGRAM(s) SubCutaneous every 12 hours  ertapenem Injectable 1000 milliGRAM(s) IntraMuscular daily  fenofibrate Tablet 145 milliGRAM(s) Oral at bedtime  ferrous    sulfate 325 milliGRAM(s) Oral daily  glucagon  Injectable 1 milliGRAM(s) IntraMuscular once  insulin glargine Injectable (LANTUS) 45 Unit(s) SubCutaneous at bedtime  insulin lispro (ADMELOG) corrective regimen sliding scale   SubCutaneous three times a day before meals  insulin lispro (ADMELOG) corrective regimen sliding scale.   SubCutaneous at bedtime  insulin lispro Injectable (ADMELOG) 20 Unit(s) SubCutaneous three times a day before meals  losartan 50 milliGRAM(s) Oral daily  magnesium oxide 400 milliGRAM(s) Oral daily  melatonin 5 milliGRAM(s) Oral at bedtime  metoprolol succinate ER 50 milliGRAM(s) Oral daily  multivitamin 1 Tablet(s) Oral daily  warfarin 7 milliGRAM(s) Oral at bedtime    MEDICATIONS  (PRN):  acetaminophen     Tablet .. 650 milliGRAM(s) Oral every 6 hours PRN Temp greater or equal to 38C (100.4F), Mild Pain (1 - 3), Moderate Pain (4 - 6)  dextrose Oral Gel 15 Gram(s) Oral once PRN Blood Glucose LESS THAN 70 milliGRAM(s)/deciliter  fluticasone propionate 50 MICROgram(s)/spray Nasal Spray 1 Spray(s) Both Nostrils two times a day PRN congestion  loperamide 2 milliGRAM(s) Oral daily PRN Diarrhea  ondansetron Injectable 4 milliGRAM(s) IV Push every 6 hours PRN Nausea  oxycodone    5 mG/acetaminophen 325 mG 1 Tablet(s) Oral every 4 hours PRN Severe Pain (7 - 10)  senna 2 Tablet(s) Oral at bedtime PRN Constipation      Vital Signs Last 24 Hrs  T(C): 36.9 (20 May 2024 08:26), Max: 36.9 (20 May 2024 08:26)  T(F): 98.5 (20 May 2024 08:26), Max: 98.5 (20 May 2024 08:26)  HR: 79 (20 May 2024 08:26) (79 - 79)  BP: 143/82 (20 May 2024 08:26) (143/82 - 143/82)  RR: 18 (20 May 2024 08:26) (18 - 18)  SpO2: 96% (20 May 2024 08:26) (96% - 96%)    Parameters below as of 20 May 2024 08:26  Patient On (Oxygen Delivery Method): room air      PHYSICAL EXAM:  GENERAL: NAD, lying in bed comfortably  HEAD:  Atraumatic, Normocephalic  EYES: conjunctiva and sclera clear  ENT: Moist mucous membranes  NECK: Supple, No JVD  CHEST/LUNG: Clear to auscultation bilaterally; No rales, rhonchi, wheezing. Unlabored respirations  HEART: Regular rate and rhythm; No murmurs  ABDOMEN: Bowel sounds present; Soft, Nontender, Nondistended.   EXTREMITIES:  2+ Peripheral Pulses, brisk capillary refill. No clubbing, cyanosis, or edema  NERVOUS SYSTEM:  Alert & Oriented X3, speech clear. No deficits   MSK: FROM all 4 extremities, full and equal strength           LABS:                          13.4   x     )-----------( x        ( 19 May 2024 06:29 )             38.8     20 May 2024 06:52    141    |  111    |  17     ----------------------------<  144    4.0     |  25     |  1.88     Ca    9.2        20 May 2024 06:52        PT/INR - ( 20 May 2024 06:51 )   PT: 19.8 sec;   INR: 1.78 ratio           CAPILLARY BLOOD GLUCOSE      POCT Blood Glucose.: 221 mg/dL (20 May 2024 17:39)  POCT Blood Glucose.: 217 mg/dL (20 May 2024 11:36)  POCT Blood Glucose.: 138 mg/dL (20 May 2024 07:56)  POCT Blood Glucose.: 163 mg/dL (19 May 2024 20:55)        Urinalysis Basic - ( 20 May 2024 06:52 )    Color: x / Appearance: x / SG: x / pH: x  Gluc: 144 mg/dL / Ketone: x  / Bili: x / Urobili: x   Blood: x / Protein: x / Nitrite: x   Leuk Esterase: x / RBC: x / WBC x   Sq Epi: x / Non Sq Epi: x / Bacteria: x        RADIOLOGY:

## 2024-05-20 NOTE — PROGRESS NOTE ADULT - SUBJECTIVE AND OBJECTIVE BOX
Date of service: 5/20024  HPI:  58-year-old male history pancreatitis,  HTN, HLD, DM2, CKD 3, obesity, CAD s/p x8 stents, s/p cholecystectomy, pacemaker, AVR, presented to ED today with wound to the left second toe.  Patient states it started off as a blister about 2 weeks back which then started to discharge pus a week and a half ago has been on PO antibiotics sulfamethoxazole by his PCP; Pt states the pus, drainage and pain has improved after the PO antibiotics but wound has not completely resolved. He saw Dr boyce today for wound evaluation and was adviced to go to ER for admission for MRi for further work up of suspected OM. Pt states he has history of toe infection to the other foot which had heeled previously. Pt denies any fever, chills, nausea, vomiting sob.     5/20/24: pt by podiatry today. Patient pleasant. S/P flexor tentomy. Wound stable no clinical signs of infection.     PMH: Essential hypertension    Obstructive sleep apnea    Diabetes mellitus    HLD (hyperlipidemia)    CAD (coronary artery disease)    Pancreatitis      PSH:No significant past surgical history    History of coronary artery stent placement    S/P cholecystectomy        Allergies:penicillin (Hives)  scallops (Nausea)      Labs:                        13.4   x     )-----------( x        ( 19 May 2024 06:29 )             38.8     05-19    141  |  111<H>  |  19  ----------------------------<  164<H>  3.8   |  24  |  1.55<H>    Ca    9.0      19 May 2024 06:29          Vital Signs Last 24 Hrs  T(C): 36.9 (20 May 2024 08:26), Max: 36.9 (20 May 2024 08:26)  T(F): 98.5 (20 May 2024 08:26), Max: 98.5 (20 May 2024 08:26)  HR: 79 (20 May 2024 08:26) (78 - 79)  BP: 143/82 (20 May 2024 08:26) (131/81 - 143/82)  BP(mean): --  RR: 18 (20 May 2024 08:26) (18 - 18)  SpO2: 96% (20 May 2024 08:26) (96% - 96%)    Parameters below as of 20 May 2024 08:26  Patient On (Oxygen Delivery Method): room air            REVIEW OF SYSTEMS:    CONSTITUTIONAL: No weakness, fevers or chills  EYES: No visual changes  RESPIRATORY: No cough, wheezing; No shortness of breath  CARDIOVASCULAR: No chest pain or palpitations  GASTROINTESTINAL: No abdominal or epigastric pain. No nausea, vomiting; No diarrhea or constipation.   GENITOURINARY: No dysuria, frequency or hematuria  NEUROLOGICAL: No numbness or weakness  SKIN: See physical examination.  All other review of systems is negative unless indicated above    Physical Exam:   Constitutional: NAD, alert;  Lower Extremity Focus  Derm:  Skin warm, dry and supple bilateral.    Full thickness wound to the let second toe distal aspect, Wound base is granular, wound size aprox 1cm x 1cm x 1cm, no periwound edema, mild periwound erythema, no purulence, no fluctuance, no malodor, does not track or tunnel, + probes to bone, sanguinous discharge only  Vascular: Dorsalis Pedis and Posterior Tibial pulses 1/4 left foot.  Capillary re-fill time less then 3 seconds digits 1-5 bilateral.    Neuro: Protective sensation intact to the level of the digits bilateral.  MSK: Muscle strength 5/5 all major muscle groups bilateral.       < from: MR Foot No Cont, Left (05.17.24 @ 15:05) >    ACC: [id number] EXAM:  MR FOOT LT   ORDERED BY: KASHIF MEDRANO     PROCEDURE DATE:  05/17/2024          INTERPRETATION:  Exam Type: MR FOOT LEFT  Exam Date and Time: 5/17/2024 3:05 PM  Indication: Concern for osteomyelitis  Comparison: Left foot radiograph from 5/16/2024    TECHNIQUE:  Multiplanar multisequence MRI of the left foot was performed using a   standard non-contrast protocol.    FINDINGS:  BONES/JOINTS:  Osseous edema with cortical irregularity and loss of normal T1 fatty   marrow of the distal phalanx of the second digit corresponding to   osteomyelitis. Alignment is anatomic. 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 Mild   osteoarthritis of the midfoot. No joint effusions.    PLANTAR PLATES:  Intact plantar plates without tear.    INTERMETATARSAL SPACES:  No intermetatarsal neuroma. Small first through third intermetatarsal   bursitis.    TENDONS:  Visualized portions of the flexor, extensor, and peroneal tendons are  intact without tendinosis or tear. No tenosynovitis.    LISFRANC LIGAMENT:  Plantar (pC1-M2M3), interosseous (C1-M2), and dorsal bands intact.    MUSCLES:  Edema throughout the muscles of the forefoot.    SOFT TISSUES:  Soft tissue edema throughoutthe foot most noted at the dorsal aspect.   Soft tissue ulcer distal to the tuft of the second digit.    IMPRESSION:  1.  Osteomyelitis of the distal phalanx of the second digit with adjacent   soft tissue ulcer.  2.  Small first through third intermetatarsal bursitis.    --- End of Report ---            ASHLEY GOLDEN DO; Attending Radiologist  This document has been electronically signed. May 17 2024  3:40PM    < end of copied text >

## 2024-05-20 NOTE — DISCHARGE NOTE NURSING/CASE MANAGEMENT/SOCIAL WORK - NSDCVIVACCINE_GEN_ALL_CORE_FT
influenza, injectable, quadrivalent, preservative free; 12-Oct-2017 15:08; Kim Samano (RN); [employer]; [id number]; IntraMuscular; Deltoid Left.; 0.5 milliLiter(s); VIS (VIS Published: 07-Aug-2015, VIS Presented: 12-Oct-2017);   influenza, injectable, quadrivalent, preservative free; 13-Sep-2018 12:04; Eliane Otero (RN); [employer]; [id number] (Exp. Date: 30-Jun-2019); IntraMuscular; Deltoid Left.; 0.5 milliLiter(s); VIS (VIS Published: 07-Aug-2015, VIS Presented: 13-Sep-2018);   influenza, injectable, quadrivalent, preservative free; 03-Dec-2019 13:24; Benita Agosto (RN); [employer]; [id number] (Exp. Date: 30-Jun-2020); IntraMuscular; Deltoid Left.; 0.5 milliLiter(s); VIS (VIS Published: 15-Aug-2019, VIS Presented: 03-Dec-2019);

## 2024-05-20 NOTE — DISCHARGE NOTE PROVIDER - CARE PROVIDERS DIRECT ADDRESSES
,DirectAddress_Unknown,DirectAddress_Unknown ,DirectAddress_Unknown,DirectAddress_Unknown,[email or web address]

## 2024-05-20 NOTE — DISCHARGE NOTE PROVIDER - POSTFACE STATEMENT FOR MINUTES SPENT
Sawyer Renteria   1/29/2018 10:40 AM   Anticoagulation Therapy Visit    Description:  85 year old male   Provider:  [facility]   Department:  [facility]           INR as of 1/29/2018     Today's INR 2.5      Anticoagulation Summary as of 1/29/2018     INR goal 2.0-3.0   Today's INR 2.5   Full instructions 2.5 mg on Mon, Wed, Fri; 5 mg all other days   Next INR check 2/12/2018    Indications   Left ventricular thrombus [JCY1087]  Transient cerebral ischemia [G45.9]  Long-term (current) use of anticoagulants [Z79.01] [Z79.01]         Your next Anticoagulation Clinic appointment(s)     Feb 12, 2018 10:40 AM CST   Anticoagulation Visit with [facility]   [facility] ([facility])    [address] [place] [id number]   [phone]              Contact Numbers     Anticoagulant (INR) Clinic Number: [phone]          January 2018 Details    Sun Mon Tue Wed Thu Fri Sat      1               2               3               4               5               6                 7               8               9               10               11               12               13                 14               15               16               17               18               19               20                 21               22               23               24               25               26               27                 28               29      2.5 mg   See details      30      5 mg         31      2.5 mg             Date Details   01/29 This INR check               How to take your warfarin dose     To take:  2.5 mg Take 0.5 of a 5 mg tablet.    To take:  5 mg Take 1 of the 5 mg tablets.           February 2018 Details    Sun Mon Tue Wed Thu Fri Sat         1      5 mg         2      2.5 mg         3      5 mg           4      5 mg         5      2.5 mg         6      5 mg         7      2.5 mg         8      5 mg          9      2.5 mg         10      5 mg           11      5 mg         12            13               14               15               16               17                 18               19               20               21               22               23               24                 25               26               27               28                   Date Details   No additional details    Date of next INR:  2/12/2018         How to take your warfarin dose     To take:  2.5 mg Take 0.5 of a 5 mg tablet.    To take:  5 mg Take 1 of the 5 mg tablets.            minutes on the discharge service.

## 2024-05-20 NOTE — ADVANCED PRACTICE NURSE CONSULT - ASSESSMENT
Procedure Note: Vascular Access  Procedure Name: RUE PICC Placement  Time out: Patient’s first and last name, , MRN, procedure and correct site confirmed prior to start of procedure.  Procedure Date and Time: 2024   1596  Informed Consent: Benefits, risks, and possible complications of procedure explained to patient.  Patient verbalized understanding and gave written consent.  Local Anesthesia: 1% Lidocaine subdermal  Procedure findings and Details:  Successful placement of RUE single lumen PICC (Xcela PICC with PASV Valve Technology 4Fr Lot # [id number]) via brachial vein inserted under ultrasound guidance.  PICC line trimmed to 48cm.   Follow up: CXR ordered to confirm tip placement.  Report given to District RN.

## 2024-05-20 NOTE — PROGRESS NOTE ADULT - ASSESSMENT
Assesment: 58yr old male seen for the following:   - Full thickness wound to the left second toe in the setting of OM --> s/p flexor tentomy left 2nd proximal phalanx  - DM    P:   Chart reviewed and Patient evaluated;  Discussed diagnosis and treatment with patient. Discussed importance of daily foot examinations, proper shoe gear, importance of tight glycemic control.   X-rays reviewed : on wet read showing no soft tissue gas, no gross cortical erosions appreciated  Full thickness wound to the let second toe distal aspect, no periwound edema, mild periwound erythema, no purulence, no fluctuance, no malodor, does not track or tunnel, + probes to bone, sanguinous discharge only  Wound flush with normal saline  MRI left foot reviewed; official read noted above    5/17: Discussed with pt and wife present at bedside in detail surgical and conservative management given MRI findings of OM to the left second toe distal phalanx. Pt only amicable to conservative management which would include local wound care and antibiotics as well as a flexor tenotomy of the second toe to relieve the contracture of the second toe which contributed to the wound formation. Pt aware of all the risks and benefit.   5/18: Pt s/p Bedside flexor tenotomy of left second toe, Pt tolerated procedure well. Please see procedure note for details.     5/20/24: Pt s/p flexor tenotomy, educated patient on dressing changes and showed patient how to change dressings step by step. Discussed IV vs po abx, will defer to ID but would reccomend IV abx given osteomyelitis findings. Patient to follow up with dr. boyce outpatient in 1 week .   No acute podiatric surgical intervention warranted at this time; Pt to f/u with Dr Swenson within 1 week of discharge    Applied xeroform and betadine with dry sterile dressing  WBAT using surgical shoe  Patient stable for dc from podiatric standpoint please follow up in 1 week with dr. boyce DPM.     Wound care instructions to be change every other day to the left second toe:  - Please remove old dressings gently  - Clean the wound with saline ang gauze, Dry thoroughly with gauze.  - Apply betadine soaked gauze, and secure with majo and tape  Thank you.

## 2024-05-20 NOTE — PROGRESS NOTE ADULT - ASSESSMENT
#Left second toe osteomyelitis.   Failure of outpatient treatment  MRI- IMPRESSION:  1.  Osteomyelitis of the distal phalanx of the second digit with adjacent   soft tissue ulcer.  2.  Small first through third intermetatarsal bursitis.    Vanco/zosyn, ID consult appreciated   podiatry consult appreciated - pt s/p bedside flexor tenotomy 5/18/24  wound care per podiatry; WBAT with surgical shoe  plan for d/c today, PICC to be placed, continue with invanz and daptomycin, d/w Dr. Machado today   Will need t monitor renal function closely, pt has CKD 3B as per records     #Mechanical aortic valve   #Subtherapeutic INR  c/w coumadin  Continue Lovenox bridge until therapeutic on coumadin   pt follow up with Dr Kaye       #Uncontrolled IDDM2  RISS  carb restriction  A1c is >11   would continue home dose insulin given A1C, continue to monitor with his PCP, pt aware of need for strict glycemic control     #CKD 3B  Renally dose meds  avoid nephrotoxic meds  monitor on serum chemistry  baseline cr 1.5-2    #CAD/HTN  c/w amlodipine  c/w lipitor  c/w tricor  c/w losartan  c/w BB   #Left second toe osteomyelitis.   Failure of outpatient treatment  MRI- IMPRESSION:  1.  Osteomyelitis of the distal phalanx of the second digit with adjacent   soft tissue ulcer.  2.  Small first through third intermetatarsal bursitis.    Vanco/zosyn, ID consult appreciated   podiatry consult appreciated - pt s/p bedside flexor tenotomy 5/18/24  wound care per podiatry; WBAT with surgical shoe  plan for d/c today, PICC to be placed, continue with invanz and daptomycin, d/w Dr. Machado today, vanco changed 2/2 renal disease   Will need t monitor renal function closely, pt has CKD 3B as per records   pt with some bleeding after PICC insertion, hemostasis achieved, dressing changed by IV RN, initially thought that there was no blood return, but on further eval, +blood return  Plan for d/c tomorrow -- needs better d/c planning and coordination     #Mechanical aortic valve   #Subtherapeutic INR  c/w coumadin  Continue Lovenox bridge until therapeutic on coumadin   pt follow up with Dr Kaye       #Uncontrolled IDDM2  RISS  carb restriction  A1c is >11   would continue home dose insulin given A1C, continue to monitor with his PCP, pt aware of need for strict glycemic control     #CKD 3B  Renally dose meds  avoid nephrotoxic meds  monitor on serum chemistry  baseline cr 1.5-2    #CAD/HTN  c/w amlodipine  c/w lipitor  c/w tricor  c/w losartan  c/w BB      DISPO; d/c tomorrow

## 2024-05-21 VITALS — TEMPERATURE: 98 F | OXYGEN SATURATION: 95 %

## 2024-05-21 LAB
ANION GAP SERPL CALC-SCNC: 7 MMOL/L — SIGNIFICANT CHANGE UP (ref 5–17)
BUN SERPL-MCNC: 21 MG/DL — SIGNIFICANT CHANGE UP (ref 7–23)
CALCIUM SERPL-MCNC: 8.8 MG/DL — SIGNIFICANT CHANGE UP (ref 8.5–10.1)
CHLORIDE SERPL-SCNC: 109 MMOL/L — HIGH (ref 96–108)
CO2 SERPL-SCNC: 22 MMOL/L — SIGNIFICANT CHANGE UP (ref 22–31)
CREAT SERPL-MCNC: 1.65 MG/DL — HIGH (ref 0.5–1.3)
EGFR: 48 ML/MIN/1.73M2 — LOW
GLUCOSE SERPL-MCNC: 268 MG/DL — HIGH (ref 70–99)
HCT VFR BLD CALC: 41.2 % — SIGNIFICANT CHANGE UP (ref 39–50)
HGB BLD-MCNC: 14.1 G/DL — SIGNIFICANT CHANGE UP (ref 13–17)
INR BLD: 1.72 RATIO — HIGH (ref 0.85–1.18)
MCHC RBC-ENTMCNC: 30.4 PG — SIGNIFICANT CHANGE UP (ref 27–34)
MCHC RBC-ENTMCNC: 34.2 GM/DL — SIGNIFICANT CHANGE UP (ref 32–36)
MCV RBC AUTO: 88.8 FL — SIGNIFICANT CHANGE UP (ref 80–100)
PLATELET # BLD AUTO: 262 K/UL — SIGNIFICANT CHANGE UP (ref 150–400)
POTASSIUM SERPL-MCNC: 4 MMOL/L — SIGNIFICANT CHANGE UP (ref 3.5–5.3)
POTASSIUM SERPL-SCNC: 4 MMOL/L — SIGNIFICANT CHANGE UP (ref 3.5–5.3)
PROTHROM AB SERPL-ACNC: 19.1 SEC — HIGH (ref 9.5–13)
RBC # BLD: 4.64 M/UL — SIGNIFICANT CHANGE UP (ref 4.2–5.8)
RBC # FLD: 13.9 % — SIGNIFICANT CHANGE UP (ref 10.3–14.5)
SODIUM SERPL-SCNC: 138 MMOL/L — SIGNIFICANT CHANGE UP (ref 135–145)
WBC # BLD: 9.93 K/UL — SIGNIFICANT CHANGE UP (ref 3.8–10.5)
WBC # FLD AUTO: 9.93 K/UL — SIGNIFICANT CHANGE UP (ref 3.8–10.5)

## 2024-05-21 PROCEDURE — 99239 HOSP IP/OBS DSCHRG MGMT >30: CPT

## 2024-05-21 RX ORDER — DAPTOMYCIN 500 MG/10ML
500 INJECTION, POWDER, LYOPHILIZED, FOR SOLUTION INTRAVENOUS ONCE
Refills: 0 | Status: COMPLETED | OUTPATIENT
Start: 2024-05-21 | End: 2024-05-21

## 2024-05-21 RX ORDER — ENOXAPARIN SODIUM 100 MG/ML
120 INJECTION SUBCUTANEOUS
Qty: 14 | Refills: 0
Start: 2024-05-21 | End: 2024-05-27

## 2024-05-21 RX ADMIN — Medication 4: at 11:59

## 2024-05-21 RX ADMIN — Medication 22 UNIT(S): at 08:15

## 2024-05-21 RX ADMIN — Medication 1 SPRAY(S): at 10:25

## 2024-05-21 RX ADMIN — ENOXAPARIN SODIUM 120 MILLIGRAM(S): 100 INJECTION SUBCUTANEOUS at 10:25

## 2024-05-21 RX ADMIN — Medication 1 TABLET(S): at 10:26

## 2024-05-21 RX ADMIN — AMLODIPINE BESYLATE 10 MILLIGRAM(S): 2.5 TABLET ORAL at 10:26

## 2024-05-21 RX ADMIN — Medication 22 UNIT(S): at 11:58

## 2024-05-21 RX ADMIN — DAPTOMYCIN 120 MILLIGRAM(S): 500 INJECTION, POWDER, LYOPHILIZED, FOR SOLUTION INTRAVENOUS at 13:35

## 2024-05-21 RX ADMIN — Medication 4: at 08:15

## 2024-05-21 RX ADMIN — Medication 1000 UNIT(S): at 10:27

## 2024-05-21 RX ADMIN — ERTAPENEM SODIUM 1000 MILLIGRAM(S): 1 INJECTION, POWDER, LYOPHILIZED, FOR SOLUTION INTRAMUSCULAR; INTRAVENOUS at 13:18

## 2024-05-21 RX ADMIN — MAGNESIUM OXIDE 400 MG ORAL TABLET 400 MILLIGRAM(S): 241.3 TABLET ORAL at 10:26

## 2024-05-21 RX ADMIN — LOSARTAN POTASSIUM 50 MILLIGRAM(S): 100 TABLET, FILM COATED ORAL at 10:25

## 2024-05-21 RX ADMIN — Medication 325 MILLIGRAM(S): at 10:26

## 2024-05-21 RX ADMIN — Medication 50 MILLIGRAM(S): at 10:26

## 2024-05-21 RX ADMIN — CLOPIDOGREL BISULFATE 75 MILLIGRAM(S): 75 TABLET, FILM COATED ORAL at 10:26

## 2024-05-21 NOTE — PROGRESS NOTE ADULT - REASON FOR ADMISSION
Osteomyelitis and cellulitis of diabetic foot, failure of outpatient treatment

## 2024-05-21 NOTE — PROGRESS NOTE ADULT - SUBJECTIVE AND OBJECTIVE BOX
Date of service: 5/21/24  HPI:  58-year-old male history pancreatitis,  HTN, HLD, DM2, CKD 3, obesity, CAD s/p x8 stents, s/p cholecystectomy, pacemaker, AVR, presented to ED today with wound to the left second toe.  Patient states it started off as a blister about 2 weeks back which then started to discharge pus a week and a half ago has been on PO antibiotics sulfamethoxazole by his PCP; Pt states the pus, drainage and pain has improved after the PO antibiotics but wound has not completely resolved. He saw Dr boyce today for wound evaluation and was adviced to go to ER for admission for MRi for further work up of suspected OM. Pt states he has history of toe infection to the other foot which had heeled previously. Pt denies any fever, chills, nausea, vomiting sob.     5/21/24: pt seen by podiatry team today. Patient resting bedside, no pain to the foot. S/P flexor tentomy. Wound stable no clinical signs of infection. No other pedal complaints.     PMH: Essential hypertension    Obstructive sleep apnea    Diabetes mellitus    HLD (hyperlipidemia)    CAD (coronary artery disease)    Pancreatitis      PSH:No significant past surgical history    History of coronary artery stent placement    S/P cholecystectomy        Allergies:penicillin (Hives)  scallops (Nausea)      Labs:                        13.4   x     )-----------( x        ( 19 May 2024 06:29 )             38.8     05-19    141  |  111<H>  |  19  ----------------------------<  164<H>  3.8   |  24  |  1.55<H>    Ca    9.0      19 May 2024 06:29    Vital Signs Last 24 Hrs  T(C): 37.1 (20 May 2024 20:05), Max: 37.1 (20 May 2024 20:05)  T(F): 98.8 (20 May 2024 20:05), Max: 98.8 (20 May 2024 20:05)  HR: 79 (20 May 2024 20:05) (79 - 79)  BP: 133/70 (20 May 2024 20:05) (133/70 - 143/82)  BP(mean): --  RR: 16 (20 May 2024 20:05) (16 - 18)  SpO2: 97% (20 May 2024 20:05) (96% - 97%)    Parameters below as of 20 May 2024 20:05  Patient On (Oxygen Delivery Method): room air    REVIEW OF SYSTEMS:    CONSTITUTIONAL: No weakness, fevers or chills  EYES: No visual changes  RESPIRATORY: No cough, wheezing; No shortness of breath  CARDIOVASCULAR: No chest pain or palpitations  GASTROINTESTINAL: No abdominal or epigastric pain. No nausea, vomiting; No diarrhea or constipation.   GENITOURINARY: No dysuria, frequency or hematuria  NEUROLOGICAL: No numbness or weakness  SKIN: See physical examination.  All other review of systems is negative unless indicated above    Physical Exam:   Constitutional: NAD, alert;  Lower Extremity Focus  Derm:  Skin warm, dry and supple bilateral.    Full thickness wound to the left second toe distal aspect, Wound base is granular, wound size aprox 1cm x 1cm x 1cm, no periwound edema, mild periwound erythema, no purulence, no fluctuance, no malodor, does not track or tunnel, + probes to bone, sanguinous discharge only  Vascular: Dorsalis Pedis and Posterior Tibial pulses 1/4 left foot.  Capillary re-fill time less then 3 seconds digits 1-5 bilateral.    Neuro: Protective sensation intact to the level of the digits bilateral.  MSK: Muscle strength 5/5 all major muscle groups bilateral.       < from: MR Foot No Cont, Left (05.17.24 @ 15:05) >    ACC: [id number] EXAM:  MR FOOT LT   ORDERED BY: KASHIF MEDRANO     PROCEDURE DATE:  05/17/2024          INTERPRETATION:  Exam Type: MR FOOT LEFT  Exam Date and Time: 5/17/2024 3:05 PM  Indication: Concern for osteomyelitis  Comparison: Left foot radiograph from 5/16/2024    TECHNIQUE:  Multiplanar multisequence MRI of the left foot was performed using a   standard non-contrast protocol.    FINDINGS:  BONES/JOINTS:  Osseous edema with cortical irregularity and loss of normal T1 fatty   marrow of the distal phalanx of the second digit corresponding to   osteomyelitis. Alignment is anatomic. 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 Mild   osteoarthritis of the midfoot. No joint effusions.    PLANTAR PLATES:  Intact plantar plates without tear.    INTERMETATARSAL SPACES:  No intermetatarsal neuroma. Small first through third intermetatarsal   bursitis.    TENDONS:  Visualized portions of the flexor, extensor, and peroneal tendons are  intact without tendinosis or tear. No tenosynovitis.    LISFRANC LIGAMENT:  Plantar (pC1-M2M3), interosseous (C1-M2), and dorsal bands intact.    MUSCLES:  Edema throughout the muscles of the forefoot.    SOFT TISSUES:  Soft tissue edema throughoutthe foot most noted at the dorsal aspect.   Soft tissue ulcer distal to the tuft of the second digit.    IMPRESSION:  1.  Osteomyelitis of the distal phalanx of the second digit with adjacent   soft tissue ulcer.  2.  Small first through third intermetatarsal bursitis.    --- End of Report ---            ASHLEY GOLDEN DO; Attending Radiologist  This document has been electronically signed. May 17 2024  3:40PM    < end of copied text >

## 2024-05-21 NOTE — PHARMACOTHERAPY INTERVENTION NOTE - COMMENTS
58y male admitted with Osteomyelitis    HPI:  58 year old male with a PMHx of pancreatitis, HTN, HLD, IDDM, CKD3B, obesity, CAD s/p PCI and 8 stents, PPM, AVR, HTN urgency, and PAF on Coumadin presents with 2 weeks of progressively worsening foot wound that began as a blister and later required oral antibiotics due to purulent drainage. (-) Fever, chills, nausea, vomiting, chest pain, abdominal pain, calf tenderness/swelling, shortness of breath, palpitations, headache, paresthesias, rash, dysuria, diarrhea, or trauma to area. (16 May 2024 20:59)    Pertinent PMH/PSH  Essential hypertension  Obstructive sleep apnea  Diabetes mellitus  HLD (hyperlipidemia)  CAD (coronary artery disease)  Pancreatitis  History of coronary artery stent placement  S/P cholecystectomy    Home Medications:  atorvastatin 80 mg oral tablet: 1 tab(s) orally once a day (at bedtime) (16 May 2024 21:47)  cholecalciferol 25 mcg (1000 intl units) oral tablet: 1 tab(s) orally once a day (16 May 2024 21:44)  clopidogrel 75 mg oral tablet: 1 tab(s) orally once a day (16 May 2024 21:30)  fenofibrate 160 mg oral tablet: 1 tab(s) orally once a day (16 May 2024 21:30)  ferrous sulfate 200 mg (65 mg elemental iron) oral tablet: 1 tab(s) orally once a day (16 May 2024 21:50)  Flonase 50 mcg/inh nasal spray: 1 spray(s) in each nostril as needed for (16 May 2024 21:44)  magnesium oxide 400 mg oral tablet: 1 tab(s) orally once a day (16 May 2024 21:45)  Multiple Vitamins oral tablet: 1 tab(s) orally once a day (16 May 2024 21:45)  NovoLOG FlexPen 100 units/mL injectable solution: 25 unit(s) injectable 3 times a day (16 May 2024 21:44)  Tresiba FlexTouch 200 units/mL subcutaneous solution: 56 unit(s) subcutaneous once a day (16 May 2024 21:44)  warfarin 1 mg oral tablet: 1 tab(s) orally once a day Take 1 tabs of 1mg with a 5mg tab to equal 6mg (16 May 2024 21:48)  warfarin 1 mg oral tablet: 1 tab(s) orally once a day (at bedtime) Take 1 tabs of 1mg with a 5mg tab to equal 6mg (16 May 2024 21:48)  warfarin 5 mg oral tablet: 1 tab(s) orally once a day (at bedtime) *** take with 1 mg total 6 mg*** (16 May 2024 21:49)    A1C Result(s) Within Prior 3 Months  A1C with Estimated Average Glucose Result: 11.4 % (05-17-24 @ 06:21)    Renal Function Within Prior 72 Hours  Creatinine: 1.83 mg/dL (05-16-24 @ 16:34)  Creatinine: 1.66 mg/dL (05-17-24 @ 06:21)  Creatinine: 1.82 mg/dL (05-18-24 @ 05:45)    Current Orders  insulin glargine Injectable (LANTUS) 45 Unit(s) SubCutaneous at bedtime  insulin lispro (ADMELOG) corrective regimen sliding scale   SubCutaneous three times a day before meals  insulin lispro (ADMELOG) corrective regimen sliding scale.   SubCutaneous at bedtime  insulin lispro Injectable (ADMELOG) 20 Unit(s) SubCutaneous three times a day before meals    POCT Within Prior 24 Hours  POCT Blood Glucose.: 200 mg/dL (05-17-24 @ 08:20)  POCT Blood Glucose.: 222 mg/dL (05-17-24 @ 11:39)  POCT Blood Glucose.: 206 mg/dL (05-17-24 @ 16:56)  POCT Blood Glucose.: 213 mg/dL (05-17-24 @ 22:49)  POCT Blood Glucose.: 160 mg/dL (05-18-24 @ 08:21)  POCT Blood Glucose.: 230 mg/dL (05-18-24 @ 13:46)    Insulin Administration Within Prior 24 Hours  insulin glargine Injectable (LANTUS)   45 Unit(s) SubCutaneous (05-17-24 @ 22:54)    insulin lispro (ADMELOG) corrective regimen sliding scale   2 Unit(s) SubCutaneous (05-17-24 @ 08:24)   4 Unit(s) SubCutaneous (05-17-24 @ 11:39)   4 Unit(s) SubCutaneous (05-17-24 @ 17:35)   2 Unit(s) SubCutaneous (05-18-24 @ 08:24)   4 Unit(s) SubCutaneous (05-18-24 @ 13:48)    insulin lispro Injectable (ADMELOG)   20 Unit(s) SubCutaneous (05-17-24 @ 08:24)   20 Unit(s) SubCutaneous (05-17-24 @ 11:40)   20 Unit(s) SubCutaneous (05-17-24 @ 17:35)   20 Unit(s) SubCutaneous (05-18-24 @ 08:28)   20 Unit(s) SubCutaneous (05-18-24 @ 13:48)    Other Administration(s) (i.e. Steroids, PO diabetes medications) Within Prior 24 Hours  N/A    Height (cm): 185.4 (05-16-24 @ 15:45)  Weight (kg): 121 (05-16-24 @ 21:18)  BMI (kg/m2): 35.2 (05-16-24 @ 21:18)    Current Diet  Diet, Regular:   Consistent Carbohydrate Evening Snack (CSTCHOSN)  DASH/TLC Sodium & Cholesterol Restricted (DASH) (05-16-24 @ 21:07) [Active]    Assessment/Plan:  - Patient with a history of Type 2 Diabetes Mellitus: Glycemic control to be managed by Inpatient Diabetes Management Team  - A1C is 11.4%: Patients treatment goal is A1C < 7.0% per the ADA standards and thus patient has poor glycemic control outpatient, managed with Tresiba 200 units/mL 56 units QD and Novolog 25 units TID AC  - Patient is insulin tolerant, currently ordered a moderate intensity insulin correction scale TID AC and HS, Lantus 45 units HS and Admelog 20 units TID AC [80% of home insulin doses]  - Fasting POCT 160 and pre-lunch POCT 230: Patients glycemic control is currently within/above protocol range of POCT 100-180  - Per RN, patient ate a late breakfast and finished eating lunch prior to pre-lunch POCT [falsely elevated]  - Prior day POCT ranged 200, 222, 206, 213: Patient has required Admelog correction 2/4/4/0 in addition to Lantus 45 units QHS and Admelog 20/20/20.  - Continue current management with Lantus 45 units HS and Admelog 20 units TID AC for 24 hours and titrate per insulin correction scale to glycemic POCT target 100-180
Medication history complete, reviewed medications with patient and confirmed with doctor first med hx.
58y male admitted with Osteomyelitis    HPI:  58 year old male with a PMHx of pancreatitis, HTN, HLD, IDDM, CKD3B, obesity, CAD s/p PCI and 8 stents, PPM, AVR, HTN urgency, and PAF on Coumadin presents with 2 weeks of progressively worsening foot wound that began as a blister and later required oral antibiotics due to purulent drainage. (-) Fever, chills, nausea, vomiting, chest pain, abdominal pain, calf tenderness/swelling, shortness of breath, palpitations, headache, paresthesias, rash, dysuria, diarrhea, or trauma to area. (16 May 2024 20:59)    Pertinent PMH/PSH  Essential hypertension  Obstructive sleep apnea  Diabetes mellitus  HLD (hyperlipidemia)  CAD (coronary artery disease)  Pancreatitis  History of coronary artery stent placement  S/P cholecystectomy    Home Medications:  atorvastatin 80 mg oral tablet: 1 tab(s) orally once a day (at bedtime) (16 May 2024 21:47)  cholecalciferol 25 mcg (1000 intl units) oral tablet: 1 tab(s) orally once a day (16 May 2024 21:44)  clopidogrel 75 mg oral tablet: 1 tab(s) orally once a day (16 May 2024 21:30)  fenofibrate 160 mg oral tablet: 1 tab(s) orally once a day (16 May 2024 21:30)  ferrous sulfate 200 mg (65 mg elemental iron) oral tablet: 1 tab(s) orally once a day (16 May 2024 21:50)  Flonase 50 mcg/inh nasal spray: 1 spray(s) in each nostril as needed for (16 May 2024 21:44)  magnesium oxide 400 mg oral tablet: 1 tab(s) orally once a day (16 May 2024 21:45)  Multiple Vitamins oral tablet: 1 tab(s) orally once a day (16 May 2024 21:45)  NovoLOG FlexPen 100 units/mL injectable solution: 25 unit(s) injectable 3 times a day (16 May 2024 21:44)  Tresiba FlexTouch 200 units/mL subcutaneous solution: 56 unit(s) subcutaneous once a day (16 May 2024 21:44)  warfarin 1 mg oral tablet: 1 tab(s) orally once a day Take 1 tabs of 1mg with a 5mg tab to equal 6mg (16 May 2024 21:48)  warfarin 1 mg oral tablet: 1 tab(s) orally once a day (at bedtime) Take 1 tabs of 1mg with a 5mg tab to equal 6mg (16 May 2024 21:48)  warfarin 5 mg oral tablet: 1 tab(s) orally once a day (at bedtime) *** take with 1 mg total 6 mg*** (16 May 2024 21:49)    A1C Result(s) Within Prior 3 Months  A1C with Estimated Average Glucose Result: 11.4 % (05-17-24 @ 06:21)    Renal Function Within Prior 72 Hours  Creatinine: 1.55 mg/dL (05-19-24 @ 06:29)  Creatinine: 1.88 mg/dL (05-20-24 @ 06:52)  Creatinine: 1.65 mg/dL (05-21-24 @ 09:11)    Current Orders  insulin glargine Injectable (LANTUS) 47 Unit(s) SubCutaneous at bedtime  insulin lispro (ADMELOG) corrective regimen sliding scale   SubCutaneous three times a day before meals  insulin lispro (ADMELOG) corrective regimen sliding scale.   SubCutaneous at bedtime  insulin lispro Injectable (ADMELOG) 22 Unit(s) SubCutaneous three times a day before meals    POCT Within Prior 24 Hours  POCT Blood Glucose.: 138 mg/dL (05-20-24 @ 07:56)  POCT Blood Glucose.: 217 mg/dL (05-20-24 @ 11:36)  POCT Blood Glucose.: 221 mg/dL (05-20-24 @ 17:39)  POCT Blood Glucose.: 205 mg/dL (05-20-24 @ 21:42)  POCT Blood Glucose.: 224 mg/dL (05-21-24 @ 08:07)  POCT Blood Glucose.: 223 mg/dL (05-21-24 @ 11:47)    Insulin Administration Within Prior 24 Hours  insulin glargine Injectable (LANTUS)   45 Unit(s) SubCutaneous (05-20-24 @ 21:51)  insulin lispro (ADMELOG) corrective regimen sliding scale   4 Unit(s) SubCutaneous (05-20-24 @ 11:41)   4 Unit(s) SubCutaneous (05-20-24 @ 17:42)   4 Unit(s) SubCutaneous (05-21-24 @ 08:15)   4 Unit(s) SubCutaneous (05-21-24 @ 11:59)  insulin lispro Injectable (ADMELOG)   20 Unit(s) SubCutaneous (05-20-24 @ 08:03)   20 Unit(s) SubCutaneous (05-20-24 @ 11:40)   20 Unit(s) SubCutaneous (05-20-24 @ 17:41)   22 Unit(s) SubCutaneous (05-21-24 @ 08:15)   22 Unit(s) SubCutaneous (05-21-24 @ 11:58)    Other Administration(s) (i.e. Steroids, PO diabetes medications) Within Prior 24 Hours: N/A    Height (cm): 185.4 (05-16-24 @ 15:45)  Weight (kg): 121 (05-16-24 @ 21:18)  BMI (kg/m2): 35.2 (05-16-24 @ 21:18)    Current Diet  Diet, Regular:   Consistent Carbohydrate Evening Snack (CSTCHOSN)  DASH/TLC Sodium & Cholesterol Restricted (DASH) (05-16-24 @ 21:07) [Active]    Assessment/Plan:  - Patient with a history of Type 2 Diabetes Mellitus: Glycemic control to be managed by Inpatient Diabetes Management Team  - A1C is 11.6%: Patients treatment goal is A1C < 7.0% per the ADA standards and thus patient has poor glycemic control outpatient, managed with Tresiba 200 units/mL 56 units QD and Novolog 25 units TID AC  - Patient is insulin tolerant, currently ordered a moderate intensity insulin correction scale TID AC and HS, Lantus 45 units HS and Admelog 20 units TID AC [80% of home insulin doses]  - Fasting POCT 224 and pre-lunch POCT 223: Patients glycemic control is currently above protocol range of POCT 100-180  - Prior day POCT trended 138, 217, 221, 205: Patient has required Admelog correction 0/4/4/0 in addition to Lantus 45 units HS and Admelog 20/20/20 AC  - Increase Lantus to 47 units HS and Admelog to 22 units TID AC [First dose pre-breakfast]  - Continue insulin correction scale to titrate therapy to a glycemic target of POCT 100-180  - Patient is not a candidate for Tradjenta in-patient use due to A1C > 7.5% and history of pancreatitis  - Nursing to review insulin injection technique with patient
58y male admitted with Osteomyelitis    HPI:  58 year old male with a PMHx of pancreatitis, HTN, HLD, IDDM, CKD3B, obesity, CAD s/p PCI and 8 stents, PPM, AVR, HTN urgency, and PAF on Coumadin presents with 2 weeks of progressively worsening foot wound that began as a blister and later required oral antibiotics due to purulent drainage. (-) Fever, chills, nausea, vomiting, chest pain, abdominal pain, calf tenderness/swelling, shortness of breath, palpitations, headache, paresthesias, rash, dysuria, diarrhea, or trauma to area. (16 May 2024 20:59)    Pertinent PMH/PSH  Essential hypertension  Obstructive sleep apnea  Diabetes mellitus  HLD (hyperlipidemia)  CAD (coronary artery disease)  Pancreatitis  History of coronary artery stent placement  S/P cholecystectomy    Home Medications:  atorvastatin 80 mg oral tablet: 1 tab(s) orally once a day (at bedtime) (16 May 2024 21:47)  cholecalciferol 25 mcg (1000 intl units) oral tablet: 1 tab(s) orally once a day (16 May 2024 21:44)  clopidogrel 75 mg oral tablet: 1 tab(s) orally once a day (16 May 2024 21:30)  fenofibrate 160 mg oral tablet: 1 tab(s) orally once a day (16 May 2024 21:30)  ferrous sulfate 200 mg (65 mg elemental iron) oral tablet: 1 tab(s) orally once a day (16 May 2024 21:50)  Flonase 50 mcg/inh nasal spray: 1 spray(s) in each nostril as needed for (16 May 2024 21:44)  magnesium oxide 400 mg oral tablet: 1 tab(s) orally once a day (16 May 2024 21:45)  Multiple Vitamins oral tablet: 1 tab(s) orally once a day (16 May 2024 21:45)  NovoLOG FlexPen 100 units/mL injectable solution: 25 unit(s) injectable 3 times a day (16 May 2024 21:44)  Tresiba FlexTouch 200 units/mL subcutaneous solution: 56 unit(s) subcutaneous once a day (16 May 2024 21:44)  warfarin 1 mg oral tablet: 1 tab(s) orally once a day Take 1 tabs of 1mg with a 5mg tab to equal 6mg (16 May 2024 21:48)  warfarin 1 mg oral tablet: 1 tab(s) orally once a day (at bedtime) Take 1 tabs of 1mg with a 5mg tab to equal 6mg (16 May 2024 21:48)  warfarin 5 mg oral tablet: 1 tab(s) orally once a day (at bedtime) *** take with 1 mg total 6 mg*** (16 May 2024 21:49)    A1C Result(s) Within Prior 3 Months  A1C with Estimated Average Glucose Result: 11.4 % (05-17-24 @ 06:21)    Renal Function Within Prior 72 Hours  Creatinine: 1.83 mg/dL (05-16-24 @ 16:34)  Creatinine: 1.66 mg/dL (05-17-24 @ 06:21)    Current Orders  insulin glargine Injectable (LANTUS) 45 Unit(s) SubCutaneous at bedtime  insulin lispro (ADMELOG) corrective regimen sliding scale   SubCutaneous three times a day before meals  insulin lispro Injectable (ADMELOG) 20 Unit(s) SubCutaneous three times a day before meals    POCT Within Prior 24 Hours  POCT Blood Glucose.: 185 mg/dL (05-16-24 @ 22:17)  POCT Blood Glucose.: 200 mg/dL (05-17-24 @ 08:20)  POCT Blood Glucose.: 222 mg/dL (05-17-24 @ 11:39)    Insulin Administration Within Prior 24 Hours  insulin glargine Injectable (LANTUS)   45 Unit(s) SubCutaneous (05-17-24 @ 00:00)  insulin lispro (ADMELOG) corrective regimen sliding scale   2 Unit(s) SubCutaneous (05-17-24 @ 08:24)   4 Unit(s) SubCutaneous (05-17-24 @ 11:39)  insulin lispro Injectable (ADMELOG)   20 Unit(s) SubCutaneous (05-17-24 @ 08:24)   20 Unit(s) SubCutaneous (05-17-24 @ 11:40)    Other Administration(s) (i.e. Steroids, PO diabetes medications) Within Prior 24 Hours: N/A    Height (cm): 185.4 (05-16-24 @ 15:45)  Weight (kg): 121 (05-16-24 @ 21:18)  BMI (kg/m2): 35.2 (05-16-24 @ 21:18)    Current Diet  Diet, Regular:   Consistent Carbohydrate Evening Snack (CSTCHOSN)  DASH/TLC Sodium & Cholesterol Restricted (DASH) (05-16-24 @ 21:07) [Active]    Assessment/Plan:  - Patient with a history of Type 2 Diabetes Mellitus: Glycemic control to be managed by Inpatient Diabetes Management Team  - A1C is 11.6%: Patients treatment goal is A1C < 7.0% per the ADA standards and thus patient has poor glycemic control outpatient, managed with Tresiba 200 units/mL 56 units QD and Novolog 25 units TID AC  - Patient is insulin tolerant, currently ordered a moderate intensity insulin correction scale TID AC, a low intensity insulin correction scale HS, Lantus 45 units HS and Admelog 20 units TID AC [80% of home insulin doses]  - Fasting POCT 200 and pre-lunch POCT 222: Patients glycemic control is currently above protocol range of POCT 100-180  - Continue current management with Lantus 45 units HS and Admelog 20 units TID AC for 24 hours and titrate per insulin correction scale to glycemic POCT target 100-180  - Added moderate intensity insulin correction scale at bedtime  - Patient is not a candidate for Tradjenta in-patient use due to A1C > 7.5% and history of pancreatitis
58y male admitted with Osteomyelitis    HPI:  58 year old male with a PMHx of pancreatitis, HTN, HLD, IDDM, CKD3B, obesity, CAD s/p PCI and 8 stents, PPM, AVR, HTN urgency, and PAF on Coumadin presents with 2 weeks of progressively worsening foot wound that began as a blister and later required oral antibiotics due to purulent drainage. (-) Fever, chills, nausea, vomiting, chest pain, abdominal pain, calf tenderness/swelling, shortness of breath, palpitations, headache, paresthesias, rash, dysuria, diarrhea, or trauma to area. (16 May 2024 20:59)    Pertinent PMH/PSH  Essential hypertension  Obstructive sleep apnea  Diabetes mellitus  HLD (hyperlipidemia)  CAD (coronary artery disease)  Pancreatitis  History of coronary artery stent placement  S/P cholecystectomy    Home Medications:  atorvastatin 80 mg oral tablet: 1 tab(s) orally once a day (at bedtime) (16 May 2024 21:47)  cholecalciferol 25 mcg (1000 intl units) oral tablet: 1 tab(s) orally once a day (16 May 2024 21:44)  clopidogrel 75 mg oral tablet: 1 tab(s) orally once a day (16 May 2024 21:30)  fenofibrate 160 mg oral tablet: 1 tab(s) orally once a day (16 May 2024 21:30)  ferrous sulfate 200 mg (65 mg elemental iron) oral tablet: 1 tab(s) orally once a day (16 May 2024 21:50)  Flonase 50 mcg/inh nasal spray: 1 spray(s) in each nostril as needed for (16 May 2024 21:44)  magnesium oxide 400 mg oral tablet: 1 tab(s) orally once a day (16 May 2024 21:45)  Multiple Vitamins oral tablet: 1 tab(s) orally once a day (16 May 2024 21:45)  NovoLOG FlexPen 100 units/mL injectable solution: 25 unit(s) injectable 3 times a day (16 May 2024 21:44)  Tresiba FlexTouch 200 units/mL subcutaneous solution: 56 unit(s) subcutaneous once a day (16 May 2024 21:44)  warfarin 1 mg oral tablet: 1 tab(s) orally once a day Take 1 tabs of 1mg with a 5mg tab to equal 6mg (16 May 2024 21:48)  warfarin 1 mg oral tablet: 1 tab(s) orally once a day (at bedtime) Take 1 tabs of 1mg with a 5mg tab to equal 6mg (16 May 2024 21:48)  warfarin 5 mg oral tablet: 1 tab(s) orally once a day (at bedtime) *** take with 1 mg total 6 mg*** (16 May 2024 21:49)    A1C Result(s) Within Prior 3 Months  A1C with Estimated Average Glucose Result: 11.4 % (05-17-24 @ 06:21)    Renal Function Within Prior 72 Hours  Creatinine: 1.66 mg/dL (05-17-24 @ 06:21)  Creatinine: 1.82 mg/dL (05-18-24 @ 05:45)  Creatinine: 1.55 mg/dL (05-19-24 @ 06:29)    Current Orders  insulin glargine Injectable (LANTUS) 45 Unit(s) SubCutaneous at bedtime  insulin lispro (ADMELOG) corrective regimen sliding scale   SubCutaneous three times a day before meals  insulin lispro (ADMELOG) corrective regimen sliding scale.   SubCutaneous at bedtime  insulin lispro Injectable (ADMELOG) 20 Unit(s) SubCutaneous three times a day before meals    POCT Within Prior 24 Hours  POCT Blood Glucose.: 160 mg/dL (05-18-24 @ 08:21)  POCT Blood Glucose.: 230 mg/dL (05-18-24 @ 13:46)  POCT Blood Glucose.: 201 mg/dL (05-18-24 @ 17:02)  POCT Blood Glucose.: 149 mg/dL (05-18-24 @ 21:57)  POCT Blood Glucose.: 156 mg/dL (05-19-24 @ 08:05)  POCT Blood Glucose.: 158 mg/dL (05-19-24 @ 12:06)  POCT Blood Glucose.: 177 mg/dL (05-19-24 @ 16:38)    Insulin Administration Within Prior 24 Hours  insulin glargine Injectable (LANTUS)   45 Unit(s) SubCutaneous (05-18-24 @ 22:18)    insulin lispro (ADMELOG) corrective regimen sliding scale   2 Unit(s) SubCutaneous (05-18-24 @ 08:24)   4 Unit(s) SubCutaneous (05-18-24 @ 13:48)   4 Unit(s) SubCutaneous (05-18-24 @ 17:17)   2 Unit(s) SubCutaneous (05-19-24 @ 08:07)   2 Unit(s) SubCutaneous (05-19-24 @ 12:12)   2 Unit(s) SubCutaneous (05-19-24 @ 16:54)    insulin lispro Injectable (ADMELOG)   20 Unit(s) SubCutaneous (05-18-24 @ 08:28)   20 Unit(s) SubCutaneous (05-18-24 @ 13:48)   20 Unit(s) SubCutaneous (05-18-24 @ 17:17)   20 Unit(s) SubCutaneous (05-19-24 @ 08:06)   20 Unit(s) SubCutaneous (05-19-24 @ 12:12)   20 Unit(s) SubCutaneous (05-19-24 @ 16:53)    Other Administration(s) (i.e. Steroids, PO diabetes medications) Within Prior 24 Hours  N/A    Height (cm): 185.4 (05-16-24 @ 15:45)  Weight (kg): 121 (05-16-24 @ 21:18)  BMI (kg/m2): 35.2 (05-16-24 @ 21:18)    Current Diet  Diet, Regular:   Consistent Carbohydrate Evening Snack (CSTCHOSN)  DASH/TLC Sodium & Cholesterol Restricted (DASH) (05-16-24 @ 21:07) [Active]    Assessment/Plan:  - Patient with a history of Type 2 Diabetes Mellitus: Glycemic control to be managed by Inpatient Diabetes Management Team  - A1C is 11.4%: Patients treatment goal is A1C < 7.0% per the ADA standards and thus patient has poor glycemic control outpatient, managed with Tresiba 200 units/mL 56 units QD and Novolog 25 units TID AC  - Patient is insulin tolerant, currently ordered a moderate intensity insulin correction scale TID AC and HS, Lantus 45 units HS and Admelog 20 units TID AC [80% of home insulin doses]  - Fasting POCT 156 and pre-lunch POCT 158: Patients glycemic control is currently within protocol range of POCT 100-180  - Prior day POCT ranged 160, 230, 201, 149: Patient has required Admelog correction 2/4/4/0 in addition to Lantus 45 units QHS and Admelog 20/20/20.  - Continue current management with Lantus 45 units HS and Admelog 20 units TID AC for 24 hours and titrate per insulin correction scale to glycemic POCT target 100-180
58y male admitted with Osteomyelitis    HPI:  58 year old male with a PMHx of pancreatitis, HTN, HLD, IDDM, CKD3B, obesity, CAD s/p PCI and 8 stents, PPM, AVR, HTN urgency, and PAF on Coumadin presents with 2 weeks of progressively worsening foot wound that began as a blister and later required oral antibiotics due to purulent drainage. (-) Fever, chills, nausea, vomiting, chest pain, abdominal pain, calf tenderness/swelling, shortness of breath, palpitations, headache, paresthesias, rash, dysuria, diarrhea, or trauma to area. (16 May 2024 20:59)    Pertinent PMH/PSH  Essential hypertension  Obstructive sleep apnea  Diabetes mellitus  HLD (hyperlipidemia)  CAD (coronary artery disease)  Pancreatitis  History of coronary artery stent placement  S/P cholecystectomy    Home Medications:  atorvastatin 80 mg oral tablet: 1 tab(s) orally once a day (at bedtime) (16 May 2024 21:47)  cholecalciferol 25 mcg (1000 intl units) oral tablet: 1 tab(s) orally once a day (16 May 2024 21:44)  clopidogrel 75 mg oral tablet: 1 tab(s) orally once a day (16 May 2024 21:30)  fenofibrate 160 mg oral tablet: 1 tab(s) orally once a day (16 May 2024 21:30)  ferrous sulfate 200 mg (65 mg elemental iron) oral tablet: 1 tab(s) orally once a day (16 May 2024 21:50)  Flonase 50 mcg/inh nasal spray: 1 spray(s) in each nostril as needed for (16 May 2024 21:44)  magnesium oxide 400 mg oral tablet: 1 tab(s) orally once a day (16 May 2024 21:45)  Multiple Vitamins oral tablet: 1 tab(s) orally once a day (16 May 2024 21:45)  NovoLOG FlexPen 100 units/mL injectable solution: 25 unit(s) injectable 3 times a day (16 May 2024 21:44)  Tresiba FlexTouch 200 units/mL subcutaneous solution: 56 unit(s) subcutaneous once a day (16 May 2024 21:44)  warfarin 1 mg oral tablet: 1 tab(s) orally once a day Take 1 tabs of 1mg with a 5mg tab to equal 6mg (16 May 2024 21:48)  warfarin 1 mg oral tablet: 1 tab(s) orally once a day (at bedtime) Take 1 tabs of 1mg with a 5mg tab to equal 6mg (16 May 2024 21:48)  warfarin 5 mg oral tablet: 1 tab(s) orally once a day (at bedtime) *** take with 1 mg total 6 mg*** (16 May 2024 21:49)    A1C Result(s) Within Prior 3 Months  A1C with Estimated Average Glucose Result: 11.4 % (05-17-24 @ 06:21)    Renal Function Within Prior 72 Hours  Creatinine: 1.82 mg/dL (05-18-24 @ 05:45)  Creatinine: 1.55 mg/dL (05-19-24 @ 06:29)  Creatinine: 1.88 mg/dL (05-20-24 @ 06:52)    Current Orders  insulin glargine Injectable (LANTUS) 45 Unit(s) SubCutaneous at bedtime  insulin lispro (ADMELOG) corrective regimen sliding scale   SubCutaneous three times a day before meals  insulin lispro (ADMELOG) corrective regimen sliding scale.   SubCutaneous at bedtime  insulin lispro Injectable (ADMELOG) 20 Unit(s) SubCutaneous three times a day before meals    POCT Within Prior 24 Hours  POCT Blood Glucose.: 156 mg/dL (05-19-24 @ 08:05)  POCT Blood Glucose.: 158 mg/dL (05-19-24 @ 12:06)  POCT Blood Glucose.: 177 mg/dL (05-19-24 @ 16:38)  POCT Blood Glucose.: 163 mg/dL (05-19-24 @ 20:55)  POCT Blood Glucose.: 138 mg/dL (05-20-24 @ 07:56)  POCT Blood Glucose.: 217 mg/dL (05-20-24 @ 11:36)    Insulin Administration Within Prior 24 Hours  insulin glargine Injectable (LANTUS)   45 Unit(s) SubCutaneous (05-19-24 @ 21:33)  insulin lispro (ADMELOG) corrective regimen sliding scale   2 Unit(s) SubCutaneous (05-19-24 @ 08:07)   2 Unit(s) SubCutaneous (05-19-24 @ 12:12)   2 Unit(s) SubCutaneous (05-19-24 @ 16:54)   4 Unit(s) SubCutaneous (05-20-24 @ 11:41)  insulin lispro Injectable (ADMELOG)   20 Unit(s) SubCutaneous (05-19-24 @ 08:06)   20 Unit(s) SubCutaneous (05-19-24 @ 12:12)   20 Unit(s) SubCutaneous (05-19-24 @ 16:53)   20 Unit(s) SubCutaneous (05-20-24 @ 08:03)   20 Unit(s) SubCutaneous (05-20-24 @ 11:40)    Other Administration(s) (i.e. Steroids, PO diabetes medications) Within Prior 24 Hours: N/A    Height (cm): 185.4 (05-16-24 @ 15:45)  Weight (kg): 121 (05-16-24 @ 21:18)  BMI (kg/m2): 35.2 (05-16-24 @ 21:18)    Current Diet  Diet, Regular:   Consistent Carbohydrate Evening Snack (CSTCHOSN)  DASH/TLC Sodium & Cholesterol Restricted (DASH) (05-16-24 @ 21:07) [Active]    Assessment/Plan:  - Patient with a history of Type 2 Diabetes Mellitus: Glycemic control to be managed by Inpatient Diabetes Management Team  - A1C is 11.6%: Patients treatment goal is A1C < 7.0% per the ADA standards and thus patient has poor glycemic control outpatient, managed with Tresiba 200 units/mL 56 units QD and Novolog 25 units TID AC  - Patient is insulin tolerant, currently ordered a moderate intensity insulin correction scale TID AC and HS, Lantus 45 units HS and Admelog 20 units TID AC [80% of home insulin doses]  - Fasting POCT 138 [Within protocol range] and pre-lunch POCT 217 [Above protocol range]  - Prior day POCT trended 156, 158, 177, 163: Patient has required Admelog correction 2/2/2/0 in addition to Lantus 45 units HS and Admelog 20 units TID AC  - Will opt to continue current management with Lantus 45 units HS and Admelog 20 units TID AC  - If patient remains hyperglycemic with POCT > 180: Titrate using previous days insulin correction scale [I.e increase Admelog dose from 20 units TID AC to 21 units TID AC]  - Patient is not a candidate for Tradjenta in-patient use due to A1C > 7.5% and history of pancreatitis

## 2024-05-21 NOTE — PHARMACOTHERAPY INTERVENTION NOTE - REASON FOR NOTE
Inpatient Diabetes Management Team

## 2024-05-21 NOTE — PROGRESS NOTE ADULT - ASSESSMENT
Assesment: 58yr old male seen for the following:   - Full thickness wound to the left second toe in the setting of OM --> s/p flexor tenotomy left 2nd proximal phalanx  - DM    P:   Chart reviewed and Patient evaluated;  Discussed diagnosis and treatment with patient. Discussed importance of daily foot examinations, proper shoe gear, importance of tight glycemic control.   X-rays reviewed : on wet read showing no soft tissue gas, no gross cortical erosions appreciated  Full thickness wound to the let second toe distal aspect, no periwound edema, mild periwound erythema, no purulence, no fluctuance, no malodor, does not track or tunnel, + probes to bone, sanguinous discharge only  Wound flush with normal saline  MRI left foot reviewed; official read noted above    5/17: Discussed with pt and wife present at bedside in detail surgical and conservative management given MRI findings of OM to the left second toe distal phalanx. Pt only amicable to conservative management which would include local wound care and antibiotics as well as a flexor tenotomy of the second toe to relieve the contracture of the second toe which contributed to the wound formation. Pt aware of all the risks and benefit.   5/18: Pt s/p Bedside flexor tenotomy of left second toe, Pt tolerated procedure well. Please see procedure note for details.     5/20/24: Pt s/p flexor tenotomy, educated patient on dressing changes and showed patient how to change dressings step by step. Discussed IV vs po abx, will defer to ID but would recommend IV abx given osteomyelitis findings.     5/21/24: Wound left second toe with out any signs of infection and improving well. No acute podiatric surgical intervention warranted at this time; Pt to f/u with Dr Swenson within 1 week of discharge    WC: Applied xeroform and betadine with dry sterile dressing  WBAT using surgical shoe  Patient stable for dc from podiatric standpoint please f/u with Dr Swenson within 1 week of discharge    Wound care instructions to be change every other day to the left second toe:  - Please remove old dressings gently  - Clean the wound with saline and gauze, Dry thoroughly with gauze.  - Apply betadine soaked gauze, and secure with majo and tape  Thank you.

## 2024-05-27 ENCOUNTER — NON-APPOINTMENT (OUTPATIENT)
Age: 59
End: 2024-05-27

## 2024-05-27 DIAGNOSIS — Z88.0 ALLERGY STATUS TO PENICILLIN: ICD-10-CM

## 2024-05-27 DIAGNOSIS — Z95.5 PRESENCE OF CORONARY ANGIOPLASTY IMPLANT AND GRAFT: ICD-10-CM

## 2024-05-27 DIAGNOSIS — Z91.013 ALLERGY TO SEAFOOD: ICD-10-CM

## 2024-05-27 DIAGNOSIS — M20.42 OTHER HAMMER TOE(S) (ACQUIRED), LEFT FOOT: ICD-10-CM

## 2024-05-27 DIAGNOSIS — Z95.0 PRESENCE OF CARDIAC PACEMAKER: ICD-10-CM

## 2024-05-27 DIAGNOSIS — Z95.3 PRESENCE OF XENOGENIC HEART VALVE: ICD-10-CM

## 2024-05-27 DIAGNOSIS — I12.9 HYPERTENSIVE CHRONIC KIDNEY DISEASE WITH STAGE 1 THROUGH STAGE 4 CHRONIC KIDNEY DISEASE, OR UNSPECIFIED CHRONIC KIDNEY DISEASE: ICD-10-CM

## 2024-05-27 DIAGNOSIS — I25.10 ATHEROSCLEROTIC HEART DISEASE OF NATIVE CORONARY ARTERY WITHOUT ANGINA PECTORIS: ICD-10-CM

## 2024-05-27 DIAGNOSIS — E78.00 PURE HYPERCHOLESTEROLEMIA, UNSPECIFIED: ICD-10-CM

## 2024-05-27 DIAGNOSIS — Z79.4 LONG TERM (CURRENT) USE OF INSULIN: ICD-10-CM

## 2024-05-27 DIAGNOSIS — Z79.02 LONG TERM (CURRENT) USE OF ANTITHROMBOTICS/ANTIPLATELETS: ICD-10-CM

## 2024-05-27 DIAGNOSIS — M86.172 OTHER ACUTE OSTEOMYELITIS, LEFT ANKLE AND FOOT: ICD-10-CM

## 2024-05-27 DIAGNOSIS — E11.621 TYPE 2 DIABETES MELLITUS WITH FOOT ULCER: ICD-10-CM

## 2024-05-27 DIAGNOSIS — Z79.01 LONG TERM (CURRENT) USE OF ANTICOAGULANTS: ICD-10-CM

## 2024-05-27 DIAGNOSIS — N18.32 CHRONIC KIDNEY DISEASE, STAGE 3B: ICD-10-CM

## 2024-05-27 DIAGNOSIS — E11.69 TYPE 2 DIABETES MELLITUS WITH OTHER SPECIFIED COMPLICATION: ICD-10-CM

## 2024-05-27 DIAGNOSIS — I48.0 PAROXYSMAL ATRIAL FIBRILLATION: ICD-10-CM

## 2024-05-27 DIAGNOSIS — L03.032 CELLULITIS OF LEFT TOE: ICD-10-CM

## 2024-05-27 DIAGNOSIS — E11.65 TYPE 2 DIABETES MELLITUS WITH HYPERGLYCEMIA: ICD-10-CM

## 2024-05-27 DIAGNOSIS — L97.529 NON-PRESSURE CHRONIC ULCER OF OTHER PART OF LEFT FOOT WITH UNSPECIFIED SEVERITY: ICD-10-CM

## 2024-05-27 DIAGNOSIS — E11.22 TYPE 2 DIABETES MELLITUS WITH DIABETIC CHRONIC KIDNEY DISEASE: ICD-10-CM

## 2024-05-28 ENCOUNTER — APPOINTMENT (OUTPATIENT)
Dept: FAMILY MEDICINE | Facility: CLINIC | Age: 59
End: 2024-05-28
Payer: COMMERCIAL

## 2024-05-28 VITALS
WEIGHT: 262 LBS | HEIGHT: 72 IN | BODY MASS INDEX: 35.49 KG/M2 | HEART RATE: 86 BPM | OXYGEN SATURATION: 96 % | TEMPERATURE: 97.7 F | SYSTOLIC BLOOD PRESSURE: 128 MMHG | DIASTOLIC BLOOD PRESSURE: 78 MMHG

## 2024-05-28 DIAGNOSIS — M86.9 OSTEOMYELITIS, UNSPECIFIED: ICD-10-CM

## 2024-05-28 PROCEDURE — 99213 OFFICE O/P EST LOW 20 MIN: CPT

## 2024-05-28 RX ORDER — FUROSEMIDE 40 MG/1
40 TABLET ORAL DAILY
Refills: 0 | Status: COMPLETED | COMMUNITY
End: 2024-05-28

## 2024-05-31 NOTE — HISTORY OF PRESENT ILLNESS
[FreeTextEntry8] : - patient was at  for 1 week for osteomyelitis of left second toe - treated with IV abx and tenotomy to release the tendon that was causing a hammer toe which was thought to be the cause of the infection  - discharged maya on 5/21 with PICC line in place for 6 weeks of IV abx  - has a homecare nurse coming out to perform wound dressings and set up, teach IV abx administration  - states that has been feeling well - no fever or pain - has a follow up appt with podiatrist and cards next week  - trying to schedule an appt with Dr. Machado, infectious disease

## 2024-05-31 NOTE — PLAN
[FreeTextEntry1] : - continue with the IV abx and wound care as recommended  - follow up with podiatry as planned - make sure to stay well hydrated and monitor for s&s of infection

## 2024-05-31 NOTE — PHYSICAL EXAM
[Normal] : affect was normal and insight and judgment were intact [de-identified] : left second toe covered with gauze and tape

## 2024-05-31 NOTE — REVIEW OF SYSTEMS
[Negative] : Psychiatric [FreeTextEntry9] : left second toe covered with a dressing [de-identified] : right upper arm PICC line in place

## 2024-05-31 NOTE — REVIEW OF SYSTEMS
[Negative] : Psychiatric [FreeTextEntry9] : left second toe covered with a dressing [de-identified] : right upper arm PICC line in place

## 2024-05-31 NOTE — PHYSICAL EXAM
[Normal] : affect was normal and insight and judgment were intact [de-identified] : left second toe covered with gauze and tape

## 2024-06-10 NOTE — ED ADULT TRIAGE NOTE - NS ED NURSE BANDS TYPE
Plan:  Labs reviewed in epic and discussed with patient.  Current medications reviewed.  Refills given as warranted.  Call [facility] and see if they want you to hold your plavix before your cyst is removed.  I am ok with it being held for the least amount of time as possible as recommended by your surgeon.   Complete your testing with Dr. Resendiz as scheduled.  If all your testing comes back normal call and let me know.  Follow with vein doctor as scheduled.    No cardiac testing at this time.     Follow up with me in 6 months  
Name band;

## 2024-06-13 PROBLEM — N18.30 CHRONIC KIDNEY DISEASE, STAGE 3: Status: ACTIVE | Noted: 2023-11-06

## 2024-06-13 PROBLEM — I25.10 CAD (CORONARY ARTERY DISEASE): Status: ACTIVE | Noted: 2021-07-07

## 2024-06-13 PROBLEM — E78.5 HLD (HYPERLIPIDEMIA): Status: ACTIVE | Noted: 2021-07-07

## 2024-06-13 PROBLEM — Z95.2 MECHANICAL HEART VALVE PRESENT: Status: ACTIVE | Noted: 2023-10-27

## 2024-06-13 PROBLEM — E11.9 DIABETES MELLITUS: Status: ACTIVE | Noted: 2021-07-07

## 2024-06-13 PROBLEM — I10 HTN (HYPERTENSION): Status: ACTIVE | Noted: 2021-07-07

## 2024-06-14 ENCOUNTER — APPOINTMENT (OUTPATIENT)
Dept: FAMILY MEDICINE | Facility: CLINIC | Age: 59
End: 2024-06-14
Payer: COMMERCIAL

## 2024-06-14 ENCOUNTER — LABORATORY RESULT (OUTPATIENT)
Age: 59
End: 2024-06-14

## 2024-06-14 VITALS
OXYGEN SATURATION: 97 % | TEMPERATURE: 97.8 F | SYSTOLIC BLOOD PRESSURE: 128 MMHG | WEIGHT: 262 LBS | HEIGHT: 72 IN | BODY MASS INDEX: 35.49 KG/M2 | DIASTOLIC BLOOD PRESSURE: 80 MMHG | HEART RATE: 70 BPM

## 2024-06-14 DIAGNOSIS — N18.30 CHRONIC KIDNEY DISEASE, STAGE 3 UNSPECIFIED: ICD-10-CM

## 2024-06-14 DIAGNOSIS — E78.5 HYPERLIPIDEMIA, UNSPECIFIED: ICD-10-CM

## 2024-06-14 DIAGNOSIS — Z95.2 PRESENCE OF PROSTHETIC HEART VALVE: ICD-10-CM

## 2024-06-14 DIAGNOSIS — I10 ESSENTIAL (PRIMARY) HYPERTENSION: ICD-10-CM

## 2024-06-14 DIAGNOSIS — I25.10 ATHEROSCLEROTIC HEART DISEASE OF NATIVE CORONARY ARTERY W/OUT ANGINA PECTORIS: ICD-10-CM

## 2024-06-14 DIAGNOSIS — E11.9 TYPE 2 DIABETES MELLITUS W/OUT COMPLICATIONS: ICD-10-CM

## 2024-06-14 PROCEDURE — 99214 OFFICE O/P EST MOD 30 MIN: CPT | Mod: 25

## 2024-06-14 NOTE — PLAN
[FreeTextEntry1] : Continue all medications as prescribed. Check labs as above. Will adjust any medications based upon lab results.  Reviewed age-appropriate preventive screening tests with patient.  Discussed clean eating (eg Mediterranean style eating plan) and regular exercise/staying as physically active as possible.  Include balance exercises and strength training and core strengthening exercises for bone health and to decrease risk for falls.  Reviewed importance of good self care (e.g. meditation, yoga, adequate rest, regular exercise, magnesium, clean eating, etc.).  Follow up to check labs again in 3-4 months.  Face-to-face time spent with patient, over half in discussion of the above diagnoses and treatment plan: 30 minutes.

## 2024-06-14 NOTE — ASSESSMENT
[FreeTextEntry1] : He is here to follow up on coronary artery disease, chronic kidney disease, diabetes, hypertension, and hyperlipidemia. He had an aortic valve replacement in 10/2023. Since this is a mechanical valve he needs lifelong anticoagulation with warfarin. He was having his INR checked in the office but now has a machine to check it at home. The results are going to his cardiologist's office (Dr. Kaye).  He was hospitalized last month with osteomyelitis of his left second toe. He was discharged on 5/21 with a PICC line for 6 weeks of IV antibiotics (Daptomycin and Ertapenem).  He is up to date with his podiatrist. He states that the toe is healed and he has 2 weeks of antibiotics left.   His is scheduled to see a new endocrinologist but not until October. He would like his labs checked today.

## 2024-06-14 NOTE — HEALTH RISK ASSESSMENT
[0] : 2) Feeling down, depressed, or hopeless: Not at all (0) [PHQ-2 Negative - No further assessment needed] : PHQ-2 Negative - No further assessment needed [Never] : Never [[id number]] : 0

## 2024-06-14 NOTE — HISTORY OF PRESENT ILLNESS
[FreeTextEntry1] : JAMES GONZALEZ is a 58 year old male here for a follow up visit. [de-identified] : He has a history of coronary artery disease, chronic kidney disease, diabetes, hypertension, and hyperlipidemia. He had an aortic valve replacement in 10/2023. Since this is a mechanical valve he needs lifelong anticoagulation with warfarin.  His last routine visit was in 2/2024 for his annual physical.  His INR was stable on 5 mg of warfarin daily. He was discharged from the hospital last week on 7 mg and his INR was low at 1.5. His dose was increased to 8.5 mg daily and his INR is back to 2.5 mg. Will decrease his dose back to 7.5 mg and recheck the INR again in 1-2 weeks.  His diabetes was managed by Endocrinology (Dr. Ibarra). His diabetes has not been well controlled. He reports his fasting glucose is around 200 and this spikes up to the 300s after meals. He has not seen Dr. Ibarra recently due to insurance issues and is here to recheck his HgbA1c today.

## 2024-06-15 LAB
ALBUMIN SERPL ELPH-MCNC: 4.4 G/DL
ALP BLD-CCNC: 70 U/L
ALT SERPL-CCNC: 56 U/L
ANION GAP SERPL CALC-SCNC: 16 MMOL/L
AST SERPL-CCNC: 56 U/L
BILIRUB SERPL-MCNC: 0.4 MG/DL
BUN SERPL-MCNC: 32 MG/DL
CALCIUM SERPL-MCNC: 9.4 MG/DL
CHLORIDE SERPL-SCNC: 101 MMOL/L
CHOLEST SERPL-MCNC: 184 MG/DL
CO2 SERPL-SCNC: 22 MMOL/L
CREAT SERPL-MCNC: 1.66 MG/DL
CREAT SPEC-SCNC: 108 MG/DL
EGFR: 48 ML/MIN/1.73M2
ESTIMATED AVERAGE GLUCOSE: 252 MG/DL
GLUCOSE SERPL-MCNC: 186 MG/DL
HBA1C MFR BLD HPLC: 10.4 %
HDLC SERPL-MCNC: 30 MG/DL
LDLC SERPL CALC-MCNC: 65 MG/DL
MICROALBUMIN 24H UR DL<=1MG/L-MCNC: 246.1 MG/DL
MICROALBUMIN/CREAT 24H UR-RTO: 2280 MG/G
NONHDLC SERPL-MCNC: 154 MG/DL
POTASSIUM SERPL-SCNC: 4.3 MMOL/L
PROT SERPL-MCNC: 7.5 G/DL
SODIUM SERPL-SCNC: 138 MMOL/L
TRIGL SERPL-MCNC: 583 MG/DL

## 2024-06-15 RX ORDER — SULFAMETHOXAZOLE AND TRIMETHOPRIM 800; 160 MG/1; MG/1
800-160 TABLET ORAL TWICE DAILY
Qty: 14 | Refills: 0 | Status: COMPLETED | COMMUNITY
Start: 2024-04-30 | End: 2024-06-15

## 2024-07-11 ENCOUNTER — APPOINTMENT (OUTPATIENT)
Dept: FAMILY MEDICINE | Facility: CLINIC | Age: 59
End: 2024-07-11
Payer: COMMERCIAL

## 2024-07-11 ENCOUNTER — LABORATORY RESULT (OUTPATIENT)
Age: 59
End: 2024-07-11

## 2024-07-11 VITALS
WEIGHT: 275 LBS | HEIGHT: 72 IN | TEMPERATURE: 97.2 F | BODY MASS INDEX: 37.25 KG/M2 | DIASTOLIC BLOOD PRESSURE: 62 MMHG | SYSTOLIC BLOOD PRESSURE: 112 MMHG | OXYGEN SATURATION: 98 % | HEART RATE: 95 BPM

## 2024-07-11 DIAGNOSIS — Z95.2 PRESENCE OF PROSTHETIC HEART VALVE: ICD-10-CM

## 2024-07-11 DIAGNOSIS — R19.5 OTHER FECAL ABNORMALITIES: ICD-10-CM

## 2024-07-11 PROCEDURE — 99213 OFFICE O/P EST LOW 20 MIN: CPT | Mod: 25

## 2024-07-11 RX ORDER — FERROUS SULFATE TAB EC 325 MG (65 MG FE EQUIVALENT) 325 (65 FE) MG
325 (65 FE) TABLET DELAYED RESPONSE ORAL
Refills: 0 | Status: ACTIVE | COMMUNITY

## 2024-07-12 LAB
BASOPHILS # BLD AUTO: 0 K/UL
BASOPHILS NFR BLD AUTO: 0 %
EOSINOPHIL # BLD AUTO: 0.37 K/UL
EOSINOPHIL NFR BLD AUTO: 3.5 %
HCT VFR BLD CALC: 26.2 %
HGB BLD-MCNC: 8.7 G/DL
LYMPHOCYTES # BLD AUTO: 2.22 K/UL
LYMPHOCYTES NFR BLD AUTO: 21 %
MAN DIFF?: NORMAL
MCHC RBC-ENTMCNC: 30.6 PG
MCHC RBC-ENTMCNC: 33.2 GM/DL
MCV RBC AUTO: 92.3 FL
MONOCYTES # BLD AUTO: 0.56 K/UL
MONOCYTES NFR BLD AUTO: 5.3 %
NEUTROPHILS # BLD AUTO: 6.87 K/UL
NEUTROPHILS NFR BLD AUTO: 64.9 %
PLATELET # BLD AUTO: 317 K/UL
RBC # BLD: 2.84 M/UL
RBC # FLD: 14.2 %
WBC # FLD AUTO: 10.59 K/UL

## 2024-07-14 ENCOUNTER — INPATIENT (INPATIENT)
Facility: HOSPITAL | Age: 59
LOS: 2 days | Discharge: HOME | DRG: 244 | End: 2024-07-17
Attending: STUDENT IN AN ORGANIZED HEALTH CARE EDUCATION/TRAINING PROGRAM | Admitting: STUDENT IN AN ORGANIZED HEALTH CARE EDUCATION/TRAINING PROGRAM
Payer: COMMERCIAL

## 2024-07-14 VITALS — HEIGHT: 73 IN

## 2024-07-14 DIAGNOSIS — I10 ESSENTIAL (PRIMARY) HYPERTENSION: ICD-10-CM

## 2024-07-14 DIAGNOSIS — Z01.818 ENCOUNTER FOR OTHER PREPROCEDURAL EXAMINATION: ICD-10-CM

## 2024-07-14 DIAGNOSIS — Z00.00 ENCOUNTER FOR GENERAL ADULT MEDICAL EXAMINATION WITHOUT ABNORMAL FINDINGS: ICD-10-CM

## 2024-07-14 DIAGNOSIS — N18.30 CHRONIC KIDNEY DISEASE, STAGE 3 UNSPECIFIED: ICD-10-CM

## 2024-07-14 DIAGNOSIS — Z90.49 ACQUIRED ABSENCE OF OTHER SPECIFIED PARTS OF DIGESTIVE TRACT: Chronic | ICD-10-CM

## 2024-07-14 DIAGNOSIS — Z95.5 PRESENCE OF CORONARY ANGIOPLASTY IMPLANT AND GRAFT: Chronic | ICD-10-CM

## 2024-07-14 DIAGNOSIS — K92.1 MELENA: ICD-10-CM

## 2024-07-14 DIAGNOSIS — D64.9 ANEMIA, UNSPECIFIED: ICD-10-CM

## 2024-07-14 DIAGNOSIS — I48.0 PAROXYSMAL ATRIAL FIBRILLATION: ICD-10-CM

## 2024-07-14 DIAGNOSIS — I25.10 ATHEROSCLEROTIC HEART DISEASE OF NATIVE CORONARY ARTERY WITHOUT ANGINA PECTORIS: ICD-10-CM

## 2024-07-14 DIAGNOSIS — E11.9 TYPE 2 DIABETES MELLITUS WITHOUT COMPLICATIONS: ICD-10-CM

## 2024-07-14 DIAGNOSIS — K92.2 GASTROINTESTINAL HEMORRHAGE, UNSPECIFIED: ICD-10-CM

## 2024-07-14 DIAGNOSIS — Z95.2 PRESENCE OF PROSTHETIC HEART VALVE: ICD-10-CM

## 2024-07-14 DIAGNOSIS — E78.5 HYPERLIPIDEMIA, UNSPECIFIED: ICD-10-CM

## 2024-07-14 DIAGNOSIS — Z95.0 PRESENCE OF CARDIAC PACEMAKER: ICD-10-CM

## 2024-07-14 LAB
ALBUMIN SERPL ELPH-MCNC: 3.1 G/DL — LOW (ref 3.3–5)
ALP SERPL-CCNC: 55 U/L — SIGNIFICANT CHANGE UP (ref 40–120)
ALT FLD-CCNC: 39 U/L — SIGNIFICANT CHANGE UP (ref 12–78)
ANION GAP SERPL CALC-SCNC: 4 MMOL/L — LOW (ref 5–17)
APTT BLD: 46.5 SEC — HIGH (ref 24.5–35.6)
AST SERPL-CCNC: 31 U/L — SIGNIFICANT CHANGE UP (ref 15–37)
BASOPHILS # BLD AUTO: 0 K/UL — SIGNIFICANT CHANGE UP (ref 0–0.2)
BASOPHILS NFR BLD AUTO: 0 % — SIGNIFICANT CHANGE UP (ref 0–2)
BILIRUB SERPL-MCNC: 0.5 MG/DL — SIGNIFICANT CHANGE UP (ref 0.2–1.2)
BLD GP AB SCN SERPL QL: SIGNIFICANT CHANGE UP
BUN SERPL-MCNC: 32 MG/DL — HIGH (ref 7–23)
CALCIUM SERPL-MCNC: 8.8 MG/DL — SIGNIFICANT CHANGE UP (ref 8.5–10.1)
CHLORIDE SERPL-SCNC: 105 MMOL/L — SIGNIFICANT CHANGE UP (ref 96–108)
CO2 SERPL-SCNC: 28 MMOL/L — SIGNIFICANT CHANGE UP (ref 22–31)
CREAT SERPL-MCNC: 2 MG/DL — HIGH (ref 0.5–1.3)
EGFR: 38 ML/MIN/1.73M2 — LOW
EOSINOPHIL # BLD AUTO: 0.13 K/UL — SIGNIFICANT CHANGE UP (ref 0–0.5)
EOSINOPHIL NFR BLD AUTO: 1 % — SIGNIFICANT CHANGE UP (ref 0–6)
GLUCOSE BLDC GLUCOMTR-MCNC: 154 MG/DL — HIGH (ref 70–99)
GLUCOSE SERPL-MCNC: 304 MG/DL — HIGH (ref 70–99)
HCT VFR BLD CALC: 21.3 % — LOW (ref 39–50)
HCT VFR BLD CALC: 23.9 % — LOW (ref 39–50)
HCV AB S/CO SERPL IA: 0.19 S/CO — SIGNIFICANT CHANGE UP (ref 0–0.99)
HCV AB SERPL-IMP: SIGNIFICANT CHANGE UP
HGB BLD-MCNC: 7.3 G/DL — LOW (ref 13–17)
HGB BLD-MCNC: 8.1 G/DL — LOW (ref 13–17)
HIV 1 & 2 AB SERPL IA.RAPID: SIGNIFICANT CHANGE UP
HYPOCHROMIA BLD QL: SLIGHT — SIGNIFICANT CHANGE UP
INR BLD: 2.86 RATIO — HIGH (ref 0.85–1.18)
LACTATE SERPL-SCNC: 2 MMOL/L — SIGNIFICANT CHANGE UP (ref 0.7–2)
LYMPHOCYTES # BLD AUTO: 1.33 K/UL — SIGNIFICANT CHANGE UP (ref 1–3.3)
LYMPHOCYTES # BLD AUTO: 10 % — LOW (ref 13–44)
MANUAL SMEAR VERIFICATION: SIGNIFICANT CHANGE UP
MCHC RBC-ENTMCNC: 31.7 PG — SIGNIFICANT CHANGE UP (ref 27–34)
MCHC RBC-ENTMCNC: 34.3 GM/DL — SIGNIFICANT CHANGE UP (ref 32–36)
MCV RBC AUTO: 92.6 FL — SIGNIFICANT CHANGE UP (ref 80–100)
METAMYELOCYTES # FLD: 3 % — HIGH (ref 0–0)
MICROCYTES BLD QL: SIGNIFICANT CHANGE UP
MONOCYTES # BLD AUTO: 0.53 K/UL — SIGNIFICANT CHANGE UP (ref 0–0.9)
MONOCYTES NFR BLD AUTO: 4 % — SIGNIFICANT CHANGE UP (ref 2–14)
MYELOCYTES NFR BLD: 3 % — HIGH (ref 0–0)
NEUTROPHILS # BLD AUTO: 10.49 K/UL — HIGH (ref 1.8–7.4)
NEUTROPHILS NFR BLD AUTO: 77 % — SIGNIFICANT CHANGE UP (ref 43–77)
NEUTS BAND # BLD: 2 % — SIGNIFICANT CHANGE UP (ref 0–8)
NRBC # BLD: 1 /100 WBCS — HIGH (ref 0–0)
NRBC # BLD: SIGNIFICANT CHANGE UP /100 WBCS (ref 0–0)
PLAT MORPH BLD: NORMAL — SIGNIFICANT CHANGE UP
PLATELET # BLD AUTO: 368 K/UL — SIGNIFICANT CHANGE UP (ref 150–400)
POLYCHROMASIA BLD QL SMEAR: SLIGHT — SIGNIFICANT CHANGE UP
POTASSIUM SERPL-MCNC: 4.5 MMOL/L — SIGNIFICANT CHANGE UP (ref 3.5–5.3)
POTASSIUM SERPL-SCNC: 4.5 MMOL/L — SIGNIFICANT CHANGE UP (ref 3.5–5.3)
PROT SERPL-MCNC: 6.6 GM/DL — SIGNIFICANT CHANGE UP (ref 6–8.3)
PROTHROM AB SERPL-ACNC: 31.3 SEC — HIGH (ref 9.5–13)
RBC # BLD: 2.3 M/UL — LOW (ref 4.2–5.8)
RBC # FLD: 16.8 % — HIGH (ref 10.3–14.5)
RBC BLD AUTO: ABNORMAL
SODIUM SERPL-SCNC: 137 MMOL/L — SIGNIFICANT CHANGE UP (ref 135–145)
TOXIC GRANULES BLD QL SMEAR: PRESENT — SIGNIFICANT CHANGE UP
TROPONIN I, HIGH SENSITIVITY RESULT: 8.22 NG/L — SIGNIFICANT CHANGE UP
WBC # BLD: 13.28 K/UL — HIGH (ref 3.8–10.5)
WBC # FLD AUTO: 13.28 K/UL — HIGH (ref 3.8–10.5)

## 2024-07-14 PROCEDURE — 74178 CT ABD&PLV WO CNTR FLWD CNTR: CPT | Mod: 26,MC

## 2024-07-14 PROCEDURE — 85610 PROTHROMBIN TIME: CPT

## 2024-07-14 PROCEDURE — 99223 1ST HOSP IP/OBS HIGH 75: CPT

## 2024-07-14 PROCEDURE — 93010 ELECTROCARDIOGRAM REPORT: CPT

## 2024-07-14 PROCEDURE — 99291 CRITICAL CARE FIRST HOUR: CPT

## 2024-07-14 PROCEDURE — P9016: CPT

## 2024-07-14 PROCEDURE — 71045 X-RAY EXAM CHEST 1 VIEW: CPT | Mod: 26

## 2024-07-14 PROCEDURE — 85027 COMPLETE CBC AUTOMATED: CPT

## 2024-07-14 PROCEDURE — 80053 COMPREHEN METABOLIC PANEL: CPT

## 2024-07-14 PROCEDURE — 82962 GLUCOSE BLOOD TEST: CPT

## 2024-07-14 PROCEDURE — 85018 HEMOGLOBIN: CPT

## 2024-07-14 PROCEDURE — 85025 COMPLETE CBC W/AUTO DIFF WBC: CPT

## 2024-07-14 PROCEDURE — 85014 HEMATOCRIT: CPT

## 2024-07-14 PROCEDURE — 36415 COLL VENOUS BLD VENIPUNCTURE: CPT

## 2024-07-14 PROCEDURE — 36430 TRANSFUSION BLD/BLD COMPNT: CPT

## 2024-07-14 PROCEDURE — 86923 COMPATIBILITY TEST ELECTRIC: CPT

## 2024-07-14 PROCEDURE — 85730 THROMBOPLASTIN TIME PARTIAL: CPT

## 2024-07-14 RX ORDER — FENOFIBRATE 130 MG/1
145 CAPSULE ORAL DAILY
Refills: 0 | Status: DISCONTINUED | OUTPATIENT
Start: 2024-07-14 | End: 2024-07-17

## 2024-07-14 RX ORDER — CLOPIDOGREL BISULFATE 75 MG/1
75 TABLET, FILM COATED ORAL DAILY
Refills: 0 | Status: DISCONTINUED | OUTPATIENT
Start: 2024-07-14 | End: 2024-07-15

## 2024-07-14 RX ORDER — INSULIN GLARGINE 100 [IU]/ML
28 INJECTION, SOLUTION SUBCUTANEOUS EVERY MORNING
Refills: 0 | Status: DISCONTINUED | OUTPATIENT
Start: 2024-07-15 | End: 2024-07-17

## 2024-07-14 RX ORDER — MAGNESIUM OXIDE 400 MG/1
400 TABLET ORAL DAILY
Refills: 0 | Status: DISCONTINUED | OUTPATIENT
Start: 2024-07-14 | End: 2024-07-17

## 2024-07-14 RX ORDER — LOSARTAN POTASSIUM 100 MG/1
50 TABLET, FILM COATED ORAL DAILY
Refills: 0 | Status: DISCONTINUED | OUTPATIENT
Start: 2024-07-14 | End: 2024-07-15

## 2024-07-14 RX ORDER — PANTOPRAZOLE SODIUM 40 MG/10ML
8 INJECTION, POWDER, FOR SOLUTION INTRAVENOUS
Qty: 80 | Refills: 0 | Status: DISCONTINUED | OUTPATIENT
Start: 2024-07-14 | End: 2024-07-16

## 2024-07-14 RX ORDER — DEXTROSE MONOHYDRATE AND SODIUM CHLORIDE 5; .3 G/100ML; G/100ML
1000 INJECTION, SOLUTION INTRAVENOUS
Refills: 0 | Status: DISCONTINUED | OUTPATIENT
Start: 2024-07-14 | End: 2024-07-17

## 2024-07-14 RX ORDER — FERROUS SULFATE 325(65) MG
325 TABLET ORAL DAILY
Refills: 0 | Status: DISCONTINUED | OUTPATIENT
Start: 2024-07-14 | End: 2024-07-15

## 2024-07-14 RX ORDER — SODIUM CHLORIDE 0.9 % (FLUSH) 0.9 %
1000 SYRINGE (ML) INJECTION
Refills: 0 | Status: DISCONTINUED | OUTPATIENT
Start: 2024-07-14 | End: 2024-07-17

## 2024-07-14 RX ORDER — DEXTROSE 30 % IN WATER 30 %
25 VIAL (ML) INTRAVENOUS ONCE
Refills: 0 | Status: DISCONTINUED | OUTPATIENT
Start: 2024-07-14 | End: 2024-07-17

## 2024-07-14 RX ORDER — DEXTROSE 30 % IN WATER 30 %
15 VIAL (ML) INTRAVENOUS ONCE
Refills: 0 | Status: DISCONTINUED | OUTPATIENT
Start: 2024-07-14 | End: 2024-07-17

## 2024-07-14 RX ORDER — MAGNESIUM, ALUMINUM HYDROXIDE 400-400
30 TABLET,CHEWABLE ORAL EVERY 4 HOURS
Refills: 0 | Status: DISCONTINUED | OUTPATIENT
Start: 2024-07-14 | End: 2024-07-17

## 2024-07-14 RX ORDER — METOPROLOL TARTRATE 50 MG
100 TABLET ORAL DAILY
Refills: 0 | Status: DISCONTINUED | OUTPATIENT
Start: 2024-07-14 | End: 2024-07-15

## 2024-07-14 RX ORDER — ONDANSETRON HYDROCHLORIDE 2 MG/ML
4 INJECTION INTRAMUSCULAR; INTRAVENOUS EVERY 8 HOURS
Refills: 0 | Status: DISCONTINUED | OUTPATIENT
Start: 2024-07-14 | End: 2024-07-17

## 2024-07-14 RX ORDER — ACETAMINOPHEN 325 MG
650 TABLET ORAL EVERY 6 HOURS
Refills: 0 | Status: DISCONTINUED | OUTPATIENT
Start: 2024-07-14 | End: 2024-07-17

## 2024-07-14 RX ORDER — METOPROLOL TARTRATE 50 MG
1 TABLET ORAL
Refills: 0 | DISCHARGE

## 2024-07-14 RX ORDER — INSULIN LISPRO 100 [IU]/ML
INJECTION, SOLUTION SUBCUTANEOUS AT BEDTIME
Refills: 0 | Status: DISCONTINUED | OUTPATIENT
Start: 2024-07-14 | End: 2024-07-17

## 2024-07-14 RX ORDER — AMLODIPINE BESYLATE 2.5 MG/1
10 TABLET ORAL DAILY
Refills: 0 | Status: DISCONTINUED | OUTPATIENT
Start: 2024-07-14 | End: 2024-07-15

## 2024-07-14 RX ORDER — PANTOPRAZOLE SODIUM 40 MG/10ML
80 INJECTION, POWDER, FOR SOLUTION INTRAVENOUS ONCE
Refills: 0 | Status: COMPLETED | OUTPATIENT
Start: 2024-07-14 | End: 2024-07-14

## 2024-07-14 RX ORDER — INSULIN LISPRO 100 [IU]/ML
INJECTION, SOLUTION SUBCUTANEOUS
Refills: 0 | Status: DISCONTINUED | OUTPATIENT
Start: 2024-07-14 | End: 2024-07-17

## 2024-07-14 RX ORDER — GLUCAGON HYDROCHLORIDE 1 MG/ML
1 INJECTION, POWDER, FOR SOLUTION INTRAMUSCULAR; INTRAVENOUS; SUBCUTANEOUS ONCE
Refills: 0 | Status: DISCONTINUED | OUTPATIENT
Start: 2024-07-14 | End: 2024-07-17

## 2024-07-14 RX ORDER — ATORVASTATIN CALCIUM 20 MG/1
80 TABLET, FILM COATED ORAL AT BEDTIME
Refills: 0 | Status: DISCONTINUED | OUTPATIENT
Start: 2024-07-14 | End: 2024-07-17

## 2024-07-14 RX ORDER — DEXTROSE 30 % IN WATER 30 %
12.5 VIAL (ML) INTRAVENOUS ONCE
Refills: 0 | Status: DISCONTINUED | OUTPATIENT
Start: 2024-07-14 | End: 2024-07-17

## 2024-07-14 RX ADMIN — PANTOPRAZOLE SODIUM 10 MG/HR: 40 INJECTION, POWDER, FOR SOLUTION INTRAVENOUS at 16:43

## 2024-07-14 RX ADMIN — Medication 50 MILLILITER(S): at 23:21

## 2024-07-14 RX ADMIN — PANTOPRAZOLE SODIUM 80 MILLIGRAM(S): 40 INJECTION, POWDER, FOR SOLUTION INTRAVENOUS at 14:42

## 2024-07-14 RX ADMIN — ATORVASTATIN CALCIUM 80 MILLIGRAM(S): 20 TABLET, FILM COATED ORAL at 22:27

## 2024-07-14 NOTE — H&P ADULT - PROBLEM SELECTOR PLAN 2
Melena for around 2 weeks, initially with several bowel movements daily, now frequency decreased to 2-3 times/day. CT with IV contrast showed no active extravasation.   Previous endoscopic workup in 2022 as reported above, only revealed gastritis and sigmoid diverticulosis.   Abdominal exam in benign with only mild tenderness on deep palpation of RUQ but no guarding or rebound. GIB appears subacute, and patient is HD stable without evidence of acute overt bleeding.   Transfuse for goal Hb > 8 g/dL. CBC q8h.   S/p PPI IV bolus and will start IV drip.   Spoke with patient's GI Dr. Cooper, agreed to transfuse, start PPI therapy, and keep NPO for possible endoscopic workup in the morning (patient will need Cardiology clearance for this purpose).   GI consult in place and MD on call also notified by the ED .

## 2024-07-14 NOTE — H&P ADULT - PROBLEM SELECTOR PLAN 7
IDDM2 on Tresiba 56 units daily at home, with additional short-acting corrective ISS.  Patient NPO now, will reduce long-acting dose to half with close monitoring of FSG. Low-corrective short-acting ISS.  Return to usual doses once no longer NPO.

## 2024-07-14 NOTE — ED ADULT NURSE NOTE - OBJECTIVE STATEMENT
PT comes to the ED complaining of weakness and lightheaded over the past two weeks. Pt states that he has had dark tarry stools. Pt with hx diverticulosis. Pt  states that he was going to see Dr. Cooper but he no longer takes his insurance, so he made an appointment for a new gastroenterologist for this tuesday and was trying to wait it out, but could not.

## 2024-07-14 NOTE — ED STATDOCS - PROGRESS NOTE DETAILS
58-year-old male mechanical valve replacement on Coumadin presents to the emergency department for rectal bleeding abdominal pain feeling lightheaded and dizzy we will send to main for further workup and evaluation. Oli Bauer M.D., Attending Physician

## 2024-07-14 NOTE — H&P ADULT - NSHPLABSRESULTS_GEN_ALL_CORE
LABS:                          7.3    13.28 )-----------( 368      ( 14 Jul 2024 13:02 )             21.3     07-14    137  |  105  |  32<H>  ----------------------------<  304<H>  4.5   |  28  |  2.00<H>    Ca    8.8      14 Jul 2024 13:02    TPro  6.6  /  Alb  3.1<L>  /  TBili  0.5  /  DBili  x   /  AST  31  /  ALT  39  /  AlkPhos  55  07-14    PT/INR - ( 14 Jul 2024 13:02 )   PT: 31.3 sec;   INR: 2.86 ratio         PTT - ( 14 Jul 2024 13:02 )  PTT:46.5 sec    ENDOSCOPIC WORKUP:    - Upper Endoscopy (01.24.22):  Impression: Normal esophagus. Z-line regular, 40 cm from the incisors. Gastritis. Normal gastric body. Normal examined duodenum. D2 diverticula. No specimens collected.    - Colonoscopy (01.28.22):  Impression: Diverticulosis in the sigmoid colon. Congested mucosa in the terminal ileum. No specimens collected.    RADIOLOGY:    - CT Abdomen and Pelvis w/wo IV Cont (07.14.24):  IMPRESSION: No CT evidence of acute GI bleed.    CARDIOLOGY:    - TTE Echo Complete w/o Contrast w/ Doppler (02.03.24):  Estimated left ventricular ejection fraction is 55-60 %. The left ventricle is normal in size, wall motion and contractility as seen in limited views. Moderate concentric left ventricular hypertrophy is present. Well seated Mechanical valve in the aortic position. Normal transprosthetic gradients. Mild (1+) mitral regurgitation. The aortic root is mildly dilated.     - EKG: NSR, VR 90 bpm, RBBB

## 2024-07-14 NOTE — ED PROVIDER NOTE - OBJECTIVE STATEMENT
58-year-old male history HTN, HLD, IDDM, CKD, CAD on Plavix, A-fib and valve replacement on warfarin, diverticuosis presents with dizziness and dyspnea on exertion in setting of about 3 weeks of black bloody stools.  Patient states he saw his PMD outpatient who had blood work performed, hemoglobin dropped from his baseline of approximately 14-8, referred to ED.  Denies nausea, vomiting, CP, fevers.  Reports mild abdominal cramping 58-year-old male history HTN, HLD, IDDM, CKD, CAD on Plavix, A-fib and valve replacement on warfarin, diverticuosis presents with dizziness and dyspnea on exertion in setting of about 3 weeks of black bloody stools.  Patient states he saw his PMD outpatient who had blood work performed, hemoglobin dropped from his baseline of approximately 13 -> 8, referred to ED.  Denies nausea, vomiting, CP, fevers.  Reports mild abdominal cramping 58-year-old male history HTN, HLD, IDDM, CKD, CAD on Plavix, A-fib and valve replacement on warfarin, diverticulosis presents with dizziness and dyspnea on exertion in setting of about 3 weeks of black bloody stools.  Patient states he saw his PMD outpatient who had blood work performed, hemoglobin dropped from his baseline of approximately 13 -> 8, referred to ED.  Denies nausea, vomiting, CP, fevers.  Reports mild abdominal cramping.    CHADWICK Hilario MD, ED Attdg:  Case d/w EM resident.  Pt seen/eval in ED.  58-year-old male multiple PMH including obesity, HTN, HLD, DM 2, CKD, A-fib and aortic mechanical valve replacement on warfarin, CAD on Plavix, BIP private car regarding 2 weeks black-harpreet stools with progressively worsening general weakness/fatigue  with associated mild lightheadedness.  No associated chest pain or SOB, F/C, LOC, palpitations.  Mild lower abdominal cramping discomfort.  Recent outpatient blood work by PCP referral revealed new anemia and patient referred to ED for further management. 58-year-old male history HTN, HLD, IDDM, CKD, CAD on Plavix, A-fib and valve replacement on warfarin, diverticulosis presents with dizziness and dyspnea on exertion in setting of about 3 weeks of black bloody stools.  Patient states he saw his PMD outpatient who had blood work performed, hemoglobin dropped from his baseline of approximately 13 -> 8, referred to ED.  Denies nausea, vomiting, CP, fevers.  Reports mild abdominal cramping.    CHADWICK Hilario MD, ED Attdg:  Case d/w EM resident.  Pt seen/eval in ED.  58-year-old male multiple PMH including obesity, HTN, HLD, DM 2, CKD, A-Fib and aortic mechanical valve replacement on warfarin, CAD on Plavix, BIB private car regarding 2 weeks black-harpreet stools with progressively worsening general weakness/fatigue  with associated mild lightheadedness.  No associated chest pain or SOB, F/C, LOC, palpitations.  Mild lower abdominal cramping discomfort.  Recent outpatient blood work by PCP referral revealed new anemia and patient referred to ED for further management.

## 2024-07-14 NOTE — ED PROVIDER NOTE - CLINICAL SUMMARY MEDICAL DECISION MAKING FREE TEXT BOX
Plan: labs incl. CBC, coags, T+S, Troponin; EKG, CXr, CT A/P as GI Bleed scan, PRBC transfusion.  Expect admit.    Labs notable for severe anemia 7.3 with normal Troponin.  CXR wet read by me: DONALD.  CT A/P GI Bleed scan report negative. PRBC 1 unit ordered, would not revferes the warfarin given + mech. valve replacement.  EM resident d/w admit hospitalist & Med admit accepted. 58-year-old male multiple PMH including obesity, HTN, HLD, DM 2, CKD, A-Fib and aortic mechanical valve replacement on warfarin, CAD on Plavix, BIB private car regarding 2 weeks black-harpreet stools with progressively worsening general weakness/fatigue  with associated mild lightheadedness.    Recent outpatient blood work by PCP referral revealed new anemia (13 -> 8) and patient referred to ED.  Exam + obese WM, + pallor, VSS.  Plan: labs incl. CBC, coags, T+S, Troponin; EKG, CXR, CT A/P as GI Bleed scan, PRBC transfusion.  Expect admit.    CHADWICK Hilario MD:  Labs notable for severe anemia 7.3 with normal Troponin.  CXR wet read by me: DONALD.  CT A/P GI Bleed scan report negative. PRBC 1 unit ordered, would not reveres the warfarin given + mech. valve replacement.  EM resident d/w admit hospitalist & Med admit accepted.

## 2024-07-14 NOTE — H&P ADULT - PROBLEM SELECTOR PLAN 11
VTE prophylaxis: on VKA, IRN therapeutic now.   Disposition: from home. Good functional capacity at baseline (recently declined in light of anemia). No ACS, ADHF, or unstable arrhythmias.   RCRI 3 points, class IV, 11% perioperative risk of MACE.  Patient appears to be elevated risk for the proposed procedure (endoscopic workup).   Optimize Hb as above for CAD.   Needs Cardiology risk stratification as requested by GI.

## 2024-07-14 NOTE — H&P ADULT - PROBLEM SELECTOR PLAN 4
CAD s/p PCI (BEATRICE x8, last in Sept 2023 at  by Dr. Colindres).   EKG with chronic RBBB. Hs Trop I negative.   No anginal symptoms or equivalents.  Appears stable.   Resume Plavix, statin, Toprol  mg daily, Losartan 50 mg daily, Norvasc 10 mg daily.   Cardiology consult for further recommendations, and pre-procedure risk stratification for endoscopic workup. CAD s/p PCI (BEATRICE x8, last PCI of ISR of LAD and PTCA of diag on 9/29/23 at  by Dr. Kaye) on Plavix  EKG with chronic RBBB. Hs Trop I negative.   No anginal symptoms or equivalents.  Appears stable.   Resume Plavix, statin, Toprol  mg daily, Losartan 50 mg daily, Norvasc 10 mg daily.   Cardiology consult for further recommendations, and pre-procedure risk stratification for endoscopic workup.

## 2024-07-14 NOTE — H&P ADULT - HISTORY OF PRESENT ILLNESS
58-year-old male with PMH of HTN, DLD, IDDM, CKD, CAD s/p PCI (BEATRICE x8, last in Sep '23 at ) on Plavix, Afib and mechanical AVR (Oct '23 at [facility]) on Warfarin, AVB s/p PPM (Oct '23 at the time of AVR at [facility]), and GIB s/p extensive workup in 2022 (EGD, colonoscopy and capsule videoendoscopy by Dr. Cooper) with findings of sigmoid diverticulosis and gastritis in the context of high intake of NSAIDs, presents to the ED on 7/14 c/o with dizziness and dyspnea on exertion in setting of about 3 weeks of black bloody stools. Patient states he saw his PCP outpatient on 7/12 who performed blodowrk and found his hemoglobin had dropped from around 14 to 9 g/dL and was told to follow with his GI doctor and to come to the ED if symptomatic. Patient denies nausea, vomiting, CP, fevers.  Reports mild abdominal cramping.             58-year-old male with PMH of HTN, DLD, IDDM, CKD, CAD s/p PCI (BEATRICE x8, last PCI of ISR of LAD and PTCA of diag on 9/29/23 at ) on Plavix, Afib and mechanical AVR (Oct '23 at [facility]) on Warfarin, AVB s/p PPM (Oct '23 at the time of AVR at [facility]), and GIB s/p extensive workup in 2022 (EGD, colonoscopy and capsule videoendoscopy by Dr. Cooper) with findings of sigmoid diverticulosis and gastritis in the context of high intake of NSAIDs, presents to the ED on 7/14 c/o with dizziness and dyspnea on exertion in setting of about 3 weeks of black bloody stools. Patient states he saw his PCP outpatient on 7/12 who performed blodowrk and found his hemoglobin had dropped from around 14 to 9 g/dL and was told to follow with his GI doctor and to come to the ED if symptomatic. Patient denies nausea, vomiting, CP, fevers.  Reports mild abdominal cramping.

## 2024-07-14 NOTE — ED PROVIDER NOTE - ATTENDING CONTRIBUTION TO CARE
Hx, P/E, MDM, d/w resident re: meds, PRBC transfusion & decision to admit Hx, P/E, MDM, d/w resident re: meds, PRBC transfusion & decision to admit    Attending Contribution to Care: Due to a high probability of clinically significant, life threatening deterioration, the patient required my highest level of preparedness to intervene emergently and I personally spent this critical care time directly and personally managing the patient. This critical care time included obtaining a history; examining the patient; pulse oximetry; ordering and review of studies; arranging urgent treatment with development of a management plan; evaluation of patient's response to treatment; frequent reassessment; and, discussions with other providers. This critical care time was performed to assess and manage the high probability of imminent, life-threatening deterioration that could result in multi-organ failure. It was exclusive of separately billable procedures and treating other patients and teaching time.  PRBC transfusion for severe/symptomatic anemia.

## 2024-07-14 NOTE — H&P ADULT - PROBLEM SELECTOR PLAN 1
Anemia is likely 2/2 GIB (patient has had melena for over 2 weeks). Previously had Hb of 14 in May, then 8.7 at PCP's office (on 7/11), and  now 7.3. Will transfuse for goal Hb > 8-9 in light Anemia is likely 2/2 GIB (patient has had melena for over 2 weeks). Previously had Hb of 14 in May, then 8.7 at PCP's office (on 7/11), and  now 7.3. Will transfuse for goal Hb > 8 g/dL in light of known CAD. Closely monitor Hb with CBC q8h. Monitor signs of bleeding. Anemia is likely 2/2 GIB (patient has had melena for over 2 weeks). Previously had Hb of 14 in May, then 8.7 at PCP's office (on 7/11), and  now 7.3. Will transfuse for goal Hb > 8 g/dL in light of known CAD (patient receiving 1 unit PRBCs by the ED and will request 2 more units to be allocated). Closely monitor Hb with CBC q8h. Monitor signs of bleeding.

## 2024-07-14 NOTE — H&P ADULT - NSICDXPASTMEDICALHX_GEN_ALL_CORE_FT
PAST MEDICAL HISTORY:  CAD (coronary artery disease)     Diabetes mellitus     Diverticulosis     Essential hypertension     HLD (hyperlipidemia)     Obstructive sleep apnea     Pacemaker     Pancreatitis     S/P AVR (aortic valve replacement)     Stage 3 chronic kidney disease

## 2024-07-14 NOTE — H&P ADULT - PROBLEM SELECTOR PLAN 3
Mechanical AVR in October 2023 at [facility]. On Warfarin at home, INR now 2.86 (therapeutic). Patient now having GIB, but will avoid reversing VKA in light of very-high thromboembolic risk with a mechanical aortic valve prosthesis. Last echo in February showed normal gradients across the valve. Currently asymptomatic from the cardiovascular standpoint and clinically euvolemic. Cardiology consult for further recommendations. Mechanical AVR in October 2023 at [facility]. On Warfarin at home, INR now 2.86 (therapeutic). Patient now having GIB, but will avoid reversing VKA in light of very high thromboembolic risk with a mechanical aortic valve prosthesis. Last echo in February showed normal gradients across the valve. Currently asymptomatic from the cardiovascular standpoint and clinically euvolemic. Cardiology consult for further recommendations. History of severe bicuspid AoV stenosis s/p mechanical AVR in October 2023 at [facility]. On Warfarin at home, INR now 2.86 (therapeutic). Patient now having GIB, but will avoid reversing VKA in light of very high thromboembolic risk with a mechanical aortic valve prosthesis. Last echo in February showed normal gradients across the valve. Currently asymptomatic from the cardiovascular standpoint and clinically euvolemic. Cardiology consult for further recommendations.

## 2024-07-14 NOTE — H&P ADULT - NSHPPHYSICALEXAM_GEN_ALL_CORE
T(C): 36.6 (07-14-24 @ 15:14), Max: 36.7 (07-14-24 @ 11:48)  HR: 79 (07-14-24 @ 15:14) (79 - 92)  BP: 124/83 (07-14-24 @ 15:14) (124/83 - 127/68)  RR: 13 (07-14-24 @ 15:14) (13 - 18)  SpO2: 100% (07-14-24 @ 15:14) (100% - 100%)    CONSTITUTIONAL: Well groomed, no apparent distress  EYES: PERRLA and symmetric, EOMI, No conjunctival or scleral injection, non-icteric  ENMT: Oral mucosa with moist membranes.   NECK: Supple, symmetric and without tracheal deviation   RESP: No respiratory distress, no use of accessory muscles; CTA b/l, no WRR  CV: RRR, +S1S2, no MRG; no JVD; trace bilateral lower extremity pitting edema at the ankles.  GI: abdomen obese, mildly distended, tympanic, tender to deep palpation in RUQ without rebound or guarding; no palpable masses.  MSK: Normal ROM without pain on all limbs.   SKIN: No rashes or ulcers noted.  NEURO: A+O x 3, no focal deficits.

## 2024-07-14 NOTE — H&P ADULT - ASSESSMENT
58-year-old male with PMH of HTN, DLD, IDDM, CKD, CAD s/p PCI (BEATRICE x8, last in Sep '23 at ) on Plavix, Afib and mechanical AVR (Oct '23 at [facility]) on Warfarin, AVB s/p PPM (Oct '23 at the time of AVR at [facility]), and GIB s/p extensive workup in 2022 (EGD, colonoscopy and capsule videoendoscopy by Dr. Cooper) with findings of sigmoid diverticulosis and gastritis in the context of high intake of NSAIDs, presents to the ED on 7/14 c/o with dizziness and dyspnea on exertion in setting of about 3 weeks of black bloody stools.   Admitted to medicine for GIB with current use of Plavix and Coumadin.

## 2024-07-14 NOTE — ED PROVIDER NOTE - PHYSICAL EXAMINATION
General: Awake, alert, lying in bed in NAD  HEENT: Normocephalic, atraumatic. Pale conjunctiva. No scleral icterus or conjunctival injection. EOMI. Moist mucous membranes. Oropharynx clear.   Neck:. Soft and supple.  Cardiac: RRR, Peripheral pulses 2+ and symmetric. No LE edema.  Resp: Lungs CTAB. No accessory muscle use  Abd: Soft, non-tender, non-distended. No guarding, rebound, or rigidity.  Back: Spine midline and non-tender.   Skin: No rashes, abrasions, or lacerations.  Neuro: AO x 4. Moves all extremities symmetrically. Motor strength and sensation grossly intact.  Psych: Appropriate mood and affect General: Awake, alert, lying in bed in NAD  HEENT: Normocephalic, atraumatic. Pale conjunctiva. No scleral icterus or conjunctival injection. EOMI. Moist mucous membranes. Oropharynx clear.   Neck:. Soft and supple.  Cardiac: RRR, Peripheral pulses 2+ and symmetric. No LE edema.  Resp: Lungs CTAB. No accessory muscle use  Abd: Soft, non-tender, non-distended. No guarding, rebound, or rigidity.  Back: Spine midline and non-tender.   Skin: No rashes, abrasions, or lacerations.  Neuro: AO x 4. Moves all extremities symmetrically. Motor strength and sensation grossly intact.  Psych: Appropriate mood and affect      CHADWICK Hilario MD:    Gen'l: Obese WM adult in NAD, no respiratory discomfort, no sentence shortening, not acutely ill.  Head: NC/AT  Eyes: PERRL, EOMI, conjunctivae + pallor  ENT: O/P clear, mm mildly dry, slt. pallor  CV: RRR, normal radial pulse  Lungs: CTA, normal respirations  GI: soft, NT, BS+  : Deferred, no flank nor CVAT  Neck: NT, supple w/o pain  MSK: [place] x 4, no focal extremity swelling nor tenderness, B/L SLR 35 degrees w/o pain, normal motor  Skin: no tactile warmth, no rash  Neuro: A+O x 4, CN 2 -12 intact, normal speech, no focal motor/sensory deficits

## 2024-07-14 NOTE — H&P ADULT - PROBLEM SELECTOR PLAN 5
NSR on admission and rate 90 bpm.   Resume BB.  Very high [id number], on VKA, and will not reverse as stated above given thromboembolic risk with mechanical aortic valve prosthesis.   Cardiology evaluation.

## 2024-07-14 NOTE — H&P ADULT - PROBLEM SELECTOR PLAN 10
CKD 3 with baseline Cr around 1.8, currently 2.0 close to baseline.   Renally dose medications and avoid nephrotoxic agents.   Trend BUN/Cr,

## 2024-07-14 NOTE — ED PROVIDER NOTE - PROGRESS NOTE DETAILS
58-year-old male on Plavix and warfarin presents with 3 weeks of melena, now with symptomatic anemia in setting of dramatic drop of hemoglobin from 14-7.  Patient with history of diverticulosis, however given history of melena will give Protonix as well. Pt hemodynamically stable, will hold on AC reversal. Consult GI, transfuse blood, admit Virgil Fisher MD

## 2024-07-14 NOTE — ED ADULT TRIAGE NOTE - CHIEF COMPLAINT QUOTE
Pt presents to the ED c/o dizziness. Pt endorses feeling dizzy, lightheaded, has been experiencing bloody stools for the past 1-2 weeks with lower abdominal pain. Pt is on warfarin and plavix.

## 2024-07-14 NOTE — PATIENT PROFILE ADULT - FALL HARM RISK - HARM RISK INTERVENTIONS

## 2024-07-15 LAB
ALBUMIN SERPL ELPH-MCNC: 3 G/DL — LOW (ref 3.3–5)
ALP SERPL-CCNC: 44 U/L — SIGNIFICANT CHANGE UP (ref 40–120)
ALT FLD-CCNC: 41 U/L — SIGNIFICANT CHANGE UP (ref 12–78)
ANION GAP SERPL CALC-SCNC: 5 MMOL/L — SIGNIFICANT CHANGE UP (ref 5–17)
APTT BLD: 43.2 SEC — HIGH (ref 24.5–35.6)
AST SERPL-CCNC: 34 U/L — SIGNIFICANT CHANGE UP (ref 15–37)
BILIRUB SERPL-MCNC: 0.7 MG/DL — SIGNIFICANT CHANGE UP (ref 0.2–1.2)
BUN SERPL-MCNC: 24 MG/DL — HIGH (ref 7–23)
CALCIUM SERPL-MCNC: 8.5 MG/DL — SIGNIFICANT CHANGE UP (ref 8.5–10.1)
CHLORIDE SERPL-SCNC: 112 MMOL/L — HIGH (ref 96–108)
CO2 SERPL-SCNC: 25 MMOL/L — SIGNIFICANT CHANGE UP (ref 22–31)
CREAT SERPL-MCNC: 1.85 MG/DL — HIGH (ref 0.5–1.3)
EGFR: 42 ML/MIN/1.73M2 — LOW
GLUCOSE BLDC GLUCOMTR-MCNC: 186 MG/DL — HIGH (ref 70–99)
GLUCOSE BLDC GLUCOMTR-MCNC: 193 MG/DL — HIGH (ref 70–99)
GLUCOSE BLDC GLUCOMTR-MCNC: 276 MG/DL — HIGH (ref 70–99)
GLUCOSE BLDC GLUCOMTR-MCNC: 309 MG/DL — HIGH (ref 70–99)
GLUCOSE SERPL-MCNC: 167 MG/DL — HIGH (ref 70–99)
HCT VFR BLD CALC: 23.4 % — LOW (ref 39–50)
HCT VFR BLD CALC: 25 % — LOW (ref 39–50)
HGB BLD-MCNC: 8 G/DL — LOW (ref 13–17)
HGB BLD-MCNC: 8.3 G/DL — LOW (ref 13–17)
INR BLD: 2.49 RATIO — HIGH (ref 0.85–1.18)
MCHC RBC-ENTMCNC: 31.7 PG — SIGNIFICANT CHANGE UP (ref 27–34)
MCHC RBC-ENTMCNC: 34.2 GM/DL — SIGNIFICANT CHANGE UP (ref 32–36)
MCV RBC AUTO: 92.9 FL — SIGNIFICANT CHANGE UP (ref 80–100)
NRBC # BLD: 1 /100 WBCS — HIGH (ref 0–0)
PLATELET # BLD AUTO: 340 K/UL — SIGNIFICANT CHANGE UP (ref 150–400)
POTASSIUM SERPL-MCNC: 4.2 MMOL/L — SIGNIFICANT CHANGE UP (ref 3.5–5.3)
POTASSIUM SERPL-SCNC: 4.2 MMOL/L — SIGNIFICANT CHANGE UP (ref 3.5–5.3)
PROT SERPL-MCNC: 6.1 GM/DL — SIGNIFICANT CHANGE UP (ref 6–8.3)
PROTHROM AB SERPL-ACNC: 27.4 SEC — HIGH (ref 9.5–13)
RBC # BLD: 2.52 M/UL — LOW (ref 4.2–5.8)
RBC # FLD: 17.7 % — HIGH (ref 10.3–14.5)
SODIUM SERPL-SCNC: 142 MMOL/L — SIGNIFICANT CHANGE UP (ref 135–145)
WBC # BLD: 11.3 K/UL — HIGH (ref 3.8–10.5)
WBC # FLD AUTO: 11.3 K/UL — HIGH (ref 3.8–10.5)

## 2024-07-15 PROCEDURE — 99233 SBSQ HOSP IP/OBS HIGH 50: CPT

## 2024-07-15 RX ORDER — PEG3350/SOD SULF,BICARB,CL/KCL 240-22.72G
2000 SOLUTION, RECONSTITUTED, ORAL ORAL ONCE
Refills: 0 | Status: COMPLETED | OUTPATIENT
Start: 2024-07-15 | End: 2024-07-15

## 2024-07-15 RX ORDER — BISACODYL 5 MG
10 TABLET, DELAYED RELEASE (ENTERIC COATED) ORAL ONCE
Refills: 0 | Status: COMPLETED | OUTPATIENT
Start: 2024-07-15 | End: 2024-07-15

## 2024-07-15 RX ORDER — METOPROLOL TARTRATE 50 MG
100 TABLET ORAL DAILY
Refills: 0 | Status: DISCONTINUED | OUTPATIENT
Start: 2024-07-15 | End: 2024-07-17

## 2024-07-15 RX ORDER — LOSARTAN POTASSIUM 100 MG/1
50 TABLET, FILM COATED ORAL DAILY
Refills: 0 | Status: DISCONTINUED | OUTPATIENT
Start: 2024-07-15 | End: 2024-07-17

## 2024-07-15 RX ORDER — AMLODIPINE BESYLATE 2.5 MG/1
10 TABLET ORAL DAILY
Refills: 0 | Status: DISCONTINUED | OUTPATIENT
Start: 2024-07-15 | End: 2024-07-17

## 2024-07-15 RX ADMIN — Medication 50 MILLILITER(S): at 21:34

## 2024-07-15 RX ADMIN — INSULIN LISPRO 1: 100 INJECTION, SOLUTION SUBCUTANEOUS at 08:55

## 2024-07-15 RX ADMIN — INSULIN LISPRO 4: 100 INJECTION, SOLUTION SUBCUTANEOUS at 17:08

## 2024-07-15 RX ADMIN — PANTOPRAZOLE SODIUM 10 MG/HR: 40 INJECTION, POWDER, FOR SOLUTION INTRAVENOUS at 12:18

## 2024-07-15 RX ADMIN — Medication 2000 MILLILITER(S): at 15:10

## 2024-07-15 RX ADMIN — Medication 10 MILLIGRAM(S): at 21:42

## 2024-07-15 RX ADMIN — Medication 1 TABLET(S): at 09:29

## 2024-07-15 RX ADMIN — LOSARTAN POTASSIUM 50 MILLIGRAM(S): 100 TABLET, FILM COATED ORAL at 09:28

## 2024-07-15 RX ADMIN — INSULIN GLARGINE 28 UNIT(S): 100 INJECTION, SOLUTION SUBCUTANEOUS at 08:54

## 2024-07-15 RX ADMIN — MAGNESIUM OXIDE 400 MILLIGRAM(S): 400 TABLET ORAL at 09:28

## 2024-07-15 RX ADMIN — Medication 325 MILLIGRAM(S): at 09:29

## 2024-07-15 RX ADMIN — Medication 1000 UNIT(S): at 09:30

## 2024-07-15 RX ADMIN — PANTOPRAZOLE SODIUM 10 MG/HR: 40 INJECTION, POWDER, FOR SOLUTION INTRAVENOUS at 02:08

## 2024-07-15 RX ADMIN — AMLODIPINE BESYLATE 10 MILLIGRAM(S): 2.5 TABLET ORAL at 09:32

## 2024-07-15 RX ADMIN — ATORVASTATIN CALCIUM 80 MILLIGRAM(S): 20 TABLET, FILM COATED ORAL at 21:42

## 2024-07-15 RX ADMIN — INSULIN LISPRO 1: 100 INJECTION, SOLUTION SUBCUTANEOUS at 12:19

## 2024-07-15 RX ADMIN — FENOFIBRATE 145 MILLIGRAM(S): 130 CAPSULE ORAL at 09:30

## 2024-07-15 RX ADMIN — Medication 100 MILLIGRAM(S): at 09:28

## 2024-07-15 RX ADMIN — INSULIN LISPRO 1: 100 INJECTION, SOLUTION SUBCUTANEOUS at 21:44

## 2024-07-15 RX ADMIN — PANTOPRAZOLE SODIUM 10 MG/HR: 40 INJECTION, POWDER, FOR SOLUTION INTRAVENOUS at 22:11

## 2024-07-15 NOTE — CHART NOTE - NSCHARTNOTEFT_GEN_A_CORE
pt seen and examined-full note to follow    subacute GIB on plavix/coumadin    will prep for EGD and colonoscopy, scheduled tomorrow afternoon  hold coumadin  trend cbc  cont PPI  orders written

## 2024-07-15 NOTE — CONSULT NOTE ADULT - ASSESSMENT
A/P: 58-year-old male with PMH of HTN, DLD, IDDM, CKD, CAD s/p PCI (BEATRICE x8, last in Sep '23 at ) on Plavix, Afib and mechanical AVR (Oct '23 at [facility]) on Warfarin, AVB s/p PPM (Oct '23 at the time of AVR at [facility]), and GIB s/p extensive workup in 2022 (EGD, colonoscopy and capsule videoendoscopy by Dr. Cooper) with findings of sigmoid diverticulosis and gastritis in the context of high intake of NSAIDs, presents to the ED on 7/14 c/o with dizziness and dyspnea on exertion in setting of about 3 weeks of black bloody stools.   Admitted to medicine for GIB with current use of Plavix and Coumadin.     1. Preop optimization. Pt is optimized for urgent EGD in setting of GIB requiring transfusion.   Coumadin on hold for now. Will resume ASAP in setting of mechanical AVR.    2. CAD s/p multiple PCI. No active CP. Can hold plavix for a few days until hgb stabilizes.   Resume ASAP as per GI.     3. HTN. BP stable. Cont outpt regimen.     4. GI bleed. GI following. Keep Hgb >8 and transfuse as needed.     5. Mechanical DVT proph, ambulate.
58M with CAD, stents, mechanical AVR and PPM, on plavix and coumadin presenting with anemia due to subacute GI bleeding.     Rec:  -although EGD/colonoscopy/VCE in 2022 was unrevealing except diverticulosis, given need to remain on anticoagulation for life will need to reinvestigate bleeding with EGD and colonoscopy this admission  -hold coumadin   -trend CBC and INR  -continue PPI  -prep today and plan for EGD/colonoscopy tomorrow  -NPO p MN

## 2024-07-15 NOTE — CONSULT NOTE ADULT - SUBJECTIVE AND OBJECTIVE BOX
Cardiology Consultation    HPI: 58-year-old male with PMH of HTN, DLD, IDDM, CKD, CAD s/p PCI (BEATRICE x8, last PCI of ISR of LAD and PTCA of diag on 9/29/23 at ) on Plavix, Afib and mechanical AVR (Oct '23 at [facility]) on Warfarin, AVB s/p PPM (Oct '23 at the time of AVR at [facility]), and GIB s/p extensive workup in 2022 (EGD, colonoscopy and capsule videoendoscopy by Dr. Cooper) with findings of sigmoid diverticulosis and gastritis in the context of high intake of NSAIDs, presents to the ED on 7/14 c/o with dizziness and dyspnea on exertion in setting of about 3 weeks of black bloody stools. Patient states he saw his PCP outpatient on 7/12 who performed blodowrk and found his hemoglobin had dropped from around 14 to 9 g/dL and was told to follow with his GI doctor and to come to the ED if symptomatic. Patient denies nausea, vomiting, CP, fevers.  Reports mild abdominal cramping.    7/15. Pt seen/examined on 5E. No CP/SOB.   Awaiting EGD likely tomorrow. S/p transfusion. Hgb 8.0 at present.    PAST MEDICAL & SURGICAL HISTORY:  Essential hypertension  Obstructive sleep apnea  Diabetes mellitus  HLD (hyperlipidemia)  CAD (coronary artery disease)  Pancreatitis  S/P AVR (aortic valve replacement)  Diverticulosis  Stage 3 chronic kidney disease  Pacemaker  History of coronary artery stent placement  Placed 9/12/18 by Dr. Kaye  S/P cholecystectomy    Allergies  scallops (Nausea)  penicillin (Hives)  Trulicity SOPN (Other)    SOCIAL HISTORY: Denies tobacco, etoh abuse or illicit drug use    FAMILY HISTORY:  Family history of diabetes mellitus (Sibling)    FH: heart disease (Sibling, Father, Mother)    MEDICATIONS  (STANDING):  amLODIPine   Tablet 10 milliGRAM(s) Oral daily  atorvastatin 80 milliGRAM(s) Oral at bedtime  cholecalciferol 1000 Unit(s) Oral daily  clopidogrel Tablet 75 milliGRAM(s) Oral daily  dextrose 5%. 1000 milliLiter(s) (50 mL/Hr) IV Continuous <Continuous>  dextrose 5%. 1000 milliLiter(s) (100 mL/Hr) IV Continuous <Continuous>  dextrose 50% Injectable 25 Gram(s) IV Push once  dextrose 50% Injectable 12.5 Gram(s) IV Push once  dextrose 50% Injectable 25 Gram(s) IV Push once  fenofibrate Tablet 145 milliGRAM(s) Oral daily  ferrous    sulfate 325 milliGRAM(s) Oral daily  glucagon  Injectable 1 milliGRAM(s) IntraMuscular once  insulin glargine Injectable (LANTUS) 28 Unit(s) SubCutaneous every morning  insulin lispro (ADMELOG) corrective regimen sliding scale   SubCutaneous three times a day before meals  insulin lispro (ADMELOG) corrective regimen sliding scale   SubCutaneous at bedtime  losartan 50 milliGRAM(s) Oral daily  magnesium oxide 400 milliGRAM(s) Oral daily  metoprolol succinate  milliGRAM(s) Oral daily  multivitamin 1 Tablet(s) Oral daily  pantoprazole Infusion 8 mG/Hr (10 mL/Hr) IV Continuous <Continuous>  sodium chloride 0.9%. 1000 milliLiter(s) (50 mL/Hr) IV Continuous <Continuous>    MEDICATIONS  (PRN):  acetaminophen     Tablet .. 650 milliGRAM(s) Oral every 6 hours PRN Temp greater or equal to 38C (100.4F), Mild Pain (1 - 3)  aluminum hydroxide/magnesium hydroxide/simethicone Suspension 30 milliLiter(s) Oral every 4 hours PRN Dyspepsia  dextrose Oral Gel 15 Gram(s) Oral once PRN Blood Glucose LESS THAN 70 milliGRAM(s)/deciliter  melatonin 3 milliGRAM(s) Oral at bedtime PRN Insomnia  ondansetron Injectable 4 milliGRAM(s) IV Push every 8 hours PRN Nausea and/or Vomiting    Vital Signs Last 24 Hrs  T(C): 36.8 (14 Jul 2024 22:03), Max: 37.1 (14 Jul 2024 17:30)  T(F): 98.3 (14 Jul 2024 22:03), Max: 98.8 (14 Jul 2024 17:30)  HR: 79 (14 Jul 2024 22:03) (76 - 92)  BP: 107/54 (14 Jul 2024 22:03) (107/54 - 133/69)  BP(mean): 95 (14 Jul 2024 15:14) (87 - 95)  RR: 17 (14 Jul 2024 22:03) (13 - 18)  SpO2: 100% (14 Jul 2024 22:03) (100% - 100%)    Parameters below as of 14 Jul 2024 22:03  Patient On (Oxygen Delivery Method): room air    REVIEW OF SYSTEMS:    CONSTITUTIONAL:  As per HPI.  HEENT:  Eyes:  No diplopia or blurred vision. ENT:  No earache, sore throat or runny nose.  CARDIOVASCULAR:  No pressure, squeezing, strangling, tightness, heaviness or aching about the chest, neck, axilla or epigastrium.  RESPIRATORY:  No cough, shortness of breath, PND or orthopnea.  GASTROINTESTINAL:  +melena, nausea  GENITOURINARY:  No dysuria, frequency or urgency.  MUSCULOSKELETAL:  As per HPI.  SKIN:  No change in skin, hair or nails.  NEUROLOGIC:  No paresthesias, fasciculations, seizures or weakness.    PHYSICAL EXAMINATION:    GENERAL APPEARANCE:  Pt. is not currently dyspneic, in no distress. Pt. is alert, oriented, and pleasant.  HEENT:  Pupils are normal and react normally. No icterus. Mucous membranes well colored.  NECK:  Supple. No lymphadenopathy. Jugular venous pressure not elevated. Carotids equal.   HEART:   The cardiac impulse has a normal quality. There are no murmurs, rubs or gallops noted  CHEST:  Chest is clear to auscultation. Normal respiratory effort.  ABDOMEN:  Soft and nontender.   EXTREMITIES:  There is no edema.     LABS:                        8.0    11.30 )-----------( 340      ( 15 Jul 2024 07:32 )             23.4     07-15    142  |  112<H>  |  24<H>  ----------------------------<  167<H>  4.2   |  25  |  1.85<H>    Ca    8.5      15 Jul 2024 07:32    TPro  6.1  /  Alb  3.0<L>  /  TBili  0.7  /  DBili  x   /  AST  34  /  ALT  41  /  AlkPhos  44  07-15    LIVER FUNCTIONS - ( 15 Jul 2024 07:32 )  Alb: 3.0 g/dL / Pro: 6.1 gm/dL / ALK PHOS: 44 U/L / ALT: 41 U/L / AST: 34 U/L / GGT: x           PT/INR - ( 15 Jul 2024 07:32 )   PT: 27.4 sec;   INR: 2.49 ratio         PTT - ( 15 Jul 2024 07:32 )  PTT:43.2 sec      Urinalysis Basic - ( 15 Jul 2024 07:32 )    Color: x / Appearance: x / SG: x / pH: x  Gluc: 167 mg/dL / Ketone: x  / Bili: x / Urobili: x   Blood: x / Protein: x / Nitrite: x   Leuk Esterase: x / RBC: x / WBC x   Sq Epi: x / Non Sq Epi: x / Bacteria: x    EKG: SR, RBBB. No ST changes.    TELEMETRY: Not on tele.     CARDIAC TESTS: < from: TTE Echo Complete w/o Contrast w/ Doppler (02.03.24 @ 08:52) >     Estimated left ventricular ejection fraction is 55-60 %.   The left ventricle is normal in size, wall motion and contractility as   seen in limited views.   Moderate concentric left ventricular hypertrophy is present.   Well seated Mechanical valve in the aortic position. Normal   transprosthetic gradients.   Mild (1+) mitral regurgitation.   The aortic root is mildly dilated.    RADIOLOGY & ADDITIONAL STUDIES: < from: CT Abdomen and Pelvis w/wo IV Cont (07.14.24 @ 13:22) >    IMPRESSION:  No CT evidence of acute GI bleed.    ASSESSMENT & PLAN:

## 2024-07-16 LAB
ALBUMIN SERPL ELPH-MCNC: 3.3 G/DL — SIGNIFICANT CHANGE UP (ref 3.3–5)
ALP SERPL-CCNC: 49 U/L — SIGNIFICANT CHANGE UP (ref 40–120)
ALT FLD-CCNC: 49 U/L — SIGNIFICANT CHANGE UP (ref 12–78)
ANION GAP SERPL CALC-SCNC: 8 MMOL/L — SIGNIFICANT CHANGE UP (ref 5–17)
APTT BLD: 46.7 SEC — HIGH (ref 24.5–35.6)
AST SERPL-CCNC: 46 U/L — HIGH (ref 15–37)
BASOPHILS # BLD AUTO: 0.08 K/UL — SIGNIFICANT CHANGE UP (ref 0–0.2)
BASOPHILS NFR BLD AUTO: 1 % — SIGNIFICANT CHANGE UP (ref 0–2)
BILIRUB SERPL-MCNC: 0.6 MG/DL — SIGNIFICANT CHANGE UP (ref 0.2–1.2)
BUN SERPL-MCNC: 16 MG/DL — SIGNIFICANT CHANGE UP (ref 7–23)
CALCIUM SERPL-MCNC: 8.8 MG/DL — SIGNIFICANT CHANGE UP (ref 8.5–10.1)
CHLORIDE SERPL-SCNC: 113 MMOL/L — HIGH (ref 96–108)
CO2 SERPL-SCNC: 21 MMOL/L — LOW (ref 22–31)
CREAT SERPL-MCNC: 1.82 MG/DL — HIGH (ref 0.5–1.3)
EGFR: 43 ML/MIN/1.73M2 — LOW
EOSINOPHIL # BLD AUTO: 0.27 K/UL — SIGNIFICANT CHANGE UP (ref 0–0.5)
EOSINOPHIL NFR BLD AUTO: 3.3 % — SIGNIFICANT CHANGE UP (ref 0–6)
GLUCOSE BLDC GLUCOMTR-MCNC: 134 MG/DL — HIGH (ref 70–99)
GLUCOSE BLDC GLUCOMTR-MCNC: 184 MG/DL — HIGH (ref 70–99)
GLUCOSE BLDC GLUCOMTR-MCNC: 192 MG/DL — HIGH (ref 70–99)
GLUCOSE BLDC GLUCOMTR-MCNC: 198 MG/DL — HIGH (ref 70–99)
GLUCOSE SERPL-MCNC: 171 MG/DL — HIGH (ref 70–99)
HCT VFR BLD CALC: 26.1 % — LOW (ref 39–50)
HGB BLD-MCNC: 8.7 G/DL — LOW (ref 13–17)
IMM GRANULOCYTES NFR BLD AUTO: 4.4 % — HIGH (ref 0–0.9)
INR BLD: 1.79 RATIO — HIGH (ref 0.85–1.18)
LYMPHOCYTES # BLD AUTO: 1.26 K/UL — SIGNIFICANT CHANGE UP (ref 1–3.3)
LYMPHOCYTES # BLD AUTO: 15.6 % — SIGNIFICANT CHANGE UP (ref 13–44)
MCHC RBC-ENTMCNC: 31.5 PG — SIGNIFICANT CHANGE UP (ref 27–34)
MCHC RBC-ENTMCNC: 33.3 GM/DL — SIGNIFICANT CHANGE UP (ref 32–36)
MCV RBC AUTO: 94.6 FL — SIGNIFICANT CHANGE UP (ref 80–100)
MONOCYTES # BLD AUTO: 0.74 K/UL — SIGNIFICANT CHANGE UP (ref 0–0.9)
MONOCYTES NFR BLD AUTO: 9.1 % — SIGNIFICANT CHANGE UP (ref 2–14)
NEUTROPHILS # BLD AUTO: 5.38 K/UL — SIGNIFICANT CHANGE UP (ref 1.8–7.4)
NEUTROPHILS NFR BLD AUTO: 66.6 % — SIGNIFICANT CHANGE UP (ref 43–77)
PLATELET # BLD AUTO: 363 K/UL — SIGNIFICANT CHANGE UP (ref 150–400)
POTASSIUM SERPL-MCNC: 3.8 MMOL/L — SIGNIFICANT CHANGE UP (ref 3.5–5.3)
POTASSIUM SERPL-SCNC: 3.8 MMOL/L — SIGNIFICANT CHANGE UP (ref 3.5–5.3)
PROT SERPL-MCNC: 7 GM/DL — SIGNIFICANT CHANGE UP (ref 6–8.3)
PROTHROM AB SERPL-ACNC: 19.9 SEC — HIGH (ref 9.5–13)
RBC # BLD: 2.76 M/UL — LOW (ref 4.2–5.8)
RBC # FLD: 18.5 % — HIGH (ref 10.3–14.5)
SODIUM SERPL-SCNC: 142 MMOL/L — SIGNIFICANT CHANGE UP (ref 135–145)
WBC # BLD: 8.09 K/UL — SIGNIFICANT CHANGE UP (ref 3.8–10.5)
WBC # FLD AUTO: 8.09 K/UL — SIGNIFICANT CHANGE UP (ref 3.8–10.5)

## 2024-07-16 PROCEDURE — 99232 SBSQ HOSP IP/OBS MODERATE 35: CPT

## 2024-07-16 RX ADMIN — PANTOPRAZOLE SODIUM 10 MG/HR: 40 INJECTION, POWDER, FOR SOLUTION INTRAVENOUS at 18:00

## 2024-07-16 RX ADMIN — INSULIN LISPRO 1: 100 INJECTION, SOLUTION SUBCUTANEOUS at 07:50

## 2024-07-16 RX ADMIN — ATORVASTATIN CALCIUM 80 MILLIGRAM(S): 20 TABLET, FILM COATED ORAL at 21:00

## 2024-07-16 RX ADMIN — Medication 100 MILLIGRAM(S): at 09:03

## 2024-07-16 RX ADMIN — PANTOPRAZOLE SODIUM 10 MG/HR: 40 INJECTION, POWDER, FOR SOLUTION INTRAVENOUS at 06:08

## 2024-07-16 RX ADMIN — LOSARTAN POTASSIUM 50 MILLIGRAM(S): 100 TABLET, FILM COATED ORAL at 09:04

## 2024-07-16 RX ADMIN — Medication 50 MILLILITER(S): at 18:01

## 2024-07-16 RX ADMIN — INSULIN LISPRO 1: 100 INJECTION, SOLUTION SUBCUTANEOUS at 12:19

## 2024-07-16 RX ADMIN — AMLODIPINE BESYLATE 10 MILLIGRAM(S): 2.5 TABLET ORAL at 09:04

## 2024-07-16 RX ADMIN — FENOFIBRATE 145 MILLIGRAM(S): 130 CAPSULE ORAL at 09:04

## 2024-07-16 RX ADMIN — Medication 1 TABLET(S): at 09:03

## 2024-07-16 NOTE — PROGRESS NOTE ADULT - SUBJECTIVE AND OBJECTIVE BOX
EGD and Colonoscopy performed    No bleeding source. Moderate left sided diverticulosis.     Resume anticoagulation and diet. Monitor for rebleeding. 
Patient is a 58y old  Male who presents with a chief complaint of GIB (15 Jul 2024 12:33)      INTERVAL HPI/OVERNIGHT EVENTS: No acute events overnight. Patient seen and examined at bedside. clear liquid diet, NPO after midnight  plan for EGD and colonoscopy tomorrow.  Denies fever/chills, chest pain, shortness of breath, headache, dizziness, nausea/vomiting, abdominal pain.    MEDICATIONS  (STANDING):  amLODIPine   Tablet 10 milliGRAM(s) Oral daily  atorvastatin 80 milliGRAM(s) Oral at bedtime  bisacodyl 10 milliGRAM(s) Oral once  cholecalciferol 1000 Unit(s) Oral daily  clopidogrel Tablet 75 milliGRAM(s) Oral daily  dextrose 5%. 1000 milliLiter(s) (50 mL/Hr) IV Continuous <Continuous>  dextrose 5%. 1000 milliLiter(s) (100 mL/Hr) IV Continuous <Continuous>  dextrose 50% Injectable 25 Gram(s) IV Push once  dextrose 50% Injectable 12.5 Gram(s) IV Push once  dextrose 50% Injectable 25 Gram(s) IV Push once  fenofibrate Tablet 145 milliGRAM(s) Oral daily  glucagon  Injectable 1 milliGRAM(s) IntraMuscular once  insulin glargine Injectable (LANTUS) 28 Unit(s) SubCutaneous every morning  insulin lispro (ADMELOG) corrective regimen sliding scale   SubCutaneous three times a day before meals  insulin lispro (ADMELOG) corrective regimen sliding scale   SubCutaneous at bedtime  losartan 50 milliGRAM(s) Oral daily  magnesium oxide 400 milliGRAM(s) Oral daily  metoprolol succinate  milliGRAM(s) Oral daily  multivitamin 1 Tablet(s) Oral daily  pantoprazole Infusion 8 mG/Hr (10 mL/Hr) IV Continuous <Continuous>  sodium chloride 0.9%. 1000 milliLiter(s) (50 mL/Hr) IV Continuous <Continuous>    MEDICATIONS  (PRN):  acetaminophen     Tablet .. 650 milliGRAM(s) Oral every 6 hours PRN Temp greater or equal to 38C (100.4F), Mild Pain (1 - 3)  aluminum hydroxide/magnesium hydroxide/simethicone Suspension 30 milliLiter(s) Oral every 4 hours PRN Dyspepsia  dextrose Oral Gel 15 Gram(s) Oral once PRN Blood Glucose LESS THAN 70 milliGRAM(s)/deciliter  melatonin 3 milliGRAM(s) Oral at bedtime PRN Insomnia  ondansetron Injectable 4 milliGRAM(s) IV Push every 8 hours PRN Nausea and/or Vomiting      Allergies    scallops (Nausea)  penicillin (Hives)  Trulicity SOPN (Other)    Intolerances        REVIEW OF SYSTEMS:  CONSTITUTIONAL: No fever or chills  HEENT:  No headache, no sore throat  RESPIRATORY: No cough, wheezing, or shortness of breath  CARDIOVASCULAR: No chest pain, palpitations  GASTROINTESTINAL: No abd pain, nausea, vomiting, or diarrhea  GENITOURINARY: No dysuria, frequency, or hematuria  NEUROLOGICAL: no focal weakness or dizziness  MUSCULOSKELETAL: no myalgias     Vital Signs Last 24 Hrs  T(C): 36.8 (15 Jul 2024 15:23), Max: 37.1 (14 Jul 2024 17:30)  T(F): 98.3 (15 Jul 2024 15:23), Max: 98.8 (14 Jul 2024 17:30)  HR: 76 (15 Jul 2024 15:23) (76 - 81)  BP: 123/64 (15 Jul 2024 15:23) (107/54 - 133/69)  BP(mean): --  RR: 18 (15 Jul 2024 15:23) (16 - 18)  SpO2: 99% (15 Jul 2024 15:23) (96% - 100%)    Parameters below as of 15 Jul 2024 15:23  Patient On (Oxygen Delivery Method): room air        PHYSICAL EXAM:  GENERAL: NAD  HEENT:  anicteric, moist mucous membranes  CHEST/LUNG:  CTA b/l, no rales, wheezes, or rhonchi  HEART:  RRR, S1, S2  ABDOMEN:  BS+, soft, nontender, nondistended  EXTREMITIES: no edema, cyanosis, or calf tenderness  NERVOUS SYSTEM: answers questions and follows commands appropriately    LABS:                        8.0    11.30 )-----------( 340      ( 15 Jul 2024 07:32 )             23.4     CBC Full  -  ( 15 Jul 2024 07:32 )  WBC Count : 11.30 K/uL  Hemoglobin : 8.0 g/dL  Hematocrit : 23.4 %  Platelet Count - Automated : 340 K/uL  Mean Cell Volume : 92.9 fl  Mean Cell Hemoglobin : 31.7 pg  Mean Cell Hemoglobin Concentration : 34.2 gm/dL  Auto Neutrophil # : x  Auto Lymphocyte # : x  Auto Monocyte # : x  Auto Eosinophil # : x  Auto Basophil # : x  Auto Neutrophil % : x  Auto Lymphocyte % : x  Auto Monocyte % : x  Auto Eosinophil % : x  Auto Basophil % : x    15 Jul 2024 07:32    142    |  112    |  24     ----------------------------<  167    4.2     |  25     |  1.85     Ca    8.5        15 Jul 2024 07:32    TPro  6.1    /  Alb  3.0    /  TBili  0.7    /  DBili  x      /  AST  34     /  ALT  41     /  AlkPhos  44     15 Jul 2024 07:32    PT/INR - ( 15 Jul 2024 07:32 )   PT: 27.4 sec;   INR: 2.49 ratio         PTT - ( 15 Jul 2024 07:32 )  PTT:43.2 sec  Urinalysis Basic - ( 15 Jul 2024 07:32 )    Color: x / Appearance: x / SG: x / pH: x  Gluc: 167 mg/dL / Ketone: x  / Bili: x / Urobili: x   Blood: x / Protein: x / Nitrite: x   Leuk Esterase: x / RBC: x / WBC x   Sq Epi: x / Non Sq Epi: x / Bacteria: x      CAPILLARY BLOOD GLUCOSE      POCT Blood Glucose.: 309 mg/dL (15 Jul 2024 16:48)  POCT Blood Glucose.: 193 mg/dL (15 Jul 2024 12:00)  POCT Blood Glucose.: 186 mg/dL (15 Jul 2024 08:46)  POCT Blood Glucose.: 154 mg/dL (14 Jul 2024 21:31)          RADIOLOGY & ADDITIONAL TESTS:  < from: Xray Chest 1 View- PORTABLE-Urgent (07.14.24 @ 13:29) >  IMPRESSION: Right PICC line seen on May 20 has been removed. There are   stable findings without acute character.    --- End of Report ---        GERTRUDE SMITH MD; Attending Radiologist  This document has been electronically signed. Jul 14 2024  5:12PM    < end of copied text >    Personally reviewed.     Consultant(s) Notes Reviewed:  [x] YES  [ ] NO    
Patient is a 58y old  Male who presents with a chief complaint of melena (15 Jul 2024 16:50)      INTERVAL HPI/OVERNIGHT EVENTS: NPO overnight. Patient seen and examined at bedside. plan for EGD and colonoscopy today  Denies fever/chills, chest pain, shortness of breath, headache, dizziness, nausea/vomiting, abdominal pain.    MEDICATIONS  (STANDING):  amLODIPine   Tablet 10 milliGRAM(s) Oral daily  atorvastatin 80 milliGRAM(s) Oral at bedtime  cholecalciferol 1000 Unit(s) Oral daily  dextrose 5%. 1000 milliLiter(s) (50 mL/Hr) IV Continuous <Continuous>  dextrose 5%. 1000 milliLiter(s) (100 mL/Hr) IV Continuous <Continuous>  dextrose 50% Injectable 12.5 Gram(s) IV Push once  dextrose 50% Injectable 25 Gram(s) IV Push once  dextrose 50% Injectable 25 Gram(s) IV Push once  fenofibrate Tablet 145 milliGRAM(s) Oral daily  glucagon  Injectable 1 milliGRAM(s) IntraMuscular once  insulin glargine Injectable (LANTUS) 28 Unit(s) SubCutaneous every morning  insulin lispro (ADMELOG) corrective regimen sliding scale   SubCutaneous three times a day before meals  insulin lispro (ADMELOG) corrective regimen sliding scale   SubCutaneous at bedtime  losartan 50 milliGRAM(s) Oral daily  magnesium oxide 400 milliGRAM(s) Oral daily  metoprolol succinate  milliGRAM(s) Oral daily  multivitamin 1 Tablet(s) Oral daily  pantoprazole Infusion 8 mG/Hr (10 mL/Hr) IV Continuous <Continuous>  sodium chloride 0.9%. 1000 milliLiter(s) (50 mL/Hr) IV Continuous <Continuous>    MEDICATIONS  (PRN):  acetaminophen     Tablet .. 650 milliGRAM(s) Oral every 6 hours PRN Temp greater or equal to 38C (100.4F), Mild Pain (1 - 3)  aluminum hydroxide/magnesium hydroxide/simethicone Suspension 30 milliLiter(s) Oral every 4 hours PRN Dyspepsia  dextrose Oral Gel 15 Gram(s) Oral once PRN Blood Glucose LESS THAN 70 milliGRAM(s)/deciliter  melatonin 3 milliGRAM(s) Oral at bedtime PRN Insomnia  ondansetron Injectable 4 milliGRAM(s) IV Push every 8 hours PRN Nausea and/or Vomiting      Allergies    scallops (Nausea)  penicillin (Hives)  Trulicity SOPN (Other)    Intolerances        REVIEW OF SYSTEMS:  CONSTITUTIONAL: No fever or chills  HEENT:  No headache, no sore throat  RESPIRATORY: No cough, wheezing, or shortness of breath  CARDIOVASCULAR: No chest pain, palpitations  GASTROINTESTINAL: No abd pain, nausea, vomiting, or diarrhea  GENITOURINARY: No dysuria, frequency, or hematuria  NEUROLOGICAL: no focal weakness or dizziness  MUSCULOSKELETAL: no myalgias     Vital Signs Last 24 Hrs  T(C): 36.6 (16 Jul 2024 08:25), Max: 36.9 (16 Jul 2024 00:00)  T(F): 97.8 (16 Jul 2024 08:25), Max: 98.4 (16 Jul 2024 00:00)  HR: 96 (16 Jul 2024 08:25) (91 - 96)  BP: 143/70 (16 Jul 2024 08:25) (126/70 - 143/70)  BP(mean): --  RR: 18 (16 Jul 2024 08:25) (18 - 18)  SpO2: 99% (16 Jul 2024 08:25) (99% - 99%)    Parameters below as of 16 Jul 2024 08:25  Patient On (Oxygen Delivery Method): room air        PHYSICAL EXAM:  GENERAL: NAD  HEENT:  anicteric, moist mucous membranes  CHEST/LUNG:  CTA b/l, no rales, wheezes, or rhonchi  HEART:  RRR, S1, S2  ABDOMEN:  BS+, soft, nontender, nondistended  EXTREMITIES: no edema, cyanosis, or calf tenderness  NERVOUS SYSTEM: answers questions and follows commands appropriately    LABS:                        8.7    8.09  )-----------( 363      ( 16 Jul 2024 08:23 )             26.1     CBC Full  -  ( 16 Jul 2024 08:23 )  WBC Count : 8.09 K/uL  Hemoglobin : 8.7 g/dL  Hematocrit : 26.1 %  Platelet Count - Automated : 363 K/uL  Mean Cell Volume : 94.6 fl  Mean Cell Hemoglobin : 31.5 pg  Mean Cell Hemoglobin Concentration : 33.3 gm/dL  Auto Neutrophil # : 5.38 K/uL  Auto Lymphocyte # : 1.26 K/uL  Auto Monocyte # : 0.74 K/uL  Auto Eosinophil # : 0.27 K/uL  Auto Basophil # : 0.08 K/uL  Auto Neutrophil % : 66.6 %  Auto Lymphocyte % : 15.6 %  Auto Monocyte % : 9.1 %  Auto Eosinophil % : 3.3 %  Auto Basophil % : 1.0 %    16 Jul 2024 08:23    142    |  113    |  16     ----------------------------<  171    3.8     |  21     |  1.82     Ca    8.8        16 Jul 2024 08:23    TPro  7.0    /  Alb  3.3    /  TBili  0.6    /  DBili  x      /  AST  46     /  ALT  49     /  AlkPhos  49     16 Jul 2024 08:23    PT/INR - ( 16 Jul 2024 08:23 )   PT: 19.9 sec;   INR: 1.79 ratio         PTT - ( 16 Jul 2024 08:23 )  PTT:46.7 sec  Urinalysis Basic - ( 16 Jul 2024 08:23 )    Color: x / Appearance: x / SG: x / pH: x  Gluc: 171 mg/dL / Ketone: x  / Bili: x / Urobili: x   Blood: x / Protein: x / Nitrite: x   Leuk Esterase: x / RBC: x / WBC x   Sq Epi: x / Non Sq Epi: x / Bacteria: x      CAPILLARY BLOOD GLUCOSE      POCT Blood Glucose.: 192 mg/dL (16 Jul 2024 12:02)  POCT Blood Glucose.: 184 mg/dL (16 Jul 2024 07:20)  POCT Blood Glucose.: 276 mg/dL (15 Jul 2024 21:31)          RADIOLOGY & ADDITIONAL TESTS:    Personally reviewed.     Consultant(s) Notes Reviewed:  [x] YES  [ ] NO

## 2024-07-16 NOTE — PROGRESS NOTE ADULT - PROBLEM SELECTOR PLAN 11
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 He is very hyperactive and aggressive and does not stay still for even a moment. School and home aid report he is grabs them a lot and they have a hard time with him. Neuro exam limited as above.
Good functional capacity at baseline (recently declined in light of anemia). No ACS, ADHF, or unstable arrhythmias.   RCRI 3 points, class IV, 11% perioperative risk of MACE.  Patient appears to be elevated risk for the proposed procedure (endoscopic workup).   Optimize Hb as above for CAD.  Preop optimization. Pt is optimized for urgent EGD in setting of GIB requiring transfusion.   Coumadin on hold for now. Will resume ASAP in setting of mechanical AVR.
Good functional capacity at baseline (recently declined in light of anemia). No ACS, ADHF, or unstable arrhythmias.   RCRI 3 points, class IV, 11% perioperative risk of MACE.  Patient appears to be elevated risk for the proposed procedure (endoscopic workup).   Optimize Hb as above for CAD.  Preop optimization. Pt is optimized for urgent EGD in setting of GIB requiring transfusion.   Coumadin on hold for now. Will resume ASAP in setting of mechanical AVR.

## 2024-07-16 NOTE — PROGRESS NOTE ADULT - PROBLEM SELECTOR PLAN 1
Anemia is likely 2/2 GIB (patient has had melena for over 2 weeks). Previously had Hb of 14 in May, then 8.7 at PCP's office (on 7/11), and  now 7.3.  - s/p 1 U prbc H/H: 8.1/23.4  - hb stable 8.7 today   - transfuse for goal Hb > 8 g/dL in light of known CAD   - Monitor signs of bleeding.
Anemia is likely 2/2 GIB (patient has had melena for over 2 weeks). Previously had Hb of 14 in May, then 8.7 at PCP's office (on 7/11), and  now 7.3.  - s/p 1 U prbc H/H: 8.1/23.4 --> 8/23  - Will check H/H at 20:00  - transfuse for goal Hb > 8 g/dL in light of known CAD   - Monitor signs of bleeding.

## 2024-07-16 NOTE — PROGRESS NOTE ADULT - PROBLEM SELECTOR PLAN 2
Melena for around 2 weeks, initially with several bowel movements daily, now frequency decreased to 2-3 times/day. CT with IV contrast showed no active extravasation.   Previous endoscopic workup in 2022 as reported above, only revealed gastritis and sigmoid diverticulosis.   Abdominal exam in benign with only mild tenderness on deep palpation of RUQ but no guarding or rebound. GIB appears subacute, and patient is HD stable without evidence of acute overt bleeding.   - Plan for EGD and colonoscopy in am  -hold coumadin   -AM CBC and INR  -continue PPI infusion   -prep today and plan for EGD/colonoscopy tomorrow  -NPO after mid night
Melena for around 2 weeks, initially with several bowel movements daily, now frequency decreased to 2-3 times/day. CT with IV contrast showed no active extravasation.   Previous endoscopic workup in 2022 as reported above, only revealed gastritis and sigmoid diverticulosis.   Abdominal exam in benign with only mild tenderness on deep palpation of RUQ but no guarding or rebound. GIB appears subacute, and patient is HD stable without evidence of acute overt bleeding.   -hold coumadin   -continue PPI infusion   -plan for EGD/colonoscopy today

## 2024-07-16 NOTE — PROGRESS NOTE ADULT - PROBLEM SELECTOR PLAN 10
CKD 3 with baseline Cr around 1.8, currently 2.0 close to baseline.   Renally dose medications and avoid nephrotoxic agents.   Trend BUN/Cr
CKD 3 with baseline Cr around 1.8, currently 2.0 close to baseline.   Renally dose medications and avoid nephrotoxic agents.   Trend BUN/Cr,
Yes

## 2024-07-16 NOTE — PROGRESS NOTE ADULT - PROBLEM SELECTOR PLAN 7
IDDM2 on Tresiba 56 units daily at home, with additional short-acting corrective ISS.  Patient NPO, reduce long-acting dose to half with close monitoring of FSG.   Low-corrective short-acting ISS.  Return to usual doses once no longer NPO.
IDDM2 on Tresiba 56 units daily at home, with additional short-acting corrective ISS.  Patient NPO, reduce long-acting dose to half with close monitoring of FSG.   Low-corrective short-acting ISS.  Return to usual doses once no longer NPO.

## 2024-07-16 NOTE — PROGRESS NOTE ADULT - PROBLEM SELECTOR PLAN 5
NSR on admission and rate 90 bpm.   continue  BB.  Very high [id number], on VKA, and will not reverse as stated above given thromboembolic risk with mechanical aortic valve prosthesis.  hold plavix for a few days until hgb stabilizes. Resume ASAP
NSR on admission and rate 90 bpm.   continue  BB.  Very high [id number], on VKA, and will not reverse as stated above given thromboembolic risk with mechanical aortic valve prosthesis.  hold Plavix for a few days until hgb stabilizes. Resume ASAP

## 2024-07-16 NOTE — PROGRESS NOTE ADULT - PROBLEM SELECTOR PLAN 6
Dual-chamber PPM in place (implanted in Oct 2023 by Dr. Everett at [facility]).
Dual-chamber PPM in place (implanted in Oct 2023 by Dr. Everett at [facility]).

## 2024-07-16 NOTE — PROGRESS NOTE ADULT - PROBLEM SELECTOR PLAN 3
History of severe bicuspid AoV stenosis s/p mechanical AVR in October 2023 at [facility]. On Warfarin at home, INR now 2.86 (therapeutic). Patient now having GIB, but will avoid reversing VKA in light of very high thromboembolic risk with a mechanical aortic valve prosthesis. Last echo in February showed normal gradients across the valve.
History of severe bicuspid AoV stenosis s/p mechanical AVR in October 2023 at [facility]. On Warfarin at home, INR now 2.86 (therapeutic). Patient now having GIB, but will avoid reversing VKA in light of very high thromboembolic risk with a mechanical aortic valve prosthesis. Last echo in February showed normal gradients across the valve. Currently asymptomatic from the cardiovascular standpoint and clinically euvolemic. Cardiology consult for further recommendations.

## 2024-07-16 NOTE — PROGRESS NOTE ADULT - PROBLEM SELECTOR PLAN 12
VTE prophylaxis: hold coumadin in the setting of Gi bleed--> plan for EGD and colonoscopy am   Repeat H/H at 20:00    Disposition: from home.
VTE prophylaxis: hold coumadin in the setting of Gi bleed--> plan for EGD and colonoscopy today    Disposition: from home.

## 2024-07-16 NOTE — PROGRESS NOTE ADULT - PROBLEM SELECTOR PLAN 4
CAD s/p PCI (BEATRICE x8, last PCI of ISR of LAD and PTCA of diag on 9/29/23 at  by Dr. Kaye) on Plavix  EKG with chronic RBBB. Hs Trop I negative.   No anginal symptoms or equivalents.  Appears stable.    statin, Toprol  mg daily, Losartan 50 mg daily, Norvasc 10 mg daily.  Plavix on hold, resume as hb stabilize
CAD s/p PCI (BEATRICE x8, last PCI of ISR of LAD and PTCA of diag on 9/29/23 at  by Dr. Kaye) on Plavix  EKG with chronic RBBB. Hs Trop I negative.   No anginal symptoms or equivalents.  Appears stable.    statin, Toprol  mg daily, Losartan 50 mg daily, Norvasc 10 mg daily.  Plavix on hold, resume as hb stabilize

## 2024-07-17 ENCOUNTER — TRANSCRIPTION ENCOUNTER (OUTPATIENT)
Age: 59
End: 2024-07-17

## 2024-07-17 VITALS
DIASTOLIC BLOOD PRESSURE: 66 MMHG | SYSTOLIC BLOOD PRESSURE: 125 MMHG | OXYGEN SATURATION: 96 % | HEART RATE: 75 BPM | TEMPERATURE: 99 F | RESPIRATION RATE: 18 BRPM

## 2024-07-17 LAB
ALBUMIN SERPL ELPH-MCNC: 3 G/DL — LOW (ref 3.3–5)
ALP SERPL-CCNC: 50 U/L — SIGNIFICANT CHANGE UP (ref 40–120)
ALT FLD-CCNC: 47 U/L — SIGNIFICANT CHANGE UP (ref 12–78)
ANION GAP SERPL CALC-SCNC: 5 MMOL/L — SIGNIFICANT CHANGE UP (ref 5–17)
APTT BLD: 38.5 SEC — HIGH (ref 24.5–35.6)
AST SERPL-CCNC: 34 U/L — SIGNIFICANT CHANGE UP (ref 15–37)
BASOPHILS # BLD AUTO: 0.06 K/UL — SIGNIFICANT CHANGE UP (ref 0–0.2)
BASOPHILS NFR BLD AUTO: 1 % — SIGNIFICANT CHANGE UP (ref 0–2)
BILIRUB SERPL-MCNC: 0.6 MG/DL — SIGNIFICANT CHANGE UP (ref 0.2–1.2)
BUN SERPL-MCNC: 14 MG/DL — SIGNIFICANT CHANGE UP (ref 7–23)
CALCIUM SERPL-MCNC: 8.7 MG/DL — SIGNIFICANT CHANGE UP (ref 8.5–10.1)
CHLORIDE SERPL-SCNC: 113 MMOL/L — HIGH (ref 96–108)
CO2 SERPL-SCNC: 24 MMOL/L — SIGNIFICANT CHANGE UP (ref 22–31)
CREAT SERPL-MCNC: 1.71 MG/DL — HIGH (ref 0.5–1.3)
EGFR: 46 ML/MIN/1.73M2 — LOW
EOSINOPHIL # BLD AUTO: 0.17 K/UL — SIGNIFICANT CHANGE UP (ref 0–0.5)
EOSINOPHIL NFR BLD AUTO: 3 % — SIGNIFICANT CHANGE UP (ref 0–6)
GLUCOSE BLDC GLUCOMTR-MCNC: 245 MG/DL — HIGH (ref 70–99)
GLUCOSE BLDC GLUCOMTR-MCNC: 248 MG/DL — HIGH (ref 70–99)
GLUCOSE BLDC GLUCOMTR-MCNC: 331 MG/DL — HIGH (ref 70–99)
GLUCOSE SERPL-MCNC: 243 MG/DL — HIGH (ref 70–99)
HCT VFR BLD CALC: 23.7 % — LOW (ref 39–50)
HCT VFR BLD CALC: 26.4 % — LOW (ref 39–50)
HGB BLD-MCNC: 7.8 G/DL — LOW (ref 13–17)
HGB BLD-MCNC: 8.9 G/DL — LOW (ref 13–17)
IMM GRANULOCYTES NFR BLD AUTO: 2.8 % — HIGH (ref 0–0.9)
INR BLD: 1.18 RATIO — SIGNIFICANT CHANGE UP (ref 0.85–1.18)
INR BLD: 1.29 RATIO — HIGH (ref 0.85–1.18)
LYMPHOCYTES # BLD AUTO: 1.13 K/UL — SIGNIFICANT CHANGE UP (ref 1–3.3)
LYMPHOCYTES # BLD AUTO: 19.7 % — SIGNIFICANT CHANGE UP (ref 13–44)
MCHC RBC-ENTMCNC: 31.5 PG — SIGNIFICANT CHANGE UP (ref 27–34)
MCHC RBC-ENTMCNC: 32.9 GM/DL — SIGNIFICANT CHANGE UP (ref 32–36)
MCV RBC AUTO: 95.6 FL — SIGNIFICANT CHANGE UP (ref 80–100)
MONOCYTES # BLD AUTO: 0.57 K/UL — SIGNIFICANT CHANGE UP (ref 0–0.9)
MONOCYTES NFR BLD AUTO: 9.9 % — SIGNIFICANT CHANGE UP (ref 2–14)
NEUTROPHILS # BLD AUTO: 3.66 K/UL — SIGNIFICANT CHANGE UP (ref 1.8–7.4)
NEUTROPHILS NFR BLD AUTO: 63.6 % — SIGNIFICANT CHANGE UP (ref 43–77)
PLATELET # BLD AUTO: 303 K/UL — SIGNIFICANT CHANGE UP (ref 150–400)
POTASSIUM SERPL-MCNC: 4.3 MMOL/L — SIGNIFICANT CHANGE UP (ref 3.5–5.3)
POTASSIUM SERPL-SCNC: 4.3 MMOL/L — SIGNIFICANT CHANGE UP (ref 3.5–5.3)
PROT SERPL-MCNC: 6.2 GM/DL — SIGNIFICANT CHANGE UP (ref 6–8.3)
PROTHROM AB SERPL-ACNC: 13.3 SEC — HIGH (ref 9.5–13)
PROTHROM AB SERPL-ACNC: 14.5 SEC — HIGH (ref 9.5–13)
RBC # BLD: 2.48 M/UL — LOW (ref 4.2–5.8)
RBC # FLD: 18.6 % — HIGH (ref 10.3–14.5)
SODIUM SERPL-SCNC: 142 MMOL/L — SIGNIFICANT CHANGE UP (ref 135–145)
WBC # BLD: 5.75 K/UL — SIGNIFICANT CHANGE UP (ref 3.8–10.5)
WBC # FLD AUTO: 5.75 K/UL — SIGNIFICANT CHANGE UP (ref 3.8–10.5)

## 2024-07-17 PROCEDURE — 99239 HOSP IP/OBS DSCHRG MGMT >30: CPT

## 2024-07-17 RX ORDER — ENOXAPARIN SODIUM 100 MG/ML
120 INJECTION SUBCUTANEOUS
Qty: 1 | Refills: 0
Start: 2024-07-17 | End: 2024-07-21

## 2024-07-17 RX ORDER — ENOXAPARIN SODIUM 100 MG/ML
120 INJECTION SUBCUTANEOUS ONCE
Refills: 0 | Status: COMPLETED | OUTPATIENT
Start: 2024-07-17 | End: 2024-07-17

## 2024-07-17 RX ORDER — CLOPIDOGREL BISULFATE 75 MG/1
75 TABLET, FILM COATED ORAL DAILY
Refills: 0 | Status: DISCONTINUED | OUTPATIENT
Start: 2024-07-17 | End: 2024-07-17

## 2024-07-17 RX ADMIN — Medication 100 MILLIGRAM(S): at 08:57

## 2024-07-17 RX ADMIN — Medication 1000 UNIT(S): at 08:57

## 2024-07-17 RX ADMIN — MAGNESIUM OXIDE 400 MILLIGRAM(S): 400 TABLET ORAL at 08:57

## 2024-07-17 RX ADMIN — INSULIN GLARGINE 28 UNIT(S): 100 INJECTION, SOLUTION SUBCUTANEOUS at 08:05

## 2024-07-17 RX ADMIN — CLOPIDOGREL BISULFATE 75 MILLIGRAM(S): 75 TABLET, FILM COATED ORAL at 08:57

## 2024-07-17 RX ADMIN — LOSARTAN POTASSIUM 50 MILLIGRAM(S): 100 TABLET, FILM COATED ORAL at 08:57

## 2024-07-17 RX ADMIN — Medication 1 TABLET(S): at 08:57

## 2024-07-17 RX ADMIN — ENOXAPARIN SODIUM 120 MILLIGRAM(S): 100 INJECTION SUBCUTANEOUS at 13:03

## 2024-07-17 RX ADMIN — INSULIN LISPRO 2: 100 INJECTION, SOLUTION SUBCUTANEOUS at 12:12

## 2024-07-17 RX ADMIN — FENOFIBRATE 145 MILLIGRAM(S): 130 CAPSULE ORAL at 08:56

## 2024-07-17 RX ADMIN — INSULIN LISPRO 2: 100 INJECTION, SOLUTION SUBCUTANEOUS at 08:05

## 2024-07-17 RX ADMIN — INSULIN LISPRO 4: 100 INJECTION, SOLUTION SUBCUTANEOUS at 16:46

## 2024-07-17 RX ADMIN — AMLODIPINE BESYLATE 10 MILLIGRAM(S): 2.5 TABLET ORAL at 08:57

## 2024-07-17 NOTE — DISCHARGE NOTE PROVIDER - CARE PROVIDER_API CALL
Ron Rogel  [facility]  [address], [address]  [place] [address]  Phone: [phone]  Fax: [phone]  Established Patient  Follow Up Time: 1-3 days

## 2024-07-17 NOTE — DISCHARGE NOTE PROVIDER - ATTENDING DISCHARGE PHYSICAL EXAMINATION:
PHYSICAL EXAM:  GENERAL: NAD  HEENT:  anicteric, moist mucous membranes  CHEST/LUNG:  CTA b/l, no rales, wheezes, or rhonchi  HEART:  RRR, S1, S2  ABDOMEN:  BS+, soft, nontender, nondistended  EXTREMITIES: no edema, cyanosis, or calf tenderness  NERVOUS SYSTEM: answers questions and follows commands appropriately

## 2024-07-17 NOTE — DISCHARGE NOTE PROVIDER - HOSPITAL COURSE
HPI:  58-year-old male with PMH of HTN, DLD, IDDM, CKD, CAD s/p PCI (BEATRICE x8, last PCI of ISR of LAD and PTCA of diag on 9/29/23 at ) on Plavix, Afib and mechanical AVR (Oct '23 at [facility]) on Warfarin, AVB s/p PPM (Oct '23 at the time of AVR at [facility]), and GIB s/p extensive workup in 2022 (EGD, colonoscopy and capsule videoendoscopy by Dr. Cooper) with findings of sigmoid diverticulosis and gastritis in the context of high intake of NSAIDs, presents to the ED on 7/14 c/o with dizziness and dyspnea on exertion in setting of about 3 weeks of black bloody stools. Patient states he saw his PCP outpatient on 7/12 who performed blodowrk and found his hemoglobin had dropped from around 14 to 9 g/dL and was told to follow with his GI doctor and to come to the ED if symptomatic. Patient denies nausea, vomiting, CP, fevers.  Reports mild abdominal cramping.             (14 Jul 2024 15:56)      ---  HOSPITAL COURSE: Patient was admitted for GI-Bleed with current use of Plavix and Coumadin. On admission, hematoglobin was 7.3 s/p 1 u prbc  hb 8.1. Anemia is likely 2/2 GIB (patient has had melena for over 2 weeks). Previously had Hb of 14 in May, then 8.7 at PCP's office (on 7/11). Patient went for  EGD and colonoscopy 7/17/24 shows No bleeding source. Moderate left sided diverticulosis.   Home Coumadin and Plavix was held due to acute anemia. INR 1.29 today with hemoglobin of 7.8. Discussed in detail with the patient, will give 1 unit of blood and start briging therapy with Lovenox on coumadin. Patient monitor INR daily at home, wants to go home after blood transfusion. Will check repeat blood work     Patient will continue Lovenox 120mg BID with coumadin home dose and Check INR with cardiologist and PCP tomorrow.       ---  CONSULTANTS:     ---  TIME SPENT:  I, the attending physician, was physically present for the key portions of the evaluation and management (E/M) service provided. The total amount of time spent reviewing the hospital notes, laboratory values, imaging findings, assessing/counseling the patient, discussing with consultant physicians, social work, nursing staff was -- minutes    ---  Primary care provider was made aware of plan for discharge:      [  ] NO     [  ] YES   HPI:  58-year-old male with PMH of HTN, DLD, IDDM, CKD, CAD s/p PCI (BEATRICE x8, last PCI of ISR of LAD and PTCA of diag on 9/29/23 at ) on Plavix, Afib and mechanical AVR (Oct '23 at [facility]) on Warfarin, AVB s/p PPM (Oct '23 at the time of AVR at [facility]), and GIB s/p extensive workup in 2022 (EGD, colonoscopy and capsule videoendoscopy by Dr. Cooper) with findings of sigmoid diverticulosis and gastritis in the context of high intake of NSAIDs, presents to the ED on 7/14 c/o with dizziness and dyspnea on exertion in setting of about 3 weeks of black bloody stools. Patient states he saw his PCP outpatient on 7/12 who performed blodowrk and found his hemoglobin had dropped from around 14 to 9 g/dL and was told to follow with his GI doctor and to come to the ED if symptomatic. Patient denies nausea, vomiting, CP, fevers.  Reports mild abdominal cramping     (14 Jul 2024 15:56)      ---  HOSPITAL COURSE: Patient was admitted for GI-Bleed with current use of Plavix and Coumadin. On admission, hematoglobin was 7.3 s/p 1 u prbc  hb 8.1. Anemia is likely 2/2 GIB (patient has had melena for over 2 weeks). Previously had Hb of 14 in May, then 8.7 at PCP's office (on 7/11). Patient went for  EGD and colonoscopy 7/17/24 shows No bleeding source. Moderate left sided diverticulosis.   Home Coumadin and Plavix was held due to acute anemia. INR 1.29 today with hemoglobin of 7.8. Discussed in detail with the patient, will give 1 unit of blood and start briging therapy with Lovenox on coumadin. Patient monitor INR daily at home, wants to go home after blood transfusion. Will check repeat blood work     Patient will continue Lovenox 120mg BID with coumadin home dose and Check INR with cardiologist and PCP tomorrow.   REPEAT hemoglobin 8.9 INR 1.1   patient wants to go home, Discussed in detail the importance of therapeutic INR   Patient is competent AAOx3 reports he will be taking home dose COUMADIN and Lovenox for bridging therapy . Will check INR in am     ---  CONSULTANTS:   Lefty Brandon (MD)  cardio Jhon Jarrett (DO)     ---  TIME SPENT:  I, the attending physician, was physically present for the key portions of the evaluation and management (E/M) service provided. The total amount of time spent reviewing the hospital notes, laboratory values, imaging findings, assessing/counseling the patient, discussing with consultant physicians, social work, nursing staff was -- minutes    ---  Primary care provider was made aware of plan for discharge:      [  ] NO     [  ] YES

## 2024-07-17 NOTE — DISCHARGE NOTE NURSING/CASE MANAGEMENT/SOCIAL WORK - NSDCVIVACCINE_GEN_ALL_CORE_FT
influenza, injectable, quadrivalent, preservative free; 12-Oct-2017 15:08; Kim Samano (RN); [employer]; [id number]; IntraMuscular; Deltoid Left.; 0.5 milliLiter(s); VIS (VIS Published: 07-Aug-2015, VIS Presented: 12-Oct-2017);   influenza, injectable, quadrivalent, preservative free; 13-Sep-2018 12:04; Eliane Otero (RN); [employer]; [id number] (Exp. Date: 30-Jun-2019); IntraMuscular; Deltoid Left.; 0.5 milliLiter(s); VIS (VIS Published: 07-Aug-2015, VIS Presented: 13-Sep-2018);   influenza, injectable, quadrivalent, preservative free; 03-Dec-2019 13:24; Benita Agosto (RN); [employer]; [id number] (Exp. Date: 30-Jun-2020); IntraMuscular; Deltoid Left.; 0.5 milliLiter(s); VIS (VIS Published: 15-Aug-2019, VIS Presented: 03-Dec-2019);

## 2024-07-17 NOTE — DISCHARGE NOTE PROVIDER - NSDCCPCAREPLAN_GEN_ALL_CORE_FT
PRINCIPAL DISCHARGE DIAGNOSIS  Diagnosis: GI bleeding  Assessment and Plan of Treatment: You were admitted for GI-Bleed with current use of Plavix and Coumadin. On admission, hemoglobin was 7.3 s/p 1 u prbc  hb 8.1. Anemia is likely 2/2 GIB (patient has had melena for over 2 weeks). Previously had Hb of 14 in May, then 8.7 at PCP's office (on 7/11). Patient went for  EGD and colonoscopy 7/17/24 shows No bleeding source. Moderate left sided diverticulosis.   Home Coumadin and Plavix was held due to acute anemia. INR 1.29 today with hemoglobin of 7.8. Discussed in detail with the patient, will give 1 unit of blood and start briging therapy with Lovenox on coumadin. Patient monitor INR daily at home, wants to go home after blood transfusion. Will check repeat blood work   Patient will continue Lovenox 120mg BID with coumadin home dose and Check INR with cardiologist and PCP tomorrow.     PRINCIPAL DISCHARGE DIAGNOSIS  Diagnosis: GI bleeding  Assessment and Plan of Treatment: You were admitted for GI-Bleed with current use of Plavix and Coumadin. On admission, hemoglobin was 7.3 s/p 1 u prbc  hb 8.1. Anemia is likely 2/2 GIB (patient has had melena for over 2 weeks). Previously had Hb of 14 in May, then 8.7 at PCP's office (on 7/11). Patient went for  EGD and colonoscopy 7/17/24 shows No bleeding source. Moderate left sided diverticulosis.   Home Coumadin and Plavix was held due to acute anemia. INR 1.29 today with hemoglobin of 7.8. Discussed in detail with the patient, will give 1 unit of blood and start briging therapy with Lovenox on coumadin. Patient monitor INR daily at home, wants to go home after blood transfusion. Will check repeat blood work   Patient will continue Lovenox 120mg BID with coumadin home dose and Check INR with cardiologist and PCP tomorrow.      SECONDARY DISCHARGE DIAGNOSES  Diagnosis: Paroxysmal atrial fibrillation  Assessment and Plan of Treatment: Atrial fibrillation is a condition where the different parts of the heart do not beat in time with each other. These changes can lead to serious issues, including clots that form in the heart and changes in the heart rate and rhythm where your heart might beat too fast. It is important that you take your prescribed medications as instructed to avoid clots forming and to make sure your heart does not beat too fast. It is also very important that you follow up with your outpatient cardiologist after discharge within a week to make sure your medications are working effectively.  continue home medication and coumadin       Diagnosis: Status post mechanical aortic valve replacement  Assessment and Plan of Treatment: sub therapeutic INR 1.1   Patient will continue Lovenox 120mg twice daily with coumadin home dose and Check INR with cardiologist and PCP tomorrow.   REPEAT hemoglobin 8.9 after 1 unit of blood transfusion   patient wants to go home, Discussed in detail the importance of therapeutic INR   Patient is competent AAOx3 reports he will be taking home dose COUMADIN and Lovenox for bridging therapy . Will check INR in am    Diagnosis: Diabetes mellitus with insulin therapy  Assessment and Plan of Treatment: Diabetes is a condition where the levels of sugar in the blood cannot be controlled through the body's normal mechanisms. Blood sugars that are too high or too low can cause serious side effects - high blood sugars can cause life-threatening states, issues with blood flow to the extremities, non-healing wounds, and other issues; whereas low blood sugars can cause a coma, fainting, or other dangerous issues. It is important that you continue all your medications when you leave the hospital and continue to follow up with your primary care doctor after leaving the hospital.      Diagnosis: CAD (coronary artery disease)  Assessment and Plan of Treatment: Continue home Toprol  mg daily, Losartan 50 mg daily, Norvasc 10 mg daily.  Take home Plavix daily

## 2024-07-17 NOTE — DISCHARGE NOTE PROVIDER - NSDCFUSCHEDAPPT_GEN_ALL_CORE_FT
[facility]  [facility] Tammy Black  Scheduled Appointment: 08/07/2024

## 2024-07-17 NOTE — DISCHARGE NOTE NURSING/CASE MANAGEMENT/SOCIAL WORK - PATIENT PORTAL LINK FT
You can access the FollowMyHealth Patient Portal offered by [facility] by registering at the following website: [email or web address]. By joining [employer]’s FollowMyHealth portal, you will also be able to view your health information using other applications (apps) compatible with our system.

## 2024-07-17 NOTE — PHARMACOTHERAPY INTERVENTION NOTE - COMMENTS
Contacted [facility] pharmacy ([phone]) to obtain cost of Lovenox. As per pharmacist, patient has a co-pay of $1 for 5 day supply of medication.
Medication history complete. Medications and allergies reviewed with patient and confirmed with .

## 2024-07-17 NOTE — DISCHARGE NOTE PROVIDER - NSDCMRMEDTOKEN_GEN_ALL_CORE_FT
amLODIPine 10 mg oral tablet: 1 tab(s) orally once a day  atorvastatin 80 mg oral tablet: 1 tab(s) orally once a day (at bedtime)  cholecalciferol 25 mcg (1000 intl units) oral tablet: 1 tab(s) orally once a day  clopidogrel 75 mg oral tablet: 1 tab(s) orally once a day  fenofibrate 160 mg oral tablet: 1 tab(s) orally once a day  ferrous sulfate 200 mg (65 mg elemental iron) oral tablet: 1 tab(s) orally once a day  losartan 50 mg oral tablet: 1 tab(s) orally once a day  Lovenox 120 mg/0.8 mL injectable solution: 120 milligram(s) subcutaneously 2 times a day  magnesium oxide 400 mg oral tablet: 1 tab(s) orally once a day  metoprolol succinate 100 mg oral tablet, extended release: 1 tab(s) orally once a day  NovoLOG FlexPen 100 units/mL injectable solution: injectable 3 times a day 8-25 units ***sliding scale***  Tresiba FlexTouch 200 units/mL subcutaneous solution: 56 unit(s) subcutaneous once a day  Vitamin B Complex oral tablet: 1 tab(s) orally once a day  warfarin 1 mg oral tablet: 1 tab(s) orally once a day (at bedtime) Take 1 tabs of 1mg with a 5mg tab to equal 6mg  warfarin 5 mg oral tablet: 1 tab(s) orally once a day (at bedtime) *** take with 1 mg total 6 mg***

## 2024-07-18 PROBLEM — N18.30 CHRONIC KIDNEY DISEASE, STAGE 3 UNSPECIFIED: Chronic | Status: ACTIVE | Noted: 2024-07-14

## 2024-07-18 PROBLEM — Z95.2 PRESENCE OF PROSTHETIC HEART VALVE: Chronic | Status: ACTIVE | Noted: 2024-07-14

## 2024-07-18 PROBLEM — K57.90 DIVERTICULOSIS OF INTESTINE, PART UNSPECIFIED, WITHOUT PERFORATION OR ABSCESS WITHOUT BLEEDING: Chronic | Status: ACTIVE | Noted: 2024-07-14

## 2024-07-18 PROBLEM — Z95.0 PRESENCE OF CARDIAC PACEMAKER: Chronic | Status: ACTIVE | Noted: 2024-07-14

## 2024-07-19 ENCOUNTER — APPOINTMENT (OUTPATIENT)
Dept: FAMILY MEDICINE | Facility: CLINIC | Age: 59
End: 2024-07-19
Payer: COMMERCIAL

## 2024-07-19 ENCOUNTER — LABORATORY RESULT (OUTPATIENT)
Age: 59
End: 2024-07-19

## 2024-07-19 VITALS
DIASTOLIC BLOOD PRESSURE: 78 MMHG | SYSTOLIC BLOOD PRESSURE: 120 MMHG | WEIGHT: 275 LBS | TEMPERATURE: 97.6 F | BODY MASS INDEX: 37.25 KG/M2 | HEIGHT: 72 IN | HEART RATE: 77 BPM | OXYGEN SATURATION: 99 %

## 2024-07-19 DIAGNOSIS — D64.9 ANEMIA, UNSPECIFIED: ICD-10-CM

## 2024-07-19 DIAGNOSIS — Z95.2 PRESENCE OF PROSTHETIC HEART VALVE: ICD-10-CM

## 2024-07-19 DIAGNOSIS — I10 ESSENTIAL (PRIMARY) HYPERTENSION: ICD-10-CM

## 2024-07-19 DIAGNOSIS — E11.9 TYPE 2 DIABETES MELLITUS W/OUT COMPLICATIONS: ICD-10-CM

## 2024-07-19 PROCEDURE — 99213 OFFICE O/P EST LOW 20 MIN: CPT | Mod: 25

## 2024-07-21 ENCOUNTER — NON-APPOINTMENT (OUTPATIENT)
Age: 59
End: 2024-07-21

## 2024-07-23 ENCOUNTER — TRANSCRIPTION ENCOUNTER (OUTPATIENT)
Age: 59
End: 2024-07-23

## 2024-07-23 LAB
BASOPHILS # BLD AUTO: 0.14 K/UL
BASOPHILS NFR BLD AUTO: 2.3 %
EOSINOPHIL # BLD AUTO: 0.23 K/UL
EOSINOPHIL NFR BLD AUTO: 3.9 %
HCT VFR BLD CALC: 33 %
HGB BLD-MCNC: 9.9 G/DL
LYMPHOCYTES # BLD AUTO: 1.33 K/UL
LYMPHOCYTES NFR BLD AUTO: 22.5 %
MAN DIFF?: NORMAL
MCHC RBC-ENTMCNC: 30 GM/DL
MCHC RBC-ENTMCNC: 31.7 PG
MCV RBC AUTO: 105.8 FL
MONOCYTES # BLD AUTO: 0.09 K/UL
MONOCYTES NFR BLD AUTO: 1.5 %
NEUTROPHILS # BLD AUTO: 4.02 K/UL
NEUTROPHILS NFR BLD AUTO: 68.2 %
PLATELET # BLD AUTO: 392 K/UL
RBC # BLD: 3.12 M/UL
RBC # FLD: 16.2 %
WBC # FLD AUTO: 5.89 K/UL

## 2024-07-24 DIAGNOSIS — Z95.2 PRESENCE OF PROSTHETIC HEART VALVE: ICD-10-CM

## 2024-07-24 DIAGNOSIS — E78.5 HYPERLIPIDEMIA, UNSPECIFIED: ICD-10-CM

## 2024-07-24 DIAGNOSIS — N18.30 CHRONIC KIDNEY DISEASE, STAGE 3 UNSPECIFIED: ICD-10-CM

## 2024-07-24 DIAGNOSIS — G47.33 OBSTRUCTIVE SLEEP APNEA (ADULT) (PEDIATRIC): ICD-10-CM

## 2024-07-24 DIAGNOSIS — D62 ACUTE POSTHEMORRHAGIC ANEMIA: ICD-10-CM

## 2024-07-24 DIAGNOSIS — Z95.0 PRESENCE OF CARDIAC PACEMAKER: ICD-10-CM

## 2024-07-24 DIAGNOSIS — I25.10 ATHEROSCLEROTIC HEART DISEASE OF NATIVE CORONARY ARTERY WITHOUT ANGINA PECTORIS: ICD-10-CM

## 2024-07-24 DIAGNOSIS — Z90.49 ACQUIRED ABSENCE OF OTHER SPECIFIED PARTS OF DIGESTIVE TRACT: ICD-10-CM

## 2024-07-24 DIAGNOSIS — K31.89 OTHER DISEASES OF STOMACH AND DUODENUM: ICD-10-CM

## 2024-07-24 DIAGNOSIS — Z79.899 OTHER LONG TERM (CURRENT) DRUG THERAPY: ICD-10-CM

## 2024-07-24 DIAGNOSIS — Z79.4 LONG TERM (CURRENT) USE OF INSULIN: ICD-10-CM

## 2024-07-24 DIAGNOSIS — I44.30 UNSPECIFIED ATRIOVENTRICULAR BLOCK: ICD-10-CM

## 2024-07-24 DIAGNOSIS — Z91.018 ALLERGY TO OTHER FOODS: ICD-10-CM

## 2024-07-24 DIAGNOSIS — I45.10 UNSPECIFIED RIGHT BUNDLE-BRANCH BLOCK: ICD-10-CM

## 2024-07-24 DIAGNOSIS — I12.9 HYPERTENSIVE CHRONIC KIDNEY DISEASE WITH STAGE 1 THROUGH STAGE 4 CHRONIC KIDNEY DISEASE, OR UNSPECIFIED CHRONIC KIDNEY DISEASE: ICD-10-CM

## 2024-07-24 DIAGNOSIS — Z79.01 LONG TERM (CURRENT) USE OF ANTICOAGULANTS: ICD-10-CM

## 2024-07-24 DIAGNOSIS — Z88.0 ALLERGY STATUS TO PENICILLIN: ICD-10-CM

## 2024-07-24 DIAGNOSIS — I48.0 PAROXYSMAL ATRIAL FIBRILLATION: ICD-10-CM

## 2024-07-24 DIAGNOSIS — Z79.02 LONG TERM (CURRENT) USE OF ANTITHROMBOTICS/ANTIPLATELETS: ICD-10-CM

## 2024-07-24 DIAGNOSIS — K57.31 DIVERTICULOSIS OF LARGE INTESTINE WITHOUT PERFORATION OR ABSCESS WITH BLEEDING: ICD-10-CM

## 2024-07-24 DIAGNOSIS — E66.9 OBESITY, UNSPECIFIED: ICD-10-CM

## 2024-07-24 DIAGNOSIS — E11.22 TYPE 2 DIABETES MELLITUS WITH DIABETIC CHRONIC KIDNEY DISEASE: ICD-10-CM

## 2024-07-24 DIAGNOSIS — Z95.5 PRESENCE OF CORONARY ANGIOPLASTY IMPLANT AND GRAFT: ICD-10-CM

## 2024-08-07 ENCOUNTER — APPOINTMENT (OUTPATIENT)
Dept: ELECTROPHYSIOLOGY | Facility: CLINIC | Age: 59
End: 2024-08-07

## 2024-08-07 ENCOUNTER — NON-APPOINTMENT (OUTPATIENT)
Age: 59
End: 2024-08-07

## 2024-08-07 PROCEDURE — 99214 OFFICE O/P EST MOD 30 MIN: CPT | Mod: 25

## 2024-08-07 PROCEDURE — 93280 PM DEVICE PROGR EVAL DUAL: CPT

## 2024-08-07 PROCEDURE — 93000 ELECTROCARDIOGRAM COMPLETE: CPT | Mod: 59

## 2024-08-07 NOTE — REVIEW OF SYSTEMS
[Feeling Fatigued] : not feeling fatigued [SOB] : no shortness of breath [Dyspnea on exertion] : not dyspnea during exertion [Chest Discomfort] : no chest discomfort [Palpitations] : no palpitations [Syncope] : no syncope [Dizziness] : no dizziness [Easy Bleeding] : a tendency for easy bleeding

## 2024-08-07 NOTE — DISCUSSION/SUMMARY
[FreeTextEntry1] : The patient is a 57 y/o male, with history of HTN, DM, CKD 3, CAD s/p PCI and aortic stenosis s/p mechanical AVR and sternal plate on 10/13/23 whose post-operative course was complicated by atrial fibrillation for which he was given amiodarone and AVN blockers followed by an episode of long conversion pause followed by transient AV block with up to a 12 second pause. Pt was taken of metoprolol and amiodarone and had maintained sinus rhythm with no further events until converting back to AF RVR. Metoprolol was resumed (25mg BID) and pt subsequently converted back to sinus rhythm and once again with a significant off set pause requiring pacing. In addition while in sinus rhythm was noted to have intermittent high grade AVB. He was discharged on amiodarone therapy.  He is now s/p [employer] dual chamber PPM implant by Dr. Nicholson on 10/20/23.  Recovered conduction noted in office. AFL burden 8% during post hospital f/u. AF/AFL burden was decreasing and amiodarone discontinued approximately 2 months after surgery.  During previous f/u no recurrent sustained atrial arrhythmias noted. Brief PAT only.  Today, Mr. Thorpe reports he has been feeling well. Denies CP, SOB, palpitations. Denies exertional complaints.   DUal chamber PPM interrogation reveals normal function. A, RV with adequate sense and pace thresholds. Minimal pacing requirement- AP- 0.1%,  0.8%. Rare PAT in arrhythmia log < 10 seconds in duration as well as one episode of NSVT- 5 beats in duration.   Recommendation:   -Dual chamber PPM with normal function. Minimal  burden. Remote monitoring in place. -PAFL, with burden significantly reduced with no sustained events since amiodarone discontinued now with brief PAT and brief NSVT. EF 60-65%. Continue BB therapy. On lifelong warfarin for mechanical valve. -Continue cardiology f/u with [facility]. -Remote PPM monitoring in place, EP follow up in 1 year.   Camila Rojo ANP-C [EKG obtained to assist in diagnosis and management of assessed problem(s)] : EKG obtained to assist in diagnosis and management of assessed problem(s)

## 2024-08-07 NOTE — DISCUSSION/SUMMARY
[FreeTextEntry1] : The patient is a 57 y/o male, with history of HTN, DM, CKD 3, CAD s/p PCI and aortic stenosis s/p mechanical AVR and sternal plate on 10/13/23 whose post-operative course was complicated by atrial fibrillation for which he was given amiodarone and AVN blockers followed by an episode of long conversion pause followed by transient AV block with up to a 12 second pause. Pt was taken of metoprolol and amiodarone and had maintained sinus rhythm with no further events until converting back to AF RVR. Metoprolol was resumed (25mg BID) and pt subsequently converted back to sinus rhythm and once again with a significant off set pause requiring pacing. In addition while in sinus rhythm was noted to have intermittent high grade AVB. He was discharged on amiodarone therapy.  He is now s/p Medtronic dual chamber PPM implant by Dr. Nicholson on 10/20/23.  Recovered conduction noted in office. AFL burden 8% during post hospital f/u. AF/AFL burden was decreasing and amiodarone discontinued approximately 2 months after surgery.  During previous f/u no recurrent sustained atrial arrhythmias noted. Brief PAT only.  Today, Mr. Thorpe reports he has been feeling well. Denies CP, SOB, palpitations. Denies exertional complaints.   DUal chamber PPM interrogation reveals normal function. A, RV with adequate sense and pace thresholds. Minimal pacing requirement- AP- 0.1%,  0.8%. Rare PAT in arrhythmia log < 10 seconds in duration as well as one episode of NSVT- 5 beats in duration.   Recommendation:   -Dual chamber PPM with normal function. Minimal  burden. Remote monitoring in place. -PAFL, with burden significantly reduced with no sustained events since amiodarone discontinued now with brief PAT and brief NSVT. EF 60-65%. Continue BB therapy. On lifelong warfarin for mechanical valve. -Continue cardiology f/u with [facility]. -Remote PPM monitoring in place, EP follow up in 1 year.   Camila Rojo ANP-C [EKG obtained to assist in diagnosis and management of assessed problem(s)] : EKG obtained to assist in diagnosis and management of assessed problem(s)

## 2024-08-07 NOTE — HISTORY OF PRESENT ILLNESS
[FreeTextEntry1] : The patient is a 57 y/o male, with history of HTN, DM, CKD 3, CAD s/p PCI and aortic stenosis s/p mechanical AVR and sternal plate on 10/13/23 whose post-operative course was complicated by atrial fibrillation for which he was given amiodarone and AVN blockers followed by an episode of long conversion pause followed by transient AV block with up to a 12 second pause. Pt was taken of metoprolol and amiodarone and had maintained sinus rhythm with no further events until converting back to AF RVR. Metoprolol was resumed (25mg BID) and pt subsequently converted back to sinus rhythm and once again with a significant off set pause requiring pacing. In addition while in sinus rhythm was noted to have intermittent high grade AVB. He was discharged on amiodarone therapy.  He is now s/p Medtronic dual chamber PPM implant by Dr. Nicholson on 10/20/23.  Recovered conduction noted in office. AFL burden 8% during post hospital f/u. AF/AFL burden was decreasing and amiodarone discontinued approximately 2 months after surgery.  During previous f/u no recurrent sustained atrial arrhythmias noted. Brief PAT only.  Today, Mr. Thorpe reports he has been feeling well. Denies CP, SOB, palpitations. Denies exertional complaints.   DUal chamber PPM interrogation reveals normal function. A, RV with adequate sense and pace thresholds. Minimal pacing requirement- AP- 0.1%,  0.8%. Rare PAT in arrhythmia log < 10 seconds in duration as well as one episode of NSVT- 5 beats in duration. 
[FreeTextEntry1] : The patient is a 59 y/o male, with history of HTN, DM, CKD 3, CAD s/p PCI and aortic stenosis s/p mechanical AVR and sternal plate on 10/13/23 whose post-operative course was complicated by atrial fibrillation for which he was given amiodarone and AVN blockers followed by an episode of long conversion pause followed by transient AV block with up to a 12 second pause. Pt was taken of metoprolol and amiodarone and had maintained sinus rhythm with no further events until converting back to AF RVR. Metoprolol was resumed (25mg BID) and pt subsequently converted back to sinus rhythm and once again with a significant off set pause requiring pacing. In addition while in sinus rhythm was noted to have intermittent high grade AVB. He was discharged on amiodarone therapy.  He is now s/p Medtronic dual chamber PPM implant by Dr. Nicholson on 10/20/23.  Recovered conduction noted in office. AFL burden 8% during post hospital f/u. AF/AFL burden was decreasing and amiodarone discontinued approximately 2 months after surgery.  During previous f/u no recurrent sustained atrial arrhythmias noted. Brief PAT only.  Today, Mr. Thorpe reports he has been feeling well. Denies CP, SOB, palpitations. Denies exertional complaints.   DUal chamber PPM interrogation reveals normal function. A, RV with adequate sense and pace thresholds. Minimal pacing requirement- AP- 0.1%,  0.8%. Rare PAT in arrhythmia log < 10 seconds in duration as well as one episode of NSVT- 5 beats in duration. 
show
no

## 2024-08-29 NOTE — DISCHARGE NOTE PROVIDER - CARE PROVIDERS DIRECT ADDRESSES
,[email or web address],DirectAddress_Unknown
4 = No assist / stand by assistance

## 2024-11-01 NOTE — ED PROVIDER NOTE - COVID-19 RESULT DATE/TIME
Confirmed that patient received an additional set of vital signs prior to discharge. 27-Jan-2022 18:30

## 2024-11-04 NOTE — PRE-OP CHECKLIST - SIDE RAILS UP
Hypertension     Conversation: Care Coordination    Current Issues/Problems reviewed on call:   Patient states that she feels ok, does not know why BP is high, always relax 30 minutes before checking BP, \"maybe it's the machine\", still eating healthy, take medications daily, denies headaches, dizziness, blurred vision, chest pain.  Follow up outreach completed. Instruct on the use of the Hypertension Action Plan, including signs and symptoms of worsening Hypertension (symptoms of a potential exacerbation) and the recommended actions to take, including the importance of reporting symptoms early.     Details for Interventions/Education completed on call:  Evaluated frequency of blood pressure monitoring and adherence to prescribed medication regime. Reviewed the importance of keeping regularly scheduled medical appointments.    CM encouraged patient to keep up the good work, continue to check blood pressure at least twice a week, always recheck high blood pressure readings within 5 minutes after sitting & deep breath relaxing techniques. CM will monitor patient's blood pressure remotely. Patient verbalized understanding of all mentioned above.       Time frame for follow-up is within 2-3 weeks as able.     Plan for next call: Office visit with PCP, BP trends    Continue educating patient regarding Hypertension and Hypertension healthy lifestyle, monitor for understanding of instruction previously provided, the integration of recommended behaviors, and evaluate patient's commitment to report any Hypertension symptoms early to their clinician.    The Hypertension Prescriptive Care Plan has been reviewed with patient; patient has agreed to participate and follow plan.     done

## 2024-11-05 ENCOUNTER — NON-APPOINTMENT (OUTPATIENT)
Age: 59
End: 2024-11-05

## 2024-11-06 ENCOUNTER — APPOINTMENT (OUTPATIENT)
Dept: ELECTROPHYSIOLOGY | Facility: CLINIC | Age: 59
End: 2024-11-06
Payer: COMMERCIAL

## 2024-11-06 ENCOUNTER — NON-APPOINTMENT (OUTPATIENT)
Age: 59
End: 2024-11-06

## 2024-11-06 PROCEDURE — 93294 REM INTERROG EVL PM/LDLS PM: CPT

## 2024-11-06 PROCEDURE — 93296 REM INTERROG EVL PM/IDS: CPT

## 2024-12-30 ENCOUNTER — INPATIENT (INPATIENT)
Facility: HOSPITAL | Age: 59
LOS: 4 days | Discharge: HOME | DRG: 253 | End: 2025-01-04
Attending: STUDENT IN AN ORGANIZED HEALTH CARE EDUCATION/TRAINING PROGRAM | Admitting: INTERNAL MEDICINE
Payer: COMMERCIAL

## 2024-12-30 VITALS
RESPIRATION RATE: 20 BRPM | DIASTOLIC BLOOD PRESSURE: 71 MMHG | HEART RATE: 84 BPM | TEMPERATURE: 98 F | SYSTOLIC BLOOD PRESSURE: 123 MMHG | OXYGEN SATURATION: 99 % | WEIGHT: 286.16 LBS

## 2024-12-30 DIAGNOSIS — K92.2 GASTROINTESTINAL HEMORRHAGE, UNSPECIFIED: ICD-10-CM

## 2024-12-30 DIAGNOSIS — Z95.5 PRESENCE OF CORONARY ANGIOPLASTY IMPLANT AND GRAFT: Chronic | ICD-10-CM

## 2024-12-30 DIAGNOSIS — Z90.49 ACQUIRED ABSENCE OF OTHER SPECIFIED PARTS OF DIGESTIVE TRACT: Chronic | ICD-10-CM

## 2024-12-30 LAB
ALBUMIN SERPL ELPH-MCNC: 3.2 G/DL — LOW (ref 3.3–5)
ALP SERPL-CCNC: 40 U/L — SIGNIFICANT CHANGE UP (ref 40–120)
ALT FLD-CCNC: 48 U/L — SIGNIFICANT CHANGE UP (ref 12–78)
ANION GAP SERPL CALC-SCNC: 3 MMOL/L — LOW (ref 5–17)
APTT BLD: 55.4 SEC — HIGH (ref 24.5–35.6)
AST SERPL-CCNC: 40 U/L — HIGH (ref 15–37)
BASOPHILS # BLD AUTO: 0 K/UL — SIGNIFICANT CHANGE UP (ref 0–0.2)
BASOPHILS NFR BLD AUTO: 0 % — SIGNIFICANT CHANGE UP (ref 0–2)
BILIRUB SERPL-MCNC: 0.3 MG/DL — SIGNIFICANT CHANGE UP (ref 0.2–1.2)
BLD GP AB SCN SERPL QL: SIGNIFICANT CHANGE UP
BUN SERPL-MCNC: 48 MG/DL — HIGH (ref 7–23)
CALCIUM SERPL-MCNC: 8.6 MG/DL — SIGNIFICANT CHANGE UP (ref 8.5–10.1)
CHLORIDE SERPL-SCNC: 104 MMOL/L — SIGNIFICANT CHANGE UP (ref 96–108)
CO2 SERPL-SCNC: 29 MMOL/L — SIGNIFICANT CHANGE UP (ref 22–31)
CREAT SERPL-MCNC: 2.1 MG/DL — HIGH (ref 0.5–1.3)
EGFR: 36 ML/MIN/1.73M2 — LOW
EGFR: 36 ML/MIN/1.73M2 — LOW
EOSINOPHIL # BLD AUTO: 0.23 K/UL — SIGNIFICANT CHANGE UP (ref 0–0.5)
EOSINOPHIL NFR BLD AUTO: 2 % — SIGNIFICANT CHANGE UP (ref 0–6)
GLUCOSE SERPL-MCNC: 97 MG/DL — SIGNIFICANT CHANGE UP (ref 70–99)
HCT VFR BLD CALC: 29.7 % — LOW (ref 39–50)
HGB BLD-MCNC: 10 G/DL — LOW (ref 13–17)
INR BLD: 2.35 RATIO — HIGH (ref 0.85–1.16)
LYMPHOCYTES # BLD AUTO: 1.39 K/UL — SIGNIFICANT CHANGE UP (ref 1–3.3)
LYMPHOCYTES # BLD AUTO: 12 % — LOW (ref 13–44)
MANUAL SMEAR VERIFICATION: SIGNIFICANT CHANGE UP
MCHC RBC-ENTMCNC: 30.3 PG — SIGNIFICANT CHANGE UP (ref 27–34)
MCHC RBC-ENTMCNC: 33.7 G/DL — SIGNIFICANT CHANGE UP (ref 32–36)
MCV RBC AUTO: 90 FL — SIGNIFICANT CHANGE UP (ref 80–100)
MONOCYTES # BLD AUTO: 0.58 K/UL — SIGNIFICANT CHANGE UP (ref 0–0.9)
MONOCYTES NFR BLD AUTO: 5 % — SIGNIFICANT CHANGE UP (ref 2–14)
NEUTROPHILS # BLD AUTO: 8.45 K/UL — HIGH (ref 1.8–7.4)
NEUTROPHILS NFR BLD AUTO: 73 % — SIGNIFICANT CHANGE UP (ref 43–77)
NRBC # BLD: 1 /100 WBCS — HIGH (ref 0–0)
NRBC # BLD: SIGNIFICANT CHANGE UP /100 WBCS (ref 0–0)
NRBC BLD-RTO: 1 /100 WBCS — HIGH (ref 0–0)
NRBC BLD-RTO: SIGNIFICANT CHANGE UP /100 WBCS (ref 0–0)
PLAT MORPH BLD: NORMAL — SIGNIFICANT CHANGE UP
PLATELET # BLD AUTO: 298 K/UL — SIGNIFICANT CHANGE UP (ref 150–400)
POLYCHROMASIA BLD QL SMEAR: SLIGHT — SIGNIFICANT CHANGE UP
POTASSIUM SERPL-MCNC: 3.6 MMOL/L — SIGNIFICANT CHANGE UP (ref 3.5–5.3)
POTASSIUM SERPL-SCNC: 3.6 MMOL/L — SIGNIFICANT CHANGE UP (ref 3.5–5.3)
PROMYELOCYTES # FLD: 4 % — HIGH (ref 0–0)
PROMYELOCYTES NFR BLD: 4 % — HIGH (ref 0–0)
PROT SERPL-MCNC: 6.7 GM/DL — SIGNIFICANT CHANGE UP (ref 6–8.3)
PROTHROM AB SERPL-ACNC: 27.5 SEC — HIGH (ref 9.9–13.4)
RBC # BLD: 3.3 M/UL — LOW (ref 4.2–5.8)
RBC # FLD: 14.7 % — HIGH (ref 10.3–14.5)
RBC BLD AUTO: ABNORMAL
SODIUM SERPL-SCNC: 136 MMOL/L — SIGNIFICANT CHANGE UP (ref 135–145)
VARIANT LYMPHS # BLD: 4 % — SIGNIFICANT CHANGE UP (ref 0–6)
VARIANT LYMPHS NFR BLD MANUAL: 4 % — SIGNIFICANT CHANGE UP (ref 0–6)
WBC # BLD: 11.58 K/UL — HIGH (ref 3.8–10.5)
WBC # FLD AUTO: 11.58 K/UL — HIGH (ref 3.8–10.5)

## 2024-12-30 PROCEDURE — 70553 MRI BRAIN STEM W/O & W/DYE: CPT | Mod: MC

## 2024-12-30 PROCEDURE — 93880 EXTRACRANIAL BILAT STUDY: CPT

## 2024-12-30 PROCEDURE — 85610 PROTHROMBIN TIME: CPT

## 2024-12-30 PROCEDURE — 0241U: CPT

## 2024-12-30 PROCEDURE — 36415 COLL VENOUS BLD VENIPUNCTURE: CPT

## 2024-12-30 PROCEDURE — 94640 AIRWAY INHALATION TREATMENT: CPT

## 2024-12-30 PROCEDURE — 80048 BASIC METABOLIC PNL TOTAL CA: CPT

## 2024-12-30 PROCEDURE — A9579: CPT

## 2024-12-30 PROCEDURE — 70450 CT HEAD/BRAIN W/O DYE: CPT | Mod: MC

## 2024-12-30 PROCEDURE — 85027 COMPLETE CBC AUTOMATED: CPT

## 2024-12-30 PROCEDURE — 82962 GLUCOSE BLOOD TEST: CPT

## 2024-12-30 PROCEDURE — 80053 COMPREHEN METABOLIC PANEL: CPT

## 2024-12-30 PROCEDURE — 85730 THROMBOPLASTIN TIME PARTIAL: CPT

## 2024-12-30 PROCEDURE — 83036 HEMOGLOBIN GLYCOSYLATED A1C: CPT

## 2024-12-30 PROCEDURE — 71045 X-RAY EXAM CHEST 1 VIEW: CPT | Mod: 26

## 2024-12-30 PROCEDURE — 93010 ELECTROCARDIOGRAM REPORT: CPT

## 2024-12-30 PROCEDURE — 99285 EMERGENCY DEPT VISIT HI MDM: CPT

## 2024-12-30 PROCEDURE — 81001 URINALYSIS AUTO W/SCOPE: CPT

## 2024-12-30 PROCEDURE — 74177 CT ABD & PELVIS W/CONTRAST: CPT | Mod: 26,MC

## 2024-12-30 PROCEDURE — 70543 MRI ORBT/FAC/NCK W/O &W/DYE: CPT | Mod: MC

## 2024-12-30 RX ADMIN — Medication 80 MILLIGRAM(S): at 19:07

## 2024-12-30 RX ADMIN — Medication 3 MILLILITER(S): at 18:37

## 2024-12-31 LAB
A1C WITH ESTIMATED AVERAGE GLUCOSE RESULT: 9.8 % — HIGH (ref 4–5.6)
ALBUMIN SERPL ELPH-MCNC: 3 G/DL — LOW (ref 3.3–5)
ALP SERPL-CCNC: 35 U/L — LOW (ref 40–120)
ALT FLD-CCNC: 49 U/L — SIGNIFICANT CHANGE UP (ref 12–78)
ANION GAP SERPL CALC-SCNC: 6 MMOL/L — SIGNIFICANT CHANGE UP (ref 5–17)
APPEARANCE UR: CLEAR — SIGNIFICANT CHANGE UP
APTT BLD: 47.8 SEC — HIGH (ref 24.5–35.6)
AST SERPL-CCNC: 36 U/L — SIGNIFICANT CHANGE UP (ref 15–37)
BACTERIA # UR AUTO: NEGATIVE /HPF — SIGNIFICANT CHANGE UP
BILIRUB SERPL-MCNC: 0.4 MG/DL — SIGNIFICANT CHANGE UP (ref 0.2–1.2)
BILIRUB UR-MCNC: NEGATIVE — SIGNIFICANT CHANGE UP
BUN SERPL-MCNC: 42 MG/DL — HIGH (ref 7–23)
CALCIUM SERPL-MCNC: 8.9 MG/DL — SIGNIFICANT CHANGE UP (ref 8.5–10.1)
CAST: 0 /LPF — SIGNIFICANT CHANGE UP (ref 0–4)
CHLORIDE SERPL-SCNC: 105 MMOL/L — SIGNIFICANT CHANGE UP (ref 96–108)
CO2 SERPL-SCNC: 28 MMOL/L — SIGNIFICANT CHANGE UP (ref 22–31)
COLOR SPEC: YELLOW — SIGNIFICANT CHANGE UP
CREAT SERPL-MCNC: 1.97 MG/DL — HIGH (ref 0.5–1.3)
DIFF PNL FLD: NEGATIVE — SIGNIFICANT CHANGE UP
EGFR: 39 ML/MIN/1.73M2 — LOW
EGFR: 39 ML/MIN/1.73M2 — LOW
ESTIMATED AVERAGE GLUCOSE: 235 MG/DL — HIGH (ref 68–114)
FLUAV AG NPH QL: SIGNIFICANT CHANGE UP
FLUBV AG NPH QL: SIGNIFICANT CHANGE UP
GLUCOSE BLDC GLUCOMTR-MCNC: 140 MG/DL — HIGH (ref 70–99)
GLUCOSE BLDC GLUCOMTR-MCNC: 189 MG/DL — HIGH (ref 70–99)
GLUCOSE BLDC GLUCOMTR-MCNC: 311 MG/DL — HIGH (ref 70–99)
GLUCOSE BLDC GLUCOMTR-MCNC: 329 MG/DL — HIGH (ref 70–99)
GLUCOSE BLDC GLUCOMTR-MCNC: 336 MG/DL — HIGH (ref 70–99)
GLUCOSE SERPL-MCNC: 153 MG/DL — HIGH (ref 70–99)
GLUCOSE UR QL: NEGATIVE MG/DL — SIGNIFICANT CHANGE UP
HCT VFR BLD CALC: 28.7 % — LOW (ref 39–50)
HCT VFR BLD CALC: 30.6 % — LOW (ref 39–50)
HGB BLD-MCNC: 10.1 G/DL — LOW (ref 13–17)
HGB BLD-MCNC: 9.5 G/DL — LOW (ref 13–17)
INR BLD: 2.07 RATIO — HIGH (ref 0.85–1.16)
KETONES UR-MCNC: NEGATIVE MG/DL — SIGNIFICANT CHANGE UP
LEUKOCYTE ESTERASE UR-ACNC: NEGATIVE — SIGNIFICANT CHANGE UP
MCHC RBC-ENTMCNC: 29.9 PG — SIGNIFICANT CHANGE UP (ref 27–34)
MCHC RBC-ENTMCNC: 30 PG — SIGNIFICANT CHANGE UP (ref 27–34)
MCHC RBC-ENTMCNC: 33 G/DL — SIGNIFICANT CHANGE UP (ref 32–36)
MCHC RBC-ENTMCNC: 33.1 G/DL — SIGNIFICANT CHANGE UP (ref 32–36)
MCV RBC AUTO: 90.3 FL — SIGNIFICANT CHANGE UP (ref 80–100)
MCV RBC AUTO: 90.8 FL — SIGNIFICANT CHANGE UP (ref 80–100)
NITRITE UR-MCNC: NEGATIVE — SIGNIFICANT CHANGE UP
PH UR: 5.5 — SIGNIFICANT CHANGE UP (ref 5–8)
PLATELET # BLD AUTO: 279 K/UL — SIGNIFICANT CHANGE UP (ref 150–400)
PLATELET # BLD AUTO: 329 K/UL — SIGNIFICANT CHANGE UP (ref 150–400)
POTASSIUM SERPL-MCNC: 3.7 MMOL/L — SIGNIFICANT CHANGE UP (ref 3.5–5.3)
POTASSIUM SERPL-SCNC: 3.7 MMOL/L — SIGNIFICANT CHANGE UP (ref 3.5–5.3)
PROT SERPL-MCNC: 6.4 GM/DL — SIGNIFICANT CHANGE UP (ref 6–8.3)
PROT UR-MCNC: 300 MG/DL
PROTHROM AB SERPL-ACNC: 23.6 SEC — HIGH (ref 9.9–13.4)
RBC # BLD: 3.18 M/UL — LOW (ref 4.2–5.8)
RBC # BLD: 3.37 M/UL — LOW (ref 4.2–5.8)
RBC # FLD: 15 % — HIGH (ref 10.3–14.5)
RBC # FLD: 15.1 % — HIGH (ref 10.3–14.5)
RBC CASTS # UR COMP ASSIST: 0 /HPF — SIGNIFICANT CHANGE UP (ref 0–4)
RSV RNA NPH QL NAA+NON-PROBE: SIGNIFICANT CHANGE UP
SARS-COV-2 RNA SPEC QL NAA+PROBE: SIGNIFICANT CHANGE UP
SODIUM SERPL-SCNC: 139 MMOL/L — SIGNIFICANT CHANGE UP (ref 135–145)
SP GR SPEC: >1.03 — HIGH (ref 1–1.03)
SQUAMOUS # UR AUTO: 0 /HPF — SIGNIFICANT CHANGE UP (ref 0–5)
UROBILINOGEN FLD QL: 0.2 MG/DL — SIGNIFICANT CHANGE UP (ref 0.2–1)
WBC # BLD: 10.19 K/UL — SIGNIFICANT CHANGE UP (ref 3.8–10.5)
WBC # BLD: 9.65 K/UL — SIGNIFICANT CHANGE UP (ref 3.8–10.5)
WBC # FLD AUTO: 10.19 K/UL — SIGNIFICANT CHANGE UP (ref 3.8–10.5)
WBC # FLD AUTO: 9.65 K/UL — SIGNIFICANT CHANGE UP (ref 3.8–10.5)
WBC UR QL: 0 /HPF — SIGNIFICANT CHANGE UP (ref 0–5)

## 2024-12-31 PROCEDURE — 99222 1ST HOSP IP/OBS MODERATE 55: CPT

## 2024-12-31 RX ORDER — MAGNESIUM, ALUMINUM HYDROXIDE 200-200 MG
30 TABLET,CHEWABLE ORAL EVERY 4 HOURS
Refills: 0 | Status: DISCONTINUED | OUTPATIENT
Start: 2024-12-31 | End: 2025-01-04

## 2024-12-31 RX ORDER — SODIUM CHLORIDE 9 G/1000ML
1000 INJECTION, SOLUTION INTRAVENOUS
Refills: 0 | Status: DISCONTINUED | OUTPATIENT
Start: 2024-12-31 | End: 2025-01-04

## 2024-12-31 RX ORDER — GLUCAGON 3 MG/1
1 POWDER NASAL ONCE
Refills: 0 | Status: DISCONTINUED | OUTPATIENT
Start: 2024-12-31 | End: 2025-01-04

## 2024-12-31 RX ORDER — ONDANSETRON HCL/PF 4 MG/2 ML
4 VIAL (ML) INJECTION EVERY 8 HOURS
Refills: 0 | Status: DISCONTINUED | OUTPATIENT
Start: 2024-12-31 | End: 2025-01-04

## 2024-12-31 RX ORDER — DEXTROSE 50 % IN WATER 50 %
25 SYRINGE (ML) INTRAVENOUS ONCE
Refills: 0 | Status: DISCONTINUED | OUTPATIENT
Start: 2024-12-31 | End: 2025-01-04

## 2024-12-31 RX ORDER — INSULIN GLARGINE-YFGN 100 [IU]/ML
60 INJECTION, SOLUTION SUBCUTANEOUS EVERY MORNING
Refills: 0 | Status: DISCONTINUED | OUTPATIENT
Start: 2024-12-31 | End: 2025-01-03

## 2024-12-31 RX ORDER — INSULIN LISPRO 100 U/ML
INJECTION, SOLUTION INTRAVENOUS; SUBCUTANEOUS
Refills: 0 | Status: DISCONTINUED | OUTPATIENT
Start: 2024-12-31 | End: 2025-01-04

## 2024-12-31 RX ORDER — INSULIN LISPRO 100 U/ML
INJECTION, SOLUTION INTRAVENOUS; SUBCUTANEOUS AT BEDTIME
Refills: 0 | Status: DISCONTINUED | OUTPATIENT
Start: 2024-12-31 | End: 2025-01-04

## 2024-12-31 RX ORDER — FLUTICASONE PROPIONATE 50 UG/1
1 SPRAY, METERED NASAL
Refills: 0 | Status: DISCONTINUED | OUTPATIENT
Start: 2024-12-31 | End: 2025-01-04

## 2024-12-31 RX ORDER — AMLODIPINE BESYLATE 10 MG/1
10 TABLET ORAL DAILY
Refills: 0 | Status: DISCONTINUED | OUTPATIENT
Start: 2024-12-31 | End: 2025-01-04

## 2024-12-31 RX ORDER — INSULIN GLARGINE-YFGN 100 [IU]/ML
60 INJECTION, SOLUTION SUBCUTANEOUS AT BEDTIME
Refills: 0 | Status: DISCONTINUED | OUTPATIENT
Start: 2024-12-31 | End: 2025-01-03

## 2024-12-31 RX ORDER — FENOFIBRATE 160 MG/1
145 TABLET ORAL DAILY
Refills: 0 | Status: DISCONTINUED | OUTPATIENT
Start: 2024-12-31 | End: 2025-01-04

## 2024-12-31 RX ORDER — METOPROLOL SUCCINATE 50 MG/1
50 TABLET, EXTENDED RELEASE ORAL DAILY
Refills: 0 | Status: DISCONTINUED | OUTPATIENT
Start: 2024-12-31 | End: 2025-01-04

## 2024-12-31 RX ORDER — MAGNESIUM OXIDE 400 MG
400 TABLET ORAL DAILY
Refills: 0 | Status: DISCONTINUED | OUTPATIENT
Start: 2024-12-31 | End: 2025-01-04

## 2024-12-31 RX ORDER — CLOPIDOGREL BISULFATE 75 MG/1
75 TABLET, FILM COATED ORAL DAILY
Refills: 0 | Status: DISCONTINUED | OUTPATIENT
Start: 2024-12-31 | End: 2024-12-31

## 2024-12-31 RX ORDER — ATORVASTATIN CALCIUM 80 MG/1
80 TABLET, FILM COATED ORAL AT BEDTIME
Refills: 0 | Status: DISCONTINUED | OUTPATIENT
Start: 2024-12-31 | End: 2025-01-04

## 2024-12-31 RX ORDER — MELATONIN 5 MG
3 TABLET ORAL AT BEDTIME
Refills: 0 | Status: DISCONTINUED | OUTPATIENT
Start: 2024-12-31 | End: 2025-01-04

## 2024-12-31 RX ORDER — DEXTROSE 50 % IN WATER 50 %
12.5 SYRINGE (ML) INTRAVENOUS ONCE
Refills: 0 | Status: DISCONTINUED | OUTPATIENT
Start: 2024-12-31 | End: 2025-01-04

## 2024-12-31 RX ORDER — ACETAMINOPHEN 500 MG/5ML
650 LIQUID (ML) ORAL EVERY 6 HOURS
Refills: 0 | Status: DISCONTINUED | OUTPATIENT
Start: 2024-12-31 | End: 2025-01-04

## 2024-12-31 RX ORDER — CLOPIDOGREL BISULFATE 75 MG/1
75 TABLET, FILM COATED ORAL DAILY
Refills: 0 | Status: DISCONTINUED | OUTPATIENT
Start: 2025-01-01 | End: 2025-01-04

## 2024-12-31 RX ORDER — DEXTROSE 50 % IN WATER 50 %
15 SYRINGE (ML) INTRAVENOUS ONCE
Refills: 0 | Status: DISCONTINUED | OUTPATIENT
Start: 2024-12-31 | End: 2025-01-04

## 2024-12-31 RX ADMIN — FLUTICASONE PROPIONATE 1 SPRAY(S): 50 SPRAY, METERED NASAL at 09:12

## 2024-12-31 RX ADMIN — Medication 400 MILLIGRAM(S): at 12:24

## 2024-12-31 RX ADMIN — INSULIN LISPRO 2: 100 INJECTION, SOLUTION INTRAVENOUS; SUBCUTANEOUS at 22:27

## 2024-12-31 RX ADMIN — INSULIN GLARGINE-YFGN 60 UNIT(S): 100 INJECTION, SOLUTION SUBCUTANEOUS at 22:26

## 2024-12-31 RX ADMIN — AMLODIPINE BESYLATE 10 MILLIGRAM(S): 10 TABLET ORAL at 09:12

## 2024-12-31 RX ADMIN — Medication 40 MILLIGRAM(S): at 22:26

## 2024-12-31 RX ADMIN — METOPROLOL SUCCINATE 50 MILLIGRAM(S): 50 TABLET, EXTENDED RELEASE ORAL at 09:12

## 2024-12-31 RX ADMIN — Medication 150 MILLILITER(S): at 03:00

## 2024-12-31 RX ADMIN — Medication 150 MILLILITER(S): at 06:00

## 2024-12-31 RX ADMIN — INSULIN LISPRO 2: 100 INJECTION, SOLUTION INTRAVENOUS; SUBCUTANEOUS at 12:19

## 2024-12-31 RX ADMIN — ATORVASTATIN CALCIUM 80 MILLIGRAM(S): 80 TABLET, FILM COATED ORAL at 22:27

## 2024-12-31 RX ADMIN — INSULIN LISPRO 8: 100 INJECTION, SOLUTION INTRAVENOUS; SUBCUTANEOUS at 17:15

## 2024-12-31 RX ADMIN — Medication 1000 UNIT(S): at 09:12

## 2024-12-31 RX ADMIN — Medication 40 MILLIGRAM(S): at 09:12

## 2024-12-31 RX ADMIN — FENOFIBRATE 145 MILLIGRAM(S): 160 TABLET ORAL at 10:35

## 2025-01-01 LAB
ALBUMIN SERPL ELPH-MCNC: 3.4 G/DL — SIGNIFICANT CHANGE UP (ref 3.3–5)
ALP SERPL-CCNC: 55 U/L — SIGNIFICANT CHANGE UP (ref 40–120)
ALT FLD-CCNC: 58 U/L — SIGNIFICANT CHANGE UP (ref 12–78)
ANION GAP SERPL CALC-SCNC: 6 MMOL/L — SIGNIFICANT CHANGE UP (ref 5–17)
APTT BLD: 43.1 SEC — HIGH (ref 24.5–35.6)
AST SERPL-CCNC: 37 U/L — SIGNIFICANT CHANGE UP (ref 15–37)
BILIRUB SERPL-MCNC: 0.6 MG/DL — SIGNIFICANT CHANGE UP (ref 0.2–1.2)
BUN SERPL-MCNC: 34 MG/DL — HIGH (ref 7–23)
CALCIUM SERPL-MCNC: 9.1 MG/DL — SIGNIFICANT CHANGE UP (ref 8.5–10.1)
CHLORIDE SERPL-SCNC: 102 MMOL/L — SIGNIFICANT CHANGE UP (ref 96–108)
CO2 SERPL-SCNC: 27 MMOL/L — SIGNIFICANT CHANGE UP (ref 22–31)
CREAT SERPL-MCNC: 2.22 MG/DL — HIGH (ref 0.5–1.3)
EGFR: 34 ML/MIN/1.73M2 — LOW
EGFR: 34 ML/MIN/1.73M2 — LOW
GLUCOSE BLDC GLUCOMTR-MCNC: 234 MG/DL — HIGH (ref 70–99)
GLUCOSE BLDC GLUCOMTR-MCNC: 235 MG/DL — HIGH (ref 70–99)
GLUCOSE BLDC GLUCOMTR-MCNC: 264 MG/DL — HIGH (ref 70–99)
GLUCOSE BLDC GLUCOMTR-MCNC: 279 MG/DL — HIGH (ref 70–99)
GLUCOSE BLDC GLUCOMTR-MCNC: 290 MG/DL — HIGH (ref 70–99)
GLUCOSE SERPL-MCNC: 318 MG/DL — HIGH (ref 70–99)
HCT VFR BLD CALC: 28.7 % — LOW (ref 39–50)
HCT VFR BLD CALC: 32.3 % — LOW (ref 39–50)
HGB BLD-MCNC: 10.7 G/DL — LOW (ref 13–17)
HGB BLD-MCNC: 9.8 G/DL — LOW (ref 13–17)
INR BLD: 1.32 RATIO — HIGH (ref 0.85–1.16)
MCHC RBC-ENTMCNC: 30.5 PG — SIGNIFICANT CHANGE UP (ref 27–34)
MCHC RBC-ENTMCNC: 30.7 PG — SIGNIFICANT CHANGE UP (ref 27–34)
MCHC RBC-ENTMCNC: 33.1 G/DL — SIGNIFICANT CHANGE UP (ref 32–36)
MCHC RBC-ENTMCNC: 34.1 G/DL — SIGNIFICANT CHANGE UP (ref 32–36)
MCV RBC AUTO: 90 FL — SIGNIFICANT CHANGE UP (ref 80–100)
MCV RBC AUTO: 92 FL — SIGNIFICANT CHANGE UP (ref 80–100)
PLATELET # BLD AUTO: 323 K/UL — SIGNIFICANT CHANGE UP (ref 150–400)
PLATELET # BLD AUTO: 352 K/UL — SIGNIFICANT CHANGE UP (ref 150–400)
POTASSIUM SERPL-MCNC: 4 MMOL/L — SIGNIFICANT CHANGE UP (ref 3.5–5.3)
POTASSIUM SERPL-SCNC: 4 MMOL/L — SIGNIFICANT CHANGE UP (ref 3.5–5.3)
PROT SERPL-MCNC: 7.3 GM/DL — SIGNIFICANT CHANGE UP (ref 6–8.3)
PROTHROM AB SERPL-ACNC: 15.5 SEC — HIGH (ref 9.9–13.4)
RBC # BLD: 3.19 M/UL — LOW (ref 4.2–5.8)
RBC # BLD: 3.51 M/UL — LOW (ref 4.2–5.8)
RBC # FLD: 14.9 % — HIGH (ref 10.3–14.5)
RBC # FLD: 15.3 % — HIGH (ref 10.3–14.5)
SODIUM SERPL-SCNC: 135 MMOL/L — SIGNIFICANT CHANGE UP (ref 135–145)
WBC # BLD: 9.21 K/UL — SIGNIFICANT CHANGE UP (ref 3.8–10.5)
WBC # BLD: 9.45 K/UL — SIGNIFICANT CHANGE UP (ref 3.8–10.5)
WBC # FLD AUTO: 9.21 K/UL — SIGNIFICANT CHANGE UP (ref 3.8–10.5)
WBC # FLD AUTO: 9.45 K/UL — SIGNIFICANT CHANGE UP (ref 3.8–10.5)

## 2025-01-01 PROCEDURE — 70450 CT HEAD/BRAIN W/O DYE: CPT | Mod: 26

## 2025-01-01 PROCEDURE — 99233 SBSQ HOSP IP/OBS HIGH 50: CPT

## 2025-01-01 RX ADMIN — FLUTICASONE PROPIONATE 1 SPRAY(S): 50 SPRAY, METERED NASAL at 21:58

## 2025-01-01 RX ADMIN — METOPROLOL SUCCINATE 50 MILLIGRAM(S): 50 TABLET, EXTENDED RELEASE ORAL at 10:21

## 2025-01-01 RX ADMIN — FENOFIBRATE 145 MILLIGRAM(S): 160 TABLET ORAL at 10:19

## 2025-01-01 RX ADMIN — INSULIN GLARGINE-YFGN 60 UNIT(S): 100 INJECTION, SOLUTION SUBCUTANEOUS at 09:25

## 2025-01-01 RX ADMIN — Medication 1000 UNIT(S): at 10:20

## 2025-01-01 RX ADMIN — INSULIN LISPRO 4: 100 INJECTION, SOLUTION INTRAVENOUS; SUBCUTANEOUS at 06:38

## 2025-01-01 RX ADMIN — Medication 400 MILLIGRAM(S): at 12:06

## 2025-01-01 RX ADMIN — FLUTICASONE PROPIONATE 1 SPRAY(S): 50 SPRAY, METERED NASAL at 10:19

## 2025-01-01 RX ADMIN — AMLODIPINE BESYLATE 10 MILLIGRAM(S): 10 TABLET ORAL at 10:20

## 2025-01-01 RX ADMIN — Medication 40 MILLIGRAM(S): at 10:18

## 2025-01-01 RX ADMIN — CLOPIDOGREL BISULFATE 75 MILLIGRAM(S): 75 TABLET, FILM COATED ORAL at 10:20

## 2025-01-01 RX ADMIN — INSULIN GLARGINE-YFGN 60 UNIT(S): 100 INJECTION, SOLUTION SUBCUTANEOUS at 21:59

## 2025-01-01 RX ADMIN — Medication 40 MILLIGRAM(S): at 21:59

## 2025-01-01 RX ADMIN — INSULIN LISPRO 6: 100 INJECTION, SOLUTION INTRAVENOUS; SUBCUTANEOUS at 12:05

## 2025-01-01 RX ADMIN — INSULIN LISPRO 6: 100 INJECTION, SOLUTION INTRAVENOUS; SUBCUTANEOUS at 17:43

## 2025-01-01 RX ADMIN — ATORVASTATIN CALCIUM 80 MILLIGRAM(S): 80 TABLET, FILM COATED ORAL at 21:59

## 2025-01-02 LAB
ALBUMIN SERPL ELPH-MCNC: 3.4 G/DL — SIGNIFICANT CHANGE UP (ref 3.3–5)
ALP SERPL-CCNC: 48 U/L — SIGNIFICANT CHANGE UP (ref 40–120)
ALT FLD-CCNC: 46 U/L — SIGNIFICANT CHANGE UP (ref 12–78)
ANION GAP SERPL CALC-SCNC: 7 MMOL/L — SIGNIFICANT CHANGE UP (ref 5–17)
AST SERPL-CCNC: 34 U/L — SIGNIFICANT CHANGE UP (ref 15–37)
BILIRUB SERPL-MCNC: 0.5 MG/DL — SIGNIFICANT CHANGE UP (ref 0.2–1.2)
BUN SERPL-MCNC: 31 MG/DL — HIGH (ref 7–23)
CALCIUM SERPL-MCNC: 8.6 MG/DL — SIGNIFICANT CHANGE UP (ref 8.5–10.1)
CHLORIDE SERPL-SCNC: 107 MMOL/L — SIGNIFICANT CHANGE UP (ref 96–108)
CO2 SERPL-SCNC: 22 MMOL/L — SIGNIFICANT CHANGE UP (ref 22–31)
CREAT SERPL-MCNC: 2.03 MG/DL — HIGH (ref 0.5–1.3)
EGFR: 38 ML/MIN/1.73M2 — LOW
EGFR: 38 ML/MIN/1.73M2 — LOW
GLUCOSE BLDC GLUCOMTR-MCNC: 205 MG/DL — HIGH (ref 70–99)
GLUCOSE BLDC GLUCOMTR-MCNC: 249 MG/DL — HIGH (ref 70–99)
GLUCOSE BLDC GLUCOMTR-MCNC: 264 MG/DL — HIGH (ref 70–99)
GLUCOSE BLDC GLUCOMTR-MCNC: 305 MG/DL — HIGH (ref 70–99)
GLUCOSE SERPL-MCNC: 218 MG/DL — HIGH (ref 70–99)
HCT VFR BLD CALC: 29.8 % — LOW (ref 39–50)
HGB BLD-MCNC: 10.2 G/DL — LOW (ref 13–17)
MCHC RBC-ENTMCNC: 30.8 PG — SIGNIFICANT CHANGE UP (ref 27–34)
MCHC RBC-ENTMCNC: 34.2 G/DL — SIGNIFICANT CHANGE UP (ref 32–36)
MCV RBC AUTO: 90 FL — SIGNIFICANT CHANGE UP (ref 80–100)
PLATELET # BLD AUTO: 325 K/UL — SIGNIFICANT CHANGE UP (ref 150–400)
POTASSIUM SERPL-MCNC: 3.7 MMOL/L — SIGNIFICANT CHANGE UP (ref 3.5–5.3)
POTASSIUM SERPL-SCNC: 3.7 MMOL/L — SIGNIFICANT CHANGE UP (ref 3.5–5.3)
PROT SERPL-MCNC: 7.1 GM/DL — SIGNIFICANT CHANGE UP (ref 6–8.3)
RBC # BLD: 3.31 M/UL — LOW (ref 4.2–5.8)
RBC # FLD: 15.1 % — HIGH (ref 10.3–14.5)
SODIUM SERPL-SCNC: 136 MMOL/L — SIGNIFICANT CHANGE UP (ref 135–145)
WBC # BLD: 9.18 K/UL — SIGNIFICANT CHANGE UP (ref 3.8–10.5)
WBC # FLD AUTO: 9.18 K/UL — SIGNIFICANT CHANGE UP (ref 3.8–10.5)

## 2025-01-02 PROCEDURE — 70553 MRI BRAIN STEM W/O & W/DYE: CPT | Mod: 26

## 2025-01-02 PROCEDURE — 99233 SBSQ HOSP IP/OBS HIGH 50: CPT

## 2025-01-02 PROCEDURE — 70543 MRI ORBT/FAC/NCK W/O &W/DYE: CPT | Mod: 26

## 2025-01-02 RX ORDER — FUROSEMIDE 10 MG/ML
20 INJECTION INTRAMUSCULAR; INTRAVENOUS ONCE
Refills: 0 | Status: COMPLETED | OUTPATIENT
Start: 2025-01-02 | End: 2025-01-02

## 2025-01-02 RX ADMIN — INSULIN LISPRO 4: 100 INJECTION, SOLUTION INTRAVENOUS; SUBCUTANEOUS at 12:35

## 2025-01-02 RX ADMIN — Medication 1000 UNIT(S): at 09:54

## 2025-01-02 RX ADMIN — Medication 3 MILLIGRAM(S): at 21:04

## 2025-01-02 RX ADMIN — METOPROLOL SUCCINATE 50 MILLIGRAM(S): 50 TABLET, EXTENDED RELEASE ORAL at 09:54

## 2025-01-02 RX ADMIN — INSULIN LISPRO 8: 100 INJECTION, SOLUTION INTRAVENOUS; SUBCUTANEOUS at 17:02

## 2025-01-02 RX ADMIN — AMLODIPINE BESYLATE 10 MILLIGRAM(S): 10 TABLET ORAL at 09:54

## 2025-01-02 RX ADMIN — CLOPIDOGREL BISULFATE 75 MILLIGRAM(S): 75 TABLET, FILM COATED ORAL at 09:54

## 2025-01-02 RX ADMIN — FLUTICASONE PROPIONATE 1 SPRAY(S): 50 SPRAY, METERED NASAL at 21:05

## 2025-01-02 RX ADMIN — Medication 40 MILLIGRAM(S): at 09:55

## 2025-01-02 RX ADMIN — Medication 40 MILLIGRAM(S): at 21:02

## 2025-01-02 RX ADMIN — ATORVASTATIN CALCIUM 80 MILLIGRAM(S): 80 TABLET, FILM COATED ORAL at 21:02

## 2025-01-02 RX ADMIN — FENOFIBRATE 145 MILLIGRAM(S): 160 TABLET ORAL at 09:54

## 2025-01-02 RX ADMIN — INSULIN LISPRO 4: 100 INJECTION, SOLUTION INTRAVENOUS; SUBCUTANEOUS at 07:56

## 2025-01-02 RX ADMIN — Medication 400 MILLIGRAM(S): at 12:35

## 2025-01-02 RX ADMIN — INSULIN LISPRO 1: 100 INJECTION, SOLUTION INTRAVENOUS; SUBCUTANEOUS at 21:03

## 2025-01-02 RX ADMIN — INSULIN GLARGINE-YFGN 60 UNIT(S): 100 INJECTION, SOLUTION SUBCUTANEOUS at 07:56

## 2025-01-02 RX ADMIN — FUROSEMIDE 20 MILLIGRAM(S): 10 INJECTION INTRAMUSCULAR; INTRAVENOUS at 21:02

## 2025-01-02 RX ADMIN — INSULIN GLARGINE-YFGN 60 UNIT(S): 100 INJECTION, SOLUTION SUBCUTANEOUS at 21:04

## 2025-01-02 RX ADMIN — FLUTICASONE PROPIONATE 1 SPRAY(S): 50 SPRAY, METERED NASAL at 09:55

## 2025-01-03 LAB
ANION GAP SERPL CALC-SCNC: 5 MMOL/L — SIGNIFICANT CHANGE UP (ref 5–17)
BUN SERPL-MCNC: 27 MG/DL — HIGH (ref 7–23)
CALCIUM SERPL-MCNC: 8.7 MG/DL — SIGNIFICANT CHANGE UP (ref 8.5–10.1)
CHLORIDE SERPL-SCNC: 104 MMOL/L — SIGNIFICANT CHANGE UP (ref 96–108)
CO2 SERPL-SCNC: 26 MMOL/L — SIGNIFICANT CHANGE UP (ref 22–31)
CREAT SERPL-MCNC: 2.02 MG/DL — HIGH (ref 0.5–1.3)
EGFR: 38 ML/MIN/1.73M2 — LOW
EGFR: 38 ML/MIN/1.73M2 — LOW
GLUCOSE BLDC GLUCOMTR-MCNC: 183 MG/DL — HIGH (ref 70–99)
GLUCOSE BLDC GLUCOMTR-MCNC: 234 MG/DL — HIGH (ref 70–99)
GLUCOSE BLDC GLUCOMTR-MCNC: 276 MG/DL — HIGH (ref 70–99)
GLUCOSE BLDC GLUCOMTR-MCNC: 281 MG/DL — HIGH (ref 70–99)
GLUCOSE SERPL-MCNC: 198 MG/DL — HIGH (ref 70–99)
HCT VFR BLD CALC: 31.1 % — LOW (ref 39–50)
HGB BLD-MCNC: 10.4 G/DL — LOW (ref 13–17)
INR BLD: 1.01 RATIO — SIGNIFICANT CHANGE UP (ref 0.85–1.16)
MCHC RBC-ENTMCNC: 30.2 PG — SIGNIFICANT CHANGE UP (ref 27–34)
MCHC RBC-ENTMCNC: 33.4 G/DL — SIGNIFICANT CHANGE UP (ref 32–36)
MCV RBC AUTO: 90.4 FL — SIGNIFICANT CHANGE UP (ref 80–100)
PLATELET # BLD AUTO: 354 K/UL — SIGNIFICANT CHANGE UP (ref 150–400)
POTASSIUM SERPL-MCNC: 3.5 MMOL/L — SIGNIFICANT CHANGE UP (ref 3.5–5.3)
POTASSIUM SERPL-SCNC: 3.5 MMOL/L — SIGNIFICANT CHANGE UP (ref 3.5–5.3)
PROTHROM AB SERPL-ACNC: 11.9 SEC — SIGNIFICANT CHANGE UP (ref 9.9–13.4)
RBC # BLD: 3.44 M/UL — LOW (ref 4.2–5.8)
RBC # FLD: 15.1 % — HIGH (ref 10.3–14.5)
SODIUM SERPL-SCNC: 135 MMOL/L — SIGNIFICANT CHANGE UP (ref 135–145)
WBC # BLD: 10.85 K/UL — HIGH (ref 3.8–10.5)
WBC # FLD AUTO: 10.85 K/UL — HIGH (ref 3.8–10.5)

## 2025-01-03 PROCEDURE — 99222 1ST HOSP IP/OBS MODERATE 55: CPT

## 2025-01-03 PROCEDURE — 99233 SBSQ HOSP IP/OBS HIGH 50: CPT

## 2025-01-03 RX ORDER — INSULIN GLARGINE-YFGN 100 [IU]/ML
65 INJECTION, SOLUTION SUBCUTANEOUS EVERY MORNING
Refills: 0 | Status: DISCONTINUED | OUTPATIENT
Start: 2025-01-03 | End: 2025-01-04

## 2025-01-03 RX ORDER — INSULIN GLARGINE-YFGN 100 [IU]/ML
65 INJECTION, SOLUTION SUBCUTANEOUS AT BEDTIME
Refills: 0 | Status: DISCONTINUED | OUTPATIENT
Start: 2025-01-03 | End: 2025-01-04

## 2025-01-03 RX ADMIN — INSULIN LISPRO 6: 100 INJECTION, SOLUTION INTRAVENOUS; SUBCUTANEOUS at 11:23

## 2025-01-03 RX ADMIN — INSULIN GLARGINE-YFGN 60 UNIT(S): 100 INJECTION, SOLUTION SUBCUTANEOUS at 08:25

## 2025-01-03 RX ADMIN — INSULIN LISPRO 1: 100 INJECTION, SOLUTION INTRAVENOUS; SUBCUTANEOUS at 21:22

## 2025-01-03 RX ADMIN — Medication 5 MILLIGRAM(S): at 21:14

## 2025-01-03 RX ADMIN — FLUTICASONE PROPIONATE 1 SPRAY(S): 50 SPRAY, METERED NASAL at 21:15

## 2025-01-03 RX ADMIN — Medication 1000 UNIT(S): at 08:43

## 2025-01-03 RX ADMIN — Medication 40 MILLIGRAM(S): at 08:41

## 2025-01-03 RX ADMIN — FENOFIBRATE 145 MILLIGRAM(S): 160 TABLET ORAL at 08:42

## 2025-01-03 RX ADMIN — INSULIN LISPRO 4: 100 INJECTION, SOLUTION INTRAVENOUS; SUBCUTANEOUS at 17:30

## 2025-01-03 RX ADMIN — INSULIN GLARGINE-YFGN 65 UNIT(S): 100 INJECTION, SOLUTION SUBCUTANEOUS at 21:14

## 2025-01-03 RX ADMIN — METOPROLOL SUCCINATE 50 MILLIGRAM(S): 50 TABLET, EXTENDED RELEASE ORAL at 08:42

## 2025-01-03 RX ADMIN — AMLODIPINE BESYLATE 10 MILLIGRAM(S): 10 TABLET ORAL at 08:42

## 2025-01-03 RX ADMIN — FLUTICASONE PROPIONATE 1 SPRAY(S): 50 SPRAY, METERED NASAL at 08:43

## 2025-01-03 RX ADMIN — ATORVASTATIN CALCIUM 80 MILLIGRAM(S): 80 TABLET, FILM COATED ORAL at 21:14

## 2025-01-03 RX ADMIN — Medication 40 MILLIGRAM(S): at 21:14

## 2025-01-03 RX ADMIN — INSULIN LISPRO 2: 100 INJECTION, SOLUTION INTRAVENOUS; SUBCUTANEOUS at 08:25

## 2025-01-03 RX ADMIN — Medication 400 MILLIGRAM(S): at 08:42

## 2025-01-03 RX ADMIN — CLOPIDOGREL BISULFATE 75 MILLIGRAM(S): 75 TABLET, FILM COATED ORAL at 08:42

## 2025-01-04 ENCOUNTER — TRANSCRIPTION ENCOUNTER (OUTPATIENT)
Age: 60
End: 2025-01-04

## 2025-01-04 VITALS
TEMPERATURE: 98 F | RESPIRATION RATE: 18 BRPM | DIASTOLIC BLOOD PRESSURE: 88 MMHG | HEART RATE: 85 BPM | SYSTOLIC BLOOD PRESSURE: 156 MMHG | OXYGEN SATURATION: 100 %

## 2025-01-04 LAB
GLUCOSE BLDC GLUCOMTR-MCNC: 130 MG/DL — HIGH (ref 70–99)
GLUCOSE BLDC GLUCOMTR-MCNC: 181 MG/DL — HIGH (ref 70–99)
INR BLD: 1.07 RATIO — SIGNIFICANT CHANGE UP (ref 0.85–1.16)
PROTHROM AB SERPL-ACNC: 12.3 SEC — SIGNIFICANT CHANGE UP (ref 9.9–13.4)

## 2025-01-04 PROCEDURE — 99239 HOSP IP/OBS DSCHRG MGMT >30: CPT

## 2025-01-04 PROCEDURE — 93880 EXTRACRANIAL BILAT STUDY: CPT | Mod: 26

## 2025-01-04 RX ORDER — ENOXAPARIN SODIUM 100 MG/ML
120 INJECTION SUBCUTANEOUS
Qty: 10 | Refills: 0
Start: 2025-01-04

## 2025-01-04 RX ADMIN — INSULIN GLARGINE-YFGN 65 UNIT(S): 100 INJECTION, SOLUTION SUBCUTANEOUS at 08:51

## 2025-01-04 RX ADMIN — INSULIN LISPRO 2: 100 INJECTION, SOLUTION INTRAVENOUS; SUBCUTANEOUS at 12:14

## 2025-01-04 RX ADMIN — METOPROLOL SUCCINATE 50 MILLIGRAM(S): 50 TABLET, EXTENDED RELEASE ORAL at 09:59

## 2025-01-04 RX ADMIN — AMLODIPINE BESYLATE 10 MILLIGRAM(S): 10 TABLET ORAL at 09:58

## 2025-01-04 RX ADMIN — FLUTICASONE PROPIONATE 1 SPRAY(S): 50 SPRAY, METERED NASAL at 10:00

## 2025-01-04 RX ADMIN — Medication 400 MILLIGRAM(S): at 09:58

## 2025-01-04 RX ADMIN — Medication 1000 UNIT(S): at 09:59

## 2025-01-04 RX ADMIN — Medication 40 MILLIGRAM(S): at 09:55

## 2025-01-04 RX ADMIN — FENOFIBRATE 145 MILLIGRAM(S): 160 TABLET ORAL at 09:59

## 2025-01-04 RX ADMIN — CLOPIDOGREL BISULFATE 75 MILLIGRAM(S): 75 TABLET, FILM COATED ORAL at 09:59

## 2025-01-09 NOTE — H&P ADULT - NEGATIVE GENERAL SYMPTOMS
left arm wrist and elbow pain
If you are a smoker, it is important for your health to stop smoking. Please be aware that second hand smoke is also harmful.
no fever/no chills

## 2025-01-13 DIAGNOSIS — N18.30 CHRONIC KIDNEY DISEASE, STAGE 3 UNSPECIFIED: ICD-10-CM

## 2025-01-13 DIAGNOSIS — E44.0 MODERATE PROTEIN-CALORIE MALNUTRITION: ICD-10-CM

## 2025-01-13 DIAGNOSIS — Z79.4 LONG TERM (CURRENT) USE OF INSULIN: ICD-10-CM

## 2025-01-13 DIAGNOSIS — K57.31 DIVERTICULOSIS OF LARGE INTESTINE WITHOUT PERFORATION OR ABSCESS WITH BLEEDING: ICD-10-CM

## 2025-01-13 DIAGNOSIS — H53.8 OTHER VISUAL DISTURBANCES: ICD-10-CM

## 2025-01-13 DIAGNOSIS — Z79.02 LONG TERM (CURRENT) USE OF ANTITHROMBOTICS/ANTIPLATELETS: ICD-10-CM

## 2025-01-13 DIAGNOSIS — Z79.01 LONG TERM (CURRENT) USE OF ANTICOAGULANTS: ICD-10-CM

## 2025-01-13 DIAGNOSIS — Z91.018 ALLERGY TO OTHER FOODS: ICD-10-CM

## 2025-01-13 DIAGNOSIS — G47.33 OBSTRUCTIVE SLEEP APNEA (ADULT) (PEDIATRIC): ICD-10-CM

## 2025-01-13 DIAGNOSIS — Z79.85 LONG-TERM (CURRENT) USE OF INJECTABLE NON-INSULIN ANTIDIABETIC DRUGS: ICD-10-CM

## 2025-01-13 DIAGNOSIS — Z88.0 ALLERGY STATUS TO PENICILLIN: ICD-10-CM

## 2025-01-13 DIAGNOSIS — Z79.899 OTHER LONG TERM (CURRENT) DRUG THERAPY: ICD-10-CM

## 2025-01-13 DIAGNOSIS — I12.9 HYPERTENSIVE CHRONIC KIDNEY DISEASE WITH STAGE 1 THROUGH STAGE 4 CHRONIC KIDNEY DISEASE, OR UNSPECIFIED CHRONIC KIDNEY DISEASE: ICD-10-CM

## 2025-01-13 DIAGNOSIS — Z95.5 PRESENCE OF CORONARY ANGIOPLASTY IMPLANT AND GRAFT: ICD-10-CM

## 2025-01-13 DIAGNOSIS — E11.22 TYPE 2 DIABETES MELLITUS WITH DIABETIC CHRONIC KIDNEY DISEASE: ICD-10-CM

## 2025-01-13 DIAGNOSIS — K55.21 ANGIODYSPLASIA OF COLON WITH HEMORRHAGE: ICD-10-CM

## 2025-01-13 DIAGNOSIS — I48.91 UNSPECIFIED ATRIAL FIBRILLATION: ICD-10-CM

## 2025-01-13 DIAGNOSIS — Z88.8 ALLERGY STATUS TO OTHER DRUGS, MEDICAMENTS AND BIOLOGICAL SUBSTANCES: ICD-10-CM

## 2025-01-13 DIAGNOSIS — I25.10 ATHEROSCLEROTIC HEART DISEASE OF NATIVE CORONARY ARTERY WITHOUT ANGINA PECTORIS: ICD-10-CM

## 2025-01-13 DIAGNOSIS — Z95.0 PRESENCE OF CARDIAC PACEMAKER: ICD-10-CM

## 2025-01-13 DIAGNOSIS — Z86.73 PERSONAL HISTORY OF TRANSIENT ISCHEMIC ATTACK (TIA), AND CEREBRAL INFARCTION WITHOUT RESIDUAL DEFICITS: ICD-10-CM

## 2025-01-13 DIAGNOSIS — E78.5 HYPERLIPIDEMIA, UNSPECIFIED: ICD-10-CM

## 2025-01-13 DIAGNOSIS — N17.9 ACUTE KIDNEY FAILURE, UNSPECIFIED: ICD-10-CM

## 2025-01-17 ENCOUNTER — APPOINTMENT (OUTPATIENT)
Dept: FAMILY MEDICINE | Facility: CLINIC | Age: 60
End: 2025-01-17
Payer: COMMERCIAL

## 2025-01-17 VITALS
DIASTOLIC BLOOD PRESSURE: 74 MMHG | OXYGEN SATURATION: 98 % | HEART RATE: 85 BPM | HEIGHT: 72 IN | TEMPERATURE: 97.9 F | BODY MASS INDEX: 36.03 KG/M2 | SYSTOLIC BLOOD PRESSURE: 120 MMHG | WEIGHT: 266 LBS

## 2025-01-17 DIAGNOSIS — D64.9 ANEMIA, UNSPECIFIED: ICD-10-CM

## 2025-01-17 DIAGNOSIS — R19.5 OTHER FECAL ABNORMALITIES: ICD-10-CM

## 2025-01-17 DIAGNOSIS — I25.10 ATHEROSCLEROTIC HEART DISEASE OF NATIVE CORONARY ARTERY W/OUT ANGINA PECTORIS: ICD-10-CM

## 2025-01-17 DIAGNOSIS — Z95.2 PRESENCE OF PROSTHETIC HEART VALVE: ICD-10-CM

## 2025-01-17 DIAGNOSIS — E78.5 HYPERLIPIDEMIA, UNSPECIFIED: ICD-10-CM

## 2025-01-17 DIAGNOSIS — I10 ESSENTIAL (PRIMARY) HYPERTENSION: ICD-10-CM

## 2025-01-17 DIAGNOSIS — D72.829 ELEVATED WHITE BLOOD CELL COUNT, UNSPECIFIED: ICD-10-CM

## 2025-01-17 DIAGNOSIS — E11.9 TYPE 2 DIABETES MELLITUS W/OUT COMPLICATIONS: ICD-10-CM

## 2025-01-17 DIAGNOSIS — N18.30 CHRONIC KIDNEY DISEASE, STAGE 3 UNSPECIFIED: ICD-10-CM

## 2025-01-17 PROCEDURE — 99215 OFFICE O/P EST HI 40 MIN: CPT | Mod: 25

## 2025-01-17 RX ORDER — TIRZEPATIDE 2.5 MG/.5ML
2.5 INJECTION, SOLUTION SUBCUTANEOUS
Refills: 0 | Status: ACTIVE | COMMUNITY

## 2025-01-20 ENCOUNTER — TRANSCRIPTION ENCOUNTER (OUTPATIENT)
Age: 60
End: 2025-01-20

## 2025-01-20 LAB
ALBUMIN SERPL ELPH-MCNC: 4 G/DL
ALP BLD-CCNC: 61 U/L
ALT SERPL-CCNC: 44 U/L
ANION GAP SERPL CALC-SCNC: 14 MMOL/L
AST SERPL-CCNC: 36 U/L
BASOPHILS # BLD AUTO: 0.14 K/UL
BASOPHILS NFR BLD AUTO: 1.3 %
BILIRUB SERPL-MCNC: 0.3 MG/DL
BUN SERPL-MCNC: 42 MG/DL
CALCIUM SERPL-MCNC: 9.2 MG/DL
CHLORIDE SERPL-SCNC: 104 MMOL/L
CO2 SERPL-SCNC: 22 MMOL/L
CREAT SERPL-MCNC: 2.35 MG/DL
EGFR: 31 ML/MIN/1.73M2
EOSINOPHIL # BLD AUTO: 0.39 K/UL
EOSINOPHIL NFR BLD AUTO: 3.5 %
ESTIMATED AVERAGE GLUCOSE: 183 MG/DL
FERRITIN SERPL-MCNC: 28 NG/ML
GLUCOSE SERPL-MCNC: 200 MG/DL
HBA1C MFR BLD HPLC: 8 %
HCT VFR BLD CALC: 36.8 %
HGB BLD-MCNC: 12.1 G/DL
IMM GRANULOCYTES NFR BLD AUTO: 1.8 %
IRON SATN MFR SERPL: 10 %
IRON SERPL-MCNC: 41 UG/DL
LYMPHOCYTES # BLD AUTO: 1.76 K/UL
LYMPHOCYTES NFR BLD AUTO: 15.8 %
MAN DIFF?: NORMAL
MCHC RBC-ENTMCNC: 29.7 PG
MCHC RBC-ENTMCNC: 32.9 G/DL
MCV RBC AUTO: 90.2 FL
MONOCYTES # BLD AUTO: 0.92 K/UL
MONOCYTES NFR BLD AUTO: 8.3 %
NEUTROPHILS # BLD AUTO: 7.73 K/UL
NEUTROPHILS NFR BLD AUTO: 69.3 %
PLATELET # BLD AUTO: 460 K/UL
POTASSIUM SERPL-SCNC: 4.2 MMOL/L
PROT SERPL-MCNC: 6.8 G/DL
RBC # BLD: 4.08 M/UL
RBC # FLD: 14.4 %
SODIUM SERPL-SCNC: 140 MMOL/L
TIBC SERPL-MCNC: 427 UG/DL
UIBC SERPL-MCNC: 386 UG/DL
WBC # FLD AUTO: 11.14 K/UL

## 2025-02-04 ENCOUNTER — APPOINTMENT (OUTPATIENT)
Dept: NEUROLOGY | Facility: CLINIC | Age: 60
End: 2025-02-04

## 2025-02-04 DIAGNOSIS — I63.9 CEREBRAL INFARCTION, UNSPECIFIED: ICD-10-CM

## 2025-02-04 PROCEDURE — 99215 OFFICE O/P EST HI 40 MIN: CPT

## 2025-02-05 ENCOUNTER — APPOINTMENT (OUTPATIENT)
Dept: ELECTROPHYSIOLOGY | Facility: CLINIC | Age: 60
End: 2025-02-05

## 2025-02-05 PROCEDURE — 93296 REM INTERROG EVL PM/IDS: CPT

## 2025-02-05 PROCEDURE — 93294 REM INTERROG EVL PM/LDLS PM: CPT

## 2025-03-10 ENCOUNTER — APPOINTMENT (OUTPATIENT)
Dept: FAMILY MEDICINE | Facility: CLINIC | Age: 60
End: 2025-03-10
Payer: COMMERCIAL

## 2025-03-10 VITALS
DIASTOLIC BLOOD PRESSURE: 80 MMHG | TEMPERATURE: 97.4 F | SYSTOLIC BLOOD PRESSURE: 154 MMHG | OXYGEN SATURATION: 99 % | HEART RATE: 97 BPM | HEIGHT: 72 IN | BODY MASS INDEX: 36.03 KG/M2 | WEIGHT: 266 LBS

## 2025-03-10 DIAGNOSIS — M19.90 UNSPECIFIED OSTEOARTHRITIS, UNSPECIFIED SITE: ICD-10-CM

## 2025-03-10 DIAGNOSIS — R60.0 LOCALIZED EDEMA: ICD-10-CM

## 2025-03-10 DIAGNOSIS — M25.561 PAIN IN RIGHT KNEE: ICD-10-CM

## 2025-03-10 PROCEDURE — 99213 OFFICE O/P EST LOW 20 MIN: CPT

## 2025-03-10 RX ORDER — FUROSEMIDE 20 MG/1
20 TABLET ORAL
Refills: 0 | Status: ACTIVE | COMMUNITY

## 2025-03-10 RX ORDER — METOPROLOL SUCCINATE 50 MG/1
50 TABLET, EXTENDED RELEASE ORAL
Refills: 0 | Status: ACTIVE | COMMUNITY

## 2025-03-29 NOTE — PROGRESS NOTE ADULT - ASSESSMENT
Pt had a large emesis on bed and on floor and around 200 ml in emesis bag. Pt visitors brought pt in clean dry cloths from car. Clothing patted down by said writer prior to pt changing. Pt bed linen changed and bed wiped down and floor mopped multiple times. New linen placed on bed and room situated accordingly. Pt states he is no longer nauseated following emesis. Pt now sitting on bed and visitors sitting in room talking.    53 y/o male with PMHx of T2DM is seen and evaluated for:    1. Partial thickness Ulceration, distal tuft 3rd digit; Right foot  2. Cellulitis, 3rd digit; Right foot  3. Right foot edema  4. Pain in toe; Right  5. Hyperkeratotic callus b/l Hallux  6. Hammer toes 2, 3, 4 B/L with Hallux limitus B/L  7. DM type 2     Plan:  Patient seen and evaluated with Dr. Garcia  X-ray results and lab work d/w patient  Ordered MRI Right foot to r/o Osteomyelitis  Consent and Time-Out performed prior to bedside excisional debridement of Right 3rd toe ulceration with callus debridement b/l hallux performed with #15 scalpel blade woi  After debridement; wound cleansed with NS; applied bacitracin + DSD + majo Right toe/foot; surgical shoe administered  WBAT surgical shoe  Instructed the patient to keep the dressing dry, clean and intact   Cont. IV abx with transition to PO abx; ID recommendations appreciated  Medical management appreciated  Podiatry will continue to follow patient while in house 51 y/o male with PMHx of T2DM is seen and evaluated for:    1. Partial thickness Ulceration, distal tuft 3rd digit; Right foot  2. Cellulitis, 3rd digit; Right foot  3. Right foot edema  4. Pain in toe; Right  5. Hyperkeratotic callus b/l Hallux  6. Hammer toes 2, 3, 4 B/L with Hallux limitus B/L  7. DM type 2     Plan:  Patient seen and evaluated with Dr. Garcia  X-ray results and lab work d/w patient  Ordered MRI Right foot to r/o Osteomyelitis  Debridement of the ulceration was performed.  Consent and Time-Out performed prior to bedside excisional debridement of Right 3rd toe ulceration.  Ulceration was debrided with #15 blade removing all non-viable soft tissue to a healthy bleeding border.  Attempts were made to evacuate purulence but no purulence was present.  I also parred calluses  b/l hallux performed with #15 scalpel blade  After debridement; wound cleansed with NS; applied bacitracin + DSD + majo Right toe/foot; surgical shoe administered  WBAT surgical shoe  Instructed the patient to keep the dressing dry, clean and intact   Cont. IV abx with transition to PO abx; ID recommendations appreciated  Medical management appreciated  Podiatry will continue to follow patient while in house.  Will review MRI when complete

## 2025-04-29 NOTE — CONSULT NOTE ADULT - SUBJECTIVE AND OBJECTIVE BOX
GI consult    HPI:  58-year-old male with PMH of HTN, DLD, IDDM, CKD, CAD s/p PCI (BEATRICE x8, last PCI of ISR of LAD and PTCA of diag on 9/29/23 at ) on Plavix, Afib and mechanical AVR (Oct '23 at [facility]) on Warfarin, AVB s/p PPM (Oct '23 at the time of AVR at [facility]), and GIB s/p extensive workup in 2022 (EGD, colonoscopy and capsule videoendoscopy by Dr. Cooper) with findings of sigmoid diverticulosis and gastritis in the context of high intake of NSAIDs, presents to the ED on 7/14 c/o with dizziness and dyspnea on exertion in setting of about 3 weeks of black bloody stools. Patient states he saw his PCP outpatient on 7/12 who performed bloodowrk and found his hemoglobin had dropped from around 14 to 9 g/dL and was told to follow with his GI doctor and to come to the ED if symptomatic. Patient denies nausea, vomiting, CP, fevers.  Reports mild abdominal cramping.    Pt last seen 2022 with bleeding, since then has started coumadin for mechanical AVR. Remains on plavix. No hematochezia. +lower abdominal crampy pain with BM. Reports 2-3 weeks of black stools. Taking PO iron. Hgb dropped significantly and rec'd transfusion overnight.       PAST MEDICAL & SURGICAL HISTORY:  Essential hypertension      Obstructive sleep apnea      Diabetes mellitus      HLD (hyperlipidemia)      CAD (coronary artery disease)      Pancreatitis      S/P AVR (aortic valve replacement)      Diverticulosis      Stage 3 chronic kidney disease      Pacemaker      History of coronary artery stent placement  Placed 9/12/18 by Dr. Kaye      S/P cholecystectomy          Home Medications:  atorvastatin 80 mg oral tablet: 1 tab(s) orally once a day (at bedtime) (14 Jul 2024 15:35)  cholecalciferol 25 mcg (1000 intl units) oral tablet: 1 tab(s) orally once a day (14 Jul 2024 15:35)  clopidogrel 75 mg oral tablet: 1 tab(s) orally once a day (14 Jul 2024 15:35)  fenofibrate 160 mg oral tablet: 1 tab(s) orally once a day (14 Jul 2024 15:35)  ferrous sulfate 200 mg (65 mg elemental iron) oral tablet: 1 tab(s) orally once a day (14 Jul 2024 15:35)  magnesium oxide 400 mg oral tablet: 1 tab(s) orally once a day (14 Jul 2024 15:35)  metoprolol succinate 100 mg oral tablet, extended release: 1 tab(s) orally once a day (14 Jul 2024 15:37)  NovoLOG FlexPen 100 units/mL injectable solution: injectable 3 times a day 8-25 units ***sliding scale*** (14 Jul 2024 15:37)  Tresiba FlexTouch 200 units/mL subcutaneous solution: 56 unit(s) subcutaneous once a day (14 Jul 2024 15:35)  Vitamin B Complex oral tablet: 1 tab(s) orally once a day (14 Jul 2024 15:37)  warfarin 1 mg oral tablet: 1 tab(s) orally once a day (at bedtime) Take 1 tabs of 1mg with a 5mg tab to equal 6mg (14 Jul 2024 15:35)  warfarin 5 mg oral tablet: 1 tab(s) orally once a day (at bedtime) *** take with 1 mg total 6 mg*** (14 Jul 2024 15:35)      MEDICATIONS  (STANDING):  amLODIPine   Tablet 10 milliGRAM(s) Oral daily  atorvastatin 80 milliGRAM(s) Oral at bedtime  bisacodyl 10 milliGRAM(s) Oral once  cholecalciferol 1000 Unit(s) Oral daily  clopidogrel Tablet 75 milliGRAM(s) Oral daily  dextrose 5%. 1000 milliLiter(s) (50 mL/Hr) IV Continuous <Continuous>  dextrose 5%. 1000 milliLiter(s) (100 mL/Hr) IV Continuous <Continuous>  dextrose 50% Injectable 12.5 Gram(s) IV Push once  dextrose 50% Injectable 25 Gram(s) IV Push once  dextrose 50% Injectable 25 Gram(s) IV Push once  fenofibrate Tablet 145 milliGRAM(s) Oral daily  glucagon  Injectable 1 milliGRAM(s) IntraMuscular once  insulin glargine Injectable (LANTUS) 28 Unit(s) SubCutaneous every morning  insulin lispro (ADMELOG) corrective regimen sliding scale   SubCutaneous three times a day before meals  insulin lispro (ADMELOG) corrective regimen sliding scale   SubCutaneous at bedtime  losartan 50 milliGRAM(s) Oral daily  magnesium oxide 400 milliGRAM(s) Oral daily  metoprolol succinate  milliGRAM(s) Oral daily  multivitamin 1 Tablet(s) Oral daily  pantoprazole Infusion 8 mG/Hr (10 mL/Hr) IV Continuous <Continuous>  polyethylene glycol/electrolyte Solution 2000 milliLiter(s) Oral once  sodium chloride 0.9%. 1000 milliLiter(s) (50 mL/Hr) IV Continuous <Continuous>    MEDICATIONS  (PRN):  acetaminophen     Tablet .. 650 milliGRAM(s) Oral every 6 hours PRN Temp greater or equal to 38C (100.4F), Mild Pain (1 - 3)  aluminum hydroxide/magnesium hydroxide/simethicone Suspension 30 milliLiter(s) Oral every 4 hours PRN Dyspepsia  dextrose Oral Gel 15 Gram(s) Oral once PRN Blood Glucose LESS THAN 70 milliGRAM(s)/deciliter  melatonin 3 milliGRAM(s) Oral at bedtime PRN Insomnia  ondansetron Injectable 4 milliGRAM(s) IV Push every 8 hours PRN Nausea and/or Vomiting      Allergies    scallops (Nausea)  penicillin (Hives)  Trulicity SOPN (Other)    Intolerances        SOCIAL HISTORY: NC    FAMILY HISTORY:  Family history of diabetes mellitus (Sibling)    FH: heart disease (Sibling, Father, Mother)        ROS  As above  Otherwise unremarkable, all systems reviewed    PE:  Vital Signs Last 24 Hrs  T(C): 36.4 (15 Jul 2024 07:20), Max: 37.1 (14 Jul 2024 17:30)  T(F): 97.5 (15 Jul 2024 07:20), Max: 98.8 (14 Jul 2024 17:30)  HR: 81 (15 Jul 2024 07:20) (76 - 81)  BP: 129/64 (15 Jul 2024 07:20) (107/54 - 133/69)  BP(mean): 95 (14 Jul 2024 15:14) (95 - 95)  RR: 18 (15 Jul 2024 07:20) (13 - 18)  SpO2: 96% (15 Jul 2024 07:20) (96% - 100%)    Parameters below as of 15 Jul 2024 07:20  Patient On (Oxygen Delivery Method): room air      Constitutional: NAD, well-developed, A+Ox3  Anicteric   Respiratory: CTABL, breathing comfortably  Cardiovascular: S1 and S2, RRR  Gastrointestinal: +BS, soft, non tender, non distended, no mass  Extremities: warm, well perfused, no edema  Psychiatric: Normal mood, normal affect  Neuro: moves all extremities, grossly intact  Skin: No rashes or lesions    LABS:                        8.0    11.30 )-----------( 340      ( 15 Jul 2024 07:32 )             23.4     07-15    142  |  112<H>  |  24<H>  ----------------------------<  167<H>  4.2   |  25  |  1.85<H>    Ca    8.5      15 Jul 2024 07:32    TPro  6.1  /  Alb  3.0<L>  /  TBili  0.7  /  DBili  x   /  AST  34  /  ALT  41  /  AlkPhos  44  07-15    PT/INR - ( 15 Jul 2024 07:32 )   PT: 27.4 sec;   INR: 2.49 ratio         PTT - ( 15 Jul 2024 07:32 )  PTT:43.2 sec  LIVER FUNCTIONS - ( 15 Jul 2024 07:32 )  Alb: 3.0 g/dL / Pro: 6.1 gm/dL / ALK PHOS: 44 U/L / ALT: 41 U/L / AST: 34 U/L / GGT: x             RADIOLOGY & ADDITIONAL STUDIES:      ACC: [id number] EXAM:  CT ABDOMEN AND PELVIS WAW IC   ORDERED BY: ANNA BLANDON     PROCEDURE DATE:  07/14/2024          INTERPRETATION:  CLINICAL INFORMATION: Abdominal pain. GI bleeding.    ADDITIONAL CLINICAL INFORMATION: Other, Non-specified    COMPARISON: CT abdomen and pelvis with IV contrast 8/2/2022    CONTRAST/COMPLICATIONS:  IV Contrast: Omnipaque 350  90 cc administered   0 cc discarded  Oral Contrast: NONE  Complications: None reported at time of study completion    PROCEDURE:  CT of the Abdomen and Pelvis was performed.  Precontrast, Arterial and Delayed phases were performed.  Sagittal and coronal reformats were performed.    FINDINGS:  LOWER CHEST: Aortic valve prosthesis. Pacemaker leads. Coronary artery   atherosclerotic calcifications/stents.    LIVER: Hepatic steatosis.  BILE DUCTS: Normal caliber.  GALLBLADDER: Cholecystectomy.  SPLEEN: Within normal limits.  PANCREAS: Within normal limits.  ADRENALS: Within normal limits.  KIDNEYS/URETERS: Subcentimeter renal hypodensities too small to   characterize. Nonobstructing 2 mm left renal calculus.    BLADDER: Within normal limits.  REPRODUCTIVE ORGANS: Prostate within normal limits.    BOWEL: No CT evidence of acute GI bleed. No bowel obstruction. Appendix   is normal. Colonic diverticulosis without acute diverticulitis.  PERITONEUM/RETROPERITONEUM: Within normal limits.  VESSELS: Atherosclerotic changes.  LYMPH NODES: No lymphadenopathy.  ABDOMINAL WALL: Small fat-containing inguinal hernias, left greater than   right.  BONES: Within normal limits. Sternotomy.    IMPRESSION:  No CT evidence of acute GI bleed.        --- End of Report ---          JOHN BEY MD; Resident Radiologist  This document has been electronically signed.  DULCE CLARK MD; Attending Radiologist  This document has been electronically signed. Jul 14 2024  1:41PM Airway patent, TM normal bilaterally, normal appearing mouth, nose, throat, neck supple with full range of motion, no cervical adenopathy.

## 2025-05-07 ENCOUNTER — NON-APPOINTMENT (OUTPATIENT)
Age: 60
End: 2025-05-07

## 2025-05-07 ENCOUNTER — APPOINTMENT (OUTPATIENT)
Dept: ELECTROPHYSIOLOGY | Facility: CLINIC | Age: 60
End: 2025-05-07

## 2025-05-07 PROCEDURE — 93296 REM INTERROG EVL PM/IDS: CPT

## 2025-05-07 PROCEDURE — 93294 REM INTERROG EVL PM/LDLS PM: CPT

## 2025-06-19 PROBLEM — Z87.39 HISTORY OF GOUT: Status: RESOLVED | Noted: 2021-06-03 | Resolved: 2025-06-19

## 2025-06-19 PROBLEM — Z01.818 PRE-OP TESTING: Status: RESOLVED | Noted: 2020-09-28 | Resolved: 2025-06-19

## 2025-06-19 PROBLEM — Z87.898 HISTORY OF POSTNASAL DRIP: Status: RESOLVED | Noted: 2021-07-19 | Resolved: 2025-06-19

## 2025-06-19 PROBLEM — J06.9 ACUTE UPPER RESPIRATORY INFECTION: Status: RESOLVED | Noted: 2022-10-06 | Resolved: 2025-06-19

## 2025-06-19 PROBLEM — M86.9 TOE OSTEOMYELITIS, LEFT: Status: RESOLVED | Noted: 2024-05-28 | Resolved: 2025-06-19

## 2025-06-19 PROBLEM — Z87.898 HISTORY OF EPIGASTRIC PAIN: Status: RESOLVED | Noted: 2022-08-02 | Resolved: 2025-06-19

## 2025-06-19 PROBLEM — L08.9 TOE INFECTION: Status: RESOLVED | Noted: 2024-04-30 | Resolved: 2025-06-19

## 2025-06-19 PROBLEM — N17.9 ACUTE KIDNEY INJURY: Status: RESOLVED | Noted: 2022-01-07 | Resolved: 2025-06-19

## 2025-06-19 PROBLEM — Z01.818 PRE-OP EVALUATION: Status: RESOLVED | Noted: 2023-06-01 | Resolved: 2025-06-19

## 2025-06-19 PROBLEM — S81.811A LACERATION OF RIGHT LOWER EXTREMITY, INITIAL ENCOUNTER: Status: RESOLVED | Noted: 2022-11-10 | Resolved: 2025-06-19

## 2025-06-19 PROBLEM — Z95.2 MECHANICAL HEART VALVE PRESENT: Status: RESOLVED | Noted: 2023-10-27 | Resolved: 2025-06-19

## 2025-06-19 PROBLEM — Z87.39 HISTORY OF OSTEOMYELITIS: Status: RESOLVED | Noted: 2021-07-07 | Resolved: 2025-06-19

## 2025-07-14 ENCOUNTER — OUTPATIENT (OUTPATIENT)
Dept: OUTPATIENT SERVICES | Facility: HOSPITAL | Age: 60
LOS: 1 days | Discharge: HOME | End: 2025-07-14

## 2025-07-14 ENCOUNTER — APPOINTMENT (OUTPATIENT)
Dept: FAMILY MEDICINE | Facility: CLINIC | Age: 60
End: 2025-07-14
Payer: COMMERCIAL

## 2025-07-14 VITALS
HEIGHT: 72 IN | SYSTOLIC BLOOD PRESSURE: 124 MMHG | OXYGEN SATURATION: 97 % | TEMPERATURE: 97.9 F | HEART RATE: 94 BPM | DIASTOLIC BLOOD PRESSURE: 68 MMHG | BODY MASS INDEX: 38.6 KG/M2 | WEIGHT: 285 LBS

## 2025-07-14 DIAGNOSIS — Z90.49 ACQUIRED ABSENCE OF OTHER SPECIFIED PARTS OF DIGESTIVE TRACT: Chronic | ICD-10-CM

## 2025-07-14 DIAGNOSIS — Z95.5 PRESENCE OF CORONARY ANGIOPLASTY IMPLANT AND GRAFT: Chronic | ICD-10-CM

## 2025-07-14 DIAGNOSIS — D64.9 ANEMIA, UNSPECIFIED: ICD-10-CM

## 2025-07-14 PROBLEM — J04.0 LARYNGITIS: Status: ACTIVE | Noted: 2025-07-14 | Resolved: 2025-08-13

## 2025-07-14 PROBLEM — M15.9 GENERALIZED OSTEOARTHRITIS OF MULTIPLE SITES: Status: ACTIVE | Noted: 2025-07-14

## 2025-07-14 PROBLEM — R05.1 ACUTE COUGH: Status: ACTIVE | Noted: 2025-07-14

## 2025-07-14 PROCEDURE — 87880 STREP A ASSAY W/OPTIC: CPT | Mod: QW

## 2025-07-14 PROCEDURE — 99213 OFFICE O/P EST LOW 20 MIN: CPT | Mod: 25

## 2025-07-15 ENCOUNTER — APPOINTMENT (OUTPATIENT)
Dept: HEMATOLOGY ONCOLOGY | Facility: CLINIC | Age: 60
End: 2025-07-15
Payer: COMMERCIAL

## 2025-07-15 ENCOUNTER — RESULT REVIEW (OUTPATIENT)
Age: 60
End: 2025-07-15

## 2025-07-15 VITALS
HEART RATE: 87 BPM | OXYGEN SATURATION: 96 % | WEIGHT: 284 LBS | SYSTOLIC BLOOD PRESSURE: 120 MMHG | DIASTOLIC BLOOD PRESSURE: 70 MMHG | BODY MASS INDEX: 38.52 KG/M2 | TEMPERATURE: 97.9 F

## 2025-07-15 PROBLEM — E11.22 TYPE 2 DIABETES MELLITUS WITH STAGE 3A CHRONIC KIDNEY DISEASE, WITH LONG-TERM CURRENT USE OF INSULIN: Status: ACTIVE | Noted: 2021-07-07

## 2025-07-15 PROBLEM — Z86.79 HISTORY OF HEART BLOCK: Status: RESOLVED | Noted: 2023-11-03 | Resolved: 2025-07-15

## 2025-07-15 PROBLEM — M25.561 ACUTE PAIN OF RIGHT KNEE: Status: RESOLVED | Noted: 2025-03-10 | Resolved: 2025-07-15

## 2025-07-15 LAB
BASOPHILS # BLD AUTO: 0.05 K/UL — SIGNIFICANT CHANGE UP (ref 0–0.2)
BASOPHILS NFR BLD AUTO: 0.5 % — SIGNIFICANT CHANGE UP (ref 0–2)
EOSINOPHIL # BLD AUTO: 0.09 K/UL — SIGNIFICANT CHANGE UP (ref 0–0.5)
EOSINOPHIL NFR BLD AUTO: 0.9 % — SIGNIFICANT CHANGE UP (ref 0–6)
HCT VFR BLD CALC: 30 % — LOW (ref 39–50)
HGB BLD-MCNC: 10.3 G/DL — LOW (ref 13–17)
IMM GRANULOCYTES # BLD AUTO: 0.27 K/UL — HIGH (ref 0–0.07)
IMM GRANULOCYTES NFR BLD AUTO: 2.7 % — HIGH (ref 0–0.9)
LYMPHOCYTES # BLD AUTO: 1.56 K/UL — SIGNIFICANT CHANGE UP (ref 1–3.3)
LYMPHOCYTES NFR BLD AUTO: 15.6 % — SIGNIFICANT CHANGE UP (ref 13–44)
MCHC RBC-ENTMCNC: 30.3 PG — SIGNIFICANT CHANGE UP (ref 27–34)
MCHC RBC-ENTMCNC: 34.3 G/DL — SIGNIFICANT CHANGE UP (ref 32–36)
MCV RBC AUTO: 88.2 FL — SIGNIFICANT CHANGE UP (ref 80–100)
MONOCYTES # BLD AUTO: 1.01 K/UL — HIGH (ref 0–0.9)
MONOCYTES NFR BLD AUTO: 10.1 % — SIGNIFICANT CHANGE UP (ref 2–14)
NEUTROPHILS # BLD AUTO: 7.03 K/UL — SIGNIFICANT CHANGE UP (ref 1.8–7.4)
NEUTROPHILS NFR BLD AUTO: 70.2 % — SIGNIFICANT CHANGE UP (ref 43–77)
NRBC # BLD AUTO: 0 K/UL — SIGNIFICANT CHANGE UP (ref 0–0)
NRBC # FLD: 0 K/UL — SIGNIFICANT CHANGE UP (ref 0–0)
NRBC BLD AUTO-RTO: 0 /100 WBCS — SIGNIFICANT CHANGE UP (ref 0–0)
PLATELET # BLD AUTO: 254 K/UL — SIGNIFICANT CHANGE UP (ref 150–400)
PMV BLD: 11.5 FL — SIGNIFICANT CHANGE UP (ref 7–13)
RBC # BLD: 3.4 M/UL — LOW (ref 4.2–5.8)
RBC # FLD: 14.1 % — SIGNIFICANT CHANGE UP (ref 10.3–14.5)
WBC # BLD: 10.01 K/UL — SIGNIFICANT CHANGE UP (ref 3.8–10.5)
WBC # FLD AUTO: 10.01 K/UL — SIGNIFICANT CHANGE UP (ref 3.8–10.5)

## 2025-07-15 PROCEDURE — 99204 OFFICE O/P NEW MOD 45 MIN: CPT

## 2025-07-16 PROBLEM — G25.81 RLS (RESTLESS LEGS SYNDROME): Status: ACTIVE | Noted: 2025-07-16

## 2025-07-16 PROBLEM — Z79.02 PLATELET INHIBITION DUE TO PLAVIX: Status: ACTIVE | Noted: 2025-07-15

## 2025-07-16 PROBLEM — Z87.19 HISTORY OF UPPER GASTROINTESTINAL HEMORRHAGE: Status: RESOLVED | Noted: 2022-01-07 | Resolved: 2025-06-19

## 2025-07-16 PROBLEM — Z79.01 ANTICOAGULATED ON COUMADIN: Status: ACTIVE | Noted: 2025-07-15

## 2025-07-16 PROBLEM — D50.9 IRON DEFICIENCY ANEMIA: Status: ACTIVE | Noted: 2025-07-15

## 2025-07-16 PROBLEM — K55.20 ANGIODYSPLASIA OF SMALL INTESTINE: Status: ACTIVE | Noted: 2025-07-15

## 2025-07-16 PROBLEM — Z92.89 HISTORY OF BLOOD TRANSFUSION: Status: RESOLVED | Noted: 2025-07-16 | Resolved: 2025-07-16

## 2025-07-16 PROBLEM — R19.5 DARK STOOLS: Status: RESOLVED | Noted: 2024-07-11 | Resolved: 2025-07-15

## 2025-07-16 LAB
ANION GAP SERPL CALC-SCNC: 15 MMOL/L
BUN SERPL-MCNC: 72 MG/DL
CALCIUM SERPL-MCNC: 8.9 MG/DL
CHLORIDE SERPL-SCNC: 104 MMOL/L
CO2 SERPL-SCNC: 18 MMOL/L
CREAT SERPL-MCNC: 2.75 MG/DL
EGFRCR SERPLBLD CKD-EPI 2021: 26 ML/MIN/1.73M2
ERYTHROCYTE [SEDIMENTATION RATE] IN BLOOD BY WESTERGREN METHOD: 25 MM/HR
FERRITIN SERPL-MCNC: 80 NG/ML
FOLATE SERPL-MCNC: 16 NG/ML
GLUCOSE SERPL-MCNC: 223 MG/DL
HAPTOGLOB SERPL-MCNC: 191 MG/DL
IRON SATN MFR SERPL: 23 %
IRON SERPL-MCNC: 74 UG/DL
POTASSIUM SERPL-SCNC: 5 MMOL/L
RBC # BLD: 3.33 M/UL
RETICS # AUTO: 2.4 %
RETICS AGGREG/RBC NFR: 79.6 K/UL
SODIUM SERPL-SCNC: 138 MMOL/L
TIBC SERPL-MCNC: 322 UG/DL
UIBC SERPL-MCNC: 248 UG/DL
VIT B12 SERPL-MCNC: 641 PG/ML

## 2025-07-16 RX ORDER — TIRZEPATIDE 5 MG/.5ML
5 INJECTION, SOLUTION SUBCUTANEOUS
Qty: 2 | Refills: 0 | Status: ACTIVE | COMMUNITY
Start: 2025-07-01

## 2025-07-16 RX ORDER — DOXAZOSIN 2 MG/1
2 TABLET ORAL
Qty: 90 | Refills: 0 | Status: ACTIVE | COMMUNITY
Start: 2025-03-19

## 2025-07-16 RX ORDER — OLMESARTAN MEDOXOMIL 40 MG/1
40 TABLET, FILM COATED ORAL
Qty: 90 | Refills: 0 | Status: ACTIVE | COMMUNITY
Start: 2025-07-11

## 2025-07-16 RX ORDER — MULTIVIT-MIN/FOLIC/VIT K/LYCOP 400-300MCG
50 MCG TABLET ORAL
Refills: 0 | Status: ACTIVE | COMMUNITY

## 2025-07-16 RX ORDER — ICOSAPENT ETHYL 1 G/1
1 CAPSULE ORAL
Qty: 360 | Refills: 0 | Status: ACTIVE | COMMUNITY
Start: 2024-10-21

## 2025-07-16 RX ORDER — FLUTICASONE PROPIONATE 50 MCG
50 SPRAY, SUSPENSION NASAL
Refills: 0 | Status: ACTIVE | COMMUNITY

## 2025-07-16 RX ORDER — OMEGA-3/DHA/EPA/FISH OIL 300-1000MG
400 CAPSULE ORAL
Refills: 0 | Status: ACTIVE | COMMUNITY

## 2025-07-16 RX ORDER — INSULIN HUMAN 500 [IU]/ML
500 INJECTION, SOLUTION SUBCUTANEOUS
Qty: 12 | Refills: 0 | Status: ACTIVE | COMMUNITY
Start: 2025-03-24

## 2025-07-17 LAB
BACTERIA THROAT CULT: NORMAL
EPO SERPL-MCNC: 25.9 MIU/ML

## 2025-07-18 LAB
ALBUMIN MFR SERPL ELPH: 56.9 %
ALBUMIN SERPL-MCNC: 3.4 G/DL
ALBUMIN/GLOB SERPL: 1.3 RATIO
ALPHA1 GLOB MFR SERPL ELPH: 5.5 %
ALPHA1 GLOB SERPL ELPH-MCNC: 0.3 G/DL
ALPHA2 GLOB MFR SERPL ELPH: 13.9 %
ALPHA2 GLOB SERPL ELPH-MCNC: 0.8 G/DL
B-GLOBULIN MFR SERPL ELPH: 13.3 %
B-GLOBULIN SERPL ELPH-MCNC: 0.8 G/DL
DEPRECATED KAPPA LC FREE/LAMBDA SER: 1.42 RATIO
GAMMA GLOB FLD ELPH-MCNC: 0.6 G/DL
GAMMA GLOB MFR SERPL ELPH: 10.4 %
IGA SERPL-MCNC: 150 MG/DL
IGG SERPL-MCNC: 619 MG/DL
IGM SERPL-MCNC: 38 MG/DL
INTERPRETATION SERPL IEP-IMP: NORMAL
KAPPA LC CSF-MCNC: 4.32 MG/DL
KAPPA LC SERPL-MCNC: 6.14 MG/DL
M PROTEIN SPEC IFE-MCNC: NORMAL
PROT SERPL-MCNC: 6 G/DL
PROT SERPL-MCNC: 6 G/DL

## 2025-07-18 NOTE — PATIENT PROFILE ADULT - NSFALLSECTIONLABEL_GEN_A_CORE

## 2025-07-23 ENCOUNTER — APPOINTMENT (OUTPATIENT)
Dept: INFUSION THERAPY | Facility: CLINIC | Age: 60
End: 2025-07-23

## 2025-07-24 DIAGNOSIS — D50.9 IRON DEFICIENCY ANEMIA, UNSPECIFIED: ICD-10-CM

## 2025-07-31 ENCOUNTER — APPOINTMENT (OUTPATIENT)
Dept: INFUSION THERAPY | Facility: CLINIC | Age: 60
End: 2025-07-31

## 2025-07-31 VITALS — HEART RATE: 88 BPM | DIASTOLIC BLOOD PRESSURE: 81 MMHG | OXYGEN SATURATION: 99 % | SYSTOLIC BLOOD PRESSURE: 128 MMHG

## 2025-07-31 VITALS — BODY MASS INDEX: 38.38 KG/M2 | WEIGHT: 283 LBS | TEMPERATURE: 97.2 F

## 2025-08-05 ENCOUNTER — NON-APPOINTMENT (OUTPATIENT)
Age: 60
End: 2025-08-05

## 2025-08-06 ENCOUNTER — APPOINTMENT (OUTPATIENT)
Dept: ELECTROPHYSIOLOGY | Facility: CLINIC | Age: 60
End: 2025-08-06
Payer: COMMERCIAL

## 2025-08-06 VITALS
OXYGEN SATURATION: 97 % | HEIGHT: 72 IN | WEIGHT: 283 LBS | HEART RATE: 80 BPM | SYSTOLIC BLOOD PRESSURE: 142 MMHG | DIASTOLIC BLOOD PRESSURE: 70 MMHG | BODY MASS INDEX: 38.33 KG/M2

## 2025-08-06 PROCEDURE — 93000 ELECTROCARDIOGRAM COMPLETE: CPT | Mod: 59

## 2025-08-06 PROCEDURE — 99214 OFFICE O/P EST MOD 30 MIN: CPT | Mod: 25

## 2025-08-06 PROCEDURE — 93281 PM DEVICE PROGR EVAL MULTI: CPT

## 2025-08-08 ENCOUNTER — APPOINTMENT (OUTPATIENT)
Dept: INFUSION THERAPY | Facility: CLINIC | Age: 60
End: 2025-08-08

## 2025-09-05 ENCOUNTER — RESULT REVIEW (OUTPATIENT)
Age: 60
End: 2025-09-05

## 2025-09-05 ENCOUNTER — APPOINTMENT (OUTPATIENT)
Dept: HEMATOLOGY ONCOLOGY | Facility: CLINIC | Age: 60
End: 2025-09-05

## 2025-09-05 LAB
BASOPHILS # BLD AUTO: 0.13 K/UL — SIGNIFICANT CHANGE UP (ref 0–0.2)
BASOPHILS NFR BLD AUTO: 1.5 % — SIGNIFICANT CHANGE UP (ref 0–2)
EOSINOPHIL # BLD AUTO: 0.3 K/UL — SIGNIFICANT CHANGE UP (ref 0–0.5)
EOSINOPHIL NFR BLD AUTO: 3.4 % — SIGNIFICANT CHANGE UP (ref 0–6)
HCT VFR BLD CALC: 39.8 % — SIGNIFICANT CHANGE UP (ref 39–50)
HGB BLD-MCNC: 13.4 G/DL — SIGNIFICANT CHANGE UP (ref 13–17)
IMM GRANULOCYTES # BLD AUTO: 0.35 K/UL — HIGH (ref 0–0.07)
IMM GRANULOCYTES NFR BLD AUTO: 3.9 % — HIGH (ref 0–0.9)
LYMPHOCYTES # BLD AUTO: 1.56 K/UL — SIGNIFICANT CHANGE UP (ref 1–3.3)
LYMPHOCYTES NFR BLD AUTO: 17.6 % — SIGNIFICANT CHANGE UP (ref 13–44)
MCHC RBC-ENTMCNC: 30.2 PG — SIGNIFICANT CHANGE UP (ref 27–34)
MCHC RBC-ENTMCNC: 33.7 G/DL — SIGNIFICANT CHANGE UP (ref 32–36)
MCV RBC AUTO: 89.8 FL — SIGNIFICANT CHANGE UP (ref 80–100)
MONOCYTES # BLD AUTO: 0.77 K/UL — SIGNIFICANT CHANGE UP (ref 0–0.9)
MONOCYTES NFR BLD AUTO: 8.7 % — SIGNIFICANT CHANGE UP (ref 2–14)
NEUTROPHILS # BLD AUTO: 5.76 K/UL — SIGNIFICANT CHANGE UP (ref 1.8–7.4)
NEUTROPHILS NFR BLD AUTO: 64.9 % — SIGNIFICANT CHANGE UP (ref 43–77)
NRBC # BLD AUTO: 0 K/UL — SIGNIFICANT CHANGE UP (ref 0–0)
NRBC # FLD: 0 K/UL — SIGNIFICANT CHANGE UP (ref 0–0)
NRBC BLD AUTO-RTO: 0 /100 WBCS — SIGNIFICANT CHANGE UP (ref 0–0)
PLATELET # BLD AUTO: 250 K/UL — SIGNIFICANT CHANGE UP (ref 150–400)
PMV BLD: 10.7 FL — SIGNIFICANT CHANGE UP (ref 7–13)
RBC # BLD: 4.43 M/UL — SIGNIFICANT CHANGE UP (ref 4.2–5.8)
RBC # FLD: 13.4 % — SIGNIFICANT CHANGE UP (ref 10.3–14.5)
WBC # BLD: 8.87 K/UL — SIGNIFICANT CHANGE UP (ref 3.8–10.5)
WBC # FLD AUTO: 8.87 K/UL — SIGNIFICANT CHANGE UP (ref 3.8–10.5)

## 2025-09-09 LAB
ERYTHROCYTE [SEDIMENTATION RATE] IN BLOOD BY WESTERGREN METHOD: 31 MM/HR
FERRITIN SERPL-MCNC: 290 NG/ML
IRON SATN MFR SERPL: 23 %
IRON SERPL-MCNC: 71 UG/DL
TIBC SERPL-MCNC: 315 UG/DL
UIBC SERPL-MCNC: 244 UG/DL

## 2025-09-19 ENCOUNTER — RESULT REVIEW (OUTPATIENT)
Age: 60
End: 2025-09-19

## 2025-09-19 RX ORDER — TIRZEPATIDE 7.5 MG/.5ML
2.5 INJECTION, SOLUTION SUBCUTANEOUS
Refills: 0 | DISCHARGE

## 2025-09-19 RX ORDER — HYDROCHLOROTHIAZIDE 50 MG/1
1 TABLET ORAL
Refills: 0 | DISCHARGE

## 2025-09-23 RX ORDER — FLUTICASONE PROPIONATE 50 UG/1
1 SPRAY, METERED NASAL
Refills: 0 | DISCHARGE

## 2025-09-23 RX ORDER — ICOSAPENT ETHYL 500 MG/1
2 CAPSULE ORAL
Refills: 0 | DISCHARGE

## 2025-09-23 RX ORDER — METOPROLOL SUCCINATE 50 MG/1
1 TABLET, EXTENDED RELEASE ORAL
Refills: 0 | DISCHARGE

## (undated) DEVICE — BIOMET BLADE STERNALOCK XP

## (undated) DEVICE — VISITEC 4X4

## (undated) DEVICE — BIOMET DRILL DRIVER HI TORQUE

## (undated) DEVICE — SUT ETHIBOND 2-0 4-30" RB-1 WHITE

## (undated) DEVICE — SUT ETHIBOND 2-0 4-30" RB-1 GREEN

## (undated) DEVICE — STOPCOCK 3 WAY W SWIVEL MALE LUER LOCK

## (undated) DEVICE — SUT VICRYL 3-0 27" CT-1

## (undated) DEVICE — PREP SCRUB BRUSH W CHG 4%

## (undated) DEVICE — SYR LUER LOK 20CC

## (undated) DEVICE — SUT SILK 5-0 60" TIES

## (undated) DEVICE — DRSG 4 X 8

## (undated) DEVICE — DRAPE CV 106" X 135"

## (undated) DEVICE — TUBING SUCTION 20FT

## (undated) DEVICE — SUT SILK 2-0 18" SH (POP-OFF)

## (undated) DEVICE — VESSEL LOOP EXTRA MAXI-BLUE 0.200" X 22"

## (undated) DEVICE — CONNECTOR STRAIGHT 3/8 X 3/8"

## (undated) DEVICE — DRAPE SLUSH / WARMER 44 X 66"

## (undated) DEVICE — SOL IRR POUR NS 0.9% 1000ML

## (undated) DEVICE — SUT SILK 0 30" SH

## (undated) DEVICE — GOWN TRIMAX LG

## (undated) DEVICE — DRSG OPSITE 2.5 X 2"

## (undated) DEVICE — DRAPE TOWEL WHITE 17" X 24"

## (undated) DEVICE — DRAPE TOWEL BLUE 17" X 24"

## (undated) DEVICE — PACK VALVE

## (undated) DEVICE — SUT ETHIBOND 2-0 30" SH-1 GREEN/WHITE

## (undated) DEVICE — SPONGE PEANUT AUTO COUNT

## (undated) DEVICE — SUT SILK 0 30" TIES

## (undated) DEVICE — SUT PROLENE 4-0 30" SH-1

## (undated) DEVICE — ELCTR BOVIE BLADE 3/4" EXTENDED LENGTH 6"

## (undated) DEVICE — SUT SILK 0 30" KS

## (undated) DEVICE — SOL INJ NS 0.9% 1000ML

## (undated) DEVICE — SUT STAINLESS STEEL 5 18" SCC

## (undated) DEVICE — SUT ETHIBOND 1 30" OS6

## (undated) DEVICE — SUT ETHIBOND 2-0 36" SH

## (undated) DEVICE — CHEST DRAIN PLEUR-EVAC DRY/WET ADULT-PEDS SINGLE (QUICK)

## (undated) DEVICE — SUT VICRYL 1 36" CTX UNDYED

## (undated) DEVICE — SUT ETHIBOND 2-0 30" V5 WHITE

## (undated) DEVICE — VENTING ADAPTER "Y" (RED/BLUE) 7.5"

## (undated) DEVICE — SUT VICRYL 2-0 27" CT-1 UNDYED

## (undated) DEVICE — WARMING BLANKET DUO-THERM HYPER/HYPOTHERM ADULT

## (undated) DEVICE — SUT VICRYL 0 36" CTX UNDYED

## (undated) DEVICE — WOUND IRR IRRISEPT W 0.5 CHG

## (undated) DEVICE — PHRENIC NERVE PAD MEDIUM

## (undated) DEVICE — SUT ETHIBOND 4-0 36" RB-1

## (undated) DEVICE — SUT PDS II 2-0 27" CT-1

## (undated) DEVICE — SUT SOFSILK 4-0 30" TIES

## (undated) DEVICE — SUT PROLENE 3-0 36" SH

## (undated) DEVICE — SUT PROLENE 4-0 36" RB-1

## (undated) DEVICE — MARKING PEN W RULER

## (undated) DEVICE — SOL BAG NS 0.9% 1000ML

## (undated) DEVICE — CONNECTOR STRAIGHT 3/8 X 1/2"

## (undated) DEVICE — STAPLER SKIN PROXIMATE

## (undated) DEVICE — POSITIONER FOAM EGG CRATE ULNAR 2PCS (PINK)

## (undated) DEVICE — TUBING INSUFFLATION LAP FILTER 10FT

## (undated) DEVICE — TONGUE DEPRESSOR

## (undated) DEVICE — SUT PERMAHAND SILK 2 60" TIES

## (undated) DEVICE — SUT PROLENE 5-0 36" RB-1

## (undated) DEVICE — DRSG OPSITE 13.75 X 4"

## (undated) DEVICE — PACK OPEN HEART VAMP PLUS

## (undated) DEVICE — DRSG TEGADERM 4X4.75"

## (undated) DEVICE — CONNECTOR "Y" 1/2 X 3/8 X 3/8"

## (undated) DEVICE — SUT PROLENE 4-0 36" SH

## (undated) DEVICE — SUT MONOCRYL 4-0 27" PS-2 UNDYED

## (undated) DEVICE — TUBING TRUWAVE PRESSURE MALE/FEMALE 72"

## (undated) DEVICE — LAP PAD 18 X 18"

## (undated) DEVICE — SUT SOFSILK 4-0 24" CV-15

## (undated) DEVICE — SUT PLEDGET 9MM X 4MM X 1.5MM

## (undated) DEVICE — URETERAL CATH RED RUBBER 18FR (RED)

## (undated) DEVICE — PREP CHLORAPREP HI-LITE ORANGE 26ML

## (undated) DEVICE — STEALTH CLAMP INSERT FIBRA/FIBRA 60MM

## (undated) DEVICE — Device

## (undated) DEVICE — DRSG MEPILEX 10 X 30CM (4 X 12") WHITE

## (undated) DEVICE — MULTIPLE PERFUSION SET FEMALE 1 INLET LEG W 4 LEGS 15" (BLUE/RED)

## (undated) DEVICE — NDL COUNTER FOAM AND MAGNET 40-70

## (undated) DEVICE — GOWN XL W TOWEL

## (undated) DEVICE — GLV 7.5 PROTEXIS (WHITE)

## (undated) DEVICE — SUT ETHIBOND 3-0 36" RB-1

## (undated) DEVICE — ELCTR MULTIFUNCTION DEFIBRILLATION ELECTRODE EDGE SYSTEM ADULT

## (undated) DEVICE — DRSG MEPILEX 10 X 10CM (4 X 4") AG

## (undated) DEVICE — MAXI-FLO SUCTION CATHETER KIT 14FR STRAIGHT

## (undated) DEVICE — SUT PROLENE 5-0 30" RB-2